# Patient Record
Sex: FEMALE | Race: BLACK OR AFRICAN AMERICAN | NOT HISPANIC OR LATINO | Employment: OTHER | URBAN - METROPOLITAN AREA
[De-identification: names, ages, dates, MRNs, and addresses within clinical notes are randomized per-mention and may not be internally consistent; named-entity substitution may affect disease eponyms.]

---

## 2017-11-29 ENCOUNTER — HOSPITAL ENCOUNTER (EMERGENCY)
Facility: HOSPITAL | Age: 72
Discharge: HOME | End: 2017-11-29
Payer: MEDICARE

## 2017-11-29 VITALS
WEIGHT: 160 LBS | RESPIRATION RATE: 18 BRPM | HEIGHT: 63 IN | TEMPERATURE: 98 F | HEART RATE: 74 BPM | DIASTOLIC BLOOD PRESSURE: 81 MMHG | OXYGEN SATURATION: 97 % | SYSTOLIC BLOOD PRESSURE: 148 MMHG | BODY MASS INDEX: 28.35 KG/M2

## 2017-11-29 DIAGNOSIS — M54.9 BACK PAIN: Primary | ICD-10-CM

## 2017-11-29 DIAGNOSIS — M25.552 ACUTE HIP PAIN, LEFT: ICD-10-CM

## 2017-11-29 DIAGNOSIS — M79.605 LEFT LEG PAIN: ICD-10-CM

## 2017-11-29 DIAGNOSIS — R74.8 ELEVATED CPK: ICD-10-CM

## 2017-11-29 LAB
ANION GAP SERPL CALC-SCNC: 8 MMOL/L
BASOPHILS # BLD AUTO: 0.02 K/UL
BASOPHILS NFR BLD: 0.2 %
BUN SERPL-MCNC: 19 MG/DL
CALCIUM SERPL-MCNC: 9.3 MG/DL
CHLORIDE SERPL-SCNC: 105 MMOL/L
CK SERPL-CCNC: 302 U/L
CO2 SERPL-SCNC: 27 MMOL/L
CREAT SERPL-MCNC: 0.9 MG/DL
D DIMER PPP IA.FEU-MCNC: 0.6 MG/L FEU
DIFFERENTIAL METHOD: ABNORMAL
EOSINOPHIL # BLD AUTO: 0.2 K/UL
EOSINOPHIL NFR BLD: 2.5 %
ERYTHROCYTE [DISTWIDTH] IN BLOOD BY AUTOMATED COUNT: 14 %
EST. GFR  (AFRICAN AMERICAN): >60 ML/MIN/1.73 M^2
EST. GFR  (NON AFRICAN AMERICAN): >60 ML/MIN/1.73 M^2
GLUCOSE SERPL-MCNC: 119 MG/DL
HCT VFR BLD AUTO: 35 %
HGB BLD-MCNC: 11.6 G/DL
LYMPHOCYTES # BLD AUTO: 3.3 K/UL
LYMPHOCYTES NFR BLD: 36.8 %
MAGNESIUM SERPL-MCNC: 2 MG/DL
MCH RBC QN AUTO: 29.1 PG
MCHC RBC AUTO-ENTMCNC: 33.1 G/DL
MCV RBC AUTO: 88 FL
MONOCYTES # BLD AUTO: 0.8 K/UL
MONOCYTES NFR BLD: 8.4 %
NEUTROPHILS # BLD AUTO: 4.7 K/UL
NEUTROPHILS NFR BLD: 52.1 %
PLATELET # BLD AUTO: 341 K/UL
PMV BLD AUTO: 9.5 FL
POTASSIUM SERPL-SCNC: 3.9 MMOL/L
RBC # BLD AUTO: 3.99 M/UL
SODIUM SERPL-SCNC: 140 MMOL/L
WBC # BLD AUTO: 9.04 K/UL

## 2017-11-29 PROCEDURE — 83735 ASSAY OF MAGNESIUM: CPT

## 2017-11-29 PROCEDURE — 82550 ASSAY OF CK (CPK): CPT

## 2017-11-29 PROCEDURE — 80048 BASIC METABOLIC PNL TOTAL CA: CPT

## 2017-11-29 PROCEDURE — 99284 EMERGENCY DEPT VISIT MOD MDM: CPT | Mod: 25

## 2017-11-29 PROCEDURE — 25000003 PHARM REV CODE 250: Performed by: PHYSICIAN ASSISTANT

## 2017-11-29 PROCEDURE — 85379 FIBRIN DEGRADATION QUANT: CPT

## 2017-11-29 PROCEDURE — 96361 HYDRATE IV INFUSION ADD-ON: CPT

## 2017-11-29 PROCEDURE — 96374 THER/PROPH/DIAG INJ IV PUSH: CPT

## 2017-11-29 PROCEDURE — 85025 COMPLETE CBC W/AUTO DIFF WBC: CPT

## 2017-11-29 PROCEDURE — 96375 TX/PRO/DX INJ NEW DRUG ADDON: CPT

## 2017-11-29 PROCEDURE — 63600175 PHARM REV CODE 636 W HCPCS: Performed by: PHYSICIAN ASSISTANT

## 2017-11-29 RX ORDER — METFORMIN HYDROCHLORIDE 1000 MG/1
1000 TABLET ORAL 2 TIMES DAILY WITH MEALS
COMMUNITY
End: 2018-05-10 | Stop reason: SDUPTHER

## 2017-11-29 RX ORDER — LORATADINE 10 MG/1
10 TABLET ORAL DAILY
COMMUNITY

## 2017-11-29 RX ORDER — VITAMIN B COMPLEX
1 CAPSULE ORAL DAILY
Status: ON HOLD | COMMUNITY
End: 2022-08-03 | Stop reason: HOSPADM

## 2017-11-29 RX ORDER — VALSARTAN AND HYDROCHLOROTHIAZIDE 160; 12.5 MG/1; MG/1
1 TABLET, FILM COATED ORAL DAILY
COMMUNITY
End: 2018-05-10 | Stop reason: SDUPTHER

## 2017-11-29 RX ORDER — ASCORBIC ACID 500 MG
500 TABLET ORAL DAILY
Status: ON HOLD | COMMUNITY
End: 2022-08-03 | Stop reason: HOSPADM

## 2017-11-29 RX ORDER — AMLODIPINE BESYLATE 2.5 MG/1
2.5 TABLET ORAL DAILY
COMMUNITY
End: 2018-05-10 | Stop reason: SDUPTHER

## 2017-11-29 RX ORDER — MORPHINE SULFATE 4 MG/ML
4 INJECTION, SOLUTION INTRAMUSCULAR; INTRAVENOUS
Status: COMPLETED | OUTPATIENT
Start: 2017-11-29 | End: 2017-11-29

## 2017-11-29 RX ORDER — ONDANSETRON 2 MG/ML
4 INJECTION INTRAMUSCULAR; INTRAVENOUS
Status: COMPLETED | OUTPATIENT
Start: 2017-11-29 | End: 2017-11-29

## 2017-11-29 RX ORDER — HYDROCODONE BITARTRATE AND ACETAMINOPHEN 5; 325 MG/1; MG/1
1 TABLET ORAL EVERY 4 HOURS PRN
Qty: 10 TABLET | Refills: 0 | Status: SHIPPED | OUTPATIENT
Start: 2017-11-29 | End: 2017-12-06 | Stop reason: SDUPTHER

## 2017-11-29 RX ORDER — CHOLECALCIFEROL (VITAMIN D3) 25 MCG
1000 TABLET ORAL DAILY
Status: ON HOLD | COMMUNITY
End: 2022-08-03 | Stop reason: HOSPADM

## 2017-11-29 RX ORDER — ATORVASTATIN CALCIUM 20 MG/1
20 TABLET, FILM COATED ORAL DAILY
COMMUNITY
End: 2017-11-29 | Stop reason: SINTOL

## 2017-11-29 RX ORDER — ASPIRIN 81 MG/1
81 TABLET ORAL DAILY
Status: ON HOLD | COMMUNITY
End: 2018-03-21

## 2017-11-29 RX ORDER — CIMETIDINE 400 MG/1
400 TABLET, FILM COATED ORAL 2 TIMES DAILY
COMMUNITY
End: 2018-04-12 | Stop reason: CLARIF

## 2017-11-29 RX ADMIN — MORPHINE SULFATE 4 MG: 4 INJECTION, SOLUTION INTRAMUSCULAR; INTRAVENOUS at 07:11

## 2017-11-29 RX ADMIN — ONDANSETRON HYDROCHLORIDE 4 MG: 2 INJECTION, SOLUTION INTRAMUSCULAR; INTRAVENOUS at 07:11

## 2017-11-29 RX ADMIN — SODIUM CHLORIDE 500 ML: 0.9 INJECTION, SOLUTION INTRAVENOUS at 08:11

## 2017-11-30 NOTE — ED PROVIDER NOTES
SCRIBE #1 NOTE: I, Kentrell Bowman, am scribing for, and in the presence of, LEA Newby. I have scribed the entire note.      History      Chief Complaint   Patient presents with    Hip Pain     left hip, leg ,ankle and back pain        Review of patient's allergies indicates:   Allergen Reactions    Iodine and iodide containing products Hives        HPI   HPI    2017, 6:18 PM   History obtained from the patient      History of Present Illness: Indira Walker is a 72 y.o. female patient who presents to the Emergency Department for left leg pain which onset gradually 4 days ago. She says pain is from low back to calf and feels like it is in bone and muscle.  Symptoms are constant and moderate in severity.  Pt states she had similar pain on the right side 8 months ago and was dx with arthritis.  No mitigating or exacerbating factors reported. No associated sx reported Patient denies any saddle anesthesia, focal weakness, bladder/bowel dysfunction, fever, chills, n/v/d, numbness, CP, SOB, HA, lightheadedness, dizziness, back pain, and all other sxs at this time. No further complaints or concerns at this time.         Arrival mode: Personal vehicle     PCP: Leo Rolle MD       Past Medical History:  Past Medical History:   Diagnosis Date    Diabetes mellitus     Hypertension        Past Surgical History:  Past Surgical History:   Procedure Laterality Date     SECTION      HERNIA REPAIR           Family History:  Unknown    Social History:  Social History     Social History Main Topics    Smoking status: Never Smoker    Smokeless tobacco: Unknown    Alcohol use No    Drug use: Unknown    Sexual activity: Unknown       ROS   Review of Systems   Constitutional: Negative for chills and fever.   HENT: Negative for sore throat.    Respiratory: Negative for shortness of breath.    Cardiovascular: Negative for chest pain.   Gastrointestinal: Negative for diarrhea, nausea and vomiting.    Genitourinary: Negative for dysuria.   Musculoskeletal: Negative for back pain and neck pain.        + left leg pain   Skin: Negative for rash.   Neurological: Negative for dizziness, weakness, light-headedness, numbness and headaches.   Hematological: Does not bruise/bleed easily.       Physical Exam      Initial Vitals [11/29/17 1759]   BP Pulse Resp Temp SpO2   (!) 173/75 73 20 98.3 °F (36.8 °C) 96 %      MAP       107.67          Physical Exam  Nursing Notes and Vital Signs Reviewed.  Constitutional: Patient is in no acute distress. Awake and alert. Well-developed and well-nourished.  Head: Atraumatic. Normocephalic.  Eyes: PERRL. EOM intact. Conjunctivae are not pale. No scleral icterus.  ENT: Mucous membranes are moist. Oropharynx is clear and symmetric.    Neck: Supple. Full ROM. No lymphadenopathy.  Cardiovascular: Regular rate. Regular rhythm. No murmurs, rubs, or gallops. Distal pulses are 2+ and symmetric.  Pulmonary/Chest: No respiratory distress. Clear to auscultation bilaterally. No wheezing, rales, or rhonchi.  Abdominal: Soft and non-distended.  There is no tenderness.  No rebound, guarding, or rigidity.    Musculoskeletal: Moves all extremities. No obvious deformities.   LLE: no evident deformity. Mild edema to left lower leg with mild ttp. ROM is normal in hip, knee, and ankle. No heat or erythema.  Cap refill distally is <2 seconds. DP and PT pulses are equal and 2+ bilaterally. No motor deficit. No distal sensory deficit  Skin: Warm and dry.  Neurological:  Alert, awake, and appropriate.  Normal speech.  No acute focal neurological deficits are appreciated.  Strong plantar and dorsiflexion bilaterally  Psychiatric: Normal affect. Good eye contact. Appropriate in content.    ED Course    Procedures  ED Vital Signs:  Vitals:    11/29/17 1759 11/29/17 1954 11/29/17 2054   BP: (!) 173/75 (!) 158/77 (!) 148/81   Pulse: 73 75 74   Resp: 20 18 18   Temp: 98.3 °F (36.8 °C)     TempSrc: Oral     SpO2:  "96% 97% 97%   Weight: 72.6 kg (160 lb)     Height: 5' 3" (1.6 m)         Abnormal Lab Results:  Labs Reviewed   CBC W/ AUTO DIFFERENTIAL - Abnormal; Notable for the following:        Result Value    RBC 3.99 (*)     Hemoglobin 11.6 (*)     Hematocrit 35.0 (*)     All other components within normal limits   BASIC METABOLIC PANEL - Abnormal; Notable for the following:     Glucose 119 (*)     All other components within normal limits   CK - Abnormal; Notable for the following:      (*)     All other components within normal limits   D DIMER, QUANTITATIVE - Abnormal; Notable for the following:     D-Dimer 0.60 (*)     All other components within normal limits   MAGNESIUM        All Lab Results:  Results for orders placed or performed during the hospital encounter of 11/29/17   CBC auto differential   Result Value Ref Range    WBC 9.04 3.90 - 12.70 K/uL    RBC 3.99 (L) 4.00 - 5.40 M/uL    Hemoglobin 11.6 (L) 12.0 - 16.0 g/dL    Hematocrit 35.0 (L) 37.0 - 48.5 %    MCV 88 82 - 98 fL    MCH 29.1 27.0 - 31.0 pg    MCHC 33.1 32.0 - 36.0 g/dL    RDW 14.0 11.5 - 14.5 %    Platelets 341 150 - 350 K/uL    MPV 9.5 9.2 - 12.9 fL    Gran # 4.7 1.8 - 7.7 K/uL    Lymph # 3.3 1.0 - 4.8 K/uL    Mono # 0.8 0.3 - 1.0 K/uL    Eos # 0.2 0.0 - 0.5 K/uL    Baso # 0.02 0.00 - 0.20 K/uL    Gran% 52.1 38.0 - 73.0 %    Lymph% 36.8 18.0 - 48.0 %    Mono% 8.4 4.0 - 15.0 %    Eosinophil% 2.5 0.0 - 8.0 %    Basophil% 0.2 0.0 - 1.9 %    Differential Method Automated    Basic metabolic panel   Result Value Ref Range    Sodium 140 136 - 145 mmol/L    Potassium 3.9 3.5 - 5.1 mmol/L    Chloride 105 95 - 110 mmol/L    CO2 27 23 - 29 mmol/L    Glucose 119 (H) 70 - 110 mg/dL    BUN, Bld 19 8 - 23 mg/dL    Creatinine 0.9 0.5 - 1.4 mg/dL    Calcium 9.3 8.7 - 10.5 mg/dL    Anion Gap 8 8 - 16 mmol/L    eGFR if African American >60 >60 mL/min/1.73 m^2    eGFR if non African American >60 >60 mL/min/1.73 m^2   Magnesium   Result Value Ref Range    Magnesium " 2.0 1.6 - 2.6 mg/dL   CPK   Result Value Ref Range     (H) 20 - 180 U/L   D dimer, quantitative   Result Value Ref Range    D-Dimer 0.60 (H) <0.50 mg/L FEU         Imaging Results:  Imaging Results          US Lower Extremity Veins Left (Final result)  Result time 11/29/17 20:31:01    Final result by Daniel Ma MD (11/29/17 20:31:01)                 Impression:         No sonographic evidence of deep venous thrombosis.      Electronically signed by: DANIEL MA MD  Date:     11/29/17  Time:    20:31              Narrative:    Exam: Venous ultrasound examination of the left lower extremity, including spectral and color flow Doppler.    Indication:     Deep venous thrombosis.    Findings:     The deep venous structures of the left lower extremity are fully patent and compressible throughout their course, with normal respiratory variation and augmentation of flow identified.                             X-Ray Lumbar Spine Ap And Lateral (Final result)  Result time 11/29/17 18:52:06    Final result by Kentrell Preston III, MD (11/29/17 18:52:06)                 Narrative:    Lumbar spine, AP and lateral views.    Clinical indication: Back pain.    Slight scoliosis, convexity to the right. Minimal anterolisthesis of L4-L5. No acute lumbar fracture. No significant subluxation.    Impression    1. no acute lumbar abnormality suggested.      Electronically signed by: KENTRELL PRESTON MD  Date:     11/29/17  Time:    18:52                              X-Ray Hip 2 View Left (Final result)  Result time 11/29/17 18:52:57    Final result by Kentrell Preston III, MD (11/29/17 18:52:57)                 Impression:     Negative AP pelvis and left hip series.      Electronically signed by: KENTRELL PRESTON MD  Date:     11/29/17  Time:    18:52              Narrative:    AP pelvis and left hip.    Hilar indication: Left hip pain.    No acute fracture. No dislocation. No lytic or blastic change.                                       The Emergency Provider reviewed the vital signs and test results, which are outlined above.    ED Discussion         8:44 PM: Reassessed pt at this time.  Pt is awake, alert, and in no distress. Pt states she is feeling better since pain medicine. Will treat for sciatica. Instructed pt to stop taking her Statin until she f/u with Dr. Serrano. Discussed with pt all pertinent ED information and results. Discussed pt dx and plan of tx. Gave pt all f/u and return to the ED instructions. All questions and concerns were addressed at this time. Pt expresses understanding of information and instructions, and is comfortable with plan to discharge. Pt is stable for discharge.    I discussed with patient and/or family/caretaker that evaluation in the ED does not suggest any emergent or life threatening medical conditions requiring immediate intervention beyond what was provided in the ED, and I believe patient is safe for discharge.  Regardless, an unremarkable evaluation in the ED does not preclude the development or presence of a serious of life threatening condition. As such, patient was instructed to return immediately for any worsening or change in current symptoms.      ED Medication(s):  Medications   morphine injection 4 mg (4 mg Intravenous Given 11/29/17 1927)   ondansetron injection 4 mg (4 mg Intravenous Given 11/29/17 1925)   sodium chloride 0.9% bolus 500 mL (0 mLs Intravenous Stopped 11/29/17 2048)       Discharge Medication List as of 11/29/2017  8:47 PM      START taking these medications    Details   hydrocodone-acetaminophen 5-325mg (NORCO) 5-325 mg per tablet Take 1 tablet by mouth every 4 (four) hours as needed for Pain., Starting Wed 11/29/2017, Print             Follow-up Information     Leo Rolle MD In 2 days.    Specialty:  Emergency Medicine  Why:  Stop taking your atorvistatin until you check with Dr Rolle.  Let him know your cpk level was a bit elevated today at  302.  Contact information:  [address]  [facility] [id number]  [phone]                     Medical Decision Making    Medical Decision Making:   Clinical Tests:   Lab Tests: Ordered and Reviewed  Radiological Study: Ordered and Reviewed           Scribe Attestation:   Scribe #1: I performed the above scribed service and the documentation accurately describes the services I performed. I attest to the accuracy of the note.    Attending:   Physician Attestation Statement for Scribe #1: I, LEA Newby, personally performed the services described in this documentation, as scribed by Kentrell Bowman, in my presence, and it is both accurate and complete.          Clinical Impression       ICD-10-CM ICD-9-CM   1. Back pain M54.9 724.5   2. Acute hip pain, left M25.552 719.45   3. Left leg pain M79.605 729.5   4. Elevated CPK R74.8 790.5       Disposition:   Disposition: Discharged  Condition: Stable           Breanna Sosa PA-C  11/29/17 2055

## 2017-12-06 ENCOUNTER — OFFICE VISIT (OUTPATIENT)
Dept: INTERNAL MEDICINE | Facility: CLINIC | Age: 72
End: 2017-12-06
Payer: MEDICARE

## 2017-12-06 VITALS
BODY MASS INDEX: 28.38 KG/M2 | DIASTOLIC BLOOD PRESSURE: 87 MMHG | SYSTOLIC BLOOD PRESSURE: 131 MMHG | OXYGEN SATURATION: 97 % | HEIGHT: 63 IN | TEMPERATURE: 99 F | HEART RATE: 106 BPM | WEIGHT: 160.19 LBS

## 2017-12-06 DIAGNOSIS — R74.8 ELEVATED CPK: ICD-10-CM

## 2017-12-06 DIAGNOSIS — R11.0 NAUSEA: ICD-10-CM

## 2017-12-06 DIAGNOSIS — M54.32 SCIATICA WITHOUT BACK PAIN, LEFT: Primary | ICD-10-CM

## 2017-12-06 DIAGNOSIS — R89.9 ABNORMAL LABORATORY TEST RESULT: ICD-10-CM

## 2017-12-06 PROCEDURE — 99204 OFFICE O/P NEW MOD 45 MIN: CPT | Mod: S$GLB,,, | Performed by: FAMILY MEDICINE

## 2017-12-06 PROCEDURE — 99999 PR PBB SHADOW E&M-EST. PATIENT-LVL IV: CPT | Mod: PBBFAC,,, | Performed by: FAMILY MEDICINE

## 2017-12-06 RX ORDER — GABAPENTIN 300 MG/1
300 CAPSULE ORAL 2 TIMES DAILY
Refills: 1 | COMMUNITY
Start: 2017-11-28 | End: 2019-01-23

## 2017-12-06 RX ORDER — HYDROCODONE BITARTRATE AND ACETAMINOPHEN 5; 325 MG/1; MG/1
1 TABLET ORAL EVERY 8 HOURS PRN
Qty: 15 TABLET | Refills: 0 | Status: ON HOLD | OUTPATIENT
Start: 2017-12-06 | End: 2018-03-21 | Stop reason: HOSPADM

## 2017-12-06 RX ORDER — CYCLOBENZAPRINE HCL 5 MG
5 TABLET ORAL 3 TIMES DAILY PRN
COMMUNITY
End: 2019-01-23

## 2017-12-06 RX ORDER — ATORVASTATIN CALCIUM 20 MG/1
20 TABLET, FILM COATED ORAL DAILY
COMMUNITY
End: 2018-05-10 | Stop reason: SDUPTHER

## 2017-12-06 RX ORDER — MELOXICAM 15 MG/1
15 TABLET ORAL
Status: ON HOLD | COMMUNITY
End: 2018-03-21 | Stop reason: HOSPADM

## 2017-12-06 RX ORDER — ONDANSETRON 4 MG/1
4 TABLET, ORALLY DISINTEGRATING ORAL EVERY 8 HOURS PRN
Qty: 10 TABLET | Refills: 0 | Status: SHIPPED | OUTPATIENT
Start: 2017-12-06 | End: 2019-05-02

## 2017-12-06 RX ORDER — PEN NEEDLE, DIABETIC 31 GX5/16"
NEEDLE, DISPOSABLE MISCELLANEOUS
Refills: 3 | COMMUNITY
Start: 2017-11-12 | End: 2018-05-24 | Stop reason: SDUPTHER
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
## 2017-12-07 ENCOUNTER — LAB VISIT (OUTPATIENT)
Dept: LAB | Facility: HOSPITAL | Age: 72
End: 2017-12-07
Attending: FAMILY MEDICINE
Payer: MEDICARE

## 2017-12-07 DIAGNOSIS — R74.8 ELEVATED CPK: ICD-10-CM

## 2017-12-07 DIAGNOSIS — R89.9 ABNORMAL LABORATORY TEST RESULT: ICD-10-CM

## 2017-12-07 LAB — CK SERPL-CCNC: 111 U/L

## 2017-12-07 PROCEDURE — 36415 COLL VENOUS BLD VENIPUNCTURE: CPT

## 2017-12-07 PROCEDURE — 82550 ASSAY OF CK (CPK): CPT

## 2017-12-11 ENCOUNTER — TELEPHONE (OUTPATIENT)
Dept: INTERNAL MEDICINE | Facility: CLINIC | Age: 72
End: 2017-12-11

## 2017-12-11 ENCOUNTER — OFFICE VISIT (OUTPATIENT)
Dept: INTERNAL MEDICINE | Facility: CLINIC | Age: 72
End: 2017-12-11
Payer: MEDICARE

## 2017-12-11 VITALS
SYSTOLIC BLOOD PRESSURE: 129 MMHG | WEIGHT: 157.94 LBS | HEART RATE: 95 BPM | OXYGEN SATURATION: 99 % | BODY MASS INDEX: 27.98 KG/M2 | DIASTOLIC BLOOD PRESSURE: 81 MMHG | HEIGHT: 63 IN | TEMPERATURE: 98 F

## 2017-12-11 DIAGNOSIS — R15.9 INCONTINENCE OF FECES, UNSPECIFIED FECAL INCONTINENCE TYPE: ICD-10-CM

## 2017-12-11 DIAGNOSIS — M54.32 SCIATICA OF LEFT SIDE: Primary | ICD-10-CM

## 2017-12-11 PROCEDURE — 99213 OFFICE O/P EST LOW 20 MIN: CPT | Mod: S$GLB,,, | Performed by: FAMILY MEDICINE

## 2017-12-11 PROCEDURE — 99999 PR PBB SHADOW E&M-EST. PATIENT-LVL III: CPT | Mod: PBBFAC,,, | Performed by: FAMILY MEDICINE

## 2017-12-11 NOTE — TELEPHONE ENCOUNTER
Ok for the note  wait until after MRI done to return to work unless she feels much better before then

## 2017-12-11 NOTE — PROGRESS NOTES
Subjective:       Patient ID: Indira Walker is a 72 y.o. female.    Chief Complaint: Numbness (has no feeling across top of foot)    She moved into her home one month ago.  She had  2 weeks duration of the left leg pain and several days of numbness of the left foot.  She has a history of hypertension or diabetes with blood sugar minimally elevated on lab 2 weeks ago.  She's had 2 episodes of bowel incontinence.  She has had no bladder incontinence.  She denies fever chills.  She denies any traumatic injuries and reports that she did no heavy lifting moving into her home.      Review of Systems   Constitutional: Negative for chills and fever.   Musculoskeletal: Positive for back pain.   Neurological: Positive for numbness. Negative for weakness and headaches.       Objective:      Physical Exam   Constitutional: She appears well-developed and well-nourished. No distress.   Musculoskeletal:   No lumbar or sciatic tenderness   Neurological:   Straight leg raise is negative.  1+ patellar reflexes bilaterally       Lab Visit on 12/07/2017   Component Date Value Ref Range Status    CPK 12/07/2017 111  20 - 180 U/L Final     Assessment:       1. Sciatica of left side        Plan:   She currently on medications including gabapentin.  MRI of the lumbar spine scheduled.  She is to start physical therapy tomorrow.  Further disposition pending MRI results   Sciatica of left side  -     MRI Lumbar Spine Without Contrast; Future; Expected date: 12/11/2017

## 2017-12-11 NOTE — TELEPHONE ENCOUNTER
The patient seen Dr Herrera on 12/06/2017 and Dr Bruno on 12/11/2017.  She states that she has been out of work since 11/28/2017 and needs a doctor's excuse to cover that and any time needed after that.  She also needs to know when the doctor wants her to go back to work.  She has PT tomorrow and her MRI is next Monday

## 2017-12-18 ENCOUNTER — HOSPITAL ENCOUNTER (OUTPATIENT)
Dept: RADIOLOGY | Facility: HOSPITAL | Age: 72
Discharge: HOME | End: 2017-12-18
Attending: FAMILY MEDICINE
Payer: MEDICARE

## 2017-12-18 DIAGNOSIS — M54.32 SCIATICA OF LEFT SIDE: ICD-10-CM

## 2017-12-18 PROCEDURE — 72148 MRI LUMBAR SPINE W/O DYE: CPT | Mod: TC

## 2017-12-20 ENCOUNTER — OFFICE VISIT (OUTPATIENT)
Dept: INTERNAL MEDICINE | Facility: CLINIC | Age: 72
End: 2017-12-20
Payer: MEDICARE

## 2017-12-20 VITALS
TEMPERATURE: 98 F | HEART RATE: 96 BPM | OXYGEN SATURATION: 98 % | DIASTOLIC BLOOD PRESSURE: 83 MMHG | BODY MASS INDEX: 27.71 KG/M2 | SYSTOLIC BLOOD PRESSURE: 127 MMHG | WEIGHT: 156.44 LBS

## 2017-12-20 DIAGNOSIS — E78.2 HYPERLIPIDEMIA, MIXED: ICD-10-CM

## 2017-12-20 DIAGNOSIS — M54.32 SCIATICA OF LEFT SIDE: Primary | ICD-10-CM

## 2017-12-20 DIAGNOSIS — R29.898 WEAKNESS OF FOOT, LEFT: ICD-10-CM

## 2017-12-20 DIAGNOSIS — R93.7 ABNORMAL MRI, LUMBAR SPINE: ICD-10-CM

## 2017-12-20 PROCEDURE — 99999 PR PBB SHADOW E&M-EST. PATIENT-LVL III: CPT | Mod: PBBFAC,,, | Performed by: FAMILY MEDICINE

## 2017-12-20 PROCEDURE — 99213 OFFICE O/P EST LOW 20 MIN: CPT | Mod: S$GLB,,, | Performed by: FAMILY MEDICINE

## 2017-12-20 RX ORDER — INSULIN DEGLUDEC 200 U/ML
INJECTION, SOLUTION SUBCUTANEOUS
Refills: 3 | COMMUNITY
Start: 2017-12-11 | End: 2017-12-20

## 2017-12-20 NOTE — PROGRESS NOTES
Subjective:       Patient ID: Indira Walker is a 72 y.o. female.    Chief Complaint: 2 week check up    She is here for recheck.  Had an MRI 2 days ago of the lumbar spine which shows left-sided nerve root impingement at the L5-S1 level.  She RTW - worked 3 days last week and 1 1/2 days this week - had MRI this week.  Her pain is not as bad as it was . She sts she missed PT - did not go. Forgot about it.   Having leg pain 4/10 - tl L lower leg to foot. It does extend from mid thigh down.  She has had no further bowel leakage - just occurred twice and not since.    She does note on questioning that she may have been stumbling more.      Review of Systems   Gastrointestinal: Positive for nausea. Negative for constipation and diarrhea.   Genitourinary: Negative for enuresis and frequency.   Neurological: Positive for numbness. Negative for weakness and headaches.       Objective:      Physical Exam   Constitutional: She is oriented to person, place, and time. She appears well-developed.   HENT:   Head: Normocephalic and atraumatic.   Cardiovascular: Normal rate, regular rhythm and normal heart sounds.    Pulmonary/Chest: Effort normal and breath sounds normal.   Musculoskeletal:   Midline TS/LS NTTP. B TS/LS paraspinals  NTTP.  B buttocks NTTP  Hip rotation painless/full B     Neurological: She is alert and oriented to person, place, and time.   MS 5/5 R knees/ankles/ toes F/E, L LE MS 5/5 except dorsiflexion ankle and toes 4+/5  MS B hip AB/AD intact and painless  Negative SLR B     Skin: Skin is warm and dry.   Psychiatric: She has a normal mood and affect. Her behavior is normal.         Assessment/Plan:     1. Sciatica of left side  Ambulatory referral to Neurosurgery   2. Weakness of foot, left  Ambulatory referral to Neurosurgery   3. Abnormal MRI, lumbar spine  Ambulatory referral to Neurosurgery   4. Uncontrolled type 2 diabetes mellitus without complication, without long-term current use of insulin   Hemoglobin A1c   5. Hyperlipidemia, mixed  Lipid panel    AST (SGOT)    ALT (SGPT)    she has some mild left ankle dorsiflexion weakness.  She is doing better in terms of pain.  She states she does not need any refills.  I'm referring her to neurosurgery for further evaluation.  She is encouraged to call back PT and reschedule.

## 2017-12-28 NOTE — PROGRESS NOTES
PHYSICAL THERAPY INITIAL OUTPATIENT EVALUATION    Referring Provider:  Dr. Gladys Herrera    Diagnosis:       ICD-10-CM ICD-9-CM    1. Lumbar radiculopathy, acute M54.16 724.4      Orders:  Evaluate and Treat    Date of Initial Evaluation: 12/29/17    Visit # 1 of 12    SUBJECTIVE: Patient reports that she was diagnosed with sciatica and has pain all the time that goes down her leg (toothache)but her foot is numb. Got a shot in her glut and helped for a little bit.  Was given pain and neuro medication and also an inflammation medicine.  Reports her L LE is also a little swollen too. She reports that is just started at the side of her leg. Reported this started 1 1/2 months ago. Reports that the only change has been in changed residency after living in a [facility] [place] for more than a year- reports doing a lot of moving. Has been been using     Onset: 1 1/2 months   Constant/ intermittent: constant  Aggravating positions:  Straightening her L LE (feels it a lot in the bed)- does worse   Relieving positions: bending L knee  Pain at worst: 10/10  Pain at best: 2/10  Pain currently: 2/10    Goal for Pt: get back to everything I was doing    Occupation:     OBJECTIVE      Gait: decreased L sided weight shift and L ankle drop while gait training, also decreased L hip flexion  Posture/Structure: decreased lordosis    L/sp AROM:   % Pain Present (Y/N) Comments( deviations,  reversal of lordosis, decreased pain with repeated mvts/ tissue on slack) aberrant movements- juddering, painful arc/return from flexion, arben's sign and reversal of lumbopelvic rhythm ,angulations    N     N     N     N     N    BB 75 N      Repeated Fl: negative  Repeated Ex: negative    Hip ROM: WNL    Hip/LE Strength R  L    Hip Fl:   4  3+  Quadriceps:  4+  4  Hamstrings:   4+  4  Ankle Df:  5  3+  Ankle Pf:  5  4  Hip Ab:  4  3+  Hip ADD:  4  4  Hip Ex:  4  4    Palpation (condition, position, mobility  (hypo/hyper): tender to L quad/L lateral quad along IT band, tender to L piriformis, lower lumbar spinous process    Core MM Strength: Trunk flexors: 4     Trunk extensors: 4     Lateral abdominals: 4     Ta: fair    MM extensibility: decreased B quad muscle length    Neuro/Sensation:   Sensation: intact     Special Test: SLR: negative    Slump: negative    Lumbar compression/distraction: negative    SI compression/ distraction: negative    Sacral thrust: negative    Function: Patient reports 50% disability based on the LEFS on initial evaluation.    ASSESSMENT:  The patient is a 72 y.o. year old female who presents to physical therapy with complaints of L LE pain/weakness/numbness.  Patient's impairments include neural compression, decreased L hip/knee/ankle strength, abnormal gait pattern, decreased muscle extensibility, increased pain/adverse symptoms, and decreased core strength.  These impairments are limiting patient's ability to perform ADLS, work, and wanted activities without increase in pain.  Patient's prognosis is good.  Patient will benefit from skilled physical therapy intervention to improve core strength, improve gait quality with decreased L foot drop,  Improve hip/knee/ankle strength, and improve muscle extensibility in order to improve quality of life.    Functional Limitations and Goal:   This patient's primary Physical Therapy goal is to improve his current level of function(Walking and moving around ) with minimal limitations. The patient's current level of impairment is 40-60% Impaired(CK) based on their score of 50% impaired on the LEFS. The Patient is expected to achieve a score of 20-39%(CJ) within 10 days of treatment.    Co-morbidities which may impact the plan of care and potentially impede the patient's progress in therapy include: Dm, HTN    Patients CLINICAL PRESENTATION is STABLE.     Short Term Goals:  1-4 weeks  1.I with HEP  2.Pt to increase lumbar AROM to 100% in all  directions    3. Pt to increase BLE strength by 1/2 grade   4, increase core mm strength to fair +  Long Term Goals: 5-12 weeks  1.Pt to score <10% on the LEFS disability questionnaire  2. Pt to report minimal to no LBP/L LE pain with all activities  3. Pt to demo Good core strength and endurance  4. Pt reports good self management of symptoms and maintenance of HEP    TREATMENT PROVIDED:  -Manual Therapy:  None provided today  -Therapeutic Exercise:  15 min  Seated marches  Supine TA activation  Seated sciatic nerve flossing  Piriformis stretch  -Modalities: none provided today  -Evaluation  -Education: 5 min: on proper posture, body mechanics and condition    PLAN:  Patient will benefit from physical therapy for 12 visits including manual therapy, therapeutic exercise, functional activities, modalities, and patient education.    Thank you for this referral.    These services are reasonable and necessary for the conditions set forth above while under my care.     Gary West, PT, DPT

## 2017-12-29 ENCOUNTER — CLINICAL SUPPORT (OUTPATIENT)
Dept: REHABILITATION | Facility: HOSPITAL | Age: 72
End: 2017-12-29
Payer: MEDICARE

## 2017-12-29 DIAGNOSIS — R20.0 LEFT LEG NUMBNESS: Primary | ICD-10-CM

## 2017-12-29 PROCEDURE — 97110 THERAPEUTIC EXERCISES: CPT

## 2017-12-29 PROCEDURE — G8978 MOBILITY CURRENT STATUS: HCPCS | Mod: CK

## 2017-12-29 PROCEDURE — G8979 MOBILITY GOAL STATUS: HCPCS | Mod: CI

## 2017-12-29 PROCEDURE — 97161 PT EVAL LOW COMPLEX 20 MIN: CPT

## 2018-01-03 ENCOUNTER — PATIENT OUTREACH (OUTPATIENT)
Dept: ADMINISTRATIVE | Facility: HOSPITAL | Age: 73
End: 2018-01-03

## 2018-01-03 NOTE — LETTER
January 3, 2018    Indira Walker  [address] [address]             [facility]  [address]  [place] [address]  Phone: [phone] Dear Mrs. Walker:    [facility] is committed to your overall health.  To help you get the most out of each of your visits, we will review your information to make sure you are up to date on all of your recommended tests and/or procedures.      Gladys Herrera MD has found that you may be due for   Health Maintenance Due   Topic    Lipid Panel     Hemoglobin A1c     Foot Exam     Eye Exam     TETANUS VACCINE     Mammogram     DEXA SCAN     Colonoscopy     Zoster Vaccine     Pneumococcal (65+) (1 of 2 - PCV13)    Influenza Vaccine         If you have had any of the above done at another facility, please bring the records or information with you so that your record at [facility] will be complete.    If you are currently taking medication, please bring it with you to your appointment for review.    We will be happy to assist you with scheduling any necessary appointments or you may contact the [facility] appointment desk at [phone] to schedule at your convenience.     Thank you for choosing [facility] for your healthcare needs,    Joan BOCANEGRA, LPN Care Coordinator  [facility]  [phone]

## 2018-01-04 ENCOUNTER — TELEPHONE (OUTPATIENT)
Dept: INTERNAL MEDICINE | Facility: CLINIC | Age: 73
End: 2018-01-04

## 2018-01-04 NOTE — TELEPHONE ENCOUNTER
----- Message from Christian Fletcher sent at 1/4/2018 12:49 PM CST -----  Pt is requesting a call from nurse. Personal.          Please call pt back at [phone]

## 2018-01-04 NOTE — TELEPHONE ENCOUNTER
Pt was calling in regards to getting an excuse for January 15-31 due to her sciatica pain. Patient states that she is retiring this month, the 31 st is her last day of actual work. But says that she does have other appointments through out this time period.    Please Advsie

## 2018-01-05 NOTE — TELEPHONE ENCOUNTER
She would need to see me to obtain an excuse such as she is requesting.  Please schedule a sooner appointment to see me

## 2018-01-08 ENCOUNTER — TELEPHONE (OUTPATIENT)
Dept: INTERNAL MEDICINE | Facility: CLINIC | Age: 73
End: 2018-01-08

## 2018-01-08 NOTE — TELEPHONE ENCOUNTER
----- Message from Gege Rodriguez sent at 1/8/2018  3:07 PM CST -----  Patient would like for you to call her at [phone].  This is all she would tell me.                                      mitchell

## 2018-01-08 NOTE — TELEPHONE ENCOUNTER
Pt was calling to verify her appt date and time. Gave pt the information. Pt verbalized understanding of the information given.

## 2018-01-11 ENCOUNTER — CLINICAL SUPPORT (OUTPATIENT)
Dept: INTERNAL MEDICINE | Facility: CLINIC | Age: 73
End: 2018-01-11
Payer: MEDICARE

## 2018-01-11 DIAGNOSIS — E78.2 HYPERLIPIDEMIA, MIXED: ICD-10-CM

## 2018-01-11 LAB
ALT SERPL W/O P-5'-P-CCNC: 18 U/L
AST SERPL-CCNC: 20 U/L
CHOLEST SERPL-MCNC: 194 MG/DL
CHOLEST/HDLC SERPL: 3.2 {RATIO}
ESTIMATED AVG GLUCOSE: 171 MG/DL
HBA1C MFR BLD HPLC: 7.6 %
HDLC SERPL-MCNC: 61 MG/DL
HDLC SERPL: 31.4 %
LDLC SERPL CALC-MCNC: 120.6 MG/DL
NONHDLC SERPL-MCNC: 133 MG/DL
TRIGL SERPL-MCNC: 62 MG/DL

## 2018-01-11 PROCEDURE — 83036 HEMOGLOBIN GLYCOSYLATED A1C: CPT

## 2018-01-11 PROCEDURE — 84460 ALANINE AMINO (ALT) (SGPT): CPT

## 2018-01-11 PROCEDURE — 80061 LIPID PANEL: CPT

## 2018-01-11 PROCEDURE — 84450 TRANSFERASE (AST) (SGOT): CPT

## 2018-01-11 PROCEDURE — 99999 PR PBB SHADOW E&M-EST. PATIENT-LVL I: CPT | Mod: PBBFAC,,,

## 2018-01-16 ENCOUNTER — TELEPHONE (OUTPATIENT)
Dept: INTERNAL MEDICINE | Facility: CLINIC | Age: 73
End: 2018-01-16

## 2018-01-16 NOTE — TELEPHONE ENCOUNTER
Pt was calling in regards to checking to see if the clinic will be opened on tomorrow. Advised the pt that we would call her before the end of the day to inform her and if need be reschedule her. Pt verbalized understanding of the information given.

## 2018-01-16 NOTE — TELEPHONE ENCOUNTER
----- Message from Daisy Morse sent at 1/16/2018 12:01 PM CST -----  Contact: Pt  Please give pt a call at ..[phone] (zbvs) regarding the weather.

## 2018-01-16 NOTE — TELEPHONE ENCOUNTER
----- Message from Daisy Morse sent at 1/16/2018 12:01 PM CST -----  Contact: Pt  Please give pt a call at ..[phone] (wroq) regarding the weather.

## 2018-01-17 ENCOUNTER — OFFICE VISIT (OUTPATIENT)
Dept: INTERNAL MEDICINE | Facility: CLINIC | Age: 73
End: 2018-01-17
Payer: MEDICARE

## 2018-01-17 VITALS
OXYGEN SATURATION: 95 % | HEART RATE: 85 BPM | SYSTOLIC BLOOD PRESSURE: 134 MMHG | TEMPERATURE: 98 F | HEIGHT: 63 IN | WEIGHT: 161.19 LBS | BODY MASS INDEX: 28.56 KG/M2 | DIASTOLIC BLOOD PRESSURE: 82 MMHG

## 2018-01-17 DIAGNOSIS — E78.2 HYPERLIPIDEMIA, MIXED: ICD-10-CM

## 2018-01-17 DIAGNOSIS — I10 ESSENTIAL HYPERTENSION: ICD-10-CM

## 2018-01-17 DIAGNOSIS — Z12.39 BREAST SCREENING: ICD-10-CM

## 2018-01-17 DIAGNOSIS — Z13.820 OSTEOPOROSIS SCREENING: ICD-10-CM

## 2018-01-17 DIAGNOSIS — M54.32 SCIATICA WITHOUT BACK PAIN, LEFT: ICD-10-CM

## 2018-01-17 PROCEDURE — 99999 PR PBB SHADOW E&M-EST. PATIENT-LVL III: CPT | Mod: PBBFAC,,, | Performed by: FAMILY MEDICINE

## 2018-01-17 PROCEDURE — 99214 OFFICE O/P EST MOD 30 MIN: CPT | Mod: S$GLB,,, | Performed by: FAMILY MEDICINE

## 2018-01-17 RX ORDER — INSULIN DEGLUDEC 200 U/ML
INJECTION, SOLUTION SUBCUTANEOUS
Refills: 3 | COMMUNITY
Start: 2018-01-10 | End: 2018-01-17 | Stop reason: SDUPTHER

## 2018-01-17 NOTE — PROGRESS NOTES
Subjective:       Patient ID: Indira Walker is a 72 y.o. female.    Chief Complaint: Follow-up (blood results)    Follow-up diabetes, hypertension, hyperlipidemia, left leg sciatica.  She reports her last A1c 3 months ago was 9+.  She is monitoring her blood sugars at home and they have improved.  She continues on a diabetic diet as well as taking metformin.  She denies any headache chest pain palpitations or shortness of breath or edema.  She denies polyuria polydipsia.  She has a left leg sciatica and currently is in physical therapy.  Neurology follow-up as planned.  She requested two-week leave of absence from work due to sciatica.  in 2 weeks she is planning on retiring from her work as an .      Review of Systems   Constitutional: Negative for activity change, appetite change, fatigue and unexpected weight change.   Respiratory: Negative for cough, chest tightness, shortness of breath and wheezing.    Cardiovascular: Negative for chest pain, palpitations and leg swelling.        No lightheadedness or syncope   Gastrointestinal: Negative for abdominal pain.   Genitourinary: Negative for difficulty urinating.   Neurological: Negative for dizziness, syncope, speech difficulty, light-headedness and headaches.        Left leg sciatica       Objective:      Physical Exam   Constitutional: She is oriented to person, place, and time. She appears well-developed and well-nourished. No distress.   Neck: Neck supple. No JVD present. No thyromegaly present.   Cardiovascular: Normal rate, regular rhythm and normal heart sounds.    No murmur heard.  Pulmonary/Chest: Effort normal and breath sounds normal. No respiratory distress. She has no wheezes.   Abdominal: Soft. Bowel sounds are normal. She exhibits no mass. There is no tenderness.   Lymphadenopathy:     She has no cervical adenopathy.   Neurological: She is alert and oriented to person, place, and time.   Skin: She is not diaphoretic.       Clinical  Support on 01/11/2018   Component Date Value Ref Range Status    Hemoglobin A1C 01/11/2018 7.6* 4.0 - 5.6 % Final    Estimated Avg Glucose 01/11/2018 171* 68 - 131 mg/dL Final    Cholesterol 01/11/2018 194  120 - 199 mg/dL Final    Triglycerides 01/11/2018 62  30 - 150 mg/dL Final    HDL 01/11/2018 61  40 - 75 mg/dL Final    LDL Cholesterol 01/11/2018 120.6  63.0 - 159.0 mg/dL Final    HDL/Chol Ratio 01/11/2018 31.4  20.0 - 50.0 % Final    Total Cholesterol/HDL Ratio 01/11/2018 3.2  2.0 - 5.0 Final    Non-HDL Cholesterol 01/11/2018 133  mg/dL Final    AST 01/11/2018 20  10 - 40 U/L Final    ALT 01/11/2018 18  10 - 44 U/L Final     Assessment:       1. Breast screening    2. Osteoporosis screening    3. Uncontrolled type 2 diabetes mellitus without complication, without long-term current use of insulin    4. Essential hypertension    5. Hyperlipidemia, mixed        Plan:   Blood pressure controlled  will continue current medication.  A1c improved and now 7.6.  Continue metformin as this.  Increase Trishiba 20 units at bedtime and start 22 units at bedtime.  Lipid profile is normal.  Health maintenance was reviewed.  She is not quite sure what she has had.  She will obtain records from previous physician for our review.  In the meantime will order mammogram and DEXA scan.  Form completed for two-week leave of absence from work.  Follow-up with Dr. Herrera  in 3 months      Breast screening  -     Mammo Digital Screening Bilat with CAD; Future; Expected date: 01/17/2018    Osteoporosis screening  -     DXA Bone Density Appendicular Skeleton; Future; Expected date: 01/17/2018    Uncontrolled type 2 diabetes mellitus without complication, without long-term current use of insulin  -     Hemoglobin A1c; Future; Expected date: 04/17/2018  -     Microalbumin/creatinine urine ratio; Future; Expected date: 04/17/2018    Essential hypertension  -     CBC auto differential; Future; Expected date: 04/17/2018  -      Comprehensive metabolic panel; Future; Expected date: 04/17/2018    Hyperlipidemia, mixed

## 2018-01-26 ENCOUNTER — INITIAL CONSULT (OUTPATIENT)
Dept: NEUROSURGERY | Facility: CLINIC | Age: 73
End: 2018-01-26
Payer: MEDICARE

## 2018-01-26 VITALS
DIASTOLIC BLOOD PRESSURE: 68 MMHG | HEIGHT: 63 IN | WEIGHT: 159.81 LBS | SYSTOLIC BLOOD PRESSURE: 132 MMHG | BODY MASS INDEX: 28.32 KG/M2 | HEART RATE: 62 BPM

## 2018-01-26 DIAGNOSIS — M54.32 SCIATICA WITHOUT BACK PAIN, LEFT: Primary | ICD-10-CM

## 2018-01-26 DIAGNOSIS — M21.372 FOOT DROP, LEFT: ICD-10-CM

## 2018-01-26 PROCEDURE — 99205 OFFICE O/P NEW HI 60 MIN: CPT | Mod: S$GLB,,, | Performed by: NEUROLOGICAL SURGERY

## 2018-01-26 NOTE — LETTER
January 26, 2018      Gladys Herrera MD  [address] [place] [address] [address] [place] [address]           [facility]  [address] [place] [address]   Second Floor  [address] [place] [address]  Phone: [phone]  Fax: [phone]          Patient: Indira Walker   MR Number: [id number]   YOB: 1945   Date of Visit: 1/26/2018       Dear Dr. Gladys Herrera:    Thank you for referring Indira Walker to me for evaluation. Attached you will find relevant portions of my assessment and plan of care.    If you have questions, please do not hesitate to call me. I look forward to following Indira Walker along with you.    Sincerely,    Shruthi Segundo MD    Enclosure  CC:  No Recipients    If you would like to receive this communication electronically, please contact [email or web address] or [phone] to request more information on [employer] Link access.    For providers and/or their staff who would like to refer a patient to [facility], please contact us through our one-stop-shop provider referral line, [facility], at [phone].    If you feel you have received this communication in error or would no longer like to receive these types of communications, please e-mail [email or web address]

## 2018-01-26 NOTE — PROGRESS NOTES
Neurosurgery History and Physical    Patient ID: Indira Walker is a 72 y.o. female.    Chief Complaint   Patient presents with    Extremity Pain     haviing pain in lower left leg and foot some pain in lt buttocks  states feels like half of her foot is dead   onset of pain in November of 2017  has never seen a neuro surgern or pain management  has never received a steroid injection in her back         Review of Systems   Constitutional: Positive for activity change.   HENT: Negative.    Eyes: Negative.    Respiratory: Negative.    Cardiovascular: Positive for chest pain.   Gastrointestinal: Negative.    Endocrine: Negative.    Genitourinary: Negative.    Musculoskeletal: Positive for back pain and gait problem.   Allergic/Immunologic: Negative.    Neurological: Positive for weakness and numbness.   Hematological: Negative.    Psychiatric/Behavioral: Negative.        Past Medical History:   Diagnosis Date    Diabetes mellitus, type 2     Hyperlipidemia     Hypertension      Social History     Social History    Marital status:      Spouse name: N/A    Number of children: 2    Years of education: N/A     Occupational History      [employer]     Social History Main Topics    Smoking status: Never Smoker    Smokeless tobacco: Never Used    Alcohol use No    Drug use: No    Sexual activity: Not Currently     Partners: Male     Other Topics Concern    Not on file     Social History Narrative    No narrative on file     No family history on file.  Review of patient's allergies indicates:   Allergen Reactions    Iodine and iodide containing products Hives       Current Outpatient Prescriptions:     amLODIPine (NORVASC) 2.5 MG tablet, Take 2.5 mg by mouth once daily., Disp: , Rfl:     ascorbic acid, vitamin C, (VITAMIN C) 1000 MG tablet, Take 1,000 mg by mouth once daily., Disp: , Rfl:     aspirin (ECOTRIN) 81 MG EC tablet, Take 81 mg by mouth once daily. Every other day, Disp:  ", Rfl:     atorvastatin (LIPITOR) 20 MG tablet, Take 20 mg by mouth once daily., Disp: , Rfl:     b complex vitamins capsule, Take 1 capsule by mouth once daily., Disp: , Rfl:     BD INSULIN PEN NEEDLE UF MINI 31 gauge x 3/16" Ndle, AS DIRECTED ONCE A DAY WITH INSULIN SUBQ 90 DAYS, Disp: , Rfl: 3    cimetidine (TAGAMET) 400 MG tablet, Take 400 mg by mouth 2 (two) times daily., Disp: , Rfl:     cyclobenzaprine (FLEXERIL) 5 MG tablet, Take 5 mg by mouth 3 (three) times daily as needed for Muscle spasms., Disp: , Rfl:     gabapentin (NEURONTIN) 300 MG capsule, Take 300 mg by mouth 2 (two) times daily., Disp: , Rfl: 1    hydrocodone-acetaminophen 5-325mg (NORCO) 5-325 mg per tablet, Take 1 tablet by mouth every 8 (eight) hours as needed for Pain., Disp: 15 tablet, Rfl: 0    INSULIN DEGLUDEC (TRESIBA FLEXTOUCH U-200 SUBQ), Inject 20 Units into the skin every evening. , Disp: , Rfl:     loratadine (CLARITIN) 10 mg tablet, Take 10 mg by mouth once daily., Disp: , Rfl:     meloxicam (MOBIC) 15 MG tablet, Take 15 mg by mouth once daily., Disp: , Rfl:     metFORMIN (GLUCOPHAGE) 1000 MG tablet, Take 1,000 mg by mouth 2 (two) times daily with meals., Disp: , Rfl:     MULTIVIT-MINERALS/FERROUS FUM (MULTI VITAMIN ORAL), Take by mouth. For women over 50 (centrum), Disp: , Rfl:     OMEGA-3S-DHA-EPA-FISH OIL ORAL, Take by mouth. Omega XL  Green lipped Mussel, Disp: , Rfl:     ondansetron (ZOFRAN-ODT) 4 MG TbDL, Take 1 tablet (4 mg total) by mouth every 8 (eight) hours as needed (nausea)., Disp: 10 tablet, Rfl: 0    valsartan-hydrochlorothiazide (DIOVAN-HCT) 160-12.5 mg per tablet, Take 1 tablet by mouth once daily., Disp: , Rfl:     vitamin D 1000 units Tab, Take 1,000 Units by mouth once daily., Disp: , Rfl:   Blood pressure 132/68, pulse 62, height 5' 3" (1.6 m), weight 72.5 kg (159 lb 13.3 oz).      Neurologic Exam     Mental Status   Oriented to person, place, and time.   Attention: normal. Concentration: normal. "   Speech: speech is normal   Level of consciousness: alert  Knowledge: good.     Cranial Nerves     CN II   Visual acuity: normal    CN III, IV, VI   Pupils are equal, round, and reactive to light.  Extraocular motions are normal.     CN V   Facial sensation intact.     CN VII   Facial expression full, symmetric.     CN VIII   Hearing: intact    CN IX, X   Palate: symmetric    CN XI   CN XI normal.     CN XII   CN XII normal.     Motor Exam   Muscle bulk: normal  Overall muscle tone: normal  Right arm pronator drift: absent  Left arm pronator drift: absent    Strength   Right deltoid: 5/5  Left deltoid: 5/5  Right biceps: 5/5  Left biceps: 5/5  Right triceps: 5/5  Left triceps: 5/5  Right wrist flexion: 5/5  Left wrist flexion: 5/5  Right wrist extension: 5/5  Left wrist extension: 5/5  Right interossei: 5/5  Left interossei: 5/5  Right iliopsoas: 5/5  Left iliopsoas: 5/5  Right quadriceps: 5/5  Left quadriceps: 5/5  Right hamstrin/5  Left hamstrin/5  Right anterior tibial: 5/5  Left anterior tibial: 3/5  Right posterior tibial: 5/5  Left posterior tibial: 3/5  Right peroneal: 5/5  Left peroneal: 3/5  Right gastroc: 5/5  Left gastroc: 5/5    Sensory Exam   Light touch normal.   Sensory deficit distribution on left: L5    Gait, Coordination, and Reflexes     Gait  Gait: normal    Coordination   Romberg: negative  Finger to nose coordination: normal    Tremor   Resting tremor: absent    Reflexes   Right brachioradialis: 2+  Left brachioradialis: 2+  Right biceps: 2+  Left biceps: 2+  Right triceps: 2+  Left triceps: 2+  Right patellar: 2+  Left patellar: 2+  Right achilles: 1+  Left achilles: 1+  Right plantar: normal  Left plantar: normal  Right Manzano: absent  Left Manzano: absent  Right ankle clonus: absent  Left ankle clonus: absent      Physical Exam   Constitutional: She is oriented to person, place, and time. She appears well-developed and well-nourished.   HENT:   Head: Normocephalic and atraumatic.  "  Eyes: EOM are normal. Pupils are equal, round, and reactive to light.   Neck: Normal range of motion. Neck supple.   Cardiovascular: Normal rate and regular rhythm.    Pulmonary/Chest: Effort normal.   Abdominal: Soft.   Musculoskeletal: Normal range of motion.   Neurological: She is alert and oriented to person, place, and time. She has a normal Finger-Nose-Finger Test and a normal Romberg Test. Gait normal.   Reflex Scores:       Tricep reflexes are 2+ on the right side and 2+ on the left side.       Bicep reflexes are 2+ on the right side and 2+ on the left side.       Brachioradialis reflexes are 2+ on the right side and 2+ on the left side.       Patellar reflexes are 2+ on the right side and 2+ on the left side.       Achilles reflexes are 1+ on the right side and 1+ on the left side.  Skin: Skin is warm and dry.   Psychiatric: She has a normal mood and affect. Her speech is normal and behavior is normal. Judgment and thought content normal.   Nursing note and vitals reviewed.      Vital Signs  Pulse: 62  BP: 132/68  Pain Score:   5  Pain Loc: Leg  Height and Weight  Height: 5' 3" (160 cm)  Weight: 72.5 kg (159 lb 13.3 oz)  BSA (Calculated - sq m): 1.8 sq meters  BMI (Calculated): 28.4  Weight in (lb) to have BMI = 25: 140.8]    Provider dictation:  I reviewed the imaging. She has a large rostrally extruded L5-S1 disc fragment compressing the left L5 nerve root. Clinicaly, she has a left foot drop and L5 distribution numbness. Her symptoms began 2 months ago and have not improved. She has had several neer fals because her foot gets caught and has been on sick leave. At this point, because of her weakness and severe radiographic appearance of the disc herniation, I recommend surgical decompression to give her the best chance at recovering some strength. I have offered her a minimally invasive left L5-S1 microdiscectomy to decompress her nerve root. I have discussed the risks, benefits and alternatives to the " proposed operation in detail. All questions were answered. Risks discussed include but are not limited to: bleeding, infection, spinal fluid leak, injury to the nerves, weakness, numbness, paralysis, loss of bowel or bladder function, injury to the blood vessels, stroke, heart attack, blood clots, destabilization, no improvement or worsening of symptoms, re-herniation, need for more surgery including fusion, death. She wishes to proceed. She does endorse sometimes having chest pain and states she has never seen a cardiologist. We will refer her to a cardiologist for evaluation and surgical clearance. She will also need clearance from her PCP.      Visit Diagnosis:  Sciatica without back pain, left    Foot drop, left

## 2018-02-01 DIAGNOSIS — M54.32 SCIATICA WITHOUT BACK PAIN, LEFT: Primary | ICD-10-CM

## 2018-02-01 DIAGNOSIS — M21.372 FOOT DROP, LEFT: ICD-10-CM

## 2018-02-01 RX ORDER — LIDOCAINE HYDROCHLORIDE 10 MG/ML
1 INJECTION, SOLUTION EPIDURAL; INFILTRATION; INTRACAUDAL; PERINEURAL ONCE
Status: CANCELLED | OUTPATIENT
Start: 2018-02-01 | End: 2018-02-01

## 2018-02-01 RX ORDER — SODIUM CHLORIDE, SODIUM LACTATE, POTASSIUM CHLORIDE, CALCIUM CHLORIDE 600; 310; 30; 20 MG/100ML; MG/100ML; MG/100ML; MG/100ML
INJECTION, SOLUTION INTRAVENOUS CONTINUOUS
Status: CANCELLED | OUTPATIENT
Start: 2018-02-01

## 2018-02-02 ENCOUNTER — TELEPHONE (OUTPATIENT)
Dept: NEUROSURGERY | Facility: CLINIC | Age: 73
End: 2018-02-02

## 2018-02-02 NOTE — TELEPHONE ENCOUNTER
Spoke with pt and confirmed her appt with Cardiologist on Monday. Also confirmed surgery pre-op and post-op appts with her. Pt verbalized understanding.

## 2018-02-02 NOTE — TELEPHONE ENCOUNTER
----- Message from Keke Hope sent at 2/2/2018  8:56 AM CST -----  Contact: pt   Pt returning a call and she needs to give some info to office,,,,Please call pt back at [phone]

## 2018-02-05 ENCOUNTER — TELEPHONE (OUTPATIENT)
Dept: INTERNAL MEDICINE | Facility: CLINIC | Age: 73
End: 2018-02-05

## 2018-02-05 ENCOUNTER — OFFICE VISIT (OUTPATIENT)
Dept: CARDIOLOGY | Facility: CLINIC | Age: 73
End: 2018-02-05
Payer: MEDICARE

## 2018-02-05 VITALS
HEIGHT: 63 IN | DIASTOLIC BLOOD PRESSURE: 75 MMHG | HEART RATE: 78 BPM | WEIGHT: 161.38 LBS | SYSTOLIC BLOOD PRESSURE: 135 MMHG | BODY MASS INDEX: 28.59 KG/M2

## 2018-02-05 DIAGNOSIS — Z91.89 AT RISK FOR CORONARY ARTERY DISEASE: ICD-10-CM

## 2018-02-05 DIAGNOSIS — M54.32 SCIATICA WITHOUT BACK PAIN, LEFT: ICD-10-CM

## 2018-02-05 DIAGNOSIS — R07.9 CHEST PAIN, UNSPECIFIED TYPE: ICD-10-CM

## 2018-02-05 DIAGNOSIS — E78.2 HYPERLIPIDEMIA, MIXED: ICD-10-CM

## 2018-02-05 DIAGNOSIS — Z01.810 PRE-OPERATIVE CARDIOVASCULAR EXAMINATION: Primary | ICD-10-CM

## 2018-02-05 DIAGNOSIS — R07.89 OTHER CHEST PAIN: ICD-10-CM

## 2018-02-05 DIAGNOSIS — I10 ESSENTIAL HYPERTENSION: ICD-10-CM

## 2018-02-05 PROCEDURE — 99205 OFFICE O/P NEW HI 60 MIN: CPT | Mod: S$GLB,,, | Performed by: INTERNAL MEDICINE

## 2018-02-05 PROCEDURE — 99999 PR PBB SHADOW E&M-EST. PATIENT-LVL III: CPT | Mod: PBBFAC,,, | Performed by: INTERNAL MEDICINE

## 2018-02-05 PROCEDURE — 3008F BODY MASS INDEX DOCD: CPT | Mod: S$GLB,,, | Performed by: INTERNAL MEDICINE

## 2018-02-05 PROCEDURE — 1159F MED LIST DOCD IN RCRD: CPT | Mod: S$GLB,,, | Performed by: INTERNAL MEDICINE

## 2018-02-05 PROCEDURE — 93000 ELECTROCARDIOGRAM COMPLETE: CPT | Mod: S$GLB,,, | Performed by: NUCLEAR MEDICINE

## 2018-02-05 NOTE — LETTER
February 5, 2018      Shruthi Segundo MD  [address] [facility] [address] [place] [address]           [facility]  [id number] [place] [address]  Phone: [phone]  Fax: [phone]          Patient: Indira Walker   MR Number: [id number]   YOB: 1945   Date of Visit: 2/5/2018       Dear Dr. Shruthi Segundo:    Thank you for referring Indira Walker to me for evaluation. Attached you will find relevant portions of my assessment and plan of care.    If you have questions, please do not hesitate to call me. I look forward to following Indira Walker along with you.    Sincerely,    Scott Rosen MD    Enclosure  CC:  No Recipients    If you would like to receive this communication electronically, please contact [email or web address] or [phone] to request more information on [employer] Link access.    For providers and/or their staff who would like to refer a patient to [facility], please contact us through our one-stop-shop provider referral line, [facility], at [phone].    If you feel you have received this communication in error or would no longer like to receive these types of communications, please e-mail [email or web address]

## 2018-02-05 NOTE — TELEPHONE ENCOUNTER
Request received for surgical clearance of the patient for a minimally invasive microdiscectomy under general anesthesia lasting approximately 2 hours.  She is to hold all anticoagulants/anti-inflammatory medications for 5-7 days prior to surgery.  Requests comes from the office of Dr. Lemos dated 2/1/18.  Surgery is scheduled for 3/21/18.    1.  Please contact the patient to schedule a preoperative evaluation.  Since the surgery is not scheduled until 3/21/18, her preoperative evaluation would not be any earlier than the last week of February, and could be performed in the first week of March.

## 2018-02-05 NOTE — PROGRESS NOTES
Subjective:   Patient ID:  Indira Walker is a 72 y.o. female who presents for cardiac consult of Pre-op Exam      HPI  The patient came in today for cardiac consult of Pre-op Exam    Referring Provider: Shruthi Segundo MD   Reason for consult: Pre-OP eval    18    This is a 72 year old female pt with PMHx HTN, HLD, DM2, Sciatica presents for pre-op CV evaluation prior to microdiscectomy.     She presents for pre-op clearance for sciata needing surgery on 3/21/18 with Dr. Segundo, Neurosurgeon, at [facility]. The surgery is a minimally invasive microdiscectomy and will be under general anesthesia per notes. MRI of the lumbar spine showed left-sided nerve root impingement at the L5-S1 level.  Pt has right foot drop with pain, shooting sharp pain. Pt has been to rehab once but will reschedule soon.     Pt does have intermittent, sharp chest pain. Pt thought it was due food or stress. Chest pain was left sided, non-radiating, pain yesterday lasted about 30 min. Occasionally gets palpitations, occurred last year. Mild dyspnea with a lot of exertion but usually okay.     Patient feels no leg swelling, no PND,  no dizziness, no syncope, no CNS symptoms.    Patient is compliant with medications.    18 ECG - NSR, cannot rule out anterior MI, poor RWP      Past Medical History:   Diagnosis Date    Diabetes mellitus, type 2     Hyperlipidemia     Hypertension        Past Surgical History:   Procedure Laterality Date     SECTION      HERNIA REPAIR         Social History   Substance Use Topics    Smoking status: Never Smoker    Smokeless tobacco: Never Used    Alcohol use No       History reviewed. No pertinent family history.    Patient's Medications   New Prescriptions    No medications on file   Previous Medications    AMLODIPINE (NORVASC) 2.5 MG TABLET    Take 2.5 mg by mouth once daily.    ASCORBIC ACID, VITAMIN C, (VITAMIN C) 1000 MG TABLET    Take 1,000 mg by mouth  "once daily.    ASPIRIN (ECOTRIN) 81 MG EC TABLET    Take 81 mg by mouth once daily. Every other day    ATORVASTATIN (LIPITOR) 20 MG TABLET    Take 20 mg by mouth once daily.    B COMPLEX VITAMINS CAPSULE    Take 1 capsule by mouth once daily.    BD INSULIN PEN NEEDLE UF MINI 31 GAUGE X 3/16" NDLE    AS DIRECTED ONCE A DAY WITH INSULIN SUBQ 90 DAYS    CIMETIDINE (TAGAMET) 400 MG TABLET    Take 400 mg by mouth 2 (two) times daily.    CYCLOBENZAPRINE (FLEXERIL) 5 MG TABLET    Take 5 mg by mouth 3 (three) times daily as needed for Muscle spasms.    GABAPENTIN (NEURONTIN) 300 MG CAPSULE    Take 300 mg by mouth 2 (two) times daily.    HYDROCODONE-ACETAMINOPHEN 5-325MG (NORCO) 5-325 MG PER TABLET    Take 1 tablet by mouth every 8 (eight) hours as needed for Pain.    INSULIN DEGLUDEC (TRESIBA FLEXTOUCH U-200 SUBQ)    Inject 20 Units into the skin every evening.     LORATADINE (CLARITIN) 10 MG TABLET    Take 10 mg by mouth once daily.    MELOXICAM (MOBIC) 15 MG TABLET    Take 15 mg by mouth as needed.     METFORMIN (GLUCOPHAGE) 1000 MG TABLET    Take 1,000 mg by mouth 2 (two) times daily with meals.    MULTIVIT-MINERALS/FERROUS FUM (MULTI VITAMIN ORAL)    Take by mouth. For women over 50 (centrum)    OMEGA-3S-DHA-EPA-FISH OIL ORAL    Take by mouth. Omega XL  Green lipped Mussel    ONDANSETRON (ZOFRAN-ODT) 4 MG TBDL    Take 1 tablet (4 mg total) by mouth every 8 (eight) hours as needed (nausea).    VALSARTAN-HYDROCHLOROTHIAZIDE (DIOVAN-HCT) 160-12.5 MG PER TABLET    Take 1 tablet by mouth once daily.    VITAMIN D 1000 UNITS TAB    Take 1,000 Units by mouth once daily.   Modified Medications    No medications on file   Discontinued Medications    No medications on file       Review of Systems   Constitutional: Negative.    HENT: Negative.    Eyes: Negative.    Respiratory: Positive for shortness of breath.    Cardiovascular: Positive for chest pain and palpitations.   Gastrointestinal: Negative.    Genitourinary: Negative.  " "  Musculoskeletal: Positive for back pain and joint pain.   Skin: Negative.    Neurological: Positive for tingling, sensory change and focal weakness. Negative for dizziness and headaches.   Endo/Heme/Allergies: Negative.    Psychiatric/Behavioral: Negative.    All 12 systems otherwise negative.      Wt Readings from Last 3 Encounters:   02/05/18 73.2 kg (161 lb 6 oz)   01/26/18 72.5 kg (159 lb 13.3 oz)   01/17/18 73.1 kg (161 lb 2.5 oz)     Temp Readings from Last 3 Encounters:   01/17/18 98.2 °F (36.8 °C) (Oral)   12/20/17 98.3 °F (36.8 °C) (Tympanic)   12/11/17 98 °F (36.7 °C) (Oral)     BP Readings from Last 3 Encounters:   02/05/18 135/75   01/26/18 132/68   01/17/18 134/82     Pulse Readings from Last 3 Encounters:   02/05/18 78   01/26/18 62   01/17/18 85       /75 (BP Method: Small (Manual))   Pulse 78   Ht 5' 3" (1.6 m)   Wt 73.2 kg (161 lb 6 oz)   BMI 28.59 kg/m²     Objective:   Physical Exam   Constitutional: She is oriented to person, place, and time. She appears well-developed and well-nourished. No distress.   HENT:   Head: Normocephalic and atraumatic.   Nose: Nose normal.   Mouth/Throat: Oropharynx is clear and moist.   Eyes: Conjunctivae and EOM are normal. No scleral icterus.   Neck: Normal range of motion. Neck supple. No JVD present. No thyromegaly present.   Cardiovascular: Normal rate, regular rhythm, S1 normal and S2 normal.  Exam reveals no gallop, no S3, no S4 and no friction rub.    No murmur heard.  Pulmonary/Chest: Effort normal and breath sounds normal. No stridor. No respiratory distress. She has no wheezes. She has no rales. She exhibits no tenderness.   Abdominal: Soft. Bowel sounds are normal. She exhibits no distension and no mass. There is no tenderness. There is no rebound.   Genitourinary:   Genitourinary Comments: Deferred   Musculoskeletal: Normal range of motion. She exhibits no edema, tenderness or deformity.   Lymphadenopathy:     She has no cervical adenopathy. "   Neurological: She is alert and oriented to person, place, and time. She exhibits normal muscle tone. Coordination normal.   Skin: Skin is warm and dry. No rash noted. She is not diaphoretic. No erythema. No pallor.   Psychiatric: She has a normal mood and affect. Her behavior is normal. Judgment and thought content normal.   Nursing note and vitals reviewed.      Lab Results   Component Value Date     11/29/2017    K 3.9 11/29/2017     11/29/2017    CO2 27 11/29/2017    BUN 19 11/29/2017    CREATININE 0.9 11/29/2017     (H) 11/29/2017    HGBA1C 7.6 (H) 01/11/2018    MG 2.0 11/29/2017    AST 20 01/11/2018    ALT 18 01/11/2018    WBC 9.04 11/29/2017    HGB 11.6 (L) 11/29/2017    HCT 35.0 (L) 11/29/2017    MCV 88 11/29/2017     11/29/2017    CHOL 194 01/11/2018    HDL 61 01/11/2018    LDLCALC 120.6 01/11/2018    TRIG 62 01/11/2018     Assessment:      1. Pre-operative cardiovascular examination    2. Hyperlipidemia, mixed    3. Essential hypertension    4. Uncontrolled type 2 diabetes mellitus without complication, without long-term current use of insulin    5. Sciatica without back pain, left    6. Chest pain, unspecified type    7. At risk for coronary artery disease        Plan:   1.Pre-op CV evaluation prior to microdiscectomy   - will order nuclear stress and 2D ECHO to evaluate for ischemia and LV/valve function  - if negative will clear for surgery    2. Chest pain, atypical but significant risk factors  - pharm nuclear stress test - pt cannot walk on treadmill due to sciatica/back pain  - 2D echo    3. HTN  - cont meds  - low salt diet    4. HLD, HbA1c 7.6  - cont statin    5. DM2  - cont meds per PCP    ADDENDUM 3/16/18  Reviewed nuclear stress results as below    Low periop risk of CV events for moderate risk procedure.  Good functional and exercise capacity.  No chest pain, active arrhythmia and CHF symptoms.  Ok to proceed the scheduled surgery without further cardiac study.  OK  to hold Aspirin 5 to 7 days before the procedure and resume ASAP postop.  Continue Metoprolol and Statin periop.  Avoid periop fluid overloaded.      Nuclear Quantitative Functional Analysis:   LVEF: >= 70 %    Impression: NORMAL MYOCARDIAL PERFUSION  1. The perfusion scan is free of evidence for myocardial ischemia or injury.   2. Resting wall motion is physiologic.   3. Resting LV function is normal.   4. The ventricular volumes are normal at rest and stress.   5. The extracardiac distribution of radioactivity is normal.   6. There was no previous study available to compare      Thank you for allowing me to participate in this patient's care. Please do not hesitate to contact me with any questions or concerns. Consult note has been forwarded to the referral physician.     Scott Rosen MD  Cardiovascular Disease  [facility], [place]  [phone] (P)

## 2018-02-07 ENCOUNTER — OFFICE VISIT (OUTPATIENT)
Dept: NEUROLOGY | Facility: CLINIC | Age: 73
End: 2018-02-07
Payer: MEDICARE

## 2018-02-07 VITALS
BODY MASS INDEX: 28.71 KG/M2 | WEIGHT: 162.06 LBS | DIASTOLIC BLOOD PRESSURE: 76 MMHG | HEART RATE: 66 BPM | HEIGHT: 63 IN | SYSTOLIC BLOOD PRESSURE: 128 MMHG

## 2018-02-07 DIAGNOSIS — M54.32 SCIATICA WITHOUT BACK PAIN, LEFT: Primary | ICD-10-CM

## 2018-02-07 PROCEDURE — 1159F MED LIST DOCD IN RCRD: CPT | Mod: S$GLB,,, | Performed by: PSYCHIATRY & NEUROLOGY

## 2018-02-07 PROCEDURE — 3008F BODY MASS INDEX DOCD: CPT | Mod: S$GLB,,, | Performed by: PSYCHIATRY & NEUROLOGY

## 2018-02-07 PROCEDURE — 99999 PR PBB SHADOW E&M-EST. PATIENT-LVL III: CPT | Mod: PBBFAC,,, | Performed by: PSYCHIATRY & NEUROLOGY

## 2018-02-07 PROCEDURE — 99203 OFFICE O/P NEW LOW 30 MIN: CPT | Mod: S$GLB,,, | Performed by: PSYCHIATRY & NEUROLOGY

## 2018-02-07 PROCEDURE — 1126F AMNT PAIN NOTED NONE PRSNT: CPT | Mod: S$GLB,,, | Performed by: PSYCHIATRY & NEUROLOGY

## 2018-02-07 NOTE — PROGRESS NOTES
This is a 72-year-old right-handed patient who indicates that she had been referred to neurology and neurosurgery for complaints of her left sciatica and left foot drop.  The patient has been previously seen by neurosurgery and an MRI of the lumbar spine had demonstrated disc bulge and rupture at L5-S1.  The patient states that she is scheduled for a surgical procedure on her disc in March.    The patient indicates that she is aware of weakness of the left lower extremity.  She will frequently trip on objects and has difficulty ambulating without a single-point cane.  She is aware of a sensation of numbness in the left foot.  She denies any severe back pain or pain in the ankle.  The patient has not had any falls although she's had several near falls.    The patient indicates that she would just like to have a second opinion regarding the findings on the MRI.  The patient states that the neurosurgeon had explained to her the surgical procedure and had shown her the images of her lumbar spine previously.      ROS:  GENERAL: No fever, chills, fatigability or weight loss.  SKIN: No rashes, itching or changes in color or texture of skin.  HEAD: No headaches or recent head trauma.  EYES: Visual acuity fine. No photophobia, ocular pain or diplopia.  EARS: Denies ear pain, discharge or vertigo.  NOSE: No loss of smell, no epistaxis or postnasal drip.  MOUTH & THROAT: No hoarseness or change in voice. No excessive gum bleeding.  NODES: Denies swollen glands.  CHEST: Denies JACKSON, cyanosis, wheezing, cough and sputum production.  CARDIOVASCULAR: Denies chest pain, PND, orthopnea or reduced exercise tolerance.  ABDOMEN: Appetite fine. No weight loss. Denies diarrhea, abdominal pain, hematemesis or blood in stool.  URINARY: No flank pain, dysuria or hematuria.  PERIPHERAL VASCULAR: No claudication or cyanosis.  MUSCULOSKELETAL: The patient has noted recently some slight swelling of the right foot and ankle.  She states that  she's had some degree of pain in the right foot and ankle.  She denies pain on the left side.  NEUROLOGIC: No history of seizures, paralysis, or unexplained loss of consciousness.    PAST HISTORY:  Surgery: , hernia repair  Medical: Diabetes mellitus, type II, hyperlipidemia, hypertension  ALLERGIES: Iodine    FAMILY HISTORY:  The patient is  and lives with her .  She has a history of longevity in the family with diabetes also.  Both of her parents are .    SOCIAL HISTORY:  The patient has recently retired as an .  She is a nonsmoker.    PE:   VITAL SIGNS: Blood pressure 128/76, pulse 66, weight 73.5 kg, height 5 foot 3 inches, BMI 28.70  APPEARANCE: Well nourished, well developed, in no acute distress.    HEAD: Normocephalic, atraumatic.  EYES: PERRL. EOMI.  Non-icteric sclerae.    EARS: TM's intact. Light reflex normal. No retraction or perforation.    NOSE: Mucosa pink. Airway clear.  MOUTH & THROAT: No tonsillar enlargement. No pharyngeal erythema or exudate. No stridor.  NECK: Supple. No bruits.  CHEST: Lungs clear to auscultation.  CARDIOVASCULAR: Regular rhythm without significant murmurs.  MUSCULOSKELETAL:  No bony deformity seen.  Muscle tone and muscle mass are normal in both upper and both lower extremities.  NEUROLOGIC:   Mental Status:  The patient is well oriented to person, time, place, and situation.  The patient is attentive to the environment and cooperative for the exam.  Cranial Nerves: II-XII grossly intact. Fundoscopic exam is normal.  No hemorrhage, exudate or papilledema is present. The extraocular muscles are intact in the cardinal directions of gaze.  No ptosis is present. Facial features are symmetrical.  Speech is normal in fluency, diction, and phrasing.  Tongue protrudes in the midline.    Gait and Station: The patient is ambulating utilizing a single-point cane having a tendency to favor the left foot and leg with an obvious foot drop.  Motor:  No  downdrift of either arm when held at shoulder level.  Manual muscle testing of proximal and distal muscles of both upper and lower extremities demonstrates weakness of dorsiflexion of the left ankle and great toe as compared to that on the right.  Sensory:  The patient reports diminished appreciation of pinprick and monofilament testing along the lateral edge of the left foot across the dorsum of the left foot in roughly the L5 and S1 nerve root distributions.  Cerebellar:  Finger to nose done well.  Alternating movements intact.  No involuntary movements or tremor seen.  Reflexes:  Stretch reflexes are 1+ both upper and lower extremities.  Plantar stimulation is flexor bilaterally and no pathological reflexes are seen       MRI lumbar spine was reviewed personally and with the patient and demonstrated to the patient again.  MRI clearly shows an eccentric disc rupture with disc fragment in the lateral recess on the left at L5-S1.    ASSESSMENT:  1.  Herniated disc with radiculopathy and foot drop due to left sided L5-S1 nerve root compression    RECOMMENDATIONS:  1.  It was recommended to the patient that she should have the microdiscectomy as planned to relieve the pressure on the nerve root and hopefully improve and prevent further weakness of the left lower extremity at the ankle.  All questions were answered to the patient's satisfaction.    This was a 45 minute visit with the patient with over 50% of time spent counseling the patient regarding the significance of the findings on the MRI and demonstrating the MRI to the patient.  All questions were answered to her satisfaction.

## 2018-02-15 ENCOUNTER — HOSPITAL ENCOUNTER (OUTPATIENT)
Dept: RADIOLOGY | Facility: HOSPITAL | Age: 73
Discharge: HOME | End: 2018-02-15
Attending: INTERNAL MEDICINE
Payer: MEDICARE

## 2018-02-15 ENCOUNTER — CLINICAL SUPPORT (OUTPATIENT)
Dept: CARDIOLOGY | Facility: CLINIC | Age: 73
End: 2018-02-15
Attending: INTERNAL MEDICINE
Payer: MEDICARE

## 2018-02-15 DIAGNOSIS — Z91.89 AT RISK FOR CORONARY ARTERY DISEASE: ICD-10-CM

## 2018-02-15 DIAGNOSIS — R07.9 CHEST PAIN, UNSPECIFIED TYPE: ICD-10-CM

## 2018-02-15 DIAGNOSIS — I10 ESSENTIAL HYPERTENSION: ICD-10-CM

## 2018-02-15 LAB
DIASTOLIC DYSFUNCTION: YES
ESTIMATED PA SYSTOLIC PRESSURE: 22.85
RETIRED EF AND QEF - SEE NOTES: 60 (ref 55–65)

## 2018-02-15 PROCEDURE — 93015 CV STRESS TEST SUPVJ I&R: CPT | Mod: S$GLB,,, | Performed by: INTERNAL MEDICINE

## 2018-02-15 PROCEDURE — 93306 TTE W/DOPPLER COMPLETE: CPT | Mod: S$GLB,,, | Performed by: INTERNAL MEDICINE

## 2018-02-15 PROCEDURE — A9502 TC99M TETROFOSMIN: HCPCS | Mod: PO

## 2018-02-15 PROCEDURE — 78452 HT MUSCLE IMAGE SPECT MULT: CPT | Mod: 26,,, | Performed by: INTERNAL MEDICINE

## 2018-02-16 LAB — DIASTOLIC DYSFUNCTION: NO

## 2018-02-21 ENCOUNTER — TELEPHONE (OUTPATIENT)
Dept: CARDIOLOGY | Facility: CLINIC | Age: 73
End: 2018-02-21

## 2018-02-21 NOTE — TELEPHONE ENCOUNTER
----- Message from Scott Rosen MD sent at 2/16/2018 10:16 AM CST -----  Please call patient regarding normal result of nuclear stress test. If he/she has any questions please let me know or have him/her schedule an appt to see me soon to discuss. Thank you

## 2018-02-21 NOTE — TELEPHONE ENCOUNTER
----- Message from Scott Rosen MD sent at 2/15/2018  2:53 PM CST -----  Please call patient regarding normal result of overall heart function with normal valves. If he/she has any questions please let me know or have him/her schedule an appt to see me soon to discuss. Thank you

## 2018-02-22 ENCOUNTER — TELEPHONE (OUTPATIENT)
Dept: INTERNAL MEDICINE | Facility: CLINIC | Age: 73
End: 2018-02-22

## 2018-02-22 NOTE — TELEPHONE ENCOUNTER
Patient requesting records from her previous Doctor.  Patient asking if records were fax'd.   Fax' number given to patient /she has appointment March 1, 2018   Patient states she will  records herself and bring them in at first appointment

## 2018-02-22 NOTE — TELEPHONE ENCOUNTER
----- Message from Oma Meredith sent at 2/22/2018  3:54 PM CST -----  Contact: pt  States she's calling to see if her documentation has been received from [facility]. Please call pt at [phone]. Thank you

## 2018-03-01 ENCOUNTER — APPOINTMENT (OUTPATIENT)
Dept: RADIOLOGY | Facility: HOSPITAL | Age: 73
End: 2018-03-01
Attending: FAMILY MEDICINE
Payer: MEDICARE

## 2018-03-01 ENCOUNTER — OFFICE VISIT (OUTPATIENT)
Dept: INTERNAL MEDICINE | Facility: CLINIC | Age: 73
End: 2018-03-01
Payer: MEDICARE

## 2018-03-01 VITALS
BODY MASS INDEX: 28.68 KG/M2 | SYSTOLIC BLOOD PRESSURE: 115 MMHG | TEMPERATURE: 98 F | OXYGEN SATURATION: 98 % | DIASTOLIC BLOOD PRESSURE: 70 MMHG | WEIGHT: 161.94 LBS | HEART RATE: 77 BPM

## 2018-03-01 DIAGNOSIS — Z01.818 PREOPERATIVE GENERAL PHYSICAL EXAMINATION: ICD-10-CM

## 2018-03-01 DIAGNOSIS — I10 ESSENTIAL HYPERTENSION: Primary | ICD-10-CM

## 2018-03-01 DIAGNOSIS — M79.605 PAIN IN BOTH LOWER EXTREMITIES: ICD-10-CM

## 2018-03-01 DIAGNOSIS — E78.2 HYPERLIPIDEMIA, MIXED: ICD-10-CM

## 2018-03-01 DIAGNOSIS — I10 ESSENTIAL HYPERTENSION: ICD-10-CM

## 2018-03-01 DIAGNOSIS — M79.604 PAIN IN BOTH LOWER EXTREMITIES: ICD-10-CM

## 2018-03-01 LAB
CREAT UR-MCNC: 85 MG/DL
MICROALBUMIN UR DL<=1MG/L-MCNC: 4 UG/ML
MICROALBUMIN/CREATININE RATIO: 4.7 UG/MG

## 2018-03-01 PROCEDURE — 3078F DIAST BP <80 MM HG: CPT | Mod: S$GLB,,, | Performed by: FAMILY MEDICINE

## 2018-03-01 PROCEDURE — 3074F SYST BP LT 130 MM HG: CPT | Mod: S$GLB,,, | Performed by: FAMILY MEDICINE

## 2018-03-01 PROCEDURE — 82043 UR ALBUMIN QUANTITATIVE: CPT

## 2018-03-01 PROCEDURE — 99999 PR PBB SHADOW E&M-EST. PATIENT-LVL III: CPT | Mod: PBBFAC,,, | Performed by: FAMILY MEDICINE

## 2018-03-01 PROCEDURE — 99214 OFFICE O/P EST MOD 30 MIN: CPT | Mod: S$GLB,,, | Performed by: FAMILY MEDICINE

## 2018-03-01 PROCEDURE — 71046 X-RAY EXAM CHEST 2 VIEWS: CPT | Mod: TC,FY,PO

## 2018-03-01 PROCEDURE — 71046 X-RAY EXAM CHEST 2 VIEWS: CPT | Mod: 26,,, | Performed by: RADIOLOGY

## 2018-03-01 RX ORDER — INSULIN DEGLUDEC 200 U/ML
INJECTION, SOLUTION SUBCUTANEOUS
Refills: 3 | COMMUNITY
Start: 2018-02-13 | End: 2018-03-08 | Stop reason: SDUPTHER

## 2018-03-01 RX ORDER — AZITHROMYCIN 250 MG/1
TABLET, FILM COATED ORAL
Refills: 0 | COMMUNITY
Start: 2017-11-28 | End: 2018-03-01

## 2018-03-01 RX ORDER — HYDROCODONE BITARTRATE AND ACETAMINOPHEN 5; 325 MG/1; MG/1
TABLET ORAL
Refills: 0 | COMMUNITY
Start: 2017-12-06 | End: 2018-03-08 | Stop reason: SDUPTHER

## 2018-03-01 NOTE — PROGRESS NOTES
Subjective:       Patient ID: Indira Walker is a 72 y.o. female.    Chief Complaint: Follow-up    She has microdiscectomy surgery planned for 3/21/18.  She is here for a preoperative evaluation for Dr. Segundo, Neurosurgeon, at [facility]. The surgery is a minimally invasive microdiscectomy and will be under general anesthesia per notes. MRI of the lumbar spine showed left-sided nerve root impingement at the L5-S1 level.  She has a receding cardiologist and undergone preoperative cardiac evaluation.  Lab Results       Component                Value               Date                       WBC                      9.04                11/29/2017                 HGB                      11.6 (L)            11/29/2017                 HCT                      35.0 (L)            11/29/2017                 PLT                      341                 11/29/2017                 CHOL                     194                 01/11/2018                 TRIG                     62                  01/11/2018                 HDL                      61                  01/11/2018                 LDLCALC                  120.6               01/11/2018                 ALT                      18                  01/11/2018                 AST                      20                  01/11/2018                 NA                       140                 11/29/2017                 K                        3.9                 11/29/2017                 CL                       105                 11/29/2017                 CALCIUM                  9.3                 11/29/2017                 CREATININE               0.9                 11/29/2017                 BUN                      19                  11/29/2017                 CO2                      27                  11/29/2017                 GLU                      119 (H)             11/29/2017                 ESTGFRAFRICA             >60                  11/29/2017                 EGFRNONAA                >60                 11/29/2017                 HGBA1C                   7.6 (H)             01/11/2018                            She has had labs as recently as November with A1c/lipids/AST/ALT in January.  She has uncontrolled diabetes with an A1c of 7.6 at her last visit.  She has orders for repeat diabetes labs in April.  No microalbumin testing in the chart.    On alendronate in past for osteoporosis but had jaw bone problem and was discontinued.  Patient is requesting temporary handicapped license certification.  She states she asked for this at her last visit with me and I did not address it at that time.      Review of Systems    Objective:      Physical Exam   Constitutional: She is oriented to person, place, and time. She appears well-developed and well-nourished.   HENT:   Head: Normocephalic and atraumatic.   Right Ear: Tympanic membrane, external ear and ear canal normal.   Left Ear: Tympanic membrane, external ear and ear canal normal.   Nose: Nose normal.   Mouth/Throat: Oropharynx is clear and moist. No oropharyngeal exudate.   Eyes: Conjunctivae and EOM are normal.   Neck: Normal range of motion. Neck supple. No thyromegaly present.   Cardiovascular: Normal rate, regular rhythm and normal heart sounds.  Exam reveals no gallop and no friction rub.    No murmur heard.  Pulmonary/Chest: Effort normal and breath sounds normal. She exhibits no tenderness.   Abdominal: Soft. She exhibits no distension. There is no tenderness.   Musculoskeletal: She exhibits no edema.   Lymphadenopathy:     She has no cervical adenopathy.   Neurological: She is alert and oriented to person, place, and time.   5/5 MSB knees/ankles/toes F/E.   Skin: Skin is warm and dry.   Psychiatric: She has a normal mood and affect. Her behavior is normal.         Assessment/Plan:     1. Essential hypertension  X-Ray Chest PA And Lateral   2. Uncontrolled type 2 diabetes  mellitus without complication, without long-term current use of insulin  Microalbumin/creatinine urine ratio   3. Preoperative general physical examination     4. Hyperlipidemia, mixed     5. Pain in both lower extremities      I asked my staff to contact the neurosurgeon's office to ask about blood work.  Patient has a pre-admit visit scheduled for 3/8/18.  They stated she can get her blood work at that time.  Awaiting those labs.  At this time there are no contraindications or precautions re proposed surgery, other than noting cardiology preoperative recs.  Most recent labs show uncontrolled but acceptable levels of blood sugar.  Hypertension is controlled.  Lipid control poor on current statin therapy.  Pt should not take metformin the night before surgery and not restart until kidney function is assured.  If no concerns with preoperative labs to be obtained 3/8/18, I see no contraindications to proceed with proposed procedure.  Forwarding this note to neurosurgery.  Temporary handicap license given pt per pt request.

## 2018-03-18 ENCOUNTER — HOSPITAL ENCOUNTER (EMERGENCY)
Facility: HOSPITAL | Age: 73
Discharge: HOME | End: 2018-03-18
Payer: MEDICARE

## 2018-03-18 VITALS
SYSTOLIC BLOOD PRESSURE: 146 MMHG | DIASTOLIC BLOOD PRESSURE: 72 MMHG | TEMPERATURE: 98 F | RESPIRATION RATE: 18 BRPM | OXYGEN SATURATION: 95 % | HEART RATE: 83 BPM | HEIGHT: 63 IN | BODY MASS INDEX: 28.56 KG/M2 | WEIGHT: 161.19 LBS

## 2018-03-18 DIAGNOSIS — M25.569 KNEE PAIN: ICD-10-CM

## 2018-03-18 DIAGNOSIS — S80.01XA CONTUSION OF RIGHT KNEE, INITIAL ENCOUNTER: Primary | ICD-10-CM

## 2018-03-18 DIAGNOSIS — S60.051A CONTUSION OF RIGHT LITTLE FINGER WITHOUT DAMAGE TO NAIL, INITIAL ENCOUNTER: ICD-10-CM

## 2018-03-18 PROCEDURE — 99283 EMERGENCY DEPT VISIT LOW MDM: CPT

## 2018-03-18 NOTE — ED PROVIDER NOTES
"Encounter Date: 3/18/2018       History     Chief Complaint   Patient presents with    Fall     " fall at [employer] on friday, hand and knee pain, i also hit my chest"     The history is provided by the patient.   Fall   The accident occurred two days ago. The fall occurred while walking. She fell from a height of 1 to 2 ft. She landed on a hard floor. The pain is at a severity of 3/10. She was ambulatory at the scene. There was no entrapment after the fall. There was no drug use involved in the accident. There was no alcohol use involved in the accident. Pertinent negatives include no neck pain, no back pain, no paresthesias, no paralysis, no visual change, no fever, no numbness, no abdominal pain, no bowel incontinence, no nausea, no vomiting, no hematuria, no headaches, no hearing loss, no loss of consciousness and no tingling. The symptoms are aggravated by activity, pressure on the injury, extension and ambulation. She has tried nothing for the symptoms.     Review of patient's allergies indicates:   Allergen Reactions    Iodine and iodide containing products Hives     Past Medical History:   Diagnosis Date    Arthritis     Diabetes mellitus, type 2     Heartburn     Hyperlipidemia     Hypertension      Past Surgical History:   Procedure Laterality Date     SECTION      HERNIA REPAIR       No family history on file.  Social History   Substance Use Topics    Smoking status: Never Smoker    Smokeless tobacco: Never Used    Alcohol use No     Review of Systems   Constitutional: Negative for chills and fever.   HENT: Negative for sore throat.    Eyes: Negative for photophobia and redness.   Respiratory: Negative for cough and shortness of breath.    Cardiovascular: Negative for chest pain.   Gastrointestinal: Negative for abdominal pain, bowel incontinence, diarrhea, nausea and vomiting.   Endocrine: Negative for polydipsia and polyphagia.   Genitourinary: Negative for dysuria and hematuria. "   Musculoskeletal: Positive for arthralgias. Negative for back pain, myalgias and neck pain.   Skin: Negative for rash.   Neurological: Negative for tingling, loss of consciousness, weakness, numbness, headaches and paresthesias.   Hematological: Does not bruise/bleed easily.   Psychiatric/Behavioral: The patient is not nervous/anxious.    All other systems reviewed and are negative.      Physical Exam     Initial Vitals [03/18/18 1222]   BP Pulse Resp Temp SpO2   (!) 146/72 83 18 98.4 °F (36.9 °C) 95 %      MAP       96.67         Physical Exam    Nursing note and vitals reviewed.  Constitutional: Vital signs are normal. She appears well-developed and well-nourished. No distress.   HENT:   Head: Normocephalic and atraumatic.   Right Ear: External ear normal.   Left Ear: External ear normal.   Nose: Nose normal.   Mouth/Throat: Oropharynx is clear and moist.   Eyes: Conjunctivae, EOM and lids are normal. Pupils are equal, round, and reactive to light.   Neck: Normal range of motion and full passive range of motion without pain. Neck supple.   Cardiovascular: Normal rate, regular rhythm, S1 normal, S2 normal, normal heart sounds, intact distal pulses and normal pulses.   Pulmonary/Chest: Breath sounds normal. No respiratory distress. She has no wheezes. She has no rales.   Abdominal: Soft. Normal appearance and bowel sounds are normal. She exhibits no distension. There is no tenderness.   Musculoskeletal:        Right knee: She exhibits decreased range of motion. She exhibits no swelling, no effusion, no ecchymosis, no deformity, no laceration, no erythema, normal alignment, no LCL laxity, normal patellar mobility, no bony tenderness, normal meniscus and no MCL laxity. Tenderness found. Patellar tendon tenderness noted. No medial joint line, no lateral joint line, no MCL and no LCL tenderness noted.        Right hand: She exhibits decreased range of motion, tenderness and bony tenderness. She exhibits normal  two-point discrimination, normal capillary refill, no deformity, no laceration and no swelling. Normal sensation noted. Normal strength noted.   Lymphadenopathy:     She has no cervical adenopathy.   Neurological: She is alert and oriented to person, place, and time. She has normal strength. No cranial nerve deficit or sensory deficit. Coordination and gait normal.   Skin: Skin is warm, dry and intact.   Psychiatric: She has a normal mood and affect. Her speech is normal and behavior is normal. Judgment and thought content normal. Cognition and memory are normal.         ED Course   Procedures  Labs Reviewed - No data to display                               Clinical Impression:   The primary encounter diagnosis was Contusion of right knee, initial encounter. Diagnoses of Knee pain and Contusion of right little finger without damage to nail, initial encounter were also pertinent to this visit.    Disposition:   Disposition: Discharged  Condition: Stable                        LEA Sam  03/18/18 0252

## 2018-03-29 ENCOUNTER — TELEPHONE (OUTPATIENT)
Dept: INTERNAL MEDICINE | Facility: CLINIC | Age: 73
End: 2018-03-29

## 2018-03-29 NOTE — TELEPHONE ENCOUNTER
----- Message from Miranda Sanchez sent at 3/29/2018 10:36 AM CDT -----  Contact: pt  The pt request a return call, no additional info given, the pt can be reached at [phone]///[id number]

## 2018-04-04 ENCOUNTER — TELEPHONE (OUTPATIENT)
Dept: INTERNAL MEDICINE | Facility: CLINIC | Age: 73
End: 2018-04-04

## 2018-04-04 NOTE — TELEPHONE ENCOUNTER
This pt had a bone density on [id number] per zbigniew with phn @8:03a. There is no record of this test in pt chart. Please call me at [phone] to let me know if doctor Bruno wants me to go forward with obtaining auth. If so, please retrieve previous results and fax to [phone].

## 2018-04-06 ENCOUNTER — CLINICAL SUPPORT (OUTPATIENT)
Dept: NEUROSURGERY | Facility: CLINIC | Age: 73
End: 2018-04-06
Payer: MEDICARE

## 2018-04-06 NOTE — PROGRESS NOTES
Patient is 10 days s/p lumbar decompression with . Her incision is well approximated with no redness, swelling, or purulent drainage noted. Steri-strips removed without complication. Patient denies tenderness at the site. Instructed to follow up in 2 weeks. Verbalized understanding.

## 2018-04-12 ENCOUNTER — TELEPHONE (OUTPATIENT)
Dept: INTERNAL MEDICINE | Facility: CLINIC | Age: 73
End: 2018-04-12

## 2018-04-12 DIAGNOSIS — T78.3XXS ANGIOEDEMA OF LIPS, SEQUELA: Primary | ICD-10-CM

## 2018-04-12 NOTE — TELEPHONE ENCOUNTER
----- Message from Praveena Paige sent at 4/12/2018  1:16 PM CDT -----  Contact: Pt   Pt called and stated she needed to speak to the nurse. She stated that her insurance no longer covers cimetidine and she needs it for swelling. She can be reached at [phone].    Thanks,  TF

## 2018-04-12 NOTE — TELEPHONE ENCOUNTER
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  It was recommended by her allergist Dr. plaza at that time.  Insurance no longer covers the Tagamet.  They do cover Zantac and Pepcid.  She is going to be placed on 150 of Zantac twice a day.  Note that she is on valsartan.  She states the swelling was going on before valsartan.  She does not think this is a problem but that it was food.

## 2018-04-12 NOTE — TELEPHONE ENCOUNTER
Patient states that her insurance company will not cover her cimetidine anymore, pt states that she would like something else called in that her insurance would cover.    Please Advise

## 2018-04-17 ENCOUNTER — CLINICAL SUPPORT (OUTPATIENT)
Dept: INTERNAL MEDICINE | Facility: CLINIC | Age: 73
End: 2018-04-17
Payer: MEDICARE

## 2018-04-17 DIAGNOSIS — I10 ESSENTIAL HYPERTENSION: ICD-10-CM

## 2018-04-17 LAB
ALBUMIN SERPL BCP-MCNC: 3.6 G/DL
ALP SERPL-CCNC: 66 U/L
ALT SERPL W/O P-5'-P-CCNC: 22 U/L
ANION GAP SERPL CALC-SCNC: 6 MMOL/L
AST SERPL-CCNC: 23 U/L
BASOPHILS # BLD AUTO: 0.05 K/UL
BASOPHILS NFR BLD: 0.8 %
BILIRUB SERPL-MCNC: 0.9 MG/DL
BUN SERPL-MCNC: 12 MG/DL
CALCIUM SERPL-MCNC: 9.3 MG/DL
CHLORIDE SERPL-SCNC: 102 MMOL/L
CO2 SERPL-SCNC: 30 MMOL/L
CREAT SERPL-MCNC: 0.9 MG/DL
DIFFERENTIAL METHOD: ABNORMAL
EOSINOPHIL # BLD AUTO: 0.2 K/UL
EOSINOPHIL NFR BLD: 3.2 %
ERYTHROCYTE [DISTWIDTH] IN BLOOD BY AUTOMATED COUNT: 13.6 %
EST. GFR  (AFRICAN AMERICAN): >60 ML/MIN/1.73 M^2
EST. GFR  (NON AFRICAN AMERICAN): >60 ML/MIN/1.73 M^2
ESTIMATED AVG GLUCOSE: 177 MG/DL
GLUCOSE SERPL-MCNC: 96 MG/DL
HBA1C MFR BLD HPLC: 7.8 %
HCT VFR BLD AUTO: 34.5 %
HGB BLD-MCNC: 11.1 G/DL
IMM GRANULOCYTES # BLD AUTO: 0.02 K/UL
IMM GRANULOCYTES NFR BLD AUTO: 0.3 %
LYMPHOCYTES # BLD AUTO: 2.6 K/UL
LYMPHOCYTES NFR BLD: 38.6 %
MCH RBC QN AUTO: 29.4 PG
MCHC RBC AUTO-ENTMCNC: 32.2 G/DL
MCV RBC AUTO: 92 FL
MONOCYTES # BLD AUTO: 0.6 K/UL
MONOCYTES NFR BLD: 8.5 %
NEUTROPHILS # BLD AUTO: 3.2 K/UL
NEUTROPHILS NFR BLD: 48.6 %
NRBC BLD-RTO: 0 /100 WBC
PLATELET # BLD AUTO: 411 K/UL
PMV BLD AUTO: 10.3 FL
POTASSIUM SERPL-SCNC: 4.1 MMOL/L
PROT SERPL-MCNC: 7.3 G/DL
RBC # BLD AUTO: 3.77 M/UL
SODIUM SERPL-SCNC: 138 MMOL/L
WBC # BLD AUTO: 6.6 K/UL

## 2018-04-17 PROCEDURE — 83036 HEMOGLOBIN GLYCOSYLATED A1C: CPT

## 2018-04-17 PROCEDURE — 85025 COMPLETE CBC W/AUTO DIFF WBC: CPT

## 2018-04-17 PROCEDURE — 99999 PR PBB SHADOW E&M-EST. PATIENT-LVL I: CPT | Mod: PBBFAC,,,

## 2018-04-17 PROCEDURE — 80053 COMPREHEN METABOLIC PANEL: CPT

## 2018-04-26 ENCOUNTER — OFFICE VISIT (OUTPATIENT)
Dept: INTERNAL MEDICINE | Facility: CLINIC | Age: 73
End: 2018-04-26
Payer: MEDICARE

## 2018-04-26 VITALS
OXYGEN SATURATION: 95 % | SYSTOLIC BLOOD PRESSURE: 122 MMHG | WEIGHT: 162.81 LBS | BODY MASS INDEX: 28.84 KG/M2 | HEART RATE: 88 BPM | DIASTOLIC BLOOD PRESSURE: 78 MMHG | TEMPERATURE: 97 F

## 2018-04-26 DIAGNOSIS — Z23 IMMUNIZATION DUE: ICD-10-CM

## 2018-04-26 PROCEDURE — 3078F DIAST BP <80 MM HG: CPT | Mod: CPTII,S$GLB,, | Performed by: FAMILY MEDICINE

## 2018-04-26 PROCEDURE — 99214 OFFICE O/P EST MOD 30 MIN: CPT | Mod: S$GLB,,, | Performed by: FAMILY MEDICINE

## 2018-04-26 PROCEDURE — 3045F PR MOST RECENT HEMOGLOBIN A1C LEVEL 7.0-9.0%: CPT | Mod: CPTII,S$GLB,, | Performed by: FAMILY MEDICINE

## 2018-04-26 PROCEDURE — 3074F SYST BP LT 130 MM HG: CPT | Mod: CPTII,S$GLB,, | Performed by: FAMILY MEDICINE

## 2018-04-26 PROCEDURE — 99999 PR PBB SHADOW E&M-EST. PATIENT-LVL IV: CPT | Mod: PBBFAC,,, | Performed by: FAMILY MEDICINE

## 2018-04-26 RX ORDER — INSULIN DEGLUDEC 200 U/ML
INJECTION, SOLUTION SUBCUTANEOUS
COMMUNITY
Start: 2018-04-11 | End: 2018-04-26 | Stop reason: SDUPTHER

## 2018-04-26 NOTE — PATIENT INSTRUCTIONS
Re-schedule your mammogram and bone density test by calling the main appt desk.    Check BS 2 hours after eating a meal or snack - <160 is considered fair control, <140 is normal

## 2018-04-26 NOTE — PROGRESS NOTES
Subjective:       Patient ID: Indira Walker is a 72 y.o. female.    Chief Complaint: f/u up on A1C    Here for recheck with recent labs - her A1C elevated at 7.8. She was instructed to increase tresiba 20 units and start 24 units a day  Lab Results       Component                Value               Date                       WBC                      6.60                04/17/2018                 HGB                      11.1 (L)            04/17/2018                 HCT                      34.5 (L)            04/17/2018                 PLT                      411 (H)             04/17/2018                 CHOL                     194                 01/11/2018                 TRIG                     62                  01/11/2018                 HDL                      61                  01/11/2018                 LDLCALC                  120.6               01/11/2018                 ALT                      22                  04/17/2018                 AST                      23                  04/17/2018                 NA                       138                 04/17/2018                 K                        4.1                 04/17/2018                 CL                       102                 04/17/2018                 CALCIUM                  9.3                 04/17/2018                 CREATININE               0.9                 04/17/2018                 BUN                      12                  04/17/2018                 CO2                      30 (H)              04/17/2018                 INR                      1.0                 03/08/2018                 GLU                      96                  04/17/2018                 ESTGFRAFRICA             >60.0               04/17/2018                 EGFRNONAA                >60.0               04/17/2018                 HGBA1C                   7.8 (H)             04/17/2018                 MICALBCREAT              4.7                  03/01/2018              She is S/P surgery for her sciatica 3/21/18. She has her f/u with surgeon tomorrow.        Diabetes   She presents for her follow-up diabetic visit. She has type 2 diabetes mellitus. There are no hypoglycemic associated symptoms. Associated symptoms include foot paresthesias (to L foot - thought due to sciatic nerve). Pertinent negatives for diabetes include no chest pain and no weakness. There are no hypoglycemic complications. Symptoms are stable. Pertinent negatives for diabetic complications include no nephropathy or retinopathy. Risk factors for coronary artery disease include dyslipidemia, diabetes mellitus, hypertension and post-menopausal. Current diabetic treatment includes oral agent (monotherapy) and insulin injections. She is compliant with treatment all of the time. Diabetic current diet: meals are not large but she eats lots of snacks. Meal planning includes avoidance of concentrated sweets. She rarely participates in exercise. There is no change in her home blood glucose trend. Her breakfast blood glucose range is generally 110-130 mg/dl. Eye exam is not current.     Review of Systems   Constitutional: Negative for fever.   Cardiovascular: Negative for chest pain.   Gastrointestinal: Negative for abdominal pain (R low pelvis) and diarrhea.   Neurological: Positive for numbness (left foot). Negative for weakness.       Objective:      Physical Exam   Constitutional: She is oriented to person, place, and time. She appears well-developed.   HENT:   Head: Normocephalic and atraumatic.   Cardiovascular: Normal rate, regular rhythm and normal heart sounds.    Pulmonary/Chest: Effort normal and breath sounds normal.   Abdominal: She exhibits no distension. There is no tenderness (Nttp to abdomen genrerally, very minimal TTP to low R ab near inguinal line).   Neurological: She is alert and oriented to person, place, and time.   Skin: Skin is warm and dry.   Surgical scar to  midline LS well healed - no S/S infection - no erythema/NTTP/no swelling   Psychiatric: She has a normal mood and affect. Her behavior is normal.         Assessment/Plan:     1. Uncontrolled type 2 diabetes mellitus without complication, with long-term current use of insulin  Ambulatory Referral to Diabetes Education    Hemoglobin A1c    Ambulatory referral to Ophthalmology    CANCELED: Ambulatory referral to Optometry   2. Immunization due  Pneumococcal Conjugate Vaccine (13 Valent) (IM)     ABBY for colonoscopy signed (JASSI). Also for [facility] exam - and she is to call for appt if due.  Too many snacks with high calories. Needs help with choices. No change to meds - work with dietician re food choices    Will contact pt with colon recs after records recd.

## 2018-04-27 ENCOUNTER — OFFICE VISIT (OUTPATIENT)
Dept: NEUROSURGERY | Facility: CLINIC | Age: 73
End: 2018-04-27
Payer: MEDICARE

## 2018-04-27 ENCOUNTER — TELEPHONE (OUTPATIENT)
Dept: INTERNAL MEDICINE | Facility: CLINIC | Age: 73
End: 2018-04-27

## 2018-04-27 VITALS — BODY MASS INDEX: 29.02 KG/M2 | WEIGHT: 163.81 LBS | HEIGHT: 63 IN

## 2018-04-27 DIAGNOSIS — M54.16 LUMBAR RADICULOPATHY: Primary | ICD-10-CM

## 2018-04-27 DIAGNOSIS — Z98.890 S/P LUMBAR MICRODISCECTOMY: ICD-10-CM

## 2018-04-27 PROCEDURE — 99024 POSTOP FOLLOW-UP VISIT: CPT | Mod: S$GLB,,, | Performed by: NEUROLOGICAL SURGERY

## 2018-04-27 NOTE — PROGRESS NOTES
Neurosurgery History and Physical    Patient ID: Indira Walker is a 72 y.o. female.    Chief Complaint   Patient presents with    Post-op Evaluation     pt states LLE is numb and tingling but not as bad as prior to surgery         Review of Systems   Constitutional: Negative for chills and fever.   HENT: Negative for congestion, ear pain and hearing loss.    Respiratory: Negative for cough and wheezing.    Cardiovascular: Negative for chest pain and palpitations.   Gastrointestinal: Negative for abdominal distention, abdominal pain, constipation, diarrhea and nausea.   Genitourinary: Negative for dysuria, frequency and urgency.   Musculoskeletal: Negative for arthralgias, back pain, myalgias and neck pain.   Neurological: Positive for numbness. Negative for weakness and headaches.   Psychiatric/Behavioral: Negative for confusion. The patient is not nervous/anxious.        Past Medical History:   Diagnosis Date    Arthritis     Diabetes mellitus, type 2     Heartburn     Hyperlipidemia     Hypertension      Social History     Social History    Marital status:      Spouse name: N/A    Number of children: 2    Years of education: N/A     Occupational History      [employer]     Social History Main Topics    Smoking status: Never Smoker    Smokeless tobacco: Never Used    Alcohol use No    Drug use: No    Sexual activity: Not Currently     Partners: Male     Other Topics Concern    Not on file     Social History Narrative    No narrative on file     History reviewed. No pertinent family history.  Review of patient's allergies indicates:   Allergen Reactions    Iodine and iodide containing products Hives       Current Outpatient Prescriptions:     amLODIPine (NORVASC) 2.5 MG tablet, Take 2.5 mg by mouth once daily., Disp: , Rfl:     ascorbic acid, vitamin C, (VITAMIN C) 1000 MG tablet, Take 1,000 mg by mouth once daily., Disp: , Rfl:     aspirin (ECOTRIN) 81 MG EC  "tablet, Take 1 tablet (81 mg total) by mouth once daily. Every other day, Disp: , Rfl: 0    atorvastatin (LIPITOR) 20 MG tablet, Take 20 mg by mouth once daily., Disp: , Rfl:     b complex vitamins capsule, Take 1 capsule by mouth once daily., Disp: , Rfl:     BD INSULIN PEN NEEDLE UF MINI 31 gauge x 3/16" Ndle, AS DIRECTED ONCE A DAY WITH INSULIN SUBQ 90 DAYS, Disp: , Rfl: 3    BIOTIN ORAL, Take 1,000 mg by mouth., Disp: , Rfl:     cyclobenzaprine (FLEXERIL) 5 MG tablet, Take 5 mg by mouth 3 (three) times daily as needed for Muscle spasms., Disp: , Rfl:     gabapentin (NEURONTIN) 300 MG capsule, Take 300 mg by mouth 2 (two) times daily., Disp: , Rfl: 1    hydrocodone-acetaminophen 7.5-325mg (NORCO) 7.5-325 mg per tablet, Take 1 tablet by mouth every 6 (six) hours as needed for Pain., Disp: 28 tablet, Rfl: 0    INSULIN DEGLUDEC (TRESIBA FLEXTOUCH U-200 SUBQ), Inject 20 Units into the skin every evening. , Disp: , Rfl:     loratadine (CLARITIN) 10 mg tablet, Take 10 mg by mouth once daily., Disp: , Rfl:     metFORMIN (GLUCOPHAGE) 1000 MG tablet, Take 1,000 mg by mouth 2 (two) times daily with meals., Disp: , Rfl:     MULTIVIT-MINERALS/FERROUS FUM (MULTI VITAMIN ORAL), Take by mouth. For women over 50 (centrum), Disp: , Rfl:     OMEGA-3S-DHA-EPA-FISH OIL ORAL, Take by mouth. Omega XL  Green lipped Mussel, Disp: , Rfl:     ondansetron (ZOFRAN-ODT) 4 MG TbDL, Take 1 tablet (4 mg total) by mouth every 8 (eight) hours as needed (nausea)., Disp: 10 tablet, Rfl: 0    ranitidine (ZANTAC) 150 MG tablet, Take 1 tablet (150 mg total) by mouth 2 (two) times daily., Disp: 60 tablet, Rfl: 11    valsartan-hydrochlorothiazide (DIOVAN-HCT) 160-12.5 mg per tablet, Take 1 tablet by mouth once daily., Disp: , Rfl:     vitamin D 1000 units Tab, Take 1,000 Units by mouth once daily., Disp: , Rfl:   Height 5' 3" (1.6 m), weight 74.3 kg (163 lb 12.8 oz).      Neurologic Exam  Mental Status   Oriented to person, place, and " time.   Attention: normal. Concentration: normal.   Speech: speech is normal   Level of consciousness: alert  Knowledge: good.      Cranial Nerves      CN II   Visual acuity: normal     CN III, IV, VI   Pupils are equal, round, and reactive to light.  Extraocular motions are normal.      CN V   Facial sensation intact.      CN VII   Facial expression full, symmetric.      CN VIII   Hearing: intact     CN IX, X   Palate: symmetric     CN XI   CN XI normal.      CN XII   CN XII normal.      Motor Exam   Muscle bulk: normal  Overall muscle tone: normal  Right arm pronator drift: absent  Left arm pronator drift: absent     Strength   Right deltoid: 5/5  Left deltoid: 5/5  Right biceps: 5/5  Left biceps: 5/5  Right triceps: 5/5  Left triceps: 5/5  Right wrist flexion: 5/5  Left wrist flexion: 5/5  Right wrist extension: 5/5  Left wrist extension: 5/5  Right interossei: 5/5  Left interossei: 5/5  Right iliopsoas: 5/5  Left iliopsoas: 5/5  Right quadriceps: 5/5  Left quadriceps: 5/5  Right hamstrin/5  Left hamstrin/5  Right anterior tibial: 5/5  Left anterior tibial: 4+/5  Right posterior tibial: 5/5  Left posterior tibial: 5/5  Right peroneal: 5/5  Left peroneal: 5/5  Right gastroc: 5/5  Left gastroc: 5/5     Sensory Exam   Light touch normal.   Sensory deficit distribution on left: L5     Gait, Coordination, and Reflexes      Gait  Gait: normal     Coordination   Romberg: negative  Finger to nose coordination: normal     Tremor   Resting tremor: absent     Reflexes   Right brachioradialis: 2+  Left brachioradialis: 2+  Right biceps: 2+  Left biceps: 2+  Right triceps: 2+  Left triceps: 2+  Right patellar: 2+  Left patellar: 2+  Right achilles: 1+  Left achilles: 1+  Right plantar: normal  Left plantar: normal  Right Manzano: absent  Left Manzano: absent  Right ankle clonus: absent  Left ankle clonus: absent        Physical Exam  Constitutional: She is oriented to person, place, and time. She appears  "well-developed and well-nourished.   HENT:   Head: Normocephalic and atraumatic.   Eyes: EOM are normal. Pupils are equal, round, and reactive to light.   Neck: Normal range of motion. Neck supple.   Cardiovascular: Normal rate and regular rhythm.    Pulmonary/Chest: Effort normal.   Abdominal: Soft.   Musculoskeletal: Normal range of motion.   Neurological: She is alert and oriented to person, place, and time. She has a normal Finger-Nose-Finger Test and a normal Romberg Test. Gait normal.   Reflex Scores:       Tricep reflexes are 2+ on the right side and 2+ on the left side.       Bicep reflexes are 2+ on the right side and 2+ on the left side.       Brachioradialis reflexes are 2+ on the right side and 2+ on the left side.       Patellar reflexes are 2+ on the right side and 2+ on the left side.       Achilles reflexes are 1+ on the right side and 1+ on the left side.  Skin: Skin is warm and dry.   Psychiatric: She has a normal mood and affect. Her speech is normal and behavior is normal. Judgment and thought content normal.   Nursing note and vitals reviewed.    Vital Signs  Pain Score:   3  Pain Loc: Back  Height and Weight  Height: 5' 3" (160 cm)  Weight: 74.3 kg (163 lb 12.8 oz)  BSA (Calculated - sq m): 1.82 sq meters  BMI (Calculated): 29.1  Weight in (lb) to have BMI = 25: 140.8]    Provider dictation:  Ms. Walker is a 72 year old female who presents for 4 week post-op follow-up s/p left L5-S1 microdiscectomy. Patient reports resolution of left lower extremity pain and improvement in weakness. She does report some intermittent numbness in the left L5 distribution, but improved. Patient denies any lower back pain.     On exam patient has significant improvement in left lower extremity weakness, she is now 5/5 throughout except 4+/5 with left dorsiflexion. Incision is well healed.     Ms. Walker is doing very well following surgery and is pleased with her outcome. She does not need any further follow-up from " my stand point. She will call with any further questions.     Visit Diagnosis:    1. Lumbar radiculopathy     2. S/P lumbar microdiscectomy

## 2018-04-27 NOTE — TELEPHONE ENCOUNTER
Called and spoke the facility it self and she sees Dr Enrique Garza, and he is an ophthalmologist

## 2018-04-27 NOTE — TELEPHONE ENCOUNTER
----- Message from Gladys Herrera MD sent at 4/27/2018 10:18 AM CDT -----  Regarding: referrals  Pls contact pt and verify provider she is seeing at [facility]. Optometrist or ophthalmologist? Name?

## 2018-04-27 NOTE — TELEPHONE ENCOUNTER
----- Message from Gladys Herrera MD sent at 4/27/2018 10:18 AM CDT -----  Regarding: referrals  Pls contact pt and verify provider she is seeing at [facility]. Optometrist or ophthalmologist? Name?

## 2018-04-30 ENCOUNTER — TELEPHONE (OUTPATIENT)
Dept: INTERNAL MEDICINE | Facility: CLINIC | Age: 73
End: 2018-04-30

## 2018-04-30 NOTE — TELEPHONE ENCOUNTER
----- Message from Candy Vega sent at 4/30/2018  2:38 PM CDT -----  Contact: Indira Jacobson at [phone] (home)   Regarding medications

## 2018-04-30 NOTE — TELEPHONE ENCOUNTER
Spoke with the patient, she stated that she is not taking Diovan.  After checking her meds, yes she is taking the valsartan- HCTZ  ( generic Diovan )

## 2018-05-07 ENCOUNTER — CLINICAL SUPPORT (OUTPATIENT)
Dept: DIABETES | Facility: CLINIC | Age: 73
End: 2018-05-07
Payer: MEDICARE

## 2018-05-07 VITALS — HEIGHT: 63 IN | BODY MASS INDEX: 29.02 KG/M2 | WEIGHT: 163.81 LBS

## 2018-05-07 PROCEDURE — 99999 PR PBB SHADOW E&M-EST. PATIENT-LVL II: CPT | Mod: PBBFAC,,, | Performed by: DIETITIAN, REGISTERED

## 2018-05-07 PROCEDURE — G0108 DIAB MANAGE TRN  PER INDIV: HCPCS | Mod: S$GLB,,, | Performed by: DIETITIAN, REGISTERED

## 2018-05-09 NOTE — PROGRESS NOTES
Diabetes Education  Author: Mayur Marie RD  Date: 5/9/2018    Diabetes Education Visit  Diabetes Education Record Assessment/Progress: Initial    Diabetes Type  Diabetes Type : Type II    Diabetes History  Diabetes Diagnosis: 5-10 years    Referring Provider: Gladys Herrera MD  DM Provider:   72 y.o. female in clinic today for diabetes education. Patient has not taken diabetes education courses in the past.  Pt stated that she reads a lot and knows what to do, but decided to come to appt to see if she could learn anything new.   DM MEDICATIONS:   Tresiba, 24 units in pm (increased from 20 units on 4/24/18)    Metformin 1000 mg bid    Hemoglobin A1C   Date Value Ref Range Status   04/17/2018 7.8 (H) 4.0 - 5.6 % Final     Comment:     According to ADA guidelines, hemoglobin A1c <7.0% represents  optimal control in non-pregnant diabetic patients. Different  metrics may apply to specific patient populations.   Standards of Medical Care in Diabetes-2016.  For the purpose of screening for the presence of diabetes:  <5.7%     Consistent with the absence of diabetes  5.7-6.4%  Consistent with increasing risk for diabetes   (prediabetes)  >or=6.5%  Consistent with diabetes  Currently, no consensus exists for use of hemoglobin A1c  for diagnosis of diabetes for children.  This Hemoglobin A1c assay has significant interference with fetal   hemoglobin   (HbF). The results are invalid for patients with abnormal amounts of   HbF,   including those with known Hereditary Persistence   of Fetal Hemoglobin. Heterozygous hemoglobin variants (HbAS, HbAC,   HbAD, HbAE, HbA2) do not significantly interfere with this assay;   however, presence of multiple variants in a sample may impact the %   interference.       Nutrition  Meal Planning: 3 meals per day  What type of beverages do you drink?: water, other (see comments), regular soda/tea (SF powdered flavor for water)  Meal Plan 24 Hour Recall - Breakfast: okra, corn, bell  pepper, celery w/shrimp and sausage, grilled fish, Sprite  Meal Plan 24 Hour Recall - Lunch: [place] (1/2 plate)- smothered pork chop w/onions, rice and gravy, carrot souflee, broccoli, roll, SF lemonade  Meal Plan 24 Hour Recall - Dinner: other half of food from lunch  Meal Plan 24 Hour Recall - Snack: Snickers dark chocolate, 1 cup of ice cream    Monitoring   Self Monitoring : per log - fastin-119, 126   Blood Glucose Logs: Yes  Do you use a personal glucose monitor?: No  In the last month, how often have you had a low blood sugar reaction?: never  Can you tell when your blood sugar is too high?: no     Exercise   Exercise Type: none (none since surgery - has exercise room w/the [place] and other small equipment)  Frequency: Never       Social History  Preferred Learning Method: Face to Face  Primary Support: Self  Educational Level: College Graduate  Occupation: retired     Barriers to Change  Barriers to Change: None  Learning Challenges : None    Readiness to Learn   Readiness to Learn : Acceptance    Cultural Influences  Cultural Influences: None    Diabetes Education Assessment/Progress  Diabetes Disease Process (diabetes disease process and treatment options): Discussion, Individual Session, Demonstrates Understanding/Competency(verbalizes/demonstrates), Written Materials Provided  Nutrition (Incorporating nutritional management into one's lifestyle): Discussion, Individual Session, Demonstrates Understanding/Competency (verbalizes/demonstrates), Written Materials Provided  Physical Activity (incorporating physical activity into one's lifestyle): Discussion, Individual Session, Demonstrates Understanding/Competency (verbalizes/demonstrates), Written Materials Provided  Medications (states correct name, dose, onset, peak, duration, side effects & timing of meds): Discussion, Individual Session, Demonstrates Understanding/Competency(verbalizes/demonstrates), Written Materials  Provided  Monitoring (monitoring blood glucose/other parameters & using results): Discussion, Individual Session, Demonstrates Understanding/Competency (verbalizes/demonstrates), Written Materials Provided  Acute Complications (preventing, detecting, and treating acute complications): Discussion, Individual Session, Demonstrates Understanding/Competency (verbalizes/demonstrates), Written Materials Provided  Chronic Complications (preventing, detecting, and treating chronic complications): Discussion, Individual Session, Demonstrates Understanding/Competency (verbalizes/demonstrates), Written Materials Provided  Clinical (diabetes, other pertinent medical history, and relevant comorbidities reviewed during visit): Discussion, Individual Session, Demonstrates Understanding/Competency (verbalizes/demonstrates), Written Materials Provided  Cognitive (knowledge of self-management skills, functional health literacy): Discussion, Individual Session, Demonstrates Understanding/Competency (verbalizes/demonstrates), Written Materials Provided  Psychosocial (emotional response to diabetes): Discussion, Individual Session, Demonstrates Understanding/Competency (verbalizes/demonstrates)  Diabetes Distress and Support Systems: Not Covered/Deferred  Behavioral (readiness for change, lifestyle practices, self-care behaviors): Discussion, Individual Session, Demonstrates Understanding/Competency (verbalizes/demonstrates)    Goals  Patient has selected/evaluated goals during today's session: Yes, selected  Healthy Eating: Set (drink lemon water rather than soft drinks )  Start Date: 05/07/18  Target Date: 08/07/18  Monitoring: Set (Check BG 2 times daily - fasting and 2hrs pp -  and record on logsheet)  Start Date: 05/07/18  Target Date: 08/07/18     Diabetes Care Plan/Intervention  Education Plan/Intervention: Individual Follow-Up DSMT   F/u in 3 months, after a1c    Diabetes Meal Plan  Calories: 1400  Carbohydrate Per Meal:  30-45g  Carbohydrate Per Snack : 15-20g    Education Units of Time   Time Spent: 60 min    Health Maintenance was reviewed today with patient. Discussed with patient importance of routine eye exams, foot exams/foot care, blood work (i.e.: A1c, microalbumin, and lipid), dental visits, yearly flu vaccine, and pneumonia vaccine as indicated by PCP. Patient verbalized understanding.     Health Maintenance Topics with due status: Not Due       Topic Last Completion Date    DEXA SCAN 04/21/2017    Lipid Panel 01/11/2018    Foot Exam 03/01/2018    Hemoglobin A1c 04/17/2018    Low Dose Statin 05/07/2018    Influenza Vaccine Not Due     Health Maintenance Due   Topic Date Due    Eye Exam  08/01/1953    TETANUS VACCINE  08/01/1961    Mammogram  08/01/1983    Colonoscopy  08/01/1993    Zoster Vaccine  08/01/2003    Pneumococcal (65+) (1 of 2 - PCV13) 08/01/2008

## 2018-05-10 ENCOUNTER — OFFICE VISIT (OUTPATIENT)
Dept: CARDIOLOGY | Facility: CLINIC | Age: 73
End: 2018-05-10
Payer: MEDICARE

## 2018-05-10 VITALS
SYSTOLIC BLOOD PRESSURE: 126 MMHG | HEART RATE: 72 BPM | DIASTOLIC BLOOD PRESSURE: 80 MMHG | BODY MASS INDEX: 28.98 KG/M2 | WEIGHT: 163.56 LBS | HEIGHT: 63 IN

## 2018-05-10 DIAGNOSIS — R07.89 OTHER CHEST PAIN: Primary | ICD-10-CM

## 2018-05-10 DIAGNOSIS — E78.2 HYPERLIPIDEMIA, MIXED: ICD-10-CM

## 2018-05-10 DIAGNOSIS — I10 ESSENTIAL HYPERTENSION: ICD-10-CM

## 2018-05-10 DIAGNOSIS — I51.89 DIASTOLIC DYSFUNCTION: ICD-10-CM

## 2018-05-10 PROCEDURE — 99214 OFFICE O/P EST MOD 30 MIN: CPT | Mod: S$GLB,,, | Performed by: INTERNAL MEDICINE

## 2018-05-10 PROCEDURE — 3079F DIAST BP 80-89 MM HG: CPT | Mod: CPTII,S$GLB,, | Performed by: INTERNAL MEDICINE

## 2018-05-10 PROCEDURE — 3074F SYST BP LT 130 MM HG: CPT | Mod: CPTII,S$GLB,, | Performed by: INTERNAL MEDICINE

## 2018-05-10 PROCEDURE — 99999 PR PBB SHADOW E&M-EST. PATIENT-LVL III: CPT | Mod: PBBFAC,,, | Performed by: INTERNAL MEDICINE

## 2018-05-10 PROCEDURE — 3045F PR MOST RECENT HEMOGLOBIN A1C LEVEL 7.0-9.0%: CPT | Mod: CPTII,S$GLB,, | Performed by: INTERNAL MEDICINE

## 2018-05-10 NOTE — TELEPHONE ENCOUNTER
Pt calling in regards to getting a refill on her medication. Patient states that it was previously prescribed by another provider but she is no longer seeing that provider and they will not refill it for her. Patient last seen on 4/26/18. Patient also needs a refill on her (Tresiba ) as well.    Please Advise

## 2018-05-10 NOTE — TELEPHONE ENCOUNTER
----- Message from Kasia Holt sent at 5/10/2018 12:25 PM CDT -----  Pt at [phone]//states she is needing a refill of her insulin//she is out of med//uses//[facility]//please call asap//dalia/michelle

## 2018-05-10 NOTE — PROGRESS NOTES
Subjective:   Patient ID:  Indira Walker is a 72 y.o. female who presents for cardiac consult of Hypertension (f/u)      HPI  The patient came in today for cardiac consult of Hypertension (f/u)    Referring Provider: Shruthi Segundo MD   Reason for consult: Pre-OP eval      This is a 72 year old female pt with PMHx HTN, HLD, DM2, Sciatica s/p surgery presents for follow up CV evaluation.     2/5/18  She presents for pre-op clearance for sciata needing surgery on 3/21/18 with Dr. Segundo, Neurosurgeon, at [facility]. The surgery is a minimally invasive microdiscectomy and will be under general anesthesia per notes. MRI of the lumbar spine showed left-sided nerve root impingement at the L5-S1 level. Pt has right foot drop with pain, shooting sharp pain. Pt has been to rehab once but will reschedule soon.   Pt does have intermittent, sharp chest pain. Pt thought it was due food or stress. Chest pain was left sided, non-radiating, pain yesterday lasted about 30 min. Occasionally gets palpitations, occurred last year. Mild dyspnea with a lot of exertion but usually okay.     5/10/18  Pt had negative nuclear stress and 2D ECHO with grade 1 DD and normal BI-V function in 2/2018. Pt's surgery went well, doing well not but still has some mild tingling. Pt still has mild mid sternal CP due to GERD, eats an apple which resolves it, previously was taking Nexium. Breathing has improved.     Patient feels no leg swelling, no PND,  no dizziness, no syncope, no CNS symptoms.    Patient is compliant with medications.    2/5/18 ECG - NSR, cannot rule out anterior MI, poor RWP    2/15/18 - Nuclear stress   Nuclear Quantitative Functional Analysis:   LVEF: >= 70 %    Impression: NORMAL MYOCARDIAL PERFUSION  1. The perfusion scan is free of evidence for myocardial ischemia or injury.   2. Resting wall motion is physiologic.   3. Resting LV function is normal.   4. The ventricular volumes are normal at  "rest and stress.   5. The extracardiac distribution of radioactivity is normal.   6. There was no previous study available to compare    2/15/18 2D ECHO  CONCLUSIONS     1 - Concentric hypertrophy.     2 - No wall motion abnormalities.     3 - Normal left ventricular systolic function (EF 60-65%).     4 - Impaired LV relaxation, normal LAP (grade 1 diastolic dysfunction).     5 - Normal right ventricular systolic function .     6 - The estimated PA systolic pressure is greater than 23 mmHg.     Past Medical History:   Diagnosis Date    Arthritis     Diabetes mellitus, type 2     Heartburn     Hyperlipidemia     Hypertension        Past Surgical History:   Procedure Laterality Date     SECTION      HERNIA REPAIR      sciatic nerve surgery in foot  2018       Social History   Substance Use Topics    Smoking status: Never Smoker    Smokeless tobacco: Never Used    Alcohol use No       History reviewed. No pertinent family history.    Patient's Medications   New Prescriptions    No medications on file   Previous Medications    AMLODIPINE (NORVASC) 2.5 MG TABLET    Take 2.5 mg by mouth once daily.    ASCORBIC ACID, VITAMIN C, (VITAMIN C) 1000 MG TABLET    Take 1,000 mg by mouth once daily.    ASPIRIN (ECOTRIN) 81 MG EC TABLET    Take 1 tablet (81 mg total) by mouth once daily. Every other day    ATORVASTATIN (LIPITOR) 20 MG TABLET    Take 20 mg by mouth once daily.    B COMPLEX VITAMINS CAPSULE    Take 1 capsule by mouth once daily.    BD INSULIN PEN NEEDLE UF MINI 31 GAUGE X 3/16" NDLE    AS DIRECTED ONCE A DAY WITH INSULIN SUBQ 90 DAYS    BIOTIN ORAL    Take 1,000 mg by mouth.    CYCLOBENZAPRINE (FLEXERIL) 5 MG TABLET    Take 5 mg by mouth 3 (three) times daily as needed for Muscle spasms.    GABAPENTIN (NEURONTIN) 300 MG CAPSULE    Take 300 mg by mouth 2 (two) times daily.    HYDROCODONE-ACETAMINOPHEN 7.5-325MG (NORCO) 7.5-325 MG PER TABLET    Take 1 tablet by mouth every 6 (six) hours as " needed for Pain.    INSULIN DEGLUDEC (TRESIBA FLEXTOUCH U-200 SUBQ)    Inject 20 Units into the skin every evening.     LORATADINE (CLARITIN) 10 MG TABLET    Take 10 mg by mouth once daily.    METFORMIN (GLUCOPHAGE) 1000 MG TABLET    Take 1,000 mg by mouth 2 (two) times daily with meals.    MULTIVIT-MINERALS/FERROUS FUM (MULTI VITAMIN ORAL)    Take by mouth. For women over 50 (centrum)    OMEGA-3S-DHA-EPA-FISH OIL ORAL    Take by mouth. Omega XL  Green lipped Mussel    ONDANSETRON (ZOFRAN-ODT) 4 MG TBDL    Take 1 tablet (4 mg total) by mouth every 8 (eight) hours as needed (nausea).    RANITIDINE (ZANTAC) 150 MG TABLET    Take 1 tablet (150 mg total) by mouth 2 (two) times daily.    VALSARTAN-HYDROCHLOROTHIAZIDE (DIOVAN-HCT) 160-12.5 MG PER TABLET    Take 1 tablet by mouth once daily.    VITAMIN D 1000 UNITS TAB    Take 1,000 Units by mouth once daily.   Modified Medications    No medications on file   Discontinued Medications    No medications on file       Review of Systems   Constitutional: Negative.    HENT: Negative.    Eyes: Negative.    Respiratory: Negative for shortness of breath.    Cardiovascular: Negative for chest pain and palpitations.   Gastrointestinal: Positive for heartburn.   Genitourinary: Negative.    Musculoskeletal: Positive for back pain and joint pain.   Skin: Negative.    Neurological: Positive for tingling and sensory change. Negative for dizziness, focal weakness and headaches.   Endo/Heme/Allergies: Negative.    Psychiatric/Behavioral: Negative.    All 12 systems otherwise negative.      Wt Readings from Last 3 Encounters:   05/10/18 74.2 kg (163 lb 9.3 oz)   05/07/18 74.3 kg (163 lb 12.8 oz)   04/27/18 74.3 kg (163 lb 12.8 oz)     Temp Readings from Last 3 Encounters:   04/26/18 97.1 °F (36.2 °C) (Oral)   03/21/18 98.6 °F (37 °C) (Oral)   03/18/18 98.4 °F (36.9 °C) (Oral)     BP Readings from Last 3 Encounters:   05/10/18 126/80   04/26/18 122/78   03/21/18 (!) 148/81     Pulse Readings  "from Last 3 Encounters:   05/10/18 72   04/26/18 88   03/21/18 84       /80 (BP Method: Large (Manual))   Pulse 72   Ht 5' 3" (1.6 m)   Wt 74.2 kg (163 lb 9.3 oz)   BMI 28.98 kg/m²     Objective:   Physical Exam   Constitutional: She is oriented to person, place, and time. She appears well-developed and well-nourished. No distress.   HENT:   Head: Normocephalic and atraumatic.   Nose: Nose normal.   Mouth/Throat: Oropharynx is clear and moist.   Eyes: Conjunctivae and EOM are normal. No scleral icterus.   Neck: Normal range of motion. Neck supple. No JVD present. No thyromegaly present.   Cardiovascular: Normal rate, regular rhythm, S1 normal and S2 normal.  Exam reveals no gallop, no S3, no S4 and no friction rub.    No murmur heard.  Pulmonary/Chest: Effort normal and breath sounds normal. No stridor. No respiratory distress. She has no wheezes. She has no rales. She exhibits no tenderness.   Abdominal: Soft. Bowel sounds are normal. She exhibits no distension and no mass. There is no tenderness. There is no rebound.   Genitourinary:   Genitourinary Comments: Deferred   Musculoskeletal: Normal range of motion. She exhibits no edema, tenderness or deformity.   Lymphadenopathy:     She has no cervical adenopathy.   Neurological: She is alert and oriented to person, place, and time. She exhibits normal muscle tone. Coordination normal.   Skin: Skin is warm and dry. No rash noted. She is not diaphoretic. No erythema. No pallor.   Psychiatric: She has a normal mood and affect. Her behavior is normal. Judgment and thought content normal.   Nursing note and vitals reviewed.      Lab Results   Component Value Date     04/17/2018    K 4.1 04/17/2018     04/17/2018    CO2 30 (H) 04/17/2018    BUN 12 04/17/2018    CREATININE 0.9 04/17/2018    GLU 96 04/17/2018    HGBA1C 7.8 (H) 04/17/2018    MG 2.0 11/29/2017    AST 23 04/17/2018    ALT 22 04/17/2018    ALBUMIN 3.6 04/17/2018    PROT 7.3 04/17/2018    " BILITOT 0.9 04/17/2018    WBC 6.60 04/17/2018    HGB 11.1 (L) 04/17/2018    HCT 34.5 (L) 04/17/2018    MCV 92 04/17/2018     (H) 04/17/2018    INR 1.0 03/08/2018    CHOL 194 01/11/2018    HDL 61 01/11/2018    LDLCALC 120.6 01/11/2018    TRIG 62 01/11/2018     Assessment:      1. Other chest pain    2. Hyperlipidemia, mixed    3. Essential hypertension    4. Diastolic dysfunction    5. Uncontrolled type 2 diabetes mellitus without complication, without long-term current use of insulin        Plan:   1. Chest pain, atypical - improved/resolved  - pharm nuclear stress test - negative  - 2D echo - diastolic dysfunction    2. HTN   - cont meds  - low salt diet    3. HLD,   - cont statin    4. DM2, HbA1c 7.6  - cont meds per PCP  - sugars improved    5. Diastolic dysfunction  - pt euvolemic  - low salt diet    Thank you for allowing me to participate in this patient's care. Please do not hesitate to contact me with any questions or concerns. Consult note has been forwarded to the referral physician.

## 2018-05-11 RX ORDER — ATORVASTATIN CALCIUM 20 MG/1
20 TABLET, FILM COATED ORAL DAILY
Qty: 90 TABLET | Refills: 3 | Status: SHIPPED | OUTPATIENT
Start: 2018-05-11 | End: 2019-03-22 | Stop reason: ALTCHOICE

## 2018-05-11 RX ORDER — METFORMIN HYDROCHLORIDE 1000 MG/1
1000 TABLET ORAL 2 TIMES DAILY WITH MEALS
Qty: 180 TABLET | Refills: 3 | Status: SHIPPED | OUTPATIENT
Start: 2018-05-11 | End: 2019-05-16 | Stop reason: SDUPTHER

## 2018-05-11 RX ORDER — INSULIN DEGLUDEC 200 U/ML
INJECTION, SOLUTION SUBCUTANEOUS
Qty: 3 SYRINGE | Refills: 1 | Status: SHIPPED | OUTPATIENT
Start: 2018-05-11 | End: 2018-10-11 | Stop reason: SDUPTHER

## 2018-05-11 RX ORDER — AMLODIPINE BESYLATE 2.5 MG/1
2.5 TABLET ORAL DAILY
Qty: 90 TABLET | Refills: 1 | Status: SHIPPED | OUTPATIENT
Start: 2018-05-11 | End: 2019-10-22 | Stop reason: DRUGHIGH

## 2018-05-11 RX ORDER — VALSARTAN AND HYDROCHLOROTHIAZIDE 160; 12.5 MG/1; MG/1
1 TABLET, FILM COATED ORAL DAILY
Qty: 90 TABLET | Refills: 1 | Status: SHIPPED | OUTPATIENT
Start: 2018-05-11 | End: 2018-07-24 | Stop reason: RX

## 2018-05-11 NOTE — TELEPHONE ENCOUNTER
----- Message from Kalpana Martell sent at 5/11/2018  1:35 PM CDT -----  Contact: Patient  Patient states she spoke to nurse yesterday and was suppose to have her insulin called in, but pharmacy does not have anything and patient is out of it. Please call patient back at [phone]. Thank you

## 2018-05-11 NOTE — TELEPHONE ENCOUNTER
----- Message from Kalpana Martell sent at 5/11/2018  1:35 PM CDT -----  Contact: Patient  Patient states she spoke to nurse yesterday and was suppose to have her insulin called in, but pharmacy does not have anything and patient is out of it. Please call patient back at [phone]. Thank you

## 2018-05-11 NOTE — TELEPHONE ENCOUNTER
Spoke with the patient. Informed her that I would inform Dr. Herrera of her needing a refill on her medications. Pt verbalized understanding of the information given.

## 2018-05-11 NOTE — TELEPHONE ENCOUNTER
In future for this many rxs from prior doctor it might be best to ask her to have her pharmacy send refill request directly to us - then we know we have correct doses/dosing etc.  Rxs have been sent. My apologies for not getting it to her yesterday.    (actually see, after sending rxs, that tresiba has been increased to 24 U daily per Dr Bruno last month - she will need rx adjustment in future.)

## 2018-05-11 NOTE — TELEPHONE ENCOUNTER
----- Message from Kalpana Martell sent at 5/11/2018  1:35 PM CDT -----  Contact: Patient  Patient states she spoke to nurse yesterday and was suppose to have her insulin called in, but pharmacy does not have anything and patient is out of it. Please call patient back at [phone]. Thank you

## 2018-05-24 ENCOUNTER — PATIENT OUTREACH (OUTPATIENT)
Dept: ADMINISTRATIVE | Facility: HOSPITAL | Age: 73
End: 2018-05-24

## 2018-05-24 RX ORDER — PEN NEEDLE, DIABETIC 31 GX5/16"
NEEDLE, DISPOSABLE MISCELLANEOUS
Qty: 100 EACH | Refills: 4 | Status: SHIPPED | OUTPATIENT
Start: 2018-05-24 | End: 2019-07-22 | Stop reason: SDUPTHER

## 2018-05-24 NOTE — TELEPHONE ENCOUNTER
----- Message from Debbie Bonner sent at 5/24/2018  8:48 AM CDT -----  Contact: patient  Calling concerning her insulin supplies. Please call patient @ [phone]. Thanks, sheba

## 2018-05-25 ENCOUNTER — TELEPHONE (OUTPATIENT)
Dept: INTERNAL MEDICINE | Facility: CLINIC | Age: 73
End: 2018-05-25

## 2018-05-25 NOTE — TELEPHONE ENCOUNTER
----- Message from Blake Arias sent at 5/25/2018 10:10 AM CDT -----  Contact: Self- [phone]   Returning call, please call back at [phone].  Thx-AH

## 2018-06-19 DIAGNOSIS — T78.3XXS ANGIOEDEMA OF LIPS, SEQUELA: Primary | ICD-10-CM

## 2018-06-19 RX ORDER — CIMETIDINE 400 MG/1
400 TABLET, FILM COATED ORAL 2 TIMES DAILY
Qty: 60 TABLET | Refills: 11 | Status: SHIPPED | OUTPATIENT
Start: 2018-06-19 | End: 2018-08-27

## 2018-06-19 RX ORDER — CIMETIDINE 400 MG/1
400 TABLET, FILM COATED ORAL 2 TIMES DAILY
Qty: 60 TABLET | Refills: 11 | Status: CANCELLED | OUTPATIENT
Start: 2018-06-19 | End: 2019-06-19

## 2018-06-19 NOTE — TELEPHONE ENCOUNTER
Called and spoke with the patient, she said that her throat has been swelling up with this dosage of ranitidine, can you either increase the ranitidine or prescribe the cimetidine.   She is allergy to several foods and the allergist prescribe for her to take both the Claritin and cimetidine 400 mg twice daily and this dose has always work.

## 2018-06-19 NOTE — TELEPHONE ENCOUNTER
----- Message from Camila Guevara sent at 6/19/2018  1:19 PM CDT -----  Contact: pt   Call pt regarding med that she was put on.   [phone] (home)

## 2018-06-19 NOTE — TELEPHONE ENCOUNTER
She was placed on the ranitidine because her insurance no longer covered cimetidine.  I have sent the prescription through for her cimetidine. She may have to pay for it herself.    I have also placed a referral to the [facility] allergist, Dr. Watson.  Please inform the patient that if her symptoms do not resolve with the switch back to cimetidine, she should call for an appointment with Dr Watson.

## 2018-06-20 NOTE — TELEPHONE ENCOUNTER
Called patient to advise.  Patient stated that she is not paying for any medication and that she will call to schedule appointment with the allergist.  Provided patient with telephone number.  Patient verbally understood.

## 2018-07-11 ENCOUNTER — PATIENT OUTREACH (OUTPATIENT)
Dept: ADMINISTRATIVE | Facility: HOSPITAL | Age: 73
End: 2018-07-11

## 2018-07-12 NOTE — PROGRESS NOTES
DM eye exam completed 1/6/18, results updated in  d/t original scanned documente date entered in error.

## 2018-07-13 ENCOUNTER — TELEPHONE (OUTPATIENT)
Dept: NEUROSURGERY | Facility: CLINIC | Age: 73
End: 2018-07-13

## 2018-07-13 DIAGNOSIS — Z12.11 SCREENING FOR COLON CANCER: Primary | ICD-10-CM

## 2018-07-13 NOTE — TELEPHONE ENCOUNTER
Left voice message to complete [employer].    ----- Message from Carol Aguilera LPN sent at 2/1/2018 12:28 PM CST -----  3 month phone f/u microdiscectomy

## 2018-07-17 ENCOUNTER — TELEPHONE (OUTPATIENT)
Dept: INTERNAL MEDICINE | Facility: CLINIC | Age: 73
End: 2018-07-17

## 2018-07-17 NOTE — TELEPHONE ENCOUNTER
Notes recorded by Gladys Herrera MD on 7/13/2018 at 11:56 PM CDT  Pls inform her that her colonoscopy report was received.  It was performed 5/8/2008 by Dr. George.  She is due for a repeat now as it has been 10 years.  Please inform her I have sent an order to our GI department and they will contact her to schedule.

## 2018-07-18 ENCOUNTER — DOCUMENTATION ONLY (OUTPATIENT)
Dept: ENDOSCOPY | Facility: HOSPITAL | Age: 73
End: 2018-07-18

## 2018-07-23 ENCOUNTER — TELEPHONE (OUTPATIENT)
Dept: NEUROSURGERY | Facility: CLINIC | Age: 73
End: 2018-07-23

## 2018-07-23 NOTE — TELEPHONE ENCOUNTER
Low Back Pain Disability Questionnaire    1. Pain Intensity:    1 - The pain is mild at the moment.    2. Personal Care (washing, dressing, etc.):    1 - I can look after myself normally, but it causes extra pain.    3. Liftin - Pain prevents me from lifting heavy weights off the floor, but I can if they are conveniently positioned, for example, on a table.    4. Walkin - Pain does not prevent me walking any distance.    5. Sittin - I can sit in my favorite chair as long as I like.    6. Standin - I can stand as long as I want but it increases my pain.     7. Sleepin - I have no trouble sleeping.    8. Sex Life:     0. My sex life is normal and causes no extra pain  1. My sex life is normal but causes some extra pain  2. My sex life is normal but is very painful  3. My sex life is severely restricted by pain  4. My sex life is nearly absent because of pain  5. Pain prevents any sex life at all    9. Social Life:    0 - My social life is normal and does not increase my pain.     10. Travellin - I can travel anywhere but it increases my pain.         Patient's Total Score:  14%       Scoring instructions   For each section the total possible score is 5: if the first statement is marked the section score = 0; if the last statement is marked, it = 5. If all 10 sections are completed the score is calculated as follows:     Example: 16 (total scored)   50 (total possible score) x 100 = 32%     If one section is missed or not applicable the score is calculated:   16 (total scored)   45 (total possible score) x 100 = 35.5%   Minimum detectable change (90% confidence): 10% points (change of less than this may be attributable to error in the measurement)       Interpretation of scores 0% to 20%: minimal disability:  The patient can cope with most living activities. Usually no treatment is indicated apart from advice on lifting sitting and exercise.    21%-40%: moderate disability:   The patient experiences more pain and difficulty with sitting, lifting and standing. Travel and social life are more difficult and they may be disabled from work. Personal care, sexual activity and sleeping are not grossly affected and the patient can usually be managed by conservative means.    41%-60%: severe disability:  Pain remains the main problem in this group but activities of daily living are affected. These patients require a detailed investigation.    61%-80%: crippled:  Back pain impinges on all aspects of the patient's life. Positive intervention is required.    81%-100%:  These patients are either bed-bound or exaggerating their symptoms.

## 2018-07-24 ENCOUNTER — TELEPHONE (OUTPATIENT)
Dept: INTERNAL MEDICINE | Facility: CLINIC | Age: 73
End: 2018-07-24

## 2018-07-24 DIAGNOSIS — I10 HYPERTENSION, ESSENTIAL: Primary | ICD-10-CM

## 2018-07-24 RX ORDER — LOSARTAN POTASSIUM AND HYDROCHLOROTHIAZIDE 12.5; 1 MG/1; MG/1
1 TABLET ORAL DAILY
Qty: 90 TABLET | Refills: 0 | Status: SHIPPED | OUTPATIENT
Start: 2018-07-24 | End: 2018-10-20 | Stop reason: SDUPTHER

## 2018-07-24 NOTE — TELEPHONE ENCOUNTER
Spoke to patient and was advised that her valsartan-hydrochlorothiazide (DIOVAN-HCT) 160-12.5 mg per tablet is on the recall list.  Please advise.    Manufacture: [employer]

## 2018-07-24 NOTE — TELEPHONE ENCOUNTER
----- Message from Rebecca Alcaraz sent at 7/24/2018  2:34 PM CDT -----  Contact: Patient  Patient called to speak with the nurse about a letter she received about her recalled medication Valsartan. She can be contacted at [phone].    Thanks,  Rebecca

## 2018-07-24 NOTE — TELEPHONE ENCOUNTER
Replacement losartan/HCTZ 100/12.5.  She will have to recheck in about 6 weeks after starting the medication and we can adjust it as needed.  Rx sent to her pharmacy.  Stop the valsartan/HCTZ.

## 2018-07-26 ENCOUNTER — HOSPITAL ENCOUNTER (OUTPATIENT)
Dept: RADIOLOGY | Facility: HOSPITAL | Age: 73
Discharge: HOME | End: 2018-07-26
Attending: FAMILY MEDICINE
Payer: MEDICARE

## 2018-07-26 DIAGNOSIS — Z12.39 BREAST SCREENING: ICD-10-CM

## 2018-07-26 PROCEDURE — 77067 SCR MAMMO BI INCL CAD: CPT | Mod: 26,,, | Performed by: RADIOLOGY

## 2018-07-26 PROCEDURE — 77067 SCR MAMMO BI INCL CAD: CPT | Mod: TC

## 2018-07-26 PROCEDURE — 77063 BREAST TOMOSYNTHESIS BI: CPT | Mod: 26,,, | Performed by: RADIOLOGY

## 2018-07-31 ENCOUNTER — TELEPHONE (OUTPATIENT)
Dept: INTERNAL MEDICINE | Facility: CLINIC | Age: 73
End: 2018-07-31

## 2018-07-31 NOTE — TELEPHONE ENCOUNTER
----- Message from Oma Meredith sent at 7/31/2018 12:09 PM CDT -----  Contact: pt  States she's returning a call. Please call pt at [phone]. Thank you

## 2018-08-07 DIAGNOSIS — R92.1 BREAST CALCIFICATION SEEN ON MAMMOGRAM: Primary | ICD-10-CM

## 2018-08-08 ENCOUNTER — TELEPHONE (OUTPATIENT)
Dept: INTERNAL MEDICINE | Facility: CLINIC | Age: 73
End: 2018-08-08

## 2018-08-08 ENCOUNTER — CLINICAL SUPPORT (OUTPATIENT)
Dept: INTERNAL MEDICINE | Facility: CLINIC | Age: 73
End: 2018-08-08
Payer: MEDICARE

## 2018-08-08 DIAGNOSIS — Z12.11 SCREENING FOR COLON CANCER: Primary | ICD-10-CM

## 2018-08-08 LAB
ESTIMATED AVG GLUCOSE: 177 MG/DL
HBA1C MFR BLD HPLC: 7.8 %

## 2018-08-08 PROCEDURE — 83036 HEMOGLOBIN GLYCOSYLATED A1C: CPT

## 2018-08-08 PROCEDURE — 99999 PR PBB SHADOW E&M-EST. PATIENT-LVL I: CPT | Mod: PBBFAC,,,

## 2018-08-08 NOTE — TELEPHONE ENCOUNTER
Spoke to patient and advised.  Patient stated that she will come and  on tomorrow.  
I have placed the order for a fit kit.  However if she wishes to do it through her insurance that is also an acceptable option.  They will send us a report of her results and we will enter the result in our system.  
Patient was here in the office today to have her hemoglobin A1C drawn. Patient advised that her insurance company sent her a FitKit to complete.  Patient would like Dr. Herrera to order the test for her and complete it through the office.  Please advise.  
soreness

## 2018-08-14 ENCOUNTER — OFFICE VISIT (OUTPATIENT)
Dept: INTERNAL MEDICINE | Facility: CLINIC | Age: 73
End: 2018-08-14
Payer: MEDICARE

## 2018-08-14 VITALS
HEART RATE: 78 BPM | WEIGHT: 163.5 LBS | DIASTOLIC BLOOD PRESSURE: 81 MMHG | BODY MASS INDEX: 28.97 KG/M2 | HEIGHT: 63 IN | SYSTOLIC BLOOD PRESSURE: 120 MMHG | TEMPERATURE: 98 F | OXYGEN SATURATION: 94 %

## 2018-08-14 DIAGNOSIS — E11.69 HYPERLIPIDEMIA ASSOCIATED WITH TYPE 2 DIABETES MELLITUS: ICD-10-CM

## 2018-08-14 DIAGNOSIS — E78.5 HYPERLIPIDEMIA ASSOCIATED WITH TYPE 2 DIABETES MELLITUS: ICD-10-CM

## 2018-08-14 DIAGNOSIS — I10 HYPERTENSION, ESSENTIAL: ICD-10-CM

## 2018-08-14 PROCEDURE — 3079F DIAST BP 80-89 MM HG: CPT | Mod: CPTII,S$GLB,, | Performed by: FAMILY MEDICINE

## 2018-08-14 PROCEDURE — 99999 PR PBB SHADOW E&M-EST. PATIENT-LVL V: CPT | Mod: PBBFAC,,, | Performed by: FAMILY MEDICINE

## 2018-08-14 PROCEDURE — 3045F PR MOST RECENT HEMOGLOBIN A1C LEVEL 7.0-9.0%: CPT | Mod: CPTII,S$GLB,, | Performed by: FAMILY MEDICINE

## 2018-08-14 PROCEDURE — 99499 UNLISTED E&M SERVICE: CPT | Mod: S$GLB,,, | Performed by: FAMILY MEDICINE

## 2018-08-14 PROCEDURE — 3074F SYST BP LT 130 MM HG: CPT | Mod: CPTII,S$GLB,, | Performed by: FAMILY MEDICINE

## 2018-08-14 PROCEDURE — 99214 OFFICE O/P EST MOD 30 MIN: CPT | Mod: S$GLB,,, | Performed by: FAMILY MEDICINE

## 2018-08-14 NOTE — PATIENT INSTRUCTIONS
Take OTC Citracal plus D (600mg calcium plus Vit D) one daily along with your diet sources of Vitamin plus 2000 U daily of the Vitamin D3 that you are already taking.  Take any supplements with food.

## 2018-08-14 NOTE — PROGRESS NOTES
Subjective:       Patient ID: Indira Walker is a 73 y.o. female.    Chief Complaint: discuss lab    Here for DM, HTN, HLD recheck - now on max metformin, 24 U tresiba. Her AM BSs are excellent - . occas low feeling when 70s. She is concerned that there was no change to A1C from last time - exact same 7.8 - despite excellent fasting blood sugars on the 80s through 120s.  Few 2 hr post prandials had been obtained  Lab Results       Component                Value               Date                       WBC                      6.60                04/17/2018                 HGB                      11.1 (L)            04/17/2018                 HCT                      34.5 (L)            04/17/2018                 PLT                      411 (H)             04/17/2018                 CHOL                     194                 01/11/2018                 TRIG                     62                  01/11/2018                 HDL                      61                  01/11/2018                 LDLCALC                  120.6               01/11/2018                 ALT                      22                  04/17/2018                 AST                      23                  04/17/2018                 NA                       138                 04/17/2018                 K                        4.1                 04/17/2018                 CL                       102                 04/17/2018                 CALCIUM                  9.3                 04/17/2018                 CREATININE               0.9                 04/17/2018                 BUN                      12                  04/17/2018                 CO2                      30 (H)              04/17/2018                 INR                      1.0                 03/08/2018                 GLU                      96                  04/17/2018                 ESTGFRAFRICA             >60.0               04/17/2018                  EGFRNONAA                >60.0               04/17/2018                 HGBA1C                   7.8 (H)             08/08/2018                 MICALBCREAT              4.7                 03/01/2018     Pt on tresiba and metformin. BP well controlled on current meds. No changes to that - no SEs.  lipids on atorvastatin 20. Note LDL level.               Diabetes   She presents for her follow-up diabetic visit. She has type 2 diabetes mellitus. Hypoglycemia symptoms include hunger and tremors. Associated symptoms include chest pain (assoc with stress  to right side). There are no hypoglycemic complications. Symptoms are stable. Diabetic complications include peripheral neuropathy. Pertinent negatives for diabetic complications include no nephropathy or retinopathy. Risk factors for coronary artery disease include dyslipidemia, diabetes mellitus, hypertension and post-menopausal. Current diabetic treatment includes insulin injections and oral agent (monotherapy). She is compliant with treatment all of the time. Exercise: activity in house only. Her breakfast blood glucose range is generally 70-90 mg/dl. An ACE inhibitor/angiotensin II receptor blocker is being taken. Eye exam is current.     Review of Systems   Respiratory: Negative for chest tightness and shortness of breath.    Cardiovascular: Positive for chest pain (assoc with stress  to right side).   Neurological: Positive for tremors.       Objective:      Physical Exam   Constitutional: She is oriented to person, place, and time. She appears well-developed.   HENT:   Head: Normocephalic and atraumatic.   Cardiovascular: Normal rate, regular rhythm and normal heart sounds.   Pulmonary/Chest: Effort normal and breath sounds normal.   Neurological: She is alert and oriented to person, place, and time.   Skin: Skin is warm and dry.   Psychiatric: She has a normal mood and affect. Her behavior is normal.         Assessment/Plan:     1. Uncontrolled type 2  diabetes mellitus without complication, with long-term current use of insulin  Hemoglobin A1c   2. Hypertension, essential  Basic metabolic panel    CBC auto differential   3. Hyperlipidemia associated with type 2 diabetes mellitus       Four month recheck with labs.  No changes to her diabetes regimen.  Her numbers are excellent.  She can try doing further 2 hour post prandials.

## 2018-08-17 ENCOUNTER — HOSPITAL ENCOUNTER (OUTPATIENT)
Dept: RADIOLOGY | Facility: HOSPITAL | Age: 73
Discharge: HOME | End: 2018-08-17
Attending: FAMILY MEDICINE
Payer: MEDICARE

## 2018-08-17 DIAGNOSIS — R92.8 ABNORMAL MAMMOGRAM: ICD-10-CM

## 2018-08-17 PROCEDURE — 77065 DX MAMMO INCL CAD UNI: CPT | Mod: TC,PO,LT

## 2018-08-17 PROCEDURE — 77065 DX MAMMO INCL CAD UNI: CPT | Mod: 26,LT,, | Performed by: RADIOLOGY

## 2018-08-20 ENCOUNTER — TELEPHONE (OUTPATIENT)
Dept: RADIOLOGY | Facility: HOSPITAL | Age: 73
End: 2018-08-20

## 2018-08-20 DIAGNOSIS — R92.8 ABNORMAL MAMMOGRAM: Primary | ICD-10-CM

## 2018-08-20 NOTE — TELEPHONE ENCOUNTER
Tried calling patient several times, no answer, and unable to leave message on voice mail due to mail box being full, will continue to try and reach patient.

## 2018-08-27 ENCOUNTER — OFFICE VISIT (OUTPATIENT)
Dept: SURGERY | Facility: CLINIC | Age: 73
End: 2018-08-27
Payer: MEDICARE

## 2018-08-27 VITALS
DIASTOLIC BLOOD PRESSURE: 70 MMHG | HEIGHT: 63 IN | SYSTOLIC BLOOD PRESSURE: 121 MMHG | BODY MASS INDEX: 28.9 KG/M2 | HEART RATE: 84 BPM | WEIGHT: 163.13 LBS | TEMPERATURE: 99 F

## 2018-08-27 DIAGNOSIS — R92.8 ABNORMAL MAMMOGRAM OF LEFT BREAST: Primary | ICD-10-CM

## 2018-08-27 PROCEDURE — 3078F DIAST BP <80 MM HG: CPT | Mod: CPTII,S$GLB,, | Performed by: SURGERY

## 2018-08-27 PROCEDURE — 99204 OFFICE O/P NEW MOD 45 MIN: CPT | Mod: S$GLB,,, | Performed by: SURGERY

## 2018-08-27 PROCEDURE — 99999 PR PBB SHADOW E&M-EST. PATIENT-LVL III: CPT | Mod: PBBFAC,,, | Performed by: SURGERY

## 2018-08-27 PROCEDURE — 3074F SYST BP LT 130 MM HG: CPT | Mod: CPTII,S$GLB,, | Performed by: SURGERY

## 2018-08-27 NOTE — LETTER
August 27, 2018      Gladys Herrera MD  [address] [place] [address]           [facility] General Surgery  [address] [place] [address]  Phone: [phone]  Fax: [phone]          Patient: Indira Walker   MR Number: [id number]   YOB: 1945   Date of Visit: 8/27/2018       Dear Dr. Gladys Herrera:    Thank you for referring Indira Walker to me for evaluation. Attached you will find relevant portions of my assessment and plan of care.    If you have questions, please do not hesitate to call me. I look forward to following Indira Walker along with you.    Sincerely,    Ko Salinas MD    Enclosure  CC:  No Recipients    If you would like to receive this communication electronically, please contact [email or web address] or [phone] to request more information on Collections Link access.    For providers and/or their staff who would like to refer a patient to [facility], please contact us through our one-stop-shop provider referral line, [facility], at [phone].    If you feel you have received this communication in error or would no longer like to receive these types of communications, please e-mail [email or web address]

## 2018-08-27 NOTE — PROGRESS NOTES
"History & Physical    SUBJECTIVE:     History of Present Illness:  Patient is a 73 y.o. female referred for abnormal mammogram of left breast.  She denies any breast masses, nipple discharge or breast pain. No family history of breast cancer or ovarian cancer menarche age 12,  1st at 21 menopause between 43 and 48 no HRT    Chief Complaint   Patient presents with    Consult       Review of patient's allergies indicates:   Allergen Reactions    Iodine and iodide containing products Hives       Current Outpatient Medications   Medication Sig Dispense Refill    amLODIPine (NORVASC) 2.5 MG tablet Take 1 tablet (2.5 mg total) by mouth once daily. 90 tablet 1    ascorbic acid, vitamin C, (VITAMIN C) 1000 MG tablet Take 1,000 mg by mouth once daily.      aspirin (ECOTRIN) 81 MG EC tablet Take 1 tablet (81 mg total) by mouth once daily. Every other day  0    atorvastatin (LIPITOR) 20 MG tablet Take 1 tablet (20 mg total) by mouth once daily. 90 tablet 3    b complex vitamins capsule Take 1 capsule by mouth once daily.      BD ULTRA-FINE MINI PEN NEEDLE 31 gauge x 3/16" Ndle AS DIRECTED ONCE daily WITH INSULIN SUBQ 100 each 4    BIOTIN ORAL Take 1,000 mg by mouth.      cholecalciferol, vitamin D3, 2,000 unit Cap Take 2,000 Units by mouth once daily.       cyclobenzaprine (FLEXERIL) 5 MG tablet Take 5 mg by mouth 3 (three) times daily as needed for Muscle spasms.      gabapentin (NEURONTIN) 300 MG capsule Take 300 mg by mouth 2 (two) times daily.  1    hydrocodone-acetaminophen 7.5-325mg (NORCO) 7.5-325 mg per tablet Take 1 tablet by mouth every 6 (six) hours as needed for Pain. 28 tablet 0    loratadine (CLARITIN) 10 mg tablet Take 10 mg by mouth once daily.      losartan-hydrochlorothiazide 100-12.5 mg (HYZAAR) 100-12.5 mg Tab Take 1 tablet by mouth once daily. 90 tablet 0    metFORMIN (GLUCOPHAGE) 1000 MG tablet Take 1 tablet (1,000 mg total) by mouth 2 (two) times daily with meals. 180 tablet 3    " MULTIVIT-MINERALS/FERROUS FUM (MULTI VITAMIN ORAL) Take by mouth. For women over 50 (centrum)      OMEGA-3S-DHA-EPA-FISH OIL ORAL Take by mouth. Omega XL  Green lipped Mussel      ondansetron (ZOFRAN-ODT) 4 MG TbDL Take 1 tablet (4 mg total) by mouth every 8 (eight) hours as needed (nausea). 10 tablet 0    TRESIBA FLEXTOUCH U-200 200 unit/mL (3 mL) InPn INJECT 20 UNITS INTO THE SKIN DAILY 3 Syringe 1     No current facility-administered medications for this visit.        Past Medical History:   Diagnosis Date    Arthritis     Diabetes mellitus, type 2     Heartburn     Hyperlipidemia     Hypertension      Past Surgical History:   Procedure Laterality Date     SECTION      COLONOSCOPY  2008    DR. JANET GARCÍA/MILD DIVERICULOSIS DESCENDING AND SIGMOID. REPEAT 5 YRS    HERNIA REPAIR      sciatic nerve surgery in foot  2018     History reviewed. No pertinent family history.  Social History     Tobacco Use    Smoking status: Never Smoker    Smokeless tobacco: Never Used   Substance Use Topics    Alcohol use: No    Drug use: No        Review of Systems:  Review of Systems   Constitutional: Negative for activity change, appetite change, chills, diaphoresis, fatigue, fever and unexpected weight change.   HENT: Negative for congestion, dental problem, rhinorrhea and sore throat.    Eyes: Negative for visual disturbance.   Respiratory: Negative for cough, chest tightness, shortness of breath and wheezing.    Cardiovascular: Negative for chest pain, palpitations and leg swelling.   Gastrointestinal: Negative for abdominal distention, abdominal pain, constipation, diarrhea, nausea and vomiting.   Endocrine: Negative for cold intolerance, heat intolerance, polydipsia, polyphagia and polyuria.   Genitourinary: Negative for difficulty urinating, dysuria, frequency, hematuria and urgency.   Musculoskeletal: Negative for arthralgias, gait problem, myalgias and neck pain.   Skin: Negative for color  "change, pallor, rash and wound.   Neurological: Negative for dizziness, syncope, weakness, light-headedness, numbness and headaches.   Hematological: Negative for adenopathy. Does not bruise/bleed easily.   Psychiatric/Behavioral: Negative for confusion, decreased concentration and sleep disturbance. The patient is not nervous/anxious.        OBJECTIVE:     Vital Signs (Most Recent)  Temp: 98.8 °F (37.1 °C) (08/27/18 0933)  Pulse: 84 (08/27/18 0933)  BP: 121/70 (08/27/18 0933)  5' 3" (1.6 m)  74 kg (163 lb 2.3 oz)     Physical Exam:  Physical Exam   Constitutional: She is oriented to person, place, and time. She appears well-developed and well-nourished. No distress.   HENT:   Head: Normocephalic and atraumatic.   Right Ear: External ear normal.   Left Ear: External ear normal.   Eyes: Conjunctivae and EOM are normal. Pupils are equal, round, and reactive to light. No scleral icterus.   Neck: Normal range of motion. Neck supple. No tracheal deviation present. No thyromegaly present.   Cardiovascular: Normal rate, regular rhythm, normal heart sounds and intact distal pulses. Exam reveals no gallop and no friction rub.   No murmur heard.  Pulmonary/Chest: Effort normal and breath sounds normal. No respiratory distress. She has no wheezes. She has no rales. She exhibits no tenderness. Right breast exhibits no inverted nipple, no mass, no nipple discharge, no skin change and no tenderness. Left breast exhibits no inverted nipple, no mass, no nipple discharge, no skin change and no tenderness.   Abdominal: Soft. Bowel sounds are normal. She exhibits no distension. There is no tenderness. No hernia.   Musculoskeletal: Normal range of motion. She exhibits no edema, tenderness or deformity.   Lymphadenopathy:     She has no cervical adenopathy.   Neurological: She is alert and oriented to person, place, and time.   Skin: Skin is warm and dry. No rash noted. She is not diaphoretic. No erythema. No pallor.   Psychiatric: She " has a normal mood and affect. Her behavior is normal. Judgment and thought content normal.   Vitals reviewed.      Diagnostic Results:  Result:   Mammo Digital Diagnostic Left with CAD     History:  Patient is 73 y.o. and is seen for a diagnostic mammogram.     Films Compared:  Compared to: 07/26/2018 Mammo Digital Screening Bilat with Tomosynthesis_CAD, 10/10/2012 Mammo Previous and 12/22/2010 Mammo Previous     Findings:   Computer-aided detection was utilized in the interpretation of this examination.  The breast has scattered areas of fibroglandular density.     There are coarse heterogeneous calcifications in a grouped distribution seen in the upper outer quadrant of the left breast.      Impression:  Left  Calcifications: Left breast calcifications at the upper outer position. Assessment: 4 - Suspicious finding. Biopsy is recommended.      BI-RADS Category:   Left: 4 - Suspicious  Overall: 4 - Suspicious     Recommendation:   Stereotactic Biopsy is recommended.     The patient's estimated lifetime risk of breast cancer (to age 85) based on Tyrer-Cuzick - 7 risk assessment model is: Tyrer-Cuzick: 2.96 %. According to the [employer],  patients with a lifetime breast cancer risk of 20% or higher might benefit from supplemental screening tests.    ASSESSMENT/PLAN:     73-year-old female with abnormal mammogram left breast    PLAN:Plan     Left breast stereotactic core biopsy  Call with results

## 2018-09-04 ENCOUNTER — PATIENT OUTREACH (OUTPATIENT)
Dept: ADMINISTRATIVE | Facility: HOSPITAL | Age: 73
End: 2018-09-04

## 2018-09-12 ENCOUNTER — HOSPITAL ENCOUNTER (OUTPATIENT)
Dept: RADIOLOGY | Facility: HOSPITAL | Age: 73
Discharge: HOME | End: 2018-09-12
Attending: SURGERY
Payer: MEDICARE

## 2018-09-12 DIAGNOSIS — R92.8 ABNORMAL MAMMOGRAM OF LEFT BREAST: ICD-10-CM

## 2018-09-12 PROCEDURE — 88305 TISSUE EXAM BY PATHOLOGIST: CPT | Performed by: PATHOLOGY

## 2018-09-12 PROCEDURE — 19081 BX BREAST 1ST LESION STRTCTC: CPT | Mod: TC,PO,LT

## 2018-09-12 PROCEDURE — 19081 BX BREAST 1ST LESION STRTCTC: CPT | Mod: LT,,, | Performed by: RADIOLOGY

## 2018-09-12 PROCEDURE — 88305 TISSUE EXAM BY PATHOLOGIST: CPT | Mod: 26,,, | Performed by: PATHOLOGY

## 2018-09-12 NOTE — NURSING
Pressure held on left breast biopsy site for 10 mins, hemostasis was achieved, steri strips were applied, and wound was covered with 4x4 guaze and a tegaderm.  Dressing clean, dry and intact with no drainage noted.  Discharge instructions given verbally and in writing, patient voiced understandings.  Patient discharged and accompanied by family member.

## 2018-09-21 ENCOUNTER — TELEPHONE (OUTPATIENT)
Dept: SURGERY | Facility: CLINIC | Age: 73
End: 2018-09-21

## 2018-09-21 NOTE — TELEPHONE ENCOUNTER
----- Message from Ko Salinas MD sent at 9/21/2018  2:33 PM CDT -----  I have tried calling her earlier this week and tried once again today.  Does she have another number where I can retract because 1 in the chart she is not answering  ----- Message -----  From: Nory Sevilla MA  Sent: 9/21/2018   1:37 PM  To: Ko Salinas MD    Please review results of core and let patient know

## 2018-09-21 NOTE — TELEPHONE ENCOUNTER
Yes sir, I will. Wen did say she is hard to get in touch with. She works at [employer].

## 2018-09-24 ENCOUNTER — TELEPHONE (OUTPATIENT)
Dept: SURGERY | Facility: CLINIC | Age: 73
End: 2018-09-24

## 2018-09-24 DIAGNOSIS — R92.8 ABNORMAL MAMMOGRAM OF LEFT BREAST: Primary | ICD-10-CM

## 2018-09-24 NOTE — PROGRESS NOTES
Breast biopsy results discussed with patient:  FINAL PATHOLOGIC DIAGNOSIS  Left breast, upper outer quadrant core biopsy:  -Benign fibroglandular breast tissue with stromal sclerosis, fibroadenomatous change, and dystrophic calcifications.  Results are concordant  Patient will need 6 month interval new baseline mammogram and follow-up in clinic for breast exam at that time

## 2018-09-24 NOTE — TELEPHONE ENCOUNTER
Call to discuss breast biopsy results.  Multiple taps have been made over the past few days.  Message left will send letter to patient.

## 2018-10-10 ENCOUNTER — TELEPHONE (OUTPATIENT)
Dept: INTERNAL MEDICINE | Facility: CLINIC | Age: 73
End: 2018-10-10

## 2018-10-10 NOTE — TELEPHONE ENCOUNTER
----- Message from Aleksandra Alanis sent at 10/10/2018  9:29 AM CDT -----  Contact: pt  Please call pt @ [phone], pt have some questions about other doctor that she was seeing.

## 2018-10-10 NOTE — TELEPHONE ENCOUNTER
Patient called in regards to checking to see if her medical records were received from her old physicians office. Patient was advised that her records were received. Pt verbally understood the information given.

## 2018-10-11 RX ORDER — INSULIN DEGLUDEC 200 U/ML
INJECTION, SOLUTION SUBCUTANEOUS
Qty: 9 SYRINGE | Refills: 3 | Status: SHIPPED | OUTPATIENT
Start: 2018-10-11 | End: 2019-11-04 | Stop reason: SDUPTHER

## 2018-10-12 NOTE — TELEPHONE ENCOUNTER
----- Message from Miri Mills sent at 10/12/2018  9:45 AM CDT -----  Contact: pt  Pt stated she will like a call back about her medication, she can be reached at [phone] Thanks

## 2018-10-12 NOTE — TELEPHONE ENCOUNTER
Patient called in regards to her getting a refill on her insulin (Tresiba Flextouch U-200). Patient stated that she was unable to get her medication refilled. I did check the patient chart and her medication was sent to the pharmacy on last night. Patient informed and verbally understood the information given.

## 2018-10-15 ENCOUNTER — LAB VISIT (OUTPATIENT)
Dept: LAB | Facility: HOSPITAL | Age: 73
End: 2018-10-15
Attending: FAMILY MEDICINE
Payer: MEDICARE

## 2018-10-15 DIAGNOSIS — Z12.11 SCREENING FOR COLON CANCER: ICD-10-CM

## 2018-10-15 PROCEDURE — 82274 ASSAY TEST FOR BLOOD FECAL: CPT

## 2018-10-17 ENCOUNTER — TELEPHONE (OUTPATIENT)
Dept: INTERNAL MEDICINE | Facility: CLINIC | Age: 73
End: 2018-10-17

## 2018-10-17 NOTE — TELEPHONE ENCOUNTER
Called patient to get the date and time that she collected her stool specimen.  Received no answer.  Left her a detailed message to call back.

## 2018-10-17 NOTE — TELEPHONE ENCOUNTER
----- Message from Roya Gloria sent at 10/17/2018  9:49 AM CDT -----  Pt mailed in FOBT stool specimen with NO collection date, in order for lab to run test within the fifteen days prior to collection of specimen, a date is needed. Because specimen is time sensitive, lab need a date of collection before running sample ASAP. Lab ext [id number]

## 2018-10-17 NOTE — TELEPHONE ENCOUNTER
Please contact the patient to obtain the date she collected her stool sample and contact the lab with that date.    See below

## 2018-10-18 ENCOUNTER — TELEPHONE (OUTPATIENT)
Dept: INTERNAL MEDICINE | Facility: CLINIC | Age: 73
End: 2018-10-18

## 2018-10-18 LAB — HEMOCCULT STL QL IA: NEGATIVE

## 2018-10-18 NOTE — TELEPHONE ENCOUNTER
----- Message from Saurabh Yoon sent at 10/17/2018  3:58 PM CDT -----  Contact: [facility] [phone]  Returning a missed call.

## 2018-10-18 NOTE — TELEPHONE ENCOUNTER
Spoke to patient and was advised that specimen was collected on 10/04/2018 at 10:30am.      Called the lab and spoke to Crispin.  Advised Crispin of the date and time.

## 2018-10-18 NOTE — TELEPHONE ENCOUNTER
----- Message from Demetra Hope sent at 10/18/2018 10:44 AM CDT -----  Contact: pt  Calling in regards to a missed call [phone] (rkhe)

## 2018-10-19 NOTE — PROGRESS NOTES
Your Pap test showed no abnormal cells and no other abnormalities.  This is a negative screening test for cervical cancer  Please repeat in 3 years.

## 2018-10-20 DIAGNOSIS — I10 HYPERTENSION, ESSENTIAL: ICD-10-CM

## 2018-10-20 RX ORDER — LOSARTAN POTASSIUM AND HYDROCHLOROTHIAZIDE 12.5; 1 MG/1; MG/1
TABLET ORAL
Qty: 90 TABLET | Refills: 3 | Status: SHIPPED | OUTPATIENT
Start: 2018-10-20 | End: 2019-10-15 | Stop reason: SDUPTHER

## 2018-11-01 ENCOUNTER — OFFICE VISIT (OUTPATIENT)
Dept: CARDIOLOGY | Facility: CLINIC | Age: 73
End: 2018-11-01
Payer: MEDICARE

## 2018-11-01 VITALS
HEIGHT: 63 IN | DIASTOLIC BLOOD PRESSURE: 70 MMHG | BODY MASS INDEX: 29.21 KG/M2 | SYSTOLIC BLOOD PRESSURE: 132 MMHG | HEART RATE: 76 BPM | WEIGHT: 164.88 LBS

## 2018-11-01 DIAGNOSIS — I50.30 HEART FAILURE WITH PRESERVED LEFT VENTRICULAR FUNCTION (HFPEF): ICD-10-CM

## 2018-11-01 DIAGNOSIS — I10 ESSENTIAL HYPERTENSION: ICD-10-CM

## 2018-11-01 DIAGNOSIS — I51.89 DIASTOLIC DYSFUNCTION: ICD-10-CM

## 2018-11-01 DIAGNOSIS — R07.89 OTHER CHEST PAIN: Primary | ICD-10-CM

## 2018-11-01 DIAGNOSIS — E78.2 HYPERLIPIDEMIA, MIXED: ICD-10-CM

## 2018-11-01 PROCEDURE — 3078F DIAST BP <80 MM HG: CPT | Mod: CPTII,S$GLB,, | Performed by: INTERNAL MEDICINE

## 2018-11-01 PROCEDURE — 99213 OFFICE O/P EST LOW 20 MIN: CPT | Mod: PBBFAC | Performed by: INTERNAL MEDICINE

## 2018-11-01 PROCEDURE — 3045F PR MOST RECENT HEMOGLOBIN A1C LEVEL 7.0-9.0%: CPT | Mod: CPTII,S$GLB,, | Performed by: INTERNAL MEDICINE

## 2018-11-01 PROCEDURE — 1101F PT FALLS ASSESS-DOCD LE1/YR: CPT | Mod: CPTII,S$GLB,, | Performed by: INTERNAL MEDICINE

## 2018-11-01 PROCEDURE — 99999 PR PBB SHADOW E&M-EST. PATIENT-LVL III: CPT | Mod: PBBFAC,,, | Performed by: INTERNAL MEDICINE

## 2018-11-01 PROCEDURE — 3075F SYST BP GE 130 - 139MM HG: CPT | Mod: CPTII,S$GLB,, | Performed by: INTERNAL MEDICINE

## 2018-11-01 PROCEDURE — 99214 OFFICE O/P EST MOD 30 MIN: CPT | Mod: S$GLB,,, | Performed by: INTERNAL MEDICINE

## 2018-11-01 PROCEDURE — 99499 UNLISTED E&M SERVICE: CPT | Mod: S$GLB,,, | Performed by: INTERNAL MEDICINE

## 2018-11-01 NOTE — PROGRESS NOTES
Subjective:   Patient ID:  Indira Walker is a 73 y.o. female who presents for cardiac consult of Hypertension; Hyperlipidemia; and Chest Pain      Hypertension   Pertinent negatives include no chest pain, headaches, palpitations or shortness of breath.   Hyperlipidemia   Pertinent negatives include no chest pain, focal weakness or shortness of breath.   Chest Pain    Associated symptoms include back pain. Pertinent negatives include no dizziness, headaches, palpitations or shortness of breath.   Her past medical history is significant for hyperlipidemia.     The patient came in today for cardiac consult of Hypertension; Hyperlipidemia; and Chest Pain    This is a 73 year old female pt with current medical conditions HFpEF, HTN, HLD, DM2, Sciatica s/p surgery presents for follow up CV evaluation.     2/5/18  She presents for pre-op clearance for sciata needing surgery on 3/21/18 with Dr. Segundo, Neurosurgeon, at [facility]. The surgery is a minimally invasive microdiscectomy and will be under general anesthesia per notes. MRI of the lumbar spine showed left-sided nerve root impingement at the L5-S1 level. Pt has right foot drop with pain, shooting sharp pain. Pt has been to rehab once but will reschedule soon.   Pt does have intermittent, sharp chest pain. Pt thought it was due food or stress. Chest pain was left sided, non-radiating, pain yesterday lasted about 30 min. Occasionally gets palpitations, occurred last year. Mild dyspnea with a lot of exertion but usually okay.     5/10/18  Pt had negative nuclear stress and 2D ECHO with grade 1 DD and normal BI-V function in 2/2018. Pt's surgery went well, doing well not but still has some mild tingling. Pt still has mild mid sternal CP due to GERD, eats an apple which resolves it, previously was taking Nexium. Breathing has improved.     11/1/18  No further episodes of CP.     Patient feels no leg swelling, no PND,  no dizziness, no syncope,  no CNS symptoms.    Patient is compliant with medications.    18 ECG - NSR, cannot rule out anterior MI, poor RWP    2/15/18 - Nuclear stress   Nuclear Quantitative Functional Analysis:   LVEF: >= 70 %    Impression: NORMAL MYOCARDIAL PERFUSION  1. The perfusion scan is free of evidence for myocardial ischemia or injury.   2. Resting wall motion is physiologic.   3. Resting LV function is normal.   4. The ventricular volumes are normal at rest and stress.   5. The extracardiac distribution of radioactivity is normal.   6. There was no previous study available to compare    2/15/18 2D ECHO  CONCLUSIONS     1 - Concentric hypertrophy.     2 - No wall motion abnormalities.     3 - Normal left ventricular systolic function (EF 60-65%).     4 - Impaired LV relaxation, normal LAP (grade 1 diastolic dysfunction).     5 - Normal right ventricular systolic function .     6 - The estimated PA systolic pressure is greater than 23 mmHg.     Past Medical History:   Diagnosis Date    Arthritis     Diabetes mellitus, type 2     Heartburn     Hyperlipidemia     Hypertension        Past Surgical History:   Procedure Laterality Date     SECTION      COLONOSCOPY  2008    DR. JANET GARCÍA/MILD DIVERICULOSIS DESCENDING AND SIGMOID. REPEAT 5 YRS    HERNIA REPAIR      MICRODISKECTOMY-MINIMALLY INVASIVE LEFT L5-S1 Left 3/21/2018    Per[facility]undo [facility]    sciatic nerve surgery in foot  2018       Social History     Tobacco Use    Smoking status: Never Smoker    Smokeless tobacco: Never Used   Substance Use Topics    Alcohol use: No    Drug use: No       History reviewed. No pertinent family history.       Medication List           Accurate as of 18 12:51 PM. If you have any questions, ask your nurse or doctor.               CONTINUE taking these medications    amLODIPine 2.5 MG tablet  Commonly known as:  NORVASC  Take 1 tablet (2.5 mg total) by mouth once daily.    "  aspirin 81 MG EC tablet  Commonly known as:  ECOTRIN  Take 1 tablet (81 mg total) by mouth once daily. Every other day     atorvastatin 20 MG tablet  Commonly known as:  LIPITOR  Take 1 tablet (20 mg total) by mouth once daily.     b complex vitamins capsule     BD ULTRA-FINE MINI PEN NEEDLE 31 gauge x 3/16" Ndle  Generic drug:  pen needle, diabetic  AS DIRECTED ONCE daily WITH INSULIN SUBQ     BIOTIN ORAL     cholecalciferol (vitamin D3) 2,000 unit Cap  Commonly known as:  VITAMIN D3     cyclobenzaprine 5 MG tablet  Commonly known as:  FLEXERIL     gabapentin 300 MG capsule  Commonly known as:  NEURONTIN     HYDROcodone-acetaminophen 7.5-325 mg per tablet  Commonly known as:  NORCO  Take 1 tablet by mouth every 6 (six) hours as needed for Pain.     loratadine 10 mg tablet  Commonly known as:  CLARITIN     losartan-hydrochlorothiazide 100-12.5 mg 100-12.5 mg Tab  Commonly known as:  HYZAAR  TAKE 1 TABLET BY MOUTH EVERY DAY     metFORMIN 1000 MG tablet  Commonly known as:  GLUCOPHAGE  Take 1 tablet (1,000 mg total) by mouth 2 (two) times daily with meals.     MULTI VITAMIN ORAL     OMEGA-3S-DHA-EPA-FISH OIL ORAL     ondansetron 4 MG Tbdl  Commonly known as:  ZOFRAN-ODT  Take 1 tablet (4 mg total) by mouth every 8 (eight) hours as needed (nausea).     TRESIBA FLEXTOUCH U-200 200 unit/mL (3 mL) Inpn  Generic drug:  insulin degludec  INJECT 20 UNITS INTO THE SKIN DAILY AS DIRECTED     VITAMIN C 1000 MG tablet  Generic drug:  ascorbic acid (vitamin C)            Review of Systems   Constitutional: Negative.    HENT: Negative.    Eyes: Negative.    Respiratory: Negative for shortness of breath.    Cardiovascular: Negative for chest pain and palpitations.   Gastrointestinal: Positive for heartburn.   Genitourinary: Negative.    Musculoskeletal: Positive for back pain and joint pain.   Skin: Negative.    Neurological: Positive for sensory change (sciatica). Negative for dizziness, tingling, focal weakness and headaches. " "  Endo/Heme/Allergies: Negative.    Psychiatric/Behavioral: Negative.    All 12 systems otherwise negative.      Wt Readings from Last 3 Encounters:   11/01/18 74.8 kg (164 lb 14.5 oz)   08/27/18 74 kg (163 lb 2.3 oz)   08/14/18 74.2 kg (163 lb 7.5 oz)     Temp Readings from Last 3 Encounters:   08/27/18 98.8 °F (37.1 °C) (Oral)   08/14/18 98 °F (36.7 °C) (Oral)   04/26/18 97.1 °F (36.2 °C) (Oral)     BP Readings from Last 3 Encounters:   11/01/18 132/70   08/27/18 121/70   08/14/18 120/81     Pulse Readings from Last 3 Encounters:   11/01/18 76   08/27/18 84   08/14/18 78       /70 (BP Location: Right arm, Patient Position: Sitting, BP Method: Medium (Manual))   Pulse 76   Ht 5' 3" (1.6 m)   Wt 74.8 kg (164 lb 14.5 oz)   BMI 29.21 kg/m²     Objective:   Physical Exam   Constitutional: She is oriented to person, place, and time. She appears well-developed and well-nourished. No distress.   HENT:   Head: Normocephalic and atraumatic.   Nose: Nose normal.   Mouth/Throat: Oropharynx is clear and moist.   Eyes: Conjunctivae and EOM are normal. No scleral icterus.   Neck: Normal range of motion. Neck supple. No JVD present. No thyromegaly present.   Cardiovascular: Normal rate, regular rhythm, S1 normal and S2 normal. Exam reveals no gallop, no S3, no S4 and no friction rub.   No murmur heard.  Pulmonary/Chest: Effort normal and breath sounds normal. No stridor. No respiratory distress. She has no wheezes. She has no rales. She exhibits no tenderness.   Abdominal: Soft. Bowel sounds are normal. She exhibits no distension and no mass. There is no tenderness. There is no rebound.   Genitourinary:   Genitourinary Comments: Deferred   Musculoskeletal: Normal range of motion. She exhibits no edema, tenderness or deformity.   Lymphadenopathy:     She has no cervical adenopathy.   Neurological: She is alert and oriented to person, place, and time. She exhibits normal muscle tone. Coordination normal.   Skin: Skin is " warm and dry. No rash noted. She is not diaphoretic. No erythema. No pallor.   Psychiatric: She has a normal mood and affect. Her behavior is normal. Judgment and thought content normal.   Nursing note and vitals reviewed.      Lab Results   Component Value Date     04/17/2018    K 4.1 04/17/2018     04/17/2018    CO2 30 (H) 04/17/2018    BUN 12 04/17/2018    CREATININE 0.9 04/17/2018    GLU 96 04/17/2018    HGBA1C 7.8 (H) 08/08/2018    MG 2.0 11/29/2017    AST 23 04/17/2018    ALT 22 04/17/2018    ALBUMIN 3.6 04/17/2018    PROT 7.3 04/17/2018    BILITOT 0.9 04/17/2018    WBC 6.60 04/17/2018    HGB 11.1 (L) 04/17/2018    HCT 34.5 (L) 04/17/2018    MCV 92 04/17/2018     (H) 04/17/2018    INR 1.0 03/08/2018    CHOL 194 01/11/2018    HDL 61 01/11/2018    LDLCALC 120.6 01/11/2018    TRIG 62 01/11/2018     Assessment:      1. Other chest pain    2. Diastolic dysfunction    3. Essential hypertension    4. Hyperlipidemia, mixed    5. Uncontrolled type 2 diabetes mellitus without complication, without long-term current use of insulin    6. Heart failure with preserved left ventricular function (HFpEF)        Plan:   1. Chest pain, atypical - improved/resolved - no further episdes  - pharm nuclear stress test - negative  - 2D echo - diastolic dysfunction    2. HTN   - cont meds  - low salt diet    3. HLD,   - cont statin    4. DM2, HbA1c 7.8  - cont meds per PCP    5. HFpEF  - pt euvolemic  - low salt diet    RTC in 1 year or PRN sooner    Thank you for allowing me to participate in this patient's care. Please do not hesitate to contact me with any questions or concerns. Consult note has been forwarded to the referral physician.

## 2018-11-01 NOTE — PATIENT INSTRUCTIONS
MRS DASH seasoning.      Low-Salt Diet  This diet removes foods that are high in salt. It also limits the amount of salt you use when cooking. It is most often used for people with high blood pressure, edema (fluid retention), and kidney, liver, or heart disease.  Table salt contains the mineral sodium. Your body needs sodium to work normally. But too much sodium can make your health problems worse. Your healthcare provider is recommending a low-salt (also called low-sodium) diet for you. Your total daily allowance of salt is 1,500 to 2,300 milligrams (mg). It is less than 1 teaspoon of table salt. This means you can have only about 500 to 700 mg of sodium at each meal. People with certain health problems should limit salt intake to the lower end of the recommended range.    When you cook, dont add much salt. If you can cook without using salt, even better. Dont add salt to your food at the table.  When shopping, read food labels. Salt is often called sodium on the label. Choose foods that are salt-free, low salt, or very low salt. Note that foods with reduced salt may not lower your salt intake enough.    Beans, potatoes, and pasta  Ok: Dry beans, split peas, lentils, potatoes, rice, macaroni, pasta, spaghetti without added salt  Avoid: Potato chips, tortilla chips, and similar products  Breads and cereals  Ok: Low-sodium breads, rolls, cereals, and cakes; low-salt crackers, matzo crackers  Avoid: Salted crackers, pretzels, popcorn, [place] toast, pancakes, muffins  Dairy  Ok: Milk, chocolate milk, hot chocolate mix, low-salt cheeses, and yogurt  Avoid: Processed cheese and cheese spreads; Roquefort, Camembert, and cottage cheese; buttermilk, instant breakfast drink  Desserts  Ok: Ice cream, frozen yogurt, juice bars, gelatin, cookies and pies, sugar, honey, jelly, hard candy  Avoid: Most pies, cakes and cookies prepared or processed with salt; instant pudding  Drinks  Ok: Tea, coffee, fizzy (carbonated) drinks,  juices  Avoid: Flavored coffees, electrolyte replacement drinks, sports drinks  Meats  Ok: All fresh meat, fish, poultry, low-salt tuna, eggs, egg substitute  Avoid: Smoked, pickled, brine-cured, or salted meats and fish. This includes fernandez, chipped beef, corned beef, hot dogs, deli meats, ham, kosher meats, salt pork, sausage, canned tuna, salted codfish, smoked salmon, herring, sardines, or anchovies.  Seasonings and spices  Ok: Most seasonings are okay. Good substitutes for salt include: fresh herb blends, hot sauce, lemon, garlic, chan, vinegar, dry mustard, parsley, cilantro, horseradish, tomato paste, regular margarine, mayonnaise, unsalted butter, cream cheese, vegetable oil, cream, low-salt salad dressing and gravy.  Avoid: Regular ketchup, relishes, pickles, soy sauce, teriyaki sauce, Worcestershire sauce, BBQ sauce, tartar sauce, meat tenderizer, chili sauce, regular gravy, regular salad dressing, salted butter  Soups  Ok: Low-salt soups and broths made with allowed foods  Avoid: Bouillon cubes, soups with smoked or salted meats, regular soup and broth  Vegetables  Ok: Most vegetables are okay; also low-salt tomato and vegetable juices  Avoid: Sauerkraut and other brine-soaked vegetables; pickles and other pickled vegetables; tomato juice, olives  Date Last Reviewed: 8/1/2016 © 2000-2017 [employer]. [address] [place] [id number]. All rights reserved. This information is not intended as a substitute for professional medical care. Always follow your healthcare professional's instructions.

## 2018-12-14 ENCOUNTER — CLINICAL SUPPORT (OUTPATIENT)
Dept: INTERNAL MEDICINE | Facility: CLINIC | Age: 73
End: 2018-12-14
Payer: MEDICARE

## 2018-12-14 DIAGNOSIS — I10 HYPERTENSION, ESSENTIAL: ICD-10-CM

## 2018-12-14 LAB
ANION GAP SERPL CALC-SCNC: 8 MMOL/L
BASOPHILS # BLD AUTO: 0.04 K/UL
BASOPHILS NFR BLD: 0.6 %
BUN SERPL-MCNC: 14 MG/DL
CALCIUM SERPL-MCNC: 9.4 MG/DL
CHLORIDE SERPL-SCNC: 104 MMOL/L
CO2 SERPL-SCNC: 29 MMOL/L
CREAT SERPL-MCNC: 0.8 MG/DL
DIFFERENTIAL METHOD: ABNORMAL
EOSINOPHIL # BLD AUTO: 0.2 K/UL
EOSINOPHIL NFR BLD: 2.6 %
ERYTHROCYTE [DISTWIDTH] IN BLOOD BY AUTOMATED COUNT: 14 %
EST. GFR  (AFRICAN AMERICAN): >60 ML/MIN/1.73 M^2
EST. GFR  (NON AFRICAN AMERICAN): >60 ML/MIN/1.73 M^2
ESTIMATED AVG GLUCOSE: 166 MG/DL
GLUCOSE SERPL-MCNC: 84 MG/DL
HBA1C MFR BLD HPLC: 7.4 %
HCT VFR BLD AUTO: 35.3 %
HGB BLD-MCNC: 11.6 G/DL
IMM GRANULOCYTES # BLD AUTO: 0.01 K/UL
IMM GRANULOCYTES NFR BLD AUTO: 0.1 %
LYMPHOCYTES # BLD AUTO: 2.2 K/UL
LYMPHOCYTES NFR BLD: 32.5 %
MCH RBC QN AUTO: 29.8 PG
MCHC RBC AUTO-ENTMCNC: 32.9 G/DL
MCV RBC AUTO: 91 FL
MONOCYTES # BLD AUTO: 0.7 K/UL
MONOCYTES NFR BLD: 9.8 %
NEUTROPHILS # BLD AUTO: 3.7 K/UL
NEUTROPHILS NFR BLD: 54.4 %
NRBC BLD-RTO: 0 /100 WBC
PLATELET # BLD AUTO: 371 K/UL
PMV BLD AUTO: 10.7 FL
POTASSIUM SERPL-SCNC: 3.6 MMOL/L
RBC # BLD AUTO: 3.89 M/UL
SODIUM SERPL-SCNC: 141 MMOL/L
WBC # BLD AUTO: 6.81 K/UL

## 2018-12-14 PROCEDURE — 80048 BASIC METABOLIC PNL TOTAL CA: CPT

## 2018-12-14 PROCEDURE — 83036 HEMOGLOBIN GLYCOSYLATED A1C: CPT

## 2018-12-14 PROCEDURE — 85025 COMPLETE CBC W/AUTO DIFF WBC: CPT

## 2018-12-14 PROCEDURE — 99999 PR PBB SHADOW E&M-EST. PATIENT-LVL I: CPT | Mod: PBBFAC,,,

## 2018-12-19 ENCOUNTER — OFFICE VISIT (OUTPATIENT)
Dept: INTERNAL MEDICINE | Facility: CLINIC | Age: 73
End: 2018-12-19
Payer: MEDICARE

## 2018-12-19 VITALS
TEMPERATURE: 98 F | DIASTOLIC BLOOD PRESSURE: 68 MMHG | WEIGHT: 161.94 LBS | OXYGEN SATURATION: 97 % | HEART RATE: 67 BPM | SYSTOLIC BLOOD PRESSURE: 114 MMHG | HEIGHT: 63 IN | BODY MASS INDEX: 28.69 KG/M2

## 2018-12-19 DIAGNOSIS — Z28.39 IMMUNIZATION DEFICIENCY: ICD-10-CM

## 2018-12-19 PROCEDURE — 99999 PR PBB SHADOW E&M-EST. PATIENT-LVL IV: CPT | Mod: PBBFAC,,, | Performed by: FAMILY MEDICINE

## 2018-12-19 PROCEDURE — 1101F PT FALLS ASSESS-DOCD LE1/YR: CPT | Mod: CPTII,S$GLB,, | Performed by: FAMILY MEDICINE

## 2018-12-19 PROCEDURE — 3078F DIAST BP <80 MM HG: CPT | Mod: CPTII,S$GLB,, | Performed by: FAMILY MEDICINE

## 2018-12-19 PROCEDURE — 90662 IIV NO PRSV INCREASED AG IM: CPT | Mod: S$GLB,,, | Performed by: FAMILY MEDICINE

## 2018-12-19 PROCEDURE — 99213 OFFICE O/P EST LOW 20 MIN: CPT | Mod: 25,S$GLB,, | Performed by: FAMILY MEDICINE

## 2018-12-19 PROCEDURE — 3074F SYST BP LT 130 MM HG: CPT | Mod: CPTII,S$GLB,, | Performed by: FAMILY MEDICINE

## 2018-12-19 PROCEDURE — G0008 ADMIN INFLUENZA VIRUS VAC: HCPCS | Mod: S$GLB,,, | Performed by: FAMILY MEDICINE

## 2018-12-19 PROCEDURE — 3045F PR MOST RECENT HEMOGLOBIN A1C LEVEL 7.0-9.0%: CPT | Mod: CPTII,S$GLB,, | Performed by: FAMILY MEDICINE

## 2018-12-19 RX ORDER — CIMETIDINE 400 MG/1
400 TABLET, FILM COATED ORAL
COMMUNITY
End: 2019-01-23

## 2018-12-19 NOTE — PATIENT INSTRUCTIONS
Diet: Diabetes  Food is an important tool that you can use to control diabetes and stay healthy. Eating well-balanced meals in the correct amounts will help you control your blood glucose levels and prevent low blood sugar reactions. It will also help you reduce the health risks of diabetes. There is no one specific diet that is right for everyone with diabetes. But there are general guidelines to follow. A registered dietitian (RD) will create a tailored diet approach thats just right for you. He or she will also help you plan healthy meals and snacks. If you have any questions, call your dietitian for advice.     Guidelines for success  Talk with your healthcare provider before starting a diabetes diet or weight loss program. If you haven't talked with a dietitian yet, ask your provider for a referral. The following guidelines can help you succeed:  · Select foods from the 6 food groups below. Your dietitian will help you find food choices within each group. He or she will also show you serving sizes and how many servings you can have at each meal.  ¨ Grains, beans, and starchy vegetables  ¨ Vegetables  ¨ Fruit  ¨ Milk or yogurt  ¨ Meat, poultry, fish, or tofu  ¨ Healthy fats  · Check your blood sugar levels as directed by your provider. Take any medicine as prescribed by your provider.  · Learn to read food labels and pick the right portion sizes.  · Eat only the amount of food in your meal plan. Eat about the same amount of food at regular times each day. Dont skip meals. Eat meals 4 to 5 hours apart, with snacks in between.  · Limit alcohol. It raises blood sugar levels. Drink water or calorie-free diet drinks that use safe sweeteners.  · Eat less fat to help lower your risk of heart disease. Use nonfat or low-fat dairy products and lean meats. Avoid fried foods. Use cooking oils that are unsaturated, such as olive, canola, or peanut oil.  · Talk with your dietitian about safe sugar substitutes.  · Avoid  added salt. It can contribute to high blood pressure, which can cause heart disease. People with diabetes already have a risk of high blood pressure and heart disease.  · Stay at a healthy weight. If you need to lose weight, cut down on your portion sizes. But dont skip meals. Exercise is an important part of any weight management program. Talk with your provider about an exercise program thats right for you.  · For more information about the best diet plan for you, talk with a registered dietitian (RD). To find an RD in your area, contact:  ¨ Academy of Nutrition and Dietetics www.eatright.org  ¨ The American Diabetes Association [phone] www.diabetes.org  Date Last Reviewed: 8/1/2016 © 2000-2017 [employer]. [address] [place] [address], [place] [address]. All rights reserved. This information is not intended as a substitute for professional medical care. Always follow your healthcare professional's instructions.        Understanding Carbohydrates, Fats, and Protein  Food is a source of fuel and nourishment for your body. Its also a source of pleasure. Having diabetes doesnt mean you have to eat special foods or give up desserts. Instead, your dietitian can show you how to plan meals to suit your body. To start, learn how different foods affect blood sugar.  Carbohydrates  Carbohydrates are the main source of fuel for the body. Carbohydrates raise blood sugar. Many people think carbohydrates are only found in pasta or bread. But carbohydrates are actually in many kinds of foods:  · Sugars occur naturally in foods such as fruit, milk, honey, and molasses. Sugars can also be added to many foods, from cereals and yogurt to candy and desserts. Sugars raise blood sugar.  · Starches are found in bread, cereals, pasta, and dried beans. Theyre also found in corn, peas, potatoes, yam, acorn squash, and butternut squash. Starches also raise blood sugar.   · Fiber is found in foods such as vegetables,  fruits, beans, and whole grains. Unlike other carbs, fiber isnt digested or absorbed. So it doesnt raise blood sugar. In fact, fiber can help keep blood sugar from rising too fast. It also helps keep blood cholesterol at a healthy level.  Did you know?  Even though carbohydrates raise blood sugar, its best to have some in every meal. They are an important part of a healthy diet.   Fat  Fat is an energy source that can be stored until needed. Fat does not raise blood sugar. However, it can raise blood cholesterol, increasing the risk of heart disease. Fat is also high in calories, which can cause weight gain. Not all types of fat are the same.  More Healthy:  · Monounsaturated fats are mostly found in vegetable oils, such as olive, canola, and peanut oils. They are also found in avocados and some nuts. Monounsaturated fats are healthy for your heart. Thats because they lower LDL (unhealthy) cholesterol.  · Polyunsaturated fats are mostly found in vegetable oils, such as corn, safflower, and soybean oils. They are also found in some seeds, nuts, and fish. Polyunsaturated fats lower LDL (unhealthy) cholesterol. So, choosing them instead of saturated fats is healthy for your heart. Certain unsaturated fats can help lower triglycerides.   Less Healthy:  · Saturated fats are found in animal products, such as meat, poultry, whole milk, lard, and butter. Saturated fats raise LDL cholesterol and are not healthy for your heart.  · Hydrogenated oils and trans fats are formed when vegetable oils are processed into solid fats. They are found in many processed foods. Hydrogenated oils and trans fats raise LDL cholesterol and lower HDL (healthy) cholesterol. They are not healthy for your heart.  Protein  Protein helps the body build and repair muscle and other tissue. Protein has little or no effect on blood sugar. However, many foods that contain protein also contain saturated fat. By choosing low-fat protein sources, you can  get the benefits of protein without the extra fat:  · Plant protein is found in dry beans and peas, nuts, and soy products, such as tofu and soymilk. These sources tend to be cholesterol-free and low in saturated fat.  · Animal protein is found in fish, poultry, meat, cheese, milk, and eggs. These contain cholesterol and can be high in saturated fat. Aim for lean, lower-fat choices.  Date Last Reviewed: 3/1/2016  © 5319-8560 [employer]. [address] [place] [address], [place] [address]. All rights reserved. This information is not intended as a substitute for professional medical care. Always follow your healthcare professional's instructions.

## 2018-12-19 NOTE — PROGRESS NOTES
Subjective:       Patient ID: Indira Walker is a 73 y.o. female.    Chief Complaint: Follow-up    Follow-up diabetes uncontrolled.  She currently is on metformin 1000 mg twice a day and tresiba 24 units a day.  Fasting home blood sugars have been in the 80 range.  She denies polyuria polydipsia or hypoglycemic symptoms.  CBC reviewed with improved hemoglobin      Review of Systems   Constitutional: Negative for appetite change, fatigue and unexpected weight change.   Respiratory: Negative for cough, shortness of breath and wheezing.    Cardiovascular: Negative for chest pain, palpitations and leg swelling.   Gastrointestinal: Negative for abdominal pain.   Endocrine: Negative for polydipsia and polyuria.   Genitourinary: Negative for difficulty urinating.   Neurological: Negative for headaches.       Objective:      Physical Exam   Constitutional: She is oriented to person, place, and time. She appears well-developed and well-nourished. No distress.   Neck: Neck supple. No thyromegaly present.   Cardiovascular: Normal rate, regular rhythm and normal heart sounds.   No murmur heard.  Pulmonary/Chest: Effort normal and breath sounds normal. No respiratory distress. She has no wheezes.   Abdominal: Soft. Bowel sounds are normal. She exhibits no mass. There is no tenderness.   Lymphadenopathy:     She has no cervical adenopathy.   Neurological: She is alert and oriented to person, place, and time.       Clinical Support on 12/14/2018   Component Date Value Ref Range Status    Hemoglobin A1C 12/14/2018 7.4* 4.0 - 5.6 % Final    Estimated Avg Glucose 12/14/2018 166* 68 - 131 mg/dL Final    Sodium 12/14/2018 141  136 - 145 mmol/L Final    Potassium 12/14/2018 3.6  3.5 - 5.1 mmol/L Final    Chloride 12/14/2018 104  95 - 110 mmol/L Final    CO2 12/14/2018 29  23 - 29 mmol/L Final    Glucose 12/14/2018 84  70 - 110 mg/dL Final    BUN, Bld 12/14/2018 14  8 - 23 mg/dL Final    Creatinine 12/14/2018 0.8  0.5 - 1.4  mg/dL Final    Calcium 12/14/2018 9.4  8.7 - 10.5 mg/dL Final    Anion Gap 12/14/2018 8  8 - 16 mmol/L Final    eGFR if African American 12/14/2018 >60.0  >60 mL/min/1.73 m^2 Final    eGFR if non African American 12/14/2018 >60.0  >60 mL/min/1.73 m^2 Final    WBC 12/14/2018 6.81  3.90 - 12.70 K/uL Final    RBC 12/14/2018 3.89* 4.00 - 5.40 M/uL Final    Hemoglobin 12/14/2018 11.6* 12.0 - 16.0 g/dL Final    Hematocrit 12/14/2018 35.3* 37.0 - 48.5 % Final    MCV 12/14/2018 91  82 - 98 fL Final    MCH 12/14/2018 29.8  27.0 - 31.0 pg Final    MCHC 12/14/2018 32.9  32.0 - 36.0 g/dL Final    RDW 12/14/2018 14.0  11.5 - 14.5 % Final    Platelets 12/14/2018 371* 150 - 350 K/uL Final    MPV 12/14/2018 10.7  9.2 - 12.9 fL Final    Immature Granulocytes 12/14/2018 0.1  0.0 - 0.5 % Final    Gran # (ANC) 12/14/2018 3.7  1.8 - 7.7 K/uL Final    Immature Grans (Abs) 12/14/2018 0.01  0.00 - 0.04 K/uL Final    Lymph # 12/14/2018 2.2  1.0 - 4.8 K/uL Final    Mono # 12/14/2018 0.7  0.3 - 1.0 K/uL Final    Eos # 12/14/2018 0.2  0.0 - 0.5 K/uL Final    Baso # 12/14/2018 0.04  0.00 - 0.20 K/uL Final    nRBC 12/14/2018 0  0 /100 WBC Final    Gran% 12/14/2018 54.4  38.0 - 73.0 % Final    Lymph% 12/14/2018 32.5  18.0 - 48.0 % Final    Mono% 12/14/2018 9.8  4.0 - 15.0 % Final    Eosinophil% 12/14/2018 2.6  0.0 - 8.0 % Final    Basophil% 12/14/2018 0.6  0.0 - 1.9 % Final    Differential Method 12/14/2018 Automated   Final     Assessment:       1. Uncontrolled type 2 diabetes mellitus without complication, with long-term current use of insulin        Plan:   A1c was 7.8 and now 7.4.  Need to adjust medications.  She is concerned about possibly getting hypoglycemic.  Will decrease metformin 1000 mg twice a day take 500 mg twice a day and increase tresiba    24 units and start 26 units a day.  Follow up with lab in 1 month.  Diet was discussed.  Flu immunization given today she wants to defer pneumococcal  immunization    Uncontrolled type 2 diabetes mellitus without complication, with long-term current use of insulin  -     Glucose, fasting; Future; Expected date: 12/19/2018  -     Hemoglobin A1c; Future; Expected date: 12/19/2018    Other orders  -     Influenza - High Dose (65+) (PF) (IM)

## 2019-01-09 ENCOUNTER — TELEPHONE (OUTPATIENT)
Dept: INTERNAL MEDICINE | Facility: CLINIC | Age: 74
End: 2019-01-09

## 2019-01-09 ENCOUNTER — PATIENT OUTREACH (OUTPATIENT)
Dept: ADMINISTRATIVE | Facility: HOSPITAL | Age: 74
End: 2019-01-09

## 2019-01-09 RX ORDER — INSULIN PUMP SYRINGE, 3 ML
EACH MISCELLANEOUS
Qty: 1 EACH | Refills: 0 | Status: SHIPPED | OUTPATIENT
Start: 2019-01-09 | End: 2022-08-02

## 2019-01-09 RX ORDER — LANCETS
EACH MISCELLANEOUS
Qty: 200 EACH | Refills: 4 | Status: SHIPPED | OUTPATIENT
Start: 2019-01-09 | End: 2022-08-02

## 2019-01-09 NOTE — LETTER
January 9, 2019        Indira Geraldine Walker  [address] [facility] [address]      Dear MsJennifer Rodriguesie,    You have an upcoming appointment with Gladys Herrera MD on 01/23/19.      Your chart is indicating you may be due for the following and I will be happy to assist you in scheduling any needed appointments:  Health Maintenance Due   Topic    TETANUS VACCINE     Zoster Vaccine     Pneumococcal Vaccine (65+ Low/Medium Risk) (1 of 2 - PCV13)    Eye Exam     Foot Exam           If you have had any of the above done at another facility, please bring the records or information with you so that your record at [facility] will be complete.    We will be happy to assist you with scheduling any necessary appointments or you may contact the [facility] appointment desk at [phone] to schedule at your convenience.     Thank you for choosing [facility] for your healthcare needs,      Joan BOCANEGRA, LPN Care Coordinator  [facility]  [phone]

## 2019-01-09 NOTE — TELEPHONE ENCOUNTER
----- Message from Lula Henning sent at 1/9/2019  9:00 AM CST -----  Contact: pt   States she's calling rg her diabetic machine has gone out and wants to know what she has to do to get another one and can be reached at [phone]//thanks/dbw

## 2019-01-09 NOTE — TELEPHONE ENCOUNTER
Spoke to patient and advised.  Patient stated that she will come by the office on tomorrow to  a meter from here.  Patient stated that she does not want a prescription sent in at this time for a meter, because she just got the one she has.  Patient has a True Metrix meter.

## 2019-01-09 NOTE — TELEPHONE ENCOUNTER
Patient called in regards to her BS machine being broken. She wants to know if we have one in office that she can have? If not she would like a script called to her pharmacy for a new meter. Patient last seen on 12/19/18.    Please Advise

## 2019-01-09 NOTE — TELEPHONE ENCOUNTER
Yes,we have meters.  Not sure what kind she has been using.  We have contour type systems mostly, I believe. Check the shelves next to wall file in nurse's station, and there are a few in my office near the outer door, top shelf.    Meanwhile, I can also send rx to her pharmacy for meter/strips/lancets - miscellaneous - and she can get the insurance preferred system - she will need to check with her insurance on their preference.

## 2019-01-10 NOTE — TELEPHONE ENCOUNTER
Spoke with the patient regarding her Rx for her blood sugar testing supply. Patient verbally understood the information given.

## 2019-01-15 ENCOUNTER — TELEPHONE (OUTPATIENT)
Dept: INTERNAL MEDICINE | Facility: CLINIC | Age: 74
End: 2019-01-15

## 2019-01-15 NOTE — TELEPHONE ENCOUNTER
----- Message from Kj Lopes sent at 1/15/2019  9:33 AM CST -----  Contact: self [phone]  Would like to have letter stating fitness to exercise faxed to [facility] at [phone].  Please call back at [phone].  Md Yvette

## 2019-01-15 NOTE — LETTER
January 16, 2019    Indira Walker  [address] [facility] [address]             [facility]  [address] [place] [id number]  Phone: [phone]  Fax: [phone] Ms. Walker is under my care.  She has multiple chronic conditions.  She was recently evaluated for cardiac health and was found to have no limiting condition present.  She is able to participate in a fitness program that starts at a low level and progresses appropriately.    Please contact me with any further questions.            Gladys Herrera MD

## 2019-01-15 NOTE — TELEPHONE ENCOUNTER
Patient called in regards to her needing to get a letter faxed over to the [facility] stating that it is okay for her to exercise. Patient last seen on 12/19/18. [facility] fax number [phone].    Please Advise

## 2019-01-16 ENCOUNTER — TELEPHONE (OUTPATIENT)
Dept: INTERNAL MEDICINE | Facility: CLINIC | Age: 74
End: 2019-01-16

## 2019-01-16 NOTE — TELEPHONE ENCOUNTER
----- Message from Beronica Frank sent at 1/16/2019  4:44 PM CST -----  Contact: pt  Pt stated she missed call back from office, advised pt of 1/23 appointment    ... [phone] (home)

## 2019-01-16 NOTE — TELEPHONE ENCOUNTER
Returned patient phone call.  Someone picked up the phone and just held it.  No one said anything.

## 2019-01-18 DIAGNOSIS — E11.9 TYPE 2 DIABETES MELLITUS WITHOUT COMPLICATION: ICD-10-CM

## 2019-01-21 ENCOUNTER — CLINICAL SUPPORT (OUTPATIENT)
Dept: INTERNAL MEDICINE | Facility: CLINIC | Age: 74
End: 2019-01-21
Payer: MEDICARE

## 2019-01-21 LAB — GLUCOSE SERPL-MCNC: 98 MG/DL

## 2019-01-21 PROCEDURE — 82947 ASSAY GLUCOSE BLOOD QUANT: CPT

## 2019-01-22 ENCOUNTER — TELEPHONE (OUTPATIENT)
Dept: INTERNAL MEDICINE | Facility: CLINIC | Age: 74
End: 2019-01-22

## 2019-01-22 NOTE — TELEPHONE ENCOUNTER
Spoke with the patient, she states that the [facility] has not received her letter stating that she could exercise. Patient asked if we could re fax the letter over to the [facility]. Patient letter re faxed. Patient verbally understood the information given.

## 2019-01-22 NOTE — TELEPHONE ENCOUNTER
----- Message from Krista Diaz sent at 1/22/2019  9:26 AM CST -----  Patient states [facility] did not get the letter that was sent to them saying patient can workout after surgery and they would like Doctor to email to [email or web address]..[phone]

## 2019-01-23 ENCOUNTER — OFFICE VISIT (OUTPATIENT)
Dept: INTERNAL MEDICINE | Facility: CLINIC | Age: 74
End: 2019-01-23
Payer: MEDICARE

## 2019-01-23 VITALS
HEART RATE: 78 BPM | BODY MASS INDEX: 28.34 KG/M2 | SYSTOLIC BLOOD PRESSURE: 146 MMHG | OXYGEN SATURATION: 100 % | WEIGHT: 159.94 LBS | HEIGHT: 63 IN | DIASTOLIC BLOOD PRESSURE: 90 MMHG | TEMPERATURE: 97 F

## 2019-01-23 DIAGNOSIS — Z23 IMMUNIZATION DUE: ICD-10-CM

## 2019-01-23 DIAGNOSIS — E11.69 HYPERLIPIDEMIA ASSOCIATED WITH TYPE 2 DIABETES MELLITUS: ICD-10-CM

## 2019-01-23 DIAGNOSIS — E78.5 HYPERLIPIDEMIA ASSOCIATED WITH TYPE 2 DIABETES MELLITUS: ICD-10-CM

## 2019-01-23 DIAGNOSIS — I10 HYPERTENSION, ESSENTIAL: ICD-10-CM

## 2019-01-23 PROCEDURE — 1101F PT FALLS ASSESS-DOCD LE1/YR: CPT | Mod: CPTII,S$GLB,, | Performed by: FAMILY MEDICINE

## 2019-01-23 PROCEDURE — 3077F PR MOST RECENT SYSTOLIC BLOOD PRESSURE >= 140 MM HG: ICD-10-PCS | Mod: CPTII,S$GLB,, | Performed by: FAMILY MEDICINE

## 2019-01-23 PROCEDURE — 3080F PR MOST RECENT DIASTOLIC BLOOD PRESSURE >= 90 MM HG: ICD-10-PCS | Mod: CPTII,S$GLB,, | Performed by: FAMILY MEDICINE

## 2019-01-23 PROCEDURE — 99999 PR PBB SHADOW E&M-EST. PATIENT-LVL III: ICD-10-PCS | Mod: PBBFAC,,, | Performed by: FAMILY MEDICINE

## 2019-01-23 PROCEDURE — 3045F PR MOST RECENT HEMOGLOBIN A1C LEVEL 7.0-9.0%: ICD-10-PCS | Mod: CPTII,S$GLB,, | Performed by: FAMILY MEDICINE

## 2019-01-23 PROCEDURE — 99213 PR OFFICE/OUTPT VISIT, EST, LEVL III, 20-29 MIN: ICD-10-PCS | Mod: S$GLB,,, | Performed by: FAMILY MEDICINE

## 2019-01-23 PROCEDURE — 3077F SYST BP >= 140 MM HG: CPT | Mod: CPTII,S$GLB,, | Performed by: FAMILY MEDICINE

## 2019-01-23 PROCEDURE — 3080F DIAST BP >= 90 MM HG: CPT | Mod: CPTII,S$GLB,, | Performed by: FAMILY MEDICINE

## 2019-01-23 PROCEDURE — 99213 OFFICE O/P EST LOW 20 MIN: CPT | Mod: S$GLB,,, | Performed by: FAMILY MEDICINE

## 2019-01-23 PROCEDURE — 99999 PR PBB SHADOW E&M-EST. PATIENT-LVL III: CPT | Mod: PBBFAC,,, | Performed by: FAMILY MEDICINE

## 2019-01-23 PROCEDURE — 1101F PR PT FALLS ASSESS DOC 0-1 FALLS W/OUT INJ PAST YR: ICD-10-PCS | Mod: CPTII,S$GLB,, | Performed by: FAMILY MEDICINE

## 2019-01-23 PROCEDURE — 3045F PR MOST RECENT HEMOGLOBIN A1C LEVEL 7.0-9.0%: CPT | Mod: CPTII,S$GLB,, | Performed by: FAMILY MEDICINE

## 2019-01-23 NOTE — PATIENT INSTRUCTIONS
chck BPs 2 x week - different times.    Continue same metformin dose and return to 24 units Tresiba.

## 2019-01-23 NOTE — PROGRESS NOTES
Subjective:       Patient ID: Indira Walker is a 73 y.o. female.    Chief Complaint: f/u DM    Here to review blood sugar readings with recent adjustment of decreasing metformin to 500 mg twice a day and increasing tresiba to 26 U from 24 U daily.  She has concerns about hypoglycemia.  Most recent A1c was 7.4 down from 7.8.  She had a low BS of 79 one day.    She states she started going to the  again - walking a mile daily. She wants to get rid of her insulin.  Lab Results       Component                Value               Date                       WBC                      6.81                12/14/2018                 HGB                      11.6 (L)            12/14/2018                 HCT                      35.3 (L)            12/14/2018                 PLT                      371 (H)             12/14/2018                 CHOL                     194                 01/11/2018                 TRIG                     62                  01/11/2018                 HDL                      61                  01/11/2018                 LDLCALC                  120.6               01/11/2018                 ALT                      22                  04/17/2018                 AST                      23                  04/17/2018                 NA                       141                 12/14/2018                 K                        3.6                 12/14/2018                 CL                       104                 12/14/2018                 CALCIUM                  9.4                 12/14/2018                 CREATININE               0.8                 12/14/2018                 BUN                      14                  12/14/2018                 CO2                      29                  12/14/2018                 INR                      1.0                 03/08/2018                 GLU                      84                  12/14/2018                 ESTGFRAFRICA              >60.0               12/14/2018                 EGFRNONAA                >60.0               12/14/2018                 HGBA1C                   7.4 (H)             12/14/2018                 MICALBCREAT              4.7                 03/01/2018                  Review of Systems   Constitutional: Negative for fever.   Genitourinary: Positive for pelvic pain.       Objective:      Physical Exam   Constitutional: She is oriented to person, place, and time. She appears well-developed.   HENT:   Head: Normocephalic and atraumatic.   Cardiovascular: Normal rate, regular rhythm and normal heart sounds.   Pulmonary/Chest: Effort normal and breath sounds normal.   Neurological: She is alert and oriented to person, place, and time.   Skin: Skin is warm and dry.   Psychiatric: She has a normal mood and affect. Her behavior is normal.         Assessment/Plan:     1. Uncontrolled type 2 diabetes mellitus without complication, with long-term current use of insulin  Hemoglobin A1c    Microalbumin/creatinine urine ratio   2. Immunization due  Pneumococcal Conjugate Vaccine (13 Valent) (IM)   3. Hypertension, essential  Comprehensive metabolic panel   4. Hyperlipidemia associated with type 2 diabetes mellitus  Lipid panel   will continue with reduced metformin for now - consider increase again in future. She has increased her exercise since last visit.  Go back to 24 U tresiba.  Check A1C in 2 more months.  Check BPs 2 x week and we will review them next visit.

## 2019-02-11 ENCOUNTER — HOSPITAL ENCOUNTER (EMERGENCY)
Facility: HOSPITAL | Age: 74
Discharge: HOME | End: 2019-02-11
Attending: EMERGENCY MEDICINE
Payer: MEDICARE

## 2019-02-11 ENCOUNTER — TELEPHONE (OUTPATIENT)
Dept: INTERNAL MEDICINE | Facility: CLINIC | Age: 74
End: 2019-02-11

## 2019-02-11 VITALS
DIASTOLIC BLOOD PRESSURE: 92 MMHG | TEMPERATURE: 98 F | HEIGHT: 64 IN | BODY MASS INDEX: 27.14 KG/M2 | OXYGEN SATURATION: 97 % | HEART RATE: 79 BPM | RESPIRATION RATE: 20 BRPM | WEIGHT: 159 LBS | SYSTOLIC BLOOD PRESSURE: 170 MMHG
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
## 2019-02-11 DIAGNOSIS — S30.0XXA COCCYX CONTUSION, INITIAL ENCOUNTER: ICD-10-CM

## 2019-02-11 DIAGNOSIS — M79.606 LEG PAIN: ICD-10-CM

## 2019-02-11 DIAGNOSIS — S39.012A LUMBAR STRAIN, INITIAL ENCOUNTER: Primary | ICD-10-CM

## 2019-02-11 DIAGNOSIS — M54.50 LOW BACK PAIN: ICD-10-CM

## 2019-02-11 PROCEDURE — 99284 EMERGENCY DEPT VISIT MOD MDM: CPT | Mod: 25

## 2019-02-11 RX ORDER — TRAMADOL HYDROCHLORIDE 50 MG/1
50 TABLET ORAL EVERY 6 HOURS PRN
Qty: 12 TABLET | Refills: 0 | Status: SHIPPED | OUTPATIENT
Start: 2019-02-11 | End: 2019-12-10 | Stop reason: SDUPTHER

## 2019-02-11 NOTE — ED NOTES
Patient examined, evaluated, and educated on discharge instructions and prescriptions by ARMANI Ferro NP  without nursing assistance. [facility]

## 2019-02-11 NOTE — TELEPHONE ENCOUNTER
Pt states she is on her way to the ER due to pain from fall. She wanted to let you know her status and will call back when she hears more information about what is going on with her.

## 2019-02-11 NOTE — ED PROVIDER NOTES
Encounter Date: 2019       History     Chief Complaint   Patient presents with    Tailbone Pain     Fell off of a step stool Friday and landed on her buttocks. complaining of lower back pain and buttock pain.      73 year old female with complaint of lower back and buttock pain after slipping off a chair 3 days ago.  Reports constant aching pain. Worse with walking and movement.  No radiation of pain. No other complaints.           Review of patient's allergies indicates:   Allergen Reactions    Iodine and iodide containing products Hives     Past Medical History:   Diagnosis Date    Arthritis     Diabetes mellitus, type 2     Heartburn     Hyperlipidemia     Hypertension      Past Surgical History:   Procedure Laterality Date     SECTION      COLONOSCOPY  2008    DR. JANET GARCÍA/MILD DIVERICULOSIS DESCENDING AND SIGMOID. REPEAT 5 YRS    HERNIA REPAIR      MICRODISKECTOMY-MINIMALLY INVASIVE LEFT L5-S1 Left 3/21/2018    Per[facility]    sciatic nerve surgery in foot  2018     History reviewed. No pertinent family history.  Social History     Tobacco Use    Smoking status: Never Smoker    Smokeless tobacco: Never Used   Substance Use Topics    Alcohol use: No    Drug use: No     Review of Systems   Constitutional: Negative for fever.   HENT: Negative for sore throat.    Respiratory: Negative for shortness of breath.    Cardiovascular: Negative for chest pain.   Gastrointestinal: Negative for nausea.   Genitourinary: Negative for dysuria.   Musculoskeletal: Positive for back pain.   Skin: Negative for rash.   Neurological: Negative for weakness.   Hematological: Does not bruise/bleed easily.       Physical Exam     Initial Vitals [19 1034]   BP Pulse Resp Temp SpO2   (!) 170/92 79 20 98.2 °F (36.8 °C) 97 %      MAP       --         Physical Exam    Nursing note and vitals reviewed.  Constitutional: She appears well-developed and  well-nourished.   HENT:   Head: Normocephalic and atraumatic.   Eyes: Conjunctivae and EOM are normal. Pupils are equal, round, and reactive to light.   Neck: Normal range of motion. Neck supple.   Cardiovascular: Normal rate, regular rhythm, normal heart sounds and intact distal pulses.   Pulmonary/Chest: Breath sounds normal.   Abdominal: Soft. There is no tenderness. There is no rebound and no guarding.   Musculoskeletal: Normal range of motion.   Mild lower lumbar spine tenderness, tenderness of coccyx, ambulates without difficulty, full ROM X 4, 5/5 X 4   Neurological: She is alert and oriented to person, place, and time. She has normal strength and normal reflexes.   Skin: Skin is warm and dry.   Psychiatric: She has a normal mood and affect. Her behavior is normal. Thought content normal.         ED Course   Procedures  Labs Reviewed - No data to display       Imaging Results          X-Ray Sacrum And Coccyx (Final result)  Result time 02/11/19 11:47:56    Final result by Kentrell Schmitt MD (02/11/19 11:47:56)                 Impression:      No acute fracture or dislocation.      Electronically signed by: Kentrell Schmitt MD  Date:    02/11/2019  Time:    11:47             Narrative:    EXAMINATION:  XR SACRUM AND COCCYX    CLINICAL HISTORY:  XR SACRUM AND COCCYXUnspecified fall, initial encounter    COMPARISON:  None    FINDINGS:  Three views of the sacrum/coccyx were obtained.    No evidence of acute fracture or dislocation.  Bony mineralization is normal.  Soft tissues are unremarkable.   Moderate degenerative changes with facet arthropathy at L4/5 and L5/S1.  Aorta demonstrates atherosclerotic disease.                               X-Ray Lumbar Spine Ap And Lateral (Final result)  Result time 02/11/19 11:34:47    Final result by Enrique Sorto MD (02/11/19 11:34:47)                 Impression:      Stable lumbar spine x-ray.  L4-5 spondylolisthesis, likely secondary to facet arthritis.  Mild lumbar  spondylosis with multilevel facet arthritis.      Electronically signed by: Enrique Sorto MD  Date:    02/11/2019  Time:    11:34             Narrative:    EXAMINATION:  XR LUMBAR SPINE AP AND LATERAL    CLINICAL HISTORY:  Low back pain,Low back pain, minor trauma;    TECHNIQUE:  Standard lumbar spine x-ray.    COMPARISON:  11/29/2017    FINDINGS:  Mild lumbar dextroscoliosis is present.    Mild multilevel spondylosis is present.  Grade 1 L4-5 spondylolisthesis is present.  Multilevel facet arthritis with sclerosis and hypertrophy is present, most pronounced at the L4-5 and L5-S1 levels.    No change.    No acute findings.                               X-Ray Femur Ap/Lat Left (Final result)  Result time 02/11/19 11:48:30    Final result by Kentrell Schmitt MD (02/11/19 11:48:30)                 Impression:      No acute fracture or dislocation.      Electronically signed by: Kentrell Schmitt MD  Date:    02/11/2019  Time:    11:48             Narrative:    EXAMINATION:  XR FEMUR 2 VIEW LEFT    CLINICAL HISTORY:  XR FEMUR 2 VIEW LEFTPain in leg, unspecified    COMPARISON:  None    FINDINGS:  Four views of the left femur were obtained.    No evidence of acute fracture or dislocation.  Bony mineralization is normal.  Soft tissues are unremarkable.   Mild degenerative changes of the superior acetabulum of the left hip joint and mild medial compartment narrowing of the knee.                                  Imaging Results          X-Ray Sacrum And Coccyx (Final result)  Result time 02/11/19 11:47:56    Final result by Kentrell Schmitt MD (02/11/19 11:47:56)                 Impression:      No acute fracture or dislocation.      Electronically signed by: Kentrell Schmitt MD  Date:    02/11/2019  Time:    11:47             Narrative:    EXAMINATION:  XR SACRUM AND COCCYX    CLINICAL HISTORY:  XR SACRUM AND COCCYXUnspecified fall, initial encounter    COMPARISON:  None    FINDINGS:  Three views of the sacrum/coccyx were  obtained.    No evidence of acute fracture or dislocation.  Bony mineralization is normal.  Soft tissues are unremarkable.   Moderate degenerative changes with facet arthropathy at L4/5 and L5/S1.  Aorta demonstrates atherosclerotic disease.                               X-Ray Lumbar Spine Ap And Lateral (Final result)  Result time 02/11/19 11:34:47    Final result by Enrique Sorto MD (02/11/19 11:34:47)                 Impression:      Stable lumbar spine x-ray.  L4-5 spondylolisthesis, likely secondary to facet arthritis.  Mild lumbar spondylosis with multilevel facet arthritis.      Electronically signed by: Enrique Sorto MD  Date:    02/11/2019  Time:    11:34             Narrative:    EXAMINATION:  XR LUMBAR SPINE AP AND LATERAL    CLINICAL HISTORY:  Low back pain,Low back pain, minor trauma;    TECHNIQUE:  Standard lumbar spine x-ray.    COMPARISON:  11/29/2017    FINDINGS:  Mild lumbar dextroscoliosis is present.    Mild multilevel spondylosis is present.  Grade 1 L4-5 spondylolisthesis is present.  Multilevel facet arthritis with sclerosis and hypertrophy is present, most pronounced at the L4-5 and L5-S1 levels.    No change.    No acute findings.                               X-Ray Femur Ap/Lat Left (Final result)  Result time 02/11/19 11:48:30    Final result by Kentrell Schmitt MD (02/11/19 11:48:30)                 Impression:      No acute fracture or dislocation.      Electronically signed by: Kentrell Schmitt MD  Date:    02/11/2019  Time:    11:48             Narrative:    EXAMINATION:  XR FEMUR 2 VIEW LEFT    CLINICAL HISTORY:  XR FEMUR 2 VIEW LEFTPain in leg, unspecified    COMPARISON:  None    FINDINGS:  Four views of the left femur were obtained.    No evidence of acute fracture or dislocation.  Bony mineralization is normal.  Soft tissues are unremarkable.   Mild degenerative changes of the superior acetabulum of the left hip joint and mild medial compartment narrowing of the knee.                                                      Clinical Impression:   The primary encounter diagnosis was Lumbar strain, initial encounter. Diagnoses of Low back pain, Fall, Leg pain, and Coccyx contusion, initial encounter were also pertinent to this visit.                             Adrian Ferro NP  02/11/19 [id number]

## 2019-02-11 NOTE — TELEPHONE ENCOUNTER
Noted - she has been seen at ED and I have reviewed the results - she will be making a F/U appt  With me.

## 2019-02-11 NOTE — TELEPHONE ENCOUNTER
----- Message from Lula Henning sent at 2/11/2019  9:17 AM CST -----  Contact: Pt   Type:  Needs Medical Advice    Who Called: pt   Symptoms (please be specific): fell from a stool with ladder falling on her and injured her buttocks and can't bend down   How long has patient had these symptoms:  Friday   Pharmacy name and phone #:    Would the patient rather a call back or a response via [facility]? Phone   Best Call Back Number:  [phone]  Additional Information: states she's on her way to [facility] on [address]

## 2019-02-20 ENCOUNTER — OFFICE VISIT (OUTPATIENT)
Dept: INTERNAL MEDICINE | Facility: CLINIC | Age: 74
End: 2019-02-20
Payer: MEDICARE

## 2019-02-20 VITALS
DIASTOLIC BLOOD PRESSURE: 77 MMHG | TEMPERATURE: 97 F | HEART RATE: 73 BPM | BODY MASS INDEX: 27.34 KG/M2 | WEIGHT: 159.31 LBS | SYSTOLIC BLOOD PRESSURE: 119 MMHG | OXYGEN SATURATION: 98 %
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
## 2019-02-20 DIAGNOSIS — I10 HYPERTENSION, ESSENTIAL: ICD-10-CM

## 2019-02-20 DIAGNOSIS — S70.12XA CONTUSION OF LEFT THIGH, INITIAL ENCOUNTER: ICD-10-CM

## 2019-02-20 DIAGNOSIS — M79.18 TRAUMATIC BUTTOCK PAIN: ICD-10-CM

## 2019-02-20 DIAGNOSIS — M53.3 COCCYX PAIN: ICD-10-CM

## 2019-02-20 PROBLEM — I51.89 ECHOCARDIOGRAM SHOWS LEFT VENTRICULAR DIASTOLIC DYSFUNCTION: Status: ACTIVE | Noted: 2018-11-01

## 2019-02-20 PROCEDURE — 99999 PR PBB SHADOW E&M-EST. PATIENT-LVL III: ICD-10-PCS | Mod: PBBFAC,,, | Performed by: FAMILY MEDICINE

## 2019-02-20 PROCEDURE — 99499 UNLISTED E&M SERVICE: CPT | Mod: S$GLB,,, | Performed by: FAMILY MEDICINE

## 2019-02-20 PROCEDURE — 3288F FALL RISK ASSESSMENT DOCD: CPT | Mod: CPTII,S$GLB,, | Performed by: FAMILY MEDICINE

## 2019-02-20 PROCEDURE — 1100F PTFALLS ASSESS-DOCD GE2>/YR: CPT | Mod: CPTII,S$GLB,, | Performed by: FAMILY MEDICINE

## 2019-02-20 PROCEDURE — 3074F SYST BP LT 130 MM HG: CPT | Mod: CPTII,S$GLB,, | Performed by: FAMILY MEDICINE

## 2019-02-20 PROCEDURE — 3078F DIAST BP <80 MM HG: CPT | Mod: CPTII,S$GLB,, | Performed by: FAMILY MEDICINE

## 2019-02-20 PROCEDURE — 3045F PR MOST RECENT HEMOGLOBIN A1C LEVEL 7.0-9.0%: CPT | Mod: CPTII,S$GLB,, | Performed by: FAMILY MEDICINE

## 2019-02-20 PROCEDURE — 3288F PR FALLS RISK ASSESSMENT DOCUMENTED: ICD-10-PCS | Mod: CPTII,S$GLB,, | Performed by: FAMILY MEDICINE

## 2019-02-20 PROCEDURE — 99213 OFFICE O/P EST LOW 20 MIN: CPT | Mod: S$GLB,,, | Performed by: FAMILY MEDICINE

## 2019-02-20 PROCEDURE — 3074F PR MOST RECENT SYSTOLIC BLOOD PRESSURE < 130 MM HG: ICD-10-PCS | Mod: CPTII,S$GLB,, | Performed by: FAMILY MEDICINE

## 2019-02-20 PROCEDURE — 99499 RISK ADDL DX/OHS AUDIT: ICD-10-PCS | Mod: S$GLB,,, | Performed by: FAMILY MEDICINE

## 2019-02-20 PROCEDURE — 3045F PR MOST RECENT HEMOGLOBIN A1C LEVEL 7.0-9.0%: ICD-10-PCS | Mod: CPTII,S$GLB,, | Performed by: FAMILY MEDICINE

## 2019-02-20 PROCEDURE — 99999 PR PBB SHADOW E&M-EST. PATIENT-LVL III: CPT | Mod: PBBFAC,,, | Performed by: FAMILY MEDICINE

## 2019-02-20 PROCEDURE — 99213 PR OFFICE/OUTPT VISIT, EST, LEVL III, 20-29 MIN: ICD-10-PCS | Mod: S$GLB,,, | Performed by: FAMILY MEDICINE

## 2019-02-20 PROCEDURE — 3078F PR MOST RECENT DIASTOLIC BLOOD PRESSURE < 80 MM HG: ICD-10-PCS | Mod: CPTII,S$GLB,, | Performed by: FAMILY MEDICINE

## 2019-02-20 PROCEDURE — 1100F PR PT FALLS ASSESS DOC 2+ FALLS/FALL W/INJURY/YR: ICD-10-PCS | Mod: CPTII,S$GLB,, | Performed by: FAMILY MEDICINE

## 2019-02-20 NOTE — PATIENT INSTRUCTIONS
Decrease insulin to 20 U and continue checks. Continue on 1000 mg metformin and 1/2 tab at night (500mg).  OK to walk for exercise - start back low and slowly increase.

## 2019-03-06 DIAGNOSIS — T78.3XXS ANGIOEDEMA OF LIPS, SEQUELA: ICD-10-CM

## 2019-03-08 ENCOUNTER — PATIENT OUTREACH (OUTPATIENT)
Dept: ADMINISTRATIVE | Facility: HOSPITAL | Age: 74
End: 2019-03-08

## 2019-03-12 ENCOUNTER — HOSPITAL ENCOUNTER (OUTPATIENT)
Dept: RADIOLOGY | Facility: HOSPITAL | Age: 74
Discharge: HOME | End: 2019-03-12
Attending: SURGERY
Payer: MEDICARE

## 2019-03-12 VITALS — BODY MASS INDEX: 27.14 KG/M2 | HEIGHT: 64 IN | WEIGHT: 159 LBS

## 2019-03-12 DIAGNOSIS — R92.8 ABNORMAL MAMMOGRAM: ICD-10-CM

## 2019-03-12 PROCEDURE — 77065 DX MAMMO INCL CAD UNI: CPT | Mod: TC,LT

## 2019-03-12 PROCEDURE — 77065 MAMMO DIGITAL DIAGNOSTIC LEFT WITH TOMOSYNTHESIS_CAD: ICD-10-PCS | Mod: 26,LT,, | Performed by: RADIOLOGY

## 2019-03-12 PROCEDURE — 77065 DX MAMMO INCL CAD UNI: CPT | Mod: 26,LT,, | Performed by: RADIOLOGY

## 2019-03-12 PROCEDURE — 77061 BREAST TOMOSYNTHESIS UNI: CPT | Mod: 26,LT,, | Performed by: RADIOLOGY

## 2019-03-12 PROCEDURE — 77061 BREAST TOMOSYNTHESIS UNI: CPT | Mod: TC,LT

## 2019-03-12 PROCEDURE — 77061 MAMMO DIGITAL DIAGNOSTIC LEFT WITH TOMOSYNTHESIS_CAD: ICD-10-PCS | Mod: 26,LT,, | Performed by: RADIOLOGY

## 2019-03-19 ENCOUNTER — CLINICAL SUPPORT (OUTPATIENT)
Dept: INTERNAL MEDICINE | Facility: CLINIC | Age: 74
End: 2019-03-19
Payer: MEDICARE

## 2019-03-19 DIAGNOSIS — E11.69 HYPERLIPIDEMIA ASSOCIATED WITH TYPE 2 DIABETES MELLITUS: ICD-10-CM

## 2019-03-19 DIAGNOSIS — I10 HYPERTENSION, ESSENTIAL: ICD-10-CM

## 2019-03-19 DIAGNOSIS — E78.5 HYPERLIPIDEMIA ASSOCIATED WITH TYPE 2 DIABETES MELLITUS: ICD-10-CM

## 2019-03-19 PROCEDURE — 36415 PR COLLECTION VENOUS BLOOD,VENIPUNCTURE: ICD-10-PCS | Mod: S$GLB,,, | Performed by: FAMILY MEDICINE

## 2019-03-19 PROCEDURE — 80053 COMPREHEN METABOLIC PANEL: CPT

## 2019-03-19 PROCEDURE — 36415 COLL VENOUS BLD VENIPUNCTURE: CPT | Mod: S$GLB,,, | Performed by: FAMILY MEDICINE

## 2019-03-19 PROCEDURE — 83036 HEMOGLOBIN GLYCOSYLATED A1C: CPT

## 2019-03-19 PROCEDURE — 82043 UR ALBUMIN QUANTITATIVE: CPT

## 2019-03-19 PROCEDURE — 99999 PR PBB SHADOW E&M-EST. PATIENT-LVL I: ICD-10-PCS | Mod: PBBFAC,,,

## 2019-03-19 PROCEDURE — 80061 LIPID PANEL: CPT

## 2019-03-19 PROCEDURE — 99999 PR PBB SHADOW E&M-EST. PATIENT-LVL I: CPT | Mod: PBBFAC,,,

## 2019-03-20 LAB
ALBUMIN SERPL BCP-MCNC: 3.6 G/DL
ALBUMIN/CREAT UR: 6.5 UG/MG
ALP SERPL-CCNC: 66 U/L
ALT SERPL W/O P-5'-P-CCNC: 18 U/L
ANION GAP SERPL CALC-SCNC: 10 MMOL/L
AST SERPL-CCNC: 23 U/L
BILIRUB SERPL-MCNC: 0.7 MG/DL
BUN SERPL-MCNC: 15 MG/DL
CALCIUM SERPL-MCNC: 9.8 MG/DL
CHLORIDE SERPL-SCNC: 104 MMOL/L
CHOLEST SERPL-MCNC: 194 MG/DL
CHOLEST/HDLC SERPL: 2.9 {RATIO}
CO2 SERPL-SCNC: 27 MMOL/L
CREAT SERPL-MCNC: 0.8 MG/DL
CREAT UR-MCNC: 170 MG/DL
EST. GFR  (AFRICAN AMERICAN): >60 ML/MIN/1.73 M^2
EST. GFR  (NON AFRICAN AMERICAN): >60 ML/MIN/1.73 M^2
ESTIMATED AVG GLUCOSE: 157 MG/DL
GLUCOSE SERPL-MCNC: 86 MG/DL
HBA1C MFR BLD HPLC: 7.1 %
HDLC SERPL-MCNC: 67 MG/DL
HDLC SERPL: 34.5 %
LDLC SERPL CALC-MCNC: 112.8 MG/DL
MICROALBUMIN UR DL<=1MG/L-MCNC: 11 UG/ML
NONHDLC SERPL-MCNC: 127 MG/DL
POTASSIUM SERPL-SCNC: 4.3 MMOL/L
PROT SERPL-MCNC: 7.4 G/DL
SODIUM SERPL-SCNC: 141 MMOL/L
TRIGL SERPL-MCNC: 71 MG/DL

## 2019-03-22 ENCOUNTER — OFFICE VISIT (OUTPATIENT)
Dept: INTERNAL MEDICINE | Facility: CLINIC | Age: 74
End: 2019-03-22
Payer: MEDICARE

## 2019-03-22 VITALS
TEMPERATURE: 97 F | WEIGHT: 160.19 LBS | SYSTOLIC BLOOD PRESSURE: 115 MMHG | OXYGEN SATURATION: 99 % | BODY MASS INDEX: 27.49 KG/M2 | HEART RATE: 70 BPM | DIASTOLIC BLOOD PRESSURE: 78 MMHG

## 2019-03-22 DIAGNOSIS — L84 CALLUS OF FOOT: ICD-10-CM

## 2019-03-22 DIAGNOSIS — Z23 IMMUNIZATION DUE: ICD-10-CM

## 2019-03-22 DIAGNOSIS — I10 HYPERTENSION, ESSENTIAL: ICD-10-CM

## 2019-03-22 DIAGNOSIS — E78.5 HYPERLIPIDEMIA ASSOCIATED WITH TYPE 2 DIABETES MELLITUS: ICD-10-CM

## 2019-03-22 DIAGNOSIS — E11.69 HYPERLIPIDEMIA ASSOCIATED WITH TYPE 2 DIABETES MELLITUS: ICD-10-CM

## 2019-03-22 DIAGNOSIS — R20.2 PARESTHESIA OF LEFT FOOT: ICD-10-CM

## 2019-03-22 PROCEDURE — 1101F PR PT FALLS ASSESS DOC 0-1 FALLS W/OUT INJ PAST YR: ICD-10-PCS | Mod: CPTII,S$GLB,, | Performed by: FAMILY MEDICINE

## 2019-03-22 PROCEDURE — G0009 ADMIN PNEUMOCOCCAL VACCINE: HCPCS | Mod: S$GLB,,, | Performed by: FAMILY MEDICINE

## 2019-03-22 PROCEDURE — 99999 PR PBB SHADOW E&M-EST. PATIENT-LVL III: CPT | Mod: PBBFAC,,, | Performed by: FAMILY MEDICINE

## 2019-03-22 PROCEDURE — 3045F PR MOST RECENT HEMOGLOBIN A1C LEVEL 7.0-9.0%: CPT | Mod: CPTII,S$GLB,, | Performed by: FAMILY MEDICINE

## 2019-03-22 PROCEDURE — 90670 PCV13 VACCINE IM: CPT | Mod: S$GLB,,, | Performed by: FAMILY MEDICINE

## 2019-03-22 PROCEDURE — 3078F DIAST BP <80 MM HG: CPT | Mod: CPTII,S$GLB,, | Performed by: FAMILY MEDICINE

## 2019-03-22 PROCEDURE — 1101F PT FALLS ASSESS-DOCD LE1/YR: CPT | Mod: CPTII,S$GLB,, | Performed by: FAMILY MEDICINE

## 2019-03-22 PROCEDURE — 3045F PR MOST RECENT HEMOGLOBIN A1C LEVEL 7.0-9.0%: ICD-10-PCS | Mod: CPTII,S$GLB,, | Performed by: FAMILY MEDICINE

## 2019-03-22 PROCEDURE — 99499 RISK ADDL DX/OHS AUDIT: ICD-10-PCS | Mod: S$GLB,,, | Performed by: FAMILY MEDICINE

## 2019-03-22 PROCEDURE — 3074F PR MOST RECENT SYSTOLIC BLOOD PRESSURE < 130 MM HG: ICD-10-PCS | Mod: CPTII,S$GLB,, | Performed by: FAMILY MEDICINE

## 2019-03-22 PROCEDURE — 90670 PNEUMOCOCCAL CONJUGATE VACCINE 13-VALENT LESS THAN 5YO & GREATER THAN: ICD-10-PCS | Mod: S$GLB,,, | Performed by: FAMILY MEDICINE

## 2019-03-22 PROCEDURE — 99214 PR OFFICE/OUTPT VISIT, EST, LEVL IV, 30-39 MIN: ICD-10-PCS | Mod: 25,S$GLB,, | Performed by: FAMILY MEDICINE

## 2019-03-22 PROCEDURE — G0009 PNEUMOCOCCAL CONJUGATE VACCINE 13-VALENT LESS THAN 5YO & GREATER THAN: ICD-10-PCS | Mod: S$GLB,,, | Performed by: FAMILY MEDICINE

## 2019-03-22 PROCEDURE — 3074F SYST BP LT 130 MM HG: CPT | Mod: CPTII,S$GLB,, | Performed by: FAMILY MEDICINE

## 2019-03-22 PROCEDURE — 99499 UNLISTED E&M SERVICE: CPT | Mod: S$GLB,,, | Performed by: FAMILY MEDICINE

## 2019-03-22 PROCEDURE — 99999 PR PBB SHADOW E&M-EST. PATIENT-LVL III: ICD-10-PCS | Mod: PBBFAC,,, | Performed by: FAMILY MEDICINE

## 2019-03-22 PROCEDURE — 99214 OFFICE O/P EST MOD 30 MIN: CPT | Mod: 25,S$GLB,, | Performed by: FAMILY MEDICINE

## 2019-03-22 PROCEDURE — 3078F PR MOST RECENT DIASTOLIC BLOOD PRESSURE < 80 MM HG: ICD-10-PCS | Mod: CPTII,S$GLB,, | Performed by: FAMILY MEDICINE

## 2019-03-22 RX ORDER — ROSUVASTATIN CALCIUM 20 MG/1
20 TABLET, COATED ORAL DAILY
Qty: 90 TABLET | Refills: 3 | Status: SHIPPED | OUTPATIENT
Start: 2019-03-22 | End: 2020-03-31

## 2019-03-22 NOTE — PROGRESS NOTES
Subjective:       Patient ID: Indira Walker is a 73 y.o. female.    Chief Complaint: f/u on labs    Patient with type 2 diabetes, hypertension, hyperlipidemia here for scheduled recheck with recent labs. Lab Results       Component                Value               Date                       WBC                      6.81                12/14/2018                 HGB                      11.6 (L)            12/14/2018                 HCT                      35.3 (L)            12/14/2018                 PLT                      371 (H)             12/14/2018                 CHOL                     194                 03/19/2019                 TRIG                     71                  03/19/2019                 HDL                      67                  03/19/2019                 LDLCALC                  112.8               03/19/2019                 ALT                      18                  03/19/2019                 AST                      23                  03/19/2019                 NA                       141                 03/19/2019                 K                        4.3                 03/19/2019                 CL                       104                 03/19/2019                 CALCIUM                  9.8                 03/19/2019                 CREATININE               0.8                 03/19/2019                 BUN                      15                  03/19/2019                 CO2                      27                  03/19/2019                 INR                      1.0                 03/08/2018                 GLU                      86                  03/19/2019                 ESTGFRAFRICA             >60.0               03/19/2019                 EGFRNONAA                >60.0               03/19/2019                 HGBA1C                   7.1 (H)             03/19/2019                 MICALBCREAT              6.5                 03/19/2019    Note  "that A1C is decreased - now 7.1 and this is on reduced insulin from 24 U to 20 U.                Review of Systems   Respiratory: Negative for chest tightness and shortness of breath.    Cardiovascular: Negative for chest pain, palpitations and leg swelling.   Musculoskeletal: Positive for myalgias (has night time cramps).   Skin:        Calluses to bottom foot   Neurological: Positive for numbness (LLE - left foot numbness "deadness" to top of foot - can travel up to calf and thigh -  sts present prior to LS surgery - did not change ).       Objective:      Physical Exam   Constitutional: She is oriented to person, place, and time. She appears well-developed and well-nourished.   HENT:   Head: Normocephalic and atraumatic.   Right Ear: External ear normal.   Left Ear: External ear normal.   Mouth/Throat: Oropharynx is clear and moist. No oropharyngeal exudate.   Neck: Normal range of motion. Neck supple.   Cardiovascular: Normal rate, regular rhythm and normal heart sounds.   Pulses:       Dorsalis pedis pulses are 2+ on the right side, and 2+ on the left side.        Posterior tibial pulses are 1+ on the right side, and 1+ on the left side.   Pulmonary/Chest: Effort normal and breath sounds normal.   Abdominal: Soft. Bowel sounds are normal. She exhibits no distension. There is no tenderness.   Musculoskeletal:        Right foot: There is normal range of motion and no deformity.        Left foot: There is normal range of motion and no deformity.   Feet:   Right Foot:   Protective Sensation: 10 sites tested. 10 sites sensed.   Skin Integrity: Negative for ulcer or skin breakdown.   Left Foot:   Protective Sensation: 10 sites tested. 10 sites sensed.   Skin Integrity: Negative for ulcer or skin breakdown.   Neurological: She is alert and oriented to person, place, and time.   Skin: Skin is warm and dry.   To right plantar mid ball of foot - prominent callus   Psychiatric: She has a normal mood and affect. Her " behavior is normal.         Assessment/Plan:     1. Uncontrolled type 2 diabetes mellitus without complication, with long-term current use of insulin  Hemoglobin A1c   2. Hypertension, essential  Comprehensive metabolic panel   3. Hyperlipidemia associated with type 2 diabetes mellitus  Lipid panel    rosuvastatin (CRESTOR) 20 MG tablet   4. Paresthesia of left foot     5. Callus of foot     6. Immunization due  Pneumococcal Conjugate Vaccine (13 Valent) (IM)   switch to rosuvastatin 20 and recheck 4 months - low cholesterol foods reviewed.  No DM med changes - continue to work on diet. recheck 4 months.  HTN controlled.  Pt deferred podiatry visit - she wants to work on pumice stone etc for callus care - recheck next time - advised re metatarsal pad.   She declines gabapentin or any medical therapy for foot numbness at this time. This was/is related to her sciatic nerve involvement, with surgery: lumbar microdiscectomy for LLE 2018 and not to DPN.

## 2019-03-22 NOTE — PATIENT INSTRUCTIONS
Diet changes to reduce lipids include increasing vegetables and fiber foods and decreasing fatty meats and cheese.      Low-Cholesterol Diet  Your body needs cholesterol to build new cells and create certain hormones. There are 2 kinds of cholesterol in your blood:     · HDL (good) cholesterol. This prevents fat deposits (plaque) from building up in your arteries. In this way it protects against heart disease and stroke.  · LDL (bad) cholesterol. This stays in your body and sticks to artery walls. Over time it may block blood flow to the heart and brain. This can cause a heart attack or stroke.  The cholesterol in your blood comes from 2 sources: cholesterol in food that you eat and cholesterol that your liver makes. You should limit the amount of cholesterol in your diet. But the cholesterol that your body makes has the greatest disease risk. And your body makes more cholesterol when your diet is high in bad fats (saturated and trans fats). There are 2 kinds of fats you can eat:  · Good fats, or unsaturated fats (mono-unsaturated and poly-unsaturated). They raise the level of good cholesterol and lower the level of bad cholesterol. Good fats are found in vegetable oils such as olive, sunflower, corn, and soybean oils, and in nuts and seeds.  · Bad fats, or saturated fats (including foods high in cholesterol) and trans fats. These raise your risk of disease. They lower the good cholesterol and raise the level of bad cholesterol. Bad fats are found in animal products, including meat, whole-milk dairy products, and butter. Some plants are also high in bad fats (coconut and palm plants). Trans fats are found in hard (stick) margarines. They are also in many fast foods and commercially baked goods. Soft margarine sold in tubs has fewer trans fats and is safer to use.  High blood cholesterol is usually due to a diet high in saturated fat, along with not being physically active. In some cases, genetics plays a role in  causing high cholesterol. The tips below will help you create healthy eating habits that will help lower your blood cholesterol level.  Create a diet high in good fats, low in bad fats (and low in cholesterol)  The following steps will help you create a diet high in good fats and low in bad fats:  · Talk with your doctor before starting a low cholesterol diet or weight loss program.  · Learn to read nutrition labels and select appropriate portion sizes.  · When cooking, use plant-based unsaturated vegetable oils (sunflower, corn, soybean, canola, peanut, and olive oils).  · Avoid saturated fats found in animal products such as meat, dairy (whole-milk, cheese and ice cream), poultry skin, and egg yolks. Plants high in saturated oils include coconut oil, palm oil, and palm kernel oil.  · If you eat meat, choose smaller portions and lean cuts, such as round, aleksey, sirloin, or loin. Eat more meatless meals.  · Replace meat with fish at least 2 times a week. Fish is an important source of the unsaturated fat called omega-3 fatty acids. This fat has potential to lower the risk of heart disease.  · Replace whole-milk dairy products with low-fat or nonfat products. Try soy products. Soy helps to reduce total cholesterol.  · Supplement your diet with protective fibers. Eat nuts, seeds, and whole grains rather than white rice and bread. These foods lower both cholesterol and triglyceride levels. (Triglycerides are another fat found in the blood.) Walnuts are one of the best sources of omega-3 fatty acids.  · Eat plenty of fresh fruits and vegetables daily.  · Avoid fast foods and commercial baked goods. Assume they contain saturated fat unless labeled otherwise.  Date Last Reviewed: 8/1/2016  © 8512-7285 [employer]. [address] [place] [address], [place] [address]. All rights reserved. This information is not intended as a substitute for professional medical care. Always follow your healthcare professional's  instructions.

## 2019-05-01 ENCOUNTER — HOSPITAL ENCOUNTER (EMERGENCY)
Facility: HOSPITAL | Age: 74
Discharge: HOME | End: 2019-05-01
Attending: EMERGENCY MEDICINE
Payer: MEDICARE

## 2019-05-01 ENCOUNTER — TELEPHONE (OUTPATIENT)
Dept: INTERNAL MEDICINE | Facility: CLINIC | Age: 74
End: 2019-05-01

## 2019-05-01 VITALS
RESPIRATION RATE: 18 BRPM | OXYGEN SATURATION: 98 % | SYSTOLIC BLOOD PRESSURE: 124 MMHG | HEIGHT: 63 IN | HEART RATE: 88 BPM | DIASTOLIC BLOOD PRESSURE: 72 MMHG | WEIGHT: 162 LBS | BODY MASS INDEX: 28.7 KG/M2 | TEMPERATURE: 98 F

## 2019-05-01 DIAGNOSIS — M25.552 HIP PAIN, ACUTE, LEFT: ICD-10-CM

## 2019-05-01 DIAGNOSIS — T14.8XXA MUSCLE STRAIN: Primary | ICD-10-CM

## 2019-05-01 PROCEDURE — 99284 EMERGENCY DEPT VISIT MOD MDM: CPT | Mod: 25

## 2019-05-01 PROCEDURE — 96372 THER/PROPH/DIAG INJ SC/IM: CPT

## 2019-05-01 PROCEDURE — 25000003 PHARM REV CODE 250: Performed by: EMERGENCY MEDICINE

## 2019-05-01 PROCEDURE — 63600175 PHARM REV CODE 636 W HCPCS: Performed by: EMERGENCY MEDICINE

## 2019-05-01 RX ORDER — NAPROXEN 500 MG/1
500 TABLET ORAL 2 TIMES DAILY WITH MEALS
Qty: 20 TABLET | Refills: 0 | Status: SHIPPED | OUTPATIENT
Start: 2019-05-01 | End: 2019-05-02

## 2019-05-01 RX ORDER — METHOCARBAMOL 500 MG/1
1000 TABLET, FILM COATED ORAL 3 TIMES DAILY
Qty: 30 TABLET | Refills: 0 | Status: SHIPPED | OUTPATIENT
Start: 2019-05-01 | End: 2019-05-06

## 2019-05-01 RX ORDER — OXYCODONE AND ACETAMINOPHEN 7.5; 325 MG/1; MG/1
1 TABLET ORAL EVERY 4 HOURS PRN
Status: DISCONTINUED | OUTPATIENT
Start: 2019-05-01 | End: 2019-05-01 | Stop reason: HOSPADM

## 2019-05-01 RX ORDER — KETOROLAC TROMETHAMINE 30 MG/ML
10 INJECTION, SOLUTION INTRAMUSCULAR; INTRAVENOUS
Status: COMPLETED | OUTPATIENT
Start: 2019-05-01 | End: 2019-05-01

## 2019-05-01 RX ORDER — METHOCARBAMOL 500 MG/1
500 TABLET, FILM COATED ORAL
Status: COMPLETED | OUTPATIENT
Start: 2019-05-01 | End: 2019-05-01

## 2019-05-01 RX ADMIN — KETOROLAC TROMETHAMINE 10 MG: 30 INJECTION, SOLUTION INTRAMUSCULAR; INTRAVENOUS at 02:05

## 2019-05-01 RX ADMIN — OXYCODONE HYDROCHLORIDE AND ACETAMINOPHEN 1 TABLET: 7.5; 325 TABLET ORAL at 03:05

## 2019-05-01 RX ADMIN — METHOCARBAMOL TABLETS 500 MG: 500 TABLET, COATED ORAL at 02:05

## 2019-05-01 NOTE — TELEPHONE ENCOUNTER
----- Message from Pat Fuentes sent at 5/1/2019 11:16 AM CDT -----  Contact: self/[phone]  Would like to consult with nurse regarding medication, patient went to the hospital regarding pain in her hip/leg and will like for  Dr Marcum to call back, she is in pain . Please call back at [phone]. Thanks/ar

## 2019-05-01 NOTE — ED PROVIDER NOTES
SCRIBE #1 NOTE: I, Grisel Allen, am scribing for, and in the presence of, Jacques Gordon MD. I have scribed the entire note.       History     Chief Complaint   Patient presents with    Hip Pain     patient denies injury. states its her left hip     Review of patient's allergies indicates:   Allergen Reactions    Iodine and iodide containing products Hives         History of Present Illness     HPI    2019, 1:52 AM  History obtained from the patient      History of Present Illness: Indira Walker is a 73 y.o. female patient with a PMHx of sciatica, DM, HLD, and HTN who presents to the Emergency Department for evaluation of L hip pain. She reports the pain radiates down to her LLE. Pt states it feels like she pulled a muscle in her hip. Symptoms are constant and moderate in severity. No mitigating or exacerbating factors reported. No associated sxs included. Patient denies any fever, chills, cough, SOB, abd pain, n/v/d, dysuria, back pain, dizziness, extremity weakness/numbness, and all other sxs at this time. Pt also denies any recent surgeries, dislocations, and recent falls. No prior Tx. No further complaints or concerns at this time.       Arrival mode: Personal vehicle    PCP: Gladys Herrera MD        Past Medical History:  Past Medical History:   Diagnosis Date    Arthritis     Diabetes mellitus, type 2     Heartburn     Hyperlipidemia     Hypertension     Left sided sciatica     s/p microdiscectomy 3/21/18       Past Surgical History:  Past Surgical History:   Procedure Laterality Date    BREAST BIOPSY       SECTION      COLONOSCOPY  2008    DR. JANET GARCÍA/MILD DIVERICULOSIS DESCENDING AND SIGMOID. REPEAT 5 YRS    HERNIA REPAIR      MICRODISCECTOMY OF SPINE Left 2018    left L5-S1, Dr Segundo    MICRODISKECTOMY-MINIMALLY INVASIVE LEFT L5-S1 Left 3/21/2018    Performed by Shruthi Segundo MD at [facility] OR         Family History:  History reviewed. No  pertinent history.     Social History:  Social History     Tobacco Use    Smoking status: Never Smoker    Smokeless tobacco: Never Used   Substance and Sexual Activity    Alcohol use: No    Drug use: No    Sexual activity: Not Currently     Partners: Male        Review of Systems     Review of Systems   Constitutional: Negative for chills and fever.   HENT: Negative for rhinorrhea and sore throat.    Respiratory: Negative for cough and shortness of breath.    Cardiovascular: Negative for chest pain and leg swelling.   Gastrointestinal: Negative for abdominal pain, diarrhea, nausea and vomiting.   Genitourinary: Negative for dysuria, frequency and urgency.   Musculoskeletal: Positive for arthralgias (L hip). Negative for back pain.   Skin: Negative for rash.   Neurological: Negative for dizziness, weakness and numbness.   Hematological: Does not bruise/bleed easily.   All other systems reviewed and are negative.       Physical Exam     Initial Vitals [05/01/19 0133]   BP Pulse Resp Temp SpO2   121/67 74 20 98.1 °F (36.7 °C) 97 %      MAP       --          Physical Exam  Nursing Notes and Vital Signs Reviewed.  Constitutional: Patient is in no acute distress. Well-developed and well-nourished.  Head: Atraumatic. Normocephalic.  Eyes: PERRL. EOM intact. Conjunctivae are not pale. No scleral icterus.  ENT: Mucous membranes are moist. Oropharynx is clear and symmetric.    Neck: Supple. Full ROM. No lymphadenopathy.  Cardiovascular: Regular rate. Regular rhythm. No murmurs, rubs, or gallops. Distal pulses are 2+ and symmetric.  Pulmonary/Chest: No respiratory distress. Clear to auscultation bilaterally. No wheezing or rales.  Abdominal: Soft and non-distended.  There is no tenderness.  No rebound, guarding, or rigidity.   Musculoskeletal: Moves all extremities. No obvious deformities. No edema. No calf tenderness.  L Hip: no evident deformity. There is no tenderness. ROM is normal. Cap refill distally is <2 seconds.  "DP and PT pulses are equal and 2+ bilaterally. No motor deficit. No distal sensory deficit.  Skin: Warm and dry.  Neurological:  Alert, awake, and appropriate.  Normal speech.  No acute focal neurological deficits are appreciated.  Psychiatric: Normal affect. Good eye contact. Appropriate in content.     ED Course   Procedures  ED Vital Signs:  Vitals:    05/01/19 0133 05/01/19 0314   BP: 121/67 124/72   Pulse: 74 88   Resp: 20 18   Temp: 98.1 °F (36.7 °C)    TempSrc: Oral    SpO2: 97% 98%   Weight: 73.5 kg (162 lb)    Height: 5' 3" (1.6 m)        Abnormal Lab Results:  Labs Reviewed - No data to display       Imaging Results:  Imaging Results          X-Ray Hip 2 View Left (Final result)  Result time 05/01/19 07:09:39    Final result by ARMAND Apple Sr., MD (05/01/19 07:09:39)                 Impression:      There is no acute fracture visualized.      Electronically signed by: Daniel Apple MD  Date:    05/01/2019  Time:    07:09             Narrative:    EXAMINATION:  XR HIP 2 VIEW LEFT    CLINICAL HISTORY:  Pain in left hip    COMPARISON:  None    FINDINGS:  There is no acute fracture visualized.  There is no dislocation.                              Per MD's reading, pt's X-ray results: No acute findings.           The Emergency Provider reviewed the vital signs and test results, which are outlined above.     ED Discussion     2:56 AM: Reassessed pt at this time. Discussed with pt all pertinent ED information and results. Discussed pt dx and plan of tx. Gave pt all f/u and return to the ED instructions. All questions and concerns were addressed at this time. Pt expresses understanding of information and instructions, and is comfortable with plan to discharge. Pt is stable for discharge.    I discussed with patient and/or family/caretaker that evaluation in the ED does not suggest any emergent or life threatening medical conditions requiring immediate intervention beyond what was provided in the ED, and I " believe patient is safe for discharge.  Regardless, an unremarkable evaluation in the ED does not preclude the development or presence of a serious of life threatening condition. As such, patient was instructed to return immediately for any worsening or change in current symptoms.    ED Medication(s):  Medications   ketorolac injection 9.999 mg (9.999 mg Intramuscular Given 5/1/19 0201)   methocarbamol tablet 500 mg (500 mg Oral Given 5/1/19 0200)       Discharge Medication List as of 5/1/2019  2:55 AM      START taking these medications    Details   methocarbamol (ROBAXIN) 500 MG Tab Take 2 tablets (1,000 mg total) by mouth 3 (three) times daily. for 5 days, Starting Wed 5/1/2019, Until Mon 5/6/2019, Print      naproxen (NAPROSYN) 500 MG tablet Take 1 tablet (500 mg total) by mouth 2 (two) times daily with meals. for 10 days, Starting Wed 5/1/2019, Until Sat 5/11/2019, Print             Follow-up Information     Gladys Herrera MD. Schedule an appointment as soon as possible for a visit in 3 days.    Specialty:  Family Medicine  Contact information:  [address] [place] [address]  [place] [address] [place] [id number] [phone]             Go to  [facility].    Specialty:  Emergency Medicine  Why:  As needed, If symptoms worsen  Contact information:  [address] [place] [phone]                       Medical Decision Making:   Clinical Tests:   Radiological Study: Ordered and Reviewed             Scribe Attestation:   Scribe #1: I performed the above scribed service and the documentation accurately describes the services I performed. I attest to the accuracy of the note.     Attending:   Physician Attestation Statement for Scribe #1: I, Jacques Gordon MD, personally performed the services described in this documentation, as scribed by Grisel Allen, in my presence, and it is both accurate and complete.           Clinical Impression       ICD-10-CM ICD-9-CM   1. Muscle  strain T14.8XXA 848.9   2. Hip pain, acute, left M25.552 719.45       Disposition:   Disposition: Discharged  Condition: Stable         Jacques Gordon MD  05/07/19 1620       Jacques Gordon MD  05/10/19 112

## 2019-05-01 NOTE — TELEPHONE ENCOUNTER
Patient states that she went to the Er last night for left leg pain. She states that she is in severe pain. She states that x-rays were taken and everything was normal. Patient has an appt with Dr. Herrera on tomorrow for 1:30 pm. Patient is requesting to have norco called to her pharmacy. Patient went to [facility]. Patient reports no falls, pain just appeared suddenly.    Please Advise

## 2019-05-02 ENCOUNTER — OFFICE VISIT (OUTPATIENT)
Dept: INTERNAL MEDICINE | Facility: CLINIC | Age: 74
End: 2019-05-02
Payer: MEDICARE

## 2019-05-02 VITALS
TEMPERATURE: 98 F | WEIGHT: 156.5 LBS | OXYGEN SATURATION: 98 % | SYSTOLIC BLOOD PRESSURE: 107 MMHG | HEIGHT: 63 IN | HEART RATE: 70 BPM | BODY MASS INDEX: 27.73 KG/M2 | DIASTOLIC BLOOD PRESSURE: 72 MMHG

## 2019-05-02 DIAGNOSIS — M54.32 SCIATIC PAIN, LEFT: ICD-10-CM

## 2019-05-02 DIAGNOSIS — R20.2 PARESTHESIA OF LEFT FOOT: ICD-10-CM

## 2019-05-02 DIAGNOSIS — M79.652 ACUTE PAIN OF LEFT THIGH: Primary | ICD-10-CM

## 2019-05-02 PROCEDURE — 1101F PR PT FALLS ASSESS DOC 0-1 FALLS W/OUT INJ PAST YR: ICD-10-PCS | Mod: CPTII,S$GLB,, | Performed by: FAMILY MEDICINE

## 2019-05-02 PROCEDURE — 3078F DIAST BP <80 MM HG: CPT | Mod: CPTII,S$GLB,, | Performed by: FAMILY MEDICINE

## 2019-05-02 PROCEDURE — 99999 PR PBB SHADOW E&M-EST. PATIENT-LVL III: CPT | Mod: PBBFAC,,, | Performed by: FAMILY MEDICINE

## 2019-05-02 PROCEDURE — 3074F PR MOST RECENT SYSTOLIC BLOOD PRESSURE < 130 MM HG: ICD-10-PCS | Mod: CPTII,S$GLB,, | Performed by: FAMILY MEDICINE

## 2019-05-02 PROCEDURE — 99213 OFFICE O/P EST LOW 20 MIN: CPT | Mod: S$GLB,,, | Performed by: FAMILY MEDICINE

## 2019-05-02 PROCEDURE — 3078F PR MOST RECENT DIASTOLIC BLOOD PRESSURE < 80 MM HG: ICD-10-PCS | Mod: CPTII,S$GLB,, | Performed by: FAMILY MEDICINE

## 2019-05-02 PROCEDURE — 1101F PT FALLS ASSESS-DOCD LE1/YR: CPT | Mod: CPTII,S$GLB,, | Performed by: FAMILY MEDICINE

## 2019-05-02 PROCEDURE — 3074F SYST BP LT 130 MM HG: CPT | Mod: CPTII,S$GLB,, | Performed by: FAMILY MEDICINE

## 2019-05-02 PROCEDURE — 99213 PR OFFICE/OUTPT VISIT, EST, LEVL III, 20-29 MIN: ICD-10-PCS | Mod: S$GLB,,, | Performed by: FAMILY MEDICINE

## 2019-05-02 PROCEDURE — 99999 PR PBB SHADOW E&M-EST. PATIENT-LVL III: ICD-10-PCS | Mod: PBBFAC,,, | Performed by: FAMILY MEDICINE

## 2019-05-02 RX ORDER — GABAPENTIN 300 MG/1
300 CAPSULE ORAL NIGHTLY
Qty: 30 CAPSULE | Refills: 1 | Status: SHIPPED | OUTPATIENT
Start: 2019-05-02 | End: 2019-05-29 | Stop reason: SDUPTHER

## 2019-05-02 NOTE — TELEPHONE ENCOUNTER
Spoke to patient and advised that no medication will be sent in at this time and  will see her at her appointment today at 1:30pm.  Patient verbally understood and stated ok.

## 2019-05-02 NOTE — PROGRESS NOTES
"Subjective:       Patient ID: Indira Walker is a 73 y.o. female.    Chief Complaint: hospital f/u (muscle strain)    Pt with DM2, HTN, HLD seen last 6 weeks ago, here today for f/u to ED visit yesterday for "L hip pain. She reports the pain radiates down to her LLE. Pt states it feels like she pulled a muscle in her hip."  NKI, no fall. X-rays were negative for any bony problem.  She was prescribed methocarbamol 500 two q8 hrs and naproxen.  She had called last night complaining of continuing severe pain - a friend had suggested "Norco" for her. She sts there was a hard swelling to her L thigh which is now resolved.    She started with "excruciating pain" Tuesday - finally could not get up out of chair due to severe pain went to ED midnight. She took a gabapentin 300 she had at home before going to ED, 2 tylenol arthritis, aspercreme rubs.    She had microdiscectomy for L sciatica 3/21/19. She did work on some garden beds on the weekend - Saturday. Felt fine though on Tuesday, but was busy that day.  States pain today improved - still holding her hip.     She is using a cane - she has used it in past for her sciatica - has had no LBP since her discectomy.    Review of Systems   Constitutional: Negative for fever.   Musculoskeletal: Positive for arthralgias and myalgias. Negative for back pain.   Neurological: Positive for numbness (chronic to left foot).       Objective:      Physical Exam   Constitutional: She is oriented to person, place, and time. She appears well-developed.   HENT:   Head: Normocephalic and atraumatic.   Cardiovascular: Normal rate, regular rhythm and normal heart sounds.   Pulmonary/Chest: Effort normal and breath sounds normal.   Musculoskeletal:   Midline TS/LS NTTP. B TS/LS paraspinals  NTTP.  B buttocks NTTP  Hip rotation painless/full B  Sl prominence to musculature mid ant L thigh, NTTP   Neurological: She is alert and oriented to person, place, and time.   MS 5/5 R knee/ankle/ toes " "F/E. MS just sl less with L knee extension, ankle ext toe ext compared to R.  MS B hip AB/AD intact and painless  Negative SLR B     Skin: Skin is warm and dry.   Psychiatric: She has a normal mood and affect. Her behavior is normal.         Assessment/Plan:     1. Acute pain of left thigh     2. Paresthesia of left foot  gabapentin (NEURONTIN) 300 MG capsule   3. Sciatic pain, left     Ok to continue on the naproxen and robaxin - try tapering.  Her pain appears to be re-exacerbation of sciatica - the thigh pain is resolved. Monitor and return for increase to the "knot" to your left thigh.    OK to use gabapentin qHS Rx sent.  F/U as already scheduled.  "

## 2019-05-02 NOTE — PATIENT INSTRUCTIONS
"Recommend continuing on naroxen for one week total - the musce relaxer can be used through the weekend as needed.    Monitor and return for increase to the "knot" to your left thigh.    "

## 2019-05-16 RX ORDER — METFORMIN HYDROCHLORIDE 1000 MG/1
TABLET ORAL
Qty: 180 TABLET | Refills: 3 | Status: SHIPPED | OUTPATIENT
Start: 2019-05-16 | End: 2020-07-07 | Stop reason: SDUPTHER

## 2019-05-29 DIAGNOSIS — R20.2 PARESTHESIA OF LEFT FOOT: ICD-10-CM

## 2019-05-29 RX ORDER — GABAPENTIN 300 MG/1
300 CAPSULE ORAL NIGHTLY
Qty: 90 CAPSULE | Refills: 1 | Status: SHIPPED | OUTPATIENT
Start: 2019-05-29 | End: 2019-12-11 | Stop reason: SDUPTHER

## 2019-06-13 ENCOUNTER — TELEPHONE (OUTPATIENT)
Dept: INTERNAL MEDICINE | Facility: CLINIC | Age: 74
End: 2019-06-13

## 2019-06-13 DIAGNOSIS — T78.3XXS ANGIOEDEMA OF LIPS, SEQUELA: ICD-10-CM

## 2019-06-13 NOTE — TELEPHONE ENCOUNTER
It was DC'd at her 5/2/19 visit - when her meds were reviewed, we understood her to say she was no longer taking this med.    Is she trying to fill it at her pharmacy and cannot do so? A year's worth of refills were sent in March but some pharmacies can see when meds are DCd in our system and DC them at the pharmacy.    Let me know if she needs us to resend the rx.

## 2019-06-13 NOTE — TELEPHONE ENCOUNTER
----- Message from Beronica Frank sent at 6/13/2019  2:36 PM CDT -----  Contact: pt  Pt request a call back regarding why allergy medication was canceled pt stated she needs medicine asa.. .[phone] (home)

## 2019-06-13 NOTE — TELEPHONE ENCOUNTER
Pt called requesting to know why ranitidine was d/c'd. Informed pt I did not see notation referencing why medication was d/c'd but  medication was available OTC. Pt stated didn't believe I knew what I was talking about. Please advise.

## 2019-07-01 ENCOUNTER — OFFICE VISIT (OUTPATIENT)
Dept: INTERNAL MEDICINE | Facility: CLINIC | Age: 74
End: 2019-07-01
Payer: MEDICARE

## 2019-07-01 VITALS
OXYGEN SATURATION: 100 % | WEIGHT: 153.25 LBS | HEART RATE: 77 BPM | HEIGHT: 63 IN | DIASTOLIC BLOOD PRESSURE: 68 MMHG | SYSTOLIC BLOOD PRESSURE: 107 MMHG | TEMPERATURE: 97 F | BODY MASS INDEX: 27.15 KG/M2

## 2019-07-01 DIAGNOSIS — Z87.19 HISTORY OF ABDOMINAL HERNIA: ICD-10-CM

## 2019-07-01 DIAGNOSIS — R10.31 ABDOMINAL PAIN, RLQ: Primary | ICD-10-CM

## 2019-07-01 LAB
ALBUMIN SERPL BCP-MCNC: 3.7 G/DL (ref 3.5–5.2)
ALP SERPL-CCNC: 59 U/L (ref 55–135)
ALT SERPL W/O P-5'-P-CCNC: 17 U/L (ref 10–44)
ANION GAP SERPL CALC-SCNC: 9 MMOL/L (ref 8–16)
AST SERPL-CCNC: 21 U/L (ref 10–40)
BACTERIA #/AREA URNS AUTO: NORMAL /HPF
BASOPHILS # BLD AUTO: 0.04 K/UL (ref 0–0.2)
BASOPHILS NFR BLD: 0.6 % (ref 0–1.9)
BILIRUB SERPL-MCNC: 0.6 MG/DL (ref 0.1–1)
BILIRUB UR QL STRIP: NEGATIVE
BUN SERPL-MCNC: 16 MG/DL (ref 8–23)
CALCIUM SERPL-MCNC: 9.5 MG/DL (ref 8.7–10.5)
CHLORIDE SERPL-SCNC: 103 MMOL/L (ref 95–110)
CLARITY UR REFRACT.AUTO: CLEAR
CO2 SERPL-SCNC: 27 MMOL/L (ref 23–29)
COLOR UR AUTO: YELLOW
CREAT SERPL-MCNC: 0.9 MG/DL (ref 0.5–1.4)
DIFFERENTIAL METHOD: ABNORMAL
EOSINOPHIL # BLD AUTO: 0.3 K/UL (ref 0–0.5)
EOSINOPHIL NFR BLD: 4.8 % (ref 0–8)
ERYTHROCYTE [DISTWIDTH] IN BLOOD BY AUTOMATED COUNT: 13.6 % (ref 11.5–14.5)
EST. GFR  (AFRICAN AMERICAN): >60 ML/MIN/1.73 M^2
EST. GFR  (NON AFRICAN AMERICAN): >60 ML/MIN/1.73 M^2
GLUCOSE SERPL-MCNC: 105 MG/DL (ref 70–110)
GLUCOSE UR QL STRIP: NEGATIVE
HCT VFR BLD AUTO: 36.8 % (ref 37–48.5)
HGB BLD-MCNC: 12.2 G/DL (ref 12–16)
HGB UR QL STRIP: NEGATIVE
IMM GRANULOCYTES # BLD AUTO: 0.02 K/UL (ref 0–0.04)
IMM GRANULOCYTES NFR BLD AUTO: 0.3 % (ref 0–0.5)
KETONES UR QL STRIP: NEGATIVE
LEUKOCYTE ESTERASE UR QL STRIP: ABNORMAL
LYMPHOCYTES # BLD AUTO: 2.5 K/UL (ref 1–4.8)
LYMPHOCYTES NFR BLD: 37 % (ref 18–48)
MCH RBC QN AUTO: 29.9 PG (ref 27–31)
MCHC RBC AUTO-ENTMCNC: 33.2 G/DL (ref 32–36)
MCV RBC AUTO: 90 FL (ref 82–98)
MICROSCOPIC COMMENT: NORMAL
MONOCYTES # BLD AUTO: 0.7 K/UL (ref 0.3–1)
MONOCYTES NFR BLD: 9.5 % (ref 4–15)
NEUTROPHILS # BLD AUTO: 3.3 K/UL (ref 1.8–7.7)
NEUTROPHILS NFR BLD: 47.8 % (ref 38–73)
NITRITE UR QL STRIP: NEGATIVE
NRBC BLD-RTO: 0 /100 WBC
PH UR STRIP: 7 [PH] (ref 5–8)
PLATELET # BLD AUTO: 367 K/UL (ref 150–350)
PMV BLD AUTO: 10.7 FL (ref 9.2–12.9)
POTASSIUM SERPL-SCNC: 4.2 MMOL/L (ref 3.5–5.1)
PROT SERPL-MCNC: 7.4 G/DL (ref 6–8.4)
PROT UR QL STRIP: NEGATIVE
RBC # BLD AUTO: 4.08 M/UL (ref 4–5.4)
RBC #/AREA URNS AUTO: 0 /HPF (ref 0–4)
SODIUM SERPL-SCNC: 139 MMOL/L (ref 136–145)
SP GR UR STRIP: 1.02 (ref 1–1.03)
SQUAMOUS #/AREA URNS AUTO: 2 /HPF
URN SPEC COLLECT METH UR: ABNORMAL
WBC # BLD AUTO: 6.82 K/UL (ref 3.9–12.7)
WBC #/AREA URNS AUTO: 0 /HPF (ref 0–5)

## 2019-07-01 PROCEDURE — 85025 COMPLETE CBC W/AUTO DIFF WBC: CPT

## 2019-07-01 PROCEDURE — 80053 COMPREHEN METABOLIC PANEL: CPT

## 2019-07-01 PROCEDURE — 1101F PR PT FALLS ASSESS DOC 0-1 FALLS W/OUT INJ PAST YR: ICD-10-PCS | Mod: CPTII,S$GLB,, | Performed by: FAMILY MEDICINE

## 2019-07-01 PROCEDURE — 3074F SYST BP LT 130 MM HG: CPT | Mod: CPTII,S$GLB,, | Performed by: FAMILY MEDICINE

## 2019-07-01 PROCEDURE — 3078F PR MOST RECENT DIASTOLIC BLOOD PRESSURE < 80 MM HG: ICD-10-PCS | Mod: CPTII,S$GLB,, | Performed by: FAMILY MEDICINE

## 2019-07-01 PROCEDURE — 81001 URINALYSIS AUTO W/SCOPE: CPT

## 2019-07-01 PROCEDURE — 99999 PR PBB SHADOW E&M-EST. PATIENT-LVL IV: ICD-10-PCS | Mod: PBBFAC,,, | Performed by: FAMILY MEDICINE

## 2019-07-01 PROCEDURE — 99999 PR PBB SHADOW E&M-EST. PATIENT-LVL IV: CPT | Mod: PBBFAC,,, | Performed by: FAMILY MEDICINE

## 2019-07-01 PROCEDURE — 3074F PR MOST RECENT SYSTOLIC BLOOD PRESSURE < 130 MM HG: ICD-10-PCS | Mod: CPTII,S$GLB,, | Performed by: FAMILY MEDICINE

## 2019-07-01 PROCEDURE — 1101F PT FALLS ASSESS-DOCD LE1/YR: CPT | Mod: CPTII,S$GLB,, | Performed by: FAMILY MEDICINE

## 2019-07-01 PROCEDURE — 99213 PR OFFICE/OUTPT VISIT, EST, LEVL III, 20-29 MIN: ICD-10-PCS | Mod: S$GLB,,, | Performed by: FAMILY MEDICINE

## 2019-07-01 PROCEDURE — 99213 OFFICE O/P EST LOW 20 MIN: CPT | Mod: S$GLB,,, | Performed by: FAMILY MEDICINE

## 2019-07-01 PROCEDURE — 3078F DIAST BP <80 MM HG: CPT | Mod: CPTII,S$GLB,, | Performed by: FAMILY MEDICINE

## 2019-07-01 NOTE — PROGRESS NOTES
Subjective:       Patient ID: Indira Walker is a 73 y.o. female.    Chief Complaint: back and pelvic pain    Last seen in May for acute left thigh pain, left sciatica with paresthesias to the left foot.  Here today with complaints of back and pelvic pain.  States she has been having pain to RLQ - very painful last 4 days. States has been painful to her right low back as well. 10/10 worst. Here at rest 0/10. sts hurts with movement. Tried gabapentin 300 one night but not help, elidia. At night she has been using a heating pad to the left side when trying to sleep.  Takes two ES tylenol only once at night couple times.    Has to hold her RLA pannus when she moves - otherwise more painful.  History hernia repair I same area of pain.    States has had small amount of pain for a long time to the RLQ in past but this is much worse.   States still with numbness/tingling to LEFT foot. Occas worse into LLE.    Abdominal Pain   This is a new problem. The current episode started in the past 7 days. The problem occurs intermittently. The problem has been unchanged. The pain is located in the RLQ. The pain is at a severity of 10/10. The pain is severe. The quality of the pain is sharp and aching. The abdominal pain radiates to the right flank. Associated symptoms include constipation (recently - last stool today). Pertinent negatives include no diarrhea. The pain is aggravated by movement. The pain is relieved by passing flatus, bowel movements, being still, urination and certain positions. She has tried acetaminophen for the symptoms. The treatment provided mild relief.     Review of Systems   Gastrointestinal: Positive for abdominal pain and constipation (recently - last stool today). Negative for diarrhea.   Musculoskeletal: Positive for back pain.       Objective:      Physical Exam   Constitutional: She is oriented to person, place, and time. She appears well-developed and well-nourished.   HENT:   Head: Normocephalic  and atraumatic.   Right Ear: External ear normal.   Left Ear: External ear normal.   Mouth/Throat: Oropharynx is clear and moist. No oropharyngeal exudate.   Neck: Normal range of motion. Neck supple.   Cardiovascular: Normal rate, regular rhythm and normal heart sounds.   Pulmonary/Chest: Effort normal and breath sounds normal. No respiratory distress.   Abdominal: Soft. Bowel sounds are normal. She exhibits no distension and no mass. There is tenderness (to deep RLQ mild TTP overlying prior hernia scar near midline). There is no rebound and no guarding.   Pain resolved momentarily with pressure to area of hernia scar. No hernia palpable however.   Neurological: She is alert and oriented to person, place, and time.   Skin: Skin is warm and dry.   Psychiatric: She has a normal mood and affect. Her behavior is normal.         Assessment/Plan:     1. Abdominal pain, RLQ  CT Abdomen Pelvis With Contrast    Urinalysis    Comprehensive metabolic panel    CBC auto differential   2. History of abdominal hernia  CT Abdomen Pelvis With Contrast     Obtain CT - eval for possible herniation. Monitor. Ok to use tramadol - has a few - with tylenol if needed.

## 2019-07-01 NOTE — PATIENT INSTRUCTIONS
OK to take two Tylenol  mg up to 3 times daily (6 total in one day),.  You may try taking one tramadol with two tylenol at bedtime.

## 2019-07-02 ENCOUNTER — TELEPHONE (OUTPATIENT)
Dept: RADIOLOGY | Facility: HOSPITAL | Age: 74
End: 2019-07-02

## 2019-07-02 ENCOUNTER — TELEPHONE (OUTPATIENT)
Dept: INTERNAL MEDICINE | Facility: CLINIC | Age: 74
End: 2019-07-02

## 2019-07-02 DIAGNOSIS — Z88.8 ALLERGY TO IODINE: Primary | ICD-10-CM

## 2019-07-02 RX ORDER — PREDNISONE 50 MG/1
TABLET ORAL
Qty: 3 TABLET | Refills: 0 | Status: SHIPPED | OUTPATIENT
Start: 2019-07-02 | End: 2019-12-05 | Stop reason: ALTCHOICE

## 2019-07-02 NOTE — TELEPHONE ENCOUNTER
Patient was informed of her script being sent to the pharmacy for her (prednisolone) that she is to take along with the OTC benadryl that she is to  for her up coming CT scan that is scheduled for tomorrow @ 1:50 pm. Patient was given instruction on how she is to take this Rx. Patient verbally understood the information given.

## 2019-07-02 NOTE — TELEPHONE ENCOUNTER
See below meassage and please inform pt - must  prednisone Rx at her pharmacy:    Take first tablet 13 hours before scan  then one tablet 7 hrs prior to scan  then last one take before scan along with 50 mg benadryl (OTC diphenhydramine 25 mg, take two)     Must also  the OTC diphenhydramine (Benadryl) if she does not have any.    I do not see a time scheduled for her CT - pls ascertain time of scan and then calculate when she needs to take her first two doses. Ask me if needed.

## 2019-07-02 NOTE — TELEPHONE ENCOUNTER
----- Message from Edda Juarez LPN sent at 7/2/2019  9:34 AM CDT -----  Regarding: FW: Premedication for iodine allergy      ----- Message -----  From: RT Jillian  Sent: 7/2/2019   9:26 AM  To: Sharon MALDONADO Staff  Subject: Premedication for iodine allergy                 Ms Walker needs to be premedicated for her allergy to iodine before her CT scan tomorrow.   Premedication protocol for radiology is :    50 mg Prednisone 13 hours prior to scan,  50 mg Prednisone 7 hours prior to scan, and  50 mg Prednisone AND 50 mg Benadryl prior to scan.    Please inform the patient of these instructions. We cannot inject contrast unless she is premedicated.    Thanks,   Claudia  [phone]

## 2019-07-03 ENCOUNTER — HOSPITAL ENCOUNTER (OUTPATIENT)
Dept: RADIOLOGY | Facility: HOSPITAL | Age: 74
Discharge: HOME | End: 2019-07-03
Attending: FAMILY MEDICINE
Payer: MEDICARE

## 2019-07-03 DIAGNOSIS — Z87.19 HISTORY OF ABDOMINAL HERNIA: ICD-10-CM

## 2019-07-03 DIAGNOSIS — R10.31 ABDOMINAL PAIN, RLQ: ICD-10-CM

## 2019-07-03 PROCEDURE — 74177 CT ABD & PELVIS W/CONTRAST: CPT | Mod: 26,,, | Performed by: RADIOLOGY

## 2019-07-03 PROCEDURE — 74177 CT ABDOMEN PELVIS WITH CONTRAST: ICD-10-PCS | Mod: 26,,, | Performed by: RADIOLOGY

## 2019-07-03 PROCEDURE — 25500020 PHARM REV CODE 255: Performed by: FAMILY MEDICINE

## 2019-07-03 PROCEDURE — 74177 CT ABD & PELVIS W/CONTRAST: CPT | Mod: TC

## 2019-07-03 RX ADMIN — IOHEXOL 75 ML: 350 INJECTION, SOLUTION INTRAVENOUS at 01:07

## 2019-07-03 RX ADMIN — IOHEXOL 30 ML: 350 INJECTION, SOLUTION INTRAVENOUS at 12:07

## 2019-07-09 ENCOUNTER — TELEPHONE (OUTPATIENT)
Dept: INTERNAL MEDICINE | Facility: CLINIC | Age: 74
End: 2019-07-09

## 2019-07-09 DIAGNOSIS — R93.5 ABNORMAL CT OF THE ABDOMEN: Primary | ICD-10-CM

## 2019-07-09 NOTE — TELEPHONE ENCOUNTER
----- Message from Lady Guevara sent at 7/9/2019  1:00 PM CDT -----  Contact: Patient   Type:  Test Results    Who Called: Indira  Name of Test (Lab/Mammo/Etc): Ct-scan  Date of Test: Wednesday 7/3/19  Ordering Provider: Dr. Herrera  Where the test was performed: [facility] the [place]  Would the patient rather a call back or a response via [employer]? Call back   Best Call Back Number: Please call her at [phone]  Additional Information:  n/a

## 2019-07-09 NOTE — TELEPHONE ENCOUNTER
Pt informed of recent CT scan results and informed that she would receive a call tmw to assist with scheduling MRI. Pt verbalized understanding of information.

## 2019-07-09 NOTE — TELEPHONE ENCOUNTER
Gallstones present but no gallbladder inflammation.  She had a lesion in the pancreas that needs further evaluation.    Diverticulosis is present but no infection or inflammation (no diverticulitis).  No appendicitis.    She has a very small fat containing umbilical herniation.  There is a very small fat containing left inguinal hernia.  She has a lipoma to the right side in the area of concern.  This is not a hernia, but may be associated with the pain she is feeling.    MRI to be scheduled to further evaluate the pancreas. I have to complete the orders and we will call back tomorrow re scheduling.

## 2019-07-10 NOTE — TELEPHONE ENCOUNTER
Returned the patient phone call.  Received no answer.  Left her a message to call the clinic back.

## 2019-07-10 NOTE — TELEPHONE ENCOUNTER
----- Message from Dora Daniels sent at 7/10/2019  2:23 PM CDT -----  Contact: Pt  PT called to speak to the nurse regarding her care and MRI and would like a call back at [phone]

## 2019-07-11 ENCOUNTER — TELEPHONE (OUTPATIENT)
Dept: INTERNAL MEDICINE | Facility: CLINIC | Age: 74
End: 2019-07-11

## 2019-07-11 RX ORDER — NAPROXEN 500 MG/1
500 TABLET ORAL 2 TIMES DAILY
Qty: 30 TABLET | Refills: 0 | Status: SHIPPED | OUTPATIENT
Start: 2019-07-11 | End: 2019-11-18 | Stop reason: SDUPTHER

## 2019-07-11 NOTE — TELEPHONE ENCOUNTER
Staff: As long as her head is not in the MRI she can do it.  She did the CT scan fine because her head was not in the tube.  Please check with Radiology to verify whether her head will be inside the MRI scanner.  If it is going to be inside, we will have  it done elsewhere.    Patient also continuing to complain of the right lower abdominal pain. No etiology suggested from the CT scan.  Reviewed results with patient.  She is out of naproxen and would like a refill.  She plans to take it with 2 Tylenol twice a day. RF sent

## 2019-07-11 NOTE — TELEPHONE ENCOUNTER
Spoke with the patient to schedule her MRI, patient informed that any date and time was okay. Patient also advised that she would need to have the seated MRI due to her being claustrophobic. I did contact [facility] [address] to schedule the patient MRI and informed that she would need to be sitting up for the test and I was informed that [facility] doesn't have the seated MRI machine, patient would have to have her MRI preformed at [facility] where they do have the seated MRI machine. Patient was informed of the information and verbally understood. Patient would need new MRI orders placed for [facility].    Please Advise

## 2019-07-11 NOTE — TELEPHONE ENCOUNTER
Spoke with Anai in radiology and was advised that based on the patient height and the type of MRI she is having, her head will be in the machine.  Anai also advised that the MRI machine is a larger machine than the usual one.

## 2019-07-11 NOTE — TELEPHONE ENCOUNTER
See order for external MRI -  Contact [facility] to schedule. Send order with CT report attached pls.

## 2019-07-19 ENCOUNTER — CLINICAL SUPPORT (OUTPATIENT)
Dept: INTERNAL MEDICINE | Facility: CLINIC | Age: 74
End: 2019-07-19
Payer: MEDICARE

## 2019-07-19 DIAGNOSIS — E78.5 HYPERLIPIDEMIA ASSOCIATED WITH TYPE 2 DIABETES MELLITUS: ICD-10-CM

## 2019-07-19 DIAGNOSIS — I10 HYPERTENSION, ESSENTIAL: ICD-10-CM

## 2019-07-19 DIAGNOSIS — E11.69 HYPERLIPIDEMIA ASSOCIATED WITH TYPE 2 DIABETES MELLITUS: ICD-10-CM

## 2019-07-19 LAB
ALBUMIN SERPL BCP-MCNC: 3.6 G/DL (ref 3.5–5.2)
ALP SERPL-CCNC: 50 U/L (ref 55–135)
ALT SERPL W/O P-5'-P-CCNC: 18 U/L (ref 10–44)
ANION GAP SERPL CALC-SCNC: 8 MMOL/L (ref 8–16)
AST SERPL-CCNC: 23 U/L (ref 10–40)
BILIRUB SERPL-MCNC: 0.7 MG/DL (ref 0.1–1)
BUN SERPL-MCNC: 16 MG/DL (ref 8–23)
CALCIUM SERPL-MCNC: 9.5 MG/DL (ref 8.7–10.5)
CHLORIDE SERPL-SCNC: 103 MMOL/L (ref 95–110)
CHOLEST SERPL-MCNC: 155 MG/DL (ref 120–199)
CHOLEST/HDLC SERPL: 2.4 {RATIO} (ref 2–5)
CO2 SERPL-SCNC: 29 MMOL/L (ref 23–29)
CREAT SERPL-MCNC: 0.8 MG/DL (ref 0.5–1.4)
EST. GFR  (AFRICAN AMERICAN): >60 ML/MIN/1.73 M^2
EST. GFR  (NON AFRICAN AMERICAN): >60 ML/MIN/1.73 M^2
GLUCOSE SERPL-MCNC: 89 MG/DL (ref 70–110)
HDLC SERPL-MCNC: 65 MG/DL (ref 40–75)
HDLC SERPL: 41.9 % (ref 20–50)
LDLC SERPL CALC-MCNC: 77.2 MG/DL (ref 63–159)
NONHDLC SERPL-MCNC: 90 MG/DL
POTASSIUM SERPL-SCNC: 3.9 MMOL/L (ref 3.5–5.1)
PROT SERPL-MCNC: 6.9 G/DL (ref 6–8.4)
SODIUM SERPL-SCNC: 140 MMOL/L (ref 136–145)
TRIGL SERPL-MCNC: 64 MG/DL (ref 30–150)

## 2019-07-19 PROCEDURE — 36415 COLL VENOUS BLD VENIPUNCTURE: CPT | Mod: S$GLB,,, | Performed by: FAMILY MEDICINE

## 2019-07-19 PROCEDURE — 80061 LIPID PANEL: CPT

## 2019-07-19 PROCEDURE — 83036 HEMOGLOBIN GLYCOSYLATED A1C: CPT

## 2019-07-19 PROCEDURE — 80053 COMPREHEN METABOLIC PANEL: CPT

## 2019-07-19 PROCEDURE — 36415 PR COLLECTION VENOUS BLOOD,VENIPUNCTURE: ICD-10-PCS | Mod: S$GLB,,, | Performed by: FAMILY MEDICINE

## 2019-07-20 LAB
ESTIMATED AVG GLUCOSE: 180 MG/DL (ref 68–131)
HBA1C MFR BLD HPLC: 7.9 % (ref 4–5.6)

## 2019-07-22 ENCOUNTER — OFFICE VISIT (OUTPATIENT)
Dept: INTERNAL MEDICINE | Facility: CLINIC | Age: 74
End: 2019-07-22
Payer: MEDICARE

## 2019-07-22 VITALS
TEMPERATURE: 98 F | WEIGHT: 152.25 LBS | HEIGHT: 63 IN | SYSTOLIC BLOOD PRESSURE: 115 MMHG | DIASTOLIC BLOOD PRESSURE: 80 MMHG | HEART RATE: 67 BPM | BODY MASS INDEX: 26.98 KG/M2 | OXYGEN SATURATION: 100 %

## 2019-07-22 DIAGNOSIS — I10 HYPERTENSION, ESSENTIAL: ICD-10-CM

## 2019-07-22 DIAGNOSIS — E78.5 HYPERLIPIDEMIA ASSOCIATED WITH TYPE 2 DIABETES MELLITUS: ICD-10-CM

## 2019-07-22 DIAGNOSIS — R10.31 ABDOMINAL PAIN, RLQ: ICD-10-CM

## 2019-07-22 DIAGNOSIS — E11.69 HYPERLIPIDEMIA ASSOCIATED WITH TYPE 2 DIABETES MELLITUS: ICD-10-CM

## 2019-07-22 PROCEDURE — 99999 PR PBB SHADOW E&M-EST. PATIENT-LVL III: CPT | Mod: PBBFAC,,, | Performed by: FAMILY MEDICINE

## 2019-07-22 PROCEDURE — 3079F PR MOST RECENT DIASTOLIC BLOOD PRESSURE 80-89 MM HG: ICD-10-PCS | Mod: CPTII,S$GLB,, | Performed by: FAMILY MEDICINE

## 2019-07-22 PROCEDURE — 1101F PR PT FALLS ASSESS DOC 0-1 FALLS W/OUT INJ PAST YR: ICD-10-PCS | Mod: CPTII,S$GLB,, | Performed by: FAMILY MEDICINE

## 2019-07-22 PROCEDURE — 3045F PR MOST RECENT HEMOGLOBIN A1C LEVEL 7.0-9.0%: ICD-10-PCS | Mod: CPTII,S$GLB,, | Performed by: FAMILY MEDICINE

## 2019-07-22 PROCEDURE — 99999 PR PBB SHADOW E&M-EST. PATIENT-LVL III: ICD-10-PCS | Mod: PBBFAC,,, | Performed by: FAMILY MEDICINE

## 2019-07-22 PROCEDURE — 3074F SYST BP LT 130 MM HG: CPT | Mod: CPTII,S$GLB,, | Performed by: FAMILY MEDICINE

## 2019-07-22 PROCEDURE — 3045F PR MOST RECENT HEMOGLOBIN A1C LEVEL 7.0-9.0%: CPT | Mod: CPTII,S$GLB,, | Performed by: FAMILY MEDICINE

## 2019-07-22 PROCEDURE — 3079F DIAST BP 80-89 MM HG: CPT | Mod: CPTII,S$GLB,, | Performed by: FAMILY MEDICINE

## 2019-07-22 PROCEDURE — 1101F PT FALLS ASSESS-DOCD LE1/YR: CPT | Mod: CPTII,S$GLB,, | Performed by: FAMILY MEDICINE

## 2019-07-22 PROCEDURE — 99213 PR OFFICE/OUTPT VISIT, EST, LEVL III, 20-29 MIN: ICD-10-PCS | Mod: S$GLB,,, | Performed by: FAMILY MEDICINE

## 2019-07-22 PROCEDURE — 3074F PR MOST RECENT SYSTOLIC BLOOD PRESSURE < 130 MM HG: ICD-10-PCS | Mod: CPTII,S$GLB,, | Performed by: FAMILY MEDICINE

## 2019-07-22 PROCEDURE — 99213 OFFICE O/P EST LOW 20 MIN: CPT | Mod: S$GLB,,, | Performed by: FAMILY MEDICINE

## 2019-07-22 RX ORDER — TRAMADOL HYDROCHLORIDE 50 MG/1
50 TABLET ORAL EVERY 6 HOURS PRN
Qty: 12 TABLET | Refills: 0 | Status: CANCELLED | OUTPATIENT
Start: 2019-07-22

## 2019-07-22 NOTE — TELEPHONE ENCOUNTER
Pt is getting this at outside imaging center - [facility] - I spoke with pt re MRI at [facility] - but this is not going to work for her needs the open MRI at  Imaging - see below.      Please cancel the MRI at the [facility].

## 2019-07-22 NOTE — PROGRESS NOTES
Subjective:       Patient ID: Indira Walker is a 74 y.o. female.    Chief Complaint: f/u on lab work    Patient with type 2 diabetes, hypertension, hyperlipidemia here for scheduled recheck with recent labs. Lab Results       Component                Value               Date                       WBC                      6.82                07/01/2019                 HGB                      12.2                07/01/2019                 HCT                      36.8 (L)            07/01/2019                 PLT                      367 (H)             07/01/2019                 CHOL                     155                 07/19/2019                 TRIG                     64                  07/19/2019                 HDL                      65                  07/19/2019                 LDLCALC                  77.2                07/19/2019                 ALT                      18                  07/19/2019                 AST                      23                  07/19/2019                 NA                       140                 07/19/2019                 K                        3.9                 07/19/2019                 CL                       103                 07/19/2019                 CALCIUM                  9.5                 07/19/2019                 CREATININE               0.8                 07/19/2019                 BUN                      16                  07/19/2019                 CO2                      29                  07/19/2019                 INR                      1.0                 03/08/2018                 GLU                      89                  07/19/2019                 ESTGFRAFRICA             >60.0               07/19/2019                 EGFRNONAA                >60.0               07/19/2019                 HGBA1C                   7.9 (H)             07/19/2019                 MICALBCREAT              6.5                 03/19/2019               Lab Results       Component                Value               Date                       HGBA1C                   7.9 (H)             07/19/2019                 HGBA1C                   7.1 (H)             03/19/2019                 HGBA1C                   7.4 (H)             12/14/2018                 HGBA1C                   7.8 (H)             08/08/2018                 HGBA1C                   7.8 (H)             04/17/2018                 HGBA1C                   7.6 (H)             01/11/2018            She changed her dosing from 1500 metformin back to 2000 daily and lowered her Tresiba insulin to 12. She brings in her readings. She has gone back up to 20 sometimes - one symptomatic low at 65 after taking 20 U night before.  She states she is symptomatic with shakiness when her blood sugar is less than 80.    She also relates that she was taken off Fosamax secondary to increased bone growth in her jaw.    Abdominal Pain   This is a chronic problem. The onset quality is gradual. The problem occurs constantly. The problem has been waxing and waning. The pain is located in the RLQ. The pain is at a severity of 3/10 (currently -at rest. worse with activity, lying down helps.). The abdominal pain radiates to the back. Associated symptoms include constipation. The pain is aggravated by movement (constipation). The pain is relieved by bowel movements, belching and passing flatus. She has tried acetaminophen (NSAID, tramadol) for the symptoms. Prior diagnostic workup includes CT scan.     Review of Systems   Gastrointestinal: Positive for abdominal pain (right sided - chronic now. relieved with laxative.) and constipation.       Objective:      Physical Exam   Constitutional: She is oriented to person, place, and time. She appears well-developed.   HENT:   Head: Normocephalic and atraumatic.   Cardiovascular: Normal rate, regular rhythm and normal heart sounds.   Pulmonary/Chest: Effort normal and breath  sounds normal.   Abdominal: Soft. She exhibits no distension.   Neurological: She is alert and oriented to person, place, and time.   Skin: Skin is warm and dry.   Psychiatric: She has a normal mood and affect. Her behavior is normal.         Assessment/Plan:     1. Uncontrolled type 2 diabetes mellitus without complication, with long-term current use of insulin  Hemoglobin A1c   2. Hyperlipidemia associated with type 2 diabetes mellitus     3. Hypertension, essential     4. Abdominal pain, RLQ      Continue current 2000 mg metformin daily as well as current dose insulin with recommendations to increase to 15-17 units as indicated by higher levels or anticipated increase in dietary intake.  Will recheck 3 months with A1C only.  Continue with diet changes to increase bowel regularity for ab pain.  More than 50% of visit was spent in counseling regarding options, recommendations, medication use, side effects, expectations, and precautions.  Total time spent face-to-face was 20 minutes.

## 2019-07-22 NOTE — TELEPHONE ENCOUNTER
I asked for help with scheduling this in clinic today. I did not remember that it is being performed at outside imaging center - see below. Let me know status - they may just need to be called to see if they have contacted the pt.

## 2019-07-22 NOTE — TELEPHONE ENCOUNTER
Patient MRI scheduled for tomorrow at [facility] for 8 am. Message left for the patient to return my call to notify of this information.

## 2019-07-22 NOTE — PATIENT INSTRUCTIONS
Continue on the 2000mg dose metformin and 12 U insulin. You may increase to 15-17 units as indicated by higher readings or dietary changes.

## 2019-07-23 RX ORDER — NAPROXEN 500 MG/1
TABLET ORAL
Qty: 30 TABLET | Refills: 0 | OUTPATIENT
Start: 2019-07-23

## 2019-07-23 RX ORDER — PEN NEEDLE, DIABETIC 31 GX5/16"
NEEDLE, DISPOSABLE MISCELLANEOUS
Qty: 100 EACH | Refills: 4 | Status: SHIPPED | OUTPATIENT
Start: 2019-07-23 | End: 2020-08-28 | Stop reason: SDUPTHER

## 2019-07-23 NOTE — TELEPHONE ENCOUNTER
Spoke with Malissa at [facility]. She states that they do accept the patient insurance [facility], she states that the order and the demographics need to be faxed over and they will call the patient to schedule her appointment and they do have the chair for the patient to sit and do her MRI. Orders and demographics will be faxed over to [phone] [phone]

## 2019-07-23 NOTE — TELEPHONE ENCOUNTER
Please contact [facility] and/or pt re scheduling of her MRI. Recall that she is getting a sitting [facility].    Orders were previously sent to [facility] - see prior notes.

## 2019-07-25 ENCOUNTER — TELEPHONE (OUTPATIENT)
Dept: INTERNAL MEDICINE | Facility: CLINIC | Age: 74
End: 2019-07-25

## 2019-07-25 DIAGNOSIS — F41.9 ANXIETY: Primary | ICD-10-CM

## 2019-07-25 NOTE — TELEPHONE ENCOUNTER
----- Message from Ami Acuna sent at 7/25/2019  4:41 PM CDT -----  Contact: Pt  Pt asked that nurse please return her call regarding her MRI, please contact pt at ([phone])

## 2019-07-25 NOTE — TELEPHONE ENCOUNTER
Pt states she was informed that they don not have an MRI machine for the abdomen that leaves her head exposed due to her claustrophobia. Pt would like to know if there is another way to examine her abdomin

## 2019-07-26 RX ORDER — LORAZEPAM 1 MG/1
TABLET ORAL
Qty: 6 TABLET | Refills: 0 | Status: SHIPPED | OUTPATIENT
Start: 2019-07-26 | End: 2022-01-27

## 2019-07-26 NOTE — TELEPHONE ENCOUNTER
Pt states she will try the medication and see if it will help. Request medication be sent to pharmacy on file.

## 2019-07-26 NOTE — TELEPHONE ENCOUNTER
Spoke with the patient.  We will try oral lorazepam 1 mg.  I am sending her 6 tablets she can try 1 one evening and if that is not sufficient she can try 2 on another evening prior to her procedure.  If two works well she can take that 30 min to an hour before the MRI.  Pt sts understanding.  Rx sent to pharmacy.

## 2019-07-28 ENCOUNTER — TELEPHONE (OUTPATIENT)
Dept: INTERNAL MEDICINE | Facility: CLINIC | Age: 74
End: 2019-07-28

## 2019-07-28 DIAGNOSIS — R93.5 ABNORMAL CT OF THE ABDOMEN: Primary | ICD-10-CM

## 2019-07-29 ENCOUNTER — TELEPHONE (OUTPATIENT)
Dept: INTERNAL MEDICINE | Facility: CLINIC | Age: 74
End: 2019-07-29

## 2019-07-29 NOTE — TELEPHONE ENCOUNTER
Spoke with the patient, she was advised of her scheduling her MRI. Patient states that she will contact the MRI department and schedule her appt after she see how the pills make her feel.

## 2019-07-29 NOTE — TELEPHONE ENCOUNTER
Patient willing to try sedative premedication for MRI abdomen. Please contact pt re re-scheduling MRI through [facility]. New internal MRI order placed.

## 2019-07-29 NOTE — TELEPHONE ENCOUNTER
----- Message from Peace Mcfarlane sent at 7/29/2019  4:47 PM CDT -----  Contact: Patient  .Type:  Patient Returning Call    Who Called:Patient  Who Left Message for Patient:???  Does the patient know what this is regarding?:???  Would the patient rather a call back or a response via [facility]? Call  Best Call Back Number:[phone]  Additional Information: Patient thinks she missed a call from Dr. Herrera's nurse

## 2019-08-07 ENCOUNTER — TELEPHONE (OUTPATIENT)
Dept: INTERNAL MEDICINE | Facility: CLINIC | Age: 74
End: 2019-08-07

## 2019-08-07 NOTE — TELEPHONE ENCOUNTER
----- Message from Jasmyn Acosta sent at 8/7/2019  2:38 PM CDT -----  Contact: Pt  Type:  Needs Medical Advice    Who Called: PT   Symptoms (please be specific): n/a   How long has patient had these symptoms:  N/a  Pharmacy name and phone #:  n/a  Would the patient rather a call back or a response via [facility]? Call back   Best Call Back Number: [phone]  Additional Information: The patient is calling in regards to checking the status of her MRI appointment being scheduled at a outside provider due to not being able to lay down because of her being afraid of being in a enclosed MRI machine,please advise as soon as possible.

## 2019-08-07 NOTE — TELEPHONE ENCOUNTER
Pt states that she has tried the medication and she thinks it  Will work to keep her calm on doing the MRI. States that she received a call that morning of her MRI apt of when it was and she canceled it because that was the first she heard about it. Informed her that she canceled the day before the apt. States that she was unaware of when it was. Informed her that she would have to get in touch with radiology department to have this scheduled. Verbalized understanding.

## 2019-09-09 ENCOUNTER — TELEPHONE (OUTPATIENT)
Dept: INTERNAL MEDICINE | Facility: CLINIC | Age: 74
End: 2019-09-09

## 2019-09-09 NOTE — TELEPHONE ENCOUNTER
----- Message from Kalpana Martell sent at 9/9/2019  2:21 PM CDT -----  Contact: Patient  Patient needs to talk to nurse about her blood pressure medication, please call her back at [phone]. Thank you

## 2019-10-15 ENCOUNTER — CLINICAL SUPPORT (OUTPATIENT)
Dept: INTERNAL MEDICINE | Facility: CLINIC | Age: 74
End: 2019-10-15
Payer: MEDICARE

## 2019-10-15 DIAGNOSIS — I10 HYPERTENSION, ESSENTIAL: ICD-10-CM

## 2019-10-15 PROCEDURE — 36415 COLL VENOUS BLD VENIPUNCTURE: CPT | Mod: S$GLB,,, | Performed by: FAMILY MEDICINE

## 2019-10-15 PROCEDURE — 83036 HEMOGLOBIN GLYCOSYLATED A1C: CPT

## 2019-10-15 PROCEDURE — 36415 PR COLLECTION VENOUS BLOOD,VENIPUNCTURE: ICD-10-PCS | Mod: S$GLB,,, | Performed by: FAMILY MEDICINE

## 2019-10-15 RX ORDER — LOSARTAN POTASSIUM AND HYDROCHLOROTHIAZIDE 12.5; 1 MG/1; MG/1
TABLET ORAL
Qty: 90 TABLET | Refills: 4 | Status: SHIPPED | OUTPATIENT
Start: 2019-10-15 | End: 2020-10-26 | Stop reason: SDUPTHER

## 2019-10-16 LAB
ESTIMATED AVG GLUCOSE: 174 MG/DL (ref 68–131)
HBA1C MFR BLD HPLC: 7.7 % (ref 4–5.6)

## 2019-10-22 ENCOUNTER — OFFICE VISIT (OUTPATIENT)
Dept: INTERNAL MEDICINE | Facility: CLINIC | Age: 74
End: 2019-10-22
Payer: MEDICARE

## 2019-10-22 VITALS
WEIGHT: 149.06 LBS | OXYGEN SATURATION: 98 % | HEIGHT: 63 IN | HEART RATE: 82 BPM | TEMPERATURE: 96 F | SYSTOLIC BLOOD PRESSURE: 105 MMHG | DIASTOLIC BLOOD PRESSURE: 73 MMHG | BODY MASS INDEX: 26.41 KG/M2

## 2019-10-22 DIAGNOSIS — E78.5 HYPERLIPIDEMIA ASSOCIATED WITH TYPE 2 DIABETES MELLITUS: ICD-10-CM

## 2019-10-22 DIAGNOSIS — R93.5 ABNORMAL CT OF THE ABDOMEN: ICD-10-CM

## 2019-10-22 DIAGNOSIS — I10 HYPERTENSION, ESSENTIAL: ICD-10-CM

## 2019-10-22 DIAGNOSIS — F41.9 ANXIETY: ICD-10-CM

## 2019-10-22 DIAGNOSIS — Z12.11 SCREENING FOR COLON CANCER: ICD-10-CM

## 2019-10-22 DIAGNOSIS — E11.69 HYPERLIPIDEMIA ASSOCIATED WITH TYPE 2 DIABETES MELLITUS: ICD-10-CM

## 2019-10-22 PROCEDURE — 99214 OFFICE O/P EST MOD 30 MIN: CPT | Mod: S$GLB,,, | Performed by: FAMILY MEDICINE

## 2019-10-22 PROCEDURE — 3074F SYST BP LT 130 MM HG: CPT | Mod: CPTII,S$GLB,, | Performed by: FAMILY MEDICINE

## 2019-10-22 PROCEDURE — 3074F PR MOST RECENT SYSTOLIC BLOOD PRESSURE < 130 MM HG: ICD-10-PCS | Mod: CPTII,S$GLB,, | Performed by: FAMILY MEDICINE

## 2019-10-22 PROCEDURE — 3078F DIAST BP <80 MM HG: CPT | Mod: CPTII,S$GLB,, | Performed by: FAMILY MEDICINE

## 2019-10-22 PROCEDURE — 99214 PR OFFICE/OUTPT VISIT, EST, LEVL IV, 30-39 MIN: ICD-10-PCS | Mod: S$GLB,,, | Performed by: FAMILY MEDICINE

## 2019-10-22 PROCEDURE — 1101F PT FALLS ASSESS-DOCD LE1/YR: CPT | Mod: CPTII,S$GLB,, | Performed by: FAMILY MEDICINE

## 2019-10-22 PROCEDURE — 99999 PR PBB SHADOW E&M-EST. PATIENT-LVL III: ICD-10-PCS | Mod: PBBFAC,,, | Performed by: FAMILY MEDICINE

## 2019-10-22 PROCEDURE — 3078F PR MOST RECENT DIASTOLIC BLOOD PRESSURE < 80 MM HG: ICD-10-PCS | Mod: CPTII,S$GLB,, | Performed by: FAMILY MEDICINE

## 2019-10-22 PROCEDURE — 1101F PR PT FALLS ASSESS DOC 0-1 FALLS W/OUT INJ PAST YR: ICD-10-PCS | Mod: CPTII,S$GLB,, | Performed by: FAMILY MEDICINE

## 2019-10-22 PROCEDURE — 99999 PR PBB SHADOW E&M-EST. PATIENT-LVL III: CPT | Mod: PBBFAC,,, | Performed by: FAMILY MEDICINE

## 2019-10-22 RX ORDER — INSULIN DEGLUDEC 200 U/ML
INJECTION, SOLUTION SUBCUTANEOUS
COMMUNITY
End: 2019-11-04 | Stop reason: SDUPTHER

## 2019-10-22 NOTE — PROGRESS NOTES
Subjective:       Patient ID: Indira Walker is a 74 y.o. female.    Chief Complaint: follow up on A1C    Patient with type 2 diabetes, hypertension, hyperlipidemia here for scheduled recheck with recent labs.   She has been having joint swelling that comes and goes to different joints. She has swelling to DIPs on some digits - pain resolves on its own - has used naproxen plus tylenol.     She is on tresiba 16 now, down from 24.    Lab Results       Component                Value               Date                       WBC                      6.82                07/01/2019                 HGB                      12.2                07/01/2019                 HCT                      36.8 (L)            07/01/2019                 PLT                      367 (H)             07/01/2019                 CHOL                     155                 07/19/2019                 TRIG                     64                  07/19/2019                 HDL                      65                  07/19/2019                 LDLCALC                  77.2                07/19/2019                 ALT                      18                  07/19/2019                 AST                      23                  07/19/2019                 NA                       140                 07/19/2019                 K                        3.9                 07/19/2019                 CL                       103                 07/19/2019                 CALCIUM                  9.5                 07/19/2019                 CREATININE               0.8                 07/19/2019                 BUN                      16                  07/19/2019                 CO2                      29                  07/19/2019                 INR                      1.0                 03/08/2018                 GLU                      89                  07/19/2019                 ESTGFRAFRICA             >60.0                07/19/2019                 EGFRNONAA                >60.0               07/19/2019                 HGBA1C                   7.7 (H)             10/15/2019                 MICALBCREAT              6.5                 03/19/2019              Lab Results       Component                Value               Date                       HGBA1C                   7.7 (H)             10/15/2019                 HGBA1C                   7.9 (H)             07/19/2019                 HGBA1C                   7.1 (H)             03/19/2019                 HGBA1C                   7.4 (H)             12/14/2018                 HGBA1C                   7.8 (H)             08/08/2018                 HGBA1C                   7.8 (H)             04/17/2018            Diabetes Medications   insulin degludec (TRESIBA FLEXTOUCH U-200) 200 unit/mL (3 mL) InPn Inject into the skin.    metFORMIN (GLUCOPHAGE) 1000 MG tablet TAKE 1 TABLET BY MOUTH TWICE A DAY WITH FOOD    TRESIBA FLEXTOUCH U-200 200 unit/mL (3 mL) InPn INJECT 20 UNITS INTO THE SKIN DAILY AS DIRECTED      BP controlled.  She brings in her own BP readings which range from a low of 99/61 to a high of 136/89.  In general they run in the 100s-120s/80s.  BP Readings from Last 3 Encounters:  10/22/19 : 105/73  07/22/19 : 115/80  07/01/19 : 107/68    Hypertension Medications   amLODIPine (NORVASC) 2.5 MG tablet Take 1 tablet (2.5 mg total) by mouth once daily.    losartan-hydrochlorothiazide 100-12.5 mg (HYZAAR) 100-12.5 mg Tab TAKE 1 TABLET BY MOUTH EVERY DAY        Lab Results       Component                Value               Date                       CHOL                     155                 07/19/2019                 CHOL                     194                 03/19/2019                 CHOL                     194                 01/11/2018            Lab Results       Component                Value               Date                       HDL                      65                   07/19/2019                 HDL                      67                  03/19/2019                 HDL                      61                  01/11/2018            Lab Results       Component                Value               Date                       LDLCALC                  77.2                07/19/2019                 LDLCALC                  112.8               03/19/2019                 LDLCALC                  120.6               01/11/2018            Lab Results       Component                Value               Date                       TRIG                     64                  07/19/2019                 TRIG                     71                  03/19/2019                 TRIG                     62                  01/11/2018                Review of Systems   Respiratory: Negative for shortness of breath.    Cardiovascular: Negative for chest pain and leg swelling.   Musculoskeletal: Positive for arthralgias and joint swelling.       Objective:      Physical Exam   Constitutional: She is oriented to person, place, and time. She appears well-developed.   HENT:   Head: Normocephalic and atraumatic.   Cardiovascular: Normal rate, regular rhythm and normal heart sounds.   Pulmonary/Chest: Effort normal and breath sounds normal.   Neurological: She is alert and oriented to person, place, and time.   Skin: Skin is warm and dry.   Psychiatric: She has a normal mood and affect. Her behavior is normal.         Assessment/Plan:     1. Uncontrolled type 2 diabetes mellitus without complication, with long-term current use of insulin  Hemoglobin A1c    Microalbumin/creatinine urine ratio   2. Abnormal CT of the abdomen     3. Hypertension, essential  Basic metabolic panel   4. Hyperlipidemia associated with type 2 diabetes mellitus     5. Anxiety     6. Screening for colon cancer  Fecal Immunochemical Test (iFOBT)     No changes.  Recheck in 4 months with labs.

## 2019-10-23 PROCEDURE — G0008 ADMIN INFLUENZA VIRUS VAC: HCPCS | Mod: S$GLB,,, | Performed by: FAMILY MEDICINE

## 2019-10-23 PROCEDURE — 90662 IIV NO PRSV INCREASED AG IM: CPT | Mod: S$GLB,,, | Performed by: FAMILY MEDICINE

## 2019-10-23 PROCEDURE — 90662 PR FLU VACCINE, INCREASED ANTIGEN, PRESV FREE: ICD-10-PCS | Mod: S$GLB,,, | Performed by: FAMILY MEDICINE

## 2019-10-23 PROCEDURE — G0008 PR ADMIN INFLUENZA VIRUS VAC: ICD-10-PCS | Mod: S$GLB,,, | Performed by: FAMILY MEDICINE

## 2019-11-04 RX ORDER — INSULIN DEGLUDEC 200 U/ML
INJECTION, SOLUTION SUBCUTANEOUS
Qty: 3 SYRINGE | Refills: 35 | Status: SHIPPED | OUTPATIENT
Start: 2019-11-04 | End: 2020-06-29 | Stop reason: SDUPTHER

## 2019-11-05 ENCOUNTER — CLINICAL SUPPORT (OUTPATIENT)
Dept: CARDIOLOGY | Facility: CLINIC | Age: 74
End: 2019-11-05
Payer: MEDICARE

## 2019-11-05 ENCOUNTER — OFFICE VISIT (OUTPATIENT)
Dept: CARDIOLOGY | Facility: CLINIC | Age: 74
End: 2019-11-05
Payer: MEDICARE

## 2019-11-05 VITALS
HEIGHT: 63 IN | HEART RATE: 63 BPM | DIASTOLIC BLOOD PRESSURE: 70 MMHG | SYSTOLIC BLOOD PRESSURE: 110 MMHG | BODY MASS INDEX: 26.6 KG/M2 | WEIGHT: 150.13 LBS

## 2019-11-05 DIAGNOSIS — I10 ESSENTIAL HYPERTENSION: Primary | ICD-10-CM

## 2019-11-05 DIAGNOSIS — R07.89 OTHER CHEST PAIN: Primary | ICD-10-CM

## 2019-11-05 DIAGNOSIS — I50.32 CHRONIC HEART FAILURE WITH PRESERVED EJECTION FRACTION: ICD-10-CM

## 2019-11-05 DIAGNOSIS — I51.89 DIASTOLIC DYSFUNCTION: ICD-10-CM

## 2019-11-05 DIAGNOSIS — I10 ESSENTIAL HYPERTENSION: ICD-10-CM

## 2019-11-05 DIAGNOSIS — E78.2 HYPERLIPIDEMIA, MIXED: ICD-10-CM

## 2019-11-05 PROCEDURE — 99499 RISK ADDL DX/OHS AUDIT: ICD-10-PCS | Mod: S$GLB,,, | Performed by: INTERNAL MEDICINE

## 2019-11-05 PROCEDURE — 1101F PT FALLS ASSESS-DOCD LE1/YR: CPT | Mod: CPTII,S$GLB,, | Performed by: INTERNAL MEDICINE

## 2019-11-05 PROCEDURE — 93010 EKG 12-LEAD: ICD-10-PCS | Mod: S$GLB,,, | Performed by: INTERNAL MEDICINE

## 2019-11-05 PROCEDURE — 3074F PR MOST RECENT SYSTOLIC BLOOD PRESSURE < 130 MM HG: ICD-10-PCS | Mod: CPTII,S$GLB,, | Performed by: INTERNAL MEDICINE

## 2019-11-05 PROCEDURE — 99999 PR PBB SHADOW E&M-EST. PATIENT-LVL III: CPT | Mod: PBBFAC,,, | Performed by: INTERNAL MEDICINE

## 2019-11-05 PROCEDURE — 99214 PR OFFICE/OUTPT VISIT, EST, LEVL IV, 30-39 MIN: ICD-10-PCS | Mod: S$GLB,,, | Performed by: INTERNAL MEDICINE

## 2019-11-05 PROCEDURE — 3078F PR MOST RECENT DIASTOLIC BLOOD PRESSURE < 80 MM HG: ICD-10-PCS | Mod: CPTII,S$GLB,, | Performed by: INTERNAL MEDICINE

## 2019-11-05 PROCEDURE — 3078F DIAST BP <80 MM HG: CPT | Mod: CPTII,S$GLB,, | Performed by: INTERNAL MEDICINE

## 2019-11-05 PROCEDURE — 99499 UNLISTED E&M SERVICE: CPT | Mod: S$GLB,,, | Performed by: INTERNAL MEDICINE

## 2019-11-05 PROCEDURE — 99999 PR PBB SHADOW E&M-EST. PATIENT-LVL III: ICD-10-PCS | Mod: PBBFAC,,, | Performed by: INTERNAL MEDICINE

## 2019-11-05 PROCEDURE — 1101F PR PT FALLS ASSESS DOC 0-1 FALLS W/OUT INJ PAST YR: ICD-10-PCS | Mod: CPTII,S$GLB,, | Performed by: INTERNAL MEDICINE

## 2019-11-05 PROCEDURE — 93005 ELECTROCARDIOGRAM TRACING: CPT | Mod: S$GLB,,, | Performed by: INTERNAL MEDICINE

## 2019-11-05 PROCEDURE — 99214 OFFICE O/P EST MOD 30 MIN: CPT | Mod: S$GLB,,, | Performed by: INTERNAL MEDICINE

## 2019-11-05 PROCEDURE — 93010 ELECTROCARDIOGRAM REPORT: CPT | Mod: S$GLB,,, | Performed by: INTERNAL MEDICINE

## 2019-11-05 PROCEDURE — 3074F SYST BP LT 130 MM HG: CPT | Mod: CPTII,S$GLB,, | Performed by: INTERNAL MEDICINE

## 2019-11-05 PROCEDURE — 93005 EKG 12-LEAD: ICD-10-PCS | Mod: S$GLB,,, | Performed by: INTERNAL MEDICINE

## 2019-11-05 NOTE — PROGRESS NOTES
Subjective:   Patient ID:  Indira Walker is a 74 y.o. female who presents for cardiac consult of Hypertension (annual f/u) and Hyperlipidemia      Hypertension   Pertinent negatives include no chest pain, headaches, palpitations or shortness of breath.   Hyperlipidemia   Pertinent negatives include no chest pain, focal weakness or shortness of breath.   Chest Pain    Associated symptoms include back pain. Pertinent negatives include no dizziness, headaches, palpitations or shortness of breath.   Her past medical history is significant for hyperlipidemia.     The patient came in today for cardiac consult of Hypertension (annual f/u) and Hyperlipidemia    Idnira Walker is a 74 y.o. female pt  with current medical conditions HFpEF, HTN, HLD, DM2, Sciatica s/p surgery presents for follow up CV evaluation.     2/5/18  She presents for pre-op clearance for sciata needing surgery on 3/21/18 with Dr. Segundo, Neurosurgeon, at [facility]. The surgery is a minimally invasive microdiscectomy and will be under general anesthesia per notes. MRI of the lumbar spine showed left-sided nerve root impingement at the L5-S1 level. Pt has right foot drop with pain, shooting sharp pain. Pt has been to rehab once but will reschedule soon.   Pt does have intermittent, sharp chest pain. Pt thought it was due food or stress. Chest pain was left sided, non-radiating, pain yesterday lasted about 30 min. Occasionally gets palpitations, occurred last year. Mild dyspnea with a lot of exertion but usually okay.     5/10/18  Pt had negative nuclear stress and 2D ECHO with grade 1 DD and normal BI-V function in 2/2018. Pt's surgery went well, doing well not but still has some mild tingling. Pt still has mild mid sternal CP due to GERD, eats an apple which resolves it, previously was taking Nexium. Breathing has improved.     11/1/18  No further episodes of CP.     11/5/19  She has been having joint pain sec to  arthritis. Breathing is good, occ chest burning and eats an apple every night. No palpitations. Occ NSAIDs as needed but rarely.   ECG - NSR, poor RWP - stable    Patient feels no leg swelling, no PND,  no dizziness, no syncope, no CNS symptoms.    Patient is compliant with medications.    18 ECG - NSR, cannot rule out anterior MI, poor RWP    2/15/18 - Nuclear stress   Nuclear Quantitative Functional Analysis:   LVEF: >= 70 %    Impression: NORMAL MYOCARDIAL PERFUSION  1. The perfusion scan is free of evidence for myocardial ischemia or injury.   2. Resting wall motion is physiologic.   3. Resting LV function is normal.   4. The ventricular volumes are normal at rest and stress.   5. The extracardiac distribution of radioactivity is normal.   6. There was no previous study available to compare    2/15/18 2D ECHO  CONCLUSIONS     1 - Concentric hypertrophy.     2 - No wall motion abnormalities.     3 - Normal left ventricular systolic function (EF 60-65%).     4 - Impaired LV relaxation, normal LAP (grade 1 diastolic dysfunction).     5 - Normal right ventricular systolic function .     6 - The estimated PA systolic pressure is greater than 23 mmHg.     Past Medical History:   Diagnosis Date    Arthritis     Diabetes mellitus, type 2     Heartburn     Hyperlipidemia     Hypertension     Left sided sciatica     s/p microdiscectomy 3/21/18       Past Surgical History:   Procedure Laterality Date    BREAST BIOPSY       SECTION      COLONOSCOPY  2008    DR. JANET GARCÍA/MILD DIVERICULOSIS DESCENDING AND SIGMOID. REPEAT 5 YRS    HERNIA REPAIR      MICRODISCECTOMY OF SPINE Left 2018    left L5-S1, Dr Segundo       Social History     Tobacco Use    Smoking status: Never Smoker    Smokeless tobacco: Never Used   Substance Use Topics    Alcohol use: No    Drug use: No       History reviewed. No pertinent family history.    Patient's Medications   New Prescriptions    No medications  "on file   Previous Medications    ASCORBIC ACID, VITAMIN C, (VITAMIN C) 1000 MG TABLET    Take 1,000 mg by mouth once daily.    ASPIRIN (ECOTRIN) 81 MG EC TABLET    Take 1 tablet (81 mg total) by mouth once daily. Every other day    B COMPLEX VITAMINS CAPSULE    Take 1 capsule by mouth once daily.    BD ULTRA-FINE MINI PEN NEEDLE 31 GAUGE X 3/16" NDLE    AS DIRECTED ONCE DAILY WITH INSULIN SUBCUTANEOUSLY    BETA-CAROTENE,A,-VITS C,E/MINS (VISION ORAL)    Take by mouth.    BIOTIN ORAL    Take 1,000 mg by mouth.    BIOTIN ORAL    Take by mouth.    BLOOD SUGAR DIAGNOSTIC STRP    To check BG 2 times daily, to use with insurance preferred meter    BLOOD-GLUCOSE METER KIT    To check BG 2 times daily, to use with insurance preferred meter    CHOLECALCIFEROL, VITAMIN D3, 2,000 UNIT CAP    Take 2,000 Units by mouth once daily.     GABAPENTIN (NEURONTIN) 300 MG CAPSULE    TAKE 1 CAPSULE (300 MG TOTAL) BY MOUTH EVERY EVENING.    LANCETS MISC    To check BG 2 times daily, to use with insurance preferred meter    LORATADINE (CLARITIN) 10 MG TABLET    Take 10 mg by mouth once daily.    LORAZEPAM (ATIVAN) 1 MG TABLET    1-2 po 1 hour before procedure/test.    LOSARTAN-HYDROCHLOROTHIAZIDE 100-12.5 MG (HYZAAR) 100-12.5 MG TAB    TAKE 1 TABLET BY MOUTH EVERY DAY    METFORMIN (GLUCOPHAGE) 1000 MG TABLET    TAKE 1 TABLET BY MOUTH TWICE A DAY WITH FOOD    METHYL SALICYLATE/MENTHOL (ARTHRITIS HOT PAIN RELIEF TOP)    Apply topically.    MULTIVIT-MINERALS/FERROUS FUM (MULTI VITAMIN ORAL)    Take by mouth. For women over 50 (centrum)    NAPROXEN (NAPROSYN) 500 MG TABLET    Take 1 tablet (500 mg total) by mouth 2 (two) times daily. Take with food.    OMEGA-3S-DHA-EPA-FISH OIL ORAL    Take by mouth. Omega XL  Green lipped Mussel    PREDNISONE (DELTASONE) 50 MG TAB    One po 13 hours prior to scan, then one po 7 hrs prior to scan, then one po prior to scan    RANITIDINE (ZANTAC) 150 MG TABLET    Take 1 tablet (150 mg total) by mouth 2 (two) " "times daily.    ROSUVASTATIN (CRESTOR) 20 MG TABLET    Take 1 tablet (20 mg total) by mouth once daily.    TRAMADOL (ULTRAM) 50 MG TABLET    Take 1 tablet (50 mg total) by mouth every 6 (six) hours as needed.    TRESIBA FLEXTOUCH U-200 200 UNIT/ML (3 ML) INPN    INJECT 20 UNITS INTO THE SKIN DAILY AS DIRECTED   Modified Medications    No medications on file   Discontinued Medications    No medications on file       Review of Systems   Constitutional: Negative.    HENT: Negative.    Eyes: Negative.    Respiratory: Negative for shortness of breath.    Cardiovascular: Negative for chest pain and palpitations.   Gastrointestinal: Positive for heartburn.   Genitourinary: Negative.    Musculoskeletal: Positive for back pain and joint pain.   Skin: Negative.    Neurological: Positive for sensory change (sciatica). Negative for dizziness, tingling, focal weakness and headaches.   Endo/Heme/Allergies: Negative.    Psychiatric/Behavioral: Negative.    All 12 systems otherwise negative.      Wt Readings from Last 3 Encounters:   11/05/19 68.1 kg (150 lb 2.1 oz)   10/22/19 67.6 kg (149 lb 0.5 oz)   07/22/19 69 kg (152 lb 3.6 oz)     Temp Readings from Last 3 Encounters:   10/22/19 96 °F (35.6 °C) (Tympanic)   07/22/19 97.7 °F (36.5 °C) (Oral)   07/01/19 97.2 °F (36.2 °C) (Oral)     BP Readings from Last 3 Encounters:   11/05/19 110/70   10/22/19 105/73   07/22/19 115/80     Pulse Readings from Last 3 Encounters:   11/05/19 63   10/22/19 82   07/22/19 67       /70 (BP Method: Small (Manual))   Pulse 63   Ht 5' 3" (1.6 m)   Wt 68.1 kg (150 lb 2.1 oz)   BMI 26.59 kg/m²     Objective:   Physical Exam   Constitutional: She is oriented to person, place, and time. She appears well-developed and well-nourished. No distress.   HENT:   Head: Normocephalic and atraumatic.   Nose: Nose normal.   Mouth/Throat: Oropharynx is clear and moist.   Eyes: Conjunctivae and EOM are normal. No scleral icterus.   Neck: Normal range of " motion. Neck supple. No JVD present. No thyromegaly present.   Cardiovascular: Normal rate, regular rhythm, S1 normal and S2 normal. Exam reveals no gallop, no S3, no S4 and no friction rub.   No murmur heard.  Pulmonary/Chest: Effort normal and breath sounds normal. No stridor. No respiratory distress. She has no wheezes. She has no rales. She exhibits no tenderness.   Abdominal: Soft. Bowel sounds are normal. She exhibits no distension and no mass. There is no tenderness. There is no rebound.   Genitourinary:   Genitourinary Comments: Deferred   Musculoskeletal: Normal range of motion. She exhibits no edema, tenderness or deformity.   Lymphadenopathy:     She has no cervical adenopathy.   Neurological: She is alert and oriented to person, place, and time. She exhibits normal muscle tone. Coordination normal.   Skin: Skin is warm and dry. No rash noted. She is not diaphoretic. No erythema. No pallor.   Psychiatric: She has a normal mood and affect. Her behavior is normal. Judgment and thought content normal.   Nursing note and vitals reviewed.      Lab Results   Component Value Date     07/19/2019    K 3.9 07/19/2019     07/19/2019    CO2 29 07/19/2019    BUN 16 07/19/2019    CREATININE 0.8 07/19/2019    GLU 89 07/19/2019    HGBA1C 7.7 (H) 10/15/2019    MG 2.0 11/29/2017    AST 23 07/19/2019    ALT 18 07/19/2019    ALBUMIN 3.6 07/19/2019    PROT 6.9 07/19/2019    BILITOT 0.7 07/19/2019    WBC 6.82 07/01/2019    HGB 12.2 07/01/2019    HCT 36.8 (L) 07/01/2019    MCV 90 07/01/2019     (H) 07/01/2019    INR 1.0 03/08/2018    CHOL 155 07/19/2019    HDL 65 07/19/2019    LDLCALC 77.2 07/19/2019    TRIG 64 07/19/2019     Assessment:      1. Other chest pain    2. Essential hypertension    3. Diastolic dysfunction    4. Hyperlipidemia, mixed    5. Chronic heart failure with preserved ejection fraction        Plan:   1. Chest pain, atypical - improved/resolved - no further episodes  - pharm nuclear stress  test - negative  - 2D echo - diastolic dysfunction    2. HTN   - cont meds  - low salt diet    3. HLD,   - cont statin    4. DM2, HbA1c 7.8 --> 7.7  - cont meds per PCP    5. HFpEF  - pt euvolemic  - cont diuretic  - low salt diet    RTC in 1 year or PRN sooner    Thank you for allowing me to participate in this patient's care. Please do not hesitate to contact me with any questions or concerns. Consult note has been forwarded to the referral physician.

## 2019-11-18 RX ORDER — NAPROXEN 500 MG/1
TABLET ORAL
Qty: 30 TABLET | Refills: 0 | Status: SHIPPED | OUTPATIENT
Start: 2019-11-18 | End: 2020-02-06

## 2019-11-25 ENCOUNTER — TELEPHONE (OUTPATIENT)
Dept: INTERNAL MEDICINE | Facility: CLINIC | Age: 74
End: 2019-11-25

## 2019-11-25 NOTE — TELEPHONE ENCOUNTER
----- Message from Daisy Morse sent at 11/25/2019  9:24 AM CST -----  Contact: Pt  Please give pt a call at .[phone] (home) she is having severe swelling and they are painful.

## 2019-11-25 NOTE — TELEPHONE ENCOUNTER
Spoke with the patient requesting Dr. Herrera to give her a call. Patient stated her pain and swelling hand get worsen and Dr. Herrera told her to have a call but she don't know how to connect directly to Dr. Herrera. Patient advised to come and see a doctor but refused to see other doctor and Dr. Herrera next available on 01/02/20 she refused. Please advise.

## 2019-11-26 ENCOUNTER — TELEPHONE (OUTPATIENT)
Dept: INTERNAL MEDICINE | Facility: CLINIC | Age: 74
End: 2019-11-26

## 2019-11-26 NOTE — TELEPHONE ENCOUNTER
OK to use naproxen rx sent 11/18/19 plus acetaminophen 500 mg (Tylenol ES) two tablets up to TID. Warm soaks.  I can also send her to rheumatology and/or consider course of steroids if tylenol plus ibuprofen for one week not helping.

## 2019-11-26 NOTE — TELEPHONE ENCOUNTER
----- Message from Moni Hope sent at 11/26/2019  2:10 PM CST -----  Contact: [phone]  Would like to consult with the nurse, Patient is returning the nurse call, Please call back [phone], Thanks sj  .Type:  Patient Returning Call    Who Called: Ms Walker  Who Left Message for Patient:Raymundo  Does the patient know what this is regarding?:No  Would the patient rather a call back or a response via [facility]? CallBack  Best Call Back Number:[phone]  Additional Information:

## 2019-11-26 NOTE — TELEPHONE ENCOUNTER
Patient informed rx was sent to her pharmacy. Patient advised taking rx for 1 week and if don't help Dr. Herrera consider her to give an steriod or sent to rheumatology. Patient verbally understand.

## 2019-11-28 ENCOUNTER — HOSPITAL ENCOUNTER (EMERGENCY)
Facility: HOSPITAL | Age: 74
Discharge: HOME | End: 2019-11-28
Attending: EMERGENCY MEDICINE
Payer: MEDICARE

## 2019-11-28 VITALS
TEMPERATURE: 99 F | BODY MASS INDEX: 26.34 KG/M2 | RESPIRATION RATE: 16 BRPM | HEART RATE: 95 BPM | DIASTOLIC BLOOD PRESSURE: 80 MMHG | OXYGEN SATURATION: 99 % | WEIGHT: 148.69 LBS | SYSTOLIC BLOOD PRESSURE: 157 MMHG

## 2019-11-28 DIAGNOSIS — H92.01 RIGHT EAR PAIN: ICD-10-CM

## 2019-11-28 DIAGNOSIS — B02.8 HERPES ZOSTER WITH COMPLICATION: Primary | ICD-10-CM

## 2019-11-28 DIAGNOSIS — R09.81 NASAL CONGESTION: ICD-10-CM

## 2019-11-28 PROCEDURE — 99284 EMERGENCY DEPT VISIT MOD MDM: CPT

## 2019-11-28 RX ORDER — MONTELUKAST SODIUM 10 MG/1
10 TABLET ORAL NIGHTLY
Qty: 10 TABLET | Refills: 0 | Status: SHIPPED | OUTPATIENT
Start: 2019-11-28 | End: 2019-12-05 | Stop reason: SDUPTHER

## 2019-11-28 RX ORDER — VALACYCLOVIR HYDROCHLORIDE 1 G/1
1000 TABLET, FILM COATED ORAL 3 TIMES DAILY
Qty: 21 TABLET | Refills: 0 | Status: SHIPPED | OUTPATIENT
Start: 2019-11-28 | End: 2020-12-14 | Stop reason: ALTCHOICE

## 2019-11-28 RX ORDER — HYDROCORTISONE 1 %
CREAM (GRAM) TOPICAL
Qty: 30 G | Refills: 0 | Status: SHIPPED | OUTPATIENT
Start: 2019-11-28 | End: 2020-12-14

## 2019-11-28 NOTE — ED PROVIDER NOTES
HISTORY     Chief Complaint   Patient presents with    Rash     woke up this morning with rash to back     Review of patient's allergies indicates:   Allergen Reactions    Iodine and iodide containing products Hives        HPI   The history is provided by the patient.   Rash    This is a new problem. The current episode started today. The problem has been unchanged. The problem is associated with nothing. Affected Location: upper back and right chest wall. The pain is at a severity of 3/10. The pain has been constant since onset. Associated symptoms include blisters. She has tried nothing for the symptoms. The treatment provided no relief.    pt also presents with nasal congestion and right ear pain x 3 days. No previous tx    PCP: Gladys Herrera MD     Past Medical History:  Past Medical History:   Diagnosis Date    Arthritis     Diabetes mellitus, type 2     Heartburn     Hyperlipidemia     Hypertension     Left sided sciatica     s/p microdiscectomy 3/21/18        Past Surgical History:  Past Surgical History:   Procedure Laterality Date    BREAST BIOPSY       SECTION      COLONOSCOPY  2008    DR. JANET GARCÍA/MILD DIVERICULOSIS DESCENDING AND SIGMOID. REPEAT 5 YRS    HERNIA REPAIR      MICRODISCECTOMY OF SPINE Left 2018    left L5-S1, Dr Segundo        Family History:  History reviewed. No pertinent family history.     Social History:  Social History     Tobacco Use    Smoking status: Never Smoker    Smokeless tobacco: Never Used   Substance and Sexual Activity    Alcohol use: No    Drug use: No    Sexual activity: Not Currently     Partners: Male         ROS   Review of Systems   Constitutional: Negative for fever.   HENT: Positive for congestion and ear pain (right). Negative for sore throat.    Respiratory: Negative for shortness of breath.    Cardiovascular: Negative for chest pain.   Gastrointestinal: Negative for nausea.   Genitourinary: Negative for  dysuria.   Musculoskeletal: Negative for back pain.   Skin: Positive for rash.   Neurological: Negative for dizziness and weakness.   Hematological: Does not bruise/bleed easily.       PHYSICAL EXAM     Initial Vitals [11/28/19 1130]   BP Pulse Resp Temp SpO2   (!) 157/80 95 16 98.6 °F (37 °C) 99 %      MAP       --           Physical Exam    Nursing note and vitals reviewed.  Constitutional: She appears well-developed and well-nourished. She is not diaphoretic. No distress.   HENT:   Head: Normocephalic and atraumatic.   Left Ear: External ear normal.   Mouth/Throat: Oropharynx is clear and moist. No oropharyngeal exudate.   Right ear tm bulging without erythema or discharge. Tm intact   Eyes: Conjunctivae are normal. Right eye exhibits no discharge. Left eye exhibits no discharge.   Neck: Normal range of motion. Neck supple.   Cardiovascular: Normal rate, regular rhythm and normal heart sounds.   Pulmonary/Chest: Breath sounds normal. No respiratory distress. She has no wheezes. She has no rhonchi. She has no rales.   Abdominal: Soft. Bowel sounds are normal. She exhibits no distension. There is no tenderness. There is no rebound and no guarding.   Musculoskeletal: Normal range of motion.   Neurological: She is alert and oriented to person, place, and time. She has normal strength.   Skin: Skin is warm and dry.        Psychiatric: She has a normal mood and affect. Her behavior is normal. Thought content normal.          ED COURSE   Procedures  ED ONGOING VITALS:  Vitals:    11/28/19 1130   BP: (!) 157/80   Pulse: 95   Resp: 16   Temp: 98.6 °F (37 °C)   TempSrc: Oral   SpO2: 99%   Weight: 67.4 kg (148 lb 11.2 oz)         ABNORMAL LAB VALUES:  Labs Reviewed - No data to display      ALL LAB VALUES:  none      RADIOLOGY STUDIES:  Imaging Results    None                   The above vital signs and test results have been reviewed by the emergency provider.     ED Medications:  Current Discharge Medication List       START taking these medications    Details   hydrocortisone 1 % cream Apply to affected area 2 times daily for 5 days  Qty: 30 g, Refills: 0      montelukast (SINGULAIR) 10 mg tablet Take 1 tablet (10 mg total) by mouth every evening. for 10 days  Qty: 10 tablet, Refills: 0      valACYclovir (VALTREX) 1000 MG tablet Take 1 tablet (1,000 mg total) by mouth 3 (three) times daily. for 7 days  Qty: 21 tablet, Refills: 0           Discharge Medications:  New Prescriptions    HYDROCORTISONE 1 % CREAM    Apply to affected area 2 times daily for 5 days    MONTELUKAST (SINGULAIR) 10 MG TABLET    Take 1 tablet (10 mg total) by mouth every evening. for 10 days    VALACYCLOVIR (VALTREX) 1000 MG TABLET    Take 1 tablet (1,000 mg total) by mouth 3 (three) times daily. for 7 days      Follow-up Information     Gladys Herrera MD.    Specialty:  Family Medicine  Contact information:  [address] [place] [address] [address] [place] [address] [place] [id number] [phone]                  11:50 AM    I discussed with patient and/or family/caretaker that evaluation in the ED does not suggest any emergent or life threatening medical conditions requiring immediate intervention beyond what was provided in the ED, and I believe patient is safe for discharge. Regardless, an unremarkable evaluation in the ED does not preclude the development or presence of a serious or life threatening condition. As such, patient was instructed to return immediately for any worsening or change in current symptoms.    Pre-hypertension/Hypertension: The pt has been informed that they may have pre-hypertension or hypertension based on a blood pressure reading in the ED. I recommend that the pt call the PCP listed on their discharge instructions or a physician of their choice this week to arrange f/u for further evaluation of possible pre-hypertension or hypertension.       MEDICAL DECISION MAKING                 CLINICAL IMPRESSION       ICD-10-CM ICD-9-CM   1. Herpes zoster with complication  B02.8 053.8   2. Nasal congestion R09.81 478.19   3. Right ear pain H92.01 388.70               Brent Tim NP  11/28/19 1153

## 2019-11-29 ENCOUNTER — TELEPHONE (OUTPATIENT)
Dept: INTERNAL MEDICINE | Facility: CLINIC | Age: 74
End: 2019-11-29

## 2019-11-29 NOTE — TELEPHONE ENCOUNTER
----- Message from Kj Lopes sent at 11/29/2019  9:19 AM CST -----  Contact: self [phone]  Pt would like to return call from nurse regarding advice for Shingles; pt states she went to ER on yesterday. Please call back at [phone].   Yvette Lynn

## 2019-11-29 NOTE — TELEPHONE ENCOUNTER
Creams may not be very effective - take the Valacyclovir as directed.  She was on gabapentin in the past - actually that can be helpful. She can start back on 300 mg gabapentin at bedtime. It may make her groggy the next day for a day or two.   Does she still have that medication or does she want me to send a refill? Which pharmacy if RF needed?

## 2019-11-29 NOTE — TELEPHONE ENCOUNTER
Spoke with pt, states that she will try to take the Gabapentin and see how it works, states that if she needs refills she will call back.

## 2019-11-29 NOTE — TELEPHONE ENCOUNTER
Pt states that she has shingles and was given a hydrocortisone 1%  cream and Valtrex. States that she is still burning, wanted to know if that is the best cream for it. Please advise.

## 2019-12-05 ENCOUNTER — OFFICE VISIT (OUTPATIENT)
Dept: INTERNAL MEDICINE | Facility: CLINIC | Age: 74
End: 2019-12-05
Payer: MEDICARE

## 2019-12-05 VITALS
OXYGEN SATURATION: 97 % | DIASTOLIC BLOOD PRESSURE: 74 MMHG | HEART RATE: 89 BPM | WEIGHT: 144.06 LBS | TEMPERATURE: 99 F | BODY MASS INDEX: 25.52 KG/M2 | SYSTOLIC BLOOD PRESSURE: 134 MMHG

## 2019-12-05 DIAGNOSIS — B02.9 HERPES ZOSTER WITHOUT COMPLICATION: Primary | ICD-10-CM

## 2019-12-05 DIAGNOSIS — R20.2 PARESTHESIA OF LEFT FOOT: ICD-10-CM

## 2019-12-05 DIAGNOSIS — J30.89 ENVIRONMENTAL AND SEASONAL ALLERGIES: ICD-10-CM

## 2019-12-05 DIAGNOSIS — R05.9 COUGH: ICD-10-CM

## 2019-12-05 DIAGNOSIS — Z12.11 SCREENING FOR COLON CANCER: ICD-10-CM

## 2019-12-05 PROCEDURE — 3078F DIAST BP <80 MM HG: CPT | Mod: CPTII,S$GLB,, | Performed by: FAMILY MEDICINE

## 2019-12-05 PROCEDURE — 99999 PR PBB SHADOW E&M-EST. PATIENT-LVL III: CPT | Mod: PBBFAC,,, | Performed by: FAMILY MEDICINE

## 2019-12-05 PROCEDURE — 1125F PR PAIN SEVERITY QUANTIFIED, PAIN PRESENT: ICD-10-PCS | Mod: S$GLB,,, | Performed by: FAMILY MEDICINE

## 2019-12-05 PROCEDURE — 1101F PT FALLS ASSESS-DOCD LE1/YR: CPT | Mod: CPTII,S$GLB,, | Performed by: FAMILY MEDICINE

## 2019-12-05 PROCEDURE — 3075F SYST BP GE 130 - 139MM HG: CPT | Mod: CPTII,S$GLB,, | Performed by: FAMILY MEDICINE

## 2019-12-05 PROCEDURE — 3078F PR MOST RECENT DIASTOLIC BLOOD PRESSURE < 80 MM HG: ICD-10-PCS | Mod: CPTII,S$GLB,, | Performed by: FAMILY MEDICINE

## 2019-12-05 PROCEDURE — 99213 OFFICE O/P EST LOW 20 MIN: CPT | Mod: S$GLB,,, | Performed by: FAMILY MEDICINE

## 2019-12-05 PROCEDURE — 99213 PR OFFICE/OUTPT VISIT, EST, LEVL III, 20-29 MIN: ICD-10-PCS | Mod: S$GLB,,, | Performed by: FAMILY MEDICINE

## 2019-12-05 PROCEDURE — 1159F PR MEDICATION LIST DOCUMENTED IN MEDICAL RECORD: ICD-10-PCS | Mod: S$GLB,,, | Performed by: FAMILY MEDICINE

## 2019-12-05 PROCEDURE — 99999 PR PBB SHADOW E&M-EST. PATIENT-LVL III: ICD-10-PCS | Mod: PBBFAC,,, | Performed by: FAMILY MEDICINE

## 2019-12-05 PROCEDURE — 1125F AMNT PAIN NOTED PAIN PRSNT: CPT | Mod: S$GLB,,, | Performed by: FAMILY MEDICINE

## 2019-12-05 PROCEDURE — 1101F PR PT FALLS ASSESS DOC 0-1 FALLS W/OUT INJ PAST YR: ICD-10-PCS | Mod: CPTII,S$GLB,, | Performed by: FAMILY MEDICINE

## 2019-12-05 PROCEDURE — 1159F MED LIST DOCD IN RCRD: CPT | Mod: S$GLB,,, | Performed by: FAMILY MEDICINE

## 2019-12-05 PROCEDURE — 3075F PR MOST RECENT SYSTOLIC BLOOD PRESS GE 130-139MM HG: ICD-10-PCS | Mod: CPTII,S$GLB,, | Performed by: FAMILY MEDICINE

## 2019-12-05 RX ORDER — MUPIROCIN 20 MG/G
OINTMENT TOPICAL 2 TIMES DAILY
Qty: 1 TUBE | Refills: 0 | Status: SHIPPED | OUTPATIENT
Start: 2019-12-05 | End: 2020-12-14 | Stop reason: ALTCHOICE

## 2019-12-05 RX ORDER — MONTELUKAST SODIUM 10 MG/1
10 TABLET ORAL NIGHTLY
Qty: 30 TABLET | Refills: 5 | OUTPATIENT
Start: 2019-12-05 | End: 2021-01-08 | Stop reason: SDUPTHER

## 2019-12-05 RX ORDER — ALBUTEROL SULFATE 90 UG/1
2 AEROSOL, METERED RESPIRATORY (INHALATION) EVERY 6 HOURS PRN
Qty: 18 G | Refills: 0 | Status: SHIPPED | OUTPATIENT
Start: 2019-12-05 | End: 2020-01-02

## 2019-12-05 RX ORDER — GABAPENTIN 100 MG/1
CAPSULE ORAL
Qty: 120 CAPSULE | Refills: 2 | Status: SHIPPED | OUTPATIENT
Start: 2019-12-05 | End: 2020-02-06

## 2019-12-05 NOTE — PROGRESS NOTES
Subjective:       Patient ID: Indira Walker is a 74 y.o. female.    Chief Complaint: Herpes Zoster (localized to right breast and upper back. 8th day, ER visit on 12/28 )    Here for ED f/u from a week ago with bad case of shingles to right chest wall. She sts he came up suddenly with a very dense vesicles.  She shows me a picture on her iPhone.  Has finished a 1 week course of Valtrex.  We had recommended over the phone that she take gabapentin which nightly, she already has prescribed.    She was caring for a 8 yo who was dxdwith flu.  This was couple weeks ago.  She has been having upper respiratory symptoms since then but no discrete illness.    HM reviewed.    Review of Systems   HENT: Positive for rhinorrhea.    Respiratory: Positive for cough.    Skin: Positive for rash.   Neurological: Negative for numbness.       Objective:      Physical Exam   Constitutional: She is oriented to person, place, and time. She appears well-developed.   HENT:   Head: Normocephalic and atraumatic.   Cardiovascular: Normal rate, regular rhythm and normal heart sounds.   Pulmonary/Chest: Effort normal and breath sounds normal. No respiratory distress. She has no wheezes.   Neurological: She is alert and oriented to person, place, and time.   Skin: Skin is warm and dry.   Healing vesiculo- papular lesions to right thorax extending from spine to anterior midline running along lower breast.  Some areas on the underside of breast have denuded skin.   Psychiatric: She has a normal mood and affect. Her behavior is normal.         Assessment/Plan:     1. Herpes zoster without complication  mupirocin (BACTROBAN) 2 % ointment   2. Screening for colon cancer  Fecal Immunochemical Test (iFOBT)   3. Environmental and seasonal allergies  montelukast (SINGULAIR) 10 mg tablet   4. Paresthesia of left foot  gabapentin (NEURONTIN) 100 MG capsule   5. Cough  albuterol (PROVENTIL/VENTOLIN HFA) 90 mcg/actuation inhaler     ABBY - signed and held -  she will make appt with her ophthal -hold Nilesh to late December.  rec increase use of gabapentin by continuing 300 at night and tapering up with 100mg dose to 1-2 AM and noon.

## 2019-12-05 NOTE — PROGRESS NOTES
[employer] was given to patient on 12/5/2019 3:22 PM, instructed on how to do the test and when and where to return it, Verbalized understanding.

## 2019-12-06 ENCOUNTER — TELEPHONE (OUTPATIENT)
Dept: INTERNAL MEDICINE | Facility: CLINIC | Age: 74
End: 2019-12-06

## 2019-12-06 DIAGNOSIS — J30.89 ENVIRONMENTAL AND SEASONAL ALLERGIES: ICD-10-CM

## 2019-12-06 NOTE — TELEPHONE ENCOUNTER
----- Message from Debbie Bonner sent at 12/6/2019 10:30 AM CST -----  Contact: PATIENT  CALLING CONCERNING AN OVER-SITE RX RENEWAL OF MONTELUKAST 10 MG @ 1 PILL DAILY. PLEASE CALL PATIENT @ [phone]. THANKS, [facility]LL[facility]/pharmacy #[id number] - [place] - [address] AT [facility]  [address] [place] [address]  Phone: [phone] Fax: [phone]

## 2019-12-10 NOTE — TELEPHONE ENCOUNTER
----- Message from Daniel Song sent at 12/10/2019 10:31 AM CST -----  Type:  RX Refill Request    Who Called:  Indira Walker  Refill or New Rx: refill  RX Name and Strength: traMADol (ULTRAM) 50 mg tablet  How is the patient currently taking it?1X6 hours as needed  Is this a 30 day or 90 day RX:   Preferred Pharmacy with phone number:   [facility]/pharmacy #[id number] - [place] - [address]  [address] [address]  Phone: [phone] Fax: [phone]  Local or Mail Order: local  Ordering Provider: Sharon  Would the patient rather a call back or a response via [facility]?  Call back  Best Call Back Number: [phone]  Additional Information:

## 2019-12-10 NOTE — TELEPHONE ENCOUNTER
Spoke with the patient. Patient requesting a prescription refill that was prescribed by a nurse practicioner from [facility] emergency department. Patient stated it work good in her nerve pain. Patient last OV 12/05/19. Please advise.

## 2019-12-11 ENCOUNTER — TELEPHONE (OUTPATIENT)
Dept: INTERNAL MEDICINE | Facility: CLINIC | Age: 74
End: 2019-12-11

## 2019-12-11 DIAGNOSIS — R20.2 PARESTHESIA OF LEFT FOOT: ICD-10-CM

## 2019-12-11 RX ORDER — GABAPENTIN 300 MG/1
300 CAPSULE ORAL NIGHTLY
Qty: 90 CAPSULE | Refills: 3 | Status: SHIPPED | OUTPATIENT
Start: 2019-12-11 | End: 2020-02-06

## 2019-12-11 RX ORDER — TRAMADOL HYDROCHLORIDE 50 MG/1
50 TABLET ORAL EVERY 8 HOURS PRN
Qty: 20 TABLET | Refills: 0 | Status: SHIPPED | OUTPATIENT
Start: 2019-12-11 | End: 2020-12-14

## 2019-12-11 NOTE — TELEPHONE ENCOUNTER
----- Message from Kalpana Martell sent at 12/11/2019  7:53 AM CST -----  Contact: Patient  Patient is returning a call, please call her back at [phone]. Thank you

## 2019-12-11 NOTE — TELEPHONE ENCOUNTER
Attempted to call pt. Left voice message.//TT  
ADHD    Depression    Gall stone    Hypercholesteremia

## 2019-12-11 NOTE — TELEPHONE ENCOUNTER
----- Message from Leyla Lang sent at 12/11/2019  8:55 AM CST -----  Contact: pt   She's calling in regards to missed call ,pls call pt back at [phone] (home)

## 2019-12-11 NOTE — TELEPHONE ENCOUNTER
Pt with herpes zoster. rx sent. Will not be refilling this rx without seeing her again - has appt 12/24/19.

## 2019-12-24 ENCOUNTER — OFFICE VISIT (OUTPATIENT)
Dept: INTERNAL MEDICINE | Facility: CLINIC | Age: 74
End: 2019-12-24
Payer: MEDICARE

## 2019-12-24 VITALS
BODY MASS INDEX: 25.76 KG/M2 | WEIGHT: 145.38 LBS | SYSTOLIC BLOOD PRESSURE: 111 MMHG | DIASTOLIC BLOOD PRESSURE: 79 MMHG | OXYGEN SATURATION: 100 % | TEMPERATURE: 99 F | HEIGHT: 63 IN | HEART RATE: 82 BPM

## 2019-12-24 DIAGNOSIS — B02.9 HERPES ZOSTER WITHOUT COMPLICATION: Primary | ICD-10-CM

## 2019-12-24 PROCEDURE — 3074F PR MOST RECENT SYSTOLIC BLOOD PRESSURE < 130 MM HG: ICD-10-PCS | Mod: CPTII,S$GLB,, | Performed by: FAMILY MEDICINE

## 2019-12-24 PROCEDURE — 99213 PR OFFICE/OUTPT VISIT, EST, LEVL III, 20-29 MIN: ICD-10-PCS | Mod: S$GLB,,, | Performed by: FAMILY MEDICINE

## 2019-12-24 PROCEDURE — 1159F MED LIST DOCD IN RCRD: CPT | Mod: S$GLB,,, | Performed by: FAMILY MEDICINE

## 2019-12-24 PROCEDURE — 99999 PR PBB SHADOW E&M-EST. PATIENT-LVL III: CPT | Mod: PBBFAC,,, | Performed by: FAMILY MEDICINE

## 2019-12-24 PROCEDURE — 99999 PR PBB SHADOW E&M-EST. PATIENT-LVL III: ICD-10-PCS | Mod: PBBFAC,,, | Performed by: FAMILY MEDICINE

## 2019-12-24 PROCEDURE — 1125F AMNT PAIN NOTED PAIN PRSNT: CPT | Mod: S$GLB,,, | Performed by: FAMILY MEDICINE

## 2019-12-24 PROCEDURE — 3078F DIAST BP <80 MM HG: CPT | Mod: CPTII,S$GLB,, | Performed by: FAMILY MEDICINE

## 2019-12-24 PROCEDURE — 3078F PR MOST RECENT DIASTOLIC BLOOD PRESSURE < 80 MM HG: ICD-10-PCS | Mod: CPTII,S$GLB,, | Performed by: FAMILY MEDICINE

## 2019-12-24 PROCEDURE — 3074F SYST BP LT 130 MM HG: CPT | Mod: CPTII,S$GLB,, | Performed by: FAMILY MEDICINE

## 2019-12-24 PROCEDURE — 1101F PR PT FALLS ASSESS DOC 0-1 FALLS W/OUT INJ PAST YR: ICD-10-PCS | Mod: CPTII,S$GLB,, | Performed by: FAMILY MEDICINE

## 2019-12-24 PROCEDURE — 99213 OFFICE O/P EST LOW 20 MIN: CPT | Mod: S$GLB,,, | Performed by: FAMILY MEDICINE

## 2019-12-24 PROCEDURE — 1159F PR MEDICATION LIST DOCUMENTED IN MEDICAL RECORD: ICD-10-PCS | Mod: S$GLB,,, | Performed by: FAMILY MEDICINE

## 2019-12-24 PROCEDURE — 1101F PT FALLS ASSESS-DOCD LE1/YR: CPT | Mod: CPTII,S$GLB,, | Performed by: FAMILY MEDICINE

## 2019-12-24 PROCEDURE — 1125F PR PAIN SEVERITY QUANTIFIED, PAIN PRESENT: ICD-10-PCS | Mod: S$GLB,,, | Performed by: FAMILY MEDICINE

## 2019-12-24 NOTE — PROGRESS NOTES
"Subjective:       Patient ID: Indira Walker is a 74 y.o. female.    Chief Complaint: shingles follow up and Medication Refill    Here for monitoring her shingles episode.  We had increased her gabapentin at her last visit approximately 2 weeks ago. She states she did take it some but now is taking just benadryl OTC and 2 ES tylenol and is able to sleep. States the rash is drying up - had used mupirocin to the rash and is out.  Last tramadol - weeks ago - made her nauseated. Does not want to add to her allergies - not sure that was the only contributor. She last took gabapentin 8-10 days ago - she tried using the 100 mg AM and noon and 300 at night only few times - may have worked her pain is just a lot less.    Review of Systems   Neurological: Negative for numbness.   Psychiatric/Behavioral: Negative for sleep disturbance.       Objective:      Physical Exam   Constitutional: She is oriented to person, place, and time. She appears well-developed.   HENT:   Head: Normocephalic and atraumatic.   Cardiovascular: Normal rate, regular rhythm and normal heart sounds.   Pulmonary/Chest: Effort normal and breath sounds normal.   Neurological: She is alert and oriented to person, place, and time.   Skin: Skin is warm and dry.   Psychiatric: She has a normal mood and affect. Her behavior is normal.         Assessment/Plan:     1. Herpes zoster without complication     continue with current OTC med regimen at night. If pain gets worse in interim - she can use 100 mg gabapentin TID.  Has appt scheduled for Feb for DM recheck.  Questions answered about shingrix. Do not recommend it for at least a year - usually wait 3 yrs. Her "brother had shingles 4 x in a year" and was given the shot series.  Also note ab MRI never obtained ordered July - she has claustrophobia fears. She has xanax. Advised to schedule MRI and take the lorazepam beforehand.  More than 50% of visit was spent in counseling regarding options, " recommendations, medication use, side effects, expectations, and precautions.  Total time spent face-to-face was 15 minutes.

## 2020-01-01 DIAGNOSIS — R05.9 COUGH: ICD-10-CM

## 2020-01-02 RX ORDER — ALBUTEROL SULFATE 90 UG/1
2 AEROSOL, METERED RESPIRATORY (INHALATION) EVERY 6 HOURS PRN
Qty: 18 INHALER | Refills: 0 | Status: SHIPPED | OUTPATIENT
Start: 2020-01-02 | End: 2021-09-27 | Stop reason: SDUPTHER

## 2020-01-03 ENCOUNTER — TELEPHONE (OUTPATIENT)
Dept: INTERNAL MEDICINE | Facility: CLINIC | Age: 75
End: 2020-01-03

## 2020-01-03 NOTE — TELEPHONE ENCOUNTER
----- Message from Lady Guevara sent at 1/3/2020  2:44 PM CST -----  Contact: Patient   Patient miss her call and would like another call back at [phone].    Thanks  Td

## 2020-01-03 NOTE — TELEPHONE ENCOUNTER
Attempt to call the patient to clarify the rx refill request for nausea. No answer. Left message to call the clinic back.

## 2020-01-03 NOTE — TELEPHONE ENCOUNTER
----- Message from Daisy Morse sent at 1/3/2020  8:28 AM CST -----  Contact: Pt   Caller is calling to get a refill on her nausea medication. Please give pt a call at .[phone] (home)               ...  [id number] [place] - [address] AT [facility]  [address] [place] [address]  Phone: [phone] Fax: [phone]

## 2020-01-08 NOTE — TELEPHONE ENCOUNTER
Patient asking an order for a blood work before seen by Dr. Herrera. Patient has informed nurse visit for blood work already scheduled. Patient verbally understood.

## 2020-01-09 ENCOUNTER — LAB VISIT (OUTPATIENT)
Dept: LAB | Facility: HOSPITAL | Age: 75
End: 2020-01-09
Payer: MEDICARE

## 2020-01-09 DIAGNOSIS — Z12.11 SCREENING FOR COLON CANCER: ICD-10-CM

## 2020-01-09 PROCEDURE — 82274 ASSAY TEST FOR BLOOD FECAL: CPT

## 2020-01-10 RX ORDER — ONDANSETRON 4 MG/1
4 TABLET, ORALLY DISINTEGRATING ORAL EVERY 8 HOURS PRN
Qty: 10 TABLET | Refills: 0 | Status: SHIPPED | OUTPATIENT
Start: 2020-01-10 | End: 2020-10-16 | Stop reason: SDUPTHER

## 2020-01-10 NOTE — TELEPHONE ENCOUNTER
Patient has inform rx was refilled today and sent to her local pharmacy. Patient verbally understood.

## 2020-01-16 LAB — HEMOCCULT STL QL IA: NEGATIVE

## 2020-01-23 ENCOUNTER — TELEPHONE (OUTPATIENT)
Dept: INTERNAL MEDICINE | Facility: CLINIC | Age: 75
End: 2020-01-23

## 2020-01-23 DIAGNOSIS — K21.9 GASTROESOPHAGEAL REFLUX DISEASE, ESOPHAGITIS PRESENCE NOT SPECIFIED: Primary | ICD-10-CM

## 2020-01-23 RX ORDER — FAMOTIDINE 40 MG/1
40 TABLET, FILM COATED ORAL DAILY
Qty: 30 TABLET | Refills: 11 | Status: SHIPPED | OUTPATIENT
Start: 2020-01-23 | End: 2021-02-17

## 2020-01-23 NOTE — TELEPHONE ENCOUNTER
Called the patient to advise that prescription was sent to the pharmacy. Received no answer.  Left a message.

## 2020-01-23 NOTE — TELEPHONE ENCOUNTER
Patient requesting an alternative for ZANTAC. Patient says ZANTAC is on recall. Please advise.    SEND TO:  [id number] - [place] - [address] AT AT [facility]

## 2020-01-23 NOTE — TELEPHONE ENCOUNTER
----- Message from Jackie Adorno sent at 1/23/2020 10:45 AM CST -----  Contact: patient  Would like to consult with nurse regarding Ranitidine 150mg medication. Patient stated she would like to change her medication being that the one she is on is now recalled. Please call back at [phone]

## 2020-02-04 ENCOUNTER — PATIENT OUTREACH (OUTPATIENT)
Dept: ADMINISTRATIVE | Facility: HOSPITAL | Age: 75
End: 2020-02-04

## 2020-02-06 ENCOUNTER — OFFICE VISIT (OUTPATIENT)
Dept: INTERNAL MEDICINE | Facility: CLINIC | Age: 75
End: 2020-02-06
Payer: MEDICARE

## 2020-02-06 VITALS
HEART RATE: 64 BPM | TEMPERATURE: 98 F | BODY MASS INDEX: 25.86 KG/M2 | DIASTOLIC BLOOD PRESSURE: 72 MMHG | WEIGHT: 145.94 LBS | HEIGHT: 63 IN | SYSTOLIC BLOOD PRESSURE: 136 MMHG | OXYGEN SATURATION: 99 %

## 2020-02-06 DIAGNOSIS — M19.042 PRIMARY OSTEOARTHRITIS OF BOTH HANDS: Primary | ICD-10-CM

## 2020-02-06 DIAGNOSIS — I10 HYPERTENSION, ESSENTIAL: ICD-10-CM

## 2020-02-06 DIAGNOSIS — I50.32 CHRONIC HEART FAILURE WITH PRESERVED EJECTION FRACTION: ICD-10-CM

## 2020-02-06 DIAGNOSIS — M19.041 PRIMARY OSTEOARTHRITIS OF BOTH HANDS: Primary | ICD-10-CM

## 2020-02-06 PROCEDURE — 1101F PT FALLS ASSESS-DOCD LE1/YR: CPT | Mod: CPTII,S$GLB,, | Performed by: FAMILY MEDICINE

## 2020-02-06 PROCEDURE — 1101F PR PT FALLS ASSESS DOC 0-1 FALLS W/OUT INJ PAST YR: ICD-10-PCS | Mod: CPTII,S$GLB,, | Performed by: FAMILY MEDICINE

## 2020-02-06 PROCEDURE — 99499 RISK ADDL DX/OHS AUDIT: ICD-10-PCS | Mod: S$GLB,,, | Performed by: FAMILY MEDICINE

## 2020-02-06 PROCEDURE — 1125F PR PAIN SEVERITY QUANTIFIED, PAIN PRESENT: ICD-10-PCS | Mod: S$GLB,,, | Performed by: FAMILY MEDICINE

## 2020-02-06 PROCEDURE — 3078F DIAST BP <80 MM HG: CPT | Mod: CPTII,S$GLB,, | Performed by: FAMILY MEDICINE

## 2020-02-06 PROCEDURE — 99499 UNLISTED E&M SERVICE: CPT | Mod: S$GLB,,, | Performed by: FAMILY MEDICINE

## 2020-02-06 PROCEDURE — 3051F PR MOST RECENT HEMOGLOBIN A1C LEVEL 7.0 - < 8.0%: ICD-10-PCS | Mod: CPTII,S$GLB,, | Performed by: FAMILY MEDICINE

## 2020-02-06 PROCEDURE — 3051F HG A1C>EQUAL 7.0%<8.0%: CPT | Mod: CPTII,S$GLB,, | Performed by: FAMILY MEDICINE

## 2020-02-06 PROCEDURE — 99999 PR PBB SHADOW E&M-EST. PATIENT-LVL III: ICD-10-PCS | Mod: PBBFAC,,, | Performed by: FAMILY MEDICINE

## 2020-02-06 PROCEDURE — 3075F PR MOST RECENT SYSTOLIC BLOOD PRESS GE 130-139MM HG: ICD-10-PCS | Mod: CPTII,S$GLB,, | Performed by: FAMILY MEDICINE

## 2020-02-06 PROCEDURE — 99999 PR PBB SHADOW E&M-EST. PATIENT-LVL III: CPT | Mod: PBBFAC,,, | Performed by: FAMILY MEDICINE

## 2020-02-06 PROCEDURE — 99214 OFFICE O/P EST MOD 30 MIN: CPT | Mod: S$GLB,,, | Performed by: FAMILY MEDICINE

## 2020-02-06 PROCEDURE — 1159F PR MEDICATION LIST DOCUMENTED IN MEDICAL RECORD: ICD-10-PCS | Mod: S$GLB,,, | Performed by: FAMILY MEDICINE

## 2020-02-06 PROCEDURE — 3078F PR MOST RECENT DIASTOLIC BLOOD PRESSURE < 80 MM HG: ICD-10-PCS | Mod: CPTII,S$GLB,, | Performed by: FAMILY MEDICINE

## 2020-02-06 PROCEDURE — 3075F SYST BP GE 130 - 139MM HG: CPT | Mod: CPTII,S$GLB,, | Performed by: FAMILY MEDICINE

## 2020-02-06 PROCEDURE — 1125F AMNT PAIN NOTED PAIN PRSNT: CPT | Mod: S$GLB,,, | Performed by: FAMILY MEDICINE

## 2020-02-06 PROCEDURE — 99214 PR OFFICE/OUTPT VISIT, EST, LEVL IV, 30-39 MIN: ICD-10-PCS | Mod: S$GLB,,, | Performed by: FAMILY MEDICINE

## 2020-02-06 PROCEDURE — 1159F MED LIST DOCD IN RCRD: CPT | Mod: S$GLB,,, | Performed by: FAMILY MEDICINE

## 2020-02-06 NOTE — PROGRESS NOTES
Subjective:       Patient ID: Indira Walker is a 74 y.o. female.    Chief Complaint: Hand Pain    Medical history includes hypertension uncontrolled diabetes chronic heart failure.  She reports her home blood sugars fasting have been 107-140.  She is on metformin and tresiba.  She denies headache chest pain palpitations shortness of breath or edema. She is concerned about increased pain in both hands.  She reports she has osteoarthritis.  She has had discomfort intermittently in her shoulders hands wrist and knees.  Her last A1c was elevated at 7.7.  She reports that naproxen has not helped her.  She currently is having increased pain and swelling of the right hand 2nd 3rd MCP joints.  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   Respiratory: Negative for cough, chest tightness, shortness of breath and wheezing.    Cardiovascular: Negative for chest pain, palpitations and leg swelling.        Denies lightheadedness   Endocrine: Negative for polydipsia and polyuria.   Musculoskeletal: Positive for arthralgias.   Neurological: Negative for dizziness, weakness, light-headedness, numbness and headaches.       Objective:      Physical Exam   Constitutional: She is oriented to person, place, and time. She appears well-developed and well-nourished. No distress.   Cardiovascular: Normal rate and regular rhythm.   Pulmonary/Chest: Effort normal and breath sounds normal.   Musculoskeletal: She exhibits tenderness and deformity.   She has moderate swelling tenderness 2nd and 3rd MCP joints right hand   Neurological: She is alert and oriented to person, place, and time.   Skin: She is not diaphoretic.       Lab Visit on 01/09/2020   Component Date Value Ref Range Status    Fecal Immunochemical Test (iFOBT) 01/16/2020 Negative  Negative Final     Assessment:       1.  Primary osteoarthritis of both hands        Plan:     Mild to moderate degen joint disease current with acute flare up in the right hand.  Naproxen did not help.  Concerned about using systemic steroid with uncontrolled diabetes.  She has requested x-ray of her hands which is being done.  Rheumatology referral.  She is scheduled for routine follow-up with Dr. Herrera in several weeks.  Primary osteoarthritis of both hands  -     X-Ray Hand Complete Left; Future; Expected date: 02/06/2020  -     X-Ray Hand Complete Right; Future; Expected date: 02/06/2020  -     Ambulatory referral/consult to Rheumatology; Future; Expected date: 02/13/2020

## 2020-02-07 ENCOUNTER — HOSPITAL ENCOUNTER (OUTPATIENT)
Dept: RADIOLOGY | Facility: HOSPITAL | Age: 75
Discharge: HOME | End: 2020-02-07
Attending: FAMILY MEDICINE
Payer: MEDICARE

## 2020-02-07 DIAGNOSIS — M19.042 PRIMARY OSTEOARTHRITIS OF BOTH HANDS: ICD-10-CM

## 2020-02-07 DIAGNOSIS — M19.041 PRIMARY OSTEOARTHRITIS OF BOTH HANDS: ICD-10-CM

## 2020-02-07 PROCEDURE — 73130 XR HAND COMPLETE 3 VIEWS BILATERAL: ICD-10-PCS | Mod: 26,50,, | Performed by: RADIOLOGY

## 2020-02-07 PROCEDURE — 73130 X-RAY EXAM OF HAND: CPT | Mod: TC,50

## 2020-02-07 PROCEDURE — 73130 X-RAY EXAM OF HAND: CPT | Mod: 26,50,, | Performed by: RADIOLOGY

## 2020-02-11 ENCOUNTER — OFFICE VISIT (OUTPATIENT)
Dept: RHEUMATOLOGY | Facility: CLINIC | Age: 75
End: 2020-02-11
Payer: MEDICARE

## 2020-02-11 ENCOUNTER — LAB VISIT (OUTPATIENT)
Dept: LAB | Facility: HOSPITAL | Age: 75
End: 2020-02-11
Attending: INTERNAL MEDICINE
Payer: MEDICARE

## 2020-02-11 VITALS
BODY MASS INDEX: 25.7 KG/M2 | DIASTOLIC BLOOD PRESSURE: 72 MMHG | HEIGHT: 63 IN | SYSTOLIC BLOOD PRESSURE: 134 MMHG | HEART RATE: 86 BPM | WEIGHT: 145.06 LBS

## 2020-02-11 DIAGNOSIS — R53.82 CHRONIC FATIGUE: ICD-10-CM

## 2020-02-11 DIAGNOSIS — M19.90 INFLAMMATORY ARTHRITIS: ICD-10-CM

## 2020-02-11 DIAGNOSIS — M19.042 PRIMARY OSTEOARTHRITIS OF BOTH HANDS: ICD-10-CM

## 2020-02-11 DIAGNOSIS — Z71.89 COUNSELING ON HEALTH PROMOTION AND DISEASE PREVENTION: ICD-10-CM

## 2020-02-11 DIAGNOSIS — M19.90 INFLAMMATORY ARTHRITIS: Primary | ICD-10-CM

## 2020-02-11 DIAGNOSIS — M19.041 PRIMARY OSTEOARTHRITIS OF BOTH HANDS: ICD-10-CM

## 2020-02-11 LAB
ALBUMIN SERPL BCP-MCNC: 3.7 G/DL (ref 3.5–5.2)
ALP SERPL-CCNC: 54 U/L (ref 55–135)
ALT SERPL W/O P-5'-P-CCNC: 17 U/L (ref 10–44)
ANION GAP SERPL CALC-SCNC: 8 MMOL/L (ref 8–16)
AST SERPL-CCNC: 19 U/L (ref 10–40)
BASOPHILS # BLD AUTO: 0.03 K/UL (ref 0–0.2)
BASOPHILS NFR BLD: 0.4 % (ref 0–1.9)
BILIRUB SERPL-MCNC: 0.7 MG/DL (ref 0.1–1)
BUN SERPL-MCNC: 13 MG/DL (ref 8–23)
CALCIUM SERPL-MCNC: 9.6 MG/DL (ref 8.7–10.5)
CCP AB SER IA-ACNC: 725.5 U/ML
CHLORIDE SERPL-SCNC: 102 MMOL/L (ref 95–110)
CO2 SERPL-SCNC: 29 MMOL/L (ref 23–29)
CREAT SERPL-MCNC: 0.9 MG/DL (ref 0.5–1.4)
CRP SERPL-MCNC: 3.9 MG/L (ref 0–8.2)
DIFFERENTIAL METHOD: ABNORMAL
EOSINOPHIL # BLD AUTO: 0.1 K/UL (ref 0–0.5)
EOSINOPHIL NFR BLD: 2.1 % (ref 0–8)
ERYTHROCYTE [DISTWIDTH] IN BLOOD BY AUTOMATED COUNT: 14.4 % (ref 11.5–14.5)
ERYTHROCYTE [SEDIMENTATION RATE] IN BLOOD BY WESTERGREN METHOD: 19 MM/HR (ref 0–36)
EST. GFR  (AFRICAN AMERICAN): >60 ML/MIN/1.73 M^2
EST. GFR  (NON AFRICAN AMERICAN): >60 ML/MIN/1.73 M^2
GLUCOSE SERPL-MCNC: 167 MG/DL (ref 70–110)
HCT VFR BLD AUTO: 35.5 % (ref 37–48.5)
HGB BLD-MCNC: 11.8 G/DL (ref 12–16)
IMM GRANULOCYTES # BLD AUTO: 0.02 K/UL (ref 0–0.04)
IMM GRANULOCYTES NFR BLD AUTO: 0.3 % (ref 0–0.5)
LYMPHOCYTES # BLD AUTO: 2.4 K/UL (ref 1–4.8)
LYMPHOCYTES NFR BLD: 34.9 % (ref 18–48)
MCH RBC QN AUTO: 30.6 PG (ref 27–31)
MCHC RBC AUTO-ENTMCNC: 33.2 G/DL (ref 32–36)
MCV RBC AUTO: 92 FL (ref 82–98)
MONOCYTES # BLD AUTO: 0.5 K/UL (ref 0.3–1)
MONOCYTES NFR BLD: 7.9 % (ref 4–15)
NEUTROPHILS # BLD AUTO: 3.7 K/UL (ref 1.8–7.7)
NEUTROPHILS NFR BLD: 54.7 % (ref 38–73)
NRBC BLD-RTO: 0 /100 WBC
PLATELET # BLD AUTO: 459 K/UL (ref 150–350)
PMV BLD AUTO: 9.2 FL (ref 9.2–12.9)
POTASSIUM SERPL-SCNC: 3.6 MMOL/L (ref 3.5–5.1)
PROT SERPL-MCNC: 7.7 G/DL (ref 6–8.4)
RBC # BLD AUTO: 3.85 M/UL (ref 4–5.4)
RHEUMATOID FACT SERPL-ACNC: 159 IU/ML (ref 0–15)
SODIUM SERPL-SCNC: 139 MMOL/L (ref 136–145)
WBC # BLD AUTO: 6.74 K/UL (ref 3.9–12.7)

## 2020-02-11 PROCEDURE — 80053 COMPREHEN METABOLIC PANEL: CPT

## 2020-02-11 PROCEDURE — 36415 COLL VENOUS BLD VENIPUNCTURE: CPT

## 2020-02-11 PROCEDURE — 86140 C-REACTIVE PROTEIN: CPT

## 2020-02-11 PROCEDURE — 3075F PR MOST RECENT SYSTOLIC BLOOD PRESS GE 130-139MM HG: ICD-10-PCS | Mod: CPTII,S$GLB,, | Performed by: INTERNAL MEDICINE

## 2020-02-11 PROCEDURE — 3075F SYST BP GE 130 - 139MM HG: CPT | Mod: CPTII,S$GLB,, | Performed by: INTERNAL MEDICINE

## 2020-02-11 PROCEDURE — 1159F PR MEDICATION LIST DOCUMENTED IN MEDICAL RECORD: ICD-10-PCS | Mod: S$GLB,,, | Performed by: INTERNAL MEDICINE

## 2020-02-11 PROCEDURE — 86431 RHEUMATOID FACTOR QUANT: CPT

## 2020-02-11 PROCEDURE — 85652 RBC SED RATE AUTOMATED: CPT

## 2020-02-11 PROCEDURE — 86704 HEP B CORE ANTIBODY TOTAL: CPT

## 2020-02-11 PROCEDURE — 99999 PR PBB SHADOW E&M-EST. PATIENT-LVL III: CPT | Mod: PBBFAC,,, | Performed by: INTERNAL MEDICINE

## 2020-02-11 PROCEDURE — 99205 OFFICE O/P NEW HI 60 MIN: CPT | Mod: S$GLB,,, | Performed by: INTERNAL MEDICINE

## 2020-02-11 PROCEDURE — 1125F AMNT PAIN NOTED PAIN PRSNT: CPT | Mod: S$GLB,,, | Performed by: INTERNAL MEDICINE

## 2020-02-11 PROCEDURE — 1125F PR PAIN SEVERITY QUANTIFIED, PAIN PRESENT: ICD-10-PCS | Mod: S$GLB,,, | Performed by: INTERNAL MEDICINE

## 2020-02-11 PROCEDURE — 3078F PR MOST RECENT DIASTOLIC BLOOD PRESSURE < 80 MM HG: ICD-10-PCS | Mod: CPTII,S$GLB,, | Performed by: INTERNAL MEDICINE

## 2020-02-11 PROCEDURE — 86480 TB TEST CELL IMMUN MEASURE: CPT

## 2020-02-11 PROCEDURE — 99205 PR OFFICE/OUTPT VISIT, NEW, LEVL V, 60-74 MIN: ICD-10-PCS | Mod: S$GLB,,, | Performed by: INTERNAL MEDICINE

## 2020-02-11 PROCEDURE — 86803 HEPATITIS C AB TEST: CPT

## 2020-02-11 PROCEDURE — 87340 HEPATITIS B SURFACE AG IA: CPT

## 2020-02-11 PROCEDURE — 85025 COMPLETE CBC W/AUTO DIFF WBC: CPT

## 2020-02-11 PROCEDURE — 86200 CCP ANTIBODY: CPT

## 2020-02-11 PROCEDURE — 1101F PR PT FALLS ASSESS DOC 0-1 FALLS W/OUT INJ PAST YR: ICD-10-PCS | Mod: CPTII,S$GLB,, | Performed by: INTERNAL MEDICINE

## 2020-02-11 PROCEDURE — 1159F MED LIST DOCD IN RCRD: CPT | Mod: S$GLB,,, | Performed by: INTERNAL MEDICINE

## 2020-02-11 PROCEDURE — 3078F DIAST BP <80 MM HG: CPT | Mod: CPTII,S$GLB,, | Performed by: INTERNAL MEDICINE

## 2020-02-11 PROCEDURE — 99999 PR PBB SHADOW E&M-EST. PATIENT-LVL III: ICD-10-PCS | Mod: PBBFAC,,, | Performed by: INTERNAL MEDICINE

## 2020-02-11 PROCEDURE — 1101F PT FALLS ASSESS-DOCD LE1/YR: CPT | Mod: CPTII,S$GLB,, | Performed by: INTERNAL MEDICINE

## 2020-02-11 RX ORDER — DICLOFENAC SODIUM 10 MG/G
2 GEL TOPICAL 4 TIMES DAILY
Qty: 1 TUBE | Refills: 2 | Status: SHIPPED | OUTPATIENT
Start: 2020-02-11 | End: 2022-08-02

## 2020-02-11 RX ORDER — METHOTREXATE 2.5 MG/1
10 TABLET ORAL
Qty: 16 TABLET | Refills: 2 | Status: SHIPPED | OUTPATIENT
Start: 2020-02-11 | End: 2020-02-14 | Stop reason: SDUPTHER

## 2020-02-11 RX ORDER — FOLIC ACID 1 MG/1
1 TABLET ORAL DAILY
Qty: 30 TABLET | Refills: 4 | Status: SHIPPED | OUTPATIENT
Start: 2020-02-11 | End: 2020-03-12 | Stop reason: SDUPTHER

## 2020-02-11 NOTE — PATIENT INSTRUCTIONS
Rheumatology medications:    · Methotrexate  Every Tuesday; take 2 tablets for 2 weeks, then  Every Tuesday; take 2 tablets in the AM and 2 in the PM until next visit    · Folic acid  Take 1 tablet every day    Prednisone tablets  What is this medicine?  PREDNISONE (PRED ni sone) is a corticosteroid. It is commonly used to treat inflammation of the skin, joints, lungs, and other organs. Common conditions treated include asthma, allergies, and arthritis. It is also used for other conditions, such as blood disorders and diseases of the adrenal glands.  How should I use this medicine?  Take this medicine by mouth with a glass of water. Follow the directions on the prescription label. Take this medicine with food. If you are taking this medicine once a day, take it in the morning. Do not take more medicine than you are told to take. Do not suddenly stop taking your medicine because you may develop a severe reaction. Your doctor will tell you how much medicine to take. If your doctor wants you to stop the medicine, the dose may be slowly lowered over time to avoid any side effects.  Talk to your pediatrician regarding the use of this medicine in children. Special care may be needed.  What side effects may I notice from receiving this medicine?  Side effects that you should report to your doctor or health care professional as soon as possible:  · allergic reactions like skin rash, itching or hives, swelling of the face, lips, or tongue  · changes in emotions or moods  · changes in vision  · depressed mood  · eye pain  · fever or chills, cough, sore throat, pain or difficulty passing urine  · increased thirst  · swelling of ankles, feet  Side effects that usually do not require medical attention (report to your doctor or health care professional if they continue or are bothersome):  · confusion, excitement, restlessness  · headache  · nausea, vomiting  · skin problems, acne, thin and shiny skin  · trouble  sleeping  · weight gain  What may interact with this medicine?  Do not take this medicine with any of the following medications:  · metyrapone  · mifepristone  This medicine may also interact with the following medications:  · aminoglutethimide  · amphotericin B  · aspirin and aspirin-like medicines  · barbiturates  · certain medicines for diabetes, like glipizide or glyburide  · cholestyramine  · cholinesterase inhibitors  · cyclosporine  · digoxin  · diuretics  · ephedrine  · female hormones, like estrogens and birth control pills  · isoniazid  · ketoconazole  · NSAIDS, medicines for pain and inflammation, like ibuprofen or naproxen  · phenytoin  · rifampin  · toxoids  · vaccines  · warfarin  What if I miss a dose?  If you miss a dose, take it as soon as you can. If it is almost time for your next dose, talk to your doctor or health care professional. You may need to miss a dose or take an extra dose. Do not take double or extra doses without advice.  Where should I keep my medicine?  Keep out of the reach of children.  Store at room temperature between 15 and 30 degrees C (59 and 86 degrees F). Protect from light. Keep container tightly closed. Throw away any unused medicine after the expiration date.  What should I tell my health care provider before I take this medicine?  They need to know if you have any of these conditions:  · Cushing's syndrome  · diabetes  · glaucoma  · heart disease  · high blood pressure  · infection (especially a virus infection such as chickenpox, cold sores, or herpes)  · kidney disease  · liver disease  · mental illness  · myasthenia gravis  · osteoporosis  · seizures  · stomach or intestine problems  · thyroid disease  · an unusual or allergic reaction to lactose, prednisone, other medicines, foods, dyes, or preservatives  · pregnant or trying to get pregnant  · breast-feeding  What should I watch for while using this medicine?  Visit your doctor or health care professional for  regular checks on your progress. If you are taking this medicine over a prolonged period, carry an identification card with your name and address, the type and dose of your medicine, and your doctor's name and address.  This medicine may increase your risk of getting an infection. Tell your doctor or health care professional if you are around anyone with measles or chickenpox, or if you develop sores or blisters that do not heal properly.  If you are going to have surgery, tell your doctor or health care professional that you have taken this medicine within the last twelve months.  Ask your doctor or health care professional about your diet. You may need to lower the amount of salt you eat.  This medicine may affect blood sugar levels. If you have diabetes, check with your doctor or health care professional before you change your diet or the dose of your diabetic medicine.  NOTE:This sheet is a summary. It may not cover all possible information. If you have questions about this medicine, talk to your doctor, pharmacist, or health care provider. Copyright© 2017 [employer]        Methotrexate tablets  What is this medicine?  METHOTREXATE (METH oh TREX ate) is a chemotherapy drug used to treat cancer including breast cancer, leukemia, and lymphoma. This medicine can also be used to treat psoriasis and certain kinds of arthritis.  How should I use this medicine?  Take this medicine by mouth with a glass of water. Follow the directions on the prescription label. Take your medicine at regular intervals. Do not take it more often than directed. Do not stop taking except on your doctor's advice.  Make sure you know why you are taking this medicine and how often you should take it. If this medicine is used for a condition that is not cancer, like arthritis or psoriasis, it should be taken weekly, NOT daily. Taking this medicine more often than directed can cause serious side effects, even death.  Talk to your healthcare  provider about safe handling and disposal of this medicine. You may need to take special precautions.  Talk to your pediatrician regarding the use of this medicine in children. While this drug may be prescribed for selected conditions, precautions do apply.  What side effects may I notice from receiving this medicine?  Side effects that you should report to your doctor or health care professional as soon as possible:  · allergic reactions like skin rash, itching or hives, swelling of the face, lips, or tongue  · breathing problems or shortness of breath  · diarrhea  · dry, nonproductive cough  · low blood counts - this medicine may decrease the number of white blood cells, red blood cells and platelets. You may be at increased risk for infections and bleeding.  · mouth sores  · redness, blistering, peeling or loosening of the skin, including inside the mouth  · signs of infection - fever or chills, cough, sore throat, pain or trouble passing urine  · signs and symptoms of bleeding such as bloody or black, tarry stools; red or dark-brown urine; spitting up blood or brown material that looks like coffee grounds; red spots on the skin; unusual bruising or bleeding from the eye, gums, or nose  · signs and symptoms of kidney injury like trouble passing urine or change in the amount of urine  · signs and symptoms of liver injury like dark yellow or brown urine; general ill feeling or flu-like symptoms; light-colored stools; loss of appetite; nausea; right upper belly pain; unusually weak or tired; yellowing of the eyes or skin  Side effects that usually do not require medical attention (report to your doctor or health care professional if they continue or are bothersome):  · dizziness  · hair loss  · tiredness  · upset stomach  · vomiting  What may interact with this medicine?  This medicine may interact with the following medication:  · acitretin  · aspirin and aspirin-like medicines including  salicylates  · azathioprine  · certain antibiotics like penicillins, tetracycline, and chloramphenicol  · cyclosporine  · gold  · hydroxychloroquine  · live virus vaccines  · NSAIDs, medicines for pain and inflammation, like ibuprofen or naproxen  · other cytotoxic agents  · penicillamine  · phenylbutazone  · phenytoin  · probenecid  · retinoids such as isotretinoin and tretinoin  · steroid medicines like prednisone or cortisone  · sulfonamides like sulfasalazine and trimethoprim/sulfamethoxazole  · theophylline  What if I miss a dose?  If you miss a dose, talk with your doctor or health care professional. Do not take double or extra doses.  Where should I keep my medicine?  Keep out of the reach of children.  Store at room temperature between 20 and 25 degrees C (68 and 77 degrees F). Protect from light. Throw away any unused medicine after the expiration date.  What should I tell my health care provider before I take this medicine?  They need to know if you have any of these conditions:  · fluid in the stomach area or lungs  · if you often drink alcohol  · infection or immune system problems  · kidney disease or on hemodialysis  · liver disease  · low blood counts, like low white cell, platelet, or red cell counts  · lung disease  · radiation therapy  · stomach ulcers  · ulcerative colitis  · an unusual or allergic reaction to methotrexate, other medicines, foods, dyes, or preservatives  · pregnant or trying to get pregnant  · breast-feeding  What should I watch for while using this medicine?  Avoid alcoholic drinks.  This medicine can make you more sensitive to the sun. Keep out of the sun. If you cannot avoid being in the sun, wear protective clothing and use sunscreen. Do not use sun lamps or tanning beds/booths.  You may need blood work done while you are taking this medicine.  Call your doctor or health care professional for advice if you get a fever, chills or sore throat, or other symptoms of a cold or flu.  Do not treat yourself. This drug decreases your body's ability to fight infections. Try to avoid being around people who are sick.  This medicine may increase your risk to bruise or bleed. Call your doctor or health care professional if you notice any unusual bleeding.  Check with your doctor or health care professional if you get an attack of severe diarrhea, nausea and vomiting, or if you sweat a lot. The loss of too much body fluid can make it dangerous for you to take this medicine.  Talk to your doctor about your risk of cancer. You may be more at risk for certain types of cancers if you take this medicine.  Both men and women must use effective birth control with this medicine. Do not become pregnant while taking this medicine or until at least 1 normal menstrual cycle has occurred after stopping it. Women should inform their doctor if they wish to become pregnant or think they might be pregnant. Men should not father a child while taking this medicine and for 3 months after stopping it. There is a potential for serious side effects to an unborn child. Talk to your health care professional or pharmacist for more information. Do not breast-feed an infant while taking this medicine.  NOTE:This sheet is a summary. It may not cover all possible information. If you have questions about this medicine, talk to your doctor, pharmacist, or health care provider. Copyright© 2017 [employer]

## 2020-02-11 NOTE — LETTER
February 11, 2020      Tucker Bruno MD  [address] [place] [address] [address] [place] [address]           [facility]  [address] [facility]  [place] [address]  Phone: [phone]  Fax: [phone]          Patient: Indira Walker   MR Number: [id number]   YOB: 1945   Date of Visit: 2/11/2020       Dear Dr. Tucker Bruno:    Thank you for referring Indira Walker to me for evaluation. Attached you will find relevant portions of my assessment and plan of care.    If you have questions, please do not hesitate to call me. I look forward to following Indira Walker along with you.    Sincerely,    Giovanni Cage MD    Enclosure  CC:  No Recipients    If you would like to receive this communication electronically, please contact [email or web address] or [phone] to request more information on [email or web address] Link access.    For providers and/or their staff who would like to refer a patient to [facility], please contact us through our one-stop-shop provider referral line, [facility], at [phone].    If you feel you have received this communication in error or would no longer like to receive these types of communications, please e-mail [email or web address]

## 2020-02-11 NOTE — PROGRESS NOTES
RHEUMATOLOGY OUTPATIENT CLINIC NOTE    2020    Attending Rheumatologist: Giovanni Cage  Primary Care Provider: Gladys Herrera MD   Physician Requesting Consultation: Tucker Bruno MD  [address] [place] [address] [place] [address]  Chief Complaint/Reason For Consultation:  Hand Pain    Subjective:       HPI  Indira Walker is a 74 y.o. Black or  female with hand pain referred for rheumatic evaluation.  Main complaint is bilateral MCP and PIP joint pain.  Onset approximately a months ago, with exacerbations the are started to occur more frequently.  No particular precipitating event.  Worst with prolonged inactivity, improved somewhat by light motion, alleviated with NSAIDs.  Associated with prolonged morning stiffness and joint swelling.  Denies fever, rash,  or GI complaints.    Review of Systems   Constitutional: Negative for chills, fever and malaise/fatigue.   Eyes: Negative for pain and redness.   Respiratory: Negative for cough, hemoptysis and shortness of breath.    Cardiovascular: Negative for chest pain and leg swelling.   Gastrointestinal: Negative for abdominal pain, blood in stool and melena.   Genitourinary: Negative for dysuria and hematuria.   Musculoskeletal: Positive for joint pain (Episodic, worst on hands.  Inflammatory pattern.). Negative for falls.   Skin: Negative for rash.   Neurological: Negative for tingling and focal weakness.   Psychiatric/Behavioral: Negative for memory loss. The patient does not have insomnia.      Chronic comorbid conditions affecting medical decision making today:  Past Medical History:   Diagnosis Date    Arthritis     Diabetes mellitus, type 2     Heartburn     Hyperlipidemia     Hypertension     Left sided sciatica     s/p microdiscectomy 3/21/18     Past Surgical History:   Procedure Laterality Date    BREAST BIOPSY       SECTION      COLONOSCOPY  2008    DR. JANET GARCÍA/MILD DIVERICULOSIS DESCENDING AND SIGMOID.  "REPEAT 5 YRS    HERNIA REPAIR      MICRODISCECTOMY OF SPINE Left 03/21/2018    left L5-S1, Dr Segundo     History reviewed. No pertinent family history.  Social History     Substance and Sexual Activity   Alcohol Use No     Social History     Tobacco Use   Smoking Status Never Smoker   Smokeless Tobacco Never Used     Social History     Substance and Sexual Activity   Drug Use No       Current Outpatient Medications:     albuterol (PROVENTIL/VENTOLIN HFA) 90 mcg/actuation inhaler, INHALE 2 PUFFS INTO THE LUNGS EVERY 6 (SIX) HOURS AS NEEDED FOR SHORTNESS OF BREATH (OR COUGHING)., Disp: 18 Inhaler, Rfl: 0    ascorbic acid, vitamin C, (VITAMIN C) 1000 MG tablet, Take 1,000 mg by mouth once daily., Disp: , Rfl:     aspirin (ECOTRIN) 81 MG EC tablet, Take 1 tablet (81 mg total) by mouth once daily. Every other day, Disp: , Rfl: 0    b complex vitamins capsule, Take 1 capsule by mouth once daily., Disp: , Rfl:     BD ULTRA-FINE MINI PEN NEEDLE 31 gauge x 3/16" Ndle, AS DIRECTED ONCE DAILY WITH INSULIN SUBCUTANEOUSLY, Disp: 100 each, Rfl: 4    beta-carotene,A,-vits C,E/mins (VISION ORAL), Take by mouth., Disp: , Rfl:     BIOTIN ORAL, Take 1,000 mg by mouth., Disp: , Rfl:     blood sugar diagnostic Strp, To check BG 2 times daily, to use with insurance preferred meter, Disp: 200 each, Rfl: 4    blood-glucose meter kit, To check BG 2 times daily, to use with insurance preferred meter, Disp: 1 each, Rfl: 0    cholecalciferol, vitamin D3, 2,000 unit Cap, Take 2,000 Units by mouth once daily. , Disp: , Rfl:     famotidine (PEPCID) 40 MG tablet, Take 1 tablet (40 mg total) by mouth once daily., Disp: 30 tablet, Rfl: 11    hydrocortisone 1 % cream, Apply to affected area 2 times daily for 5 days, Disp: 30 g, Rfl: 0    lancets Misc, To check BG 2 times daily, to use with insurance preferred meter, Disp: 200 each, Rfl: 4    loratadine (CLARITIN) 10 mg tablet, Take 10 mg by mouth once daily., Disp: , Rfl:     " LORazepam (ATIVAN) 1 MG tablet, 1-2 po 1 hour before procedure/test., Disp: 6 tablet, Rfl: 0    losartan-hydrochlorothiazide 100-12.5 mg (HYZAAR) 100-12.5 mg Tab, TAKE 1 TABLET BY MOUTH EVERY DAY, Disp: 90 tablet, Rfl: 4    metFORMIN (GLUCOPHAGE) 1000 MG tablet, TAKE 1 TABLET BY MOUTH TWICE A DAY WITH FOOD, Disp: 180 tablet, Rfl: 3    methyl salicylate/menthol (ARTHRITIS HOT PAIN RELIEF TOP), Apply topically., Disp: , Rfl:     montelukast (SINGULAIR) 10 mg tablet, Take 1 tablet (10 mg total) by mouth every evening., Disp: 30 tablet, Rfl: 5    MULTIVIT-MINERALS/FERROUS FUM (MULTI VITAMIN ORAL), Take by mouth. For women over 50 (centrum), Disp: , Rfl:     mupirocin (BACTROBAN) 2 % ointment, Apply topically 2 (two) times daily. Apply to open wounds., Disp: 1 Tube, Rfl: 0    OMEGA-3S-DHA-EPA-FISH OIL ORAL, Take by mouth. Omega XL  Green lipped Mussel, Disp: , Rfl:     ondansetron (ZOFRAN-ODT) 4 MG TbDL, Take 1 tablet (4 mg total) by mouth every 8 (eight) hours as needed (nausea)., Disp: 10 tablet, Rfl: 0    rosuvastatin (CRESTOR) 20 MG tablet, Take 1 tablet (20 mg total) by mouth once daily., Disp: 90 tablet, Rfl: 3    traMADol (ULTRAM) 50 mg tablet, Take 1 tablet (50 mg total) by mouth every 8 (eight) hours as needed for Pain., Disp: 20 tablet, Rfl: 0    TRESIBA FLEXTOUCH U-200 200 unit/mL (3 mL) InPn, INJECT 20 UNITS INTO THE SKIN DAILY AS DIRECTED (Patient taking differently: Inject 16 Units into the skin. ), Disp: 3 Syringe, Rfl: 35    BIOTIN ORAL, Take by mouth., Disp: , Rfl:     diclofenac sodium (VOLTAREN) 1 % Gel, Apply 2 g topically 4 (four) times daily., Disp: 1 Tube, Rfl: 2    folic acid (FOLVITE) 1 MG tablet, Take 1 tablet (1 mg total) by mouth once daily., Disp: 30 tablet, Rfl: 4    methotrexate 2.5 MG Tab, Take 4 tablets (10 mg total) by mouth every 7 days., Disp: 16 tablet, Rfl: 2    valACYclovir (VALTREX) 1000 MG tablet, Take 1 tablet (1,000 mg total) by mouth 3 (three) times daily. for 7  "days, Disp: 21 tablet, Rfl: 0     Objective:         Vitals:    02/11/20 1031   BP: 134/72   Pulse: 86     Physical Exam   Constitutional: No distress.   Estimated body mass index is 25.7 kg/m² as calculated from the following:    Height as of this encounter: 5' 3" (1.6 m).    Weight as of this encounter: 65.8 kg (145 lb 1 oz).    Wt Readings from Last 1 Encounters:  02/11/20 1031 : 65.8 kg (145 lb 1 oz)     HENT:   Head: Normocephalic and atraumatic.   Eyes: Conjunctivae are normal. Pupils are equal, round, and reactive to light.   Neck: Normal range of motion.   Cardiovascular: Normal rate and intact distal pulses.    Pulmonary/Chest: Effort normal. No respiratory distress.   Abdominal: Soft. She exhibits no distension.   Neurological: She is alert. Gait normal.   Skin: No rash noted. No erythema.     Musculoskeletal: Normal range of motion. She exhibits edema, tenderness and deformity (Slight ulnar deviation.).   :  Week right hand due to pain  Synovitis ++, warmth +.  No erythema:   2nd and 3rd MCP R>L, some PIPs.    AROM:  Limited right  due to pain, otherwise intact  PROM:  As above    Devices used by patient: none       Reviewed old and all outside pertinent medical records available.    All lab results personally reviewed and interpreted by me.  Lab Results   Component Value Date    WBC 6.82 07/01/2019    HGB 12.2 07/01/2019    HCT 36.8 (L) 07/01/2019    MCV 90 07/01/2019    MCH 29.9 07/01/2019    MCHC 33.2 07/01/2019    RDW 13.6 07/01/2019     (H) 07/01/2019    MPV 10.7 07/01/2019       Lab Results   Component Value Date     07/19/2019    K 3.9 07/19/2019     07/19/2019    CO2 29 07/19/2019    GLU 89 07/19/2019    BUN 16 07/19/2019    CALCIUM 9.5 07/19/2019    PROT 6.9 07/19/2019    ALBUMIN 3.6 07/19/2019    BILITOT 0.7 07/19/2019    AST 23 07/19/2019    ALKPHOS 50 (L) 07/19/2019    ALT 18 07/19/2019       Lab Results   Component Value Date    COLORU Yellow 07/01/2019    " APPEARANCEUA Clear 07/01/2019    SPECGRAV 1.020 07/01/2019    PHUR 7.0 07/01/2019    PROTEINUA Negative 07/01/2019    KETONESU Negative 07/01/2019    LEUKOCYTESUR Trace (A) 07/01/2019    NITRITE Negative 07/01/2019    UROBILINOGEN 0.2 03/08/2018       No results found for: CRP    No results found for: SEDRATE, ERYTHROCYTES    No results found for: CORAL, RF, SEDRATE    No components found for: 25OHVITDTOT, 87JKBMHK7, 72SJZINC2, METHODNOTE    No results found for: URICACID    No components found for: TSPOTTB    · Hemoglobin A1c 7.7 October 2019    Rheum Labs:   n/a     Infectious Labs:   n/a     Imaging:  All imaging reviewed and independently  interpreted by me.    X-ray hands February 2020  Mild degenerative changes noted bilaterally with most prominent findings at the 1st CMC joints and to lesser extent throughout the IP joints.  Right 2nd, 4th and 5th DIP joints and left 2nd and 5th DIP joints demonstrate mildly prominent changes.  Calcification again noted and slightly more prominent along the margins of the right 1st and 4th MCP joints.  No acute or healing fracture bilaterally.     ASSESSMENT / PLAN:     Indira Walker is a 74 y.o. Black or  female with:    1. Inflammatory arthritis  - inflammatory joint pain, synovitis present, onset over 6 weeks,  several small joints  - high likelihood of rheumatoid arthritis, currently on active flare.  - No erosions noted on available imaging.  Recommended workup as below.  - will initiate DMARD therapy with methotrexate 10 mg once per week and daily folic acid  - clinical significanct side effects of therapy discussed in detail.  - expect onset of action around 3 months.  - Offered systemic steroids for immediate relief, refused by patient at this time for history of diabetes.  - C-reactive protein; Standing  - Sedimentation rate; Standing  - Rheumatoid factor; Future  - Cyclic citrul peptide antibody, IgG; Future  - Quantiferon Gold TB; Future  -  Comprehensive metabolic panel; Standing  - methotrexate 2.5 MG Tab; Take 4 tablets (10 mg total) by mouth every 7 days.  Dispense: 16 tablet; Refill: 2  - folic acid (FOLVITE) 1 MG tablet; Take 1 tablet (1 mg total) by mouth once daily.  Dispense: 30 tablet; Refill: 4  - diclofenac sodium (VOLTAREN) 1 % Gel; Apply 2 g topically 4 (four) times daily.  Dispense: 1 Tube; Refill: 2  - Hepatitis B core antibody, total; Future  - Hepatitis B surface antigen; Future  - Hepatitis C antibody; Future  - CBC auto differential; Standing    2. Other specified counseling  - over 10 minutes spent regarding below topics:  - Immunization counseling done.  - Nutrition and exercise counseling.  - Limitation of alcohol consumption.  - Medication counseling provided.    Follow up in about 1 month (around 3/11/2020).    Method of contact with patient concerns: [facility]    Disclaimer:  This note is prepared using voice recognition software and as such is likely to have errors and has not been proof read. Please contact me for questions.     Time spent: 60 minutes in face to face discussion concerning diagnosis, prognosis, review of lab and test results, benefits of treatment as well as management of disease, counseling of patient and coordination of care between various health care providers.  Greater than half the time spent was used for coordination of care and counseling of patient.    Giovanni Cage M.D.  Rheumatology Department   [facility]

## 2020-02-13 LAB
GAMMA INTERFERON BACKGROUND BLD IA-ACNC: 0.06 IU/ML
HBV CORE AB SERPL QL IA: NEGATIVE
HBV SURFACE AG SERPL QL IA: NEGATIVE
HCV AB SERPL QL IA: NEGATIVE
M TB IFN-G CD4+ BCKGRND COR BLD-ACNC: 0 IU/ML
MITOGEN IGNF BCKGRD COR BLD-ACNC: >10 IU/ML
TB GOLD PLUS: NEGATIVE
TB2 - NIL: -0.01 IU/ML

## 2020-02-14 ENCOUNTER — PATIENT OUTREACH (OUTPATIENT)
Dept: ADMINISTRATIVE | Facility: HOSPITAL | Age: 75
End: 2020-02-14

## 2020-02-14 ENCOUNTER — OFFICE VISIT (OUTPATIENT)
Dept: RHEUMATOLOGY | Facility: CLINIC | Age: 75
End: 2020-02-14
Payer: MEDICARE

## 2020-02-14 ENCOUNTER — TELEPHONE (OUTPATIENT)
Dept: RHEUMATOLOGY | Facility: CLINIC | Age: 75
End: 2020-02-14

## 2020-02-14 VITALS
DIASTOLIC BLOOD PRESSURE: 79 MMHG | WEIGHT: 148.13 LBS | BODY MASS INDEX: 26.25 KG/M2 | HEART RATE: 81 BPM | SYSTOLIC BLOOD PRESSURE: 130 MMHG | HEIGHT: 63 IN

## 2020-02-14 DIAGNOSIS — M05.9 SEROPOSITIVE RHEUMATOID ARTHRITIS: Primary | ICD-10-CM

## 2020-02-14 DIAGNOSIS — Z71.89 COUNSELING ON HEALTH PROMOTION AND DISEASE PREVENTION: ICD-10-CM

## 2020-02-14 DIAGNOSIS — Z79.60 LONG-TERM USE OF IMMUNOSUPPRESSANT MEDICATION: ICD-10-CM

## 2020-02-14 PROCEDURE — 99999 PR PBB SHADOW E&M-EST. PATIENT-LVL III: ICD-10-PCS | Mod: PBBFAC,,, | Performed by: INTERNAL MEDICINE

## 2020-02-14 PROCEDURE — 99215 OFFICE O/P EST HI 40 MIN: CPT | Mod: S$GLB,,, | Performed by: INTERNAL MEDICINE

## 2020-02-14 PROCEDURE — 3075F SYST BP GE 130 - 139MM HG: CPT | Mod: CPTII,S$GLB,, | Performed by: INTERNAL MEDICINE

## 2020-02-14 PROCEDURE — 99215 PR OFFICE/OUTPT VISIT, EST, LEVL V, 40-54 MIN: ICD-10-PCS | Mod: S$GLB,,, | Performed by: INTERNAL MEDICINE

## 2020-02-14 PROCEDURE — 1159F MED LIST DOCD IN RCRD: CPT | Mod: S$GLB,,, | Performed by: INTERNAL MEDICINE

## 2020-02-14 PROCEDURE — 1125F AMNT PAIN NOTED PAIN PRSNT: CPT | Mod: S$GLB,,, | Performed by: INTERNAL MEDICINE

## 2020-02-14 PROCEDURE — 3075F PR MOST RECENT SYSTOLIC BLOOD PRESS GE 130-139MM HG: ICD-10-PCS | Mod: CPTII,S$GLB,, | Performed by: INTERNAL MEDICINE

## 2020-02-14 PROCEDURE — 1101F PT FALLS ASSESS-DOCD LE1/YR: CPT | Mod: CPTII,S$GLB,, | Performed by: INTERNAL MEDICINE

## 2020-02-14 PROCEDURE — 3078F PR MOST RECENT DIASTOLIC BLOOD PRESSURE < 80 MM HG: ICD-10-PCS | Mod: CPTII,S$GLB,, | Performed by: INTERNAL MEDICINE

## 2020-02-14 PROCEDURE — 99999 PR PBB SHADOW E&M-EST. PATIENT-LVL III: CPT | Mod: PBBFAC,,, | Performed by: INTERNAL MEDICINE

## 2020-02-14 PROCEDURE — 1159F PR MEDICATION LIST DOCUMENTED IN MEDICAL RECORD: ICD-10-PCS | Mod: S$GLB,,, | Performed by: INTERNAL MEDICINE

## 2020-02-14 PROCEDURE — 1125F PR PAIN SEVERITY QUANTIFIED, PAIN PRESENT: ICD-10-PCS | Mod: S$GLB,,, | Performed by: INTERNAL MEDICINE

## 2020-02-14 PROCEDURE — 1101F PR PT FALLS ASSESS DOC 0-1 FALLS W/OUT INJ PAST YR: ICD-10-PCS | Mod: CPTII,S$GLB,, | Performed by: INTERNAL MEDICINE

## 2020-02-14 PROCEDURE — 3078F DIAST BP <80 MM HG: CPT | Mod: CPTII,S$GLB,, | Performed by: INTERNAL MEDICINE

## 2020-02-14 RX ORDER — METHOTREXATE 2.5 MG/1
10 TABLET ORAL
Qty: 16 TABLET | Refills: 2 | Status: SHIPPED | OUTPATIENT
Start: 2020-02-14 | End: 2020-02-14

## 2020-02-14 RX ORDER — METHOTREXATE 2.5 MG/1
10 TABLET ORAL
Qty: 16 TABLET | Refills: 2 | Status: SHIPPED | OUTPATIENT
Start: 2020-02-14 | End: 2020-03-12

## 2020-02-14 RX ORDER — PREDNISONE 10 MG/1
10 TABLET ORAL DAILY
Qty: 30 TABLET | Refills: 2 | Status: SHIPPED | OUTPATIENT
Start: 2020-02-14 | End: 2020-03-12 | Stop reason: SDUPTHER

## 2020-02-14 NOTE — PROGRESS NOTES
RHEUMATOLOGY OUTPATIENT CLINIC NOTE    2020    Attending Rheumatologist: Giovanni Cage  Primary Care Provider: Gladys Herrera MD   Physician Requesting Consultation: No referring provider defined for this encounter.  Chief Complaint/Reason For Consultation:  Seropositive rheumatoid arthritis    Subjective:       HPI  Indira Walker is a 74 y.o. Black or  female with hand pain comes for follow-up.    Today  Last seen approximately 10 days ago.  Presentation highly suggestive of rheumatoid arthritis, recommended for imaging and blood workup.  Topical therapy recommended, no significant response referred by patient.  Was previously hesitant to start steroid therapy due to history of diabetes.  No change to main complaint of bilateral hand pain and swelling.  Denies fever, rash,  or GI complaints.    Review of Systems   Constitutional: Negative for chills, fever and malaise/fatigue.   Eyes: Negative for pain and redness.   Respiratory: Negative for cough, hemoptysis and shortness of breath.    Cardiovascular: Negative for chest pain and leg swelling.   Gastrointestinal: Negative for abdominal pain, blood in stool and melena.   Genitourinary: Negative for dysuria and hematuria.   Musculoskeletal: Positive for joint pain (Episodic, worst on hands.  Inflammatory pattern.). Negative for falls.   Skin: Negative for rash.   Neurological: Negative for tingling and focal weakness.   Psychiatric/Behavioral: Negative for memory loss. The patient does not have insomnia.      Chronic comorbid conditions affecting medical decision making today:  Past Medical History:   Diagnosis Date    Arthritis     Diabetes mellitus, type 2     Heartburn     Hyperlipidemia     Hypertension     Left sided sciatica     s/p microdiscectomy 3/21/18     Past Surgical History:   Procedure Laterality Date    BREAST BIOPSY       SECTION      COLONOSCOPY  2008    DR. JANET GARCÍA/MILD DIVERICULOSIS  "DESCENDING AND SIGMOID. REPEAT 5 YRS    HERNIA REPAIR      MICRODISCECTOMY OF SPINE Left 03/21/2018    left L5-S1, Dr Segundo     History reviewed. No pertinent family history.  Social History     Substance and Sexual Activity   Alcohol Use No     Social History     Tobacco Use   Smoking Status Never Smoker   Smokeless Tobacco Never Used     Social History     Substance and Sexual Activity   Drug Use No       Current Outpatient Medications:     albuterol (PROVENTIL/VENTOLIN HFA) 90 mcg/actuation inhaler, INHALE 2 PUFFS INTO THE LUNGS EVERY 6 (SIX) HOURS AS NEEDED FOR SHORTNESS OF BREATH (OR COUGHING)., Disp: 18 Inhaler, Rfl: 0    ascorbic acid, vitamin C, (VITAMIN C) 1000 MG tablet, Take 1,000 mg by mouth once daily., Disp: , Rfl:     aspirin (ECOTRIN) 81 MG EC tablet, Take 1 tablet (81 mg total) by mouth once daily. Every other day, Disp: , Rfl: 0    b complex vitamins capsule, Take 1 capsule by mouth once daily., Disp: , Rfl:     BD ULTRA-FINE MINI PEN NEEDLE 31 gauge x 3/16" Ndle, AS DIRECTED ONCE DAILY WITH INSULIN SUBCUTANEOUSLY, Disp: 100 each, Rfl: 4    beta-carotene,A,-vits C,E/mins (VISION ORAL), Take by mouth., Disp: , Rfl:     BIOTIN ORAL, Take 1,000 mg by mouth., Disp: , Rfl:     BIOTIN ORAL, Take by mouth., Disp: , Rfl:     blood sugar diagnostic Strp, To check BG 2 times daily, to use with insurance preferred meter, Disp: 200 each, Rfl: 4    blood-glucose meter kit, To check BG 2 times daily, to use with insurance preferred meter, Disp: 1 each, Rfl: 0    cholecalciferol, vitamin D3, 2,000 unit Cap, Take 2,000 Units by mouth once daily. , Disp: , Rfl:     diclofenac sodium (VOLTAREN) 1 % Gel, Apply 2 g topically 4 (four) times daily., Disp: 1 Tube, Rfl: 2    famotidine (PEPCID) 40 MG tablet, Take 1 tablet (40 mg total) by mouth once daily., Disp: 30 tablet, Rfl: 11    folic acid (FOLVITE) 1 MG tablet, Take 1 tablet (1 mg total) by mouth once daily., Disp: 30 tablet, Rfl: 4    " hydrocortisone 1 % cream, Apply to affected area 2 times daily for 5 days, Disp: 30 g, Rfl: 0    lancets Misc, To check BG 2 times daily, to use with insurance preferred meter, Disp: 200 each, Rfl: 4    loratadine (CLARITIN) 10 mg tablet, Take 10 mg by mouth once daily., Disp: , Rfl:     LORazepam (ATIVAN) 1 MG tablet, 1-2 po 1 hour before procedure/test., Disp: 6 tablet, Rfl: 0    losartan-hydrochlorothiazide 100-12.5 mg (HYZAAR) 100-12.5 mg Tab, TAKE 1 TABLET BY MOUTH EVERY DAY, Disp: 90 tablet, Rfl: 4    metFORMIN (GLUCOPHAGE) 1000 MG tablet, TAKE 1 TABLET BY MOUTH TWICE A DAY WITH FOOD, Disp: 180 tablet, Rfl: 3    methyl salicylate/menthol (ARTHRITIS HOT PAIN RELIEF TOP), Apply topically., Disp: , Rfl:     montelukast (SINGULAIR) 10 mg tablet, Take 1 tablet (10 mg total) by mouth every evening., Disp: 30 tablet, Rfl: 5    MULTIVIT-MINERALS/FERROUS FUM (MULTI VITAMIN ORAL), Take by mouth. For women over 50 (centrum), Disp: , Rfl:     mupirocin (BACTROBAN) 2 % ointment, Apply topically 2 (two) times daily. Apply to open wounds., Disp: 1 Tube, Rfl: 0    OMEGA-3S-DHA-EPA-FISH OIL ORAL, Take by mouth. Omega XL  Green lipped Mussel, Disp: , Rfl:     ondansetron (ZOFRAN-ODT) 4 MG TbDL, Take 1 tablet (4 mg total) by mouth every 8 (eight) hours as needed (nausea)., Disp: 10 tablet, Rfl: 0    rosuvastatin (CRESTOR) 20 MG tablet, Take 1 tablet (20 mg total) by mouth once daily., Disp: 90 tablet, Rfl: 3    traMADol (ULTRAM) 50 mg tablet, Take 1 tablet (50 mg total) by mouth every 8 (eight) hours as needed for Pain., Disp: 20 tablet, Rfl: 0    TRESIBA FLEXTOUCH U-200 200 unit/mL (3 mL) InPn, INJECT 20 UNITS INTO THE SKIN DAILY AS DIRECTED (Patient taking differently: Inject 16 Units into the skin. ), Disp: 3 Syringe, Rfl: 35    methotrexate 2.5 MG Tab, Take 4 tablets (10 mg total) by mouth every 7 days., Disp: 16 tablet, Rfl: 2    predniSONE (DELTASONE) 10 MG tablet, Take 1 tablet (10 mg total) by mouth once  "daily., Disp: 30 tablet, Rfl: 2    valACYclovir (VALTREX) 1000 MG tablet, Take 1 tablet (1,000 mg total) by mouth 3 (three) times daily. for 7 days, Disp: 21 tablet, Rfl: 0     Objective:         Vitals:    02/14/20 1325   BP: 130/79   Pulse: 81     Physical Exam   Constitutional: No distress.   Estimated body mass index is 25.7 kg/m² as calculated from the following:    Height as of this encounter: 5' 3" (1.6 m).    Weight as of this encounter: 65.8 kg (145 lb 1 oz).    Wt Readings from Last 1 Encounters:  02/11/20 1031 : 65.8 kg (145 lb 1 oz)     HENT:   Head: Normocephalic and atraumatic.   Eyes: Conjunctivae are normal. Pupils are equal, round, and reactive to light.   Neck: Normal range of motion.   Cardiovascular: Normal rate and intact distal pulses.    Pulmonary/Chest: Effort normal. No respiratory distress.   Abdominal: Soft. She exhibits no distension.   Neurological: She is alert. Gait normal.   Skin: No rash noted. No erythema.     Musculoskeletal: Normal range of motion. She exhibits edema, tenderness and deformity (Slight ulnar deviation.).   :  Week right hand due to pain  Synovitis ++, warmth +.  No erythema:   2nd and 3rd MCP R>L, some PIPs.    AROM:  Limited right  due to pain, otherwise intact  PROM:  As above    Devices used by patient: none       Reviewed old and all outside pertinent medical records available.    All lab results personally reviewed and interpreted by me.  Lab Results   Component Value Date    WBC 6.74 02/11/2020    HGB 11.8 (L) 02/11/2020    HCT 35.5 (L) 02/11/2020    MCV 92 02/11/2020    MCH 30.6 02/11/2020    MCHC 33.2 02/11/2020    RDW 14.4 02/11/2020     (H) 02/11/2020    MPV 9.2 02/11/2020       Lab Results   Component Value Date     02/11/2020    K 3.6 02/11/2020     02/11/2020    CO2 29 02/11/2020     (H) 02/11/2020    BUN 13 02/11/2020    CALCIUM 9.6 02/11/2020    PROT 7.7 02/11/2020    ALBUMIN 3.7 02/11/2020    BILITOT 0.7 02/11/2020 "    AST 19 02/11/2020    ALKPHOS 54 (L) 02/11/2020    ALT 17 02/11/2020       Lab Results   Component Value Date    COLORU Yellow 07/01/2019    APPEARANCEUA Clear 07/01/2019    SPECGRAV 1.020 07/01/2019    PHUR 7.0 07/01/2019    PROTEINUA Negative 07/01/2019    KETONESU Negative 07/01/2019    LEUKOCYTESUR Trace (A) 07/01/2019    NITRITE Negative 07/01/2019    UROBILINOGEN 0.2 03/08/2018       Lab Results   Component Value Date    CRP 3.9 02/11/2020       Lab Results   Component Value Date    SEDRATE 19 02/11/2020       Lab Results   Component Value Date    .0 (H) 02/11/2020    SEDRATE 19 02/11/2020       No components found for: 25OHVITDTOT, 40XLMNRJ2, 87GHVUAK6, METHODNOTE    No results found for: URICACID    No components found for: TSPOTTB    · Hemoglobin A1c 7.7 October 2019    Rheum Labs:   Rheumatoid factor 159.  .5    Infectious Labs:   Hepatitis profile nonreactive February 2020   QuantiFERON TB negative February 2020    Imaging:  All imaging reviewed and independently  interpreted by me.    X-ray hands February 2020  Mild degenerative changes noted bilaterally with most prominent findings at the 1st CMC joints and to lesser extent throughout the IP joints.  Right 2nd, 4th and 5th DIP joints and left 2nd and 5th DIP joints demonstrate mildly prominent changes.  Calcification again noted and slightly more prominent along the margins of the right 1st and 4th MCP joints.  No acute or healing fracture bilaterally.     ASSESSMENT / PLAN:     Indira Walker is a 74 y.o. Black or  female with:    1. Seropositive rheumatoid arthritis  - currently on active flare.  High disease activity.  - patient amenable for steroid therapy.  Recommend 10 mg daily until next visit.  Close glucose monitoring per PMD.  - initiate DMARD therapy with methotrexate 10 mg once per week with daily folic acid supplementation.  - clinical significanct side effects of therapy discussed in detail.  - ESR, CRP  prior next visit.    2. Immunosuppressed status   - Compromised immune system secondary to autoimmune disease and use of immunosuppressive drugs.   - Monitor carefully for infections and toxicities.   - Advised to get immediate medical care if any infection.   - Also advised strict adherence to age appropriate vaccinations and cancer screenings with PCP.  - CBC, CMP prior next visit    3. Other specified counseling  - over 10 minutes spent regarding below topics:  - Immunization counseling done.  - Nutrition and exercise counseling.  - Limitation of alcohol consumption.  - Medication counseling provided.    Follow up in about 1 month (around 3/14/2020).    Method of contact with patient concerns: [facility]    Disclaimer:  This note is prepared using voice recognition software and as such is likely to have errors and has not been proof read. Please contact me for questions.     Time spent: 40 minutes in face to face discussion concerning diagnosis, prognosis, review of lab and test results, benefits of treatment as well as management of disease, counseling of patient and coordination of care between various health care providers.  Greater than half the time spent was used for coordination of care and counseling of patient.    Giovanni Cage M.D.  Rheumatology Department   [facility]

## 2020-02-14 NOTE — TELEPHONE ENCOUNTER
----- Message from [place] sent at 2/14/2020  8:13 AM CST -----  Contact: Pt   Type:  Same Day Appointment Request    Caller is requesting a same day appointment.  Caller declined first available appointment listed below.    Name of Caller:Indira Walker  When is the first available appointment?  Symptoms:Hand pains (pt wants a shot )  Best Call Back Number:[phone] (home)   Additional Information:

## 2020-02-18 ENCOUNTER — CLINICAL SUPPORT (OUTPATIENT)
Dept: INTERNAL MEDICINE | Facility: CLINIC | Age: 75
End: 2020-02-18
Payer: MEDICARE

## 2020-02-18 DIAGNOSIS — I10 HYPERTENSION, ESSENTIAL: ICD-10-CM

## 2020-02-18 LAB
ALBUMIN/CREAT UR: 6.4 UG/MG (ref 0–30)
ANION GAP SERPL CALC-SCNC: 10 MMOL/L (ref 8–16)
BUN SERPL-MCNC: 16 MG/DL (ref 8–23)
CALCIUM SERPL-MCNC: 9 MG/DL (ref 8.7–10.5)
CHLORIDE SERPL-SCNC: 102 MMOL/L (ref 95–110)
CO2 SERPL-SCNC: 28 MMOL/L (ref 23–29)
CREAT SERPL-MCNC: 0.8 MG/DL (ref 0.5–1.4)
CREAT UR-MCNC: 298 MG/DL (ref 15–325)
EST. GFR  (AFRICAN AMERICAN): >60 ML/MIN/1.73 M^2
EST. GFR  (NON AFRICAN AMERICAN): >60 ML/MIN/1.73 M^2
ESTIMATED AVG GLUCOSE: 166 MG/DL (ref 68–131)
GLUCOSE SERPL-MCNC: 86 MG/DL (ref 70–110)
HBA1C MFR BLD HPLC: 7.4 % (ref 4–5.6)
MICROALBUMIN UR DL<=1MG/L-MCNC: 19 UG/ML
POTASSIUM SERPL-SCNC: 3.9 MMOL/L (ref 3.5–5.1)
SODIUM SERPL-SCNC: 140 MMOL/L (ref 136–145)

## 2020-02-18 PROCEDURE — 36416 PR COLLECTION CAPILLARY BLOOD SPECIMEN: ICD-10-PCS | Mod: S$GLB,,, | Performed by: FAMILY MEDICINE

## 2020-02-18 PROCEDURE — 82043 UR ALBUMIN QUANTITATIVE: CPT

## 2020-02-18 PROCEDURE — 36416 COLLJ CAPILLARY BLOOD SPEC: CPT | Mod: S$GLB,,, | Performed by: FAMILY MEDICINE

## 2020-02-18 PROCEDURE — 83036 HEMOGLOBIN GLYCOSYLATED A1C: CPT

## 2020-02-18 PROCEDURE — 80048 BASIC METABOLIC PNL TOTAL CA: CPT

## 2020-02-18 NOTE — PROGRESS NOTES
Patient here in the office to have her labs and urine collected.  Labs and urine collected successfully.

## 2020-02-25 ENCOUNTER — OFFICE VISIT (OUTPATIENT)
Dept: INTERNAL MEDICINE | Facility: CLINIC | Age: 75
End: 2020-02-25
Payer: MEDICARE

## 2020-02-25 VITALS
DIASTOLIC BLOOD PRESSURE: 81 MMHG | SYSTOLIC BLOOD PRESSURE: 133 MMHG | HEIGHT: 63 IN | BODY MASS INDEX: 26.11 KG/M2 | TEMPERATURE: 97 F | OXYGEN SATURATION: 96 % | HEART RATE: 78 BPM | WEIGHT: 147.38 LBS

## 2020-02-25 DIAGNOSIS — I10 HYPERTENSION, ESSENTIAL: ICD-10-CM

## 2020-02-25 DIAGNOSIS — E78.5 HYPERLIPIDEMIA ASSOCIATED WITH TYPE 2 DIABETES MELLITUS: ICD-10-CM

## 2020-02-25 DIAGNOSIS — E11.69 HYPERLIPIDEMIA ASSOCIATED WITH TYPE 2 DIABETES MELLITUS: ICD-10-CM

## 2020-02-25 DIAGNOSIS — Z12.31 ENCOUNTER FOR SCREENING MAMMOGRAM FOR BREAST CANCER: ICD-10-CM

## 2020-02-25 PROCEDURE — 1101F PT FALLS ASSESS-DOCD LE1/YR: CPT | Mod: CPTII,S$GLB,, | Performed by: FAMILY MEDICINE

## 2020-02-25 PROCEDURE — 3051F PR MOST RECENT HEMOGLOBIN A1C LEVEL 7.0 - < 8.0%: ICD-10-PCS | Mod: CPTII,S$GLB,, | Performed by: FAMILY MEDICINE

## 2020-02-25 PROCEDURE — 99499 UNLISTED E&M SERVICE: CPT | Mod: S$GLB,,, | Performed by: FAMILY MEDICINE

## 2020-02-25 PROCEDURE — 1101F PR PT FALLS ASSESS DOC 0-1 FALLS W/OUT INJ PAST YR: ICD-10-PCS | Mod: CPTII,S$GLB,, | Performed by: FAMILY MEDICINE

## 2020-02-25 PROCEDURE — 1159F PR MEDICATION LIST DOCUMENTED IN MEDICAL RECORD: ICD-10-PCS | Mod: S$GLB,,, | Performed by: FAMILY MEDICINE

## 2020-02-25 PROCEDURE — 99999 PR PBB SHADOW E&M-EST. PATIENT-LVL IV: CPT | Mod: PBBFAC,,, | Performed by: FAMILY MEDICINE

## 2020-02-25 PROCEDURE — 3079F DIAST BP 80-89 MM HG: CPT | Mod: CPTII,S$GLB,, | Performed by: FAMILY MEDICINE

## 2020-02-25 PROCEDURE — 3075F SYST BP GE 130 - 139MM HG: CPT | Mod: CPTII,S$GLB,, | Performed by: FAMILY MEDICINE

## 2020-02-25 PROCEDURE — 1126F PR PAIN SEVERITY QUANTIFIED, NO PAIN PRESENT: ICD-10-PCS | Mod: S$GLB,,, | Performed by: FAMILY MEDICINE

## 2020-02-25 PROCEDURE — 99499 RISK ADDL DX/OHS AUDIT: ICD-10-PCS | Mod: S$GLB,,, | Performed by: FAMILY MEDICINE

## 2020-02-25 PROCEDURE — 3051F HG A1C>EQUAL 7.0%<8.0%: CPT | Mod: CPTII,S$GLB,, | Performed by: FAMILY MEDICINE

## 2020-02-25 PROCEDURE — 3079F PR MOST RECENT DIASTOLIC BLOOD PRESSURE 80-89 MM HG: ICD-10-PCS | Mod: CPTII,S$GLB,, | Performed by: FAMILY MEDICINE

## 2020-02-25 PROCEDURE — 3075F PR MOST RECENT SYSTOLIC BLOOD PRESS GE 130-139MM HG: ICD-10-PCS | Mod: CPTII,S$GLB,, | Performed by: FAMILY MEDICINE

## 2020-02-25 PROCEDURE — 99214 OFFICE O/P EST MOD 30 MIN: CPT | Mod: S$GLB,,, | Performed by: FAMILY MEDICINE

## 2020-02-25 PROCEDURE — 1126F AMNT PAIN NOTED NONE PRSNT: CPT | Mod: S$GLB,,, | Performed by: FAMILY MEDICINE

## 2020-02-25 PROCEDURE — 99999 PR PBB SHADOW E&M-EST. PATIENT-LVL IV: ICD-10-PCS | Mod: PBBFAC,,, | Performed by: FAMILY MEDICINE

## 2020-02-25 PROCEDURE — 99214 PR OFFICE/OUTPT VISIT, EST, LEVL IV, 30-39 MIN: ICD-10-PCS | Mod: S$GLB,,, | Performed by: FAMILY MEDICINE

## 2020-02-25 PROCEDURE — 1159F MED LIST DOCD IN RCRD: CPT | Mod: S$GLB,,, | Performed by: FAMILY MEDICINE

## 2020-02-25 RX ORDER — ACETAMINOPHEN 500 MG
1000 TABLET ORAL EVERY 8 HOURS PRN
COMMUNITY
End: 2022-08-02

## 2020-02-25 RX ORDER — GABAPENTIN 300 MG/1
300 CAPSULE ORAL 2 TIMES DAILY
COMMUNITY
End: 2022-08-02

## 2020-02-25 NOTE — PROGRESS NOTES
Subjective:       Patient ID: Indira Walker is a 74 y.o. female.    Chief Complaint: Follow-up (lab)    Patient with type 2 diabetes, hypertension, hyperlipidemia here for scheduled recheck with recent labs.   She had recent diagnosis of seropositive rheumatoid arthritis and has started methotrexate and prednisone 10 mg as of 02/14/2020 - 10 days ago.  Lab Results       Component                Value               Date                       WBC                      6.74                02/11/2020                 HGB                      11.8 (L)            02/11/2020                 HCT                      35.5 (L)            02/11/2020                 PLT                      459 (H)             02/11/2020                 CHOL                     155                 07/19/2019                 TRIG                     64                  07/19/2019                 HDL                      65                  07/19/2019                 LDLCALC                  77.2                07/19/2019                 ALT                      17                  02/11/2020                 AST                      19                  02/11/2020                 NA                       140                 02/18/2020                 K                        3.9                 02/18/2020                 CL                       102                 02/18/2020                 CALCIUM                  9.0                 02/18/2020                 CREATININE               0.8                 02/18/2020                 BUN                      16                  02/18/2020                 CO2                      28                  02/18/2020                 INR                      1.0                 03/08/2018                 GLU                      86                  02/18/2020                 ESTGFRAFRICA             >60.0               02/18/2020                 EGFRNONAA                >60.0               02/18/2020                  HGBA1C                   7.4 (H)             02/18/2020                 MICALBCREAT              6.4                 02/18/2020              Some slight improvement to A1c is seen from October.  No microalbuminuria present.  Lab Results       Component                Value               Date                       HGBA1C                   7.4 (H)             02/18/2020                 HGBA1C                   7.7 (H)             10/15/2019                 HGBA1C                   7.9 (H)             07/19/2019                 HGBA1C                   7.1 (H)             03/19/2019                 HGBA1C                   7.4 (H)             12/14/2018                 HGBA1C                   7.8 (H)             08/08/2018            Diabetes Medications        metFORMIN (GLUCOPHAGE) 1000 MG tablet TAKE 1 TABLET BY MOUTH TWICE A DAY WITH FOOD    TRESIBA FLEXTOUCH U-200 200 unit/mL (3 mL) InPn INJECT 20 UNITS INTO THE SKIN DAILY AS DIRECTED        BP Readings from Last 3 Encounters:  02/25/20 : 133/81  02/14/20 : 130/79  02/11/20 : 134/72  Hypertension controlled.  Hypertension Medications        losartan-hydrochlorothiazide 100-12.5 mg (HYZAAR) 100-12.5 mg Tab TAKE 1 TABLET BY MOUTH EVERY DAY        Lab Results on rosuvastatin 20:       Component                Value               Date                       CHOL                     155                 07/19/2019                 CHOL                     194                 03/19/2019                 CHOL                     194                 01/11/2018            Lab Results       Component                Value               Date                       HDL                      65                  07/19/2019                 HDL                      67                  03/19/2019                 HDL                      61                  01/11/2018            Lab Results       Component                Value               Date                       LDLCALC                   77.2                07/19/2019                 LDLCALC                  112.8               03/19/2019                 LDLCALC                  120.6               01/11/2018            Lab Results       Component                Value               Date                       TRIG                     64                  07/19/2019                 TRIG                     71                  03/19/2019                 TRIG                     62                  01/11/2018     She started taking the 100 mg gabapentin few days ago due to exacerbation of sciatica. Not taking the 300 at night.    She wakes up with leg cramps at night - reviewed recs - she has gabapentin 300   Diabetes   She presents for her follow-up diabetic visit. She has type 2 diabetes mellitus. Her disease course has been improving. (At 75 BS she feels a little out of it) Associated symptoms include chest pain and foot paresthesias (L foot 2/2 sciatica). Pertinent negatives for diabetes include no foot ulcerations. There are no hypoglycemic complications. Diabetic complications include peripheral neuropathy. Pertinent negatives for diabetic complications include no nephropathy or retinopathy. Current diabetic treatment includes insulin injections and oral agent (monotherapy). She is compliant with treatment all of the time. Her weight is stable. She is following a generally healthy diet. Meal planning includes avoidance of concentrated sweets. Exercise: [employer] - not walking currently. Her breakfast blood glucose range is generally  mg/dl. An ACE inhibitor/angiotensin II receptor blocker is being taken. She does not see a podiatrist.Eye exam is current.     Review of Systems   Cardiovascular: Positive for chest pain. Negative for leg swelling.   Musculoskeletal: Positive for arthralgias and joint swelling.   Psychiatric/Behavioral: Negative for sleep disturbance.       Objective:      Physical Exam   Constitutional: She is  oriented to person, place, and time. She appears well-developed.   HENT:   Head: Normocephalic and atraumatic.   Cardiovascular: Normal rate, regular rhythm and normal heart sounds.   No murmur heard.  Pulmonary/Chest: Effort normal and breath sounds normal. No respiratory distress. She has no wheezes.   Musculoskeletal: She exhibits no edema.   Neurological: She is alert and oriented to person, place, and time.   Skin: Skin is warm and dry.   Psychiatric: She has a normal mood and affect. Her behavior is normal.         Assessment/Plan:     1. Uncontrolled type 2 diabetes mellitus without complication, with long-term current use of insulin  Hemoglobin A1c   2. Hypertension, essential     3. Hyperlipidemia associated with type 2 diabetes mellitus     4. Encounter for screening mammogram for breast cancer  Mammo Digital Screening Bilat   Pt instructions: Take the gabapentin 300mg every night (cramps) Take the 100 mg gabapentin during the day if sciatica is acting up.  Also try taking one OTC B-complex 3 x day dialy with meals.  OK to decrease insulin to 14 U. Go back up if needed. Monitor and let me know if increasing readings occur  Schedule the MRI by calling our general appt desk  ABBY signed for [facility] - she states she saw them end of 2019.

## 2020-02-25 NOTE — PATIENT INSTRUCTIONS
"Talk to your pharmacy and/or your insurance about the "Shingrix" shingles vaccine (two-shot series).    Go to [email or web address] to sign into [email or web address].    Take the gabapentin 300mg every night (cramps)  Take the 100 mg gabapentin during the day if sciatica is acting up.  Also try taking one OTC B-complex 3 x day dialy with meals.    OK to decrease insulin to 14 U. Go back up if needed. Monitor and let me know if increasing readings occur    Schedule the MRI by calling our general appt desk  "

## 2020-03-11 ENCOUNTER — PATIENT OUTREACH (OUTPATIENT)
Dept: ADMINISTRATIVE | Facility: OTHER | Age: 75
End: 2020-03-11

## 2020-03-12 ENCOUNTER — LAB VISIT (OUTPATIENT)
Dept: LAB | Facility: HOSPITAL | Age: 75
End: 2020-03-12
Attending: INTERNAL MEDICINE
Payer: MEDICARE

## 2020-03-12 ENCOUNTER — OFFICE VISIT (OUTPATIENT)
Dept: RHEUMATOLOGY | Facility: CLINIC | Age: 75
End: 2020-03-12
Payer: MEDICARE

## 2020-03-12 VITALS
HEART RATE: 75 BPM | BODY MASS INDEX: 26.17 KG/M2 | SYSTOLIC BLOOD PRESSURE: 128 MMHG | WEIGHT: 147.69 LBS | HEIGHT: 63 IN | DIASTOLIC BLOOD PRESSURE: 71 MMHG

## 2020-03-12 DIAGNOSIS — M19.90 INFLAMMATORY ARTHRITIS: ICD-10-CM

## 2020-03-12 DIAGNOSIS — Z71.89 COUNSELING ON HEALTH PROMOTION AND DISEASE PREVENTION: ICD-10-CM

## 2020-03-12 DIAGNOSIS — M05.9 SEROPOSITIVE RHEUMATOID ARTHRITIS: ICD-10-CM

## 2020-03-12 DIAGNOSIS — Z79.60 LONG-TERM USE OF IMMUNOSUPPRESSANT MEDICATION: ICD-10-CM

## 2020-03-12 DIAGNOSIS — M05.9 SEROPOSITIVE RHEUMATOID ARTHRITIS: Primary | ICD-10-CM

## 2020-03-12 LAB
ALBUMIN SERPL BCP-MCNC: 3.4 G/DL (ref 3.5–5.2)
ALP SERPL-CCNC: 54 U/L (ref 55–135)
ALT SERPL W/O P-5'-P-CCNC: 22 U/L (ref 10–44)
ANION GAP SERPL CALC-SCNC: 11 MMOL/L (ref 8–16)
AST SERPL-CCNC: 14 U/L (ref 10–40)
BASOPHILS # BLD AUTO: 0.04 K/UL (ref 0–0.2)
BASOPHILS NFR BLD: 0.5 % (ref 0–1.9)
BILIRUB SERPL-MCNC: 0.7 MG/DL (ref 0.1–1)
BUN SERPL-MCNC: 15 MG/DL (ref 8–23)
CALCIUM SERPL-MCNC: 9 MG/DL (ref 8.7–10.5)
CHLORIDE SERPL-SCNC: 99 MMOL/L (ref 95–110)
CO2 SERPL-SCNC: 27 MMOL/L (ref 23–29)
CREAT SERPL-MCNC: 1 MG/DL (ref 0.5–1.4)
CRP SERPL-MCNC: 1.1 MG/L (ref 0–8.2)
DIFFERENTIAL METHOD: ABNORMAL
EOSINOPHIL # BLD AUTO: 0.2 K/UL (ref 0–0.5)
EOSINOPHIL NFR BLD: 2.5 % (ref 0–8)
ERYTHROCYTE [DISTWIDTH] IN BLOOD BY AUTOMATED COUNT: 14.4 % (ref 11.5–14.5)
ERYTHROCYTE [SEDIMENTATION RATE] IN BLOOD BY WESTERGREN METHOD: 5 MM/HR (ref 0–20)
EST. GFR  (AFRICAN AMERICAN): >60 ML/MIN/1.73 M^2
EST. GFR  (NON AFRICAN AMERICAN): 55 ML/MIN/1.73 M^2
GLUCOSE SERPL-MCNC: 283 MG/DL (ref 70–110)
HCT VFR BLD AUTO: 34.4 % (ref 37–48.5)
HGB BLD-MCNC: 11.1 G/DL (ref 12–16)
IMM GRANULOCYTES # BLD AUTO: 0.05 K/UL (ref 0–0.04)
IMM GRANULOCYTES NFR BLD AUTO: 0.6 % (ref 0–0.5)
LYMPHOCYTES # BLD AUTO: 3.3 K/UL (ref 1–4.8)
LYMPHOCYTES NFR BLD: 37.3 % (ref 18–48)
MCH RBC QN AUTO: 30.1 PG (ref 27–31)
MCHC RBC AUTO-ENTMCNC: 32.3 G/DL (ref 32–36)
MCV RBC AUTO: 93 FL (ref 82–98)
MONOCYTES # BLD AUTO: 0.8 K/UL (ref 0.3–1)
MONOCYTES NFR BLD: 8.8 % (ref 4–15)
NEUTROPHILS # BLD AUTO: 4.5 K/UL (ref 1.8–7.7)
NEUTROPHILS NFR BLD: 50.3 % (ref 38–73)
NRBC BLD-RTO: 0 /100 WBC
PLATELET # BLD AUTO: 359 K/UL (ref 150–350)
PMV BLD AUTO: 10.1 FL (ref 9.2–12.9)
POTASSIUM SERPL-SCNC: 3.5 MMOL/L (ref 3.5–5.1)
PROT SERPL-MCNC: 6.7 G/DL (ref 6–8.4)
RBC # BLD AUTO: 3.69 M/UL (ref 4–5.4)
SODIUM SERPL-SCNC: 137 MMOL/L (ref 136–145)
WBC # BLD AUTO: 8.84 K/UL (ref 3.9–12.7)

## 2020-03-12 PROCEDURE — 3078F DIAST BP <80 MM HG: CPT | Mod: CPTII,S$GLB,, | Performed by: INTERNAL MEDICINE

## 2020-03-12 PROCEDURE — 1101F PR PT FALLS ASSESS DOC 0-1 FALLS W/OUT INJ PAST YR: ICD-10-PCS | Mod: CPTII,S$GLB,, | Performed by: INTERNAL MEDICINE

## 2020-03-12 PROCEDURE — 85651 RBC SED RATE NONAUTOMATED: CPT

## 2020-03-12 PROCEDURE — 1126F AMNT PAIN NOTED NONE PRSNT: CPT | Mod: S$GLB,,, | Performed by: INTERNAL MEDICINE

## 2020-03-12 PROCEDURE — 36415 COLL VENOUS BLD VENIPUNCTURE: CPT

## 2020-03-12 PROCEDURE — 99999 PR PBB SHADOW E&M-EST. PATIENT-LVL III: ICD-10-PCS | Mod: PBBFAC,,, | Performed by: INTERNAL MEDICINE

## 2020-03-12 PROCEDURE — 3074F SYST BP LT 130 MM HG: CPT | Mod: CPTII,S$GLB,, | Performed by: INTERNAL MEDICINE

## 2020-03-12 PROCEDURE — 99214 OFFICE O/P EST MOD 30 MIN: CPT | Mod: S$GLB,,, | Performed by: INTERNAL MEDICINE

## 2020-03-12 PROCEDURE — 85025 COMPLETE CBC W/AUTO DIFF WBC: CPT

## 2020-03-12 PROCEDURE — 99214 PR OFFICE/OUTPT VISIT, EST, LEVL IV, 30-39 MIN: ICD-10-PCS | Mod: S$GLB,,, | Performed by: INTERNAL MEDICINE

## 2020-03-12 PROCEDURE — 80053 COMPREHEN METABOLIC PANEL: CPT

## 2020-03-12 PROCEDURE — 3078F PR MOST RECENT DIASTOLIC BLOOD PRESSURE < 80 MM HG: ICD-10-PCS | Mod: CPTII,S$GLB,, | Performed by: INTERNAL MEDICINE

## 2020-03-12 PROCEDURE — 1101F PT FALLS ASSESS-DOCD LE1/YR: CPT | Mod: CPTII,S$GLB,, | Performed by: INTERNAL MEDICINE

## 2020-03-12 PROCEDURE — 99999 PR PBB SHADOW E&M-EST. PATIENT-LVL III: CPT | Mod: PBBFAC,,, | Performed by: INTERNAL MEDICINE

## 2020-03-12 PROCEDURE — 1126F PR PAIN SEVERITY QUANTIFIED, NO PAIN PRESENT: ICD-10-PCS | Mod: S$GLB,,, | Performed by: INTERNAL MEDICINE

## 2020-03-12 PROCEDURE — 1159F PR MEDICATION LIST DOCUMENTED IN MEDICAL RECORD: ICD-10-PCS | Mod: S$GLB,,, | Performed by: INTERNAL MEDICINE

## 2020-03-12 PROCEDURE — 3074F PR MOST RECENT SYSTOLIC BLOOD PRESSURE < 130 MM HG: ICD-10-PCS | Mod: CPTII,S$GLB,, | Performed by: INTERNAL MEDICINE

## 2020-03-12 PROCEDURE — 86140 C-REACTIVE PROTEIN: CPT

## 2020-03-12 PROCEDURE — 1159F MED LIST DOCD IN RCRD: CPT | Mod: S$GLB,,, | Performed by: INTERNAL MEDICINE

## 2020-03-12 RX ORDER — METHOTREXATE 2.5 MG/1
20 TABLET ORAL
Qty: 40 TABLET | Refills: 2 | Status: SHIPPED | OUTPATIENT
Start: 2020-03-12 | End: 2020-08-12 | Stop reason: SDUPTHER

## 2020-03-12 RX ORDER — PREDNISONE 10 MG/1
10 TABLET ORAL DAILY
Qty: 30 TABLET | Refills: 1 | Status: SHIPPED | OUTPATIENT
Start: 2020-03-12 | End: 2020-04-11

## 2020-03-12 RX ORDER — FOLIC ACID 1 MG/1
1 TABLET ORAL DAILY
Qty: 30 TABLET | Refills: 4 | Status: SHIPPED | OUTPATIENT
Start: 2020-03-12 | End: 2020-06-23 | Stop reason: SDUPTHER

## 2020-03-12 NOTE — PROGRESS NOTES
RHEUMATOLOGY OUTPATIENT CLINIC NOTE    3/12/2020    Attending Rheumatologist: Giovanni Cage  Primary Care Provider: Gladys Herrera MD   Physician Requesting Consultation: Aaareferral Self  No address on file  Chief Complaint/Reason For Consultation:  Seropositive rheumatoid arthritis    Subjective:       HPI  Indira Walker is a 74 y.o. Black or  female with hand pain comes for follow-up.    Today  Last seen early February.  Recommended to start methotrexate 10 mg weekly and continue with 10 mg of prednisone.  Excellent response to therapy.  Tolerating DMARD without side effects.  Denies having new joint pain or swelling.  Does not have interference with activities of daily living due to pain.  Denies fever, rash,  or GI complaints.    Addendum 3/17:  Communicated patient.  Reported having malaise over the weekend, upon evaluation by EMT was noted to have blood glucose over 500 mg per dL.  Serum glucose level normalized after management on emergency department.  Currently voices no acute complaints, taking 5 mg of prednisone daily.  Denies new joint pain or swelling.  Recommend taper PDN to discontinue over 1 week.  She will inform clinic if for RA flares.    Review of Systems   Constitutional: Negative for chills, fever and malaise/fatigue.   Eyes: Negative for pain and redness.   Respiratory: Negative for cough, hemoptysis and shortness of breath.    Cardiovascular: Negative for chest pain and leg swelling.   Gastrointestinal: Negative for abdominal pain, blood in stool and melena.   Genitourinary: Negative for dysuria and hematuria.   Musculoskeletal: Negative for falls and joint pain.   Skin: Negative for rash.   Neurological: Negative for tingling and focal weakness.   Psychiatric/Behavioral: Negative for memory loss. The patient does not have insomnia.      Chronic comorbid conditions affecting medical decision making today:  Past Medical History:   Diagnosis Date    Arthritis   "   Diabetes mellitus, type 2     Heartburn     Hyperlipidemia     Hypertension     Left sided sciatica     s/p microdiscectomy 3/21/18     Past Surgical History:   Procedure Laterality Date    BREAST BIOPSY       SECTION      COLONOSCOPY  2008    DR. JANET GARCÍA/MILD DIVERICULOSIS DESCENDING AND SIGMOID. REPEAT 5 YRS    HERNIA REPAIR      MICRODISCECTOMY OF SPINE Left 2018    left L5-S1, Dr Segundo     History reviewed. No pertinent family history.  Social History     Substance and Sexual Activity   Alcohol Use No     Social History     Tobacco Use   Smoking Status Never Smoker   Smokeless Tobacco Never Used     Social History     Substance and Sexual Activity   Drug Use No       Current Outpatient Medications:     acetaminophen (TYLENOL) 650 MG TbSR, Take 1,300 mg by mouth every 8 (eight) hours., Disp: , Rfl:     albuterol (PROVENTIL/VENTOLIN HFA) 90 mcg/actuation inhaler, INHALE 2 PUFFS INTO THE LUNGS EVERY 6 (SIX) HOURS AS NEEDED FOR SHORTNESS OF BREATH (OR COUGHING)., Disp: 18 Inhaler, Rfl: 0    ascorbic acid, vitamin C, (VITAMIN C) 1000 MG tablet, Take 1,000 mg by mouth once daily., Disp: , Rfl:     aspirin (ECOTRIN) 81 MG EC tablet, Take 1 tablet (81 mg total) by mouth once daily. Every other day, Disp: , Rfl: 0    b complex vitamins capsule, Take 1 capsule by mouth once daily., Disp: , Rfl:     BD ULTRA-FINE MINI PEN NEEDLE 31 gauge x 3/16" Ndle, AS DIRECTED ONCE DAILY WITH INSULIN SUBCUTANEOUSLY, Disp: 100 each, Rfl: 4    beta-carotene,A,-vits C,E/mins (VISION ORAL), Take by mouth., Disp: , Rfl:     BIOTIN ORAL, Take 1,000 mg by mouth., Disp: , Rfl:     BIOTIN ORAL, Take by mouth., Disp: , Rfl:     blood sugar diagnostic Strp, To check BG 2 times daily, to use with insurance preferred meter, Disp: 200 each, Rfl: 4    blood-glucose meter kit, To check BG 2 times daily, to use with insurance preferred meter, Disp: 1 each, Rfl: 0    cholecalciferol, vitamin D3, " 2,000 unit Cap, Take 2,000 Units by mouth once daily. , Disp: , Rfl:     diclofenac sodium (VOLTAREN) 1 % Gel, Apply 2 g topically 4 (four) times daily., Disp: 1 Tube, Rfl: 2    famotidine (PEPCID) 40 MG tablet, Take 1 tablet (40 mg total) by mouth once daily., Disp: 30 tablet, Rfl: 11    folic acid (FOLVITE) 1 MG tablet, Take 1 tablet (1 mg total) by mouth once daily., Disp: 30 tablet, Rfl: 4    gabapentin (NEURONTIN) 300 MG capsule, Take 300 mg by mouth 2 (two) times daily., Disp: , Rfl:     hydrocortisone 1 % cream, Apply to affected area 2 times daily for 5 days, Disp: 30 g, Rfl: 0    lancets Misc, To check BG 2 times daily, to use with insurance preferred meter, Disp: 200 each, Rfl: 4    loratadine (CLARITIN) 10 mg tablet, Take 10 mg by mouth once daily., Disp: , Rfl:     LORazepam (ATIVAN) 1 MG tablet, 1-2 po 1 hour before procedure/test., Disp: 6 tablet, Rfl: 0    losartan-hydrochlorothiazide 100-12.5 mg (HYZAAR) 100-12.5 mg Tab, TAKE 1 TABLET BY MOUTH EVERY DAY, Disp: 90 tablet, Rfl: 4    metFORMIN (GLUCOPHAGE) 1000 MG tablet, TAKE 1 TABLET BY MOUTH TWICE A DAY WITH FOOD, Disp: 180 tablet, Rfl: 3    methotrexate 2.5 MG Tab, Take 8 tablets (20 mg total) by mouth every 7 days., Disp: 40 tablet, Rfl: 2    methyl salicylate/menthol (ARTHRITIS HOT PAIN RELIEF TOP), Apply topically., Disp: , Rfl:     montelukast (SINGULAIR) 10 mg tablet, Take 1 tablet (10 mg total) by mouth every evening., Disp: 30 tablet, Rfl: 5    MULTIVIT-MINERALS/FERROUS FUM (MULTI VITAMIN ORAL), Take by mouth. For women over 50 (centrum), Disp: , Rfl:     mupirocin (BACTROBAN) 2 % ointment, Apply topically 2 (two) times daily. Apply to open wounds., Disp: 1 Tube, Rfl: 0    OMEGA-3S-DHA-EPA-FISH OIL ORAL, Take by mouth. Omega XL  Green lipped Mussel, Disp: , Rfl:     ondansetron (ZOFRAN-ODT) 4 MG TbDL, Take 1 tablet (4 mg total) by mouth every 8 (eight) hours as needed (nausea)., Disp: 10 tablet, Rfl: 0    predniSONE  "(DELTASONE) 10 MG tablet, Take 1 tablet (10 mg total) by mouth once daily., Disp: 30 tablet, Rfl: 1    rosuvastatin (CRESTOR) 20 MG tablet, Take 1 tablet (20 mg total) by mouth once daily., Disp: 90 tablet, Rfl: 3    traMADol (ULTRAM) 50 mg tablet, Take 1 tablet (50 mg total) by mouth every 8 (eight) hours as needed for Pain., Disp: 20 tablet, Rfl: 0    TRESIBA FLEXTOUCH U-200 200 unit/mL (3 mL) InPn, INJECT 20 UNITS INTO THE SKIN DAILY AS DIRECTED (Patient taking differently: Inject 16 Units into the skin. ), Disp: 3 Syringe, Rfl: 35    valACYclovir (VALTREX) 1000 MG tablet, Take 1 tablet (1,000 mg total) by mouth 3 (three) times daily. for 7 days, Disp: 21 tablet, Rfl: 0     Objective:         Vitals:    03/12/20 1135   BP: 128/71   Pulse: 75     Physical Exam   Constitutional: No distress.   Estimated body mass index is 26.17 kg/m² as calculated from the following:    Height as of this encounter: 5' 3" (1.6 m).    Weight as of this encounter: 67 kg (147 lb 11.3 oz).    Wt Readings from Last 1 Encounters:  03/12/20 1135 : 67 kg (147 lb 11.3 oz)     HENT:   Head: Normocephalic and atraumatic.   Eyes: Conjunctivae are normal. Pupils are equal, round, and reactive to light.   Neck: Normal range of motion.   Cardiovascular: Normal rate and intact distal pulses.    Pulmonary/Chest: Effort normal. No respiratory distress.   Abdominal: Soft. She exhibits no distension.   Neurological: She is alert. Gait normal.   Skin: No rash noted. No erythema.     Musculoskeletal: Normal range of motion. She exhibits no tenderness.   : strong    AROM: intact  PROM: intact    Devices used by patient: none       Reviewed old and all outside pertinent medical records available.    All lab results personally reviewed and interpreted by me.  Lab Results   Component Value Date    WBC 8.84 03/12/2020    HGB 11.1 (L) 03/12/2020    HCT 34.4 (L) 03/12/2020    MCV 93 03/12/2020    MCH 30.1 03/12/2020    MCHC 32.3 03/12/2020    RDW 14.4 " 03/12/2020     (H) 03/12/2020    MPV 10.1 03/12/2020       Lab Results   Component Value Date     02/18/2020    K 3.9 02/18/2020     02/18/2020    CO2 28 02/18/2020    GLU 86 02/18/2020    BUN 16 02/18/2020    CALCIUM 9.0 02/18/2020    PROT 7.7 02/11/2020    ALBUMIN 3.7 02/11/2020    BILITOT 0.7 02/11/2020    AST 19 02/11/2020    ALKPHOS 54 (L) 02/11/2020    ALT 17 02/11/2020       Lab Results   Component Value Date    COLORU Yellow 07/01/2019    APPEARANCEUA Clear 07/01/2019    SPECGRAV 1.020 07/01/2019    PHUR 7.0 07/01/2019    PROTEINUA Negative 07/01/2019    KETONESU Negative 07/01/2019    LEUKOCYTESUR Trace (A) 07/01/2019    NITRITE Negative 07/01/2019    UROBILINOGEN 0.2 03/08/2018       Lab Results   Component Value Date    CRP 3.9 02/11/2020       Lab Results   Component Value Date    SEDRATE 19 02/11/2020       Lab Results   Component Value Date    .0 (H) 02/11/2020    SEDRATE 19 02/11/2020       No components found for: 25OHVITDTOT, 34UPBZIV8, 47EZRFRT9, METHODNOTE    No results found for: URICACID    No components found for: TSPOTTB    · Hemoglobin A1c 7.7 October 2019    Rheum Labs:   Rheumatoid factor 159.  .5    Infectious Labs:   Hepatitis profile nonreactive February 2020   QuantiFERON TB negative February 2020    Imaging:  All imaging reviewed and independently  interpreted by me.    X-ray hands February 2020  Mild degenerative changes noted bilaterally with most prominent findings at the 1st CMC joints and to lesser extent throughout the IP joints.  Right 2nd, 4th and 5th DIP joints and left 2nd and 5th DIP joints demonstrate mildly prominent changes.  Calcification again noted and slightly more prominent along the margins of the right 1st and 4th MCP joints.  No acute or healing fracture bilaterally.     ASSESSMENT / PLAN:     Indira Walker is a 74 y.o. Black or  female with:    1. Seropositive rheumatoid arthritis  - CDAI:  Low disease  activity.  - currently on 10 mg prednisone daily.  Recommend taper to discontinue.    - May resume PDN up to 15 mg per day p.r.n. for flares for 10 days  - tolerating methotrexate 10 mg once per week.  Will ramp to goal of 20 mg split dose once per week and continue with daily folic acid.  - ESR, CRP prior next visit.    2. Immunosuppressed status   - Compromised immune system secondary to autoimmune disease and use of immunosuppressive drugs.   - Monitor carefully for infections and toxicities.   - Advised to get immediate medical care if any infection.   - Also advised strict adherence to age appropriate vaccinations and cancer screenings with PCP.  - CBC, CMP prior next visit    3. Other specified counseling  - over 10 minutes spent regarding below topics:  - Immunization counseling done.  - Nutrition and exercise counseling.  - Limitation of alcohol consumption.  - Medication counseling provided.    Follow up in about 3 months (around 6/12/2020).    Method of contact with patient concerns: [facility]    Disclaimer:  This note is prepared using voice recognition software and as such is likely to have errors and has not been proof read. Please contact me for questions.     Time spent: 25 minutes in face to face discussion concerning diagnosis, prognosis, review of lab and test results, benefits of treatment as well as management of disease, counseling of patient and coordination of care between various health care providers.  Greater than half the time spent was used for coordination of care and counseling of patient.    Giovanni Cage M.D.  Rheumatology Department   [facility]

## 2020-03-12 NOTE — PATIENT INSTRUCTIONS
Rheumatology medications 3/12/20:    · Prednisone  - Taper:  - Take 10 mg daily for 7 days then  - Take 7.5 mg daily for 7 days then  - Take 5 mg daily for 7 days then  - Take 5 mg every other day then stop  - May resume 10-15mg for 10 days for flares    · Methotrexate  Every Friday; take 3 tablets in the AM and 3 in the PM for 1 weeks then  Every Friday; take 4 tablets in the AM and 4 in the PM until next visit    · Folic acid  Take 1 tablet every day

## 2020-03-15 ENCOUNTER — NURSE TRIAGE (OUTPATIENT)
Dept: ADMINISTRATIVE | Facility: CLINIC | Age: 75
End: 2020-03-15

## 2020-03-15 NOTE — TELEPHONE ENCOUNTER
Reason for Disposition   Blood glucose  mg/dl (3.9 -13 mmol/l)    Additional Information   Negative: Unconscious or difficult to awaken   Negative: Acting confused (e.g., disoriented, slurred speech)   Negative: Very weak (e.g., can't stand)   Negative: Sounds like a life-threatening emergency to the triager   Negative: [1] Vomiting AND [2] signs of dehydration (e.g., very dry mouth, lightheaded, etc.)   Negative: [1] Blood glucose > 240 mg/dl (13 mmol/l) AND [2] rapid breathing   Negative: Blood glucose > 500 mg/dl (27.5 mmol/l)   Negative: [1] Blood glucose > 240 mg/dl (13 mmol/l) AND [2] urine ketones moderate-large (or more than 1+)   Negative: [1] Blood glucose > 240 mg/dl (13 mmol/l) AND [2] blood ketones > 1.5 mmol/l   Negative: [1] Blood glucose > 240 mg/dl (13 mmol/l) AND [2] vomiting AND [3] unable to check for ketones (in blood or urine)   Negative: [1] New onset Diabetes suspected (e.g., frequent urination, weak, weight loss) AND [2] vomiting or rapid breathing   Negative: Vomiting lasts > 4 hours   Negative: Patient sounds very sick or weak to the triager   Negative: Fever > 100.5 F (38.1 C)   Negative: Blood glucose > 400 mg/dl (22 mmol/l)   Negative: [1] Blood glucose > 300 mg/dl (16.7 mmol/l) AND [2] two or more times in a row   Negative: Urine ketones moderate - large (or blood ketones > 1.5 mmol/l)   Negative: Caller has URGENT medication or insulin pump question and triager unable to answer question   Negative: [1] Symptoms of high blood sugar (e.g., frequent urination, weak, weight loss) AND [2] not able to test blood glucose   Negative: New onset diabetes suspected (e.g., frequent urination, weakness, weight loss)   Negative: Caller has NON-URGENT medication question about med that PCP prescribed and triager unable to answer question   Negative: [1] Blood glucose > 240 mg/dl (13 mmol/l) AND [2] uses insulin (e.g., insulin-dependent, all people with type 1  diabetes)   Negative: [1] Blood glucose > 240  mg/dl (13 mmol/l) AND [2] does not  use insulin (e.g., not insulin-dependent; most people with type 2 diabetes)    Protocols used: DIABETES - HIGH BLOOD SUGAR-A-    Right now . Earlier tonight around 930pm it was 586 and she administered 26 units of tresiba insulin instead of her usual dose. She is on prednisone for rheumatoid arthritis. Patient advised not to administer more units of insulin without consulting her md first. She is drinking wter and feeling better. Patient advised to call back if she has more questions.

## 2020-03-16 ENCOUNTER — TELEPHONE (OUTPATIENT)
Dept: RHEUMATOLOGY | Facility: CLINIC | Age: 75
End: 2020-03-16

## 2020-03-16 NOTE — TELEPHONE ENCOUNTER
Pt called in stating she was just put Prednisone & has caused her blood sugars to elevate. Would like advise as to what to do?  Please advise

## 2020-03-30 DIAGNOSIS — E11.69 HYPERLIPIDEMIA ASSOCIATED WITH TYPE 2 DIABETES MELLITUS: ICD-10-CM

## 2020-03-30 DIAGNOSIS — E78.5 HYPERLIPIDEMIA ASSOCIATED WITH TYPE 2 DIABETES MELLITUS: ICD-10-CM

## 2020-03-31 RX ORDER — ROSUVASTATIN CALCIUM 20 MG/1
TABLET, COATED ORAL
Qty: 90 TABLET | Refills: 3 | Status: SHIPPED | OUTPATIENT
Start: 2020-03-31 | End: 2021-02-24

## 2020-05-19 ENCOUNTER — TELEPHONE (OUTPATIENT)
Dept: INTERNAL MEDICINE | Facility: CLINIC | Age: 75
End: 2020-05-19

## 2020-05-19 NOTE — TELEPHONE ENCOUNTER
Peripheral neuropathy. OK for handicap certification - one year temporary signed - call pt to  and file copy to Epic pls. At your work station.

## 2020-05-19 NOTE — TELEPHONE ENCOUNTER
----- Message from Iveth Ernandez sent at 5/19/2020  4:05 PM CDT -----  Contact: self  Pt is requesting a replacement handicap parking sticker. She states she has tried reaching out several times she also states that she needs ASAP. Please call back [phone]  Thanks

## 2020-05-20 NOTE — TELEPHONE ENCOUNTER
Patient notified and patient will  today. Advised to call the office as needed, patient verbalized understanding.

## 2020-06-10 ENCOUNTER — TELEPHONE (OUTPATIENT)
Dept: RHEUMATOLOGY | Facility: CLINIC | Age: 75
End: 2020-06-10

## 2020-06-23 DIAGNOSIS — M19.90 INFLAMMATORY ARTHRITIS: ICD-10-CM

## 2020-06-23 RX ORDER — PREDNISONE 10 MG/1
10 TABLET ORAL DAILY
COMMUNITY
End: 2020-06-23 | Stop reason: SDUPTHER

## 2020-06-23 NOTE — TELEPHONE ENCOUNTER
----- Message from Joan Baum sent at 6/23/2020  2:09 PM CDT -----  Contact: Patient [phone]  Requesting an RX refill or new RX.  Is this a refill or new RX:  refill  RX name and strength: folic acid (FOLVITE) 1 MG tablet  Directions (copy/paste from chart):    Is this a 30 day or 90 day RX:    Local pharmacy or mail order pharmacy:  local  Pharmacy name and phone # (copy/paste from chart):   [facility]/pharmacy #[id number] - [place] - [address] AT AT [facility] [phone] (Phone)  [phone] (Fax)

## 2020-06-24 RX ORDER — PREDNISONE 10 MG/1
10 TABLET ORAL DAILY
Qty: 30 TABLET | Refills: 1 | Status: SHIPPED | OUTPATIENT
Start: 2020-06-24 | End: 2020-12-14 | Stop reason: ALTCHOICE

## 2020-06-24 RX ORDER — FOLIC ACID 1 MG/1
1 TABLET ORAL DAILY
Qty: 30 TABLET | Refills: 4 | Status: SHIPPED | OUTPATIENT
Start: 2020-06-24 | End: 2020-08-12 | Stop reason: SDUPTHER

## 2020-06-29 RX ORDER — INSULIN DEGLUDEC 200 U/ML
INJECTION, SOLUTION SUBCUTANEOUS
Qty: 3 SYRINGE | Refills: 35 | Status: SHIPPED | OUTPATIENT
Start: 2020-06-29 | End: 2020-12-14

## 2020-06-29 NOTE — TELEPHONE ENCOUNTER
Please advise- patient's next dose is tonight. Blood sugar this morning was 124.----- Message from Sabrina Lezama sent at 6/29/2020 10:49 AM CDT -----  Regarding: RX refill  Contact: Pt  Pt is calling the staff regarding a refill RX TRESIBA FLEXTOUCH U-200 200 unit/mL (3 mL) InPn  Once a day/ 90 day     Pt call back to be advised  today [phone] Pt stated that the pt is completely out  of medication      [id number] [place] - [address] AT [facility]  [address] [facility]  [place] [address]  Phone: [phone] Fax: [phone]    Thanks

## 2020-07-07 ENCOUNTER — TELEPHONE (OUTPATIENT)
Dept: INTERNAL MEDICINE | Facility: CLINIC | Age: 75
End: 2020-07-07

## 2020-07-07 RX ORDER — METFORMIN HYDROCHLORIDE 1000 MG/1
1000 TABLET ORAL 2 TIMES DAILY WITH MEALS
Qty: 180 TABLET | Refills: 3 | Status: SHIPPED | OUTPATIENT
Start: 2020-07-07 | End: 2021-07-07 | Stop reason: SDUPTHER

## 2020-07-07 NOTE — TELEPHONE ENCOUNTER
Please advise----- Message from Rosemary Jerson sent at 7/7/2020 10:57 AM CDT -----  Regarding: refill  Contact: pt  Type:  RX Refill Request    Who Called: pt  Refill or New Rx:efill  RX Name and Strength:metFORMIN (GLUCOPHAGE) 1000 MG tablet  How is the patient currently taking it? (ex. 1XDay):twice a day  Is this a 30 day or 90 day RX:90  Preferred Pharmacy with phone number:  [facility] #[id number] - [place] - [address] [facility] AT [facility]  [address] [facility] [place] [address]  Phone: [phone] Fax: [phone]  Local or Mail Order:local  Ordering Provider:jacqueline  Would the patient rather a call back or a response via [employer]? Call back  Best Call Back Number:[phone]  Additional Information: none

## 2020-07-08 NOTE — TELEPHONE ENCOUNTER
Spoke with patient and informed her that her refill was sent to the pharmacy. She thanked me for calling

## 2020-07-15 ENCOUNTER — TELEPHONE (OUTPATIENT)
Dept: RHEUMATOLOGY | Facility: CLINIC | Age: 75
End: 2020-07-15

## 2020-07-15 NOTE — TELEPHONE ENCOUNTER
----- Message from Alise Henning sent at 7/15/2020  4:09 PM CDT -----  Contact: self/ [phone]  Pt would consult with nurse about her hands please call back [phone]

## 2020-07-15 NOTE — TELEPHONE ENCOUNTER
Left voice message stating I was returning her call.    Problem: Occupational Therapy Goal  Goal: Occupational Therapy Goal  Goals to be met by: 3 weeks     Patient will increase functional independence with ADLs by performing:    Feeding with Set-up Assistance.  MET 05-14  UE Dressing with Set-up Assistance. MET 05-14  LE Dressing with Supervision.  Grooming while standing with Supervision.  Toileting from bedside commode with Supervision for hygiene and clothing management.   Bathing from  shower chair/bench with Stand-by Assistance. MET sponge bath 05-14  Supine to sit with Modified [place]. MET 05/16  Stand pivot transfers with Supervision.  Toilet transfer to bedside commode with Supervision  Pt. Will be able to follow 2 step directions involving ADL tasks with  75% accuracy MET 05-14  Pt.. Will be independent with HEP for BUE as well as for improving FMC skills in left hand   .        Pt. Tolerated session well.

## 2020-07-22 ENCOUNTER — TELEPHONE (OUTPATIENT)
Dept: RHEUMATOLOGY | Facility: CLINIC | Age: 75
End: 2020-07-22

## 2020-07-22 NOTE — TELEPHONE ENCOUNTER
Dr. Cage  I spoke with Mrs. Walker she reports that she is experiencing increase pain and swelling to bilateral hands, she states that she is presently taking Prednisone 10mg daily she would like to know what else she can do to help.

## 2020-07-22 NOTE — TELEPHONE ENCOUNTER
----- Message from Aleksandra Alanis sent at 7/22/2020  1:48 PM CDT -----  Type:  Patient Returning Call    Who Called:Patient  Who Left Message for Patient:nurse  Does the patient know what this is regarding?:hand   Would the patient rather a call back or a response via [facility]? Call back  Best Call Back Number:[phone]  Additional Information: na

## 2020-07-27 ENCOUNTER — TELEPHONE (OUTPATIENT)
Dept: INTERNAL MEDICINE | Facility: CLINIC | Age: 75
End: 2020-07-27

## 2020-07-27 NOTE — TELEPHONE ENCOUNTER
----- Message from Miri [place] sent at 7/27/2020 10:39 AM CDT -----  Regarding: Refill  Contact: pt  Pt stated she out of her insulin and will like a refill, she can be reached at [phone] Thanks       [facility]pharmacy #[id number] - [place] - [address] AT AT [facility]  [address] [place] [address]  Phone: [phone] Fax: [phone]

## 2020-07-31 ENCOUNTER — TELEPHONE (OUTPATIENT)
Dept: INTERNAL MEDICINE | Facility: CLINIC | Age: 75
End: 2020-07-31

## 2020-07-31 NOTE — TELEPHONE ENCOUNTER
Patient states she start having the pain on her left side tight yesterday and today is getting worse , she describe the pain excruciating that she can't walk. Patient informed to proceed to the ER and follow up with Dr. Herrera. Verbally agreed.

## 2020-07-31 NOTE — TELEPHONE ENCOUNTER
----- Message from Elisabeth Day sent at 7/31/2020 12:09 PM CDT -----  Regarding: Patient  The patient would like to consult with nurse. She stated that she has pain on her LT side between her stomach and thigh. She is unable to walk or get out of bed. She's not sure if she should go to the ER. Please call back at [phone] (home)

## 2020-08-03 NOTE — TELEPHONE ENCOUNTER
Message  Received: Today  Message Contents   MD Jhonathan Gaffney, GERRYN; P Niles Davila Staff   Caller: Unspecified (1 week ago)             She missed her June follow-up appointment.  Recommend to come to clinic at her earliest convenience for further evaluation management for probable active RA.  Thanks!      Spoke with patient. Scheduled 8-12 at [facility].

## 2020-08-04 NOTE — TELEPHONE ENCOUNTER
Spoke with Jennifer Denise informed her that per Dr. Hernández she will need follow-up appointment for further evaluation. Patient has appointment scheduled for 8/12/2020

## 2020-08-11 ENCOUNTER — PATIENT OUTREACH (OUTPATIENT)
Dept: ADMINISTRATIVE | Facility: OTHER | Age: 75
End: 2020-08-11

## 2020-08-11 ENCOUNTER — TELEPHONE (OUTPATIENT)
Dept: RHEUMATOLOGY | Facility: CLINIC | Age: 75
End: 2020-08-11

## 2020-08-11 NOTE — PROGRESS NOTES
Requested updates within Care Everywhere.  Patient's chart was reviewed for overdue GEORGE topics.  Immunizations not reconciled due to LINKS not responding.

## 2020-08-12 ENCOUNTER — LAB VISIT (OUTPATIENT)
Dept: LAB | Facility: HOSPITAL | Age: 75
End: 2020-08-12
Attending: FAMILY MEDICINE
Payer: MEDICARE

## 2020-08-12 ENCOUNTER — OFFICE VISIT (OUTPATIENT)
Dept: RHEUMATOLOGY | Facility: CLINIC | Age: 75
End: 2020-08-12
Payer: MEDICARE

## 2020-08-12 VITALS
DIASTOLIC BLOOD PRESSURE: 76 MMHG | BODY MASS INDEX: 26.68 KG/M2 | HEART RATE: 85 BPM | SYSTOLIC BLOOD PRESSURE: 134 MMHG | HEIGHT: 63 IN | WEIGHT: 150.56 LBS

## 2020-08-12 DIAGNOSIS — Z79.52 LONG TERM CURRENT USE OF SYSTEMIC STEROIDS: ICD-10-CM

## 2020-08-12 DIAGNOSIS — M19.90 INFLAMMATORY ARTHRITIS: ICD-10-CM

## 2020-08-12 DIAGNOSIS — M05.9 SEROPOSITIVE RHEUMATOID ARTHRITIS: Primary | ICD-10-CM

## 2020-08-12 DIAGNOSIS — Z71.89 COUNSELING ON HEALTH PROMOTION AND DISEASE PREVENTION: ICD-10-CM

## 2020-08-12 DIAGNOSIS — Z79.60 LONG-TERM USE OF IMMUNOSUPPRESSANT MEDICATION: ICD-10-CM

## 2020-08-12 LAB
ALBUMIN SERPL BCP-MCNC: 3.4 G/DL (ref 3.5–5.2)
ALP SERPL-CCNC: 58 U/L (ref 55–135)
ALT SERPL W/O P-5'-P-CCNC: 19 U/L (ref 10–44)
ANION GAP SERPL CALC-SCNC: 9 MMOL/L (ref 8–16)
AST SERPL-CCNC: 15 U/L (ref 10–40)
BASOPHILS # BLD AUTO: 0.04 K/UL (ref 0–0.2)
BASOPHILS NFR BLD: 0.4 % (ref 0–1.9)
BILIRUB SERPL-MCNC: 0.5 MG/DL (ref 0.1–1)
BUN SERPL-MCNC: 13 MG/DL (ref 8–23)
CALCIUM SERPL-MCNC: 9.3 MG/DL (ref 8.7–10.5)
CHLORIDE SERPL-SCNC: 99 MMOL/L (ref 95–110)
CO2 SERPL-SCNC: 31 MMOL/L (ref 23–29)
CREAT SERPL-MCNC: 1.3 MG/DL (ref 0.5–1.4)
CRP SERPL-MCNC: 1.9 MG/L (ref 0–8.2)
DIFFERENTIAL METHOD: ABNORMAL
EOSINOPHIL # BLD AUTO: 0.1 K/UL (ref 0–0.5)
EOSINOPHIL NFR BLD: 1 % (ref 0–8)
ERYTHROCYTE [DISTWIDTH] IN BLOOD BY AUTOMATED COUNT: 13.5 % (ref 11.5–14.5)
ERYTHROCYTE [SEDIMENTATION RATE] IN BLOOD BY WESTERGREN METHOD: 8 MM/HR (ref 0–36)
EST. GFR  (AFRICAN AMERICAN): 46 ML/MIN/1.73 M^2
EST. GFR  (NON AFRICAN AMERICAN): 40 ML/MIN/1.73 M^2
GLUCOSE SERPL-MCNC: 366 MG/DL (ref 70–110)
HCT VFR BLD AUTO: 35 % (ref 37–48.5)
HGB BLD-MCNC: 11.4 G/DL (ref 12–16)
IMM GRANULOCYTES # BLD AUTO: 0.05 K/UL (ref 0–0.04)
IMM GRANULOCYTES NFR BLD AUTO: 0.5 % (ref 0–0.5)
LYMPHOCYTES # BLD AUTO: 3.3 K/UL (ref 1–4.8)
LYMPHOCYTES NFR BLD: 34.2 % (ref 18–48)
MCH RBC QN AUTO: 29.5 PG (ref 27–31)
MCHC RBC AUTO-ENTMCNC: 32.6 G/DL (ref 32–36)
MCV RBC AUTO: 91 FL (ref 82–98)
MONOCYTES # BLD AUTO: 0.8 K/UL (ref 0.3–1)
MONOCYTES NFR BLD: 8.1 % (ref 4–15)
NEUTROPHILS # BLD AUTO: 5.3 K/UL (ref 1.8–7.7)
NEUTROPHILS NFR BLD: 55.8 % (ref 38–73)
NRBC BLD-RTO: 0 /100 WBC
PLATELET # BLD AUTO: 384 K/UL (ref 150–350)
PMV BLD AUTO: 9.1 FL (ref 9.2–12.9)
POTASSIUM SERPL-SCNC: 3.6 MMOL/L (ref 3.5–5.1)
PROT SERPL-MCNC: 6.9 G/DL (ref 6–8.4)
RBC # BLD AUTO: 3.86 M/UL (ref 4–5.4)
SODIUM SERPL-SCNC: 139 MMOL/L (ref 136–145)
WBC # BLD AUTO: 9.56 K/UL (ref 3.9–12.7)

## 2020-08-12 PROCEDURE — 99499 RISK ADDL DX/OHS AUDIT: ICD-10-PCS | Mod: S$GLB,,, | Performed by: INTERNAL MEDICINE

## 2020-08-12 PROCEDURE — 1125F AMNT PAIN NOTED PAIN PRSNT: CPT | Mod: S$GLB,,, | Performed by: INTERNAL MEDICINE

## 2020-08-12 PROCEDURE — 1125F PR PAIN SEVERITY QUANTIFIED, PAIN PRESENT: ICD-10-PCS | Mod: S$GLB,,, | Performed by: INTERNAL MEDICINE

## 2020-08-12 PROCEDURE — 99214 OFFICE O/P EST MOD 30 MIN: CPT | Mod: S$GLB,,, | Performed by: INTERNAL MEDICINE

## 2020-08-12 PROCEDURE — 3078F PR MOST RECENT DIASTOLIC BLOOD PRESSURE < 80 MM HG: ICD-10-PCS | Mod: CPTII,S$GLB,, | Performed by: INTERNAL MEDICINE

## 2020-08-12 PROCEDURE — 1101F PT FALLS ASSESS-DOCD LE1/YR: CPT | Mod: CPTII,S$GLB,, | Performed by: INTERNAL MEDICINE

## 2020-08-12 PROCEDURE — 3075F SYST BP GE 130 - 139MM HG: CPT | Mod: CPTII,S$GLB,, | Performed by: INTERNAL MEDICINE

## 2020-08-12 PROCEDURE — 86140 C-REACTIVE PROTEIN: CPT

## 2020-08-12 PROCEDURE — 99499 UNLISTED E&M SERVICE: CPT | Mod: S$GLB,,, | Performed by: INTERNAL MEDICINE

## 2020-08-12 PROCEDURE — 36415 COLL VENOUS BLD VENIPUNCTURE: CPT

## 2020-08-12 PROCEDURE — 80053 COMPREHEN METABOLIC PANEL: CPT

## 2020-08-12 PROCEDURE — 1159F MED LIST DOCD IN RCRD: CPT | Mod: S$GLB,,, | Performed by: INTERNAL MEDICINE

## 2020-08-12 PROCEDURE — 3075F PR MOST RECENT SYSTOLIC BLOOD PRESS GE 130-139MM HG: ICD-10-PCS | Mod: CPTII,S$GLB,, | Performed by: INTERNAL MEDICINE

## 2020-08-12 PROCEDURE — 99214 PR OFFICE/OUTPT VISIT, EST, LEVL IV, 30-39 MIN: ICD-10-PCS | Mod: S$GLB,,, | Performed by: INTERNAL MEDICINE

## 2020-08-12 PROCEDURE — 85025 COMPLETE CBC W/AUTO DIFF WBC: CPT

## 2020-08-12 PROCEDURE — 85651 RBC SED RATE NONAUTOMATED: CPT

## 2020-08-12 PROCEDURE — 1101F PR PT FALLS ASSESS DOC 0-1 FALLS W/OUT INJ PAST YR: ICD-10-PCS | Mod: CPTII,S$GLB,, | Performed by: INTERNAL MEDICINE

## 2020-08-12 PROCEDURE — 99999 PR PBB SHADOW E&M-EST. PATIENT-LVL V: CPT | Mod: PBBFAC,,, | Performed by: INTERNAL MEDICINE

## 2020-08-12 PROCEDURE — 99999 PR PBB SHADOW E&M-EST. PATIENT-LVL V: ICD-10-PCS | Mod: PBBFAC,,, | Performed by: INTERNAL MEDICINE

## 2020-08-12 PROCEDURE — 3078F DIAST BP <80 MM HG: CPT | Mod: CPTII,S$GLB,, | Performed by: INTERNAL MEDICINE

## 2020-08-12 PROCEDURE — 1159F PR MEDICATION LIST DOCUMENTED IN MEDICAL RECORD: ICD-10-PCS | Mod: S$GLB,,, | Performed by: INTERNAL MEDICINE

## 2020-08-12 RX ORDER — FOLIC ACID 1 MG/1
1 TABLET ORAL DAILY
Qty: 90 TABLET | Refills: 0 | Status: SHIPPED | OUTPATIENT
Start: 2020-08-12 | End: 2020-11-11 | Stop reason: SDUPTHER

## 2020-08-12 RX ORDER — METHOTREXATE 2.5 MG/1
20 TABLET ORAL
Qty: 96 TABLET | Refills: 0 | Status: SHIPPED | OUTPATIENT
Start: 2020-08-12 | End: 2020-10-28

## 2020-08-12 RX ORDER — HYDROXYCHLOROQUINE SULFATE 200 MG/1
200 TABLET, FILM COATED ORAL 2 TIMES DAILY
Qty: 180 TABLET | Refills: 0 | Status: SHIPPED | OUTPATIENT
Start: 2020-08-12 | End: 2020-11-03

## 2020-08-12 NOTE — PATIENT INSTRUCTIONS
Rheumatology medications 8/12/2020     · Prednisone (stop rheumatoid flares)  - May take resume 10-15mg for 10 days for flares.  Do not repeat more than once per month     · Methotrexate (medication for rheumatoid arthritis)  Every Friday; take 4 tablets in the AM and 4 in the PM    · Plaquenil  (medication for rheumatoid arthritis)  Take 1 tablet every 12 hours     · Folic acid  Take 1 tablet every day

## 2020-08-12 NOTE — PROGRESS NOTES
RHEUMATOLOGY OUTPATIENT CLINIC NOTE    2020    Attending Rheumatologist: Giovanni Cage  Primary Care Provider: Gladys Herrera MD   Physician Requesting Consultation: No referring provider defined for this encounter.  Chief Complaint/Reason For Consultation:  Seropositive rheumatoid arthritis    Subjective:       HPI  Indira Walker is a 75 y.o. Black or  female with hand pain comes for follow-up.    Today  Last seen on March.  Recommendations given for RA, patient lost follow-up at 3 months.  She has stopped taking methotrexate because ran out of script.  Continues to take prednisone on a daily basis.  Denies significant hand pain while on prednisone.  Follow with primary care physician for history of uncontrolled diabetes.  Denies fever or rash.  Does not have new joint pain or swelling.  Was tolerating DMARD therapy without significant side effects previously.    Review of Systems   Constitutional: Negative for chills, fever and malaise/fatigue.   Eyes: Negative for pain and redness.   Respiratory: Negative for cough, hemoptysis and shortness of breath.    Cardiovascular: Negative for chest pain and leg swelling.   Gastrointestinal: Negative for abdominal pain, blood in stool and melena.   Genitourinary: Negative for dysuria and hematuria.   Musculoskeletal: Negative for falls and joint pain.   Skin: Negative for rash.   Neurological: Negative for tingling and focal weakness.   Psychiatric/Behavioral: Negative for memory loss. The patient does not have insomnia.      Chronic comorbid conditions affecting medical decision making today:  Past Medical History:   Diagnosis Date    Arthritis     Diabetes mellitus, type 2     Heartburn     Hyperlipidemia     Hypertension     Left sided sciatica     s/p microdiscectomy 3/21/18     Past Surgical History:   Procedure Laterality Date    BREAST BIOPSY       SECTION      COLONOSCOPY  2008    DR. JANET GARCÍA/ARELI  "DIVERICULOSIS DESCENDING AND SIGMOID. REPEAT 5 YRS    HERNIA REPAIR      MICRODISCECTOMY OF SPINE Left 03/21/2018    left L5-S1, Dr Segundo     History reviewed. No pertinent family history.  Social History     Substance and Sexual Activity   Alcohol Use No     Social History     Tobacco Use   Smoking Status Never Smoker   Smokeless Tobacco Never Used     Social History     Substance and Sexual Activity   Drug Use No       Current Outpatient Medications:     acetaminophen (TYLENOL) 650 MG TbSR, Take 1,300 mg by mouth every 8 (eight) hours., Disp: , Rfl:     albuterol (PROVENTIL/VENTOLIN HFA) 90 mcg/actuation inhaler, INHALE 2 PUFFS INTO THE LUNGS EVERY 6 (SIX) HOURS AS NEEDED FOR SHORTNESS OF BREATH (OR COUGHING)., Disp: 18 Inhaler, Rfl: 0    ascorbic acid, vitamin C, (VITAMIN C) 1000 MG tablet, Take 1,000 mg by mouth once daily., Disp: , Rfl:     aspirin (ECOTRIN) 81 MG EC tablet, Take 1 tablet (81 mg total) by mouth once daily. Every other day, Disp: , Rfl: 0    b complex vitamins capsule, Take 1 capsule by mouth once daily., Disp: , Rfl:     BD ULTRA-FINE MINI PEN NEEDLE 31 gauge x 3/16" Ndle, AS DIRECTED ONCE DAILY WITH INSULIN SUBCUTANEOUSLY, Disp: 100 each, Rfl: 4    beta-carotene,A,-vits C,E/mins (VISION ORAL), Take by mouth., Disp: , Rfl:     BIOTIN ORAL, Take 1,000 mg by mouth., Disp: , Rfl:     blood sugar diagnostic Strp, To check BG 2 times daily, to use with insurance preferred meter, Disp: 200 each, Rfl: 4    blood-glucose meter kit, To check BG 2 times daily, to use with insurance preferred meter, Disp: 1 each, Rfl: 0    cholecalciferol, vitamin D3, 2,000 unit Cap, Take 2,000 Units by mouth once daily. , Disp: , Rfl:     famotidine (PEPCID) 40 MG tablet, Take 1 tablet (40 mg total) by mouth once daily., Disp: 30 tablet, Rfl: 11    folic acid (FOLVITE) 1 MG tablet, Take 1 tablet (1 mg total) by mouth once daily., Disp: 90 tablet, Rfl: 0    gabapentin (NEURONTIN) 300 MG capsule, " Take 300 mg by mouth 2 (two) times daily., Disp: , Rfl:     hydrocortisone 1 % cream, Apply to affected area 2 times daily for 5 days, Disp: 30 g, Rfl: 0    insulin degludec (TRESIBA FLEXTOUCH U-200) 200 unit/mL (3 mL) InPn, INJECT 20 UNITS INTO THE SKIN DAILY AS DIRECTED, Disp: 3 Syringe, Rfl: 35    lancets Misc, To check BG 2 times daily, to use with insurance preferred meter, Disp: 200 each, Rfl: 4    loratadine (CLARITIN) 10 mg tablet, Take 10 mg by mouth once daily., Disp: , Rfl:     LORazepam (ATIVAN) 1 MG tablet, 1-2 po 1 hour before procedure/test., Disp: 6 tablet, Rfl: 0    losartan-hydrochlorothiazide 100-12.5 mg (HYZAAR) 100-12.5 mg Tab, TAKE 1 TABLET BY MOUTH EVERY DAY, Disp: 90 tablet, Rfl: 4    metFORMIN (GLUCOPHAGE) 1000 MG tablet, Take 1 tablet (1,000 mg total) by mouth 2 (two) times daily with meals., Disp: 180 tablet, Rfl: 3    montelukast (SINGULAIR) 10 mg tablet, Take 1 tablet (10 mg total) by mouth every evening., Disp: 30 tablet, Rfl: 5    MULTIVIT-MINERALS/FERROUS FUM (MULTI VITAMIN ORAL), Take by mouth. For women over 50 (centrum), Disp: , Rfl:     OMEGA-3S-DHA-EPA-FISH OIL ORAL, Take by mouth. Omega XL  Green lipped Mussel, Disp: , Rfl:     ondansetron (ZOFRAN-ODT) 4 MG TbDL, Take 1 tablet (4 mg total) by mouth every 8 (eight) hours as needed (nausea)., Disp: 10 tablet, Rfl: 0    predniSONE (DELTASONE) 10 MG tablet, Take 1 tablet (10 mg total) by mouth once daily., Disp: 30 tablet, Rfl: 1    ranitidine (ZANTAC) 150 MG tablet, , Disp: , Rfl:     rosuvastatin (CRESTOR) 20 MG tablet, TAKE 1 TABLET BY MOUTH EVERY DAY, Disp: 90 tablet, Rfl: 3    traMADol (ULTRAM) 50 mg tablet, Take 1 tablet (50 mg total) by mouth every 8 (eight) hours as needed for Pain., Disp: 20 tablet, Rfl: 0    BIOTIN ORAL, Take by mouth., Disp: , Rfl:     diclofenac sodium (VOLTAREN) 1 % Gel, Apply 2 g topically 4 (four) times daily., Disp: 1 Tube, Rfl: 2    hydrOXYchloroQUINE (PLAQUENIL) 200 mg tablet,  Take 1 tablet (200 mg total) by mouth 2 (two) times daily., Disp: 180 tablet, Rfl: 0    methotrexate 2.5 MG Tab, Take 8 tablets (20 mg total) by mouth every 7 days., Disp: 96 tablet, Rfl: 0    methyl salicylate/menthol (ARTHRITIS HOT PAIN RELIEF TOP), Apply topically., Disp: , Rfl:     mupirocin (BACTROBAN) 2 % ointment, Apply topically 2 (two) times daily. Apply to open wounds., Disp: 1 Tube, Rfl: 0    valACYclovir (VALTREX) 1000 MG tablet, Take 1 tablet (1,000 mg total) by mouth 3 (three) times daily. for 7 days, Disp: 21 tablet, Rfl: 0     Objective:         Vitals:    08/12/20 1531   BP: 134/76   Pulse: 85     Physical Exam   Constitutional: No distress.   Estimated body mass index is 26.6  Wt Readings from Last 1 Encounters:  03/12/20 1135 : 67 kg (147 lb 11.3 oz)   HENT:   Head: Normocephalic and atraumatic.   Eyes: Conjunctivae are normal. Pupils are equal, round, and reactive to light.   Neck: Normal range of motion.   Cardiovascular: Normal rate and intact distal pulses.    Pulmonary/Chest: Effort normal. No respiratory distress.   Abdominal: Soft. She exhibits no distension.   Neurological: She is alert. Gait normal.   Skin: No rash noted. No erythema.   Musculoskeletal: Normal range of motion. She exhibits no tenderness.   : strong  AROM: intact  PROM: intact    Devices used by patient: none     Reviewed old and all outside pertinent medical records available.    All lab results personally reviewed and interpreted by me.  Lab Results   Component Value Date    WBC 9.56 08/12/2020    HGB 11.4 (L) 08/12/2020    HCT 35.0 (L) 08/12/2020    MCV 91 08/12/2020    MCH 29.5 08/12/2020    MCHC 32.6 08/12/2020    RDW 13.5 08/12/2020     (H) 08/12/2020    MPV 9.1 (L) 08/12/2020       Lab Results   Component Value Date     03/12/2020    K 3.5 03/12/2020    CL 99 03/12/2020    CO2 27 03/12/2020     (H) 03/12/2020    BUN 15 03/12/2020    CALCIUM 9.0 03/12/2020    PROT 6.7 03/12/2020    ALBUMIN  3.4 (L) 03/12/2020    BILITOT 0.7 03/12/2020    AST 14 03/12/2020    ALKPHOS 54 (L) 03/12/2020    ALT 22 03/12/2020       Lab Results   Component Value Date    COLORU Yellow 07/01/2019    APPEARANCEUA Clear 07/01/2019    SPECGRAV 1.020 07/01/2019    PHUR 7.0 07/01/2019    PROTEINUA Negative 07/01/2019    KETONESU Negative 07/01/2019    LEUKOCYTESUR Trace (A) 07/01/2019    NITRITE Negative 07/01/2019    UROBILINOGEN 0.2 03/08/2018       Lab Results   Component Value Date    CRP 1.1 03/12/2020       Lab Results   Component Value Date    SEDRATE 5 03/12/2020       Lab Results   Component Value Date    .0 (H) 02/11/2020    SEDRATE 5 03/12/2020       No components found for: 25OHVITDTOT, 79RHRNNK8, 49GUCFOG4, METHODNOTE    No results found for: URICACID    No components found for: TSPOTTB    · Hemoglobin A1c 7.7 October 2019    Rheum Labs:   Rheumatoid factor 159.  .5    Infectious Labs:   Hepatitis profile nonreactive February 2020   QuantiFERON TB negative February 2020    Imaging:  All imaging reviewed and independently  interpreted by me.    X-ray hands February 2020  Mild degenerative changes noted bilaterally with most prominent findings at the 1st CMC joints and to lesser extent throughout the IP joints.  Right 2nd, 4th and 5th DIP joints and left 2nd and 5th DIP joints demonstrate mildly prominent changes.  Calcification again noted and slightly more prominent along the margins of the right 1st and 4th MCP joints.  No acute or healing fracture bilaterally.     ASSESSMENT / PLAN:     Indira Walker is a 75 y.o. Black or  female with:    1. Seropositive rheumatoid arthritis  - CDAI:  Low disease activity (on chronic prednisone).  - adherence to therapeutic recommendations and followups recommended.  Clinical side effects of chronic steroid therapy discussed.  - may use prednisone 5-10 mg daily for 10 days for RA flares.  - recommend to resume methotrexate 20 mg split dose once per  week with daily folic acid.  - with out Plaquenil 200 mg b.i.d.  Follow with Ophthalmology to monitor for retinal toxicity.    2. Immunosuppressed status   - Compromised immune system secondary to autoimmune disease and use of immunosuppressive drugs.   - Monitor carefully for infections and toxicities.   - Advised to get immediate medical care if any infection.   - Also advised strict adherence to age appropriate vaccinations and cancer screenings with PCP.  - CBC, CMP today and prior next visit    3. Other specified counseling  - Immunization counseling done.  - Nutrition and exercise counseling.  - Limitation of alcohol consumption.  - Medication counseling provided.    No follow-ups on file.   RTC 3 months    Method of contact with patient concerns: [facility]    Disclaimer:  This note is prepared using voice recognition software and as such is likely to have errors and has not been proof read. Please contact me for questions.     Time spent: 25 minutes in face to face discussion concerning diagnosis, prognosis, review of lab and test results, benefits of treatment as well as management of disease, counseling of patient and coordination of care between various health care providers.  Greater than half the time spent was used for coordination of care and counseling of patient.    Giovanni Cage M.D.  Rheumatology Department   [facility]

## 2020-08-25 ENCOUNTER — TELEPHONE (OUTPATIENT)
Dept: RHEUMATOLOGY | Facility: CLINIC | Age: 75
End: 2020-08-25

## 2020-08-25 NOTE — TELEPHONE ENCOUNTER
Spoke with Mrs. Walker regarding her inquiring if the Plaquenil that Dr. Cage prescribed for her for Seropositive rheumatoid arthritis can be used to treat COVID-19 because her son is presently in the hospital with COVID. I explained to Mrs. Walker that Plaquenil was prescribed to her for Rheumatologic reasons, I gave clear instructions that any and all medications that is prescribed for her should never be shared. Mrs. Walker voiced understanding stating that she will not share her medications and that she just was curious.

## 2020-08-25 NOTE — TELEPHONE ENCOUNTER
----- Message from Shashank Fletcher sent at 8/25/2020  1:44 PM CDT -----  Regarding: pt  Would like a call from nurse in regards to a medication that was prescribed for her . Please call back at .[phone] (home)             Thank You,   Shashank Fletcher

## 2020-08-28 RX ORDER — PEN NEEDLE, DIABETIC 31 GX5/16"
NEEDLE, DISPOSABLE MISCELLANEOUS
Qty: 100 EACH | Refills: 4 | Status: SHIPPED | OUTPATIENT
Start: 2020-08-28 | End: 2022-02-02

## 2020-09-18 ENCOUNTER — TELEPHONE (OUTPATIENT)
Dept: INTERNAL MEDICINE | Facility: CLINIC | Age: 75
End: 2020-09-18

## 2020-09-18 NOTE — TELEPHONE ENCOUNTER
----- Message from Peace Mcfarlane sent at 9/18/2020  2:13 PM CDT -----  Regarding: Missed call  Contact: Patient  .Type:  Patient Returning Call    Who Called:Patient  Who Left Message for Patient:  PEDRO   Does the patient know what this is regarding?: missed call  Would the patient rather a call back or a response via [facility]?  call  Best Call Back Number:.[phone] (home)     Additional Information:

## 2020-09-18 NOTE — TELEPHONE ENCOUNTER
----- Message from Diamond Marroquin sent at 9/18/2020 11:35 AM CDT -----  .Type:  Sooner Apoointment Request    Caller is requesting a sooner appointment.  Caller declined first available appointment listed below.  Caller will not accept being placed on the waitlist and is requesting a message be sent to doctor.  Name of Caller:self  When is the first available appointment? 11/2  Symptoms: 9/24 lab f/u  Would the patient rather a call back or a response via [facility]? call  Best Call Back Number:.[phone] (home)   Additional Information:

## 2020-09-24 ENCOUNTER — LAB VISIT (OUTPATIENT)
Dept: LAB | Facility: HOSPITAL | Age: 75
End: 2020-09-24
Attending: FAMILY MEDICINE
Payer: MEDICARE

## 2020-09-24 LAB
ESTIMATED AVG GLUCOSE: 220 MG/DL (ref 68–131)
HBA1C MFR BLD HPLC: 9.3 % (ref 4–5.6)

## 2020-09-24 PROCEDURE — 83036 HEMOGLOBIN GLYCOSYLATED A1C: CPT

## 2020-09-24 PROCEDURE — 36415 COLL VENOUS BLD VENIPUNCTURE: CPT

## 2020-09-30 ENCOUNTER — TELEPHONE (OUTPATIENT)
Dept: INTERNAL MEDICINE | Facility: CLINIC | Age: 75
End: 2020-09-30

## 2020-09-30 NOTE — TELEPHONE ENCOUNTER
I called pharmacy and can not get them on the phone. Pt states that she has called them and they told her they can't break the box.   Not for sure if a new Rx with just one pen needs to be written or not. Please advise.

## 2020-09-30 NOTE — TELEPHONE ENCOUNTER
----- Message from Stephanie Méndez sent at 9/30/2020  3:59 PM CDT -----  Regarding: medication  Good evening,      Pt stated that he refrigerator at home is broken and she is unable to keep all insulin but would like for you to call her pharmacy back to do only a 30 day supply till its fix. Pt can be reached at [phone]      Thanks,  Stephanie Méndez

## 2020-10-01 ENCOUNTER — TELEPHONE (OUTPATIENT)
Dept: INTERNAL MEDICINE | Facility: CLINIC | Age: 75
End: 2020-10-01

## 2020-10-01 NOTE — TELEPHONE ENCOUNTER
----- Message from Negra Rodrigez sent at 10/1/2020  2:59 PM CDT -----  Pt called, she was returning a call from the nurse this morning.    Pt can be reached at  [phone]    Thank you

## 2020-10-08 ENCOUNTER — PES CALL (OUTPATIENT)
Dept: ADMINISTRATIVE | Facility: CLINIC | Age: 75
End: 2020-10-08

## 2020-10-14 ENCOUNTER — TELEPHONE (OUTPATIENT)
Dept: INTERNAL MEDICINE | Facility: CLINIC | Age: 75
End: 2020-10-14

## 2020-10-14 NOTE — TELEPHONE ENCOUNTER
----- Message from Miranda Sanchez sent at 10/13/2020  4:18 PM CDT -----  Contact: pt  Type:  Needs Medical Advice    Who Called: pt  Symptoms (please be specific): The pt states there is a recall on her  Metformin, the pt wants to be advised.  How long has patient had these symptoms:  n/a  Pharmacy name and phone #:  n/a  Would the patient rather a call back or a response via [facility]? Call back  Best Call Back Number: [phone]  Additional Information: n/a

## 2020-10-15 NOTE — TELEPHONE ENCOUNTER
Contact your pharmacy to see if your metformin was recalled - if it was ask if there is a replacement available for it.

## 2020-10-16 ENCOUNTER — TELEPHONE (OUTPATIENT)
Dept: INTERNAL MEDICINE | Facility: CLINIC | Age: 75
End: 2020-10-16

## 2020-10-16 DIAGNOSIS — R11.0 NAUSEA: Primary | ICD-10-CM

## 2020-10-16 RX ORDER — ONDANSETRON 4 MG/1
4 TABLET, ORALLY DISINTEGRATING ORAL EVERY 8 HOURS PRN
Qty: 10 TABLET | Refills: 0 | Status: SHIPPED | OUTPATIENT
Start: 2020-10-16 | End: 2022-01-11

## 2020-10-16 NOTE — TELEPHONE ENCOUNTER
Called and spoke with patient. She asked if you can send something to the pharmacy for her for nausea that she can place under her tongue.

## 2020-10-16 NOTE — TELEPHONE ENCOUNTER
----- Message from Jr Mendes sent at 10/16/2020  9:43 AM CDT -----  Regarding: Nausea question  Please call Indira to discuss a medication question she has [phone] (home).

## 2020-10-26 DIAGNOSIS — I10 HYPERTENSION, ESSENTIAL: ICD-10-CM

## 2020-10-26 RX ORDER — LOSARTAN POTASSIUM AND HYDROCHLOROTHIAZIDE 12.5; 1 MG/1; MG/1
1 TABLET ORAL DAILY
Qty: 90 TABLET | Refills: 4 | Status: SHIPPED | OUTPATIENT
Start: 2020-10-26 | End: 2021-11-05 | Stop reason: SDUPTHER

## 2020-10-26 NOTE — TELEPHONE ENCOUNTER
----- Message from Lynne Diaz sent at 10/26/2020  1:42 PM CDT -----  Type:  RX Refill Request    Who Called: pt   Refill or New Rx:refill  RX Name and Strength:losartan-hydrochlorothiazide 100-12.5 mg (HYZAAR) 100-12.5 mg Tab  How is the patient currently taking it? (ex. 1XDay):1XDaily   Is this a 30 day or 90 day RX:90  Preferred Pharmacy with phone number:  Local or Mail Order:local   Ordering Provider:jacqueline   Would the patient rather a call back or a response via [facility]? Callback   Best Call Back Number:[phone]   Additional Information:       [id number] [place] [facility] [address] AT AT [facility]  [address] [place] [address]  Phone: [phone] Fax: [phone]

## 2020-10-28 DIAGNOSIS — M05.9 SEROPOSITIVE RHEUMATOID ARTHRITIS: ICD-10-CM

## 2020-10-28 RX ORDER — METHOTREXATE 2.5 MG/1
20 TABLET ORAL
Qty: 96 TABLET | Refills: 0 | Status: SHIPPED | OUTPATIENT
Start: 2020-10-28 | End: 2020-11-11

## 2020-10-29 RX ORDER — PREDNISONE 10 MG/1
10 TABLET ORAL DAILY
Qty: 30 TABLET | Refills: 1 | OUTPATIENT
Start: 2020-10-29

## 2020-10-30 ENCOUNTER — TELEPHONE (OUTPATIENT)
Dept: INTERNAL MEDICINE | Facility: CLINIC | Age: 75
End: 2020-10-30

## 2020-10-30 NOTE — TELEPHONE ENCOUNTER
Called and spoke with patient regarding her medication. I told her to call her pharmacy to check to see if her prescription was recalled or not. Patient verbalized understanding.

## 2020-10-30 NOTE — TELEPHONE ENCOUNTER
----- Message from Odalis Bailey sent at 10/30/2020  2:52 PM CDT -----  Would lie to consult with nurse to verify if she is to still take metformin being that it was recalled. States she be nausea everyday. Please give a call back at [phone].

## 2020-11-03 DIAGNOSIS — M05.9 SEROPOSITIVE RHEUMATOID ARTHRITIS: ICD-10-CM

## 2020-11-03 DIAGNOSIS — I10 ESSENTIAL HYPERTENSION: Primary | ICD-10-CM

## 2020-11-03 DIAGNOSIS — I50.32 CHRONIC HEART FAILURE WITH PRESERVED EJECTION FRACTION: ICD-10-CM

## 2020-11-03 RX ORDER — HYDROXYCHLOROQUINE SULFATE 200 MG/1
TABLET, FILM COATED ORAL
Qty: 180 TABLET | Refills: 0 | Status: SHIPPED | OUTPATIENT
Start: 2020-11-03 | End: 2020-11-11 | Stop reason: SDUPTHER

## 2020-11-04 RX ORDER — PREDNISONE 10 MG/1
10 TABLET ORAL DAILY
Qty: 30 TABLET | Refills: 1 | OUTPATIENT
Start: 2020-11-04

## 2020-11-09 NOTE — TELEPHONE ENCOUNTER
----- Message from Miranda Sanchez sent at 11/9/2020 12:07 PM CST -----  Contact: pt  Type:  Needs Medical Advice    Who Called: pt  Symptoms (please be specific): The pt states her Hydroxychloroquine medication is making her sick and wants to know if she can take one a day instead of two.  How long has patient had these symptoms:  n/a  Pharmacy name and phone #:  n/a  Would the patient rather a call back or a response via [facility]? Call back  Best Call Back Number: [phone]  Additional Information: n/a

## 2020-11-09 NOTE — TELEPHONE ENCOUNTER
Patient states that the Plaquenil is making her nauseated after taking.  States that it is the newest medication that she was started on.    Can she decrease to taking it one time daily to see if that helps?

## 2020-11-10 ENCOUNTER — TELEPHONE (OUTPATIENT)
Dept: RHEUMATOLOGY | Facility: CLINIC | Age: 75
End: 2020-11-10

## 2020-11-10 ENCOUNTER — PATIENT OUTREACH (OUTPATIENT)
Dept: ADMINISTRATIVE | Facility: OTHER | Age: 75
End: 2020-11-10

## 2020-11-11 ENCOUNTER — LAB VISIT (OUTPATIENT)
Dept: LAB | Facility: HOSPITAL | Age: 75
End: 2020-11-11
Attending: FAMILY MEDICINE
Payer: MEDICARE

## 2020-11-11 ENCOUNTER — OFFICE VISIT (OUTPATIENT)
Dept: RHEUMATOLOGY | Facility: CLINIC | Age: 75
End: 2020-11-11
Payer: MEDICARE

## 2020-11-11 DIAGNOSIS — M05.9 SEROPOSITIVE RHEUMATOID ARTHRITIS: ICD-10-CM

## 2020-11-11 DIAGNOSIS — Z79.60 LONG-TERM USE OF IMMUNOSUPPRESSANT MEDICATION: ICD-10-CM

## 2020-11-11 DIAGNOSIS — M19.90 INFLAMMATORY ARTHRITIS: ICD-10-CM

## 2020-11-11 DIAGNOSIS — Z79.899 HIGH RISK MEDICATION USE: Primary | ICD-10-CM

## 2020-11-11 LAB
ALBUMIN SERPL BCP-MCNC: 4 G/DL (ref 3.5–5.2)
ALP SERPL-CCNC: 44 U/L (ref 55–135)
ALT SERPL W/O P-5'-P-CCNC: 17 U/L (ref 10–44)
ANION GAP SERPL CALC-SCNC: 7 MMOL/L (ref 8–16)
AST SERPL-CCNC: 20 U/L (ref 10–40)
BASOPHILS # BLD AUTO: 0.04 K/UL (ref 0–0.2)
BASOPHILS NFR BLD: 0.5 % (ref 0–1.9)
BILIRUB SERPL-MCNC: 0.4 MG/DL (ref 0.1–1)
CALCIUM SERPL-MCNC: 10 MG/DL (ref 8.7–10.5)
CHLORIDE SERPL-SCNC: 99 MMOL/L (ref 95–110)
CO2 SERPL-SCNC: 29 MMOL/L (ref 23–29)
CREAT SERPL-MCNC: 1 MG/DL (ref 0.5–1.4)
CRP SERPL-MCNC: 1.7 MG/L (ref 0–8.2)
DIFFERENTIAL METHOD: ABNORMAL
EOSINOPHIL # BLD AUTO: 0.2 K/UL (ref 0–0.5)
EOSINOPHIL NFR BLD: 3.3 % (ref 0–8)
ERYTHROCYTE [DISTWIDTH] IN BLOOD BY AUTOMATED COUNT: 14.4 % (ref 11.5–14.5)
ERYTHROCYTE [SEDIMENTATION RATE] IN BLOOD BY WESTERGREN METHOD: <2 MM/HR (ref 0–36)
EST. GFR  (AFRICAN AMERICAN): >60 ML/MIN/1.73 M^2
EST. GFR  (NON AFRICAN AMERICAN): 54 ML/MIN/1.73 M^2
GLUCOSE SERPL-MCNC: 91 MG/DL (ref 70–110)
HCT VFR BLD AUTO: 34.7 % (ref 37–48.5)
HGB BLD-MCNC: 11.4 G/DL (ref 12–16)
IMM GRANULOCYTES # BLD AUTO: 0.01 K/UL (ref 0–0.04)
IMM GRANULOCYTES NFR BLD AUTO: 0.1 % (ref 0–0.5)
LYMPHOCYTES # BLD AUTO: 2.5 K/UL (ref 1–4.8)
LYMPHOCYTES NFR BLD: 34.4 % (ref 18–48)
MCH RBC QN AUTO: 30.3 PG (ref 27–31)
MCHC RBC AUTO-ENTMCNC: 32.9 G/DL (ref 32–36)
MCV RBC AUTO: 92 FL (ref 82–98)
MONOCYTES # BLD AUTO: 0.7 K/UL (ref 0.3–1)
MONOCYTES NFR BLD: 10.1 % (ref 4–15)
NEUTROPHILS # BLD AUTO: 3.8 K/UL (ref 1.8–7.7)
NEUTROPHILS NFR BLD: 51.6 % (ref 38–73)
NRBC BLD-RTO: 0 /100 WBC
PLATELET # BLD AUTO: 392 K/UL (ref 150–350)
PMV BLD AUTO: 8.8 FL (ref 9.2–12.9)
POTASSIUM SERPL-SCNC: 4 MMOL/L (ref 3.5–5.1)
PROT SERPL-MCNC: 7.3 G/DL (ref 6–8.4)
RBC # BLD AUTO: 3.76 M/UL (ref 4–5.4)
SODIUM SERPL-SCNC: 135 MMOL/L (ref 136–145)
WBC # BLD AUTO: 7.35 K/UL (ref 3.9–12.7)

## 2020-11-11 PROCEDURE — 1159F MED LIST DOCD IN RCRD: CPT | Mod: S$GLB,,, | Performed by: INTERNAL MEDICINE

## 2020-11-11 PROCEDURE — 80053 COMPREHEN METABOLIC PANEL: CPT

## 2020-11-11 PROCEDURE — 85652 RBC SED RATE AUTOMATED: CPT

## 2020-11-11 PROCEDURE — 1101F PR PT FALLS ASSESS DOC 0-1 FALLS W/OUT INJ PAST YR: ICD-10-PCS | Mod: CPTII,S$GLB,, | Performed by: INTERNAL MEDICINE

## 2020-11-11 PROCEDURE — 1159F PR MEDICATION LIST DOCUMENTED IN MEDICAL RECORD: ICD-10-PCS | Mod: S$GLB,,, | Performed by: INTERNAL MEDICINE

## 2020-11-11 PROCEDURE — 99999 PR PBB SHADOW E&M-EST. PATIENT-LVL I: CPT | Mod: PBBFAC,,, | Performed by: INTERNAL MEDICINE

## 2020-11-11 PROCEDURE — 99214 OFFICE O/P EST MOD 30 MIN: CPT | Mod: S$GLB,,, | Performed by: INTERNAL MEDICINE

## 2020-11-11 PROCEDURE — 85025 COMPLETE CBC W/AUTO DIFF WBC: CPT

## 2020-11-11 PROCEDURE — 99214 PR OFFICE/OUTPT VISIT, EST, LEVL IV, 30-39 MIN: ICD-10-PCS | Mod: S$GLB,,, | Performed by: INTERNAL MEDICINE

## 2020-11-11 PROCEDURE — 36415 COLL VENOUS BLD VENIPUNCTURE: CPT

## 2020-11-11 PROCEDURE — 99999 PR PBB SHADOW E&M-EST. PATIENT-LVL I: ICD-10-PCS | Mod: PBBFAC,,, | Performed by: INTERNAL MEDICINE

## 2020-11-11 PROCEDURE — 86140 C-REACTIVE PROTEIN: CPT

## 2020-11-11 PROCEDURE — 1101F PT FALLS ASSESS-DOCD LE1/YR: CPT | Mod: CPTII,S$GLB,, | Performed by: INTERNAL MEDICINE

## 2020-11-11 RX ORDER — HYDROXYCHLOROQUINE SULFATE 200 MG/1
200 TABLET, FILM COATED ORAL 2 TIMES DAILY
Qty: 60 TABLET | Refills: 3 | Status: SHIPPED | OUTPATIENT
Start: 2020-11-11 | End: 2020-12-11

## 2020-11-11 RX ORDER — METHOTREXATE 2.5 MG/1
25 TABLET ORAL
Qty: 40 TABLET | Refills: 3 | Status: SHIPPED | OUTPATIENT
Start: 2020-11-11 | End: 2020-12-07 | Stop reason: SDUPTHER

## 2020-11-11 RX ORDER — FOLIC ACID 1 MG/1
1 TABLET ORAL DAILY
Qty: 90 TABLET | Refills: 3 | Status: SHIPPED | OUTPATIENT
Start: 2020-11-11 | End: 2021-03-18 | Stop reason: SDUPTHER

## 2020-11-11 NOTE — PROGRESS NOTES
Health Maintenance Due   Topic Date Due    TETANUS VACCINE  08/01/1963    Shingles Vaccine (1 of 2) 08/01/1993    Mammogram  03/12/2020    Pneumococcal Vaccine (65+ Low/Medium Risk) (2 of 2 - PPSV23) 03/22/2020    Foot Exam  03/22/2020    Lipid Panel  07/19/2020    Influenza Vaccine (1) 08/01/2020     Updates were requested from care everywhere.  Chart was reviewed for overdue [facility]active [facility] ([facility]) topics (CRS, Breast Cancer Screening, Eye exam)  Health Maintenance has been updated.  LINKS immunization registry triggered.  Immunizations were reconciled.

## 2020-11-11 NOTE — PROGRESS NOTES
RHEUMATOLOGY OUTPATIENT CLINIC NOTE    2020    Attending Rheumatologist: Giovanni Cage  Primary Care Provider: Gladys Herrera MD   Physician Requesting Consultation: No referring provider defined for this encounter.  Chief Complaint/Reason For Consultation:  Seropositive rheumatoid arthritis    Subjective:       HPI  Indira Walker is a 75 y.o. Black or  female with hand pain comes for follow-up.    Today  Last seen on August.  Recommended taper prednisone and titrate methotrexate.  Tolerating 20 mg of methotrexate and daily Plaquenil without significant side effects.  Refers episodic abdominal discomfort when takes Plaquenil.  Denies fever or rash.  Does not have  or GI complaints.  Refers very normal improvement to exam pains since on DMARDs.  Denies interference with activities of daily living from pain or stiffness.  Does not have new joint swelling.    Review of Systems   Constitutional: Negative for chills, fever and malaise/fatigue.   Eyes: Negative for pain and redness.   Respiratory: Negative for cough, hemoptysis and shortness of breath.    Cardiovascular: Negative for chest pain and leg swelling.   Gastrointestinal: Negative for abdominal pain, blood in stool and melena.   Genitourinary: Negative for dysuria and hematuria.   Musculoskeletal: Negative for falls and joint pain.   Skin: Negative for rash.   Neurological: Negative for tingling, focal weakness and weakness.   Psychiatric/Behavioral: Negative for memory loss. The patient does not have insomnia.      Chronic comorbid conditions affecting medical decision making today:  Past Medical History:   Diagnosis Date    Arthritis     Diabetes mellitus, type 2     Heartburn     Hyperlipidemia     Hypertension     Left sided sciatica     s/p microdiscectomy 3/21/18     Past Surgical History:   Procedure Laterality Date    BREAST BIOPSY       SECTION      COLONOSCOPY  2008    DR. JANET GARCÍA/ARELI  DIVERICULOSIS DESCENDING AND SIGMOID. REPEAT 5 YRS    HERNIA REPAIR      MICRODISCECTOMY OF SPINE Left 03/21/2018    left L5-S1, Dr Segundo     History reviewed. No pertinent family history.  Social History     Substance and Sexual Activity   Alcohol Use No     Social History     Tobacco Use   Smoking Status Never Smoker   Smokeless Tobacco Never Used     Social History     Substance and Sexual Activity   Drug Use No       Current Outpatient Medications:     acetaminophen (TYLENOL) 650 MG TbSR, Take 1,300 mg by mouth every 8 (eight) hours., Disp: , Rfl:     ascorbic acid, vitamin C, (VITAMIN C) 1000 MG tablet, Take 1,000 mg by mouth once daily., Disp: , Rfl:     aspirin (ECOTRIN) 81 MG EC tablet, Take 1 tablet (81 mg total) by mouth once daily. Every other day, Disp: , Rfl: 0    b complex vitamins capsule, Take 1 capsule by mouth once daily., Disp: , Rfl:     beta-carotene,A,-vits C,E/mins (VISION ORAL), Take by mouth., Disp: , Rfl:     BIOTIN ORAL, Take 1,000 mg by mouth., Disp: , Rfl:     cholecalciferol, vitamin D3, 2,000 unit Cap, Take 2,000 Units by mouth once daily. , Disp: , Rfl:     famotidine (PEPCID) 40 MG tablet, Take 1 tablet (40 mg total) by mouth once daily., Disp: 30 tablet, Rfl: 11    folic acid (FOLVITE) 1 MG tablet, Take 1 tablet (1 mg total) by mouth once daily., Disp: 90 tablet, Rfl: 3    gabapentin (NEURONTIN) 300 MG capsule, Take 300 mg by mouth 2 (two) times daily., Disp: , Rfl:     hydrOXYchloroQUINE (PLAQUENIL) 200 mg tablet, Take 1 tablet (200 mg total) by mouth 2 (two) times daily., Disp: 60 tablet, Rfl: 3    loratadine (CLARITIN) 10 mg tablet, Take 10 mg by mouth once daily., Disp: , Rfl:     losartan-hydrochlorothiazide 100-12.5 mg (HYZAAR) 100-12.5 mg Tab, Take 1 tablet by mouth once daily., Disp: 90 tablet, Rfl: 4    metFORMIN (GLUCOPHAGE) 1000 MG tablet, Take 1 tablet (1,000 mg total) by mouth 2 (two) times daily with meals., Disp: 180 tablet, Rfl: 3     "methotrexate 2.5 MG Tab, Take 10 tablets (25 mg total) by mouth every 7 days. 5 tabs in the a.m. and 5 tabs in the p.m. once per week, Disp: 40 tablet, Rfl: 3    methyl salicylate/menthol (ARTHRITIS HOT PAIN RELIEF TOP), Apply topically., Disp: , Rfl:     MULTIVIT-MINERALS/FERROUS FUM (MULTI VITAMIN ORAL), Take by mouth. For women over 50 (centrum), Disp: , Rfl:     OMEGA-3S-DHA-EPA-FISH OIL ORAL, Take by mouth. Omega XL  Green lipped Mussel, Disp: , Rfl:     ranitidine (ZANTAC) 150 MG tablet, , Disp: , Rfl:     rosuvastatin (CRESTOR) 20 MG tablet, TAKE 1 TABLET BY MOUTH EVERY DAY, Disp: 90 tablet, Rfl: 3    albuterol (PROVENTIL/VENTOLIN HFA) 90 mcg/actuation inhaler, INHALE 2 PUFFS INTO THE LUNGS EVERY 6 (SIX) HOURS AS NEEDED FOR SHORTNESS OF BREATH (OR COUGHING)., Disp: 18 Inhaler, Rfl: 0    BD ULTRA-FINE MINI PEN NEEDLE 31 gauge x 3/16" Ndle, AS DIRECTED ONCE DAILY WITH INSULIN SUBCUTANEOUSLY, Disp: 100 each, Rfl: 4    blood sugar diagnostic Strp, To check BG 2 times daily, to use with insurance preferred meter, Disp: 200 each, Rfl: 4    blood-glucose meter kit, To check BG 2 times daily, to use with insurance preferred meter, Disp: 1 each, Rfl: 0    diclofenac sodium (VOLTAREN) 1 % Gel, Apply 2 g topically 4 (four) times daily., Disp: 1 Tube, Rfl: 2    hydrocortisone 1 % cream, Apply to affected area 2 times daily for 5 days, Disp: 30 g, Rfl: 0    insulin degludec (TRESIBA FLEXTOUCH U-200) 200 unit/mL (3 mL) InPn, INJECT 20 UNITS INTO THE SKIN DAILY AS DIRECTED, Disp: 3 Syringe, Rfl: 35    lancets Misc, To check BG 2 times daily, to use with insurance preferred meter, Disp: 200 each, Rfl: 4    LORazepam (ATIVAN) 1 MG tablet, 1-2 po 1 hour before procedure/test., Disp: 6 tablet, Rfl: 0    montelukast (SINGULAIR) 10 mg tablet, Take 1 tablet (10 mg total) by mouth every evening. (Patient not taking: Reported on 11/11/2020), Disp: 30 tablet, Rfl: 5    mupirocin (BACTROBAN) 2 % ointment, Apply " "topically 2 (two) times daily. Apply to open wounds. (Patient not taking: Reported on 11/11/2020), Disp: 1 Tube, Rfl: 0    ondansetron (ZOFRAN-ODT) 4 MG TbDL, Take 1 tablet (4 mg total) by mouth every 8 (eight) hours as needed (nausea). (Patient not taking: Reported on 11/11/2020), Disp: 10 tablet, Rfl: 0    predniSONE (DELTASONE) 10 MG tablet, Take 1 tablet (10 mg total) by mouth once daily. (Patient not taking: Reported on 11/11/2020), Disp: 30 tablet, Rfl: 1    traMADol (ULTRAM) 50 mg tablet, Take 1 tablet (50 mg total) by mouth every 8 (eight) hours as needed for Pain. (Patient not taking: Reported on 11/11/2020), Disp: 20 tablet, Rfl: 0    valACYclovir (VALTREX) 1000 MG tablet, Take 1 tablet (1,000 mg total) by mouth 3 (three) times daily. for 7 days (Patient not taking: Reported on 11/11/2020), Disp: 21 tablet, Rfl: 0     Objective:         There were no vitals filed for this visit.    Physical Exam   Constitutional: No distress.   Estimated body mass index is 26.67 kg/m² as calculated from the following:    Height as of 8/12/20: 5' 3" (1.6 m).    Weight as of 8/12/20: 68.3 kg (150 lb 9.2 oz).    Wt Readings from Last 1 Encounters:  08/12/20 1531 : 68.3 kg (150 lb 9.2 oz)     HENT:   Head: Normocephalic and atraumatic.   Eyes: Conjunctivae are normal. Pupils are equal, round, and reactive to light.   Neck: Normal range of motion.   Cardiovascular: Normal rate and intact distal pulses.    Pulmonary/Chest: Effort normal. No respiratory distress.   Abdominal: Soft. She exhibits no distension.   Neurological: She is alert. Gait normal.   Skin: No rash noted. No erythema.     Musculoskeletal: Normal range of motion. No tenderness.      Comments: :  Able to make full fist without difficulty  No acute synovitis appreciated    AROM: intact  PROM: intact    Devices used by patient: none       Reviewed old and all outside pertinent medical records available.    All lab results personally reviewed and interpreted " by me.  Lab Results   Component Value Date    WBC 7.35 11/11/2020    HGB 11.4 (L) 11/11/2020    HCT 34.7 (L) 11/11/2020    MCV 92 11/11/2020    MCH 30.3 11/11/2020    MCHC 32.9 11/11/2020    RDW 14.4 11/11/2020     (H) 11/11/2020    MPV 8.8 (L) 11/11/2020       Lab Results   Component Value Date     08/12/2020    K 3.6 08/12/2020    CL 99 08/12/2020    CO2 31 (H) 08/12/2020     (H) 08/12/2020    BUN 13 08/12/2020    CALCIUM 9.3 08/12/2020    PROT 6.9 08/12/2020    ALBUMIN 3.4 (L) 08/12/2020    BILITOT 0.5 08/12/2020    AST 15 08/12/2020    ALKPHOS 58 08/12/2020    ALT 19 08/12/2020       Lab Results   Component Value Date    COLORU Yellow 07/01/2019    APPEARANCEUA Clear 07/01/2019    SPECGRAV 1.020 07/01/2019    PHUR 7.0 07/01/2019    PROTEINUA Negative 07/01/2019    KETONESU Negative 07/01/2019    LEUKOCYTESUR Trace (A) 07/01/2019    NITRITE Negative 07/01/2019    UROBILINOGEN 0.2 03/08/2018       Lab Results   Component Value Date    CRP 1.9 08/12/2020       Lab Results   Component Value Date    SEDRATE 8 08/12/2020       Lab Results   Component Value Date    .0 (H) 02/11/2020    SEDRATE 8 08/12/2020       No components found for: 25OHVITDTOT, 22GFMVRG2, 95QTXLAQ5, METHODNOTE    No results found for: URICACID    No components found for: TSPOTTB    · Hemoglobin A1c 7.7 October 2019    Rheum Labs:   Rheumatoid factor 159.  .5    Infectious Labs:   Hepatitis profile nonreactive February 2020   QuantiFERON TB negative February 2020    Imaging:  All imaging reviewed and independently  interpreted by me.    X-ray hands February 2020  Mild degenerative changes noted bilaterally with most prominent findings at the 1st CMC joints and to lesser extent throughout the IP joints.  Right 2nd, 4th and 5th DIP joints and left 2nd and 5th DIP joints demonstrate mildly prominent changes.  Calcification again noted and slightly more prominent along the margins of the right 1st and 4th MCP joints.   No acute or healing fracture bilaterally.     ASSESSMENT / PLAN:     Indira Walker is a 75 y.o. Black or  female with:    1. Seropositive rheumatoid arthritis  - CDAI: Remission off systemic steroids since last visit  - excellent response to methotrexate 20 mg and Plaquenil  - refers episodic abdominal discomfort when takes Plaquenil.  Recommend holding dose or taking 1 tablet daily instead  - increase methotrexate to 25 mg once per week split dose and continue with daily folic acid  - may use prednisone 5-10 mg daily for 10 days for RA flares.  Follow with PMD for history of diabetes    2.  High risk medication use   - Compromised immune system secondary to autoimmune disease and use of immunosuppressive drugs.   - Monitor carefully for infections and toxicities.   - Advised to get immediate medical care if any infection.   - Also advised strict adherence to age appropriate vaccinations and cancer screenings with PCP.  - CBC, CMP today and prior next visit    3. Other specified counseling  - Immunization counseling done.  - Nutrition and exercise counseling.  - Limitation of alcohol consumption.  - Medication counseling provided.    No follow-ups on file.   RTC 4 months    Method of contact with patient concerns: [facility]    Disclaimer:  This note is prepared using voice recognition software and as such is likely to have errors and has not been proof read. Please contact me for questions.     Giovanni Cage M.D.  Rheumatology Department   [facility] [place]

## 2020-11-30 ENCOUNTER — TELEPHONE (OUTPATIENT)
Dept: INTERNAL MEDICINE | Facility: CLINIC | Age: 75
End: 2020-11-30

## 2020-11-30 DIAGNOSIS — E11.9 TYPE 2 DIABETES MELLITUS WITHOUT COMPLICATION, WITHOUT LONG-TERM CURRENT USE OF INSULIN: ICD-10-CM

## 2020-11-30 DIAGNOSIS — E78.5 HYPERLIPIDEMIA ASSOCIATED WITH TYPE 2 DIABETES MELLITUS: Primary | ICD-10-CM

## 2020-11-30 DIAGNOSIS — E11.69 HYPERLIPIDEMIA ASSOCIATED WITH TYPE 2 DIABETES MELLITUS: Primary | ICD-10-CM

## 2020-11-30 NOTE — TELEPHONE ENCOUNTER
----- Message from Vesna Mandujano sent at 11/30/2020 10:57 AM CST -----  Contact: [phone]/SELF  Type:  Needs Medical Advice    Who Called: patient  Symptoms (please be specific):    How long has patient had these symptoms:    Pharmacy name and phone #:    Would the patient rather a call back or a response via [facility]? .Call Back  Best Call Back Number: [phone]  Additional Information: patient has stopped taking her insulin. Patient would like to speak with nurse regarding blood work before she makes an appointment        Thanks/ROBERT

## 2020-12-07 DIAGNOSIS — M05.9 SEROPOSITIVE RHEUMATOID ARTHRITIS: ICD-10-CM

## 2020-12-09 RX ORDER — METHOTREXATE 2.5 MG/1
25 TABLET ORAL
Qty: 120 TABLET | Refills: 0 | Status: SHIPPED | OUTPATIENT
Start: 2020-12-09 | End: 2021-03-18 | Stop reason: SDUPTHER

## 2020-12-11 ENCOUNTER — LAB VISIT (OUTPATIENT)
Dept: LAB | Facility: HOSPITAL | Age: 75
End: 2020-12-11
Attending: FAMILY MEDICINE
Payer: MEDICARE

## 2020-12-11 DIAGNOSIS — E11.9 TYPE 2 DIABETES MELLITUS WITHOUT COMPLICATION, WITHOUT LONG-TERM CURRENT USE OF INSULIN: ICD-10-CM

## 2020-12-11 DIAGNOSIS — E11.69 HYPERLIPIDEMIA ASSOCIATED WITH TYPE 2 DIABETES MELLITUS: ICD-10-CM

## 2020-12-11 DIAGNOSIS — E78.5 HYPERLIPIDEMIA ASSOCIATED WITH TYPE 2 DIABETES MELLITUS: ICD-10-CM

## 2020-12-11 LAB
CHOLEST SERPL-MCNC: 151 MG/DL (ref 120–199)
CHOLEST/HDLC SERPL: 2 {RATIO} (ref 2–5)
ESTIMATED AVG GLUCOSE: 166 MG/DL (ref 68–131)
HBA1C MFR BLD HPLC: 7.4 % (ref 4–5.6)
HDLC SERPL-MCNC: 74 MG/DL (ref 40–75)
HDLC SERPL: 49 % (ref 20–50)
LDLC SERPL CALC-MCNC: 64.6 MG/DL (ref 63–159)
NONHDLC SERPL-MCNC: 77 MG/DL
TRIGL SERPL-MCNC: 62 MG/DL (ref 30–150)

## 2020-12-11 PROCEDURE — 36415 COLL VENOUS BLD VENIPUNCTURE: CPT

## 2020-12-11 PROCEDURE — 80061 LIPID PANEL: CPT

## 2020-12-11 PROCEDURE — 83036 HEMOGLOBIN GLYCOSYLATED A1C: CPT

## 2020-12-14 ENCOUNTER — OFFICE VISIT (OUTPATIENT)
Dept: INTERNAL MEDICINE | Facility: CLINIC | Age: 75
End: 2020-12-14
Payer: MEDICARE

## 2020-12-14 VITALS
OXYGEN SATURATION: 97 % | TEMPERATURE: 97 F | BODY MASS INDEX: 26.06 KG/M2 | DIASTOLIC BLOOD PRESSURE: 70 MMHG | WEIGHT: 147.06 LBS | SYSTOLIC BLOOD PRESSURE: 128 MMHG | HEART RATE: 97 BPM | HEIGHT: 63 IN

## 2020-12-14 DIAGNOSIS — E78.5 HYPERLIPIDEMIA ASSOCIATED WITH TYPE 2 DIABETES MELLITUS: ICD-10-CM

## 2020-12-14 DIAGNOSIS — E11.69 HYPERLIPIDEMIA ASSOCIATED WITH TYPE 2 DIABETES MELLITUS: ICD-10-CM

## 2020-12-14 DIAGNOSIS — E11.9 TYPE 2 DIABETES MELLITUS WITHOUT COMPLICATION, WITHOUT LONG-TERM CURRENT USE OF INSULIN: Primary | ICD-10-CM

## 2020-12-14 DIAGNOSIS — I10 HYPERTENSION, ESSENTIAL: ICD-10-CM

## 2020-12-14 PROCEDURE — 3074F SYST BP LT 130 MM HG: CPT | Mod: CPTII,S$GLB,, | Performed by: FAMILY MEDICINE

## 2020-12-14 PROCEDURE — 1159F MED LIST DOCD IN RCRD: CPT | Mod: S$GLB,,, | Performed by: FAMILY MEDICINE

## 2020-12-14 PROCEDURE — 1101F PR PT FALLS ASSESS DOC 0-1 FALLS W/OUT INJ PAST YR: ICD-10-PCS | Mod: CPTII,S$GLB,, | Performed by: FAMILY MEDICINE

## 2020-12-14 PROCEDURE — 3051F HG A1C>EQUAL 7.0%<8.0%: CPT | Mod: CPTII,S$GLB,, | Performed by: FAMILY MEDICINE

## 2020-12-14 PROCEDURE — 3288F PR FALLS RISK ASSESSMENT DOCUMENTED: ICD-10-PCS | Mod: CPTII,S$GLB,, | Performed by: FAMILY MEDICINE

## 2020-12-14 PROCEDURE — 3078F DIAST BP <80 MM HG: CPT | Mod: CPTII,S$GLB,, | Performed by: FAMILY MEDICINE

## 2020-12-14 PROCEDURE — 99499 UNLISTED E&M SERVICE: CPT | Mod: S$GLB,,, | Performed by: FAMILY MEDICINE

## 2020-12-14 PROCEDURE — G0008 ADMIN INFLUENZA VIRUS VAC: HCPCS | Mod: S$GLB,,, | Performed by: FAMILY MEDICINE

## 2020-12-14 PROCEDURE — 3051F PR MOST RECENT HEMOGLOBIN A1C LEVEL 7.0 - < 8.0%: ICD-10-PCS | Mod: CPTII,S$GLB,, | Performed by: FAMILY MEDICINE

## 2020-12-14 PROCEDURE — 99214 PR OFFICE/OUTPT VISIT, EST, LEVL IV, 30-39 MIN: ICD-10-PCS | Mod: 25,S$GLB,, | Performed by: FAMILY MEDICINE

## 2020-12-14 PROCEDURE — 1101F PT FALLS ASSESS-DOCD LE1/YR: CPT | Mod: CPTII,S$GLB,, | Performed by: FAMILY MEDICINE

## 2020-12-14 PROCEDURE — 90694 FLU VACCINE - QUADRIVALENT - ADJUVANTED: ICD-10-PCS | Mod: S$GLB,,, | Performed by: FAMILY MEDICINE

## 2020-12-14 PROCEDURE — 3074F PR MOST RECENT SYSTOLIC BLOOD PRESSURE < 130 MM HG: ICD-10-PCS | Mod: CPTII,S$GLB,, | Performed by: FAMILY MEDICINE

## 2020-12-14 PROCEDURE — 1126F AMNT PAIN NOTED NONE PRSNT: CPT | Mod: S$GLB,,, | Performed by: FAMILY MEDICINE

## 2020-12-14 PROCEDURE — 99499 RISK ADDL DX/OHS AUDIT: ICD-10-PCS | Mod: S$GLB,,, | Performed by: FAMILY MEDICINE

## 2020-12-14 PROCEDURE — 99999 PR PBB SHADOW E&M-EST. PATIENT-LVL III: CPT | Mod: PBBFAC,,, | Performed by: FAMILY MEDICINE

## 2020-12-14 PROCEDURE — 3288F FALL RISK ASSESSMENT DOCD: CPT | Mod: CPTII,S$GLB,, | Performed by: FAMILY MEDICINE

## 2020-12-14 PROCEDURE — 3078F PR MOST RECENT DIASTOLIC BLOOD PRESSURE < 80 MM HG: ICD-10-PCS | Mod: CPTII,S$GLB,, | Performed by: FAMILY MEDICINE

## 2020-12-14 PROCEDURE — 1126F PR PAIN SEVERITY QUANTIFIED, NO PAIN PRESENT: ICD-10-PCS | Mod: S$GLB,,, | Performed by: FAMILY MEDICINE

## 2020-12-14 PROCEDURE — G0008 FLU VACCINE - QUADRIVALENT - ADJUVANTED: ICD-10-PCS | Mod: S$GLB,,, | Performed by: FAMILY MEDICINE

## 2020-12-14 PROCEDURE — 99999 PR PBB SHADOW E&M-EST. PATIENT-LVL III: ICD-10-PCS | Mod: PBBFAC,,, | Performed by: FAMILY MEDICINE

## 2020-12-14 PROCEDURE — 99214 OFFICE O/P EST MOD 30 MIN: CPT | Mod: 25,S$GLB,, | Performed by: FAMILY MEDICINE

## 2020-12-14 PROCEDURE — 1159F PR MEDICATION LIST DOCUMENTED IN MEDICAL RECORD: ICD-10-PCS | Mod: S$GLB,,, | Performed by: FAMILY MEDICINE

## 2020-12-14 PROCEDURE — 90694 VACC AIIV4 NO PRSRV 0.5ML IM: CPT | Mod: S$GLB,,, | Performed by: FAMILY MEDICINE

## 2020-12-14 RX ORDER — GABAPENTIN 100 MG/1
100 CAPSULE ORAL 2 TIMES DAILY
COMMUNITY
End: 2022-08-02

## 2020-12-14 NOTE — PROGRESS NOTES
Subjective:       Patient ID: Indira Walker is a 75 y.o. female.    Chief Complaint: Follow-up (LAB)    Patient with type 2 diabetes, hypertension, hyperlipidemia here for scheduled recheck with recent labs.   The patient's last visit with me was on 2020.  Big improvement with A1c now 7.4 down from 9.3 in September.  SHE STATES SHE STOPPED HER INSULIN - LAST DAY 2020. She had dropped her insulin to 16, then 12 U for 3-4 weeks and then stopped.  She is drinking 48 oz water daily. She uses a gazelle - more than in the past.  BS is running 100-130s.     Her son  of COVID - early September.    Lab Results       Component                Value               Date                       WBC                      7.35                2020                 HGB                      11.4 (L)            2020                 HCT                      34.7 (L)            2020                 PLT                      392 (H)             2020                 CHOL                     151                 2020                 TRIG                     62                  2020                 HDL                      74                  2020                 LDLCALC                  64.6                2020                 ALT                      17                  2020                 AST                      20                  2020                 NA                       135 (L)             2020                 K                        4.0                 2020                 CL                       99                  2020                 CALCIUM                  10.0                2020                 CREATININE               1.0                 2020                 BUN                      13                  2020                 CO2                      29                  2020                 INR                       1.0                 03/08/2018                 GLU                      91                  11/11/2020                 ESTGFRAFRICA             >60                 11/11/2020                 EGFRNONAA                54 (A)              11/11/2020                 HGBA1C                   7.4 (H)             12/11/2020                 MICALBCREAT              6.4                 02/18/2020            Lab Results       Component                Value               Date                       HGBA1C                   7.4 (H)             12/11/2020                 HGBA1C                   9.3 (H)             09/24/2020                 HGBA1C                   7.4 (H)             02/18/2020                 HGBA1C                   7.7 (H)             10/15/2019                 HGBA1C                   7.9 (H)             07/19/2019                 HGBA1C                   7.1 (H)             03/19/2019            Diabetes Medications        metFORMIN (GLUCOPHAGE) 1000 MG tablet Take 1 tablet (1,000 mg total) by mouth 2 (two) times daily with meals.     BP Readings from Last 3 Encounters:  12/14/20 : 128/70  08/12/20 : 134/76  03/12/20 : 128/71  Controlled.  Hypertension Medications        losartan-hydrochlorothiazide 100-12.5 mg (HYZAAR) 100-12.5 mg Tab Take 1 tablet by mouth once daily.        Lab Results       Component                Value               Date                       CHOL                     151                 12/11/2020                 CHOL                     155                 07/19/2019                 CHOL                     194                 03/19/2019            Lab Results       Component                Value               Date                       HDL                      74                  12/11/2020                 HDL                      65                  07/19/2019                 HDL                      67                  03/19/2019            Lab Results       Component                 Value               Date                       LDLCALC                  64.6                12/11/2020                 LDLCALC                  77.2                07/19/2019                 LDLCALC                  112.8               03/19/2019            Lab Results       Component                Value               Date                       TRIG                     62                  12/11/2020                 TRIG                     64                  07/19/2019                 TRIG                     71                  03/19/2019                Diabetes  She has type 2 diabetes mellitus. Her disease course has been improving. There are no hypoglycemic associated symptoms. Associated symptoms include foot paresthesias. Pertinent negatives for diabetes include no foot ulcerations. There are no hypoglycemic complications. Diabetic complications include nephropathy and peripheral neuropathy. Pertinent negatives for diabetic complications include no retinopathy. Current diabetic treatment includes diet. Her weight is fluctuating minimally. She is following a generally healthy diet. Her breakfast blood glucose range is generally 110-130 mg/dl. An ACE inhibitor/angiotensin II receptor blocker is being taken. Eye exam is current.     Review of Systems   Constitutional: Negative for fever.   Cardiovascular: Negative for leg swelling.       Objective:      Physical Exam  Constitutional:       Appearance: Normal appearance. She is well-developed.   HENT:      Head: Normocephalic and atraumatic.   Cardiovascular:      Rate and Rhythm: Normal rate and regular rhythm.      Pulses:           Dorsalis pedis pulses are 2+ on the right side and 2+ on the left side.        Posterior tibial pulses are 1+ on the right side and 1+ on the left side.      Heart sounds: Normal heart sounds.   Pulmonary:      Effort: Pulmonary effort is normal.      Breath sounds: Normal breath sounds.   Abdominal:      General: There is  no distension.      Palpations: Abdomen is soft. There is no mass.      Tenderness: There is no abdominal tenderness.   Musculoskeletal:      Right lower leg: No edema.      Left lower leg: No edema.      Right foot: Normal range of motion. No deformity.      Left foot: Normal range of motion. No deformity.   Feet:      Right foot:      Protective Sensation: 10 sites tested. 10 sites sensed.      Skin integrity: No ulcer or skin breakdown.      Left foot:      Protective Sensation: 10 sites tested. 10 sites sensed.      Skin integrity: No ulcer or skin breakdown.   Skin:     General: Skin is warm and dry.      Comments: Toes 1 and 2 R with white flaking under nail   Neurological:      Mental Status: She is alert and oriented to person, place, and time.   Psychiatric:         Behavior: Behavior normal.           Assessment/Plan:     1. Type 2 diabetes mellitus without complication, without long-term current use of insulin  Hemoglobin A1C    Microalbumin/Creatinine Ratio, Urine   2. Hypertension, essential     3. Hyperlipidemia associated with type 2 diabetes mellitus     she is going to schedule visit with Dr Garza for diabetic eye exam. ABBY signed/hold  Schedule for 3 month recheck with Dr Kearns, labs prior. Continue on metformin + diet/exercise.

## 2021-01-08 DIAGNOSIS — J30.89 ENVIRONMENTAL AND SEASONAL ALLERGIES: ICD-10-CM

## 2021-01-08 RX ORDER — MONTELUKAST SODIUM 10 MG/1
10 TABLET ORAL NIGHTLY
Qty: 30 TABLET | Refills: 11 | Status: SHIPPED | OUTPATIENT
Start: 2021-01-08 | End: 2022-08-02

## 2021-01-19 ENCOUNTER — TELEPHONE (OUTPATIENT)
Dept: INTERNAL MEDICINE | Facility: CLINIC | Age: 76
End: 2021-01-19

## 2021-02-08 ENCOUNTER — TELEPHONE (OUTPATIENT)
Dept: INTERNAL MEDICINE | Facility: CLINIC | Age: 76
End: 2021-02-08

## 2021-02-11 ENCOUNTER — TELEPHONE (OUTPATIENT)
Dept: INTERNAL MEDICINE | Facility: CLINIC | Age: 76
End: 2021-02-11

## 2021-02-15 DIAGNOSIS — K21.9 GASTROESOPHAGEAL REFLUX DISEASE: ICD-10-CM

## 2021-02-17 RX ORDER — FAMOTIDINE 40 MG/1
TABLET, FILM COATED ORAL
Qty: 90 TABLET | Refills: 2 | Status: SHIPPED | OUTPATIENT
Start: 2021-02-17 | End: 2021-02-22

## 2021-02-18 DIAGNOSIS — K21.9 GASTROESOPHAGEAL REFLUX DISEASE: ICD-10-CM

## 2021-02-22 DIAGNOSIS — E78.5 HYPERLIPIDEMIA ASSOCIATED WITH TYPE 2 DIABETES MELLITUS: ICD-10-CM

## 2021-02-22 DIAGNOSIS — E11.69 HYPERLIPIDEMIA ASSOCIATED WITH TYPE 2 DIABETES MELLITUS: ICD-10-CM

## 2021-02-22 RX ORDER — FAMOTIDINE 40 MG/1
40 TABLET, FILM COATED ORAL DAILY
Qty: 90 TABLET | Refills: 2 | OUTPATIENT
Start: 2021-02-22

## 2021-02-22 RX ORDER — FAMOTIDINE 40 MG/1
TABLET, FILM COATED ORAL
Qty: 90 TABLET | Refills: 2 | Status: SHIPPED | OUTPATIENT
Start: 2021-02-22 | End: 2021-09-27 | Stop reason: SDUPTHER

## 2021-02-24 RX ORDER — ROSUVASTATIN CALCIUM 20 MG/1
TABLET, COATED ORAL
Qty: 90 TABLET | Refills: 0 | Status: SHIPPED | OUTPATIENT
Start: 2021-02-24 | End: 2021-07-01

## 2021-03-04 ENCOUNTER — TELEPHONE (OUTPATIENT)
Dept: RHEUMATOLOGY | Facility: CLINIC | Age: 76
End: 2021-03-04

## 2021-03-08 ENCOUNTER — LAB VISIT (OUTPATIENT)
Dept: LAB | Facility: HOSPITAL | Age: 76
End: 2021-03-08
Attending: FAMILY MEDICINE
Payer: MEDICARE

## 2021-03-08 DIAGNOSIS — E11.9 TYPE 2 DIABETES MELLITUS WITHOUT COMPLICATION, WITHOUT LONG-TERM CURRENT USE OF INSULIN: ICD-10-CM

## 2021-03-08 DIAGNOSIS — Z79.899 HIGH RISK MEDICATION USE: ICD-10-CM

## 2021-03-08 LAB
ALBUMIN SERPL BCP-MCNC: 3.9 G/DL (ref 3.5–5.2)
ALP SERPL-CCNC: 52 U/L (ref 55–135)
ALT SERPL W/O P-5'-P-CCNC: 15 U/L (ref 10–44)
ANION GAP SERPL CALC-SCNC: 9 MMOL/L (ref 8–16)
AST SERPL-CCNC: 18 U/L (ref 10–40)
BASOPHILS # BLD AUTO: 0.02 K/UL (ref 0–0.2)
BASOPHILS NFR BLD: 0.5 % (ref 0–1.9)
BILIRUB SERPL-MCNC: 0.7 MG/DL (ref 0.1–1)
BUN SERPL-MCNC: 16 MG/DL (ref 8–23)
CALCIUM SERPL-MCNC: 9.2 MG/DL (ref 8.7–10.5)
CHLORIDE SERPL-SCNC: 102 MMOL/L (ref 95–110)
CO2 SERPL-SCNC: 28 MMOL/L (ref 23–29)
CREAT SERPL-MCNC: 0.8 MG/DL (ref 0.5–1.4)
DIFFERENTIAL METHOD: ABNORMAL
EOSINOPHIL # BLD AUTO: 0.2 K/UL (ref 0–0.5)
EOSINOPHIL NFR BLD: 3.5 % (ref 0–8)
ERYTHROCYTE [DISTWIDTH] IN BLOOD BY AUTOMATED COUNT: 14.6 % (ref 11.5–14.5)
EST. GFR  (AFRICAN AMERICAN): >60 ML/MIN/1.73 M^2
EST. GFR  (NON AFRICAN AMERICAN): >60 ML/MIN/1.73 M^2
GLUCOSE SERPL-MCNC: 137 MG/DL (ref 70–110)
HCT VFR BLD AUTO: 35.6 % (ref 37–48.5)
HGB BLD-MCNC: 11.7 G/DL (ref 12–16)
IMM GRANULOCYTES # BLD AUTO: 0.02 K/UL (ref 0–0.04)
IMM GRANULOCYTES NFR BLD AUTO: 0.5 % (ref 0–0.5)
LYMPHOCYTES # BLD AUTO: 1.9 K/UL (ref 1–4.8)
LYMPHOCYTES NFR BLD: 45 % (ref 18–48)
MCH RBC QN AUTO: 31 PG (ref 27–31)
MCHC RBC AUTO-ENTMCNC: 32.9 G/DL (ref 32–36)
MCV RBC AUTO: 94 FL (ref 82–98)
MONOCYTES # BLD AUTO: 0.3 K/UL (ref 0.3–1)
MONOCYTES NFR BLD: 7.3 % (ref 4–15)
NEUTROPHILS # BLD AUTO: 1.9 K/UL (ref 1.8–7.7)
NEUTROPHILS NFR BLD: 43.2 % (ref 38–73)
NRBC BLD-RTO: 0 /100 WBC
PLATELET # BLD AUTO: 366 K/UL (ref 150–350)
PMV BLD AUTO: 9.3 FL (ref 9.2–12.9)
POTASSIUM SERPL-SCNC: 3.5 MMOL/L (ref 3.5–5.1)
PROT SERPL-MCNC: 7.1 G/DL (ref 6–8.4)
RBC # BLD AUTO: 3.77 M/UL (ref 4–5.4)
SODIUM SERPL-SCNC: 139 MMOL/L (ref 136–145)
WBC # BLD AUTO: 4.27 K/UL (ref 3.9–12.7)

## 2021-03-08 PROCEDURE — 80053 COMPREHEN METABOLIC PANEL: CPT | Performed by: INTERNAL MEDICINE

## 2021-03-08 PROCEDURE — 85025 COMPLETE CBC W/AUTO DIFF WBC: CPT | Performed by: INTERNAL MEDICINE

## 2021-03-08 PROCEDURE — 36415 COLL VENOUS BLD VENIPUNCTURE: CPT | Performed by: FAMILY MEDICINE

## 2021-03-08 PROCEDURE — 83036 HEMOGLOBIN GLYCOSYLATED A1C: CPT | Performed by: FAMILY MEDICINE

## 2021-03-09 LAB
ESTIMATED AVG GLUCOSE: 174 MG/DL (ref 68–131)
HBA1C MFR BLD: 7.7 % (ref 4–5.6)

## 2021-03-15 ENCOUNTER — OFFICE VISIT (OUTPATIENT)
Dept: INTERNAL MEDICINE | Facility: CLINIC | Age: 76
End: 2021-03-15
Payer: MEDICARE

## 2021-03-15 ENCOUNTER — PATIENT OUTREACH (OUTPATIENT)
Dept: ADMINISTRATIVE | Facility: HOSPITAL | Age: 76
End: 2021-03-15

## 2021-03-15 VITALS
TEMPERATURE: 99 F | HEART RATE: 100 BPM | BODY MASS INDEX: 24.65 KG/M2 | OXYGEN SATURATION: 97 % | HEIGHT: 63 IN | DIASTOLIC BLOOD PRESSURE: 80 MMHG | WEIGHT: 139.13 LBS | SYSTOLIC BLOOD PRESSURE: 110 MMHG

## 2021-03-15 DIAGNOSIS — L08.9 FINGER INFECTION: ICD-10-CM

## 2021-03-15 DIAGNOSIS — E78.5 HYPERLIPIDEMIA ASSOCIATED WITH TYPE 2 DIABETES MELLITUS: ICD-10-CM

## 2021-03-15 DIAGNOSIS — E11.65 UNCONTROLLED TYPE 2 DIABETES MELLITUS WITH HYPERGLYCEMIA: Primary | ICD-10-CM

## 2021-03-15 DIAGNOSIS — E11.69 HYPERLIPIDEMIA ASSOCIATED WITH TYPE 2 DIABETES MELLITUS: ICD-10-CM

## 2021-03-15 DIAGNOSIS — I15.2 HYPERTENSION ASSOCIATED WITH DIABETES: ICD-10-CM

## 2021-03-15 DIAGNOSIS — E11.59 HYPERTENSION ASSOCIATED WITH DIABETES: ICD-10-CM

## 2021-03-15 DIAGNOSIS — M54.32 SCIATICA WITHOUT BACK PAIN, LEFT: ICD-10-CM

## 2021-03-15 DIAGNOSIS — M85.80 OSTEOPENIA, UNSPECIFIED LOCATION: ICD-10-CM

## 2021-03-15 PROCEDURE — 1125F AMNT PAIN NOTED PAIN PRSNT: CPT | Mod: S$GLB,,, | Performed by: FAMILY MEDICINE

## 2021-03-15 PROCEDURE — 1159F MED LIST DOCD IN RCRD: CPT | Mod: S$GLB,,, | Performed by: FAMILY MEDICINE

## 2021-03-15 PROCEDURE — 1125F PR PAIN SEVERITY QUANTIFIED, PAIN PRESENT: ICD-10-PCS | Mod: S$GLB,,, | Performed by: FAMILY MEDICINE

## 2021-03-15 PROCEDURE — 99999 PR PBB SHADOW E&M-EST. PATIENT-LVL III: CPT | Mod: PBBFAC,,, | Performed by: FAMILY MEDICINE

## 2021-03-15 PROCEDURE — 1101F PT FALLS ASSESS-DOCD LE1/YR: CPT | Mod: CPTII,S$GLB,, | Performed by: FAMILY MEDICINE

## 2021-03-15 PROCEDURE — 99499 UNLISTED E&M SERVICE: CPT | Mod: S$GLB,,, | Performed by: FAMILY MEDICINE

## 2021-03-15 PROCEDURE — 99214 OFFICE O/P EST MOD 30 MIN: CPT | Mod: S$GLB,,, | Performed by: FAMILY MEDICINE

## 2021-03-15 PROCEDURE — 99499 RISK ADDL DX/OHS AUDIT: ICD-10-PCS | Mod: S$GLB,,, | Performed by: FAMILY MEDICINE

## 2021-03-15 PROCEDURE — 1159F PR MEDICATION LIST DOCUMENTED IN MEDICAL RECORD: ICD-10-PCS | Mod: S$GLB,,, | Performed by: FAMILY MEDICINE

## 2021-03-15 PROCEDURE — 3079F DIAST BP 80-89 MM HG: CPT | Mod: CPTII,S$GLB,, | Performed by: FAMILY MEDICINE

## 2021-03-15 PROCEDURE — 3288F FALL RISK ASSESSMENT DOCD: CPT | Mod: CPTII,S$GLB,, | Performed by: FAMILY MEDICINE

## 2021-03-15 PROCEDURE — 3079F PR MOST RECENT DIASTOLIC BLOOD PRESSURE 80-89 MM HG: ICD-10-PCS | Mod: CPTII,S$GLB,, | Performed by: FAMILY MEDICINE

## 2021-03-15 PROCEDURE — 99214 PR OFFICE/OUTPT VISIT, EST, LEVL IV, 30-39 MIN: ICD-10-PCS | Mod: S$GLB,,, | Performed by: FAMILY MEDICINE

## 2021-03-15 PROCEDURE — 1101F PR PT FALLS ASSESS DOC 0-1 FALLS W/OUT INJ PAST YR: ICD-10-PCS | Mod: CPTII,S$GLB,, | Performed by: FAMILY MEDICINE

## 2021-03-15 PROCEDURE — 3051F HG A1C>EQUAL 7.0%<8.0%: CPT | Mod: CPTII,S$GLB,, | Performed by: FAMILY MEDICINE

## 2021-03-15 PROCEDURE — 3288F PR FALLS RISK ASSESSMENT DOCUMENTED: ICD-10-PCS | Mod: CPTII,S$GLB,, | Performed by: FAMILY MEDICINE

## 2021-03-15 PROCEDURE — 99999 PR PBB SHADOW E&M-EST. PATIENT-LVL III: ICD-10-PCS | Mod: PBBFAC,,, | Performed by: FAMILY MEDICINE

## 2021-03-15 PROCEDURE — 3074F PR MOST RECENT SYSTOLIC BLOOD PRESSURE < 130 MM HG: ICD-10-PCS | Mod: CPTII,S$GLB,, | Performed by: FAMILY MEDICINE

## 2021-03-15 PROCEDURE — 3051F PR MOST RECENT HEMOGLOBIN A1C LEVEL 7.0 - < 8.0%: ICD-10-PCS | Mod: CPTII,S$GLB,, | Performed by: FAMILY MEDICINE

## 2021-03-15 PROCEDURE — 3074F SYST BP LT 130 MM HG: CPT | Mod: CPTII,S$GLB,, | Performed by: FAMILY MEDICINE

## 2021-03-15 RX ORDER — MUPIROCIN 20 MG/G
OINTMENT TOPICAL 3 TIMES DAILY
Qty: 30 G | Refills: 0 | Status: SHIPPED | OUTPATIENT
Start: 2021-03-15 | End: 2022-01-11

## 2021-03-15 RX ORDER — HYDROXYCHLOROQUINE SULFATE 200 MG/1
200 TABLET, FILM COATED ORAL 2 TIMES DAILY
COMMUNITY
Start: 2021-03-03 | End: 2021-03-18

## 2021-03-17 ENCOUNTER — TELEPHONE (OUTPATIENT)
Dept: RHEUMATOLOGY | Facility: CLINIC | Age: 76
End: 2021-03-17

## 2021-03-17 ENCOUNTER — PATIENT OUTREACH (OUTPATIENT)
Dept: ADMINISTRATIVE | Facility: OTHER | Age: 76
End: 2021-03-17

## 2021-03-18 ENCOUNTER — OFFICE VISIT (OUTPATIENT)
Dept: RHEUMATOLOGY | Facility: CLINIC | Age: 76
End: 2021-03-18
Payer: MEDICARE

## 2021-03-18 VITALS
HEIGHT: 63 IN | WEIGHT: 139.13 LBS | SYSTOLIC BLOOD PRESSURE: 135 MMHG | HEART RATE: 83 BPM | BODY MASS INDEX: 24.65 KG/M2 | DIASTOLIC BLOOD PRESSURE: 81 MMHG

## 2021-03-18 DIAGNOSIS — M05.9 SEROPOSITIVE RHEUMATOID ARTHRITIS: Primary | ICD-10-CM

## 2021-03-18 DIAGNOSIS — M19.90 INFLAMMATORY ARTHRITIS: ICD-10-CM

## 2021-03-18 DIAGNOSIS — M15.9 PRIMARY OSTEOARTHRITIS INVOLVING MULTIPLE JOINTS: ICD-10-CM

## 2021-03-18 DIAGNOSIS — Z71.89 COUNSELING ON HEALTH PROMOTION AND DISEASE PREVENTION: ICD-10-CM

## 2021-03-18 PROCEDURE — 99999 PR PBB SHADOW E&M-EST. PATIENT-LVL III: ICD-10-PCS | Mod: PBBFAC,,, | Performed by: INTERNAL MEDICINE

## 2021-03-18 PROCEDURE — 1126F AMNT PAIN NOTED NONE PRSNT: CPT | Mod: S$GLB,,, | Performed by: INTERNAL MEDICINE

## 2021-03-18 PROCEDURE — 1101F PT FALLS ASSESS-DOCD LE1/YR: CPT | Mod: CPTII,S$GLB,, | Performed by: INTERNAL MEDICINE

## 2021-03-18 PROCEDURE — 3288F FALL RISK ASSESSMENT DOCD: CPT | Mod: CPTII,S$GLB,, | Performed by: INTERNAL MEDICINE

## 2021-03-18 PROCEDURE — 1159F MED LIST DOCD IN RCRD: CPT | Mod: S$GLB,,, | Performed by: INTERNAL MEDICINE

## 2021-03-18 PROCEDURE — 99214 PR OFFICE/OUTPT VISIT, EST, LEVL IV, 30-39 MIN: ICD-10-PCS | Mod: S$GLB,,, | Performed by: INTERNAL MEDICINE

## 2021-03-18 PROCEDURE — 3075F SYST BP GE 130 - 139MM HG: CPT | Mod: CPTII,S$GLB,, | Performed by: INTERNAL MEDICINE

## 2021-03-18 PROCEDURE — 3075F PR MOST RECENT SYSTOLIC BLOOD PRESS GE 130-139MM HG: ICD-10-PCS | Mod: CPTII,S$GLB,, | Performed by: INTERNAL MEDICINE

## 2021-03-18 PROCEDURE — 99499 RISK ADDL DX/OHS AUDIT: ICD-10-PCS | Mod: S$GLB,,, | Performed by: INTERNAL MEDICINE

## 2021-03-18 PROCEDURE — 1159F PR MEDICATION LIST DOCUMENTED IN MEDICAL RECORD: ICD-10-PCS | Mod: S$GLB,,, | Performed by: INTERNAL MEDICINE

## 2021-03-18 PROCEDURE — 1101F PR PT FALLS ASSESS DOC 0-1 FALLS W/OUT INJ PAST YR: ICD-10-PCS | Mod: CPTII,S$GLB,, | Performed by: INTERNAL MEDICINE

## 2021-03-18 PROCEDURE — 99214 OFFICE O/P EST MOD 30 MIN: CPT | Mod: S$GLB,,, | Performed by: INTERNAL MEDICINE

## 2021-03-18 PROCEDURE — 99499 UNLISTED E&M SERVICE: CPT | Mod: S$GLB,,, | Performed by: INTERNAL MEDICINE

## 2021-03-18 PROCEDURE — 1126F PR PAIN SEVERITY QUANTIFIED, NO PAIN PRESENT: ICD-10-PCS | Mod: S$GLB,,, | Performed by: INTERNAL MEDICINE

## 2021-03-18 PROCEDURE — 3079F DIAST BP 80-89 MM HG: CPT | Mod: CPTII,S$GLB,, | Performed by: INTERNAL MEDICINE

## 2021-03-18 PROCEDURE — 3288F PR FALLS RISK ASSESSMENT DOCUMENTED: ICD-10-PCS | Mod: CPTII,S$GLB,, | Performed by: INTERNAL MEDICINE

## 2021-03-18 PROCEDURE — 3079F PR MOST RECENT DIASTOLIC BLOOD PRESSURE 80-89 MM HG: ICD-10-PCS | Mod: CPTII,S$GLB,, | Performed by: INTERNAL MEDICINE

## 2021-03-18 PROCEDURE — 99999 PR PBB SHADOW E&M-EST. PATIENT-LVL III: CPT | Mod: PBBFAC,,, | Performed by: INTERNAL MEDICINE

## 2021-03-18 RX ORDER — METHOTREXATE 2.5 MG/1
25 TABLET ORAL
Qty: 120 TABLET | Refills: 1 | Status: SHIPPED | OUTPATIENT
Start: 2021-03-18 | End: 2021-03-18

## 2021-03-18 RX ORDER — FOLIC ACID 1 MG/1
1 TABLET ORAL DAILY
Qty: 90 TABLET | Refills: 3 | Status: SHIPPED | OUTPATIENT
Start: 2021-03-18 | End: 2021-08-04 | Stop reason: SDUPTHER

## 2021-03-30 LAB
LEFT EYE DM RETINOPATHY: POSITIVE
RIGHT EYE DM RETINOPATHY: POSITIVE

## 2021-04-01 ENCOUNTER — PATIENT OUTREACH (OUTPATIENT)
Dept: ADMINISTRATIVE | Facility: HOSPITAL | Age: 76
End: 2021-04-01

## 2021-05-28 ENCOUNTER — TELEPHONE (OUTPATIENT)
Dept: RHEUMATOLOGY | Facility: CLINIC | Age: 76
End: 2021-05-28

## 2021-06-03 ENCOUNTER — TELEPHONE (OUTPATIENT)
Dept: RHEUMATOLOGY | Facility: CLINIC | Age: 76
End: 2021-06-03

## 2021-06-28 ENCOUNTER — OFFICE VISIT (OUTPATIENT)
Dept: INTERNAL MEDICINE | Facility: CLINIC | Age: 76
End: 2021-06-28
Payer: MEDICARE

## 2021-06-28 VITALS
OXYGEN SATURATION: 98 % | HEART RATE: 87 BPM | WEIGHT: 139 LBS | BODY MASS INDEX: 24.63 KG/M2 | SYSTOLIC BLOOD PRESSURE: 120 MMHG | TEMPERATURE: 98 F | DIASTOLIC BLOOD PRESSURE: 70 MMHG | HEIGHT: 63 IN

## 2021-06-28 DIAGNOSIS — M25.551 RIGHT HIP PAIN: ICD-10-CM

## 2021-06-28 DIAGNOSIS — E11.69 HYPERLIPIDEMIA ASSOCIATED WITH TYPE 2 DIABETES MELLITUS: ICD-10-CM

## 2021-06-28 DIAGNOSIS — I15.2 HYPERTENSION ASSOCIATED WITH DIABETES: ICD-10-CM

## 2021-06-28 DIAGNOSIS — E78.5 HYPERLIPIDEMIA ASSOCIATED WITH TYPE 2 DIABETES MELLITUS: ICD-10-CM

## 2021-06-28 DIAGNOSIS — E11.59 HYPERTENSION ASSOCIATED WITH DIABETES: ICD-10-CM

## 2021-06-28 DIAGNOSIS — H61.22 IMPACTED CERUMEN OF LEFT EAR: ICD-10-CM

## 2021-06-28 DIAGNOSIS — Z78.0 POSTMENOPAUSAL: ICD-10-CM

## 2021-06-28 DIAGNOSIS — E11.65 UNCONTROLLED TYPE 2 DIABETES MELLITUS WITH HYPERGLYCEMIA: ICD-10-CM

## 2021-06-28 DIAGNOSIS — Z00.00 ROUTINE GENERAL MEDICAL EXAMINATION AT A HEALTH CARE FACILITY: Primary | ICD-10-CM

## 2021-06-28 PROCEDURE — 3051F PR MOST RECENT HEMOGLOBIN A1C LEVEL 7.0 - < 8.0%: ICD-10-PCS | Mod: CPTII,S$GLB,, | Performed by: FAMILY MEDICINE

## 2021-06-28 PROCEDURE — 1101F PT FALLS ASSESS-DOCD LE1/YR: CPT | Mod: CPTII,S$GLB,, | Performed by: FAMILY MEDICINE

## 2021-06-28 PROCEDURE — 99499 RISK ADDL DX/OHS AUDIT: ICD-10-PCS | Mod: S$GLB,,, | Performed by: FAMILY MEDICINE

## 2021-06-28 PROCEDURE — 1126F PR PAIN SEVERITY QUANTIFIED, NO PAIN PRESENT: ICD-10-PCS | Mod: S$GLB,,, | Performed by: FAMILY MEDICINE

## 2021-06-28 PROCEDURE — 3288F PR FALLS RISK ASSESSMENT DOCUMENTED: ICD-10-PCS | Mod: CPTII,S$GLB,, | Performed by: FAMILY MEDICINE

## 2021-06-28 PROCEDURE — 3288F FALL RISK ASSESSMENT DOCD: CPT | Mod: CPTII,S$GLB,, | Performed by: FAMILY MEDICINE

## 2021-06-28 PROCEDURE — 99999 PR PBB SHADOW E&M-EST. PATIENT-LVL V: ICD-10-PCS | Mod: PBBFAC,,, | Performed by: FAMILY MEDICINE

## 2021-06-28 PROCEDURE — 99499 UNLISTED E&M SERVICE: CPT | Mod: S$GLB,,, | Performed by: FAMILY MEDICINE

## 2021-06-28 PROCEDURE — 3051F HG A1C>EQUAL 7.0%<8.0%: CPT | Mod: CPTII,S$GLB,, | Performed by: FAMILY MEDICINE

## 2021-06-28 PROCEDURE — 1126F AMNT PAIN NOTED NONE PRSNT: CPT | Mod: S$GLB,,, | Performed by: FAMILY MEDICINE

## 2021-06-28 PROCEDURE — 99999 PR PBB SHADOW E&M-EST. PATIENT-LVL V: CPT | Mod: PBBFAC,,, | Performed by: FAMILY MEDICINE

## 2021-06-28 PROCEDURE — 99214 PR OFFICE/OUTPT VISIT, EST, LEVL IV, 30-39 MIN: ICD-10-PCS | Mod: S$GLB,,, | Performed by: FAMILY MEDICINE

## 2021-06-28 PROCEDURE — 1101F PR PT FALLS ASSESS DOC 0-1 FALLS W/OUT INJ PAST YR: ICD-10-PCS | Mod: CPTII,S$GLB,, | Performed by: FAMILY MEDICINE

## 2021-06-28 PROCEDURE — 99214 OFFICE O/P EST MOD 30 MIN: CPT | Mod: S$GLB,,, | Performed by: FAMILY MEDICINE

## 2021-06-28 RX ORDER — GINGER ROOT
POWDER (GRAM) MISCELLANEOUS
COMMUNITY
End: 2022-08-02

## 2021-06-28 RX ORDER — CINNAMON BARK
POWDER (GRAM) MISCELLANEOUS
COMMUNITY
End: 2022-08-02

## 2021-06-30 ENCOUNTER — TELEPHONE (OUTPATIENT)
Dept: INTERNAL MEDICINE | Facility: CLINIC | Age: 76
End: 2021-06-30

## 2021-07-01 DIAGNOSIS — E11.69 HYPERLIPIDEMIA ASSOCIATED WITH TYPE 2 DIABETES MELLITUS: ICD-10-CM

## 2021-07-01 DIAGNOSIS — E78.5 HYPERLIPIDEMIA ASSOCIATED WITH TYPE 2 DIABETES MELLITUS: ICD-10-CM

## 2021-07-01 RX ORDER — ROSUVASTATIN CALCIUM 20 MG/1
TABLET, COATED ORAL
Qty: 90 TABLET | Refills: 3 | Status: SHIPPED | OUTPATIENT
Start: 2021-07-01 | End: 2022-05-24

## 2021-07-07 DIAGNOSIS — G91.2 (IDIOPATHIC) NORMAL PRESSURE HYDROCEPHALUS: ICD-10-CM

## 2021-07-07 RX ORDER — METFORMIN HYDROCHLORIDE 1000 MG/1
1000 TABLET ORAL 2 TIMES DAILY WITH MEALS
Qty: 180 TABLET | Refills: 3 | Status: SHIPPED | OUTPATIENT
Start: 2021-07-07 | End: 2022-05-24

## 2021-07-08 ENCOUNTER — LAB VISIT (OUTPATIENT)
Dept: LAB | Facility: HOSPITAL | Age: 76
End: 2021-07-08
Payer: MEDICARE

## 2021-07-08 DIAGNOSIS — I15.2 HYPERTENSION ASSOCIATED WITH DIABETES: ICD-10-CM

## 2021-07-08 DIAGNOSIS — E11.65 UNCONTROLLED TYPE 2 DIABETES MELLITUS WITH HYPERGLYCEMIA: ICD-10-CM

## 2021-07-08 DIAGNOSIS — E78.5 HYPERLIPIDEMIA ASSOCIATED WITH TYPE 2 DIABETES MELLITUS: ICD-10-CM

## 2021-07-08 DIAGNOSIS — E11.59 HYPERTENSION ASSOCIATED WITH DIABETES: ICD-10-CM

## 2021-07-08 DIAGNOSIS — E11.69 HYPERLIPIDEMIA ASSOCIATED WITH TYPE 2 DIABETES MELLITUS: ICD-10-CM

## 2021-07-08 DIAGNOSIS — M85.80 OSTEOPENIA, UNSPECIFIED LOCATION: ICD-10-CM

## 2021-07-08 LAB
BASOPHILS # BLD AUTO: 0.04 K/UL (ref 0–0.2)
BASOPHILS NFR BLD: 0.7 % (ref 0–1.9)
DIFFERENTIAL METHOD: ABNORMAL
EOSINOPHIL # BLD AUTO: 0.2 K/UL (ref 0–0.5)
EOSINOPHIL NFR BLD: 2.4 % (ref 0–8)
ERYTHROCYTE [DISTWIDTH] IN BLOOD BY AUTOMATED COUNT: 14.9 % (ref 11.5–14.5)
HCT VFR BLD AUTO: 35.2 % (ref 37–48.5)
HGB BLD-MCNC: 11 G/DL (ref 12–16)
IMM GRANULOCYTES # BLD AUTO: 0.02 K/UL (ref 0–0.04)
IMM GRANULOCYTES NFR BLD AUTO: 0.3 % (ref 0–0.5)
LYMPHOCYTES # BLD AUTO: 2.2 K/UL (ref 1–4.8)
LYMPHOCYTES NFR BLD: 35.7 % (ref 18–48)
MCH RBC QN AUTO: 30.1 PG (ref 27–31)
MCHC RBC AUTO-ENTMCNC: 31.3 G/DL (ref 32–36)
MCV RBC AUTO: 96 FL (ref 82–98)
MONOCYTES # BLD AUTO: 0.7 K/UL (ref 0.3–1)
MONOCYTES NFR BLD: 10.6 % (ref 4–15)
NEUTROPHILS # BLD AUTO: 3.1 K/UL (ref 1.8–7.7)
NEUTROPHILS NFR BLD: 50.3 % (ref 38–73)
NRBC BLD-RTO: 0 /100 WBC
PLATELET # BLD AUTO: 370 K/UL (ref 150–450)
PMV BLD AUTO: 10.1 FL (ref 9.2–12.9)
RBC # BLD AUTO: 3.65 M/UL (ref 4–5.4)
WBC # BLD AUTO: 6.13 K/UL (ref 3.9–12.7)

## 2021-07-08 PROCEDURE — 80061 LIPID PANEL: CPT | Performed by: FAMILY MEDICINE

## 2021-07-08 PROCEDURE — 85025 COMPLETE CBC W/AUTO DIFF WBC: CPT | Performed by: FAMILY MEDICINE

## 2021-07-08 PROCEDURE — 83036 HEMOGLOBIN GLYCOSYLATED A1C: CPT | Performed by: FAMILY MEDICINE

## 2021-07-08 PROCEDURE — 82306 VITAMIN D 25 HYDROXY: CPT | Performed by: FAMILY MEDICINE

## 2021-07-08 PROCEDURE — 36415 COLL VENOUS BLD VENIPUNCTURE: CPT | Performed by: FAMILY MEDICINE

## 2021-07-08 PROCEDURE — 80053 COMPREHEN METABOLIC PANEL: CPT | Performed by: FAMILY MEDICINE

## 2021-07-08 PROCEDURE — 84443 ASSAY THYROID STIM HORMONE: CPT | Performed by: FAMILY MEDICINE

## 2021-07-09 LAB
25(OH)D3+25(OH)D2 SERPL-MCNC: 58 NG/ML (ref 30–96)
ALBUMIN SERPL BCP-MCNC: 3.9 G/DL (ref 3.5–5.2)
ALP SERPL-CCNC: 47 U/L (ref 55–135)
ALT SERPL W/O P-5'-P-CCNC: 14 U/L (ref 10–44)
ANION GAP SERPL CALC-SCNC: 10 MMOL/L (ref 8–16)
AST SERPL-CCNC: 19 U/L (ref 10–40)
BILIRUB SERPL-MCNC: 0.8 MG/DL (ref 0.1–1)
BUN SERPL-MCNC: 10 MG/DL (ref 8–23)
CALCIUM SERPL-MCNC: 9.5 MG/DL (ref 8.7–10.5)
CHLORIDE SERPL-SCNC: 99 MMOL/L (ref 95–110)
CHOLEST SERPL-MCNC: 153 MG/DL (ref 120–199)
CHOLEST/HDLC SERPL: 2 {RATIO} (ref 2–5)
CO2 SERPL-SCNC: 27 MMOL/L (ref 23–29)
CREAT SERPL-MCNC: 0.9 MG/DL (ref 0.5–1.4)
EST. GFR  (AFRICAN AMERICAN): >60 ML/MIN/1.73 M^2
EST. GFR  (NON AFRICAN AMERICAN): >60 ML/MIN/1.73 M^2
ESTIMATED AVG GLUCOSE: 186 MG/DL (ref 68–131)
GLUCOSE SERPL-MCNC: 135 MG/DL (ref 70–110)
HBA1C MFR BLD: 8.1 % (ref 4–5.6)
HDLC SERPL-MCNC: 76 MG/DL (ref 40–75)
HDLC SERPL: 49.7 % (ref 20–50)
LDLC SERPL CALC-MCNC: 65.8 MG/DL (ref 63–159)
NONHDLC SERPL-MCNC: 77 MG/DL
POTASSIUM SERPL-SCNC: 3.6 MMOL/L (ref 3.5–5.1)
PROT SERPL-MCNC: 7.3 G/DL (ref 6–8.4)
SODIUM SERPL-SCNC: 136 MMOL/L (ref 136–145)
TRIGL SERPL-MCNC: 56 MG/DL (ref 30–150)
TSH SERPL DL<=0.005 MIU/L-ACNC: 2.26 UIU/ML (ref 0.4–4)

## 2021-07-13 ENCOUNTER — OFFICE VISIT (OUTPATIENT)
Dept: OTOLARYNGOLOGY | Facility: CLINIC | Age: 76
End: 2021-07-13
Payer: MEDICARE

## 2021-07-13 VITALS — HEIGHT: 63 IN | TEMPERATURE: 98 F | BODY MASS INDEX: 24.34 KG/M2 | WEIGHT: 137.38 LBS

## 2021-07-13 DIAGNOSIS — H61.22 IMPACTED CERUMEN OF LEFT EAR: Primary | ICD-10-CM

## 2021-07-13 DIAGNOSIS — M27.0 TORUS PALATINUS: ICD-10-CM

## 2021-07-13 PROCEDURE — 1159F PR MEDICATION LIST DOCUMENTED IN MEDICAL RECORD: ICD-10-PCS | Mod: S$GLB,,, | Performed by: OTOLARYNGOLOGY

## 2021-07-13 PROCEDURE — 99999 PR PBB SHADOW E&M-EST. PATIENT-LVL V: ICD-10-PCS | Mod: PBBFAC,,, | Performed by: OTOLARYNGOLOGY

## 2021-07-13 PROCEDURE — 1101F PT FALLS ASSESS-DOCD LE1/YR: CPT | Mod: CPTII,S$GLB,, | Performed by: OTOLARYNGOLOGY

## 2021-07-13 PROCEDURE — 99202 OFFICE O/P NEW SF 15 MIN: CPT | Mod: 25,S$GLB,, | Performed by: OTOLARYNGOLOGY

## 2021-07-13 PROCEDURE — 1101F PR PT FALLS ASSESS DOC 0-1 FALLS W/OUT INJ PAST YR: ICD-10-PCS | Mod: CPTII,S$GLB,, | Performed by: OTOLARYNGOLOGY

## 2021-07-13 PROCEDURE — 69210 REMOVE IMPACTED EAR WAX UNI: CPT | Mod: S$GLB,,, | Performed by: OTOLARYNGOLOGY

## 2021-07-13 PROCEDURE — 3288F PR FALLS RISK ASSESSMENT DOCUMENTED: ICD-10-PCS | Mod: CPTII,S$GLB,, | Performed by: OTOLARYNGOLOGY

## 2021-07-13 PROCEDURE — 1159F MED LIST DOCD IN RCRD: CPT | Mod: S$GLB,,, | Performed by: OTOLARYNGOLOGY

## 2021-07-13 PROCEDURE — 99999 PR PBB SHADOW E&M-EST. PATIENT-LVL V: CPT | Mod: PBBFAC,,, | Performed by: OTOLARYNGOLOGY

## 2021-07-13 PROCEDURE — 3288F FALL RISK ASSESSMENT DOCD: CPT | Mod: CPTII,S$GLB,, | Performed by: OTOLARYNGOLOGY

## 2021-07-13 PROCEDURE — 69210 PR REMOVAL IMPACTED CERUMEN REQUIRING INSTRUMENTATION, UNILATERAL: ICD-10-PCS | Mod: S$GLB,,, | Performed by: OTOLARYNGOLOGY

## 2021-07-13 PROCEDURE — 1126F AMNT PAIN NOTED NONE PRSNT: CPT | Mod: S$GLB,,, | Performed by: OTOLARYNGOLOGY

## 2021-07-13 PROCEDURE — 1126F PR PAIN SEVERITY QUANTIFIED, NO PAIN PRESENT: ICD-10-PCS | Mod: S$GLB,,, | Performed by: OTOLARYNGOLOGY

## 2021-07-13 PROCEDURE — 99202 PR OFFICE/OUTPT VISIT, NEW, LEVL II, 15-29 MIN: ICD-10-PCS | Mod: 25,S$GLB,, | Performed by: OTOLARYNGOLOGY

## 2021-07-26 ENCOUNTER — APPOINTMENT (OUTPATIENT)
Dept: RADIOLOGY | Facility: HOSPITAL | Age: 76
End: 2021-07-26
Attending: FAMILY MEDICINE
Payer: MEDICARE

## 2021-07-26 DIAGNOSIS — Z78.0 POSTMENOPAUSAL: ICD-10-CM

## 2021-07-26 PROCEDURE — 77080 DEXA BONE DENSITY SPINE HIP: ICD-10-PCS | Mod: 26,,, | Performed by: RADIOLOGY

## 2021-07-26 PROCEDURE — 77080 DXA BONE DENSITY AXIAL: CPT | Mod: 26,,, | Performed by: RADIOLOGY

## 2021-07-26 PROCEDURE — 77080 DXA BONE DENSITY AXIAL: CPT | Mod: TC

## 2021-08-04 ENCOUNTER — OFFICE VISIT (OUTPATIENT)
Dept: RHEUMATOLOGY | Facility: CLINIC | Age: 76
End: 2021-08-04
Payer: MEDICARE

## 2021-08-04 ENCOUNTER — LAB VISIT (OUTPATIENT)
Dept: LAB | Facility: HOSPITAL | Age: 76
End: 2021-08-04
Attending: INTERNAL MEDICINE
Payer: MEDICARE

## 2021-08-04 VITALS
HEIGHT: 63 IN | DIASTOLIC BLOOD PRESSURE: 68 MMHG | HEART RATE: 81 BPM | WEIGHT: 136 LBS | SYSTOLIC BLOOD PRESSURE: 106 MMHG | BODY MASS INDEX: 24.1 KG/M2

## 2021-08-04 DIAGNOSIS — Z71.89 COUNSELING ON HEALTH PROMOTION AND DISEASE PREVENTION: ICD-10-CM

## 2021-08-04 DIAGNOSIS — M19.90 INFLAMMATORY ARTHRITIS: ICD-10-CM

## 2021-08-04 DIAGNOSIS — M15.9 PRIMARY OSTEOARTHRITIS INVOLVING MULTIPLE JOINTS: ICD-10-CM

## 2021-08-04 DIAGNOSIS — M05.9 SEROPOSITIVE RHEUMATOID ARTHRITIS: Primary | ICD-10-CM

## 2021-08-04 DIAGNOSIS — Z79.899 HIGH RISK MEDICATION USE: ICD-10-CM

## 2021-08-04 DIAGNOSIS — M05.9 SEROPOSITIVE RHEUMATOID ARTHRITIS: ICD-10-CM

## 2021-08-04 LAB
ALBUMIN SERPL BCP-MCNC: 3.8 G/DL (ref 3.5–5.2)
ALP SERPL-CCNC: 54 U/L (ref 55–135)
ALT SERPL W/O P-5'-P-CCNC: 15 U/L (ref 10–44)
ANION GAP SERPL CALC-SCNC: 10 MMOL/L (ref 8–16)
AST SERPL-CCNC: 15 U/L (ref 10–40)
BASOPHILS # BLD AUTO: 0.02 K/UL (ref 0–0.2)
BASOPHILS NFR BLD: 0.4 % (ref 0–1.9)
BILIRUB SERPL-MCNC: 0.6 MG/DL (ref 0.1–1)
BUN SERPL-MCNC: 14 MG/DL (ref 8–23)
CALCIUM SERPL-MCNC: 9.6 MG/DL (ref 8.7–10.5)
CHLORIDE SERPL-SCNC: 103 MMOL/L (ref 95–110)
CO2 SERPL-SCNC: 27 MMOL/L (ref 23–29)
CREAT SERPL-MCNC: 1 MG/DL (ref 0.5–1.4)
DIFFERENTIAL METHOD: ABNORMAL
EOSINOPHIL # BLD AUTO: 0.3 K/UL (ref 0–0.5)
EOSINOPHIL NFR BLD: 4.7 % (ref 0–8)
ERYTHROCYTE [DISTWIDTH] IN BLOOD BY AUTOMATED COUNT: 14.6 % (ref 11.5–14.5)
EST. GFR  (AFRICAN AMERICAN): >60 ML/MIN/1.73 M^2
EST. GFR  (NON AFRICAN AMERICAN): 54 ML/MIN/1.73 M^2
GLUCOSE SERPL-MCNC: 265 MG/DL (ref 70–110)
HCT VFR BLD AUTO: 34.5 % (ref 37–48.5)
HGB BLD-MCNC: 11.2 G/DL (ref 12–16)
IMM GRANULOCYTES # BLD AUTO: 0.02 K/UL (ref 0–0.04)
IMM GRANULOCYTES NFR BLD AUTO: 0.4 % (ref 0–0.5)
LYMPHOCYTES # BLD AUTO: 1.5 K/UL (ref 1–4.8)
LYMPHOCYTES NFR BLD: 27.6 % (ref 18–48)
MCH RBC QN AUTO: 30.8 PG (ref 27–31)
MCHC RBC AUTO-ENTMCNC: 32.5 G/DL (ref 32–36)
MCV RBC AUTO: 95 FL (ref 82–98)
MONOCYTES # BLD AUTO: 0.5 K/UL (ref 0.3–1)
MONOCYTES NFR BLD: 8.5 % (ref 4–15)
NEUTROPHILS # BLD AUTO: 3.2 K/UL (ref 1.8–7.7)
NEUTROPHILS NFR BLD: 58.4 % (ref 38–73)
NRBC BLD-RTO: 0 /100 WBC
PLATELET # BLD AUTO: 388 K/UL (ref 150–450)
PMV BLD AUTO: 8.8 FL (ref 9.2–12.9)
POTASSIUM SERPL-SCNC: 3.9 MMOL/L (ref 3.5–5.1)
PROT SERPL-MCNC: 7 G/DL (ref 6–8.4)
RBC # BLD AUTO: 3.64 M/UL (ref 4–5.4)
SODIUM SERPL-SCNC: 140 MMOL/L (ref 136–145)
WBC # BLD AUTO: 5.51 K/UL (ref 3.9–12.7)

## 2021-08-04 PROCEDURE — 36415 COLL VENOUS BLD VENIPUNCTURE: CPT | Performed by: INTERNAL MEDICINE

## 2021-08-04 PROCEDURE — 80053 COMPREHEN METABOLIC PANEL: CPT | Performed by: INTERNAL MEDICINE

## 2021-08-04 PROCEDURE — 1101F PT FALLS ASSESS-DOCD LE1/YR: CPT | Mod: CPTII,S$GLB,, | Performed by: INTERNAL MEDICINE

## 2021-08-04 PROCEDURE — 3074F PR MOST RECENT SYSTOLIC BLOOD PRESSURE < 130 MM HG: ICD-10-PCS | Mod: CPTII,S$GLB,, | Performed by: INTERNAL MEDICINE

## 2021-08-04 PROCEDURE — 99999 PR PBB SHADOW E&M-EST. PATIENT-LVL V: ICD-10-PCS | Mod: PBBFAC,,, | Performed by: INTERNAL MEDICINE

## 2021-08-04 PROCEDURE — 3078F PR MOST RECENT DIASTOLIC BLOOD PRESSURE < 80 MM HG: ICD-10-PCS | Mod: CPTII,S$GLB,, | Performed by: INTERNAL MEDICINE

## 2021-08-04 PROCEDURE — 3074F SYST BP LT 130 MM HG: CPT | Mod: CPTII,S$GLB,, | Performed by: INTERNAL MEDICINE

## 2021-08-04 PROCEDURE — 1159F MED LIST DOCD IN RCRD: CPT | Mod: CPTII,S$GLB,, | Performed by: INTERNAL MEDICINE

## 2021-08-04 PROCEDURE — 3078F DIAST BP <80 MM HG: CPT | Mod: CPTII,S$GLB,, | Performed by: INTERNAL MEDICINE

## 2021-08-04 PROCEDURE — 1126F AMNT PAIN NOTED NONE PRSNT: CPT | Mod: CPTII,S$GLB,, | Performed by: INTERNAL MEDICINE

## 2021-08-04 PROCEDURE — 99214 OFFICE O/P EST MOD 30 MIN: CPT | Mod: S$GLB,,, | Performed by: INTERNAL MEDICINE

## 2021-08-04 PROCEDURE — 3288F PR FALLS RISK ASSESSMENT DOCUMENTED: ICD-10-PCS | Mod: CPTII,S$GLB,, | Performed by: INTERNAL MEDICINE

## 2021-08-04 PROCEDURE — 99214 PR OFFICE/OUTPT VISIT, EST, LEVL IV, 30-39 MIN: ICD-10-PCS | Mod: S$GLB,,, | Performed by: INTERNAL MEDICINE

## 2021-08-04 PROCEDURE — 1126F PR PAIN SEVERITY QUANTIFIED, NO PAIN PRESENT: ICD-10-PCS | Mod: CPTII,S$GLB,, | Performed by: INTERNAL MEDICINE

## 2021-08-04 PROCEDURE — 1101F PR PT FALLS ASSESS DOC 0-1 FALLS W/OUT INJ PAST YR: ICD-10-PCS | Mod: CPTII,S$GLB,, | Performed by: INTERNAL MEDICINE

## 2021-08-04 PROCEDURE — 1159F PR MEDICATION LIST DOCUMENTED IN MEDICAL RECORD: ICD-10-PCS | Mod: CPTII,S$GLB,, | Performed by: INTERNAL MEDICINE

## 2021-08-04 PROCEDURE — 99999 PR PBB SHADOW E&M-EST. PATIENT-LVL V: CPT | Mod: PBBFAC,,, | Performed by: INTERNAL MEDICINE

## 2021-08-04 PROCEDURE — 85025 COMPLETE CBC W/AUTO DIFF WBC: CPT | Performed by: INTERNAL MEDICINE

## 2021-08-04 PROCEDURE — 3288F FALL RISK ASSESSMENT DOCD: CPT | Mod: CPTII,S$GLB,, | Performed by: INTERNAL MEDICINE

## 2021-08-04 RX ORDER — METHOTREXATE 2.5 MG/1
25 TABLET ORAL
Qty: 120 TABLET | Refills: 2 | Status: SHIPPED | OUTPATIENT
Start: 2021-08-04 | End: 2021-10-26

## 2021-08-04 RX ORDER — FOLIC ACID 1 MG/1
1 TABLET ORAL DAILY
Qty: 90 TABLET | Refills: 3 | Status: SHIPPED | OUTPATIENT
Start: 2021-08-04 | End: 2021-12-30 | Stop reason: SDUPTHER

## 2021-09-14 DIAGNOSIS — E11.9 TYPE 2 DIABETES MELLITUS WITHOUT COMPLICATION, WITH LONG-TERM CURRENT USE OF INSULIN: ICD-10-CM

## 2021-09-14 DIAGNOSIS — Z79.4 TYPE 2 DIABETES MELLITUS WITHOUT COMPLICATION, WITH LONG-TERM CURRENT USE OF INSULIN: ICD-10-CM

## 2021-09-27 DIAGNOSIS — R05.9 COUGH: ICD-10-CM

## 2021-09-27 DIAGNOSIS — K21.9 GASTROESOPHAGEAL REFLUX DISEASE: ICD-10-CM

## 2021-09-27 RX ORDER — ALBUTEROL SULFATE 90 UG/1
2 AEROSOL, METERED RESPIRATORY (INHALATION) EVERY 6 HOURS PRN
Qty: 18 G | Refills: 3 | Status: SHIPPED | OUTPATIENT
Start: 2021-09-27 | End: 2022-08-02

## 2021-09-27 RX ORDER — FAMOTIDINE 40 MG/1
40 TABLET, FILM COATED ORAL DAILY
Qty: 90 TABLET | Refills: 3 | Status: SHIPPED | OUTPATIENT
Start: 2021-09-27 | End: 2022-02-02

## 2021-09-30 LAB
LEFT EYE DM RETINOPATHY: NEGATIVE
RIGHT EYE DM RETINOPATHY: NEGATIVE

## 2021-10-15 ENCOUNTER — PATIENT OUTREACH (OUTPATIENT)
Dept: ADMINISTRATIVE | Facility: HOSPITAL | Age: 76
End: 2021-10-15

## 2021-10-27 ENCOUNTER — PATIENT OUTREACH (OUTPATIENT)
Dept: ADMINISTRATIVE | Facility: HOSPITAL | Age: 76
End: 2021-10-27
Payer: MEDICARE

## 2021-11-01 ENCOUNTER — TELEPHONE (OUTPATIENT)
Dept: ADMINISTRATIVE | Facility: HOSPITAL | Age: 76
End: 2021-11-01
Payer: MEDICARE

## 2021-11-05 DIAGNOSIS — I10 HYPERTENSION, ESSENTIAL: ICD-10-CM

## 2021-11-05 RX ORDER — LOSARTAN POTASSIUM AND HYDROCHLOROTHIAZIDE 12.5; 1 MG/1; MG/1
1 TABLET ORAL DAILY
Qty: 90 TABLET | Refills: 2 | Status: SHIPPED | OUTPATIENT
Start: 2021-11-05 | End: 2022-05-24

## 2021-12-08 ENCOUNTER — TELEPHONE (OUTPATIENT)
Dept: RHEUMATOLOGY | Facility: CLINIC | Age: 76
End: 2021-12-08
Payer: MEDICARE

## 2021-12-13 ENCOUNTER — PES CALL (OUTPATIENT)
Dept: ADMINISTRATIVE | Facility: CLINIC | Age: 76
End: 2021-12-13
Payer: MEDICARE

## 2021-12-27 ENCOUNTER — PATIENT OUTREACH (OUTPATIENT)
Dept: ADMINISTRATIVE | Facility: OTHER | Age: 76
End: 2021-12-27
Payer: MEDICARE

## 2021-12-30 ENCOUNTER — LAB VISIT (OUTPATIENT)
Dept: LAB | Facility: HOSPITAL | Age: 76
End: 2021-12-30
Attending: INTERNAL MEDICINE
Payer: MEDICARE

## 2021-12-30 ENCOUNTER — OFFICE VISIT (OUTPATIENT)
Dept: RHEUMATOLOGY | Facility: CLINIC | Age: 76
End: 2021-12-30
Payer: MEDICARE

## 2021-12-30 VITALS
DIASTOLIC BLOOD PRESSURE: 68 MMHG | BODY MASS INDEX: 21.61 KG/M2 | HEART RATE: 70 BPM | WEIGHT: 121.94 LBS | HEIGHT: 63 IN | SYSTOLIC BLOOD PRESSURE: 119 MMHG

## 2021-12-30 DIAGNOSIS — M05.9 SEROPOSITIVE RHEUMATOID ARTHRITIS: ICD-10-CM

## 2021-12-30 DIAGNOSIS — R10.13 EPIGASTRIC PAIN: ICD-10-CM

## 2021-12-30 DIAGNOSIS — Z71.89 COUNSELING ON HEALTH PROMOTION AND DISEASE PREVENTION: ICD-10-CM

## 2021-12-30 DIAGNOSIS — M15.9 PRIMARY OSTEOARTHRITIS INVOLVING MULTIPLE JOINTS: ICD-10-CM

## 2021-12-30 DIAGNOSIS — M19.90 INFLAMMATORY ARTHRITIS: ICD-10-CM

## 2021-12-30 DIAGNOSIS — M05.9 SEROPOSITIVE RHEUMATOID ARTHRITIS: Primary | ICD-10-CM

## 2021-12-30 DIAGNOSIS — Z79.899 HIGH RISK MEDICATION USE: ICD-10-CM

## 2021-12-30 LAB
ALBUMIN SERPL BCP-MCNC: 3.7 G/DL (ref 3.5–5.2)
ALP SERPL-CCNC: 57 U/L (ref 55–135)
ALT SERPL W/O P-5'-P-CCNC: 17 U/L (ref 10–44)
ANION GAP SERPL CALC-SCNC: 12 MMOL/L (ref 8–16)
AST SERPL-CCNC: 18 U/L (ref 10–40)
BASOPHILS # BLD AUTO: 0.05 K/UL (ref 0–0.2)
BASOPHILS NFR BLD: 0.9 % (ref 0–1.9)
BILIRUB SERPL-MCNC: 0.6 MG/DL (ref 0.1–1)
BUN SERPL-MCNC: 15 MG/DL (ref 8–23)
CALCIUM SERPL-MCNC: 9.8 MG/DL (ref 8.7–10.5)
CHLORIDE SERPL-SCNC: 100 MMOL/L (ref 95–110)
CO2 SERPL-SCNC: 29 MMOL/L (ref 23–29)
CREAT SERPL-MCNC: 0.9 MG/DL (ref 0.5–1.4)
CRP SERPL-MCNC: 2.6 MG/L (ref 0–8.2)
DIFFERENTIAL METHOD: ABNORMAL
EOSINOPHIL # BLD AUTO: 0.2 K/UL (ref 0–0.5)
EOSINOPHIL NFR BLD: 3.4 % (ref 0–8)
ERYTHROCYTE [DISTWIDTH] IN BLOOD BY AUTOMATED COUNT: 14.8 % (ref 11.5–14.5)
ERYTHROCYTE [SEDIMENTATION RATE] IN BLOOD BY WESTERGREN METHOD: 15 MM/HR (ref 0–20)
EST. GFR  (AFRICAN AMERICAN): >60 ML/MIN/1.73 M^2
EST. GFR  (NON AFRICAN AMERICAN): >60 ML/MIN/1.73 M^2
GLUCOSE SERPL-MCNC: 170 MG/DL (ref 70–110)
HCT VFR BLD AUTO: 34.6 % (ref 37–48.5)
HGB BLD-MCNC: 11.1 G/DL (ref 12–16)
IMM GRANULOCYTES # BLD AUTO: 0.03 K/UL (ref 0–0.04)
IMM GRANULOCYTES NFR BLD AUTO: 0.5 % (ref 0–0.5)
LYMPHOCYTES # BLD AUTO: 1.3 K/UL (ref 1–4.8)
LYMPHOCYTES NFR BLD: 22.2 % (ref 18–48)
MCH RBC QN AUTO: 30.2 PG (ref 27–31)
MCHC RBC AUTO-ENTMCNC: 32.1 G/DL (ref 32–36)
MCV RBC AUTO: 94 FL (ref 82–98)
MONOCYTES # BLD AUTO: 0.6 K/UL (ref 0.3–1)
MONOCYTES NFR BLD: 10.1 % (ref 4–15)
NEUTROPHILS # BLD AUTO: 3.7 K/UL (ref 1.8–7.7)
NEUTROPHILS NFR BLD: 62.9 % (ref 38–73)
NRBC BLD-RTO: 0 /100 WBC
PLATELET # BLD AUTO: 393 K/UL (ref 150–450)
PMV BLD AUTO: 9.9 FL (ref 9.2–12.9)
POTASSIUM SERPL-SCNC: 3.5 MMOL/L (ref 3.5–5.1)
PROT SERPL-MCNC: 7.1 G/DL (ref 6–8.4)
RBC # BLD AUTO: 3.67 M/UL (ref 4–5.4)
SODIUM SERPL-SCNC: 141 MMOL/L (ref 136–145)
WBC # BLD AUTO: 5.86 K/UL (ref 3.9–12.7)

## 2021-12-30 PROCEDURE — 99999 PR PBB SHADOW E&M-EST. PATIENT-LVL IV: CPT | Mod: PBBFAC,,, | Performed by: INTERNAL MEDICINE

## 2021-12-30 PROCEDURE — 1125F AMNT PAIN NOTED PAIN PRSNT: CPT | Mod: CPTII,S$GLB,, | Performed by: INTERNAL MEDICINE

## 2021-12-30 PROCEDURE — 99215 PR OFFICE/OUTPT VISIT, EST, LEVL V, 40-54 MIN: ICD-10-PCS | Mod: S$GLB,,, | Performed by: INTERNAL MEDICINE

## 2021-12-30 PROCEDURE — 99499 UNLISTED E&M SERVICE: CPT | Mod: S$GLB,,, | Performed by: INTERNAL MEDICINE

## 2021-12-30 PROCEDURE — 3078F PR MOST RECENT DIASTOLIC BLOOD PRESSURE < 80 MM HG: ICD-10-PCS | Mod: CPTII,S$GLB,, | Performed by: INTERNAL MEDICINE

## 2021-12-30 PROCEDURE — 3288F PR FALLS RISK ASSESSMENT DOCUMENTED: ICD-10-PCS | Mod: CPTII,S$GLB,, | Performed by: INTERNAL MEDICINE

## 2021-12-30 PROCEDURE — 80053 COMPREHEN METABOLIC PANEL: CPT | Performed by: INTERNAL MEDICINE

## 2021-12-30 PROCEDURE — 1159F PR MEDICATION LIST DOCUMENTED IN MEDICAL RECORD: ICD-10-PCS | Mod: CPTII,S$GLB,, | Performed by: INTERNAL MEDICINE

## 2021-12-30 PROCEDURE — 36415 COLL VENOUS BLD VENIPUNCTURE: CPT | Performed by: INTERNAL MEDICINE

## 2021-12-30 PROCEDURE — 99215 OFFICE O/P EST HI 40 MIN: CPT | Mod: S$GLB,,, | Performed by: INTERNAL MEDICINE

## 2021-12-30 PROCEDURE — 99499 RISK ADDL DX/OHS AUDIT: ICD-10-PCS | Mod: S$GLB,,, | Performed by: INTERNAL MEDICINE

## 2021-12-30 PROCEDURE — 3078F DIAST BP <80 MM HG: CPT | Mod: CPTII,S$GLB,, | Performed by: INTERNAL MEDICINE

## 2021-12-30 PROCEDURE — 1125F PR PAIN SEVERITY QUANTIFIED, PAIN PRESENT: ICD-10-PCS | Mod: CPTII,S$GLB,, | Performed by: INTERNAL MEDICINE

## 2021-12-30 PROCEDURE — 3074F SYST BP LT 130 MM HG: CPT | Mod: CPTII,S$GLB,, | Performed by: INTERNAL MEDICINE

## 2021-12-30 PROCEDURE — 1101F PR PT FALLS ASSESS DOC 0-1 FALLS W/OUT INJ PAST YR: ICD-10-PCS | Mod: CPTII,S$GLB,, | Performed by: INTERNAL MEDICINE

## 2021-12-30 PROCEDURE — 3288F FALL RISK ASSESSMENT DOCD: CPT | Mod: CPTII,S$GLB,, | Performed by: INTERNAL MEDICINE

## 2021-12-30 PROCEDURE — 86140 C-REACTIVE PROTEIN: CPT | Performed by: INTERNAL MEDICINE

## 2021-12-30 PROCEDURE — 85651 RBC SED RATE NONAUTOMATED: CPT | Performed by: INTERNAL MEDICINE

## 2021-12-30 PROCEDURE — 1101F PT FALLS ASSESS-DOCD LE1/YR: CPT | Mod: CPTII,S$GLB,, | Performed by: INTERNAL MEDICINE

## 2021-12-30 PROCEDURE — 1159F MED LIST DOCD IN RCRD: CPT | Mod: CPTII,S$GLB,, | Performed by: INTERNAL MEDICINE

## 2021-12-30 PROCEDURE — 3074F PR MOST RECENT SYSTOLIC BLOOD PRESSURE < 130 MM HG: ICD-10-PCS | Mod: CPTII,S$GLB,, | Performed by: INTERNAL MEDICINE

## 2021-12-30 PROCEDURE — 99999 PR PBB SHADOW E&M-EST. PATIENT-LVL IV: ICD-10-PCS | Mod: PBBFAC,,, | Performed by: INTERNAL MEDICINE

## 2021-12-30 PROCEDURE — 85025 COMPLETE CBC W/AUTO DIFF WBC: CPT | Performed by: INTERNAL MEDICINE

## 2021-12-30 RX ORDER — FOLIC ACID 1 MG/1
1 TABLET ORAL DAILY
Qty: 90 TABLET | Refills: 3 | Status: SHIPPED | OUTPATIENT
Start: 2021-12-30 | End: 2022-11-28

## 2021-12-30 RX ORDER — METHOTREXATE 2.5 MG/1
TABLET ORAL
Qty: 120 TABLET | Refills: 1 | Status: SHIPPED | OUTPATIENT
Start: 2021-12-30 | End: 2022-07-26

## 2022-01-11 ENCOUNTER — OFFICE VISIT (OUTPATIENT)
Dept: HOME HEALTH SERVICES | Facility: CLINIC | Age: 77
End: 2022-01-11
Payer: MEDICARE

## 2022-01-11 VITALS
HEART RATE: 71 BPM | SYSTOLIC BLOOD PRESSURE: 134 MMHG | WEIGHT: 122 LBS | TEMPERATURE: 98 F | DIASTOLIC BLOOD PRESSURE: 77 MMHG | OXYGEN SATURATION: 99 % | BODY MASS INDEX: 21.62 KG/M2 | HEIGHT: 63 IN

## 2022-01-11 DIAGNOSIS — M54.32 SCIATICA WITHOUT BACK PAIN, LEFT: ICD-10-CM

## 2022-01-11 DIAGNOSIS — I51.89 DIASTOLIC DYSFUNCTION: ICD-10-CM

## 2022-01-11 DIAGNOSIS — M19.042 PRIMARY OSTEOARTHRITIS OF BOTH HANDS: ICD-10-CM

## 2022-01-11 DIAGNOSIS — E11.65 UNCONTROLLED TYPE 2 DIABETES MELLITUS WITH HYPERGLYCEMIA: ICD-10-CM

## 2022-01-11 DIAGNOSIS — Z63.4 GRIEF AT LOSS OF CHILD: ICD-10-CM

## 2022-01-11 DIAGNOSIS — Z00.00 ENCOUNTER FOR PREVENTIVE HEALTH EXAMINATION: Primary | ICD-10-CM

## 2022-01-11 DIAGNOSIS — H91.93 DECREASED HEARING OF BOTH EARS: ICD-10-CM

## 2022-01-11 DIAGNOSIS — E11.69 HYPERLIPIDEMIA ASSOCIATED WITH TYPE 2 DIABETES MELLITUS: ICD-10-CM

## 2022-01-11 DIAGNOSIS — E78.5 HYPERLIPIDEMIA ASSOCIATED WITH TYPE 2 DIABETES MELLITUS: ICD-10-CM

## 2022-01-11 DIAGNOSIS — M05.9 SEROPOSITIVE RHEUMATOID ARTHRITIS: ICD-10-CM

## 2022-01-11 DIAGNOSIS — I50.32 CHRONIC HEART FAILURE WITH PRESERVED EJECTION FRACTION: ICD-10-CM

## 2022-01-11 DIAGNOSIS — F43.21 GRIEF AT LOSS OF CHILD: ICD-10-CM

## 2022-01-11 DIAGNOSIS — E11.59 HYPERTENSION ASSOCIATED WITH DIABETES: ICD-10-CM

## 2022-01-11 DIAGNOSIS — M85.80 OSTEOPENIA, UNSPECIFIED LOCATION: ICD-10-CM

## 2022-01-11 DIAGNOSIS — M19.041 PRIMARY OSTEOARTHRITIS OF BOTH HANDS: ICD-10-CM

## 2022-01-11 DIAGNOSIS — E78.2 HYPERLIPIDEMIA, MIXED: ICD-10-CM

## 2022-01-11 DIAGNOSIS — I15.2 HYPERTENSION ASSOCIATED WITH DIABETES: ICD-10-CM

## 2022-01-11 PROBLEM — R07.89 OTHER CHEST PAIN: Status: RESOLVED | Noted: 2018-05-10 | Resolved: 2022-01-11

## 2022-01-11 PROBLEM — M21.372 FOOT DROP, LEFT: Status: RESOLVED | Noted: 2018-01-26 | Resolved: 2022-01-11

## 2022-01-11 PROBLEM — R15.9 INCONTINENCE OF FECES: Status: RESOLVED | Noted: 2017-12-11 | Resolved: 2022-01-11

## 2022-01-11 PROCEDURE — G0439 PPPS, SUBSEQ VISIT: HCPCS | Mod: S$GLB,,, | Performed by: NURSE PRACTITIONER

## 2022-01-11 PROCEDURE — 3288F PR FALLS RISK ASSESSMENT DOCUMENTED: ICD-10-PCS | Mod: CPTII,S$GLB,, | Performed by: NURSE PRACTITIONER

## 2022-01-11 PROCEDURE — 3288F FALL RISK ASSESSMENT DOCD: CPT | Mod: CPTII,S$GLB,, | Performed by: NURSE PRACTITIONER

## 2022-01-11 PROCEDURE — 3078F PR MOST RECENT DIASTOLIC BLOOD PRESSURE < 80 MM HG: ICD-10-PCS | Mod: CPTII,S$GLB,, | Performed by: NURSE PRACTITIONER

## 2022-01-11 PROCEDURE — 3078F DIAST BP <80 MM HG: CPT | Mod: CPTII,S$GLB,, | Performed by: NURSE PRACTITIONER

## 2022-01-11 PROCEDURE — 3075F SYST BP GE 130 - 139MM HG: CPT | Mod: CPTII,S$GLB,, | Performed by: NURSE PRACTITIONER

## 2022-01-11 PROCEDURE — 1125F AMNT PAIN NOTED PAIN PRSNT: CPT | Mod: CPTII,S$GLB,, | Performed by: NURSE PRACTITIONER

## 2022-01-11 PROCEDURE — 1159F PR MEDICATION LIST DOCUMENTED IN MEDICAL RECORD: ICD-10-PCS | Mod: CPTII,S$GLB,, | Performed by: NURSE PRACTITIONER

## 2022-01-11 PROCEDURE — 1160F PR REVIEW ALL MEDS BY PRESCRIBER/CLIN PHARMACIST DOCUMENTED: ICD-10-PCS | Mod: CPTII,S$GLB,, | Performed by: NURSE PRACTITIONER

## 2022-01-11 PROCEDURE — 1101F PR PT FALLS ASSESS DOC 0-1 FALLS W/OUT INJ PAST YR: ICD-10-PCS | Mod: CPTII,S$GLB,, | Performed by: NURSE PRACTITIONER

## 2022-01-11 PROCEDURE — 1170F PR FUNCTIONAL STATUS ASSESSED: ICD-10-PCS | Mod: CPTII,S$GLB,, | Performed by: NURSE PRACTITIONER

## 2022-01-11 PROCEDURE — 1125F PR PAIN SEVERITY QUANTIFIED, PAIN PRESENT: ICD-10-PCS | Mod: CPTII,S$GLB,, | Performed by: NURSE PRACTITIONER

## 2022-01-11 PROCEDURE — 3052F HG A1C>EQUAL 8.0%<EQUAL 9.0%: CPT | Mod: CPTII,S$GLB,, | Performed by: NURSE PRACTITIONER

## 2022-01-11 PROCEDURE — 3075F PR MOST RECENT SYSTOLIC BLOOD PRESS GE 130-139MM HG: ICD-10-PCS | Mod: CPTII,S$GLB,, | Performed by: NURSE PRACTITIONER

## 2022-01-11 PROCEDURE — 1159F MED LIST DOCD IN RCRD: CPT | Mod: CPTII,S$GLB,, | Performed by: NURSE PRACTITIONER

## 2022-01-11 PROCEDURE — 1101F PT FALLS ASSESS-DOCD LE1/YR: CPT | Mod: CPTII,S$GLB,, | Performed by: NURSE PRACTITIONER

## 2022-01-11 PROCEDURE — 1160F RVW MEDS BY RX/DR IN RCRD: CPT | Mod: CPTII,S$GLB,, | Performed by: NURSE PRACTITIONER

## 2022-01-11 PROCEDURE — 3052F PR MOST RECENT HEMOGLOBIN A1C LEVEL 8.0 - < 9.0%: ICD-10-PCS | Mod: CPTII,S$GLB,, | Performed by: NURSE PRACTITIONER

## 2022-01-11 PROCEDURE — 1170F FXNL STATUS ASSESSED: CPT | Mod: CPTII,S$GLB,, | Performed by: NURSE PRACTITIONER

## 2022-01-11 PROCEDURE — G0439 PR MEDICARE ANNUAL WELLNESS SUBSEQUENT VISIT: ICD-10-PCS | Mod: S$GLB,,, | Performed by: NURSE PRACTITIONER

## 2022-01-11 SDOH — SOCIAL DETERMINANTS OF HEALTH (SDOH): DISSAPEARANCE AND DEATH OF FAMILY MEMBER: Z63.4

## 2022-01-11 NOTE — Clinical Note
Medicare awv complete. Health maintenance:  Tetanus, shingles, pneumococcal, and flu vaccines due-discussed with patient.

## 2022-01-11 NOTE — PATIENT INSTRUCTIONS
Counseling and Referral of Other Preventative  (Italic type indicates deductible and co-insurance are waived)    Patient Name: Indira Walker  Today's Date: 1/11/2022    Health Maintenance       Date Due Completion Date    TETANUS VACCINE Never done ---    Shingles Vaccine (1 of 2) Never done ---    Influenza Vaccine (1) 09/01/2021 12/14/2020    COVID-19 Vaccine (3 - Booster for Moderna series) 10/02/2021 4/2/2021    Hemoglobin A1c 10/08/2021 7/8/2021    Foot Exam 12/14/2021 12/14/2020    Diabetes Urine Screening 03/08/2022 3/8/2021    Pneumococcal Vaccines (Age 65+) (2 of 4 - PPSV23) 06/28/2022 (Originally 5/17/2019) 3/22/2019    Lipid Panel 07/08/2022 7/8/2021    Eye Exam 09/30/2022 9/30/2021    DEXA SCAN 07/26/2024 7/26/2021        No orders of the defined types were placed in this encounter.    The following information is provided to all patients.  This information is to help you find resources for any of the problems found today that may be affecting your health:                Living healthy guide: www.[facility].[place].gov      Understanding Diabetes: www.diabetes.org      Eating healthy: www.cdc.gov/healthyweight      CDC home safety checklist: www.cdc.gov/steadi/patient.html      Agency on Aging: www.goea.[place].[facility]      Alcoholics anonymous (AA): www.aa.org      Physical Activity: www.[place].nih.gov/xo0wmve      Tobacco use: www.quitwithusla.org

## 2022-01-11 NOTE — PROGRESS NOTES
"Indira Walker presented for Medicare AWV today. The following components were reviewed and updated:    · Medical history  · Family History  · Social history  · Allergies and Current Medications  · Health Risk Assessment  · Health Maintenance  · Care Team    **See Completed Assessments for Annual Wellness visit with in the encounter summary    The following assessments were completed:  · Depression Screening  · Cognitive function Screening        · Timed Get Up Test  · Whisper Test    Vitals:    01/11/22 0913   BP: 134/77   Pulse: 71   Temp: 98.4 °F (36.9 °C)   TempSrc: Temporal   SpO2: 99%   Weight: 55.3 kg (122 lb)   Height: 5' 3" (1.6 m)     Body mass index is 21.61 kg/m².   ]    Physical Exam  Vitals reviewed.   Constitutional:       Appearance: Normal appearance.   HENT:      Head: Normocephalic and atraumatic.      Nose: Nose normal.      Mouth/Throat:      Mouth: Mucous membranes are moist.      Pharynx: Oropharynx is clear.   Cardiovascular:      Rate and Rhythm: Normal rate and regular rhythm.      Pulses: Normal pulses.           Dorsalis pedis pulses are 2+ on the right side and 2+ on the left side.        Posterior tibial pulses are 2+ on the right side and 2+ on the left side.      Heart sounds: Normal heart sounds.   Pulmonary:      Effort: Pulmonary effort is normal.      Breath sounds: Normal breath sounds.   Musculoskeletal:         General: Normal range of motion.      Cervical back: Normal range of motion and neck supple.      Right foot: Normal range of motion. No deformity, bunion, Charcot foot, foot drop or prominent metatarsal heads.      Left foot: Normal range of motion. No deformity, bunion, Charcot foot, foot drop or prominent metatarsal heads.   Feet:      Right foot:      Protective Sensation: 10 sites tested. 10 sites sensed.      Skin integrity: Skin integrity normal. No ulcer, blister, skin breakdown, erythema, warmth, callus, dry skin or fissure.      Toenail Condition: Right toenails " "are normal.      Left foot:      Protective Sensation: 10 sites tested. 10 sites sensed.      Skin integrity: Skin integrity normal. No ulcer, blister, skin breakdown, erythema, warmth, callus, dry skin or fissure.      Toenail Condition: Left toenails are normal.   Skin:     General: Skin is warm and dry.   Neurological:      Mental Status: She is alert and oriented to person, place, and time.   Psychiatric:         Mood and Affect: Mood normal.         Behavior: Behavior normal.          Outpatient Medications Marked as Taking for the 1/11/22 encounter (Office Visit) with RYDER Martinez   Medication Sig Dispense Refill    acetaminophen (TYLENOL) 500 MG tablet Take 1,000 mg by mouth every 8 (eight) hours as needed.      ascorbic acid, vitamin C, (VITAMIN C) 1000 MG tablet Take 1,000 mg by mouth once daily.      aspirin (ECOTRIN) 81 MG EC tablet Take 1 tablet (81 mg total) by mouth once daily. Every other day  0    b complex vitamins capsule Take 1 capsule by mouth once daily.      BD ULTRA-FINE MINI PEN NEEDLE 31 gauge x 3/16" Ndle AS DIRECTED ONCE DAILY WITH INSULIN SUBCUTANEOUSLY 100 each 4    beta-carotene,A,-vits C,E/mins (VISION ORAL) Take by mouth.      BIOTIN ORAL Take 1,000 mg by mouth.      blood sugar diagnostic Strp To check BG 2 times daily, to use with insurance preferred meter 200 each 3    blood-glucose meter kit To check BG 2 times daily, to use with insurance preferred meter 1 each 0    chlorpheniramine-acetaminophen 2-325 mg Tab Take 2 tablets by mouth every 4 to 6 hours as needed. Coricidin HBP      cholecalciferol, vitamin D3, 2,000 unit Cap Take 2,000 Units by mouth once daily.       folic acid (FOLVITE) 1 MG tablet Take 1 tablet (1 mg total) by mouth once daily. 90 tablet 3    ginger root, bulk, Powd by Misc.(Non-Drug; Combo Route) route.      lancets Mis To check BG 2 times daily, to use with insurance preferred meter 200 each 4    loratadine (CLARITIN) 10 mg tablet " Take 10 mg by mouth once daily.      losartan-hydrochlorothiazide 100-12.5 mg (HYZAAR) 100-12.5 mg Tab Take 1 tablet by mouth once daily. 90 tablet 2    metFORMIN (GLUCOPHAGE) 1000 MG tablet Take 1 tablet (1,000 mg total) by mouth 2 (two) times daily with meals. 180 tablet 3    methotrexate 2.5 MG Tab TAKE 10 TABLETS (5 TABLET IN THE MORNING, 5 TABLETS IN THE EVENING) BY MOUTH EVERY 7 DAYS. 120 tablet 1    montelukast (SINGULAIR) 10 mg tablet Take 1 tablet (10 mg total) by mouth every evening. 30 tablet 11    MULTIVIT-MINERALS/FERROUS FUM (MULTI VITAMIN ORAL) Take by mouth. For women over 50 (centrum)      rosuvastatin (CRESTOR) 20 MG tablet TAKE 1 TABLET BY MOUTH EVERY DAY 90 tablet 3        Diagnoses and health risks identified today and associated recommendations/orders:  1. Encounter for preventive health examination  Medicare awv complete. Health maintenance:  Tetanus, shingles, pneumococcal, and flu vaccines due-discussed with patient.     2. Uncontrolled type 2 diabetes mellitus with hyperglycemia  Results for JINA TAM (MRN [id number]) as of 1/11/2022 21:48   Ref. Range 8/8/2018 10:04 12/14/2018 09:09 1/21/2019 09:23 3/19/2019 09:28 7/19/2019 09:29 10/15/2019 09:50 2/18/2020 10:13 9/24/2020 09:42 12/11/2020 09:10 3/8/2021 11:28 7/8/2021 11:27   Hemoglobin A1C External Latest Ref Range: 4.0 - 5.6 % 7.8 (H) 7.4 (H)  7.1 (H) 7.9 (H) 7.7 (H) 7.4 (H) 9.3 (H) 7.4 (H) 7.7 (H) 8.1 (H)   Estimated Avg Glucose Latest Ref Range: 68 - 131 mg/dL 177 (H) 166 (H)  157 (H) 180 (H) 174 (H) 166 (H) 220 (H) 166 (H) 174 (H) 186 (H)   Chronic and uncontrolled. On metformin. See med list above. Patient states BS ranges 100-140s.. Reviewed diabetic diet and preferred BS levels. Hgba1c scheduled for 1/27/21. Follow up with your PCP as instructed. Instructed patient on diabetic foot care. Follow up with podiatry and ophthalmology yearly.      3. Hypertension associated with diabetes  Chronic and stable on current  management. See med list above. On hyzaar. Recommend low sodium diet. Follow up with PCP.       4. Hyperlipidemia associated with type 2 diabetes mellitus  Lab Results   Component Value Date    CHOL 153 07/08/2021    CHOL 151 12/11/2020    CHOL 155 07/19/2019     Lab Results   Component Value Date    HDL 76 (H) 07/08/2021    HDL 74 12/11/2020    HDL 65 07/19/2019     Lab Results   Component Value Date    LDLCALC 65.8 07/08/2021    LDLCALC 64.6 12/11/2020    LDLCALC 77.2 07/19/2019     Lab Results   Component Value Date    TRIG 56 07/08/2021    TRIG 62 12/11/2020    TRIG 64 07/19/2019     Lab Results   Component Value Date    CHOLHDL 49.7 07/08/2021    CHOLHDL 49.0 12/11/2020    CHOLHDL 41.9 07/19/2019    Chronic and stable on current management. On crestor. See med list above. Follow up with PCP.      5. Seropositive rheumatoid arthritis  Chronic and stable on current management. On methotrexate and folic acid. See med list above. Follow up with rheumatology.     6. Chronic heart failure with preserved ejection fraction  Chronic and stable. No acute issues. Continue current management. On hyzaar. See med list above. Follow up with cardiology.      7. Hyperlipidemia, mixed  Lab Results   Component Value Date    CHOL 153 07/08/2021    CHOL 151 12/11/2020    CHOL 155 07/19/2019     Lab Results   Component Value Date    HDL 76 (H) 07/08/2021    HDL 74 12/11/2020    HDL 65 07/19/2019     Lab Results   Component Value Date    LDLCALC 65.8 07/08/2021    LDLCALC 64.6 12/11/2020    LDLCALC 77.2 07/19/2019     Lab Results   Component Value Date    TRIG 56 07/08/2021    TRIG 62 12/11/2020    TRIG 64 07/19/2019     Lab Results   Component Value Date    CHOLHDL 49.7 07/08/2021    CHOLHDL 49.0 12/11/2020    CHOLHDL 41.9 07/19/2019    Chronic and stable on current management. On crestor. See med list above. Follow up with PCP.      8. Diastolic dysfunction  Chronic and stable. No acute issues. Continue current management. On  hyzaar. See med list above. Follow up with cardiology.      9. Sciatica without back pain, left  Chronic and stable on current management. See med list above. Follow up with PCP.      10. Primary osteoarthritis of both hands  Chronic and stable on current management. See med list above. Follow up with PCP.      11. Osteopenia, unspecified location  Chronic and stable on current management. Continue vitamin d supplement, calcium in the diet, and weight bearing exercise. See med list above. Follow up with PCP.      12. Grief at loss of child  Acute. States she does feel depressed nearly every day. Mild depression on PHQ-9. Denies thoughts of suicide or harming self.  States she is not interested in medication or counseling. F/u with pcp.     13. Decreased hearing of both ears  Chronic and stable on current management. Informed patient to use Debrox OTC. Follow up with PCP.          Provided Indira with a 5-10 year written screening schedule and personal prevention plan. Recommendations were developed using the USPSTF age appropriate recommendations. Education, counseling, and referrals were provided as needed.  After Visit Summary printed and given to patient which includes a list of additional screenings\tests needed.    Follow up in about 1 year (around 1/11/2023) for annual wellness visit.      Doreen Rose, RYDER     I offered to discuss advanced care planning, including how to pick a person who would make decisions for you if you were unable to make them for yourself, called a health care power of , and what kind of decisions you might make such as use of life sustaining treatments such as ventilators and tube feeding when faced with a life limiting illness recorded on a living will that they will need to know. (How you want to be cared for as you near the end of your natural life)     X Patient is interested in learning more about how to make advanced directives.  I provided them paperwork and offered  to discuss this with them.

## 2022-01-25 ENCOUNTER — TELEPHONE (OUTPATIENT)
Dept: INTERNAL MEDICINE | Facility: CLINIC | Age: 77
End: 2022-01-25
Payer: MEDICARE

## 2022-01-25 ENCOUNTER — HOSPITAL ENCOUNTER (EMERGENCY)
Facility: HOSPITAL | Age: 77
Discharge: HOME | End: 2022-01-25
Attending: EMERGENCY MEDICINE
Payer: MEDICARE

## 2022-01-25 VITALS
BODY MASS INDEX: 23.03 KG/M2 | TEMPERATURE: 99 F | OXYGEN SATURATION: 100 % | HEART RATE: 80 BPM | RESPIRATION RATE: 18 BRPM | SYSTOLIC BLOOD PRESSURE: 144 MMHG | WEIGHT: 130 LBS | DIASTOLIC BLOOD PRESSURE: 72 MMHG

## 2022-01-25 DIAGNOSIS — N81.10 BLADDER PROLAPSE, FEMALE, ACQUIRED: Primary | ICD-10-CM

## 2022-01-25 DIAGNOSIS — N81.10 FEMALE BLADDER PROLAPSE: Primary | ICD-10-CM

## 2022-01-25 PROCEDURE — 99284 EMERGENCY DEPT VISIT MOD MDM: CPT

## 2022-01-25 NOTE — TELEPHONE ENCOUNTER
She probably needs to see GYN rather than Urology. Referral placed, please assist with appointment. Thank you.

## 2022-01-25 NOTE — ED PROVIDER NOTES
"SCRIBE #1 NOTE: I, Keisha Mandujano, am scribing for, and in the presence of, Ananth Carranza MD. I have scribed the entire note.      History      Chief Complaint   Patient presents with    Abdominal Pain     Has "something" hanging out of her vaginal opening        Review of patient's allergies indicates:   Allergen Reactions    Iodine and iodide containing products Hives        HPI   HPI    2022, 11:39 AM   History obtained from the patient      History of Present Illness: Indira Walker is a 76 y.o. female  patient with a PMHx of DM2, HLD, HTN, and left sided sciatica who presents to the Emergency Department for a vaginal mass which onset suddenly about 1 month PTA. The pt reports that she incidentally noticed the mass 1 month ago while wiping. Her symptoms are constant and mild in severity. No mitigating or exacerbating factors reported. No associated sxs reported. Patient denies any dysuria, hematuria, SOB, CP, n/v/d, abdominal pain, and current frequency, although; she had frequency 2-3 weeks ago which resolved on its own with out treatment, and all other sxs at this time. No prior Tx reported. She also reports minimal trouble having a bowel movement this morning, but she attributes this to not drinking enough water. The pt has a PCP appointment on 2022, but she is anxious to have the mass evaluated before her appointment. No further complaints or concerns at this time.          Arrival mode: Personal vehicle    PCP: Bertha Kearns MD       Past Medical History:  Past Medical History:   Diagnosis Date    Arthritis     Diabetes mellitus, type 2     Foot drop, left 2018    Heartburn     Hyperlipidemia     Hypertension     Left sided sciatica     s/p microdiscectomy 3/21/18       Past Surgical History:  Past Surgical History:   Procedure Laterality Date    BREAST BIOPSY       SECTION      COLONOSCOPY  2008    DR. JANET GARCÍA/MILD DIVERICULOSIS DESCENDING AND " SIGMOID. REPEAT 5 YRS    HERNIA REPAIR      MICRODISCECTOMY OF SPINE Left 03/21/2018    left L5-S1, Dr Segundo         Family History:  Family History   Problem Relation Age of Onset    Diabetes Mother     Diabetes Father        Social History:  Social History     Tobacco Use    Smoking status: Never Smoker    Smokeless tobacco: Never Used   Substance and Sexual Activity    Alcohol use: No    Drug use: No    Sexual activity: Not Currently     Partners: Male       ROS   Review of Systems   Constitutional: Negative for chills and fever.   HENT: Negative for sore throat.    Respiratory: Negative for shortness of breath.    Cardiovascular: Negative for chest pain.   Gastrointestinal: Negative for abdominal pain, diarrhea, nausea and vomiting.   Genitourinary: Negative for dysuria, frequency and hematuria.        (+) vaginal mass   Musculoskeletal: Negative for back pain.   Skin: Negative for rash.   Neurological: Negative for weakness.   Hematological: Does not bruise/bleed easily.   All other systems reviewed and are negative.    Physical Exam      Initial Vitals   BP Pulse Resp Temp SpO2   01/25/22 0951 01/25/22 0951 01/25/22 0951 01/25/22 0950 01/25/22 0951   (!) 148/71 81 18 98.9 °F (37.2 °C) 96 %      MAP       --                 Physical Exam  Nursing Notes and Vital Signs Reviewed.  Constitutional: Patient is in no acute distress. Pt is elderly.  Head: Atraumatic. Normocephalic.  Eyes: PERRL. EOM intact. Conjunctivae are not pale. No scleral icterus.  ENT: Mucous membranes are moist. Oropharynx is clear and symmetric.    Neck: Supple. Full ROM. No lymphadenopathy.  Cardiovascular: Regular rate. Regular rhythm. No murmurs, rubs, or gallops. Distal pulses are 2+ and symmetric.  Pulmonary/Chest: No respiratory distress. Clear to auscultation bilaterally. No wheezing or rales.  Abdominal: Soft and non-distended.  There is no tenderness.  No rebound, guarding, or rigidity.   Musculoskeletal: Moves all  extremities. No obvious deformities. No edema.  Skin: Warm and dry.  Neurological:  Alert, awake, and appropriate.  Normal speech.  No acute focal neurological deficits are appreciated.  Psychiatric: Normal affect. Good eye contact. Appropriate in content.  Pelvic: A female chaperone was present for this examination. Nl external inspection. No lesions or abnormalities were visible on the labia majora or minora. Cervical os is closed. There is no CMT. There is no blood in the vaginal vault. No discharge. No adnexal tenderness. No adnexal masses.    ED Course    Procedures  ED Vital Signs:  Vitals:    01/25/22 0950 01/25/22 0951 01/25/22 1145   BP:  (!) 148/71    Pulse:  81 82   Resp:  18 18   Temp: 98.9 °F (37.2 °C)  98.6 °F (37 °C)   TempSrc: Oral     SpO2:  96% 100%   Weight: 59 kg (130 lb)         Abnormal Lab Results:  Labs Reviewed - No data to display     All Lab Results:    Imaging Results:  Imaging Results    None                 The Emergency Provider reviewed the vital signs and test results, which are outlined above.    ED Discussion     11:56 AM: Reassessed pt at this time. Discussed with pt all pertinent ED information and results. Discussed pt dx and plan of tx. Gave pt all f/u and return to the ED instructions. All questions and concerns were addressed at this time. Pt expresses understanding of information and instructions, and is comfortable with plan to discharge. Pt is stable for discharge.      Patient on history, it is likely she has a prolapse.  Recommend follow up with urology for definitive treatment.        I discussed with patient and/or family/caretaker that evaluation in the ED does not suggest any emergent or life threatening medical conditions requiring immediate intervention beyond what was provided in the ED, and I believe patient is safe for discharge.  Regardless, an unremarkable evaluation in the ED does not preclude the development or presence of a serious of life threatening  condition. As such, patient was instructed to return immediately for any worsening or change in current symptoms.           ED Medication(s):  Medications - No data to display     Follow-up Information     Urology.    Contact information:  [phone]                       New Prescriptions    No medications on file         Medical Decision Making              Scribe Attestation:   Scribe #1: I performed the above scribed service and the documentation accurately describes the services I performed. I attest to the accuracy of the note.    Attending:   Physician Attestation Statement for Scribe #1: I, Ananth Carranza MD, personally performed the services described in this documentation, as scribed by Keisha Mandujano, in my presence, and it is both accurate and complete.          Clinical Impression       ICD-10-CM ICD-9-CM   1. Bladder prolapse, female, acquired  N81.10 618.01       Disposition:   Disposition: Discharged  Condition: Stable         Ananth Carranza MD  01/25/22 [id number]

## 2022-01-25 NOTE — FIRST PROVIDER EVALUATION
Medical screening exam completed.  I have conducted a focused provider triage encounter, findings are as follows:    Brief history of present illness:  Feels something like bladder/uterus protruding from vagina.      Vitals:    01/25/22 0950 01/25/22 0951   BP:  (!) 148/71   BP Location:  Right arm   Patient Position:  Sitting   Pulse:  81   Resp:  18   Temp: 98.9 °F (37.2 °C)    TempSrc: Oral    SpO2:  96%   Weight: 59 kg (130 lb)        Pertinent physical exam:  nad        Preliminary workup initiated; this workup will be continued and followed by the physician or advanced practice provider that is assigned to the patient when roomed.

## 2022-01-25 NOTE — TELEPHONE ENCOUNTER
----- Message from Susie Vega sent at 1/25/2022 10:39 AM CST -----  Contact: [phone]  Patient called, stated that she is in the emergency for prolapse uterus and would like to get an advise on what to do after seen emergency . Please call and advise. Thank you

## 2022-01-26 ENCOUNTER — OFFICE VISIT (OUTPATIENT)
Dept: OBSTETRICS AND GYNECOLOGY | Facility: CLINIC | Age: 77
End: 2022-01-26
Payer: MEDICARE

## 2022-01-26 VITALS
DIASTOLIC BLOOD PRESSURE: 78 MMHG | BODY MASS INDEX: 22.19 KG/M2 | HEIGHT: 63 IN | WEIGHT: 125.25 LBS | SYSTOLIC BLOOD PRESSURE: 130 MMHG

## 2022-01-26 DIAGNOSIS — N81.10 FEMALE BLADDER PROLAPSE: ICD-10-CM

## 2022-01-26 PROCEDURE — 3078F DIAST BP <80 MM HG: CPT | Mod: CPTII,S$GLB,, | Performed by: NURSE PRACTITIONER

## 2022-01-26 PROCEDURE — 3288F PR FALLS RISK ASSESSMENT DOCUMENTED: ICD-10-PCS | Mod: CPTII,S$GLB,, | Performed by: NURSE PRACTITIONER

## 2022-01-26 PROCEDURE — 1125F AMNT PAIN NOTED PAIN PRSNT: CPT | Mod: CPTII,S$GLB,, | Performed by: NURSE PRACTITIONER

## 2022-01-26 PROCEDURE — 99202 OFFICE O/P NEW SF 15 MIN: CPT | Mod: S$GLB,,, | Performed by: NURSE PRACTITIONER

## 2022-01-26 PROCEDURE — 1159F MED LIST DOCD IN RCRD: CPT | Mod: CPTII,S$GLB,, | Performed by: NURSE PRACTITIONER

## 2022-01-26 PROCEDURE — 1125F PR PAIN SEVERITY QUANTIFIED, PAIN PRESENT: ICD-10-PCS | Mod: CPTII,S$GLB,, | Performed by: NURSE PRACTITIONER

## 2022-01-26 PROCEDURE — 1159F PR MEDICATION LIST DOCUMENTED IN MEDICAL RECORD: ICD-10-PCS | Mod: CPTII,S$GLB,, | Performed by: NURSE PRACTITIONER

## 2022-01-26 PROCEDURE — 3288F FALL RISK ASSESSMENT DOCD: CPT | Mod: CPTII,S$GLB,, | Performed by: NURSE PRACTITIONER

## 2022-01-26 PROCEDURE — 3075F PR MOST RECENT SYSTOLIC BLOOD PRESS GE 130-139MM HG: ICD-10-PCS | Mod: CPTII,S$GLB,, | Performed by: NURSE PRACTITIONER

## 2022-01-26 PROCEDURE — 99999 PR PBB SHADOW E&M-EST. PATIENT-LVL III: ICD-10-PCS | Mod: PBBFAC,,, | Performed by: NURSE PRACTITIONER

## 2022-01-26 PROCEDURE — 99202 PR OFFICE/OUTPT VISIT, NEW, LEVL II, 15-29 MIN: ICD-10-PCS | Mod: S$GLB,,, | Performed by: NURSE PRACTITIONER

## 2022-01-26 PROCEDURE — 1160F PR REVIEW ALL MEDS BY PRESCRIBER/CLIN PHARMACIST DOCUMENTED: ICD-10-PCS | Mod: CPTII,S$GLB,, | Performed by: NURSE PRACTITIONER

## 2022-01-26 PROCEDURE — 3078F PR MOST RECENT DIASTOLIC BLOOD PRESSURE < 80 MM HG: ICD-10-PCS | Mod: CPTII,S$GLB,, | Performed by: NURSE PRACTITIONER

## 2022-01-26 PROCEDURE — 1160F RVW MEDS BY RX/DR IN RCRD: CPT | Mod: CPTII,S$GLB,, | Performed by: NURSE PRACTITIONER

## 2022-01-26 PROCEDURE — 3075F SYST BP GE 130 - 139MM HG: CPT | Mod: CPTII,S$GLB,, | Performed by: NURSE PRACTITIONER

## 2022-01-26 PROCEDURE — 1101F PT FALLS ASSESS-DOCD LE1/YR: CPT | Mod: CPTII,S$GLB,, | Performed by: NURSE PRACTITIONER

## 2022-01-26 PROCEDURE — 99999 PR PBB SHADOW E&M-EST. PATIENT-LVL III: CPT | Mod: PBBFAC,,, | Performed by: NURSE PRACTITIONER

## 2022-01-26 PROCEDURE — 1101F PR PT FALLS ASSESS DOC 0-1 FALLS W/OUT INJ PAST YR: ICD-10-PCS | Mod: CPTII,S$GLB,, | Performed by: NURSE PRACTITIONER

## 2022-01-26 NOTE — PROGRESS NOTES
Chief Complaint   Patient presents with    Vaginal Prolapse        Indira Walker is a 76 y.o. female  Went to ER 2022 and was diagnosed with bladder prolapse and was told to see urology, however her PCP told her she needs to see GYN.   About a week ago she was urinating every 15 minutes and it felt like a knot would come down in her vagina.   Past Medical History:   Diagnosis Date    Arthritis     Diabetes mellitus, type 2     Foot drop, left 2018    Heartburn     Hyperlipidemia     Hypertension     Left sided sciatica     s/p microdiscectomy 3/21/18     Past Surgical History:   Procedure Laterality Date    BREAST BIOPSY       SECTION      COLONOSCOPY  2008    DR. JANET GARCÍA/MILD DIVERICULOSIS DESCENDING AND SIGMOID. REPEAT 5 YRS    HERNIA REPAIR      MICRODISCECTOMY OF SPINE Left 2018    left L5-S1, Dr Segundo     Social History     Tobacco Use    Smoking status: Never Smoker    Smokeless tobacco: Never Used   Substance Use Topics    Alcohol use: No    Drug use: No     Family History   Problem Relation Age of Onset    Diabetes Mother     Diabetes Father      OB History    Para Term  AB Living   3 3 3         SAB IAB Ectopic Multiple Live Births                  # Outcome Date GA Lbr Leonid/2nd Weight Sex Delivery Anes PTL Lv   3 Term            2 Term            1 Term                Medication List with Changes/Refills   Current Medications    ACETAMINOPHEN (TYLENOL) 500 MG TABLET    Take 1,000 mg by mouth every 8 (eight) hours as needed.    ALBUTEROL (PROVENTIL/VENTOLIN HFA) 90 MCG/ACTUATION INHALER    Inhale 2 puffs into the lungs every 6 (six) hours as needed for Shortness of Breath (or coughing).    ASCORBIC ACID, VITAMIN C, (VITAMIN C) 1000 MG TABLET    Take 1,000 mg by mouth once daily.    ASPIRIN (ECOTRIN) 81 MG EC TABLET    Take 1 tablet (81 mg total) by mouth once daily. Every other day    B COMPLEX VITAMINS CAPSULE    Take 1  "capsule by mouth once daily.    BD ULTRA-FINE MINI PEN NEEDLE 31 GAUGE X 3/16" NDLE    AS DIRECTED ONCE DAILY WITH INSULIN SUBCUTANEOUSLY    BETA-CAROTENE,A,-VITS C,E/MINS (VISION ORAL)    Take by mouth.    BIOTIN ORAL    Take 1,000 mg by mouth.    BLOOD SUGAR DIAGNOSTIC STRP    To check BG 2 times daily, to use with insurance preferred meter    BLOOD-GLUCOSE METER KIT    To check BG 2 times daily, to use with insurance preferred meter    CHLORPHENIRAMINE-ACETAMINOPHEN 2-325 MG TAB    Take 2 tablets by mouth every 4 to 6 hours as needed. Coricidin HBP    CHOLECALCIFEROL, VITAMIN D3, 2,000 UNIT CAP    Take 2,000 Units by mouth once daily.     CINNAMON BARK, BULK, POWD    by Misc.(Non-Drug; Combo Route) route.    DICLOFENAC SODIUM (VOLTAREN) 1 % GEL    Apply 2 g topically 4 (four) times daily.    FAMOTIDINE (PEPCID) 40 MG TABLET    Take 1 tablet (40 mg total) by mouth once daily.    FOLIC ACID (FOLVITE) 1 MG TABLET    Take 1 tablet (1 mg total) by mouth once daily.    GABAPENTIN (NEURONTIN) 100 MG CAPSULE    Take 100 mg by mouth 2 (two) times daily. Taking up to BID prn    GABAPENTIN (NEURONTIN) 300 MG CAPSULE    Take 300 mg by mouth 2 (two) times daily.    GARLIC TAB    Take by mouth.    GINGER ROOT, BULK, POWD    by Misc.(Non-Drug; Combo Route) route.    LANCETS MISC    To check BG 2 times daily, to use with insurance preferred meter    LORATADINE (CLARITIN) 10 MG TABLET    Take 10 mg by mouth once daily.    LORAZEPAM (ATIVAN) 1 MG TABLET    1-2 po 1 hour before procedure/test.    LOSARTAN-HYDROCHLOROTHIAZIDE 100-12.5 MG (HYZAAR) 100-12.5 MG TAB    Take 1 tablet by mouth once daily.    METFORMIN (GLUCOPHAGE) 1000 MG TABLET    Take 1 tablet (1,000 mg total) by mouth 2 (two) times daily with meals.    METHOTREXATE 2.5 MG TAB    TAKE 10 TABLETS (5 TABLET IN THE MORNING, 5 TABLETS IN THE EVENING) BY MOUTH EVERY 7 DAYS.    MONTELUKAST (SINGULAIR) 10 MG TABLET    Take 1 tablet (10 mg total) by mouth every evening.    " "MULTIVIT-MINERALS/FERROUS FUM (MULTI VITAMIN ORAL)    Take by mouth. For women over 50 (centrum)    OMEGA-3S-DHA-EPA-FISH OIL ORAL    Take by mouth. Omega XL  Green lipped Mussel    ROSUVASTATIN (CRESTOR) 20 MG TABLET    TAKE 1 TABLET BY MOUTH EVERY DAY      /78   Ht 5' 3" (1.6 m)   Wt 56.8 kg (125 lb 3.5 oz)   BMI 22.18 kg/m²     ROS:  Review of Systems    See HPI   PHYSICAL EXAM:  Physical Exam  Genitourinary:      Urethral meatus normal.      Vulva exam comments: Normal .      Vaginal exam comments: Normal .      Bladder exam comments: Bladder prolapse 0-1 .               ASSESSMENT and PLAN:    ICD-10-CM ICD-9-CM    1. Female bladder prolapse  N81.10 618.01 Ambulatory referral/consult to Obstetrics / Gynecology      Ambulatory referral/consult to Urology     Refer to GYN MD for consultation for bladder repair  Cecily Sosa NP     "

## 2022-01-27 ENCOUNTER — LAB VISIT (OUTPATIENT)
Dept: LAB | Facility: HOSPITAL | Age: 77
End: 2022-01-27
Attending: FAMILY MEDICINE
Payer: MEDICARE

## 2022-01-27 DIAGNOSIS — E11.65 UNCONTROLLED TYPE 2 DIABETES MELLITUS WITH HYPERGLYCEMIA: ICD-10-CM

## 2022-01-27 DIAGNOSIS — E78.5 HYPERLIPIDEMIA ASSOCIATED WITH TYPE 2 DIABETES MELLITUS: ICD-10-CM

## 2022-01-27 DIAGNOSIS — E11.69 HYPERLIPIDEMIA ASSOCIATED WITH TYPE 2 DIABETES MELLITUS: ICD-10-CM

## 2022-01-27 LAB
ALBUMIN SERPL BCP-MCNC: 3.6 G/DL (ref 3.5–5.2)
ALP SERPL-CCNC: 48 U/L (ref 55–135)
ALT SERPL W/O P-5'-P-CCNC: 27 U/L (ref 10–44)
ANION GAP SERPL CALC-SCNC: 7 MMOL/L (ref 8–16)
AST SERPL-CCNC: 28 U/L (ref 10–40)
BILIRUB SERPL-MCNC: 0.8 MG/DL (ref 0.1–1)
BUN SERPL-MCNC: 16 MG/DL (ref 8–23)
CALCIUM SERPL-MCNC: 10.1 MG/DL (ref 8.7–10.5)
CHLORIDE SERPL-SCNC: 100 MMOL/L (ref 95–110)
CHOLEST SERPL-MCNC: 158 MG/DL (ref 120–199)
CHOLEST/HDLC SERPL: 2.1 {RATIO} (ref 2–5)
CO2 SERPL-SCNC: 28 MMOL/L (ref 23–29)
CREAT SERPL-MCNC: 0.9 MG/DL (ref 0.5–1.4)
EST. GFR  (AFRICAN AMERICAN): >60 ML/MIN/1.73 M^2
EST. GFR  (NON AFRICAN AMERICAN): >60 ML/MIN/1.73 M^2
ESTIMATED AVG GLUCOSE: 174 MG/DL (ref 68–131)
GLUCOSE SERPL-MCNC: 107 MG/DL (ref 70–110)
HBA1C MFR BLD: 7.7 % (ref 4–5.6)
HDLC SERPL-MCNC: 74 MG/DL (ref 40–75)
HDLC SERPL: 46.8 % (ref 20–50)
LDLC SERPL CALC-MCNC: 72.6 MG/DL (ref 63–159)
NONHDLC SERPL-MCNC: 84 MG/DL
POTASSIUM SERPL-SCNC: 4.2 MMOL/L (ref 3.5–5.1)
PROT SERPL-MCNC: 7.2 G/DL (ref 6–8.4)
SODIUM SERPL-SCNC: 135 MMOL/L (ref 136–145)
TRIGL SERPL-MCNC: 57 MG/DL (ref 30–150)

## 2022-01-27 PROCEDURE — 80061 LIPID PANEL: CPT | Performed by: FAMILY MEDICINE

## 2022-01-27 PROCEDURE — 36415 COLL VENOUS BLD VENIPUNCTURE: CPT | Performed by: FAMILY MEDICINE

## 2022-01-27 PROCEDURE — 80053 COMPREHEN METABOLIC PANEL: CPT | Performed by: FAMILY MEDICINE

## 2022-01-27 PROCEDURE — 83036 HEMOGLOBIN GLYCOSYLATED A1C: CPT | Performed by: FAMILY MEDICINE

## 2022-02-02 ENCOUNTER — OFFICE VISIT (OUTPATIENT)
Dept: INTERNAL MEDICINE | Facility: CLINIC | Age: 77
End: 2022-02-02
Payer: MEDICARE

## 2022-02-02 VITALS
WEIGHT: 123 LBS | TEMPERATURE: 98 F | BODY MASS INDEX: 21.79 KG/M2 | SYSTOLIC BLOOD PRESSURE: 110 MMHG | OXYGEN SATURATION: 96 % | HEIGHT: 63 IN | HEART RATE: 89 BPM | DIASTOLIC BLOOD PRESSURE: 60 MMHG

## 2022-02-02 DIAGNOSIS — I50.32 CHRONIC HEART FAILURE WITH PRESERVED EJECTION FRACTION: ICD-10-CM

## 2022-02-02 DIAGNOSIS — E78.5 HYPERLIPIDEMIA ASSOCIATED WITH TYPE 2 DIABETES MELLITUS: Primary | ICD-10-CM

## 2022-02-02 DIAGNOSIS — E11.59 HYPERTENSION ASSOCIATED WITH DIABETES: ICD-10-CM

## 2022-02-02 DIAGNOSIS — E11.69 HYPERLIPIDEMIA ASSOCIATED WITH TYPE 2 DIABETES MELLITUS: Primary | ICD-10-CM

## 2022-02-02 DIAGNOSIS — M05.9 SEROPOSITIVE RHEUMATOID ARTHRITIS: ICD-10-CM

## 2022-02-02 DIAGNOSIS — I15.2 HYPERTENSION ASSOCIATED WITH DIABETES: ICD-10-CM

## 2022-02-02 DIAGNOSIS — E11.65 UNCONTROLLED TYPE 2 DIABETES MELLITUS WITH HYPERGLYCEMIA: ICD-10-CM

## 2022-02-02 PROCEDURE — 99499 RISK ADDL DX/OHS AUDIT: ICD-10-PCS | Mod: S$GLB,,, | Performed by: FAMILY MEDICINE

## 2022-02-02 PROCEDURE — 90694 VACC AIIV4 NO PRSRV 0.5ML IM: CPT | Mod: S$GLB,,, | Performed by: FAMILY MEDICINE

## 2022-02-02 PROCEDURE — 3288F PR FALLS RISK ASSESSMENT DOCUMENTED: ICD-10-PCS | Mod: CPTII,S$GLB,, | Performed by: FAMILY MEDICINE

## 2022-02-02 PROCEDURE — G0008 ADMIN INFLUENZA VIRUS VAC: HCPCS | Mod: S$GLB,,, | Performed by: FAMILY MEDICINE

## 2022-02-02 PROCEDURE — 1126F AMNT PAIN NOTED NONE PRSNT: CPT | Mod: CPTII,S$GLB,, | Performed by: FAMILY MEDICINE

## 2022-02-02 PROCEDURE — 99999 PR PBB SHADOW E&M-EST. PATIENT-LVL V: ICD-10-PCS | Mod: PBBFAC,,, | Performed by: FAMILY MEDICINE

## 2022-02-02 PROCEDURE — 99499 UNLISTED E&M SERVICE: CPT | Mod: S$GLB,,, | Performed by: FAMILY MEDICINE

## 2022-02-02 PROCEDURE — G0008 FLU VACCINE - QUADRIVALENT - ADJUVANTED: ICD-10-PCS | Mod: S$GLB,,, | Performed by: FAMILY MEDICINE

## 2022-02-02 PROCEDURE — 3074F SYST BP LT 130 MM HG: CPT | Mod: CPTII,S$GLB,, | Performed by: FAMILY MEDICINE

## 2022-02-02 PROCEDURE — 3051F HG A1C>EQUAL 7.0%<8.0%: CPT | Mod: CPTII,S$GLB,, | Performed by: FAMILY MEDICINE

## 2022-02-02 PROCEDURE — 1101F PT FALLS ASSESS-DOCD LE1/YR: CPT | Mod: CPTII,S$GLB,, | Performed by: FAMILY MEDICINE

## 2022-02-02 PROCEDURE — 1101F PR PT FALLS ASSESS DOC 0-1 FALLS W/OUT INJ PAST YR: ICD-10-PCS | Mod: CPTII,S$GLB,, | Performed by: FAMILY MEDICINE

## 2022-02-02 PROCEDURE — 1126F PR PAIN SEVERITY QUANTIFIED, NO PAIN PRESENT: ICD-10-PCS | Mod: CPTII,S$GLB,, | Performed by: FAMILY MEDICINE

## 2022-02-02 PROCEDURE — 90694 FLU VACCINE - QUADRIVALENT - ADJUVANTED: ICD-10-PCS | Mod: S$GLB,,, | Performed by: FAMILY MEDICINE

## 2022-02-02 PROCEDURE — 3078F DIAST BP <80 MM HG: CPT | Mod: CPTII,S$GLB,, | Performed by: FAMILY MEDICINE

## 2022-02-02 PROCEDURE — 3078F PR MOST RECENT DIASTOLIC BLOOD PRESSURE < 80 MM HG: ICD-10-PCS | Mod: CPTII,S$GLB,, | Performed by: FAMILY MEDICINE

## 2022-02-02 PROCEDURE — 3288F FALL RISK ASSESSMENT DOCD: CPT | Mod: CPTII,S$GLB,, | Performed by: FAMILY MEDICINE

## 2022-02-02 PROCEDURE — 3074F PR MOST RECENT SYSTOLIC BLOOD PRESSURE < 130 MM HG: ICD-10-PCS | Mod: CPTII,S$GLB,, | Performed by: FAMILY MEDICINE

## 2022-02-02 PROCEDURE — 99214 OFFICE O/P EST MOD 30 MIN: CPT | Mod: S$GLB,,, | Performed by: FAMILY MEDICINE

## 2022-02-02 PROCEDURE — 3051F PR MOST RECENT HEMOGLOBIN A1C LEVEL 7.0 - < 8.0%: ICD-10-PCS | Mod: CPTII,S$GLB,, | Performed by: FAMILY MEDICINE

## 2022-02-02 PROCEDURE — 99214 PR OFFICE/OUTPT VISIT, EST, LEVL IV, 30-39 MIN: ICD-10-PCS | Mod: S$GLB,,, | Performed by: FAMILY MEDICINE

## 2022-02-02 PROCEDURE — 99999 PR PBB SHADOW E&M-EST. PATIENT-LVL V: CPT | Mod: PBBFAC,,, | Performed by: FAMILY MEDICINE

## 2022-02-09 ENCOUNTER — OFFICE VISIT (OUTPATIENT)
Dept: OBSTETRICS AND GYNECOLOGY | Facility: CLINIC | Age: 77
End: 2022-02-09
Payer: MEDICARE

## 2022-02-09 VITALS
DIASTOLIC BLOOD PRESSURE: 70 MMHG | HEIGHT: 63 IN | SYSTOLIC BLOOD PRESSURE: 138 MMHG | WEIGHT: 129 LBS | BODY MASS INDEX: 22.86 KG/M2

## 2022-02-09 DIAGNOSIS — N81.4 CYSTOCELE WITH UTERINE PROLAPSE: Primary | ICD-10-CM

## 2022-02-09 PROCEDURE — 1159F PR MEDICATION LIST DOCUMENTED IN MEDICAL RECORD: ICD-10-PCS | Mod: CPTII,S$GLB,, | Performed by: OBSTETRICS & GYNECOLOGY

## 2022-02-09 PROCEDURE — 1159F MED LIST DOCD IN RCRD: CPT | Mod: CPTII,S$GLB,, | Performed by: OBSTETRICS & GYNECOLOGY

## 2022-02-09 PROCEDURE — 1126F PR PAIN SEVERITY QUANTIFIED, NO PAIN PRESENT: ICD-10-PCS | Mod: CPTII,S$GLB,, | Performed by: OBSTETRICS & GYNECOLOGY

## 2022-02-09 PROCEDURE — 3078F DIAST BP <80 MM HG: CPT | Mod: CPTII,S$GLB,, | Performed by: OBSTETRICS & GYNECOLOGY

## 2022-02-09 PROCEDURE — 1101F PR PT FALLS ASSESS DOC 0-1 FALLS W/OUT INJ PAST YR: ICD-10-PCS | Mod: CPTII,S$GLB,, | Performed by: OBSTETRICS & GYNECOLOGY

## 2022-02-09 PROCEDURE — 99999 PR PBB SHADOW E&M-EST. PATIENT-LVL IV: ICD-10-PCS | Mod: PBBFAC,,, | Performed by: OBSTETRICS & GYNECOLOGY

## 2022-02-09 PROCEDURE — 99213 PR OFFICE/OUTPT VISIT, EST, LEVL III, 20-29 MIN: ICD-10-PCS | Mod: S$GLB,,, | Performed by: OBSTETRICS & GYNECOLOGY

## 2022-02-09 PROCEDURE — 3075F PR MOST RECENT SYSTOLIC BLOOD PRESS GE 130-139MM HG: ICD-10-PCS | Mod: CPTII,S$GLB,, | Performed by: OBSTETRICS & GYNECOLOGY

## 2022-02-09 PROCEDURE — 99213 OFFICE O/P EST LOW 20 MIN: CPT | Mod: S$GLB,,, | Performed by: OBSTETRICS & GYNECOLOGY

## 2022-02-09 PROCEDURE — 3288F PR FALLS RISK ASSESSMENT DOCUMENTED: ICD-10-PCS | Mod: CPTII,S$GLB,, | Performed by: OBSTETRICS & GYNECOLOGY

## 2022-02-09 PROCEDURE — 1101F PT FALLS ASSESS-DOCD LE1/YR: CPT | Mod: CPTII,S$GLB,, | Performed by: OBSTETRICS & GYNECOLOGY

## 2022-02-09 PROCEDURE — 3288F FALL RISK ASSESSMENT DOCD: CPT | Mod: CPTII,S$GLB,, | Performed by: OBSTETRICS & GYNECOLOGY

## 2022-02-09 PROCEDURE — 1126F AMNT PAIN NOTED NONE PRSNT: CPT | Mod: CPTII,S$GLB,, | Performed by: OBSTETRICS & GYNECOLOGY

## 2022-02-09 PROCEDURE — 3078F PR MOST RECENT DIASTOLIC BLOOD PRESSURE < 80 MM HG: ICD-10-PCS | Mod: CPTII,S$GLB,, | Performed by: OBSTETRICS & GYNECOLOGY

## 2022-02-09 PROCEDURE — 99999 PR PBB SHADOW E&M-EST. PATIENT-LVL IV: CPT | Mod: PBBFAC,,, | Performed by: OBSTETRICS & GYNECOLOGY

## 2022-02-09 PROCEDURE — 3075F SYST BP GE 130 - 139MM HG: CPT | Mod: CPTII,S$GLB,, | Performed by: OBSTETRICS & GYNECOLOGY

## 2022-02-09 NOTE — PROGRESS NOTES
"  Subjective:       Patient ID: Indira Walker is a 76 y.o. female.    Chief Complaint:  Bladder Prolapse      History of Present Illness  HPI  Referral to discuss findings of Pelvic Organ Prolapse over the past 30 days   Symptom is limited to observation of vaginal wall prolapse out the vaginal opening   No problem with defecation or micturation   Pelvic organs are intact.     Health Maintenance   Topic Date Due    TETANUS VACCINE  Never done    Hemoglobin A1c  2022    Eye Exam  2022    Foot Exam  2023    Lipid Panel  2023    DEXA SCAN  2024    Hepatitis C Screening  Completed     GYN & OB History  No LMP recorded. Patient is postmenopausal.   Date of Last Pap: No result found    OB History    Para Term  AB Living   3 3 3         SAB IAB Ectopic Multiple Live Births                  # Outcome Date GA Lbr Leonid/2nd Weight Sex Delivery Anes PTL Lv   3 Term            2 Term            1 Term                Review of Systems  Review of Systems        Objective:   /70   Ht 5' 3" (1.6 m)   Wt 58.5 kg (128 lb 15.5 oz)   BMI 22.85 kg/m²    Physical Exam:             Abdominal: Soft. Bowel sounds are normal. She exhibits no distension, no mass and no abdominal incision. There is no abdominal tenderness. There is no guarding. No hernia. Hernia confirmed negative in the right inguinal area and confirmed negative in the left inguinal area.     Genitourinary:    Vagina, uterus and rectum normal.   Labial bartholins normal.There is no rash, tenderness or lesion on the right labia. There is no rash, tenderness or lesion on the left labia. Cervix is normal. Right adnexum displays no mass, no tenderness and no fullness. Left adnexum displays no mass, no tenderness and no fullness. There is cystocele (grade 2 ) in the vagina. No  no vaginal discharge, tenderness, bleeding, rectocele or unspecified prolapse of vaginal walls in the vagina.    No foreign body in the vagina. "      No signs of injury in the vagina.   Vaginal atrophy noted. Cervix exhibits no motion tenderness, no discharge and no friability. Uterus is uterine prolapse (grade 2 ). Uterus is not deviated, not enlarged, not tender, not hosting fibroids and not experiencing uterine prolapse.              Lymphadenopathy:        Right: No inguinal adenopathy present.        Left: No inguinal adenopathy present.           Assessment:        1. Cystocele with uterine prolapse                Plan:            Indira was seen today for bladder prolapse.    Diagnoses and all orders for this visit:    Cystocele with uterine prolapse  Comments:  Asymptomatic, will follow for exacerbation

## 2022-02-09 NOTE — PATIENT INSTRUCTIONS
"Patient Education       Pelvic Organ Prolapse   The Basics   Written by the doctors and editors at [facility]   What is pelvic organ prolapse? -- Pelvic organ prolapse is a condition that affects the "pelvic floor." The pelvic floor is the name for the muscles that support the organs in the pelvis, including the bladder, rectum, and uterus (figure 1). Pelvic organ prolapse is when these muscles relax too much. This causes the organs to drop down and press against or bulge into the vagina.  Prolapse can affect different organs (figure 2). Doctors use different terms for the types of prolapse:  · Bladder - If the bladder bulges into the vagina, it is called "cystocele."  · Rectum - If the rectum bulges into the vagina, it is called "rectocele."  · Uterus - If the uterus bulges into the vagina, it is called "uterine prolapse."  Some things can increase your risk of having pelvic organ prolapse. They include pregnancy, obesity, and older age.  What are the symptoms of pelvic organ prolapse? -- Many times, prolapse does not cause any symptoms. But when symptoms do happen, they can include:  · Fullness or pressure in the pelvis or vagina   · An aching feeling in the pelvis  · A bulge in the vagina or coming out of the vagina  · Leaking urine when you laugh, cough, or sneeze  · Needing to urinate all of a sudden  · Trouble having a bowel movement  When using the toilet, you might need to press on the bulge in your vagina with a finger to get out all your urine or to finish a bowel movement.  Is there a test for pelvic organ prolapse? -- Your doctor or nurse will be able to tell if you have it by doing a pelvic exam.  Is there anything I can do on my own to feel better? -- Yes. Some people feel better if they do pelvic muscle exercises. These exercises strengthen the muscles that control the flow of urine and bowel movements. They are also known as "Kegel" exercises. Your nurse or doctor can teach you how to do them or refer " "you to a physical therapist who specializes in pelvic floor problems.  How is pelvic organ prolapse treated? -- People who have no symptoms or who are not bothered by their symptoms do not need treatment. If you do have symptoms that bother you, your treatment options might include:  · Pelvic floor muscle exercises - This involves working with a physical therapist for 8 to 12 weeks to strengthen your pelvic muscles.  · A vaginal pessary - This device fits inside your vagina to support the bladder and push it back into place. Pessaries come in different shapes and sizes. Your doctor or nurse will talk to you about your options and make sure your pessary fits your body.   · Surgery - A surgeon can move organs back where they belong and strengthen the tissues that keep them in place. You should only consider this type of surgery if you do not plan to have children or are done having children.  Can pelvic organ prolapse be prevented? -- You can reduce your chances of pelvic organ prolapse if you:  · Lose weight if you are overweight  · Get treated for constipation if you are constipated  · Avoid activities that require you to lift heavy things  All topics are updated as new evidence becomes available and our peer review process is complete.  This topic retrieved from [employer] on: Sep 21, 2021.  Topic 42631 Version 6.0  Release: 29.4.2 - C29.263  © 2021 UpToDate, Inc. and/or its affiliates. All rights reserved.  figure 1: Pelvic floor muscles     The "pelvic floor" is a group of muscles that support the organs in the pelvis. In women, these organs include the uterus, bladder, and rectum.  Graphic 573565 Version 1.0    figure 2: Pelvic organ prolapse     When the "pelvic floor" muscles relax, organs can drop down and bulge into the vagina. In each picture, the black arrow shows how the organs move. In picture A, the organs are normal. Picture B shows prolapse of the bladder (called a "cystocele" or "anterior vaginal wall " "prolapse"). Picture C shows prolapse of the rectum (called a "rectocele" or "posterior vaginal wall prolapse"). Picture D shows prolapse of the uterus (called uterine prolapse).  Graphic 83279 Version 3.0    Consumer Information Use and Disclaimer   This information is not specific medical advice and does not replace information you receive from your health care provider. This is only a brief summary of general information. It does NOT include all information about conditions, illnesses, injuries, tests, procedures, treatments, therapies, discharge instructions or life-style choices that may apply to you. You must talk with your health care provider for complete information about your health and treatment options. This information should not be used to decide whether or not to accept your health care provider's advice, instructions or recommendations. Only your health care provider has the knowledge and training to provide advice that is right for you. The use of this information is governed by the [employer] End User License Agreement, available at https://www.Prow[employer]Plexis/en/solutions/[employer]/about/kishore.The use of [employer] content is governed by the [employer] Terms of Use. ©2021 UpToDate, Inc. All rights reserved.  Copyright   © 2021 UpToDate, Inc. and/or its affiliates. All rights reserved.    "

## 2022-02-12 NOTE — PROGRESS NOTES
Subjective:       Patient ID: Indira Walker is a 76 y.o. female.    Chief Complaint: Follow-up (6 month)    Patient presents to clinic today for followup of chronic conditions.    Review of Systems   Constitutional: Negative for chills, fatigue, fever and unexpected weight change.   Eyes: Negative for visual disturbance.   Respiratory: Negative for shortness of breath.    Cardiovascular: Negative for chest pain.   Musculoskeletal: Negative for myalgias.   Neurological: Negative for headaches.         Objective:      Physical Exam  Vitals reviewed.   Constitutional:       General: She is not in acute distress.     Appearance: She is well-developed.   HENT:      Head: Normocephalic and atraumatic.   Eyes:      General: Lids are normal. No scleral icterus.     Extraocular Movements: Extraocular movements intact.      Conjunctiva/sclera: Conjunctivae normal.      Pupils: Pupils are equal, round, and reactive to light.   Pulmonary:      Effort: Pulmonary effort is normal.   Neurological:      Mental Status: She is alert and oriented to person, place, and time.      Cranial Nerves: No cranial nerve deficit.      Gait: Gait normal.   Psychiatric:         Mood and Affect: Mood and affect normal.         Assessment:       1. Hyperlipidemia associated with type 2 diabetes mellitus    2. Uncontrolled type 2 diabetes mellitus with hyperglycemia    3. Hypertension associated with diabetes    4. Chronic heart failure with preserved ejection fraction    5. Seropositive rheumatoid arthritis        Plan:     Problem List Items Addressed This Visit     Chronic heart failure with preserved ejection fraction    Overview     Followed by Cardiology         Hyperlipidemia associated with type 2 diabetes mellitus - Primary    Current Assessment & Plan     LDL 72; continue crestor         Relevant Orders    Comprehensive Metabolic Panel    Lipid Panel    Hypertension associated with diabetes    Current Assessment & Plan     Controlled,  continue losartan-HCTZ         Relevant Orders    TSH    CBC Auto Differential    Seropositive rheumatoid arthritis    Overview     Followed by Rheumatology         Uncontrolled type 2 diabetes mellitus with hyperglycemia    Current Assessment & Plan     a1c 7.7, improved from 8.1; continue metformin         Relevant Orders    Hemoglobin A1C    Microalbumin/Creatinine Ratio, Urine          Health Maintenance reviewed/updated.  HRA reviewed.

## 2022-02-21 DIAGNOSIS — K21.9 GASTROESOPHAGEAL REFLUX DISEASE: ICD-10-CM

## 2022-02-21 NOTE — TELEPHONE ENCOUNTER
----- Message from Lula Henning sent at 2/21/2022 10:01 AM CST -----  Contact: Pt  Pt is taking medicine and states the medicine was stopped per pt's pharm / famotidine is the med and / pt wants to know why it was stopped and can be reached at [phone]//thanks/dbw

## 2022-02-22 RX ORDER — FAMOTIDINE 40 MG/1
40 TABLET, FILM COATED ORAL DAILY
Qty: 90 TABLET | Refills: 3 | Status: SHIPPED | OUTPATIENT
Start: 2022-02-22 | End: 2022-12-27

## 2022-02-23 ENCOUNTER — TELEPHONE (OUTPATIENT)
Dept: RHEUMATOLOGY | Facility: CLINIC | Age: 77
End: 2022-02-23
Payer: MEDICARE

## 2022-02-23 ENCOUNTER — TELEPHONE (OUTPATIENT)
Dept: RADIOLOGY | Facility: HOSPITAL | Age: 77
End: 2022-02-23
Payer: MEDICARE

## 2022-02-23 NOTE — TELEPHONE ENCOUNTER
Jocelin CALDWELL, Rheumatology Manager received below message regarding CT scan ordered on 2.28.22 from Cathi with Radiology           She has an allergy to iodine and will need allergy prep ordered. Allergy prep should be prednisone 50 mg 13hr before CT, another 50 mg 7 hrs before CT, then another 50 mg 1hr before CT. Benadryl 50 mg 1hr before CT, as well.         Pt will need to contacted an notified of above.

## 2022-02-24 DIAGNOSIS — R93.5 ABNORMAL CT OF THE ABDOMEN: Primary | ICD-10-CM

## 2022-02-24 DIAGNOSIS — R63.4 WEIGHT LOSS: ICD-10-CM

## 2022-02-25 ENCOUNTER — TELEPHONE (OUTPATIENT)
Dept: RHEUMATOLOGY | Facility: CLINIC | Age: 77
End: 2022-02-25
Payer: MEDICARE

## 2022-02-25 NOTE — TELEPHONE ENCOUNTER
Spoke to patient and informed her that  Dr. Cage canceled CT scan and recommend GI evaluation for further evaluation and management of incidental pancreatic body/tail junction small hypodense lesion.     Explained that someone from the GI department will be calling her to schedule an appt.

## 2022-02-25 NOTE — TELEPHONE ENCOUNTER
----- Message from Kalpana Martell sent at 2/25/2022 12:17 PM CST -----  Regarding: CT  Contact: Patient  Patient wants to know why her CT scan was cancelled, please call her back at [phone]

## 2022-03-03 ENCOUNTER — TELEPHONE (OUTPATIENT)
Dept: RHEUMATOLOGY | Facility: CLINIC | Age: 77
End: 2022-03-03
Payer: MEDICARE

## 2022-03-03 NOTE — TELEPHONE ENCOUNTER
----- Message from Dinora Bernstein sent at 3/3/2022 10:37 AM CST -----  Contact: JINA TAM [[id number]]  .Type:  Needs Medical Advice    Who Called: JINA TAM [[id number]]  Would the patient rather a call back or a response via [facility]? Call   Best Call Back Number: .[phone] (home)    Additional Information: Pt is req a call back in regards to what's going on with her and why she needs to see Gastro

## 2022-03-04 ENCOUNTER — TELEPHONE (OUTPATIENT)
Dept: OBSTETRICS AND GYNECOLOGY | Facility: CLINIC | Age: 77
End: 2022-03-04
Payer: MEDICARE

## 2022-03-04 NOTE — TELEPHONE ENCOUNTER
Spoke to pt. Pt wants to know what incidental pancreatic body/tail junction small hypodense lesion is (see encounters from 2/25). Pt also would like to know who Dr. Cage recommends to see. Please advise

## 2022-03-04 NOTE — TELEPHONE ENCOUNTER
----- Message from Geo Hermosillo sent at 3/4/2022  2:57 PM CST -----  Contact: Pt [phone]  In addition to the message sent yesterday, she wants to why her CT ABD W CONTRAST was cancelled.    Thank you

## 2022-03-08 NOTE — TELEPHONE ENCOUNTER
Spoke with pt and told pt the following recommendations below per [facility]. Pt verbalized understanding        Giovanni Cage MD  You; [facility] Staff 8 minutes ago (4:36 PM)         A pancreatic body/tail junction small hypodense lesion reported on CT abdomen and pelvis from July 2019. In view of progressive weight loss and intermittent abdominal pain, I recommended gastroenterology evaluation for these symptoms and prior abnormal CT imaging for further evaluation management.         Message text

## 2022-03-09 ENCOUNTER — TELEPHONE (OUTPATIENT)
Dept: INTERNAL MEDICINE | Facility: CLINIC | Age: 77
End: 2022-03-09
Payer: MEDICARE

## 2022-03-09 NOTE — TELEPHONE ENCOUNTER
----- Message from Anastasiia Diallo sent at 3/9/2022  4:16 PM CST -----  Contact: Pt  Type:  Needs Medical Advice    Who Called:Pt  Symptoms (please be specific):   How long has patient had these symptoms:    Pharmacy name and phone #:   Would the patient rather a call back or a response via [facility]? Call back  Best Call Back Number: [phone]  Additional Information: Pt called to see if the doctor could recommend a Gastro so she can see

## 2022-04-05 LAB
LEFT EYE DM RETINOPATHY: NEGATIVE
RIGHT EYE DM RETINOPATHY: NEGATIVE

## 2022-05-24 DIAGNOSIS — G91.2 (IDIOPATHIC) NORMAL PRESSURE HYDROCEPHALUS: ICD-10-CM

## 2022-05-24 DIAGNOSIS — E11.69 HYPERLIPIDEMIA ASSOCIATED WITH TYPE 2 DIABETES MELLITUS: ICD-10-CM

## 2022-05-24 DIAGNOSIS — I10 HYPERTENSION, ESSENTIAL: ICD-10-CM

## 2022-05-24 DIAGNOSIS — E78.5 HYPERLIPIDEMIA ASSOCIATED WITH TYPE 2 DIABETES MELLITUS: ICD-10-CM

## 2022-05-24 RX ORDER — ROSUVASTATIN CALCIUM 20 MG/1
TABLET, COATED ORAL
Qty: 90 TABLET | Refills: 1 | Status: SHIPPED | OUTPATIENT
Start: 2022-05-24 | End: 2022-11-09

## 2022-05-24 RX ORDER — LOSARTAN POTASSIUM AND HYDROCHLOROTHIAZIDE 12.5; 1 MG/1; MG/1
TABLET ORAL
Qty: 90 TABLET | Refills: 1 | Status: SHIPPED | OUTPATIENT
Start: 2022-05-24 | End: 2022-11-09

## 2022-05-24 RX ORDER — METFORMIN HYDROCHLORIDE 1000 MG/1
TABLET ORAL
Qty: 180 TABLET | Refills: 1 | Status: SHIPPED | OUTPATIENT
Start: 2022-05-24 | End: 2022-11-09

## 2022-05-26 ENCOUNTER — OFFICE VISIT (OUTPATIENT)
Dept: GASTROENTEROLOGY | Facility: CLINIC | Age: 77
End: 2022-05-26
Payer: MEDICARE

## 2022-05-26 VITALS
WEIGHT: 127.44 LBS | BODY MASS INDEX: 22.58 KG/M2 | SYSTOLIC BLOOD PRESSURE: 128 MMHG | DIASTOLIC BLOOD PRESSURE: 62 MMHG | HEIGHT: 63 IN

## 2022-05-26 DIAGNOSIS — R63.4 WEIGHT LOSS: ICD-10-CM

## 2022-05-26 DIAGNOSIS — K86.9 LESION OF PANCREAS: ICD-10-CM

## 2022-05-26 DIAGNOSIS — R63.4 ABNORMAL WEIGHT LOSS: Primary | ICD-10-CM

## 2022-05-26 DIAGNOSIS — R93.5 ABNORMAL CT OF THE ABDOMEN: ICD-10-CM

## 2022-05-26 PROCEDURE — 99214 OFFICE O/P EST MOD 30 MIN: CPT | Mod: S$GLB,,, | Performed by: INTERNAL MEDICINE

## 2022-05-26 PROCEDURE — 1159F MED LIST DOCD IN RCRD: CPT | Mod: CPTII,S$GLB,, | Performed by: INTERNAL MEDICINE

## 2022-05-26 PROCEDURE — 3288F FALL RISK ASSESSMENT DOCD: CPT | Mod: CPTII,S$GLB,, | Performed by: INTERNAL MEDICINE

## 2022-05-26 PROCEDURE — 1101F PR PT FALLS ASSESS DOC 0-1 FALLS W/OUT INJ PAST YR: ICD-10-PCS | Mod: CPTII,S$GLB,, | Performed by: INTERNAL MEDICINE

## 2022-05-26 PROCEDURE — 3078F DIAST BP <80 MM HG: CPT | Mod: CPTII,S$GLB,, | Performed by: INTERNAL MEDICINE

## 2022-05-26 PROCEDURE — 1159F PR MEDICATION LIST DOCUMENTED IN MEDICAL RECORD: ICD-10-PCS | Mod: CPTII,S$GLB,, | Performed by: INTERNAL MEDICINE

## 2022-05-26 PROCEDURE — 3074F PR MOST RECENT SYSTOLIC BLOOD PRESSURE < 130 MM HG: ICD-10-PCS | Mod: CPTII,S$GLB,, | Performed by: INTERNAL MEDICINE

## 2022-05-26 PROCEDURE — 1101F PT FALLS ASSESS-DOCD LE1/YR: CPT | Mod: CPTII,S$GLB,, | Performed by: INTERNAL MEDICINE

## 2022-05-26 PROCEDURE — 1126F PR PAIN SEVERITY QUANTIFIED, NO PAIN PRESENT: ICD-10-PCS | Mod: CPTII,S$GLB,, | Performed by: INTERNAL MEDICINE

## 2022-05-26 PROCEDURE — 3078F PR MOST RECENT DIASTOLIC BLOOD PRESSURE < 80 MM HG: ICD-10-PCS | Mod: CPTII,S$GLB,, | Performed by: INTERNAL MEDICINE

## 2022-05-26 PROCEDURE — 1126F AMNT PAIN NOTED NONE PRSNT: CPT | Mod: CPTII,S$GLB,, | Performed by: INTERNAL MEDICINE

## 2022-05-26 PROCEDURE — 3288F PR FALLS RISK ASSESSMENT DOCUMENTED: ICD-10-PCS | Mod: CPTII,S$GLB,, | Performed by: INTERNAL MEDICINE

## 2022-05-26 PROCEDURE — 99214 PR OFFICE/OUTPT VISIT, EST, LEVL IV, 30-39 MIN: ICD-10-PCS | Mod: S$GLB,,, | Performed by: INTERNAL MEDICINE

## 2022-05-26 PROCEDURE — 3074F SYST BP LT 130 MM HG: CPT | Mod: CPTII,S$GLB,, | Performed by: INTERNAL MEDICINE

## 2022-05-26 PROCEDURE — 99999 PR PBB SHADOW E&M-EST. PATIENT-LVL V: CPT | Mod: PBBFAC,,, | Performed by: INTERNAL MEDICINE

## 2022-05-26 PROCEDURE — 99999 PR PBB SHADOW E&M-EST. PATIENT-LVL V: ICD-10-PCS | Mod: PBBFAC,,, | Performed by: INTERNAL MEDICINE

## 2022-05-26 NOTE — PROGRESS NOTES
[facility]  Gastroenterology    Patient evaluated at the request of Giovanni Cage MD  [id number] [place] [address] [place] [address]    PCP: Bertha Kearns MD    22    HPI     Abnormal Ct Scan      Additional comments: Review abnormal CT abdomen from 2021.              Weight Loss      Additional comments: Stable since last 22          Last edited by Gladys Kelley MA on 2022 10:44 AM. (History)        Reason for Visit: Abnormal CT Scan    Subjective:   Indira Walker is a 76 y.o. female with PMH of diverticulosis here for evaluation of abnormal CT scan of the GI tract. Patient had a Ct scan done in 2019 which showed a pancreatic body/tail junction lesion 9 mm with further imaging recommended. It seems like MRI was ordered but not completed because patient states she cannot go into MRI machine due to claustrophobia even pre-medicated. She was recently seen by rheumatology for unintentional 40 lb weight loss and it was recommended she follow-up with GI clinic for further evaluation of this lesion. Her weight has now remained stable. Of note, she did lose two of her children over the past year but she feels like her weight loss started before this happened. She denies any abdominal pain.       Past Medical History:   Diagnosis Date    Arthritis     Diabetes mellitus, type 2     Foot drop, left 2018    Heartburn     Hyperlipidemia     Hypertension     Left sided sciatica     s/p microdiscectomy 3/21/18       Past Surgical History:   Procedure Laterality Date    BREAST BIOPSY       SECTION      COLONOSCOPY  2008    DR. JANET GARCÍA/MILD DIVERICULOSIS DESCENDING AND SIGMOID. REPEAT 5 YRS    HERNIA REPAIR      MICRODISCECTOMY OF SPINE Left 2018    left L5-S1, Dr Segundo       Current Outpatient Medications on File Prior to Visit   Medication Sig Dispense Refill    acetaminophen (TYLENOL) 500 MG tablet Take 1,000 mg by mouth every 8  (eight) hours as needed.      albuterol (PROVENTIL/VENTOLIN HFA) 90 mcg/actuation inhaler Inhale 2 puffs into the lungs every 6 (six) hours as needed for Shortness of Breath (or coughing). 18 g 3    ascorbic acid, vitamin C, (VITAMIN C) 1000 MG tablet Take 1,000 mg by mouth once daily.      aspirin (ECOTRIN) 81 MG EC tablet Take 1 tablet (81 mg total) by mouth once daily. Every other day  0    b complex vitamins capsule Take 1 capsule by mouth once daily.      beta-carotene,A,-vits C,E/mins (VISION ORAL) Take by mouth.      BIOTIN ORAL Take 1,000 mg by mouth.      blood sugar diagnostic Strp To check BG 2 times daily, to use with insurance preferred meter 200 each 3    blood-glucose meter kit To check BG 2 times daily, to use with insurance preferred meter 1 each 0    chlorpheniramine-acetaminophen 2-325 mg Tab Take 2 tablets by mouth every 4 to 6 hours as needed. Coricidin HBP      cholecalciferol, vitamin D3, 2,000 unit Cap Take 2,000 Units by mouth once daily.       cinnamon bark, bulk, Powd by Misc.(Non-Drug; Combo Route) route.      diclofenac sodium (VOLTAREN) 1 % Gel Apply 2 g topically 4 (four) times daily. 1 Tube 2    famotidine (PEPCID) 40 MG tablet Take 1 tablet (40 mg total) by mouth once daily. 90 tablet 3    folic acid (FOLVITE) 1 MG tablet Take 1 tablet (1 mg total) by mouth once daily. 90 tablet 3    gabapentin (NEURONTIN) 100 MG capsule Take 100 mg by mouth 2 (two) times daily. Taking up to BID prn      gabapentin (NEURONTIN) 300 MG capsule Take 300 mg by mouth 2 (two) times daily.      garlic Tab Take by mouth.      ginger root, bulk, Powd by Misc.(Non-Drug; Combo Route) route.      lancets [facility] To check BG 2 times daily, to use with insurance preferred meter 200 each 4    loratadine (CLARITIN) 10 mg tablet Take 10 mg by mouth once daily.      losartan-hydrochlorothiazide 100-12.5 mg (HYZAAR) 100-12.5 mg Tab TAKE 1 TABLET BY MOUTH EVERY DAY 90 tablet 1    metFORMIN  (GLUCOPHAGE) 1000 MG tablet TAKE 1 TABLET BY MOUTH TWICE A DAY WITH MEALS 180 tablet 1    methotrexate 2.5 MG Tab TAKE 10 TABLETS (5 TABLET IN THE MORNING, 5 TABLETS IN THE EVENING) BY MOUTH EVERY 7 DAYS. 120 tablet 1    montelukast (SINGULAIR) 10 mg tablet Take 1 tablet (10 mg total) by mouth every evening. 30 tablet 11    MULTIVIT-MINERALS/FERROUS FUM (MULTI VITAMIN ORAL) Take by mouth. For women over 50 (centrum)      rosuvastatin (CRESTOR) 20 MG tablet TAKE 1 TABLET BY MOUTH EVERY DAY 90 tablet 1     No current facility-administered medications on file prior to visit.       Review of patient's allergies indicates:   Allergen Reactions    Iodine and iodide containing products Hives       Social History     Socioeconomic History    Marital status:     Number of children: 2   Occupational History    Occupation:       Employer: [employer]    Tobacco Use    Smoking status: Never Smoker    Smokeless tobacco: Never Used   Substance and Sexual Activity    Alcohol use: No    Drug use: No    Sexual activity: Not Currently     Partners: Male       Family History   Problem Relation Age of Onset    Diabetes Mother     Diabetes Father        Review of Systems   Constitutional: Negative for appetite change, fever and unexpected weight change.   HENT: Negative for postnasal drip, rhinorrhea, sneezing, sore throat and trouble swallowing.    Eyes: Negative for visual disturbance.   Respiratory: Negative for cough, shortness of breath and wheezing.    Cardiovascular: Negative for chest pain, palpitations and leg swelling.   Gastrointestinal: Negative for abdominal pain, blood in stool, constipation, diarrhea, nausea and vomiting.   Genitourinary: Negative for dysuria.   Musculoskeletal: Negative for arthralgias, joint swelling and myalgias.   Skin: Negative for color change, pallor and rash.   Neurological: Negative for weakness, light-headedness, numbness and headaches.   Hematological:  Negative for adenopathy. Does not bruise/bleed easily.   Psychiatric/Behavioral: Negative for agitation.       Objective:   Vitals:   Vitals:    05/26/22 1042   BP: 128/62       Physical Exam  Vitals reviewed.   Constitutional:       General: She is not in acute distress.     Appearance: She is not diaphoretic.   HENT:      Head: Normocephalic and atraumatic.      Mouth/Throat:      Pharynx: No oropharyngeal exudate.   Eyes:      General: No scleral icterus.        Right eye: No discharge.         Left eye: No discharge.      Conjunctiva/sclera: Conjunctivae normal.      Pupils: Pupils are equal, round, and reactive to light.   Cardiovascular:      Rate and Rhythm: Normal rate and regular rhythm.      Heart sounds: Normal heart sounds. No murmur heard.    No friction rub. No gallop.   Pulmonary:      Effort: Pulmonary effort is normal. No respiratory distress.      Breath sounds: Normal breath sounds. No stridor. No wheezing or rales.   Abdominal:      General: Bowel sounds are normal. There is no distension.      Palpations: Abdomen is soft. There is no mass.      Tenderness: There is no abdominal tenderness. There is no guarding.   Musculoskeletal:         General: Normal range of motion.      Cervical back: Normal range of motion.   Skin:     General: Skin is warm and dry.      Coloration: Skin is not pale.      Findings: No erythema or rash.   Neurological:      Mental Status: She is alert and oriented to person, place, and time.       IMPRESSION     Problem List Items Addressed This Visit    None     Visit Diagnoses     Abnormal weight loss    -  Primary    Abnormal CT of the abdomen        Weight loss        Lesion of pancreas              PLANS:    - Patient reports that her weight is now maintained, but she did lose 40 lbs over the past year. Also noted that she had two children pass away during this time as well  - CT scan in 2019 showed a 9 mm pancreatic emily/body lesion and it was not followed up  -  Patient does not think she can tolerate an MRI   - Will discuss with Dr. Saldivar regarding an EUS for further evaluation of this lesion  - In addition, it seems that patient is not up to date on her colonoscopy. If the pancreatic work-up is unrevealing, will need to pursue EGD/colonoscopy for evaluation of the weight loss. Will discuss with patient (she had already left at the time it was noted that she was not up to date on scopes)    Abnormal weight loss    Abnormal CT of the abdomen  -     Ambulatory referral/consult to Gastroenterology    Weight loss  -     Ambulatory referral/consult to Gastroenterology    Lesion of pancreas      Tamiko Smith MD  Gastroenterology and Hepatology

## 2022-05-28 DIAGNOSIS — K86.9 LESION OF PANCREAS: Primary | ICD-10-CM

## 2022-06-15 ENCOUNTER — TELEPHONE (OUTPATIENT)
Dept: GASTROENTEROLOGY | Facility: CLINIC | Age: 77
End: 2022-06-15
Payer: MEDICARE

## 2022-06-15 ENCOUNTER — PATIENT MESSAGE (OUTPATIENT)
Dept: GASTROENTEROLOGY | Facility: CLINIC | Age: 77
End: 2022-06-15
Payer: MEDICARE

## 2022-06-15 NOTE — TELEPHONE ENCOUNTER
----- Message from Gladys Kelley MA sent at 6/15/2022  9:51 AM CDT -----  Good morning! Can you please schedule this patient for an EUS? Thanks!  ----- Message -----  From: Jose M Martinez RN  Sent: 6/15/2022   9:36 AM CDT  To: Wesley Preston LPN, Gladys Kelley MA    Please make sure once you have read the message and it's completed that you remove from the inbasket or it will show as an undone message  ----- Message -----  From: Robbie Jensen  Sent: 6/14/2022   3:55 PM CDT  To: Luis Bah         Type: Patient Returning Call    Who Called: Pt  Who Left Message for Patient: NA  Does the patient know what this is regarding?: MRI order  Would the patient rather a call back or a response via [facility]? Call  Best Call Back Number: [phone]  Additional Information: Please assist, thank you!

## 2022-06-15 NOTE — TELEPHONE ENCOUNTER
Phoned patient and scheduled her EUS with Dr. Saldivar on July 11th.  Went over instructions and will also send vial MyChart and will mail in case patient can't get into her MyChart.  Patient verbalized understanding.

## 2022-07-10 ENCOUNTER — NURSE TRIAGE (OUTPATIENT)
Dept: ADMINISTRATIVE | Facility: CLINIC | Age: 77
End: 2022-07-10
Payer: MEDICARE

## 2022-07-11 ENCOUNTER — HOSPITAL ENCOUNTER (OUTPATIENT)
Facility: HOSPITAL | Age: 77
Discharge: HOME | End: 2022-07-11
Attending: INTERNAL MEDICINE | Admitting: INTERNAL MEDICINE
Payer: MEDICARE

## 2022-07-11 ENCOUNTER — ANESTHESIA EVENT (OUTPATIENT)
Dept: ENDOSCOPY | Facility: HOSPITAL | Age: 77
End: 2022-07-11
Payer: MEDICARE

## 2022-07-11 ENCOUNTER — ANESTHESIA (OUTPATIENT)
Dept: ENDOSCOPY | Facility: HOSPITAL | Age: 77
End: 2022-07-11
Payer: MEDICARE

## 2022-07-11 VITALS
BODY MASS INDEX: 22.5 KG/M2 | OXYGEN SATURATION: 96 % | RESPIRATION RATE: 17 BRPM | SYSTOLIC BLOOD PRESSURE: 122 MMHG | DIASTOLIC BLOOD PRESSURE: 89 MMHG | HEART RATE: 76 BPM | TEMPERATURE: 98 F | WEIGHT: 127 LBS | HEIGHT: 63 IN

## 2022-07-11 DIAGNOSIS — K86.2 PANCREAS CYST: Primary | ICD-10-CM

## 2022-07-11 LAB — GLUCOSE SERPL-MCNC: 165 MG/DL (ref 70–110)

## 2022-07-11 PROCEDURE — 37000009 HC ANESTHESIA EA ADD 15 MINS: Performed by: INTERNAL MEDICINE

## 2022-07-11 PROCEDURE — 43242 PR UPGI ENDOSCOPY,FN NEEDLE BX,GUIDED: ICD-10-PCS | Mod: ,,, | Performed by: INTERNAL MEDICINE

## 2022-07-11 PROCEDURE — 82378 CARCINOEMBRYONIC ANTIGEN: CPT | Performed by: INTERNAL MEDICINE

## 2022-07-11 PROCEDURE — 37000008 HC ANESTHESIA 1ST 15 MINUTES: Performed by: INTERNAL MEDICINE

## 2022-07-11 PROCEDURE — 27202059 HC NEEDLE, FNA (ANY): Performed by: INTERNAL MEDICINE

## 2022-07-11 PROCEDURE — 43242 EGD US FINE NEEDLE BX/ASPIR: CPT | Performed by: INTERNAL MEDICINE

## 2022-07-11 PROCEDURE — 43242 EGD US FINE NEEDLE BX/ASPIR: CPT | Mod: ,,, | Performed by: INTERNAL MEDICINE

## 2022-07-11 PROCEDURE — 63600175 PHARM REV CODE 636 W HCPCS: Performed by: NURSE ANESTHETIST, CERTIFIED REGISTERED

## 2022-07-11 RX ORDER — PROPOFOL 10 MG/ML
VIAL (ML) INTRAVENOUS
Status: DISCONTINUED | OUTPATIENT
Start: 2022-07-11 | End: 2022-07-11

## 2022-07-11 RX ORDER — PROPOFOL 10 MG/ML
VIAL (ML) INTRAVENOUS CONTINUOUS PRN
Status: DISCONTINUED | OUTPATIENT
Start: 2022-07-11 | End: 2022-07-11

## 2022-07-11 RX ADMIN — PROPOFOL 30 MG: 10 INJECTION, EMULSION INTRAVENOUS at 12:07

## 2022-07-11 RX ADMIN — SODIUM CHLORIDE, POTASSIUM CHLORIDE, SODIUM LACTATE AND CALCIUM CHLORIDE: 600; 310; 30; 20 INJECTION, SOLUTION INTRAVENOUS at 11:07

## 2022-07-11 RX ADMIN — PROPOFOL 100 MCG/KG/MIN: 10 INJECTION, EMULSION INTRAVENOUS at 11:07

## 2022-07-11 RX ADMIN — PROPOFOL 30 MG: 10 INJECTION, EMULSION INTRAVENOUS at 11:07

## 2022-07-11 NOTE — TRANSFER OF CARE
"Anesthesia Transfer of Care Note    Patient: Indira Walker    Procedure(s) Performed: Procedure(s) (LRB):  ULTRASOUND, UPPER GI TRACT, ENDOSCOPIC (N/A)    Patient location: GI    Anesthesia Type: MAC    Transport from OR: Transported from OR on room air with adequate spontaneous ventilation    Post pain: adequate analgesia    Post assessment: no apparent anesthetic complications and tolerated procedure well    Post vital signs: stable    Level of consciousness: awake and alert    Nausea/Vomiting: no nausea/vomiting    Complications: none    Transfer of care protocol was followed      Last vitals:   Visit Vitals  /73 (BP Location: Left arm, Patient Position: Lying)   Pulse 80   Temp 36.8 °C (98.2 °F)   Resp 15   Ht 5' 3" (1.6 m)   Wt 57.6 kg (127 lb)   SpO2 98%   Breastfeeding No   BMI 22.50 kg/m²     "

## 2022-07-11 NOTE — ANESTHESIA POSTPROCEDURE EVALUATION
Anesthesia Post Evaluation    Patient: Indira Walker    Procedure(s) Performed: Procedure(s) (LRB):  ULTRASOUND, UPPER GI TRACT, ENDOSCOPIC (N/A)    Final Anesthesia Type: MAC      Patient location during evaluation: GI PACU  Patient participation: Yes- Able to Participate  Level of consciousness: awake and alert and awake  Post-procedure vital signs: reviewed and stable  Pain management: adequate  Airway patency: patent  RHIANNON mitigation strategies: Multimodal analgesia  PONV status at discharge: No PONV  Anesthetic complications: no      Cardiovascular status: blood pressure returned to baseline and hemodynamically stable  Respiratory status: unassisted and spontaneous ventilation  Hydration status: euvolemic  Follow-up not needed.          Vitals Value Taken Time   /89 07/11/22 1245   Temp 36.5 °C (97.7 °F) 07/11/22 1230   Pulse 76 07/11/22 1245   Resp 17 07/11/22 1245   SpO2 96 % 07/11/22 1245         Event Time   Out of Recovery 12:57:07         Pain/Coleman Score: Coleman Score: 10 (7/11/2022 12:45 PM)

## 2022-07-11 NOTE — H&P
PRE PROCEDURE H&P    Patient Name: Indira Walker  MRN: [id number]  : 1945  Date of Procedure:  2022  Referring Physician: Ange Saldivar *  Primary Physician: Bertha Kearns MD  Procedure Physician: Ange Saldivar MD       Planned Procedure: EUS  Diagnosis: pancreatic lesion seen on MRCP  Chief Complaint: Same as above    HPI: Patient is an 76 y.o. female is here for the above.     .Endoscopic ultrasound with possible fine needle aspiration/biopsy was recommended. The procedure was described along with the risks and benefits. Risks include perforation (0.02%), pancreatitis (0-2%), bleeding (4%), sedation related adverse events. Other risks include, risks of adverse reaction to sedation requiring the use of reversal agents, bleeding requiring blood transfusion, perforation requiring surgical intervention, technical failure, aspiration leading to respiratory distress and respiratory failure resulting in endotracheal intubation and mechanical ventilation including death. Anesthesia is utilized for this procedure, it is up to the anesthesiologist to determine airway safety including elective endotracheal intubation. Questions were answered, the patient agreed to proceed. There were no language barriers.      Past Medical History:   Past Medical History:   Diagnosis Date    Arthritis     Diabetes mellitus, type 2     Foot drop, left 2018    GERD (gastroesophageal reflux disease)     Heartburn     Hyperlipidemia     Hypertension     Left sided sciatica     s/p microdiscectomy 3/21/18        Past Surgical History:  Past Surgical History:   Procedure Laterality Date    BREAST BIOPSY       SECTION      COLONOSCOPY  2008    DR. JANET GARCÍA/MILD DIVERICULOSIS DESCENDING AND SIGMOID. REPEAT 5 YRS    HERNIA REPAIR      MICRODISCECTOMY OF SPINE Left 2018    left L5-S1, Dr Segundo        Home Medications:  Prior to Admission medications    Medication Sig  Start Date End Date Taking? Authorizing Provider   ascorbic acid, vitamin C, (VITAMIN C) 1000 MG tablet Take 1,000 mg by mouth once daily.   Yes Historical Provider   aspirin (ECOTRIN) 81 MG EC tablet Take 1 tablet (81 mg total) by mouth once daily. Every other day 3/21/18  Yes Suzi Hall PA-C   b complex vitamins capsule Take 1 capsule by mouth once daily.   Yes Historical Provider   beta-carotene,A,-vits C,E/mins (VISION ORAL) Take by mouth.   Yes Historical Provider   BIOTIN ORAL Take 1,000 mg by mouth.   Yes Historical Provider   cholecalciferol, vitamin D3, 2,000 unit Cap Take 2,000 Units by mouth once daily.    Yes Historical Provider   cinnamon bark, bulk, Powd by Misc.(Non-Drug; Combo Route) route.   Yes Historical Provider   famotidine (PEPCID) 40 MG tablet Take 1 tablet (40 mg total) by mouth once daily. 2/22/22  Yes Hilda Hankins PA-C   folic acid (FOLVITE) 1 MG tablet Take 1 tablet (1 mg total) by mouth once daily. 12/30/21 12/30/22 Yes Giovanni Cage MD   garlic Tab Take by mouth.   Yes Historical Provider   loratadine (CLARITIN) 10 mg tablet Take 10 mg by mouth once daily.   Yes Historical Provider   losartan-hydrochlorothiazide 100-12.5 mg (HYZAAR) 100-12.5 mg Tab TAKE 1 TABLET BY MOUTH EVERY DAY 5/24/22  Yes Bertha Kearns MD   metFORMIN (GLUCOPHAGE) 1000 MG tablet TAKE 1 TABLET BY MOUTH TWICE A DAY WITH MEALS 5/24/22  Yes Bertha Kearns MD   methotrexate 2.5 MG Tab TAKE 10 TABLETS (5 TABLET IN THE MORNING, 5 TABLETS IN THE EVENING) BY MOUTH EVERY 7 DAYS. 12/30/21  Yes Giovanni Cage MD   MULTIVIT-MINERALS/FERROUS FUM (MULTI VITAMIN ORAL) Take by mouth. For women over 50 (centrum)   Yes Historical Provider   rosuvastatin (CRESTOR) 20 MG tablet TAKE 1 TABLET BY MOUTH EVERY DAY 5/24/22  Yes Bertha Kearns MD   acetaminophen (TYLENOL) 500 MG tablet Take 1,000 mg by mouth every 8 (eight) hours as needed.    Historical Provider   albuterol (PROVENTIL/VENTOLIN HFA) 90  mcg/actuation inhaler Inhale 2 puffs into the lungs every 6 (six) hours as needed for Shortness of Breath (or coughing). 9/27/21   Bertha Kearns MD   blood sugar diagnostic Strp To check BG 2 times daily, to use with insurance preferred meter 9/15/21   Bertha Kearns MD   blood-glucose meter kit To check BG 2 times daily, to use with insurance preferred meter 1/9/19   Gladys Herrera MD   chlorpheniramine-acetaminophen 2-325 mg Tab Take 2 tablets by mouth every 4 to 6 hours as needed. Coricidin HBP    Historical Provider   diclofenac sodium (VOLTAREN) 1 % Gel Apply 2 g topically 4 (four) times daily. 2/11/20 3/15/21  Giovanni Cage MD   gabapentin (NEURONTIN) 100 MG capsule Take 100 mg by mouth 2 (two) times daily. Taking up to BID prn    Historical Provider   gabapentin (NEURONTIN) 300 MG capsule Take 300 mg by mouth 2 (two) times daily.    Historical Provider   ginger root, bulk, Powd by Misc.(Non-Drug; Combo Route) route.    Historical Provider   lancets Misc To check BG 2 times daily, to use with insurance preferred meter 1/9/19   Gladys Herrera MD   montelukast (SINGULAIR) 10 mg tablet Take 1 tablet (10 mg total) by mouth every evening. 1/8/21   Gladys Herrera MD        Allergies:  Review of patient's allergies indicates:   Allergen Reactions    Midol (acetaminoph-pyrilamine) Swelling    Iodine and iodide containing products Hives        Social History:   Social History     Socioeconomic History    Marital status:     Number of children: 2   Occupational History    Occupation:       Employer: [employer]   Tobacco Use    Smoking status: Never Smoker    Smokeless tobacco: Never Used   Substance and Sexual Activity    Alcohol use: No    Drug use: No    Sexual activity: Not Currently     Partners: Male       Family History:  Family History   Problem Relation Age of Onset    Diabetes Mother     Diabetes Father        ROS: No acute cardiac events, no acute  "respiratory complaints.     Physical Exam (all patients):    /73 (BP Location: Left arm, Patient Position: Lying)   Pulse 80   Temp 98.2 °F (36.8 °C)   Resp 15   Ht 5' 3" (1.6 m)   Wt 57.6 kg (127 lb)   SpO2 98%   Breastfeeding No   BMI 22.50 kg/m²   Lungs: Clear to auscultation bilaterally, respirations unlabored  Heart: Regular rate and rhythm, S1 and S2 normal, no obvious murmurs  Abdomen:         Soft, non-tender, bowel sounds normal, no masses, no organomegaly    Lab Results   Component Value Date    WBC 5.86 12/30/2021    WBC 5.86 12/30/2021    WBC 5.86 12/30/2021    MCV 94 12/30/2021    MCV 94 12/30/2021    MCV 94 12/30/2021    RDW 14.8 (H) 12/30/2021    RDW 14.8 (H) 12/30/2021    RDW 14.8 (H) 12/30/2021     12/30/2021     12/30/2021     12/30/2021    INR 1.0 03/08/2018     01/27/2022    HGBA1C 7.7 (H) 01/27/2022    BUN 16 01/27/2022     (L) 01/27/2022    K 4.2 01/27/2022     01/27/2022        SEDATION PLAN: per anesthesia      History reviewed, vital signs satisfactory, cardiopulmonary status satisfactory, sedation options, risks and plans have been discussed with the patient  All their questions were answered and the patient agrees to the sedation procedures as planned and the patient is deemed an appropriate candidate for the sedation as planned.    Procedure explained to patient, informed consent obtained and placed in chart.    Ange Saldivar  7/11/2022  11:46 AM     "

## 2022-07-11 NOTE — TELEPHONE ENCOUNTER
Patient's states she has a procedure scheduled in the morning and needs clarification regarding the pre-op instruction.     Triage RN reviewed and read patient's pre-op instructions that included the following:       Please check in for your appointment at 10:30.     Your procedure will be performed at [facility] ([facility] The hospital is located at [address], off [address] ([place] Once on [address], [facility] is the second building on the left. Check in for your procedure on the 1st floor. ([phone])         If you take BLOOD PRESSURE, HEART, SEIZURE, LUNG or PSYCHIATRIC MEDICATIONS in the morning, please take them the morning of your procedure. This includes Inhalers.   Please take these medications one hour prior to your arrival time with a small sip of water, or 2 hours before you startdrinking the second part of the prep.     IF YOU ARE DIABETIC:   Check blood sugar levels while following clear liquid diet on the day before the exam. Check again on the morning of the examand as needed if you feel hypoglycemic (low blood sugar).  Do not take any diabetic medications (including insulin) the morning of the exam.  If your blood sugar goes below 70, you may drink 4 ounces of clear juice, soda or eat a piece of hard candy. Wait 15 minutes,then recheck your blood sugar. If it isn't going up, you may drink another 4 ounces of clear juice and contact the [facility] at [phone].               The day before your procedure: Date: July 10th:                 Eat a light evening meal and no solid food after 7:00 pm. You may drink clear liquids including     · Water, Coffee, or decaffeinated coffee (no milk or cream)          · Tea, herbal Tea  · Carbonated beverages (soft drinks), regular sugar-free  · Gelatin desert (jell-O), plain or fruit flavored (no red Gelatin)  · Apple juice, grape juice , cranberry juice, juicy juice  · Powerade drinks,  such as Gatorade, Power Aid, Crystal Light, and Josue-Aid  · Lemonade and limeade  · Bouillon, Clear consommé  · Snowball, flavored water-ice, popsicles  · Hard candy, sugar, salt        · Do not drink any liquids containing red dye     · Do not drink any liquids not listed     · Do not drink Alcohol     · Nothing by mouth after midnight                         The day of your procedure: Date: July 11th:     When you wake nothing by mouth except water to take any of the approved medications listed above.      Patient stated understanding of Care Advice and cites no additional concerns at this time.       Reason for Disposition   Question about upcoming scheduled test, no triage required and triager able to answer question    Additional Information   Negative: [1] Caller is not with the adult (patient) AND [2] reporting urgent symptoms   Negative: Lab result questions   Negative: Medication questions   Negative: Caller can't be reached by phone   Negative: Caller has already spoken to PCP or another triager   Negative: RN needs further essential information from caller in order to complete triage   Negative: Requesting regular office appointment   Negative: [1] Caller requesting NON-URGENT health information AND [2] PCP's office is the best resource   Negative: Health Information question, no triage required and triager able to answer question   Negative: General information question, no triage required and triager able to answer question    Protocols used: INFORMATION ONLY CALL - NO TRIAGE-A-

## 2022-07-11 NOTE — PLAN OF CARE
Dr Peña came to bedside and discussed findings. NO N/V,  no abdominal pain, no GI bleeding, and vitals stable.  Pt discharged from unit.

## 2022-07-11 NOTE — [employer]ATION PATIENT INSTRUCTIONS
Discharge Summary/Instructions after an Endoscopic Procedure  Patient Name: Indira Walker  Patient MRN: [id number]  Patient YOB: 1945 Monday, July 11, 2022 Ange Saldivar MD  Dear patient,  As a result of recent federal legislation (The Federal Cures Act), you may   receive lab or pathology results from your procedure in your [facility]   account before your physician is able to contact you. Your physician or   their representative will relay the results to you with their   recommendations at their soonest availability.  Thank you,  RESTRICTIONS:  During your procedure today, you received medications for sedation.  These   medications may affect your judgment, balance and coordination.  Therefore,   for 24 hours, you have the following restrictions:   - DO NOT drive a car, operate machinery, make legal/financial decisions,   sign important papers or drink alcohol.    ACTIVITY:  Today: no heavy lifting, straining or running due to procedural   sedation/anesthesia.  The following day: return to full activity including work.  DIET:  Eat and drink normally unless instructed otherwise.     TREATMENT FOR COMMON SIDE EFFECTS:  - Mild abdominal pain, nausea, belching, bloating or excessive gas:  rest,   eat lightly and use a heating pad.  - Sore Throat: treat with throat lozenges and/or gargle with warm salt   water.  - Because air was used during the procedure, expelling large amounts of air   from your rectum or belching is normal.  - If a bowel prep was taken, you may not have a bowel movement for 1-3 days.    This is normal.  SYMPTOMS TO WATCH FOR AND REPORT TO YOUR PHYSICIAN:  1. Abdominal pain or bloating, other than gas cramps.  2. Chest pain.  3. Back pain.  4. Signs of infection such as: chills or fever occurring within 24 hours   after the procedure.  5. Rectal bleeding, which would show as bright red, maroon, or black stools.   (A tablespoon of blood from the rectum is not serious, especially  if   hemorrhoids are present.)  6. Vomiting.  7. Weakness or dizziness.  GO DIRECTLY TO THE NEAREST EMERGENCY ROOM IF YOU HAVE ANY OF THE FOLLOWING:      Difficulty breathing              Chills and/or fever over 101 F   Persistent vomiting and/or vomiting blood   Severe abdominal pain   Severe chest pain   Black, tarry stools   Bleeding- more than one tablespoon   Any other symptom or condition that you feel may need urgent attention  Your doctor recommends these additional instructions:  If any biopsies were taken, your doctors clinic will contact you in 1 to 2   weeks with any results.  - Discharge patient to home.   - Resume previous diet.   - Continue present medications.   - Await pathology results.   - Return to my office in 3 months.  For questions, problems or results please call your physician Ange Saldivar MD at Work:  [phone]  If you have any questions about the above instructions, call the GI   department at [phone] or call the endoscopy unit at [phone]   from 7am until 3 pm.  [facility], EMERGENCY ROOM PHONE NUMBER:   [phone]  IF A COMPLICATION OR EMERGENCY SITUATION ARISES AND YOU ARE UNABLE TO REACH   YOUR PHYSICIAN - GO DIRECTLY TO THE EMERGENCY ROOM.  I have read or have had read to me these discharge instructions for my   procedure and have received a written copy.  I understand these   instructions and will follow-up with my physician if I have any questions.     __________________________________       _____________________________________  Nurse Signature                                          Patient/Designated   Responsible Party Signature  MD Ange Reis MD  7/11/2022 12:42:28 PM  This report has been verified and signed electronically.  Dear patient,  As a result of recent federal legislation (The Federal Cures Act), you may   receive lab or pathology results from your procedure in  your [facility]   account before your physician is able to contact you. Your physician or   their representative will relay the results to you with their   recommendations at their soonest availability.  Thank you,  PROVATION

## 2022-07-11 NOTE — ANESTHESIA PREPROCEDURE EVALUATION
07/11/2022  Indira Walker is a 76 y.o., female.      - Patient reports that her weight is now maintained, but she did lose 40 lbs over the past year. Also noted that she had two children pass away during this time as well  - CT scan in 2019 showed a 9 mm pancreatic emily/body lesion and it was not followed up  - Patient does not think she can tolerate an MRI     Pre-op Assessment    I have reviewed the Patient Summary Reports.    I have reviewed the Nursing Notes.    I have reviewed the Medications.     Review of Systems  Anesthesia Hx:  No problems with previous Anesthesia    Social:  Non-Smoker, No Alcohol Use    Cardiovascular:   Hypertension    Hepatic/GI:   Incontinence of feces   Musculoskeletal:   Arthritis  Sciatica without back pain, left  Foot drop, left   Endocrine:   Diabetes, type 2        Physical Exam  General:  Well nourished and Obesity      Airway/Jaw/Neck:  Airway Findings: Mouth Opening: Normal   Tongue: Normal   General Airway Assessment: Adult Mallampati: III  Improves to II with phonation.  TM Distance: Normal, at least 6 cm   Jaw/Neck Findings:  Neck ROM: Normal ROM      Eyes/Ears/Nose:  Eyes/Ears/Nose Findings:    Dental:  Dental Findings: In tact          Mental Status:  Mental Status Findings:  Cooperative, Alert and Oriented         Anesthesia Plan  Type of Anesthesia, risks & benefits discussed:  Anesthesia Type:  general, MAC    Patient's Preference:   Plan Factors:          Intra-op Monitoring Plan: standard ASA monitors  Intra-op Monitoring Plan Comments:   Post Op Pain Control Plan:   Post Op Pain Control Plan Comments:     Induction:   IV  Beta Blocker:  Patient is not currently on a Beta-Blocker (No further documentation required).       Informed Consent: Informed consent signed with the Patient and all parties understand the risks and agree with anesthesia plan.  All  questions answered.  Anesthesia consent signed with patient.  ASA Score: 2     Day of Surgery Review of History & Physical:        Anesthesia Plan Notes: The patient has been educated and understands the risk for dental injury  Discussed risk of obstructive sleep apnea with patient.  Advised to follow up with primary care physician.              Ready For Surgery From Anesthesia Perspective.           Physical Exam  General: Well nourished and Obesity    Airway:  Mallampati: III / II  Mouth Opening: Normal  TM Distance: Normal, at least 6 cm  Tongue: Normal  Neck ROM: Normal ROM    Dental:  In tact          Anesthesia Plan  Type of Anesthesia, risks & benefits discussed:    Anesthesia Type: general, MAC  Intra-op Monitoring Plan: standard ASA monitors  Induction:  IV  Informed Consent: Informed consent signed with the Patient and all parties understand the risks and agree with anesthesia plan.  All questions answered.   ASA Score: 2  Anesthesia Plan Notes: The patient has been educated and understands the risk for dental injury  Discussed risk of obstructive sleep apnea with patient.  Advised to follow up with primary care physician.          Ready For Surgery From Anesthesia Perspective.       .

## 2022-07-12 LAB — POCT GLUCOSE: 165 MG/DL (ref 70–110)

## 2022-07-13 ENCOUNTER — TELEPHONE (OUTPATIENT)
Dept: GASTROENTEROLOGY | Facility: CLINIC | Age: 77
End: 2022-07-13
Payer: MEDICARE

## 2022-07-13 LAB
BDY SITE: NORMAL
CEA FLD-MCNC: 32 NG/ML

## 2022-07-13 NOTE — TELEPHONE ENCOUNTER
----- Message from Nila Vallejo PA-C sent at 7/11/2022 12:56 PM CDT -----    ----- Message -----  From: Ange Saldivar MD  Sent: 7/11/2022  12:53 PM CDT  To: Nila Vallejo PA-C    Can you schedule her for follow up with you in 3 months for pancreatic cyst on a Thursday with me.    -S

## 2022-07-13 NOTE — TELEPHONE ENCOUNTER
Patient scheduled for a 3 month follow up on 10/13/22 at the [place] location. Patient aware of appointment date and time.

## 2022-07-19 ENCOUNTER — PATIENT MESSAGE (OUTPATIENT)
Dept: GASTROENTEROLOGY | Facility: CLINIC | Age: 77
End: 2022-07-19
Payer: MEDICARE

## 2022-07-20 ENCOUNTER — LAB VISIT (OUTPATIENT)
Dept: LAB | Facility: HOSPITAL | Age: 77
End: 2022-07-20
Attending: INTERNAL MEDICINE
Payer: MEDICARE

## 2022-07-20 ENCOUNTER — OFFICE VISIT (OUTPATIENT)
Dept: RHEUMATOLOGY | Facility: CLINIC | Age: 77
End: 2022-07-20
Payer: MEDICARE

## 2022-07-20 VITALS
SYSTOLIC BLOOD PRESSURE: 117 MMHG | HEART RATE: 74 BPM | DIASTOLIC BLOOD PRESSURE: 62 MMHG | HEIGHT: 63 IN | WEIGHT: 122.56 LBS | BODY MASS INDEX: 21.71 KG/M2

## 2022-07-20 DIAGNOSIS — Z79.899 HIGH RISK MEDICATION USE: ICD-10-CM

## 2022-07-20 DIAGNOSIS — Z71.89 COUNSELING ON HEALTH PROMOTION AND DISEASE PREVENTION: ICD-10-CM

## 2022-07-20 DIAGNOSIS — L98.499 SUPERFICIAL ULCERATIVE LESION: ICD-10-CM

## 2022-07-20 DIAGNOSIS — M05.9 SEROPOSITIVE RHEUMATOID ARTHRITIS: ICD-10-CM

## 2022-07-20 DIAGNOSIS — M05.9 SEROPOSITIVE RHEUMATOID ARTHRITIS: Primary | ICD-10-CM

## 2022-07-20 LAB
ALBUMIN SERPL BCP-MCNC: 3.8 G/DL (ref 3.5–5.2)
ALP SERPL-CCNC: 66 U/L (ref 55–135)
ALT SERPL W/O P-5'-P-CCNC: 21 U/L (ref 10–44)
ANION GAP SERPL CALC-SCNC: 10 MMOL/L (ref 8–16)
AST SERPL-CCNC: 19 U/L (ref 10–40)
BASOPHILS # BLD AUTO: 0.06 K/UL (ref 0–0.2)
BASOPHILS NFR BLD: 1.1 % (ref 0–1.9)
BILIRUB SERPL-MCNC: 0.8 MG/DL (ref 0.1–1)
BUN SERPL-MCNC: 13 MG/DL (ref 8–23)
C3 SERPL-MCNC: 144 MG/DL (ref 50–180)
C4 SERPL-MCNC: 36 MG/DL (ref 11–44)
CALCIUM SERPL-MCNC: 10.7 MG/DL (ref 8.7–10.5)
CHLORIDE SERPL-SCNC: 100 MMOL/L (ref 95–110)
CO2 SERPL-SCNC: 28 MMOL/L (ref 23–29)
CREAT SERPL-MCNC: 1.1 MG/DL (ref 0.5–1.4)
CREAT UR-MCNC: 92 MG/DL (ref 15–325)
CRP SERPL-MCNC: 6.4 MG/L (ref 0–8.2)
DIFFERENTIAL METHOD: ABNORMAL
EOSINOPHIL # BLD AUTO: 0.2 K/UL (ref 0–0.5)
EOSINOPHIL NFR BLD: 4 % (ref 0–8)
ERYTHROCYTE [DISTWIDTH] IN BLOOD BY AUTOMATED COUNT: 15.4 % (ref 11.5–14.5)
ERYTHROCYTE [SEDIMENTATION RATE] IN BLOOD BY WESTERGREN METHOD: 28 MM/HR (ref 0–20)
EST. GFR  (AFRICAN AMERICAN): 56 ML/MIN/1.73 M^2
EST. GFR  (NON AFRICAN AMERICAN): 48 ML/MIN/1.73 M^2
GLUCOSE SERPL-MCNC: 154 MG/DL (ref 70–110)
HCT VFR BLD AUTO: 31.2 % (ref 37–48.5)
HGB BLD-MCNC: 10.8 G/DL (ref 12–16)
IMM GRANULOCYTES # BLD AUTO: 0.04 K/UL (ref 0–0.04)
IMM GRANULOCYTES NFR BLD AUTO: 0.7 % (ref 0–0.5)
LYMPHOCYTES # BLD AUTO: 0.8 K/UL (ref 1–4.8)
LYMPHOCYTES NFR BLD: 13.7 % (ref 18–48)
MCH RBC QN AUTO: 32 PG (ref 27–31)
MCHC RBC AUTO-ENTMCNC: 34.6 G/DL (ref 32–36)
MCV RBC AUTO: 93 FL (ref 82–98)
MONOCYTES # BLD AUTO: 0.5 K/UL (ref 0.3–1)
MONOCYTES NFR BLD: 9 % (ref 4–15)
NEUTROPHILS # BLD AUTO: 4.1 K/UL (ref 1.8–7.7)
NEUTROPHILS NFR BLD: 71.5 % (ref 38–73)
NRBC BLD-RTO: 0 /100 WBC
PLATELET # BLD AUTO: 413 K/UL (ref 150–450)
PMV BLD AUTO: 9.2 FL (ref 9.2–12.9)
POTASSIUM SERPL-SCNC: 4.1 MMOL/L (ref 3.5–5.1)
PROT SERPL-MCNC: 7.2 G/DL (ref 6–8.4)
PROT UR-MCNC: 98 MG/DL (ref 0–15)
PROT/CREAT UR: 1.07 MG/G{CREAT} (ref 0–0.2)
RBC # BLD AUTO: 3.37 M/UL (ref 4–5.4)
SODIUM SERPL-SCNC: 138 MMOL/L (ref 136–145)
WBC # BLD AUTO: 5.69 K/UL (ref 3.9–12.7)

## 2022-07-20 PROCEDURE — 1159F PR MEDICATION LIST DOCUMENTED IN MEDICAL RECORD: ICD-10-PCS | Mod: CPTII,S$GLB,, | Performed by: INTERNAL MEDICINE

## 2022-07-20 PROCEDURE — 1125F PR PAIN SEVERITY QUANTIFIED, PAIN PRESENT: ICD-10-PCS | Mod: CPTII,S$GLB,, | Performed by: INTERNAL MEDICINE

## 2022-07-20 PROCEDURE — 80053 COMPREHEN METABOLIC PANEL: CPT | Performed by: INTERNAL MEDICINE

## 2022-07-20 PROCEDURE — 1159F MED LIST DOCD IN RCRD: CPT | Mod: CPTII,S$GLB,, | Performed by: INTERNAL MEDICINE

## 2022-07-20 PROCEDURE — 83516 IMMUNOASSAY NONANTIBODY: CPT | Mod: 59 | Performed by: INTERNAL MEDICINE

## 2022-07-20 PROCEDURE — 3074F PR MOST RECENT SYSTOLIC BLOOD PRESSURE < 130 MM HG: ICD-10-PCS | Mod: CPTII,S$GLB,, | Performed by: INTERNAL MEDICINE

## 2022-07-20 PROCEDURE — 87340 HEPATITIS B SURFACE AG IA: CPT | Performed by: INTERNAL MEDICINE

## 2022-07-20 PROCEDURE — 3074F SYST BP LT 130 MM HG: CPT | Mod: CPTII,S$GLB,, | Performed by: INTERNAL MEDICINE

## 2022-07-20 PROCEDURE — 36415 COLL VENOUS BLD VENIPUNCTURE: CPT | Performed by: INTERNAL MEDICINE

## 2022-07-20 PROCEDURE — 1125F AMNT PAIN NOTED PAIN PRSNT: CPT | Mod: CPTII,S$GLB,, | Performed by: INTERNAL MEDICINE

## 2022-07-20 PROCEDURE — 86704 HEP B CORE ANTIBODY TOTAL: CPT | Performed by: INTERNAL MEDICINE

## 2022-07-20 PROCEDURE — 3078F DIAST BP <80 MM HG: CPT | Mod: CPTII,S$GLB,, | Performed by: INTERNAL MEDICINE

## 2022-07-20 PROCEDURE — 86160 COMPLEMENT ANTIGEN: CPT | Mod: 59 | Performed by: INTERNAL MEDICINE

## 2022-07-20 PROCEDURE — 86038 ANTINUCLEAR ANTIBODIES: CPT | Performed by: INTERNAL MEDICINE

## 2022-07-20 PROCEDURE — 99499 RISK ADDL DX/OHS AUDIT: ICD-10-PCS | Mod: S$GLB,,, | Performed by: INTERNAL MEDICINE

## 2022-07-20 PROCEDURE — 85651 RBC SED RATE NONAUTOMATED: CPT | Performed by: INTERNAL MEDICINE

## 2022-07-20 PROCEDURE — 99499 UNLISTED E&M SERVICE: CPT | Mod: S$GLB,,, | Performed by: INTERNAL MEDICINE

## 2022-07-20 PROCEDURE — 85025 COMPLETE CBC W/AUTO DIFF WBC: CPT | Performed by: INTERNAL MEDICINE

## 2022-07-20 PROCEDURE — 99215 PR OFFICE/OUTPT VISIT, EST, LEVL V, 40-54 MIN: ICD-10-PCS | Mod: S$GLB,,, | Performed by: INTERNAL MEDICINE

## 2022-07-20 PROCEDURE — 99999 PR PBB SHADOW E&M-EST. PATIENT-LVL IV: ICD-10-PCS | Mod: PBBFAC,,, | Performed by: INTERNAL MEDICINE

## 2022-07-20 PROCEDURE — 82570 ASSAY OF URINE CREATININE: CPT | Performed by: INTERNAL MEDICINE

## 2022-07-20 PROCEDURE — 86140 C-REACTIVE PROTEIN: CPT | Performed by: INTERNAL MEDICINE

## 2022-07-20 PROCEDURE — 99999 PR PBB SHADOW E&M-EST. PATIENT-LVL IV: CPT | Mod: PBBFAC,,, | Performed by: INTERNAL MEDICINE

## 2022-07-20 PROCEDURE — 3288F PR FALLS RISK ASSESSMENT DOCUMENTED: ICD-10-PCS | Mod: CPTII,S$GLB,, | Performed by: INTERNAL MEDICINE

## 2022-07-20 PROCEDURE — 3078F PR MOST RECENT DIASTOLIC BLOOD PRESSURE < 80 MM HG: ICD-10-PCS | Mod: CPTII,S$GLB,, | Performed by: INTERNAL MEDICINE

## 2022-07-20 PROCEDURE — 86255 FLUORESCENT ANTIBODY SCREEN: CPT | Performed by: INTERNAL MEDICINE

## 2022-07-20 PROCEDURE — 1101F PT FALLS ASSESS-DOCD LE1/YR: CPT | Mod: CPTII,S$GLB,, | Performed by: INTERNAL MEDICINE

## 2022-07-20 PROCEDURE — 1101F PR PT FALLS ASSESS DOC 0-1 FALLS W/OUT INJ PAST YR: ICD-10-PCS | Mod: CPTII,S$GLB,, | Performed by: INTERNAL MEDICINE

## 2022-07-20 PROCEDURE — 3288F FALL RISK ASSESSMENT DOCD: CPT | Mod: CPTII,S$GLB,, | Performed by: INTERNAL MEDICINE

## 2022-07-20 PROCEDURE — 99215 OFFICE O/P EST HI 40 MIN: CPT | Mod: S$GLB,,, | Performed by: INTERNAL MEDICINE

## 2022-07-20 PROCEDURE — 86160 COMPLEMENT ANTIGEN: CPT | Performed by: INTERNAL MEDICINE

## 2022-07-20 PROCEDURE — 83516 IMMUNOASSAY NONANTIBODY: CPT | Performed by: INTERNAL MEDICINE

## 2022-07-20 PROCEDURE — 82595 ASSAY OF CRYOGLOBULIN: CPT | Performed by: INTERNAL MEDICINE

## 2022-07-20 NOTE — PROGRESS NOTES
RHEUMATOLOGY OUTPATIENT CLINIC NOTE    2022    Attending Rheumatologist: Giovanni Cage  Primary Care Provider/Physician Requesting Consultation: Bertha Kearns MD   Chief Complaint/Reason For Consultation:  Rheumatoid Arthritis and Osteoarthritis      Subjective:     Indira Walker is a 76 y.o. Black or  female with seropositive rheumatoid arthritis for follow-up visit.    Main concern of acute onset ulcerative lesion on left palmar aspect of middle finger.  Denies association with recent trauma or prior episodes.  Currently without significant arthralgias.    Addendum 1/3: message received regarding MTX interaction with PPIu, along with refractory painful mouth ulcers w/o PPI.  Will prescribe Enbrel for seropositive RA.    Review of Systems   Constitutional: Negative for fever and malaise/fatigue.   HENT:        Denies significant xerostomia or xerophthalmia. Denies Raynaud phenomena.   Eyes: Negative for photophobia and pain.   Respiratory: Negative for cough, hemoptysis and shortness of breath.         Denies history of smoking   Cardiovascular: Negative for palpitations.   Gastrointestinal: Negative for blood in stool and melena.   Genitourinary: Negative for hematuria.   Musculoskeletal: Negative for joint pain.   Skin: Negative for rash.   Neurological: Negative for tingling and weakness.     Chronic comorbid conditions affecting medical decision making today:  Past Medical History:   Diagnosis Date    Arthritis     Diabetes mellitus, type 2     Foot drop, left 2018    GERD (gastroesophageal reflux disease)     Heartburn     Hyperlipidemia     Hypertension     Left sided sciatica     s/p microdiscectomy 3/21/18     Past Surgical History:   Procedure Laterality Date    BREAST BIOPSY       SECTION      COLONOSCOPY  2008    DR. JANET GARCÍA/MILD DIVERICULOSIS DESCENDING AND SIGMOID. REPEAT 5 YRS    ENDOSCOPIC ULTRASOUND OF UPPER GASTROINTESTINAL TRACT N/A  7/11/2022    Procedure: ULTRASOUND, UPPER GI TRACT, ENDOSCOPIC;  Surgeon: Ange Saldivar MD;  Location: [facility];  Service: Endoscopy;  Laterality: N/A;  Upper and Linear, 22 sharkcore, slides and formalin    HERNIA REPAIR      MICRODISCECTOMY OF SPINE Left 03/21/2018    left L5-S1, Dr Segundo     Family History   Problem Relation Age of Onset    Diabetes Mother     Diabetes Father      Social History     Substance and Sexual Activity   Alcohol Use No     Social History     Tobacco Use   Smoking Status Never Smoker   Smokeless Tobacco Never Used     Social History     Substance and Sexual Activity   Drug Use No       Current Outpatient Medications:     acetaminophen (TYLENOL) 500 MG tablet, Take 1,000 mg by mouth every 8 (eight) hours as needed., Disp: , Rfl:     albuterol (PROVENTIL/VENTOLIN HFA) 90 mcg/actuation inhaler, Inhale 2 puffs into the lungs every 6 (six) hours as needed for Shortness of Breath (or coughing)., Disp: 18 g, Rfl: 3    ascorbic acid, vitamin C, (VITAMIN C) 1000 MG tablet, Take 1,000 mg by mouth once daily., Disp: , Rfl:     aspirin (ECOTRIN) 81 MG EC tablet, Take 1 tablet (81 mg total) by mouth once daily. Every other day, Disp: , Rfl: 0    b complex vitamins capsule, Take 1 capsule by mouth once daily., Disp: , Rfl:     beta-carotene,A,-vits C,E/mins (VISION ORAL), Take by mouth., Disp: , Rfl:     blood sugar diagnostic Strp, To check BG 2 times daily, to use with insurance preferred meter, Disp: 200 each, Rfl: 3    blood-glucose meter kit, To check BG 2 times daily, to use with insurance preferred meter, Disp: 1 each, Rfl: 0    chlorpheniramine-acetaminophen 2-325 mg Tab, Take 2 tablets by mouth every 4 to 6 hours as needed. Coricidin HBP, Disp: , Rfl:     cholecalciferol, vitamin D3, 2,000 unit Cap, Take 2,000 Units by mouth once daily. , Disp: , Rfl:     cinnamon bark, bulk, Powd, by Misc.(Non-Drug; Combo Route) route., Disp: , Rfl:     famotidine (PEPCID) 40 MG tablet,  Take 1 tablet (40 mg total) by mouth once daily., Disp: 90 tablet, Rfl: 3    folic acid (FOLVITE) 1 MG tablet, Take 1 tablet (1 mg total) by mouth once daily., Disp: 90 tablet, Rfl: 3    garlic Tab, Take by mouth., Disp: , Rfl:     ruby root, bulk, Powd, by Misc.(Non-Drug; Combo Route) route., Disp: , Rfl:     lancets Mis, To check BG 2 times daily, to use with insurance preferred meter, Disp: 200 each, Rfl: 4    loratadine (CLARITIN) 10 mg tablet, Take 10 mg by mouth once daily., Disp: , Rfl:     losartan-hydrochlorothiazide 100-12.5 mg (HYZAAR) 100-12.5 mg Tab, TAKE 1 TABLET BY MOUTH EVERY DAY, Disp: 90 tablet, Rfl: 1    metFORMIN (GLUCOPHAGE) 1000 MG tablet, TAKE 1 TABLET BY MOUTH TWICE A DAY WITH MEALS, Disp: 180 tablet, Rfl: 1    methotrexate 2.5 MG Tab, TAKE 10 TABLETS (5 TABLET IN THE MORNING, 5 TABLETS IN THE EVENING) BY MOUTH EVERY 7 DAYS., Disp: 120 tablet, Rfl: 1    montelukast (SINGULAIR) 10 mg tablet, Take 1 tablet (10 mg total) by mouth every evening., Disp: 30 tablet, Rfl: 11    MULTIVIT-MINERALS/FERROUS FUM (MULTI VITAMIN ORAL), Take by mouth. For women over 50 (centrum), Disp: , Rfl:     rosuvastatin (CRESTOR) 20 MG tablet, TAKE 1 TABLET BY MOUTH EVERY DAY, Disp: 90 tablet, Rfl: 1    diclofenac sodium (VOLTAREN) 1 % Gel, Apply 2 g topically 4 (four) times daily., Disp: 1 Tube, Rfl: 2    gabapentin (NEURONTIN) 100 MG capsule, Take 100 mg by mouth 2 (two) times daily. Taking up to BID prn, Disp: , Rfl:     gabapentin (NEURONTIN) 300 MG capsule, Take 300 mg by mouth 2 (two) times daily., Disp: , Rfl:      Objective:     Vitals:    07/20/22 1207   BP: 117/62   Pulse: 74     Physical Exam   Eyes: Conjunctivae are normal.   Pulmonary/Chest: Effort normal. No respiratory distress.   Musculoskeletal:         General: No swelling or tenderness. Normal range of motion.      Comments: Interosseous atrophy giving appearance of prominent MCP joints   Skin: No rash noted.   Ulcerative lesion 3rd finger left  hand.         Reviewed available old and all outside pertinent medical records available.    All lab results personally reviewed and interpreted by me.  Lab Results   Component Value Date    WBC 5.86 12/30/2021    WBC 5.86 12/30/2021    WBC 5.86 12/30/2021    HGB 11.1 (L) 12/30/2021    HGB 11.1 (L) 12/30/2021    HGB 11.1 (L) 12/30/2021    HCT 34.6 (L) 12/30/2021    HCT 34.6 (L) 12/30/2021    HCT 34.6 (L) 12/30/2021    MCV 94 12/30/2021    MCV 94 12/30/2021    MCV 94 12/30/2021    RDW 14.8 (H) 12/30/2021    RDW 14.8 (H) 12/30/2021    RDW 14.8 (H) 12/30/2021     12/30/2021     12/30/2021     12/30/2021       Lab Results   Component Value Date     (L) 01/27/2022    K 4.2 01/27/2022     01/27/2022    CO2 28 01/27/2022     01/27/2022    BUN 16 01/27/2022    CALCIUM 10.1 01/27/2022    PROT 7.2 01/27/2022    ALBUMIN 3.6 01/27/2022    BILITOT 0.8 01/27/2022    AST 28 01/27/2022    ALKPHOS 48 (L) 01/27/2022    ALT 27 01/27/2022       Lab Results   Component Value Date    COLORU Yellow 07/01/2019    APPEARANCEUA Clear 07/01/2019    SPECGRAV 1.020 07/01/2019    PHUR 7.0 07/01/2019    PROTEINUA Negative 07/01/2019    KETONESU Negative 07/01/2019    LEUKOCYTESUR Trace (A) 07/01/2019    NITRITE Negative 07/01/2019    UROBILINOGEN 0.2 03/08/2018       No results found for: PTH    No results found for: URICACID    Lab Results   Component Value Date    CRP 2.6 12/30/2021       No results found for: ANATITER    No components found for: TSPOTTB,  QUANTIFERON     ASSESSMENT:     Seropositive rheumatoid arthritis for follow-up visit.  Currently without significant arthralgias, good adherence with methotrexate 25 mg split dose once per week with daily folic acid.  Main concern of ulcerative skin lesion acute onset on left hand.  Denies past episodes or association with injury.  Does not have Raynaud phenomena.  Refers intermittent oral ulcers in the past.  Will send blood work to determine features  suggestive of vasculitis.  Recommend holding methotrexate for 2 weeks and apply Neosporin topical for the time being.  If no improvement recommend follow-up with primary care provider to consider oral antibiotic therapy.  Recommend dermatology evaluation to determine etiology of lesions (bullosis diabeticorum vs vasculitis vs PG vs other) considering skin biopsy for differential diagnosis.      PLAN:     1. Seropositive rheumatoid arthritis    2. Ulcerative skin lesion    3. High risk medication use    4. Other specified counseling      Disclaimer: This note is prepared using voice recognition software and as such is likely to have errors and has not been proof read. Please contact me for questions.       Giovanni Cage M.D.

## 2022-07-21 LAB — ANA SER QL IF: NORMAL

## 2022-07-22 LAB
HBV CORE AB SERPL QL IA: NEGATIVE
HBV SURFACE AG SERPL QL IA: NEGATIVE
PROTEINASE3 IGG SER-ACNC: <0.2 U

## 2022-07-25 LAB
ANCA AB TITR SER IF: NORMAL TITER
MYELOPEROXIDASE AB SER-ACNC: 5 UNITS
P-ANCA TITR SER IF: NORMAL TITER

## 2022-07-28 ENCOUNTER — OFFICE VISIT (OUTPATIENT)
Dept: DERMATOLOGY | Facility: CLINIC | Age: 77
End: 2022-07-28
Payer: MEDICARE

## 2022-07-28 DIAGNOSIS — L98.499 ULCER OF FINGER, UNSPECIFIED ULCER STAGE: Primary | ICD-10-CM

## 2022-07-28 DIAGNOSIS — L98.499 SUPERFICIAL ULCERATIVE LESION: ICD-10-CM

## 2022-07-28 DIAGNOSIS — M05.9 SEROPOSITIVE RHEUMATOID ARTHRITIS: ICD-10-CM

## 2022-07-28 PROCEDURE — 1126F PR PAIN SEVERITY QUANTIFIED, NO PAIN PRESENT: ICD-10-PCS | Mod: CPTII,S$GLB,, | Performed by: DERMATOLOGY

## 2022-07-28 PROCEDURE — 3288F FALL RISK ASSESSMENT DOCD: CPT | Mod: CPTII,S$GLB,, | Performed by: DERMATOLOGY

## 2022-07-28 PROCEDURE — 99202 OFFICE O/P NEW SF 15 MIN: CPT | Mod: S$GLB,,, | Performed by: DERMATOLOGY

## 2022-07-28 PROCEDURE — 1101F PR PT FALLS ASSESS DOC 0-1 FALLS W/OUT INJ PAST YR: ICD-10-PCS | Mod: CPTII,S$GLB,, | Performed by: DERMATOLOGY

## 2022-07-28 PROCEDURE — 1160F PR REVIEW ALL MEDS BY PRESCRIBER/CLIN PHARMACIST DOCUMENTED: ICD-10-PCS | Mod: CPTII,S$GLB,, | Performed by: DERMATOLOGY

## 2022-07-28 PROCEDURE — 1159F MED LIST DOCD IN RCRD: CPT | Mod: CPTII,S$GLB,, | Performed by: DERMATOLOGY

## 2022-07-28 PROCEDURE — 1159F PR MEDICATION LIST DOCUMENTED IN MEDICAL RECORD: ICD-10-PCS | Mod: CPTII,S$GLB,, | Performed by: DERMATOLOGY

## 2022-07-28 PROCEDURE — 1126F AMNT PAIN NOTED NONE PRSNT: CPT | Mod: CPTII,S$GLB,, | Performed by: DERMATOLOGY

## 2022-07-28 PROCEDURE — 1101F PT FALLS ASSESS-DOCD LE1/YR: CPT | Mod: CPTII,S$GLB,, | Performed by: DERMATOLOGY

## 2022-07-28 PROCEDURE — 99999 PR PBB SHADOW E&M-EST. PATIENT-LVL IV: CPT | Mod: PBBFAC,,, | Performed by: DERMATOLOGY

## 2022-07-28 PROCEDURE — 99202 PR OFFICE/OUTPT VISIT, NEW, LEVL II, 15-29 MIN: ICD-10-PCS | Mod: S$GLB,,, | Performed by: DERMATOLOGY

## 2022-07-28 PROCEDURE — 1160F RVW MEDS BY RX/DR IN RCRD: CPT | Mod: CPTII,S$GLB,, | Performed by: DERMATOLOGY

## 2022-07-28 PROCEDURE — 99999 PR PBB SHADOW E&M-EST. PATIENT-LVL IV: ICD-10-PCS | Mod: PBBFAC,,, | Performed by: DERMATOLOGY

## 2022-07-28 PROCEDURE — 3288F PR FALLS RISK ASSESSMENT DOCUMENTED: ICD-10-PCS | Mod: CPTII,S$GLB,, | Performed by: DERMATOLOGY

## 2022-07-28 NOTE — PROGRESS NOTES
Subjective:       Patient ID:  Indira Walker is a 77 y.o. female who presents for   History of Present Illness: The patient presents with chief complaint of ulcer  Location: L 3rd ulcer  Duration: 2 weeks  Signs/Symptoms: pain at first, now healing; denies any preceding trauma, blister, no drainage    Prior treatments: neosporin and a bandage daily      Referred from rheum for acute onset ulcer of the L 3rd finger.  Reports it is healing. Not painful.  No ulcers elsewhere.    Has seropositive RA and is treated with MTX, currently holding this medication.  Also has h/o DM, HTN, HLD.            Review of Systems   Constitutional: Negative for fever.   Musculoskeletal: Positive for arthralgias.   Skin: Negative for itching and rash.        Objective:    Physical Exam   Constitutional: She appears well-developed and well-nourished. No distress.   Neurological: She is alert and oriented to person, place, and time. She is not disoriented.   Psychiatric: She has a normal mood and affect.   Skin:   Areas Examined (abnormalities noted in diagram):   Head / Face Inspection Performed  Neck Inspection Performed  Chest / Axilla Inspection Performed  Abdomen Inspection Performed  Genitals / Buttocks / Groin Inspection Performed  Back Inspection Performed  RUE Inspected  LUE Inspection Performed  RLE Inspected  LLE Inspection Performed  Nails and Digits Inspection Performed             Diagram Legend     Erythematous scaling macule/papule c/w actinic keratosis       Vascular papule c/w angioma      Pigmented verrucoid papule/plaque c/w seborrheic keratosis      Yellow umbilicated papule c/w sebaceous hyperplasia      Irregularly shaped tan macule c/w lentigo     1-2 mm smooth white papules consistent with Milia      Movable subcutaneous cyst with punctum c/w epidermal inclusion cyst      Subcutaneous movable cyst c/w pilar cyst      Firm pink to brown papule c/w dermatofibroma      Pedunculated fleshy papule(s) c/w skin  tag(s)      Evenly pigmented macule c/w junctional nevus     Mildly variegated pigmented, slightly irregular-bordered macule c/w mildly atypical nevus      Flesh colored to evenly pigmented papule c/w intradermal nevus       Pink pearly papule/plaque c/w basal cell carcinoma      Erythematous hyperkeratotic cursted plaque c/w SCC      Surgical scar with no sign of skin cancer recurrence      Open and closed comedones      Inflammatory papules and pustules      Verrucoid papule consistent consistent with wart     Erythematous eczematous patches and plaques     Dystrophic onycholytic nail with subungual debris c/w onychomycosis     Umbilicated papule    Erythematous-base heme-crusted tan verrucoid plaque consistent with inflamed seborrheic keratosis     Erythematous Silvery Scaling Plaque c/w Psoriasis     See annotation          Assessment / Plan:        Ulcer of finger  - based on previous photo and appearance of ulcer today, appears to be healing well, and does not appear infectious, therefore will continue local wound care (apply vaseline ointment BID and keep clean and covered, washing once daily with gentle cleanser) and avoid biopsy at this time.  If ulcer worsens or does not heal, or if she develops additional lesions, will plan to biopsy. Measurements and photo taken.  - ddx: healing bullosis diabeticorum (it does appear non inflammatory) vs vasculitis/vasculopathy (but lab w/u negative) vs PG (but appears to be healing, no rolled borders) vs infectious (but no signs/symptoms of infection) vs traumatic (no trauma identified) vs other           RTC 2 weeks

## 2022-08-01 ENCOUNTER — LAB VISIT (OUTPATIENT)
Dept: LAB | Facility: HOSPITAL | Age: 77
End: 2022-08-01
Attending: FAMILY MEDICINE
Payer: MEDICARE

## 2022-08-01 ENCOUNTER — TELEPHONE (OUTPATIENT)
Dept: PEDIATRICS | Facility: CLINIC | Age: 77
End: 2022-08-01
Payer: MEDICARE

## 2022-08-01 DIAGNOSIS — E11.65 UNCONTROLLED TYPE 2 DIABETES MELLITUS WITH HYPERGLYCEMIA: ICD-10-CM

## 2022-08-01 DIAGNOSIS — E11.69 HYPERLIPIDEMIA ASSOCIATED WITH TYPE 2 DIABETES MELLITUS: ICD-10-CM

## 2022-08-01 DIAGNOSIS — E78.5 HYPERLIPIDEMIA ASSOCIATED WITH TYPE 2 DIABETES MELLITUS: ICD-10-CM

## 2022-08-01 DIAGNOSIS — I15.2 HYPERTENSION ASSOCIATED WITH DIABETES: ICD-10-CM

## 2022-08-01 DIAGNOSIS — E11.59 HYPERTENSION ASSOCIATED WITH DIABETES: ICD-10-CM

## 2022-08-01 LAB
ALBUMIN SERPL BCP-MCNC: 3.1 G/DL (ref 3.5–5.2)
ALP SERPL-CCNC: 62 U/L (ref 55–135)
ALT SERPL W/O P-5'-P-CCNC: 25 U/L (ref 10–44)
ANION GAP SERPL CALC-SCNC: 11 MMOL/L (ref 8–16)
AST SERPL-CCNC: 27 U/L (ref 10–40)
BILIRUB SERPL-MCNC: 0.5 MG/DL (ref 0.1–1)
BUN SERPL-MCNC: 21 MG/DL (ref 8–23)
CALCIUM SERPL-MCNC: 12.4 MG/DL (ref 8.7–10.5)
CHLORIDE SERPL-SCNC: 99 MMOL/L (ref 95–110)
CHOLEST SERPL-MCNC: 114 MG/DL (ref 120–199)
CHOLEST/HDLC SERPL: 2.2 {RATIO} (ref 2–5)
CO2 SERPL-SCNC: 27 MMOL/L (ref 23–29)
CREAT SERPL-MCNC: 1.6 MG/DL (ref 0.5–1.4)
CRYOGLOB SER QL: NORMAL
EST. GFR  (NO RACE VARIABLE): 32.6 ML/MIN/1.73 M^2
ESTIMATED AVG GLUCOSE: 194 MG/DL (ref 68–131)
GLUCOSE SERPL-MCNC: 121 MG/DL (ref 70–110)
HBA1C MFR BLD: 8.4 % (ref 4–5.6)
HDLC SERPL-MCNC: 52 MG/DL (ref 40–75)
HDLC SERPL: 45.6 % (ref 20–50)
LDLC SERPL CALC-MCNC: 46.2 MG/DL (ref 63–159)
NONHDLC SERPL-MCNC: 62 MG/DL
POTASSIUM SERPL-SCNC: 3.9 MMOL/L (ref 3.5–5.1)
PROT SERPL-MCNC: 7.3 G/DL (ref 6–8.4)
SODIUM SERPL-SCNC: 137 MMOL/L (ref 136–145)
TRIGL SERPL-MCNC: 79 MG/DL (ref 30–150)
TSH SERPL DL<=0.005 MIU/L-ACNC: 1.5 UIU/ML (ref 0.4–4)

## 2022-08-01 PROCEDURE — 83036 HEMOGLOBIN GLYCOSYLATED A1C: CPT | Performed by: FAMILY MEDICINE

## 2022-08-01 PROCEDURE — 85025 COMPLETE CBC W/AUTO DIFF WBC: CPT | Performed by: FAMILY MEDICINE

## 2022-08-01 PROCEDURE — 36415 COLL VENOUS BLD VENIPUNCTURE: CPT | Performed by: FAMILY MEDICINE

## 2022-08-01 PROCEDURE — 84443 ASSAY THYROID STIM HORMONE: CPT | Performed by: FAMILY MEDICINE

## 2022-08-01 PROCEDURE — 80053 COMPREHEN METABOLIC PANEL: CPT | Performed by: FAMILY MEDICINE

## 2022-08-01 PROCEDURE — 80061 LIPID PANEL: CPT | Performed by: FAMILY MEDICINE

## 2022-08-02 ENCOUNTER — HOSPITAL ENCOUNTER (OUTPATIENT)
Facility: HOSPITAL | Age: 77
Discharge: HOME | End: 2022-08-03
Attending: EMERGENCY MEDICINE | Admitting: EMERGENCY MEDICINE
Payer: MEDICARE

## 2022-08-02 ENCOUNTER — TELEPHONE (OUTPATIENT)
Dept: INTERNAL MEDICINE | Facility: CLINIC | Age: 77
End: 2022-08-02
Payer: MEDICARE

## 2022-08-02 DIAGNOSIS — N17.9 AKI (ACUTE KIDNEY INJURY): ICD-10-CM

## 2022-08-02 DIAGNOSIS — E87.5 HYPERKALEMIA: ICD-10-CM

## 2022-08-02 DIAGNOSIS — E83.52 HYPERCALCEMIA: Primary | ICD-10-CM

## 2022-08-02 DIAGNOSIS — R07.9 CHEST PAIN: ICD-10-CM

## 2022-08-02 PROBLEM — M06.9 RA (RHEUMATOID ARTHRITIS): Status: ACTIVE | Noted: 2022-01-11

## 2022-08-02 LAB
ALBUMIN SERPL BCP-MCNC: 3.4 G/DL (ref 3.5–5.2)
ALP SERPL-CCNC: 62 U/L (ref 55–135)
ALT SERPL W/O P-5'-P-CCNC: 25 U/L (ref 10–44)
ANION GAP SERPL CALC-SCNC: 7 MMOL/L (ref 8–16)
ANISOCYTOSIS BLD QL SMEAR: SLIGHT
AST SERPL-CCNC: 25 U/L (ref 10–40)
BACTERIA #/AREA URNS HPF: NORMAL /HPF
BASOPHILS # BLD AUTO: 0.11 K/UL (ref 0–0.2)
BASOPHILS # BLD AUTO: 0.13 K/UL (ref 0–0.2)
BASOPHILS NFR BLD: 0.8 % (ref 0–1.9)
BASOPHILS NFR BLD: 1 % (ref 0–1.9)
BILIRUB SERPL-MCNC: 0.6 MG/DL (ref 0.1–1)
BILIRUB UR QL STRIP: NEGATIVE
BUN SERPL-MCNC: 24 MG/DL (ref 8–23)
BURR CELLS BLD QL SMEAR: ABNORMAL
BURR CELLS BLD QL SMEAR: ABNORMAL
CALCIUM SERPL-MCNC: 12.3 MG/DL (ref 8.7–10.5)
CHLORIDE SERPL-SCNC: 98 MMOL/L (ref 95–110)
CLARITY UR: ABNORMAL
CO2 SERPL-SCNC: 31 MMOL/L (ref 23–29)
COLOR UR: YELLOW
CREAT SERPL-MCNC: 1.7 MG/DL (ref 0.5–1.4)
CTP QC/QA: YES
DIFFERENTIAL METHOD: ABNORMAL
DIFFERENTIAL METHOD: ABNORMAL
EOSINOPHIL # BLD AUTO: 0.3 K/UL (ref 0–0.5)
EOSINOPHIL # BLD AUTO: 0.3 K/UL (ref 0–0.5)
EOSINOPHIL NFR BLD: 2.5 % (ref 0–8)
EOSINOPHIL NFR BLD: 2.6 % (ref 0–8)
ERYTHROCYTE [DISTWIDTH] IN BLOOD BY AUTOMATED COUNT: 15.7 % (ref 11.5–14.5)
ERYTHROCYTE [DISTWIDTH] IN BLOOD BY AUTOMATED COUNT: 15.9 % (ref 11.5–14.5)
EST. GFR  (NO RACE VARIABLE): 30 ML/MIN/1.73 M^2
GLUCOSE SERPL-MCNC: 131 MG/DL (ref 70–110)
GLUCOSE UR QL STRIP: NEGATIVE
HCT VFR BLD AUTO: 30.6 % (ref 37–48.5)
HCT VFR BLD AUTO: 32.8 % (ref 37–48.5)
HGB BLD-MCNC: 10.1 G/DL (ref 12–16)
HGB BLD-MCNC: 10.7 G/DL (ref 12–16)
HGB UR QL STRIP: ABNORMAL
HYALINE CASTS #/AREA URNS LPF: 0 /LPF
IMM GRANULOCYTES # BLD AUTO: 0.12 K/UL (ref 0–0.04)
IMM GRANULOCYTES # BLD AUTO: 0.15 K/UL (ref 0–0.04)
IMM GRANULOCYTES NFR BLD AUTO: 0.9 % (ref 0–0.5)
IMM GRANULOCYTES NFR BLD AUTO: 1.1 % (ref 0–0.5)
KETONES UR QL STRIP: NEGATIVE
LEUKOCYTE ESTERASE UR QL STRIP: ABNORMAL
LYMPHOCYTES # BLD AUTO: 7.4 K/UL (ref 1–4.8)
LYMPHOCYTES # BLD AUTO: 7.7 K/UL (ref 1–4.8)
LYMPHOCYTES NFR BLD: 56.8 % (ref 18–48)
LYMPHOCYTES NFR BLD: 59.1 % (ref 18–48)
MCH RBC QN AUTO: 30.7 PG (ref 27–31)
MCH RBC QN AUTO: 32.5 PG (ref 27–31)
MCHC RBC AUTO-ENTMCNC: 32.6 G/DL (ref 32–36)
MCHC RBC AUTO-ENTMCNC: 33 G/DL (ref 32–36)
MCV RBC AUTO: 94 FL (ref 82–98)
MCV RBC AUTO: 98 FL (ref 82–98)
MICROSCOPIC COMMENT: NORMAL
MONOCYTES # BLD AUTO: 1.5 K/UL (ref 0.3–1)
MONOCYTES # BLD AUTO: 1.6 K/UL (ref 0.3–1)
MONOCYTES NFR BLD: 11.7 % (ref 4–15)
MONOCYTES NFR BLD: 12.1 % (ref 4–15)
NEUTROPHILS # BLD AUTO: 3.2 K/UL (ref 1.8–7.7)
NEUTROPHILS # BLD AUTO: 3.5 K/UL (ref 1.8–7.7)
NEUTROPHILS NFR BLD: 24.4 % (ref 38–73)
NEUTROPHILS NFR BLD: 27 % (ref 38–73)
NITRITE UR QL STRIP: NEGATIVE
NRBC BLD-RTO: 0 /100 WBC
NRBC BLD-RTO: 0 /100 WBC
OVALOCYTES BLD QL SMEAR: ABNORMAL
OVALOCYTES BLD QL SMEAR: ABNORMAL
PH UR STRIP: 7 [PH] (ref 5–8)
PLATELET # BLD AUTO: 524 K/UL (ref 150–450)
PLATELET # BLD AUTO: 537 K/UL (ref 150–450)
PLATELET BLD QL SMEAR: ABNORMAL
PLATELET BLD QL SMEAR: ABNORMAL
PMV BLD AUTO: 11.1 FL (ref 9.2–12.9)
PMV BLD AUTO: 9.6 FL (ref 9.2–12.9)
POCT GLUCOSE: 142 MG/DL (ref 70–110)
POIKILOCYTOSIS BLD QL SMEAR: SLIGHT
POTASSIUM SERPL-SCNC: 3.8 MMOL/L (ref 3.5–5.1)
PROT SERPL-MCNC: 7.8 G/DL (ref 6–8.4)
PROT UR QL STRIP: ABNORMAL
PTH-INTACT SERPL-MCNC: 12.8 PG/ML (ref 9–77)
RBC # BLD AUTO: 3.11 M/UL (ref 4–5.4)
RBC # BLD AUTO: 3.48 M/UL (ref 4–5.4)
RBC #/AREA URNS HPF: 1 /HPF (ref 0–4)
SARS-COV-2 RDRP RESP QL NAA+PROBE: NEGATIVE
SODIUM SERPL-SCNC: 136 MMOL/L (ref 136–145)
SP GR UR STRIP: 1.01 (ref 1–1.03)
SQUAMOUS #/AREA URNS HPF: 5 /HPF
URN SPEC COLLECT METH UR: ABNORMAL
UROBILINOGEN UR STRIP-ACNC: NEGATIVE EU/DL
WBC # BLD AUTO: 13.05 K/UL (ref 3.9–12.7)
WBC # BLD AUTO: 13.07 K/UL (ref 3.9–12.7)
WBC #/AREA URNS HPF: 3 /HPF (ref 0–5)

## 2022-08-02 PROCEDURE — 93010 ELECTROCARDIOGRAM REPORT: CPT | Mod: ,,, | Performed by: INTERNAL MEDICINE

## 2022-08-02 PROCEDURE — 25000003 PHARM REV CODE 250: Performed by: EMERGENCY MEDICINE

## 2022-08-02 PROCEDURE — G0378 HOSPITAL OBSERVATION PER HR: HCPCS

## 2022-08-02 PROCEDURE — 85025 COMPLETE CBC W/AUTO DIFF WBC: CPT | Performed by: NURSE PRACTITIONER

## 2022-08-02 PROCEDURE — 80053 COMPREHEN METABOLIC PANEL: CPT | Performed by: NURSE PRACTITIONER

## 2022-08-02 PROCEDURE — U0002 COVID-19 LAB TEST NON-CDC: HCPCS | Performed by: EMERGENCY MEDICINE

## 2022-08-02 PROCEDURE — 96374 THER/PROPH/DIAG INJ IV PUSH: CPT

## 2022-08-02 PROCEDURE — A4216 STERILE WATER/SALINE, 10 ML: HCPCS | Performed by: EMERGENCY MEDICINE

## 2022-08-02 PROCEDURE — 99291 CRITICAL CARE FIRST HOUR: CPT | Mod: 25

## 2022-08-02 PROCEDURE — 96372 THER/PROPH/DIAG INJ SC/IM: CPT | Performed by: EMERGENCY MEDICINE

## 2022-08-02 PROCEDURE — 63600175 PHARM REV CODE 636 W HCPCS: Performed by: EMERGENCY MEDICINE

## 2022-08-02 PROCEDURE — 81000 URINALYSIS NONAUTO W/SCOPE: CPT | Performed by: NURSE PRACTITIONER

## 2022-08-02 PROCEDURE — 93005 ELECTROCARDIOGRAM TRACING: CPT

## 2022-08-02 PROCEDURE — 96361 HYDRATE IV INFUSION ADD-ON: CPT

## 2022-08-02 PROCEDURE — 83970 ASSAY OF PARATHORMONE: CPT | Performed by: EMERGENCY MEDICINE

## 2022-08-02 PROCEDURE — 93010 EKG 12-LEAD: ICD-10-PCS | Mod: ,,, | Performed by: INTERNAL MEDICINE

## 2022-08-02 RX ORDER — POLYETHYLENE GLYCOL 3350 17 G/17G
17 POWDER, FOR SOLUTION ORAL DAILY PRN
Status: DISCONTINUED | OUTPATIENT
Start: 2022-08-02 | End: 2022-08-03 | Stop reason: HOSPADM

## 2022-08-02 RX ORDER — ACETAMINOPHEN 325 MG/1
650 TABLET ORAL EVERY 4 HOURS PRN
Status: DISCONTINUED | OUTPATIENT
Start: 2022-08-02 | End: 2022-08-03 | Stop reason: HOSPADM

## 2022-08-02 RX ORDER — IBUPROFEN 200 MG
24 TABLET ORAL
Status: DISCONTINUED | OUTPATIENT
Start: 2022-08-02 | End: 2022-08-03 | Stop reason: HOSPADM

## 2022-08-02 RX ORDER — SODIUM CHLORIDE 0.9 % (FLUSH) 0.9 %
10 SYRINGE (ML) INJECTION EVERY 8 HOURS
Status: DISCONTINUED | OUTPATIENT
Start: 2022-08-02 | End: 2022-08-03 | Stop reason: HOSPADM

## 2022-08-02 RX ORDER — NALOXONE HCL 0.4 MG/ML
0.02 VIAL (ML) INJECTION
Status: DISCONTINUED | OUTPATIENT
Start: 2022-08-02 | End: 2022-08-03 | Stop reason: HOSPADM

## 2022-08-02 RX ORDER — IBUPROFEN 200 MG
16 TABLET ORAL
Status: DISCONTINUED | OUTPATIENT
Start: 2022-08-02 | End: 2022-08-03 | Stop reason: HOSPADM

## 2022-08-02 RX ORDER — GLUCAGON 1 MG
1 KIT INJECTION
Status: DISCONTINUED | OUTPATIENT
Start: 2022-08-02 | End: 2022-08-03 | Stop reason: HOSPADM

## 2022-08-02 RX ORDER — AMOXICILLIN 250 MG
1 CAPSULE ORAL 2 TIMES DAILY PRN
Status: DISCONTINUED | OUTPATIENT
Start: 2022-08-02 | End: 2022-08-03 | Stop reason: HOSPADM

## 2022-08-02 RX ORDER — BIOTIN 1 MG
1000 TABLET ORAL DAILY
Status: ON HOLD | COMMUNITY
End: 2022-08-03 | Stop reason: HOSPADM

## 2022-08-02 RX ORDER — ENOXAPARIN SODIUM 100 MG/ML
30 INJECTION SUBCUTANEOUS EVERY 24 HOURS
Status: DISCONTINUED | OUTPATIENT
Start: 2022-08-02 | End: 2022-08-03 | Stop reason: HOSPADM

## 2022-08-02 RX ORDER — TALC
6 POWDER (GRAM) TOPICAL NIGHTLY PRN
Status: DISCONTINUED | OUTPATIENT
Start: 2022-08-02 | End: 2022-08-03 | Stop reason: HOSPADM

## 2022-08-02 RX ORDER — ONDANSETRON 4 MG/1
4 TABLET, ORALLY DISINTEGRATING ORAL EVERY 8 HOURS PRN
Status: DISCONTINUED | OUTPATIENT
Start: 2022-08-02 | End: 2022-08-03 | Stop reason: HOSPADM

## 2022-08-02 RX ORDER — INSULIN ASPART 100 [IU]/ML
0-5 INJECTION, SOLUTION INTRAVENOUS; SUBCUTANEOUS
Status: DISCONTINUED | OUTPATIENT
Start: 2022-08-02 | End: 2022-08-03 | Stop reason: HOSPADM

## 2022-08-02 RX ORDER — ONDANSETRON 2 MG/ML
4 INJECTION INTRAMUSCULAR; INTRAVENOUS
Status: COMPLETED | OUTPATIENT
Start: 2022-08-02 | End: 2022-08-02

## 2022-08-02 RX ADMIN — SODIUM CHLORIDE 1000 ML: 0.9 INJECTION, SOLUTION INTRAVENOUS at 02:08

## 2022-08-02 RX ADMIN — ENOXAPARIN SODIUM 30 MG: 30 INJECTION SUBCUTANEOUS at 09:08

## 2022-08-02 RX ADMIN — ONDANSETRON 4 MG: 2 INJECTION INTRAMUSCULAR; INTRAVENOUS at 02:08

## 2022-08-02 RX ADMIN — Medication 10 ML: at 09:08

## 2022-08-02 NOTE — ASSESSMENT & PLAN NOTE
Etiology unclear-  Could be due to Vitamins and various Supplements but she has a 40 lbs weight loss hx- so concerned about any occult malignancy  Will continue IVF and repeat BMP in am  Await PTH, Vit D levels  May need Bone scan, skeletal survery

## 2022-08-02 NOTE — TELEPHONE ENCOUNTER
Called pt to urge her to proceed to nearest ED.  No answer, left voicemail urging her to call clinic back

## 2022-08-02 NOTE — ED NOTES
Attempted to bring pt to the restroom. Pt is currently consulting with a physician. Pt stated she will call back after.

## 2022-08-02 NOTE — FIRST PROVIDER EVALUATION
Medical screening exam completed.  I have conducted a focused provider triage encounter, findings are as follows:    Brief history of present illness:  Patient sent into the ED by primary care for hypercalcemia    There were no vitals filed for this visit.    Pertinent physical exam:  nad    Brief workup plan:  labs    Preliminary workup initiated; this workup will be continued and followed by the physician or advanced practice provider that is assigned to the patient when roomed.

## 2022-08-02 NOTE — ASSESSMENT & PLAN NOTE
Patient's FSGs are uncontrolled due to hyperglycemia on current medication regimen.  Last A1c reviewed-   Lab Results   Component Value Date    HGBA1C 8.4 (H) 08/01/2022     Most recent fingerstick glucose reviewed- No results for input(s): POCTGLUCOSE in the last 24 hours.  Current correctional scale  Medium  Maintain anti-hyperglycemic dose as follows-   Antihyperglycemics (From admission, onward)            None        Hold Oral hypoglycemics while patient is in the hospital.

## 2022-08-02 NOTE — TELEPHONE ENCOUNTER
Please contact patient. Her calcium level is critical. Given her symptoms, I agree she should present to ER for evaluation/treatment as advised last night by Dr. Delgado.

## 2022-08-02 NOTE — ED PROVIDER NOTES
SCRIBE #1 NOTE: I, Keisha Mandujano, am scribing for, and in the presence of, Cheryl Evans Do, MD. I have scribed the entire note.      History      Chief Complaint   Patient presents with    Abnormal Lab     Pt was seen by provider and reports an issue with her calcium level and was told to come to ED. Pt reports dizziness, fever, nausea       Review of patient's allergies indicates:   Allergen Reactions    Midol (acetaminoph-pyrilamine) Swelling    Iodine and iodide containing products Hives        HPI   HPI    2022, 1:56 PM   History obtained from the patient      History of Present Illness: Indira Walker is a 77 y.o. female patient with a PMHx of DM2, HLD, and HTN who presents to the Emergency Department after she was referred to the ED by Dr. Kearns ([facility]) because her Calcium was 12.4. Pt reports that she has been taking half of a teaspoon of Marisa Red which has calcium in it and drinking plenty of fluids recently. The pt denies a history of thyroid disease or cancer. Associated sxs include confusion, generalized weakness, dizziness, N/V, and dark urine. Symptoms are constant and moderate in severity. No mitigating or exacerbating factors reported. Patient denies any fever, chills, myalgias, abdominal pain, appetite change, SOB, CP, and all other sxs at this time. No prior Tx reported. No further complaints or concerns at this time.         Arrival mode: Personal vehicle    PCP: Bertha Kearns MD       Past Medical History:  Past Medical History:   Diagnosis Date    Arthritis     Diabetes mellitus, type 2     Foot drop, left 2018    GERD (gastroesophageal reflux disease)     Heartburn     Hyperlipidemia     Hypertension     Left sided sciatica     s/p microdiscectomy 3/21/18       Past Surgical History:  Past Surgical History:   Procedure Laterality Date    BREAST BIOPSY       SECTION      COLONOSCOPY  2008    DR. JANET GARCÍA/MILD DIVERICULOSIS DESCENDING  AND SIGMOID. REPEAT 5 YRS    ENDOSCOPIC ULTRASOUND OF UPPER GASTROINTESTINAL TRACT N/A 7/11/2022    Procedure: ULTRASOUND, UPPER GI TRACT, ENDOSCOPIC;  Surgeon: Ange Saldivar MD;  Location: [facility];  Service: Endoscopy;  Laterality: N/A;  Upper and Linear, 22 sharkcore, slides and formalin    HERNIA REPAIR      MICRODISCECTOMY OF SPINE Left 03/21/2018    left L5-S1, Dr Segundo         Family History:  Family History   Problem Relation Age of Onset    Diabetes Mother     Diabetes Father        Social History:  Social History     Tobacco Use    Smoking status: Never Smoker    Smokeless tobacco: Never Used   Substance and Sexual Activity    Alcohol use: No    Drug use: No    Sexual activity: Not Currently     Partners: Male       ROS   Review of Systems   Constitutional: Negative for appetite change, chills and fever.   HENT: Negative for sore throat.    Respiratory: Negative for shortness of breath.    Cardiovascular: Negative for chest pain.   Gastrointestinal: Positive for nausea and vomiting. Negative for abdominal pain and diarrhea.   Genitourinary: Negative for dysuria.        (+) dark urine   Musculoskeletal: Negative for back pain and myalgias.   Skin: Negative for rash.   Neurological: Positive for dizziness and weakness (generalized).   Hematological: Does not bruise/bleed easily.   Psychiatric/Behavioral: Positive for confusion.   All other systems reviewed and are negative.    Physical Exam      Initial Vitals [08/02/22 1150]   BP Pulse Resp Temp SpO2   (!) 155/84 92 18 98.2 °F (36.8 °C) 97 %      MAP       --          Physical Exam  Nursing Notes and Vital Signs Reviewed.  Constitutional: Patient is in mild distress. Well-developed and well-nourished.  Head: Atraumatic. Normocephalic.  Eyes: PERRL. EOM intact. Conjunctivae are not pale. No scleral icterus.  ENT: Pt's mouth is dry. Oropharynx is clear and symmetric.    Neck: Supple. Full ROM. No lymphadenopathy.  Cardiovascular:  Regular rate. Regular rhythm. No murmurs, rubs, or gallops. Distal pulses are 2+ and symmetric.  Pulmonary/Chest: No respiratory distress. Clear to auscultation bilaterally. No wheezing or rales.  Abdominal: Soft and non-distended.  There is no tenderness.  No rebound, guarding, or rigidity.   Musculoskeletal: Moves all extremities. No obvious deformities. No edema.  Skin: Warm and dry.  Neurological:  Alert, awake, and appropriate.  Normal speech.  No acute focal neurological deficits are appreciated.  Psychiatric: Normal affect. Good eye contact. Appropriate in content.    ED Course    Critical Care    Date/Time: 8/2/2022 3:03 PM  Performed by: Cheryl Evans Do, MD  Authorized by: Cheryl Evans Do, MD   Direct patient critical care time: 15 minutes  Additional history critical care time: 5 minutes  Ordering / reviewing critical care time: 5 minutes  Documentation critical care time: 5 minutes  Consulting other physicians critical care time: 10 minutes  Total critical care time (exclusive of procedural time) : 40 minutes  Critical care time was exclusive of separately billable procedures and treating other patients and teaching time.  Critical care was necessary to treat or prevent imminent or life-threatening deterioration of the following conditions: JANICE and hypercalcemia.  Critical care was time spent personally by me on the following activities: blood draw for specimens, development of treatment plan with patient or surrogate, discussions with consultants, interpretation of cardiac output measurements, evaluation of patient's response to treatment, examination of patient, ordering and performing treatments and interventions, obtaining history from patient or surrogate, ordering and review of laboratory studies, ordering and review of radiographic studies, pulse oximetry, re-evaluation of patient's condition and review of old charts.        ED Vital Signs:  Vitals:    08/02/22 1150 08/02/22 1432 08/02/22 1502    BP: (!) 155/84  (!) 153/83   Pulse: 92  80   Resp: 18 (!) 29 20   Temp: 98.2 °F (36.8 °C)     TempSrc: Oral     SpO2: 97%  97%   Weight: 55.3 kg (122 lb 0.4 oz)         Abnormal Lab Results:  Labs Reviewed   CBC W/ AUTO DIFFERENTIAL - Abnormal; Notable for the following components:       Result Value    WBC 13.05 (*)     RBC 3.48 (*)     Hemoglobin 10.7 (*)     Hematocrit 32.8 (*)     RDW 15.7 (*)     Platelets 537 (*)     Immature Granulocytes 0.9 (*)     Immature Grans (Abs) 0.12 (*)     Lymph # 7.7 (*)     Mono # 1.6 (*)     Gran % 24.4 (*)     Lymph % 59.1 (*)     Platelet Estimate Increased (*)     All other components within normal limits   COMPREHENSIVE METABOLIC PANEL - Abnormal; Notable for the following components:    CO2 31 (*)     Glucose 131 (*)     BUN 24 (*)     Creatinine 1.7 (*)     Calcium 12.3 (*)     Albumin 3.4 (*)     Anion Gap 7 (*)     eGFR 30 (*)     All other components within normal limits    Narrative:     CA critical result(s) called and verbal readback obtained from MARY GORDILLO  by CP5 08/02/2022 13:05   URINALYSIS, REFLEX TO URINE CULTURE - Abnormal; Notable for the following components:    Appearance, UA Hazy (*)     Protein, UA 1+ (*)     Occult Blood UA Trace (*)     Leukocytes, UA 2+ (*)     All other components within normal limits    Narrative:     Specimen Source->Urine   URINALYSIS MICROSCOPIC    Narrative:     Specimen Source->Urine   PTH, INTACT   SARS-COV-2 RDRP GENE    Narrative:     This test utilizes isothermal nucleic acid amplification   technology to detect the SARS-CoV-2 RdRp nucleic acid segment.   The analytical sensitivity (limit of detection) is 125 genome   equivalents/mL.   A POSITIVE result implies infection with the SARS-CoV-2 virus;   the patient is presumed to be contagious.     A NEGATIVE result means that SARS-CoV-2 nucleic acids are not   present above the limit of detection. A NEGATIVE result should be   treated as presumptive. It does not rule  out the possibility of   COVID-19 and should not be the sole basis for treatment decisions.   If COVID-19 is strongly suspected based on clinical and exposure   history, re-testing using an alternate molecular assay should be   considered.   This test is only for use under the Food and Drug   Administration s Emergency Use Authorization (EUA).   Commercial kits are provided by [employer].   Performance characteristics of the EUA have been independently   verified by [facility] Department of   Pathology and Laboratory Medicine.   _________________________________________________________________   The authorized Fact Sheet for Healthcare Providers and the authorized Fact   Sheet for Patients of the ID NOW COVID-19 are available on the FDA   website:     https://www.fda.gov/media/871431/download  https://www.fda.gov/media/170454/download                All Lab Results:  Results for orders placed or performed during the hospital encounter of 08/02/22   CBC auto differential   Result Value Ref Range    WBC 13.05 (H) 3.90 - 12.70 K/uL    RBC 3.48 (L) 4.00 - 5.40 M/uL    Hemoglobin 10.7 (L) 12.0 - 16.0 g/dL    Hematocrit 32.8 (L) 37.0 - 48.5 %    MCV 94 82 - 98 fL    MCH 30.7 27.0 - 31.0 pg    MCHC 32.6 32.0 - 36.0 g/dL    RDW 15.7 (H) 11.5 - 14.5 %    Platelets 537 (H) 150 - 450 K/uL    MPV 9.6 9.2 - 12.9 fL    Immature Granulocytes 0.9 (H) 0.0 - 0.5 %    Gran # (ANC) 3.2 1.8 - 7.7 K/uL    Immature Grans (Abs) 0.12 (H) 0.00 - 0.04 K/uL    Lymph # 7.7 (H) 1.0 - 4.8 K/uL    Mono # 1.6 (H) 0.3 - 1.0 K/uL    Eos # 0.3 0.0 - 0.5 K/uL    Baso # 0.13 0.00 - 0.20 K/uL    nRBC 0 0 /100 WBC    Gran % 24.4 (L) 38.0 - 73.0 %    Lymph % 59.1 (H) 18.0 - 48.0 %    Mono % 12.1 4.0 - 15.0 %    Eosinophil % 2.5 0.0 - 8.0 %    Basophil % 1.0 0.0 - 1.9 %    Platelet Estimate Increased (A)     Ovalocytes Occasional     Jordan Cells Occasional     Differential Method Automated    Comprehensive metabolic panel   Result Value  Ref Range    Sodium 136 136 - 145 mmol/L    Potassium 3.8 3.5 - 5.1 mmol/L    Chloride 98 95 - 110 mmol/L    CO2 31 (H) 23 - 29 mmol/L    Glucose 131 (H) 70 - 110 mg/dL    BUN 24 (H) 8 - 23 mg/dL    Creatinine 1.7 (H) 0.5 - 1.4 mg/dL    Calcium 12.3 (HH) 8.7 - 10.5 mg/dL    Total Protein 7.8 6.0 - 8.4 g/dL    Albumin 3.4 (L) 3.5 - 5.2 g/dL    Total Bilirubin 0.6 0.1 - 1.0 mg/dL    Alkaline Phosphatase 62 55 - 135 U/L    AST 25 10 - 40 U/L    ALT 25 10 - 44 U/L    Anion Gap 7 (L) 8 - 16 mmol/L    eGFR 30 (A) >60 mL/min/1.73 m^2   Urinalysis, Reflex to Urine Culture Urine, Clean Catch    Specimen: Urine   Result Value Ref Range    Specimen UA Urine, Clean Catch     Color, UA Yellow Yellow, Straw, Harriet    Appearance, UA Hazy (A) Clear    pH, UA 7.0 5.0 - 8.0    Specific Gravity, UA 1.010 1.005 - 1.030    Protein, UA 1+ (A) Negative    Glucose, UA Negative Negative    Ketones, UA Negative Negative    Bilirubin (UA) Negative Negative    Occult Blood UA Trace (A) Negative    Nitrite, UA Negative Negative    Urobilinogen, UA Negative <2.0 EU/dL    Leukocytes, UA 2+ (A) Negative   Urinalysis Microscopic   Result Value Ref Range    RBC, UA 1 0 - 4 /hpf    WBC, UA 3 0 - 5 /hpf    Bacteria Rare None-Occ /hpf    Squam Epithel, UA 5 /hpf    Hyaline Casts, UA 0 0-1/lpf /lpf    Microscopic Comment SEE COMMENT    PTH, intact   Result Value Ref Range    PTH, Intact 12.8 9.0 - 77.0 pg/mL   POCT COVID-19 Rapid Screening   Result Value Ref Range    POC Rapid COVID Negative Negative     Acceptable Yes          Imaging Results:  Imaging Results    None        The EKG was ordered, reviewed, and independently interpreted by the ED provider.  Interpretation time: 12:51  Rate: 89 BPM  Rhythm: normal sinus rhythm  Interpretation: Cannot rule out Anterior infarct, age undetermined. No STEMI.         The Emergency Provider reviewed the vital signs and test results, which are outlined above.    ED Discussion     2:37 PM: Based on  the calcium correction based on hypoalbuminemia, the pt's calcium is 12.78.    2:58 PM: Discussed case with Dr. West ([facility]) who recommends orthostatics. Dr. West agrees with current care and management of pt and accepts admission.   Admitting Service: Hospital Medicine   Admitting Physician: Dr. West  Admit to: Obs    2:59 PM: Re-evaluated pt. I have discussed test results, shared treatment plan, and the need for admission with patient and family at bedside. Pt and family express understanding at this time and agree with all information. All questions answered. Pt and family have no further questions or concerns at this time. Pt is ready for admit.         ED Medication(s):  Medications   sodium chloride 0.9% bolus 1,000 mL (1,000 mLs Intravenous New Bag 8/2/22 1437)   ondansetron injection 4 mg (4 mg Intravenous Given 8/2/22 1436)           New Prescriptions    No medications on file         Medical Decision Making    Medical Decision Making:   Clinical Tests:   Lab Tests: Ordered and Reviewed  Radiological Study: Ordered and Reviewed  Medical Tests: Ordered and Reviewed           Scribe Attestation:   Scribe #1: I performed the above scribed service and the documentation accurately describes the services I performed. I attest to the accuracy of the note.    Attending:   Physician Attestation Statement for Scribe #1: I, Cheryl Evans Do, MD, personally performed the services described in this documentation, as scribed by Keisha Mandujano, in my presence, and it is both accurate and complete.          Clinical Impression       ICD-10-CM ICD-9-CM   1. JANICE (acute kidney injury)  N17.9 584.9   2. Hyperkalemia  E87.5 276.7   3. Hypercalcemia  E83.52 275.42       Disposition:   Disposition: Placed in Observation  Condition: Fair         Cheryl Evans Do, MD  08/02/22 4474

## 2022-08-02 NOTE — TELEPHONE ENCOUNTER
"----- Message from Megha Delgado DO sent at 8/2/2022  7:42 AM CDT -----  I received a call about this patient last night (11:35 pm) regarding her calcium of 12.4. I talked with her and she denies taking any calcium but has been taking "herbs". She was having symptoms (dizziness, weakness). I advised evaluation in the ER but she states she will monitor her symptoms and wait for a call from you (your staff) this morning.     Megha"

## 2022-08-02 NOTE — PHARMACY MED REC
"Admission Medication History     The home medication history was taken by Kamari Rincon.    You may go to "Admission" then "Reconcile Home Medications" tabs to review and/or act upon these items.      The home medication list has been updated by the Pharmacy department.    Please read ALL comments highlighted in yellow.    Please address this information as you see fit.     Feel free to contact us if you have any questions or require assistance.      The medications listed below were removed from the home medication list. Please reorder if appropriate:  Patient reports no longer taking the following medication(s):   ACETAMINOPHEN 500 MG TABLET   ALBUTEROL HFA 90 MCG/ACTUATION INHALER   CHLORPHENIRAMINE-ACETAMINOPHEN 2-325 MG TABLET   CINNAMON BARK   DICLOFENAC SODIUM 1 % TOPICAL GEL   GABAPENTIN 100 MG CAPSULE   GABAPENTIN 300 MG CAPSULE   GARLIC   CAROL ROOT   MONTELUKAST 10 MG TABLET    Medications listed below were obtained from: Patient/family, Analytic software- [employer] and Patient's pharmacy  (Not in a hospital admission)      Potential issues to be addressed PRIOR TO DISCHARGE: NONE    Kamari Rincon CPhT  Pharmacy Technician Specialist-Medication History  [facility] [phone]  Secure chat preferred       Current Outpatient Medications on File Prior to Encounter   Medication Sig Dispense Refill Last Dose    ascorbic acid, vitamin C, (VITAMIN C) 500 MG tablet Take 500 mg by mouth once daily.   8/2/2022 at Unknown time    aspirin (ECOTRIN) 81 MG EC tablet Take 1 tablet (81 mg total) by mouth once daily. Every other day  0 8/2/2022 at Unknown time    b complex vitamins capsule Take 1 capsule by mouth once daily.   8/2/2022 at Unknown time    biotin 1 mg tablet Take 1,000 mcg by mouth once daily.   8/2/2022 at Unknown time    famotidine (PEPCID) 40 MG tablet Take 1 tablet (40 mg total) by mouth once daily. 90 tablet 3 8/2/2022 at Unknown time    folic acid (FOLVITE) 1 MG tablet Take 1 " tablet (1 mg total) by mouth once daily. 90 tablet 3 8/2/2022 at Unknown time    loratadine (CLARITIN) 10 mg tablet Take 10 mg by mouth once daily.   8/2/2022 at Unknown time    losartan-hydrochlorothiazide 100-12.5 mg (HYZAAR) 100-12.5 mg Tab TAKE 1 TABLET BY MOUTH EVERY DAY 90 tablet 1 8/2/2022 at Unknown time    metFORMIN (GLUCOPHAGE) 1000 MG tablet TAKE 1 TABLET BY MOUTH TWICE A DAY WITH MEALS 180 tablet 1 8/2/2022 at Unknown time    methotrexate 2.5 MG Tab TAKE 10 TABLETS (5 TABLET IN THE MORNING, 5 TABLETS IN THE EVENING) BY MOUTH EVERY 7 DAYS. 120 tablet 1 Past Week at Friday, 07/29/2022    MULTIVIT-MINERALS/FERROUS FUM (MULTI VITAMIN ORAL) Take 1 tablet by mouth once daily. For women over 50 (centrum)   8/2/2022 at Unknown time    rosuvastatin (CRESTOR) 20 MG tablet TAKE 1 TABLET BY MOUTH EVERY DAY 90 tablet 1 8/2/2022 at Unknown time    TURMERIC ROOT EXTRACT ORAL Take 1 tablet by mouth once daily.   8/2/2022 at Unknown time    vit A/vit C/vit E/zinc/copper (OCUVITE PRESERVISION ORAL) Take 1 tablet by mouth once daily.   8/2/2022 at Unknown time    vitamin D (VITAMIN D3) 1000 units Tab Take 1,000 Units by mouth once daily.   8/2/2022 at Unknown time    [DISCONTINUED] acetaminophen (TYLENOL) 500 MG tablet Take 1,000 mg by mouth every 8 (eight) hours as needed.       [DISCONTINUED] albuterol (PROVENTIL/VENTOLIN HFA) 90 mcg/actuation inhaler Inhale 2 puffs into the lungs every 6 (six) hours as needed for Shortness of Breath (or coughing). 18 g 3     [DISCONTINUED] beta-carotene,A,-vits C,E/mins (VISION ORAL) Take by mouth.       [DISCONTINUED] blood sugar diagnostic Strp To check BG 2 times daily, to use with insurance preferred meter 200 each 3     [DISCONTINUED] blood-glucose meter kit To check BG 2 times daily, to use with insurance preferred meter 1 each 0     [DISCONTINUED] chlorpheniramine-acetaminophen 2-325 mg Tab Take 2 tablets by mouth every 4 to 6 hours as needed. Coricidin HBP        [DISCONTINUED] cinnamon bark, bulk, Powd by Misc.(Non-Drug; Combo Route) route.       [DISCONTINUED] diclofenac sodium (VOLTAREN) 1 % Gel Apply 2 g topically 4 (four) times daily. 1 Tube 2     [DISCONTINUED] gabapentin (NEURONTIN) 100 MG capsule Take 100 mg by mouth 2 (two) times daily. Taking up to BID prn       [DISCONTINUED] gabapentin (NEURONTIN) 300 MG capsule Take 300 mg by mouth 2 (two) times daily.       [DISCONTINUED] garlic Tab Take by mouth.       [DISCONTINUED] ruby root, bulk, Powd by Misc.(Non-Drug; Combo Route) route.       [DISCONTINUED] lancets [facility] To check BG 2 times daily, to use with insurance preferred meter 200 each 4     [DISCONTINUED] montelukast (SINGULAIR) 10 mg tablet Take 1 tablet (10 mg total) by mouth every evening. 30 tablet 11                              .

## 2022-08-02 NOTE — ASSESSMENT & PLAN NOTE
Chronic and stable, hx of Sciatica with L Foot Drop  May have associated Osteopenia/bone loss contributing to hypercalcemia

## 2022-08-02 NOTE — HPI
Indira Walker is a 77 y.o. female patient with a PMHx of Seropositive RA, DM2, HLD, HTN, GERD, L Foot drop sec to Sciatica s/p Microdiscectomy who was sent to ER by Dr. Kearns () because her Calcium was 12.4. Pt reports that she has been taking half a teaspoon of Marisa Red which has calcium in it and drinking plenty of fluids recently. The pt denies a history of thyroid disease or cancer. Associated sxs include confusion, generalized weakness, dizziness, N/V, and dark urine. She also reports a 40 lbs weight loss over last few months. Patient denies any fever, chills, myalgias, abdominal pain, appetite change, SOB, CP, and all other sxs at this time. No prior Tx reported.   Repeat labs today showed WBC 13, H/H 10.7/33, Bun/Cr 24/1.7, Ca 12.3- (corrected Ca 12.8). EKG NSR, 89, no ST-T changes. Pt received a bolus of NS 1 L in the ER plus I dose of Zofran for her nausea. Hospital Medicine was contacted and pt placed in Observation for Hypercalcemia. Pt admits to eating multiple types of vitamins and supplements including Vit D and Ca. PTH and Vit D levels sent.

## 2022-08-02 NOTE — H&P
[facility]  [facility]  History & Physical    Patient Name: Indira Walker  MRN: [id number]  Patient Class: OP- Observation  Admission Date: 8/2/2022  Attending Physician: Marcell West MD   Primary Care Provider: Bertha Kearns MD         Patient information was obtained from patient, relative(s), past medical records and ER records.     Subjective:     Principal Problem:Hypercalcemia    Chief Complaint:   Chief Complaint   Patient presents with    Abnormal Lab     Pt was seen by provider and reports an issue with her calcium level and was told to come to ED. Pt reports dizziness, fever, nausea        HPI: Indira Walker is a 77 y.o. female patient with a PMHx of Seropositive RA, DM2, HLD, HTN, GERD, L Foot drop sec to Sciatica s/p Microdiscectomy who was sent to ER by Dr. Kearns () because her Calcium was 12.4. Pt reports that she has been taking half a teaspoon of Marisa Red which has calcium in it and drinking plenty of fluids recently. The pt denies a history of thyroid disease or cancer. Associated sxs include confusion, generalized weakness, dizziness, N/V, and dark urine. She also reports a 40 lbs weight loss over last few months. Patient denies any fever, chills, myalgias, abdominal pain, appetite change, SOB, CP, and all other sxs at this time. No prior Tx reported.   Repeat labs today showed WBC 13, H/H 10.7/33, Bun/Cr 24/1.7, Ca 12.3- (corrected Ca 12.8). EKG NSR, 89, no ST-T changes. Pt received a bolus of NS 1 L in the ER plus I dose of Zofran for her nausea. Hospital Medicine was contacted and pt placed in Observation for Hypercalcemia. Pt admits to eating multiple types of vitamins and supplements including Vit D and Ca. PTH and Vit D levels sent.             Past Medical History:   Diagnosis Date    Arthritis     Diabetes mellitus, type 2     Foot drop, left 01/26/2018    GERD (gastroesophageal reflux disease)     Heartburn     Hyperlipidemia     Hypertension      Left sided sciatica     s/p microdiscectomy 3/21/18       Past Surgical History:   Procedure Laterality Date    BREAST BIOPSY       SECTION      COLONOSCOPY  2008    DR. JANET GARCÍA/MILD DIVERICULOSIS DESCENDING AND SIGMOID. REPEAT 5 YRS    ENDOSCOPIC ULTRASOUND OF UPPER GASTROINTESTINAL TRACT N/A 2022    Procedure: ULTRASOUND, UPPER GI TRACT, ENDOSCOPIC;  Surgeon: Ange Saldivar MD;  Location: [facility];  Service: Endoscopy;  Laterality: N/A;  Upper and Linear, 22 sharkcore, slides and formalin    HERNIA REPAIR      MICRODISCECTOMY OF SPINE Left 2018    left L5-S1, Dr Segundo       Review of patient's allergies indicates:   Allergen Reactions    Pyrilamine Swelling     (an ingredient in Midol)    Iodine and iodide containing products Hives       No current facility-administered medications on file prior to encounter.     Current Outpatient Medications on File Prior to Encounter   Medication Sig    ascorbic acid, vitamin C, (VITAMIN C) 500 MG tablet Take 500 mg by mouth once daily.    aspirin (ECOTRIN) 81 MG EC tablet Take 1 tablet (81 mg total) by mouth once daily. Every other day    b complex vitamins capsule Take 1 capsule by mouth once daily.    biotin 1 mg tablet Take 1,000 mcg by mouth once daily.    famotidine (PEPCID) 40 MG tablet Take 1 tablet (40 mg total) by mouth once daily.    folic acid (FOLVITE) 1 MG tablet Take 1 tablet (1 mg total) by mouth once daily.    loratadine (CLARITIN) 10 mg tablet Take 10 mg by mouth once daily.    losartan-hydrochlorothiazide 100-12.5 mg (HYZAAR) 100-12.5 mg Tab TAKE 1 TABLET BY MOUTH EVERY DAY    metFORMIN (GLUCOPHAGE) 1000 MG tablet TAKE 1 TABLET BY MOUTH TWICE A DAY WITH MEALS    methotrexate 2.5 MG Tab TAKE 10 TABLETS (5 TABLET IN THE MORNING, 5 TABLETS IN THE EVENING) BY MOUTH EVERY 7 DAYS.    MULTIVIT-MINERALS/FERROUS FUM (MULTI VITAMIN ORAL) Take 1 tablet by mouth once daily. For women over 50 (centrum)     rosuvastatin (CRESTOR) 20 MG tablet TAKE 1 TABLET BY MOUTH EVERY DAY    TURMERIC ROOT EXTRACT ORAL Take 1 tablet by mouth once daily.    vit A/vit C/vit E/zinc/copper (OCUVITE PRESERVISION ORAL) Take 1 tablet by mouth once daily.    vitamin D (VITAMIN D3) 1000 units Tab Take 1,000 Units by mouth once daily.    [DISCONTINUED] acetaminophen (TYLENOL) 500 MG tablet Take 1,000 mg by mouth every 8 (eight) hours as needed.    [DISCONTINUED] albuterol (PROVENTIL/VENTOLIN HFA) 90 mcg/actuation inhaler Inhale 2 puffs into the lungs every 6 (six) hours as needed for Shortness of Breath (or coughing).    [DISCONTINUED] beta-carotene,A,-vits C,E/mins (VISION ORAL) Take by mouth.    [DISCONTINUED] blood sugar diagnostic Strp To check BG 2 times daily, to use with insurance preferred meter    [DISCONTINUED] blood-glucose meter kit To check BG 2 times daily, to use with insurance preferred meter    [DISCONTINUED] chlorpheniramine-acetaminophen 2-325 mg Tab Take 2 tablets by mouth every 4 to 6 hours as needed. Coricidin HBP    [DISCONTINUED] cinnamon bark, bulk, Powd by Misc.(Non-Drug; Combo Route) route.    [DISCONTINUED] diclofenac sodium (VOLTAREN) 1 % Gel Apply 2 g topically 4 (four) times daily.    [DISCONTINUED] gabapentin (NEURONTIN) 100 MG capsule Take 100 mg by mouth 2 (two) times daily. Taking up to BID prn    [DISCONTINUED] gabapentin (NEURONTIN) 300 MG capsule Take 300 mg by mouth 2 (two) times daily.    [DISCONTINUED] garlic Tab Take by mouth.    [DISCONTINUED] ginger root, bulk, Powd by Misc.(Non-Drug; Combo Route) route.    [DISCONTINUED] lancets Mis To check BG 2 times daily, to use with insurance preferred meter    [DISCONTINUED] montelukast (SINGULAIR) 10 mg tablet Take 1 tablet (10 mg total) by mouth every evening.     Family History       Problem Relation (Age of Onset)    Diabetes Mother, Father          Tobacco Use    Smoking status: Never Smoker    Smokeless tobacco: Never Used    Substance and Sexual Activity    Alcohol use: No    Drug use: No    Sexual activity: Not Currently     Partners: Male     Review of Systems   Constitutional:  Positive for activity change and fatigue. Negative for appetite change, chills, diaphoresis and fever.   HENT: Negative.  Negative for congestion, trouble swallowing and voice change.    Eyes: Negative.    Respiratory:  Negative for apnea, cough and shortness of breath.    Cardiovascular: Negative.  Negative for chest pain, palpitations and leg swelling.   Gastrointestinal:  Positive for nausea and vomiting. Negative for abdominal distention, abdominal pain, constipation and diarrhea.   Endocrine: Negative.    Genitourinary: Negative.    Musculoskeletal:  Positive for arthralgias and gait problem.   Skin: Negative.    Allergic/Immunologic: Negative.    Neurological:  Positive for weakness.   Hematological: Negative.    Psychiatric/Behavioral: Negative.     Objective:     Vital Signs (Most Recent):  Temp: 98.2 °F (36.8 °C) (08/02/22 1150)  Pulse: 82 (08/02/22 1732)  Resp: (!) 21 (08/02/22 1731)  BP: (!) 170/85 (08/02/22 1732)  SpO2: (!) 94 % (08/02/22 1732)   Vital Signs (24h Range):  Temp:  [98.2 °F (36.8 °C)] 98.2 °F (36.8 °C)  Pulse:  [76-92] 82  Resp:  [18-29] 21  SpO2:  [94 %-98 %] 94 %  BP: (152-170)/(79-97) 170/85     Weight: 55.3 kg (122 lb 0.4 oz)  Body mass index is 21.62 kg/m².    Physical Exam  Vitals and nursing note reviewed.   Constitutional:       General: She is awake. She is not in acute distress.     Appearance: She is ill-appearing.   HENT:      Head: Normocephalic and atraumatic.      Right Ear: External ear normal.      Left Ear: External ear normal.      Nose: Nose normal. No congestion or rhinorrhea.      Mouth/Throat:      Mouth: Mucous membranes are moist.      Pharynx: Oropharynx is clear. No oropharyngeal exudate or posterior oropharyngeal erythema.   Eyes:      General: No scleral icterus.     Extraocular Movements: Extraocular  movements intact.      Conjunctiva/sclera: Conjunctivae normal.      Pupils: Pupils are equal, round, and reactive to light.   Cardiovascular:      Rate and Rhythm: Normal rate and regular rhythm.      Pulses: Normal pulses.      Heart sounds: Normal heart sounds. No murmur heard.    No gallop.   Pulmonary:      Effort: Pulmonary effort is normal.      Breath sounds: Normal breath sounds. No wheezing or rales.   Abdominal:      General: Abdomen is flat. Bowel sounds are normal. There is no distension.      Palpations: Abdomen is soft.      Tenderness: There is no abdominal tenderness. There is no guarding.   Genitourinary:     Comments: deferred  Musculoskeletal:         General: No swelling or deformity. Normal range of motion.      Cervical back: Normal range of motion and neck supple. No rigidity or tenderness.   Lymphadenopathy:      Cervical: No cervical adenopathy.   Skin:     General: Skin is warm and dry.      Capillary Refill: Capillary refill takes 2 to 3 seconds.   Neurological:      General: No focal deficit present.      Mental Status: She is alert and oriented to person, place, and time.      Cranial Nerves: No cranial nerve deficit.      Motor: No weakness.      Gait: Gait normal.   Psychiatric:         Mood and Affect: Mood normal.         Behavior: Behavior normal. Behavior is cooperative.         Thought Content: Thought content normal.         Judgment: Judgment normal.         CRANIAL NERVES     CN III, IV, VI   Pupils are equal, round, and reactive to light.    Results for orders placed or performed during the hospital encounter of 08/02/22   CBC auto differential   Result Value Ref Range    WBC 13.05 (H) 3.90 - 12.70 K/uL    RBC 3.48 (L) 4.00 - 5.40 M/uL    Hemoglobin 10.7 (L) 12.0 - 16.0 g/dL    Hematocrit 32.8 (L) 37.0 - 48.5 %    MCV 94 82 - 98 fL    MCH 30.7 27.0 - 31.0 pg    MCHC 32.6 32.0 - 36.0 g/dL    RDW 15.7 (H) 11.5 - 14.5 %    Platelets 537 (H) 150 - 450 K/uL    MPV 9.6 9.2 - 12.9  fL    Immature Granulocytes 0.9 (H) 0.0 - 0.5 %    Gran # (ANC) 3.2 1.8 - 7.7 K/uL    Immature Grans (Abs) 0.12 (H) 0.00 - 0.04 K/uL    Lymph # 7.7 (H) 1.0 - 4.8 K/uL    Mono # 1.6 (H) 0.3 - 1.0 K/uL    Eos # 0.3 0.0 - 0.5 K/uL    Baso # 0.13 0.00 - 0.20 K/uL    nRBC 0 0 /100 WBC    Gran % 24.4 (L) 38.0 - 73.0 %    Lymph % 59.1 (H) 18.0 - 48.0 %    Mono % 12.1 4.0 - 15.0 %    Eosinophil % 2.5 0.0 - 8.0 %    Basophil % 1.0 0.0 - 1.9 %    Platelet Estimate Increased (A)     Ovalocytes Occasional     Sharon Cells Occasional     Differential Method Automated    Comprehensive metabolic panel   Result Value Ref Range    Sodium 136 136 - 145 mmol/L    Potassium 3.8 3.5 - 5.1 mmol/L    Chloride 98 95 - 110 mmol/L    CO2 31 (H) 23 - 29 mmol/L    Glucose 131 (H) 70 - 110 mg/dL    BUN 24 (H) 8 - 23 mg/dL    Creatinine 1.7 (H) 0.5 - 1.4 mg/dL    Calcium 12.3 (HH) 8.7 - 10.5 mg/dL    Total Protein 7.8 6.0 - 8.4 g/dL    Albumin 3.4 (L) 3.5 - 5.2 g/dL    Total Bilirubin 0.6 0.1 - 1.0 mg/dL    Alkaline Phosphatase 62 55 - 135 U/L    AST 25 10 - 40 U/L    ALT 25 10 - 44 U/L    Anion Gap 7 (L) 8 - 16 mmol/L    eGFR 30 (A) >60 mL/min/1.73 m^2   Urinalysis, Reflex to Urine Culture Urine, Clean Catch    Specimen: Urine   Result Value Ref Range    Specimen UA Urine, Clean Catch     Color, UA Yellow Yellow, Straw, Harriet    Appearance, UA Hazy (A) Clear    pH, UA 7.0 5.0 - 8.0    Specific Gravity, UA 1.010 1.005 - 1.030    Protein, UA 1+ (A) Negative    Glucose, UA Negative Negative    Ketones, UA Negative Negative    Bilirubin (UA) Negative Negative    Occult Blood UA Trace (A) Negative    Nitrite, UA Negative Negative    Urobilinogen, UA Negative <2.0 EU/dL    Leukocytes, UA 2+ (A) Negative   Urinalysis Microscopic   Result Value Ref Range    RBC, UA 1 0 - 4 /hpf    WBC, UA 3 0 - 5 /hpf    Bacteria Rare None-Occ /hpf    Squam Epithel, UA 5 /hpf    Hyaline Casts, UA 0 0-1/lpf /lpf    Microscopic Comment SEE COMMENT    PTH, intact   Result  Value Ref Range    PTH, Intact 12.8 9.0 - 77.0 pg/mL   POCT COVID-19 Rapid Screening   Result Value Ref Range    POC Rapid COVID Negative Negative     Acceptable Yes       Significant Labs: All pertinent labs within the past 24 hours have been reviewed.    Significant Imaging: I have reviewed all pertinent imaging results/findings within the past 24 hours.    Assessment/Plan:     * Hypercalcemia  Etiology unclear-  Could be due to Vitamins and various Supplements but she has a 40 lbs weight loss hx- so concerned about any occult malignancy  Will continue IVF and repeat BMP in am  Await PTH, Vit D levels  May need Bone scan, skeletal survery        JANICE (acute kidney injury)  Patient with acute kidney injury likely due to IVVD/dehydration JANICE is currently worsening. Labs reviewed- Renal function/electrolytes with Estimated Creatinine Clearance: 22.2 mL/min (A) (based on SCr of 1.7 mg/dL (H)). according to latest data. Monitor urine output and serial BMP and adjust therapy as needed. Avoid nephrotoxins and renally dose meds for GFR listed above.     Rehydrate Aggressively to treat IVVD and HHypercalcemia    RA (rheumatoid arthritis)  Chronic and stable, hx of Sciatica with L Foot Drop  May have associated Osteopenia/bone loss contributing to hypercalcemia      Uncontrolled type 2 diabetes mellitus with hyperglycemia  Patient's FSGs are uncontrolled due to hyperglycemia on current medication regimen.  Last A1c reviewed-   Lab Results   Component Value Date    HGBA1C 8.4 (H) 08/01/2022     Most recent fingerstick glucose reviewed- No results for input(s): POCTGLUCOSE in the last 24 hours.  Current correctional scale  Medium  Maintain anti-hyperglycemic dose as follows-   Antihyperglycemics (From admission, onward)            None        Hold Oral hypoglycemics while patient is in the hospital.    Chronic heart failure with preserved ejection fraction          VTE Risk Mitigation (From admission, onward)     None             Marcell West MD  Department of Hospital Medicine   [facility] - Emergency Dept.

## 2022-08-02 NOTE — SUBJECTIVE & OBJECTIVE
Past Medical History:   Diagnosis Date    Arthritis     Diabetes mellitus, type 2     Foot drop, left 2018    GERD (gastroesophageal reflux disease)     Heartburn     Hyperlipidemia     Hypertension     Left sided sciatica     s/p microdiscectomy 3/21/18       Past Surgical History:   Procedure Laterality Date    BREAST BIOPSY       SECTION      COLONOSCOPY  2008    DR. JANET GARCÍA/MILD DIVERICULOSIS DESCENDING AND SIGMOID. REPEAT 5 YRS    ENDOSCOPIC ULTRASOUND OF UPPER GASTROINTESTINAL TRACT N/A 2022    Procedure: ULTRASOUND, UPPER GI TRACT, ENDOSCOPIC;  Surgeon: Ange Saldivar MD;  Location: [facility];  Service: Endoscopy;  Laterality: N/A;  Upper and Linear, 22 sharkcore, slides and formalin    HERNIA REPAIR      MICRODISCECTOMY OF SPINE Left 2018    left L5-S1, Dr Segundo       Review of patient's allergies indicates:   Allergen Reactions    Pyrilamine Swelling     (an ingredient in Midol)    Iodine and iodide containing products Hives       No current facility-administered medications on file prior to encounter.     Current Outpatient Medications on File Prior to Encounter   Medication Sig    ascorbic acid, vitamin C, (VITAMIN C) 500 MG tablet Take 500 mg by mouth once daily.    aspirin (ECOTRIN) 81 MG EC tablet Take 1 tablet (81 mg total) by mouth once daily. Every other day    b complex vitamins capsule Take 1 capsule by mouth once daily.    biotin 1 mg tablet Take 1,000 mcg by mouth once daily.    famotidine (PEPCID) 40 MG tablet Take 1 tablet (40 mg total) by mouth once daily.    folic acid (FOLVITE) 1 MG tablet Take 1 tablet (1 mg total) by mouth once daily.    loratadine (CLARITIN) 10 mg tablet Take 10 mg by mouth once daily.    losartan-hydrochlorothiazide 100-12.5 mg (HYZAAR) 100-12.5 mg Tab TAKE 1 TABLET BY MOUTH EVERY DAY    metFORMIN (GLUCOPHAGE) 1000 MG tablet TAKE 1 TABLET BY MOUTH TWICE A DAY WITH MEALS    methotrexate 2.5 MG Tab TAKE 10 TABLETS (5  TABLET IN THE MORNING, 5 TABLETS IN THE EVENING) BY MOUTH EVERY 7 DAYS.    MULTIVIT-MINERALS/FERROUS FUM (MULTI VITAMIN ORAL) Take 1 tablet by mouth once daily. For women over 50 (centrum)    rosuvastatin (CRESTOR) 20 MG tablet TAKE 1 TABLET BY MOUTH EVERY DAY    TURMERIC ROOT EXTRACT ORAL Take 1 tablet by mouth once daily.    vit A/vit C/vit E/zinc/copper (OCUVITE PRESERVISION ORAL) Take 1 tablet by mouth once daily.    vitamin D (VITAMIN D3) 1000 units Tab Take 1,000 Units by mouth once daily.    [DISCONTINUED] acetaminophen (TYLENOL) 500 MG tablet Take 1,000 mg by mouth every 8 (eight) hours as needed.    [DISCONTINUED] albuterol (PROVENTIL/VENTOLIN HFA) 90 mcg/actuation inhaler Inhale 2 puffs into the lungs every 6 (six) hours as needed for Shortness of Breath (or coughing).    [DISCONTINUED] beta-carotene,A,-vits C,E/mins (VISION ORAL) Take by mouth.    [DISCONTINUED] blood sugar diagnostic Strp To check BG 2 times daily, to use with insurance preferred meter    [DISCONTINUED] blood-glucose meter kit To check BG 2 times daily, to use with insurance preferred meter    [DISCONTINUED] chlorpheniramine-acetaminophen 2-325 mg Tab Take 2 tablets by mouth every 4 to 6 hours as needed. Coricidin HBP    [DISCONTINUED] cinnamon bark, bulk, Powd by Misc.(Non-Drug; Combo Route) route.    [DISCONTINUED] diclofenac sodium (VOLTAREN) 1 % Gel Apply 2 g topically 4 (four) times daily.    [DISCONTINUED] gabapentin (NEURONTIN) 100 MG capsule Take 100 mg by mouth 2 (two) times daily. Taking up to BID prn    [DISCONTINUED] gabapentin (NEURONTIN) 300 MG capsule Take 300 mg by mouth 2 (two) times daily.    [DISCONTINUED] garlic Tab Take by mouth.    [DISCONTINUED] ginger root, bulk, Powd by Misc.(Non-Drug; Combo Route) route.    [DISCONTINUED] lancets Misc To check BG 2 times daily, to use with insurance preferred meter    [DISCONTINUED] montelukast (SINGULAIR) 10 mg tablet Take 1 tablet (10 mg total) by mouth every evening.      Family History       Problem Relation (Age of Onset)    Diabetes Mother, Father          Tobacco Use    Smoking status: Never Smoker    Smokeless tobacco: Never Used   Substance and Sexual Activity    Alcohol use: No    Drug use: No    Sexual activity: Not Currently     Partners: Male     Review of Systems   Constitutional:  Positive for activity change and fatigue. Negative for appetite change, chills, diaphoresis and fever.   HENT: Negative.  Negative for congestion, trouble swallowing and voice change.    Eyes: Negative.    Respiratory:  Negative for apnea, cough and shortness of breath.    Cardiovascular: Negative.  Negative for chest pain, palpitations and leg swelling.   Gastrointestinal:  Positive for nausea and vomiting. Negative for abdominal distention, abdominal pain, constipation and diarrhea.   Endocrine: Negative.    Genitourinary: Negative.    Musculoskeletal:  Positive for arthralgias and gait problem.   Skin: Negative.    Allergic/Immunologic: Negative.    Neurological:  Positive for weakness.   Hematological: Negative.    Psychiatric/Behavioral: Negative.     Objective:     Vital Signs (Most Recent):  Temp: 98.2 °F (36.8 °C) (08/02/22 1150)  Pulse: 82 (08/02/22 1732)  Resp: (!) 21 (08/02/22 1731)  BP: (!) 170/85 (08/02/22 1732)  SpO2: (!) 94 % (08/02/22 1732)   Vital Signs (24h Range):  Temp:  [98.2 °F (36.8 °C)] 98.2 °F (36.8 °C)  Pulse:  [76-92] 82  Resp:  [18-29] 21  SpO2:  [94 %-98 %] 94 %  BP: (152-170)/(79-97) 170/85     Weight: 55.3 kg (122 lb 0.4 oz)  Body mass index is 21.62 kg/m².    Physical Exam  Vitals and nursing note reviewed.   Constitutional:       General: She is awake. She is not in acute distress.     Appearance: She is ill-appearing.   HENT:      Head: Normocephalic and atraumatic.      Right Ear: External ear normal.      Left Ear: External ear normal.      Nose: Nose normal. No congestion or rhinorrhea.      Mouth/Throat:      Mouth: Mucous membranes are moist.       Pharynx: Oropharynx is clear. No oropharyngeal exudate or posterior oropharyngeal erythema.   Eyes:      General: No scleral icterus.     Extraocular Movements: Extraocular movements intact.      Conjunctiva/sclera: Conjunctivae normal.      Pupils: Pupils are equal, round, and reactive to light.   Cardiovascular:      Rate and Rhythm: Normal rate and regular rhythm.      Pulses: Normal pulses.      Heart sounds: Normal heart sounds. No murmur heard.    No gallop.   Pulmonary:      Effort: Pulmonary effort is normal.      Breath sounds: Normal breath sounds. No wheezing or rales.   Abdominal:      General: Abdomen is flat. Bowel sounds are normal. There is no distension.      Palpations: Abdomen is soft.      Tenderness: There is no abdominal tenderness. There is no guarding.   Genitourinary:     Comments: deferred  Musculoskeletal:         General: No swelling or deformity. Normal range of motion.      Cervical back: Normal range of motion and neck supple. No rigidity or tenderness.   Lymphadenopathy:      Cervical: No cervical adenopathy.   Skin:     General: Skin is warm and dry.      Capillary Refill: Capillary refill takes 2 to 3 seconds.   Neurological:      General: No focal deficit present.      Mental Status: She is alert and oriented to person, place, and time.      Cranial Nerves: No cranial nerve deficit.      Motor: No weakness.      Gait: Gait normal.   Psychiatric:         Mood and Affect: Mood normal.         Behavior: Behavior normal. Behavior is cooperative.         Thought Content: Thought content normal.         Judgment: Judgment normal.         CRANIAL NERVES     CN III, IV, VI   Pupils are equal, round, and reactive to light.    Results for orders placed or performed during the hospital encounter of 08/02/22   CBC auto differential   Result Value Ref Range    WBC 13.05 (H) 3.90 - 12.70 K/uL    RBC 3.48 (L) 4.00 - 5.40 M/uL    Hemoglobin 10.7 (L) 12.0 - 16.0 g/dL    Hematocrit 32.8 (L) 37.0 -  48.5 %    MCV 94 82 - 98 fL    MCH 30.7 27.0 - 31.0 pg    MCHC 32.6 32.0 - 36.0 g/dL    RDW 15.7 (H) 11.5 - 14.5 %    Platelets 537 (H) 150 - 450 K/uL    MPV 9.6 9.2 - 12.9 fL    Immature Granulocytes 0.9 (H) 0.0 - 0.5 %    Gran # (ANC) 3.2 1.8 - 7.7 K/uL    Immature Grans (Abs) 0.12 (H) 0.00 - 0.04 K/uL    Lymph # 7.7 (H) 1.0 - 4.8 K/uL    Mono # 1.6 (H) 0.3 - 1.0 K/uL    Eos # 0.3 0.0 - 0.5 K/uL    Baso # 0.13 0.00 - 0.20 K/uL    nRBC 0 0 /100 WBC    Gran % 24.4 (L) 38.0 - 73.0 %    Lymph % 59.1 (H) 18.0 - 48.0 %    Mono % 12.1 4.0 - 15.0 %    Eosinophil % 2.5 0.0 - 8.0 %    Basophil % 1.0 0.0 - 1.9 %    Platelet Estimate Increased (A)     Ovalocytes Occasional     Jordan Cells Occasional     Differential Method Automated    Comprehensive metabolic panel   Result Value Ref Range    Sodium 136 136 - 145 mmol/L    Potassium 3.8 3.5 - 5.1 mmol/L    Chloride 98 95 - 110 mmol/L    CO2 31 (H) 23 - 29 mmol/L    Glucose 131 (H) 70 - 110 mg/dL    BUN 24 (H) 8 - 23 mg/dL    Creatinine 1.7 (H) 0.5 - 1.4 mg/dL    Calcium 12.3 (HH) 8.7 - 10.5 mg/dL    Total Protein 7.8 6.0 - 8.4 g/dL    Albumin 3.4 (L) 3.5 - 5.2 g/dL    Total Bilirubin 0.6 0.1 - 1.0 mg/dL    Alkaline Phosphatase 62 55 - 135 U/L    AST 25 10 - 40 U/L    ALT 25 10 - 44 U/L    Anion Gap 7 (L) 8 - 16 mmol/L    eGFR 30 (A) >60 mL/min/1.73 m^2   Urinalysis, Reflex to Urine Culture Urine, Clean Catch    Specimen: Urine   Result Value Ref Range    Specimen UA Urine, Clean Catch     Color, UA Yellow Yellow, Straw, Harriet    Appearance, UA Hazy (A) Clear    pH, UA 7.0 5.0 - 8.0    Specific Gravity, UA 1.010 1.005 - 1.030    Protein, UA 1+ (A) Negative    Glucose, UA Negative Negative    Ketones, UA Negative Negative    Bilirubin (UA) Negative Negative    Occult Blood UA Trace (A) Negative    Nitrite, UA Negative Negative    Urobilinogen, UA Negative <2.0 EU/dL    Leukocytes, UA 2+ (A) Negative   Urinalysis Microscopic   Result Value Ref Range    RBC, UA 1 0 - 4 /hpf     WBC, UA 3 0 - 5 /hpf    Bacteria Rare None-Occ /hpf    Squam Epithel, UA 5 /hpf    Hyaline Casts, UA 0 0-1/lpf /lpf    Microscopic Comment SEE COMMENT    PTH, intact   Result Value Ref Range    PTH, Intact 12.8 9.0 - 77.0 pg/mL   POCT COVID-19 Rapid Screening   Result Value Ref Range    POC Rapid COVID Negative Negative     Acceptable Yes       Significant Labs: All pertinent labs within the past 24 hours have been reviewed.    Significant Imaging: I have reviewed all pertinent imaging results/findings within the past 24 hours.

## 2022-08-02 NOTE — ED NOTES
Bed: 15  Expected date:   Expected time:   Means of arrival: Personal Transportation  Comments:  When clean

## 2022-08-02 NOTE — ASSESSMENT & PLAN NOTE
Patient with acute kidney injury likely due to IVVD/dehydration JANICE is currently worsening. Labs reviewed- Renal function/electrolytes with Estimated Creatinine Clearance: 22.2 mL/min (A) (based on SCr of 1.7 mg/dL (H)). according to latest data. Monitor urine output and serial BMP and adjust therapy as needed. Avoid nephrotoxins and renally dose meds for GFR listed above.     Rehydrate Aggressively to treat IVVD and HHypercalcemia

## 2022-08-03 VITALS
DIASTOLIC BLOOD PRESSURE: 70 MMHG | HEART RATE: 87 BPM | BODY MASS INDEX: 22.19 KG/M2 | TEMPERATURE: 99 F | HEIGHT: 63 IN | OXYGEN SATURATION: 97 % | SYSTOLIC BLOOD PRESSURE: 145 MMHG | WEIGHT: 125.25 LBS | RESPIRATION RATE: 18 BRPM

## 2022-08-03 PROBLEM — E83.52 HYPERCALCEMIA: Status: RESOLVED | Noted: 2022-08-02 | Resolved: 2022-08-03

## 2022-08-03 PROBLEM — N17.9 AKI (ACUTE KIDNEY INJURY): Status: RESOLVED | Noted: 2022-08-02 | Resolved: 2022-08-03

## 2022-08-03 LAB
ANION GAP SERPL CALC-SCNC: 5 MMOL/L (ref 8–16)
BASOPHILS # BLD AUTO: 0.13 K/UL (ref 0–0.2)
BASOPHILS NFR BLD: 1.1 % (ref 0–1.9)
BUN SERPL-MCNC: 22 MG/DL (ref 8–23)
CALCIUM SERPL-MCNC: 10.8 MG/DL (ref 8.7–10.5)
CHLORIDE SERPL-SCNC: 104 MMOL/L (ref 95–110)
CO2 SERPL-SCNC: 30 MMOL/L (ref 23–29)
CREAT SERPL-MCNC: 1.5 MG/DL (ref 0.5–1.4)
DIFFERENTIAL METHOD: ABNORMAL
EOSINOPHIL # BLD AUTO: 0.4 K/UL (ref 0–0.5)
EOSINOPHIL NFR BLD: 3.7 % (ref 0–8)
ERYTHROCYTE [DISTWIDTH] IN BLOOD BY AUTOMATED COUNT: 15.6 % (ref 11.5–14.5)
EST. GFR  (NO RACE VARIABLE): 35 ML/MIN/1.73 M^2
GLUCOSE SERPL-MCNC: 143 MG/DL (ref 70–110)
HCT VFR BLD AUTO: 29.1 % (ref 37–48.5)
HGB BLD-MCNC: 9.6 G/DL (ref 12–16)
IMM GRANULOCYTES # BLD AUTO: 0.09 K/UL (ref 0–0.04)
IMM GRANULOCYTES NFR BLD AUTO: 0.7 % (ref 0–0.5)
LYMPHOCYTES # BLD AUTO: 6.2 K/UL (ref 1–4.8)
LYMPHOCYTES NFR BLD: 51.4 % (ref 18–48)
MAGNESIUM SERPL-MCNC: 2 MG/DL (ref 1.6–2.6)
MCH RBC QN AUTO: 31.7 PG (ref 27–31)
MCHC RBC AUTO-ENTMCNC: 33 G/DL (ref 32–36)
MCV RBC AUTO: 96 FL (ref 82–98)
MONOCYTES # BLD AUTO: 1.5 K/UL (ref 0.3–1)
MONOCYTES NFR BLD: 12.8 % (ref 4–15)
NEUTROPHILS # BLD AUTO: 3.6 K/UL (ref 1.8–7.7)
NEUTROPHILS NFR BLD: 30.3 % (ref 38–73)
NRBC BLD-RTO: 0 /100 WBC
PHOSPHATE SERPL-MCNC: 2.8 MG/DL (ref 2.7–4.5)
PLATELET # BLD AUTO: 490 K/UL (ref 150–450)
PMV BLD AUTO: 10.3 FL (ref 9.2–12.9)
POCT GLUCOSE: 130 MG/DL (ref 70–110)
POCT GLUCOSE: 368 MG/DL (ref 70–110)
POTASSIUM SERPL-SCNC: 4.1 MMOL/L (ref 3.5–5.1)
RBC # BLD AUTO: 3.03 M/UL (ref 4–5.4)
SODIUM SERPL-SCNC: 139 MMOL/L (ref 136–145)
T4 FREE SERPL-MCNC: 1.04 NG/DL (ref 0.71–1.51)
TSH SERPL DL<=0.005 MIU/L-ACNC: 1.06 UIU/ML (ref 0.4–4)
WBC # BLD AUTO: 12.01 K/UL (ref 3.9–12.7)

## 2022-08-03 PROCEDURE — 36415 COLL VENOUS BLD VENIPUNCTURE: CPT | Performed by: INTERNAL MEDICINE

## 2022-08-03 PROCEDURE — 84165 PROTEIN E-PHORESIS SERUM: CPT | Mod: 26,,, | Performed by: PATHOLOGY

## 2022-08-03 PROCEDURE — 84443 ASSAY THYROID STIM HORMONE: CPT | Performed by: INTERNAL MEDICINE

## 2022-08-03 PROCEDURE — 85025 COMPLETE CBC W/AUTO DIFF WBC: CPT | Performed by: EMERGENCY MEDICINE

## 2022-08-03 PROCEDURE — A4216 STERILE WATER/SALINE, 10 ML: HCPCS | Performed by: EMERGENCY MEDICINE

## 2022-08-03 PROCEDURE — 25000003 PHARM REV CODE 250: Performed by: EMERGENCY MEDICINE

## 2022-08-03 PROCEDURE — 82397 CHEMILUMINESCENT ASSAY: CPT | Performed by: INTERNAL MEDICINE

## 2022-08-03 PROCEDURE — 86334 IMMUNOFIX E-PHORESIS SERUM: CPT | Performed by: INTERNAL MEDICINE

## 2022-08-03 PROCEDURE — 84165 PATHOLOGIST INTERPRETATION SPE: ICD-10-PCS | Mod: 26,,, | Performed by: PATHOLOGY

## 2022-08-03 PROCEDURE — 84439 ASSAY OF FREE THYROXINE: CPT | Performed by: INTERNAL MEDICINE

## 2022-08-03 PROCEDURE — 82164 ANGIOTENSIN I ENZYME TEST: CPT | Performed by: INTERNAL MEDICINE

## 2022-08-03 PROCEDURE — 36415 COLL VENOUS BLD VENIPUNCTURE: CPT | Performed by: EMERGENCY MEDICINE

## 2022-08-03 PROCEDURE — 99205 OFFICE O/P NEW HI 60 MIN: CPT | Mod: ,,, | Performed by: INTERNAL MEDICINE

## 2022-08-03 PROCEDURE — 86334 IMMUNOFIX E-PHORESIS SERUM: CPT | Mod: 26,,, | Performed by: PATHOLOGY

## 2022-08-03 PROCEDURE — 82306 VITAMIN D 25 HYDROXY: CPT | Performed by: INTERNAL MEDICINE

## 2022-08-03 PROCEDURE — 83735 ASSAY OF MAGNESIUM: CPT | Performed by: EMERGENCY MEDICINE

## 2022-08-03 PROCEDURE — 80048 BASIC METABOLIC PNL TOTAL CA: CPT | Performed by: EMERGENCY MEDICINE

## 2022-08-03 PROCEDURE — 84100 ASSAY OF PHOSPHORUS: CPT | Performed by: EMERGENCY MEDICINE

## 2022-08-03 PROCEDURE — 99205 PR OFFICE/OUTPT VISIT, NEW, LEVL V, 60-74 MIN: ICD-10-PCS | Mod: ,,, | Performed by: INTERNAL MEDICINE

## 2022-08-03 PROCEDURE — 84165 PROTEIN E-PHORESIS SERUM: CPT | Performed by: INTERNAL MEDICINE

## 2022-08-03 PROCEDURE — 86334 PATHOLOGIST INTERPRETATION IFE: ICD-10-PCS | Mod: 26,,, | Performed by: PATHOLOGY

## 2022-08-03 PROCEDURE — G0378 HOSPITAL OBSERVATION PER HR: HCPCS

## 2022-08-03 RX ADMIN — Medication 10 ML: at 06:08

## 2022-08-03 NOTE — ASSESSMENT & PLAN NOTE
Hypercalcemia and metabolic alkalosis  Pt has milk alkali syndrome  Due to XS intake of calcium in food supplements (takes 200% of daily requirements)  Doubt other etiology, but w/u was initiated    JANICE: due to hypercalcemia  Prognosis is favorable once ca is corrected    Pt was advised about food supplements and risk of taking excess amounts of daily requirements of vitamins and minerals, as well as taking impurities contained in food supplements (not FDA approved)  Hypercalcemia will require no specific treatment other than discontinuing the food supplements and some NS IVF's.

## 2022-08-03 NOTE — PLAN OF CARE
[facility] Tele[facility] (Hospital)  Discharge Final Note    Primary Care Provider: Bertha Kearns MD    Expected Discharge Date: 8/3/2022    Final Discharge Note (most recent)     Final Note - 08/03/22 1407        Final Note    Assessment Type Final Discharge Note     Anticipated Discharge Disposition Home or Self Care     Hospital Resources/Appts/Education Provided Appointments scheduled by Navigator/Coordinator                 Important Message from Medicare             Contact Info     Bertha Kearns MD   Specialty: Family Medicine   Relationship: PCP - General    [address] [place] [address] [address]SE [address] [place] [id number]   Phone: [phone]       Next Steps: Schedule an appointment as soon as possible for a visit    Instructions: Hospital follow up for elevated Calcium level    Hilda Hankins PA-C   Specialty: Internal Medicine    [address] [place] [address] [address] Jose L [place] [address]   Phone: [phone]       Next Steps: Go on 8/4/2022    Instructions: Scheduled follow up        PCP f/u Friday, CM called and discussed with patient's daughter.

## 2022-08-03 NOTE — HPI
Pt was seen and examined. Labs and meds reviewed. Discussed with other providers. Pt is a 76 y/o female with h/o of HTn who presented with weakness. Pt was noted to have worsened s Cr from baseline 1.1 to 1.7 and Ca 12.4. Renal was consulted. Pt has no acute or new c/o's. Pt feels weak chronically, no new events. 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 Pt reports occasional nausea and hard stools.

## 2022-08-03 NOTE — PLAN OF CARE
[facility] (Hospital)  Discharge Final Note    Primary Care Provider: Bertha Kearns MD    Expected Discharge Date:     Final Discharge Note (most recent)     Final Note - 08/03/22 1210        Final Note    Assessment Type Discharge Planning Assessment                 Important Message from Medicare             Anticipated DC Dispo: home with spouse   Prior Level of Function: independent, lives with spouse. Denies use of HME.  PCP: Dr. Kearns  Comments:  CM met with patient and family at bedside to introduce role and discuss d/c planning. Patient is independent, denies needs at this time. Patient is hopeful to d/c today, CM will continue to follow.

## 2022-08-03 NOTE — DISCHARGE SUMMARY
[facility] ([facility])  [facility] Medicine  Discharge Summary      Patient Name: Indira Walker  MRN: [id number]  Patient Class: OP- Observation  Admission Date: 8/2/2022  Hospital Length of Stay: 0 days  Discharge Date and Time: 8/3/2022  2:32 PM  Attending Physician: Marcell West MD   Discharging Provider: Armando Hilario NP  Primary Care Provider: Bertha Kearns MD      HPI:   Indira Walker is a 77 y.o. female patient with a PMHx of Seropositive RA, DM2, HLD, HTN, GERD, L Foot drop sec to Sciatica s/p Microdiscectomy who was sent to ER by Dr. Kearns () because her Calcium was 12.4. Pt reports that she has been taking half a teaspoon of Marisa Red which has calcium in it and drinking plenty of fluids recently. The pt denies a history of thyroid disease or cancer. Associated sxs include confusion, generalized weakness, dizziness, N/V, and dark urine. She also reports a 40 lbs weight loss over last few months. Patient denies any fever, chills, myalgias, abdominal pain, appetite change, SOB, CP, and all other sxs at this time. No prior Tx reported.   Repeat labs today showed WBC 13, H/H 10.7/33, Bun/Cr 24/1.7, Ca 12.3- (corrected Ca 12.8). EKG NSR, 89, no ST-T changes. Pt received a bolus of NS 1 L in the ER plus I dose of Zofran for her nausea. Hospital Medicine was contacted and pt placed in Observation for Hypercalcemia. Pt admits to eating multiple types of vitamins and supplements including Vit D and Ca. PTH and Vit D levels sent.             * No surgery found *      Hospital Course:   Ms Walker is a 77 year old female who presented to [facility] Emergency Room after being sent by Dr Kearns for elevated calcium level of 12.4. She had been taking Marisa Red which has calcium in it and other supplements. Creatinine noted to be mildly elevated on admission at 1.7, Nephrology consulted. As of 8/3/2022, patient feeling well, vital signs and labs stable. Calcium level trending downward to 10.8,  creatinine down to 1.5. Case reviewed with Nephrology, Dr Reynoso, on to discharge for his standpoint. She was offer Home Health, however refused. Patient instruct to avoid supplements. She was seen, examined and deemed suitable for discharge.        Goals of Care Treatment Preferences:  Code Status: Full Code      Consults:     No new Assessment & Plan notes have been filed under this hospital service since the last note was generated.  Service: Hospital Medicine    Final Active Diagnoses:    Diagnosis Date Noted POA    PRINCIPAL PROBLEM:  Hypercalcemia [E83.52] 08/02/2022 Unknown    RA (rheumatoid arthritis) [M06.9] 01/11/2022 Yes    Uncontrolled type 2 diabetes mellitus with hyperglycemia [E11.65] 03/15/2021 Yes      Problems Resolved During this Admission:    Diagnosis Date Noted Date Resolved POA    JANICE (acute kidney injury) [N17.9] 08/02/2022 08/03/2022 Yes       Discharged Condition: stable    Disposition: Home or Self Care    Follow Up:   Follow-up Information     Bertha Kearns MD. Schedule an appointment as soon as possible for a visit.    Specialty: Family Medicine  Why: Hospital follow up for elevated Calcium level  Contact information:  [address] [place] [address] DR Jose L [place] [address]  [phone]             Hilda Hankins PA-C. Go on 8/4/2022.    Specialty: Internal Medicine  Why: Scheduled follow up  Contact information:  [address] [place] [address] Dr Jose L [place] [address]  [phone]                       Patient Instructions:      Ambulatory referral/consult to [facility]   Standing Status: Future   Referral Priority: Routine Referral Type: Consultation   Referral Reason: Specialty Services Required   Number of Visits Requested: 1     Diet diabetic     Notify your health care provider if you experience any of the following:  increased confusion or weakness     Notify your health care provider if you experience any of the following:  persistent dizziness, light-headedness, or  visual disturbances     Activity as tolerated       Significant Diagnostic Studies: Labs:   CMP   Recent Labs   Lab 08/02/22  1224 08/03/22  0308    139   K 3.8 4.1   CL 98 104   CO2 31* 30*   * 143*   BUN 24* 22   CREATININE 1.7* 1.5*   CALCIUM 12.3* 10.8*   PROT 7.8  --    ALBUMIN 3.4*  --    BILITOT 0.6  --    ALKPHOS 62  --    AST 25  --    ALT 25  --    ANIONGAP 7* 5*    and CBC   Recent Labs   Lab 08/02/22  1224 08/03/22  0308   WBC 13.05* 12.01   HGB 10.7* 9.6*   HCT 32.8* 29.1*   * 490*       Pending Diagnostic Studies:     Procedure Component Value Units Date/Time    Angiotensin converting enzyme [[id number]] Collected: 08/03/22 1213    Order Status: Sent Lab Status: In process Updated: 08/03/22 1214    Specimen: Blood     Immunofixation electrophoresis [[id number]] Collected: 08/03/22 1214    Order Status: Sent Lab Status: In process Updated: 08/03/22 1214    Specimen: Blood     PTH-related peptide [[id number]] Collected: 08/03/22 1213    Order Status: Sent Lab Status: In process Updated: 08/03/22 1214    Specimen: Blood     Protein electrophoresis, serum [[id number]] Collected: 08/03/22 1214    Order Status: Sent Lab Status: In process Updated: 08/03/22 1214    Specimen: Blood     Vitamin D [[id number]] Collected: 08/03/22 1214    Order Status: Sent Lab Status: In process Updated: 08/03/22 1214    Specimen: Blood          Medications:  Reconciled Home Medications:      Medication List      CONTINUE taking these medications    aspirin 81 MG EC tablet  Commonly known as: ECOTRIN  Take 1 tablet (81 mg total) by mouth once daily. Every other day     famotidine 40 MG tablet  Commonly known as: PEPCID  Take 1 tablet (40 mg total) by mouth once daily.     folic acid 1 MG tablet  Commonly known as: FOLVITE  Take 1 tablet (1 mg total) by mouth once daily.     loratadine 10 mg tablet  Commonly known as: CLARITIN  Take 10 mg by mouth once daily.     losartan-hydrochlorothiazide 100-12.5 mg 100-12.5 mg  Tab  Commonly known as: HYZAAR  TAKE 1 TABLET BY MOUTH EVERY DAY     metFORMIN 1000 MG tablet  Commonly known as: GLUCOPHAGE  TAKE 1 TABLET BY MOUTH TWICE A DAY WITH MEALS     methotrexate 2.5 MG Tab  TAKE 10 TABLETS (5 TABLET IN THE MORNING, 5 TABLETS IN THE EVENING) BY MOUTH EVERY 7 DAYS.     rosuvastatin 20 MG tablet  Commonly known as: CRESTOR  TAKE 1 TABLET BY MOUTH EVERY DAY        STOP taking these medications    ascorbic acid (vitamin C) 500 MG tablet  Commonly known as: VITAMIN C     b complex vitamins capsule     biotin 1 mg tablet     MULTI VITAMIN ORAL     OCUVITE PRESERVISION ORAL     TURMERIC ROOT EXTRACT ORAL     vitamin D 1000 units Tab  Commonly known as: VITAMIN D3            Indwelling Lines/Drains at time of discharge:   Lines/Drains/Airways     None                 Time spent on the discharge of patient: 45 minutes         Armando Hilario NP  Department of Hospital Medicine  [facility] Tele[facility] ([facility])

## 2022-08-03 NOTE — PLAN OF CARE
Pt AAOx4. No signs of distress. Able to verbalize needs and follow commands. Calm and Cooperative. Denies any pain at this time. Vital signs stable. Sinus Rhythm on tele monitor. On room air.  PIV is patent. Pt continent of b/b. independent. Pt remains free of falls. Meds given as prescribed.  POC reviewed with pt and pt verbalized understanding. Pt educated on safety and fall risk. Verbalized understanding. Interventions implemented as appropriate. Pt able to turn self. Encouraged pt  to turn frequently. Resting quietly in bed.Bed low. Side rails up x2, wheels locked, call light within reach.

## 2022-08-03 NOTE — SUBJECTIVE & OBJECTIVE
Past Medical History:   Diagnosis Date    Arthritis     Diabetes mellitus, type 2     Foot drop, left 2018    GERD (gastroesophageal reflux disease)     Heartburn     Hyperlipidemia     Hypertension     Left sided sciatica     s/p microdiscectomy 3/21/18       Past Surgical History:   Procedure Laterality Date    BREAST BIOPSY       SECTION      COLONOSCOPY  2008    DR. JANET GARCÍA/MILD DIVERICULOSIS DESCENDING AND SIGMOID. REPEAT 5 YRS    ENDOSCOPIC ULTRASOUND OF UPPER GASTROINTESTINAL TRACT N/A 2022    Procedure: ULTRASOUND, UPPER GI TRACT, ENDOSCOPIC;  Surgeon: Ange Saldivar MD;  Location: [facility];  Service: Endoscopy;  Laterality: N/A;  Upper and Linear, 22 sharkcore, slides and formalin    HERNIA REPAIR      MICRODISCECTOMY OF SPINE Left 2018    left L5-S1, Dr Segundo       Review of patient's allergies indicates:   Allergen Reactions    Pyrilamine Swelling     (an ingredient in Midol)    Iodine and iodide containing products Hives     Current Facility-Administered Medications   Medication Frequency    acetaminophen tablet 650 mg Q4H PRN    cefTRIAXone (ROCEPHIN) 1 g/50 mL D5W IVPB Q24H    dextrose 10% bolus 125 mL PRN    dextrose 10% bolus 250 mL PRN    enoxaparin injection 30 mg Daily    glucagon (human recombinant) injection 1 mg PRN    glucose chewable tablet 16 g PRN    glucose chewable tablet 24 g PRN    insulin aspart U-100 pen 0-5 Units QID (AC + HS) PRN    melatonin tablet 6 mg Nightly PRN    naloxone 0.4 mg/mL injection 0.02 mg PRN    ondansetron disintegrating tablet 4 mg Q8H PRN    polyethylene glycol packet 17 g Daily PRN    senna-docusate 8.6-50 mg per tablet 1 tablet BID PRN    sodium chloride 0.9% flush 10 mL Q8H     Family History       Problem Relation (Age of Onset)    Diabetes Mother, Father          Tobacco Use    Smoking status: Never Smoker    Smokeless tobacco: Never Used   Substance and Sexual Activity    Alcohol use: No    Drug use: No     Sexual activity: Not Currently     Partners: Male     Review of Systems   Constitutional: Negative.    HENT: Negative.     Respiratory: Negative.     Cardiovascular: Negative.    Gastrointestinal:  Positive for nausea. Negative for constipation.   Genitourinary: Negative.    Musculoskeletal: Negative.    Neurological: Negative.    Psychiatric/Behavioral: Negative.     Objective:     Vital Signs (Most Recent):  Temp: 98.6 °F (37 °C) (08/03/22 1143)  Pulse: 87 (08/03/22 1143)  Resp: 18 (08/03/22 1143)  BP: (!) 145/70 (08/03/22 1143)  SpO2: 97 % (08/03/22 1143)  O2 Device (Oxygen Therapy): room air (08/03/22 0300)   Vital Signs (24h Range):  Temp:  [96.8 °F (36 °C)-98.6 °F (37 °C)] 98.6 °F (37 °C)  Pulse:  [65-88] 87  Resp:  [12-29] 18  SpO2:  [93 %-98 %] 97 %  BP: (140-170)/(70-97) 145/70     Weight: 56.8 kg (125 lb 3.5 oz) (08/03/22 0009)  Body mass index is 22.18 kg/m².  Body surface area is 1.59 meters squared.    I/O last 3 completed shifts:  In: 1000 [IV Piggyback:1000]  Out: -     Physical Exam  Vitals and nursing note reviewed.   Constitutional:       Appearance: Normal appearance.   HENT:      Head: Normocephalic and atraumatic.   Cardiovascular:      Rate and Rhythm: Normal rate and regular rhythm.      Pulses: Normal pulses.      Heart sounds: Normal heart sounds.   Pulmonary:      Effort: Pulmonary effort is normal.      Breath sounds: Normal breath sounds.   Abdominal:      General: Abdomen is flat.      Palpations: Abdomen is soft.      Tenderness: There is no abdominal tenderness.   Musculoskeletal:      Right lower leg: No edema.      Left lower leg: No edema.   Skin:     General: Skin is warm and dry.   Neurological:      Mental Status: She is alert and oriented to person, place, and time.   Psychiatric:         Mood and Affect: Mood normal.         Behavior: Behavior normal.       Significant Labs: reviewed  BMP  Lab Results   Component Value Date     08/03/2022    K 4.1 08/03/2022      08/03/2022    CO2 30 (H) 08/03/2022    BUN 22 08/03/2022    CREATININE 1.5 (H) 08/03/2022    CALCIUM 10.8 (H) 08/03/2022    ANIONGAP 5 (L) 08/03/2022    ESTGFRAFRICA 56 (A) 07/20/2022    EGFRNONAA 48 (A) 07/20/2022     Lab Results   Component Value Date    WBC 12.01 08/03/2022    HGB 9.6 (L) 08/03/2022    HCT 29.1 (L) 08/03/2022    MCV 96 08/03/2022     (H) 08/03/2022       Lab Results   Component Value Date    PTH 12.8 08/02/2022    CALCIUM 10.8 (H) 08/03/2022    PHOS 2.8 08/03/2022     Ca highest 12.4      Significant Imaging: reviewed

## 2022-08-03 NOTE — CONSULTS
[facility] ([facility])  Nephrology  Consult Note      Patient Name: Indira Walker  MRN: [id number]  Admission Date: 2022  Hospital Length of Stay: 0 days  Attending Provider: Marcell West MD   Primary Care Physician: Bertha Kearns MD  Principal Problem:Hypercalcemia    Reason for consult: JANICE and hypercalcemia    Consults  Subjective:     HPI: Pt was seen and examined. Labs and meds reviewed. Discussed with other providers. Pt is a 78 y/o female with h/o of HTn who presented with weakness. Pt was noted to have worsened s Cr from baseline 1.1 to 1.7 and Ca 12.4. Renal was consulted. Pt has no acute or new c/o's. Pt feels weak chronically, no new events. 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 Pt reports occasional nausea and hard stools.      Past Medical History:   Diagnosis Date    Arthritis     Diabetes mellitus, type 2     Foot drop, left 2018    GERD (gastroesophageal reflux disease)     Heartburn     Hyperlipidemia     Hypertension     Left sided sciatica     s/p microdiscectomy 3/21/18       Past Surgical History:   Procedure Laterality Date    BREAST BIOPSY       SECTION      COLONOSCOPY  2008    DR. JANET GARCÍA/MILD DIVERICULOSIS DESCENDING AND SIGMOID. REPEAT 5 YRS    ENDOSCOPIC ULTRASOUND OF UPPER GASTROINTESTINAL TRACT N/A 2022    Procedure: ULTRASOUND, UPPER GI TRACT, ENDOSCOPIC;  Surgeon: Ange Saldivar MD;  Location: [facility];  Service: Endoscopy;  Laterality: N/A;  Upper and Linear, 22 sharkcore, slides and formalin    HERNIA REPAIR      MICRODISCECTOMY OF SPINE Left 2018    left L5-S1, Dr Segundo       Review of patient's allergies indicates:   Allergen Reactions    Pyrilamine Swelling     (an ingredient in Midol)    Iodine and iodide containing products Hives      Current Facility-Administered Medications   Medication Frequency    acetaminophen tablet 650 mg Q4H PRN    cefTRIAXone (ROCEPHIN) 1 g/50 mL D5W IVPB Q24H    dextrose 10% bolus 125 mL PRN    dextrose 10% bolus 250 mL PRN    enoxaparin injection 30 mg Daily    glucagon (human recombinant) injection 1 mg PRN    glucose chewable tablet 16 g PRN    glucose chewable tablet 24 g PRN    insulin aspart U-100 pen 0-5 Units QID (AC + HS) PRN    melatonin tablet 6 mg Nightly PRN    naloxone 0.4 mg/mL injection 0.02 mg PRN    ondansetron disintegrating tablet 4 mg Q8H PRN    polyethylene glycol packet 17 g Daily PRN    senna-docusate 8.6-50 mg per tablet 1 tablet BID PRN    sodium chloride 0.9% flush 10 mL Q8H     Family History       Problem Relation (Age of Onset)    Diabetes Mother, Father          Tobacco Use    Smoking status: Never Smoker    Smokeless tobacco: Never Used   Substance and Sexual Activity    Alcohol use: No    Drug use: No    Sexual activity: Not Currently     Partners: Male     Review of Systems   Constitutional: Negative.    HENT: Negative.     Respiratory: Negative.     Cardiovascular: Negative.    Gastrointestinal:  Positive for nausea. Negative for constipation.   Genitourinary: Negative.    Musculoskeletal: Negative.    Neurological: Negative.    Psychiatric/Behavioral: Negative.     Objective:     Vital Signs (Most Recent):  Temp: 98.6 °F (37 °C) (08/03/22 1143)  Pulse: 87 (08/03/22 1143)  Resp: 18 (08/03/22 1143)  BP: (!) 145/70 (08/03/22 1143)  SpO2: 97 % (08/03/22 1143)  O2 Device (Oxygen Therapy): room air (08/03/22 0300)   Vital Signs (24h Range):  Temp:  [96.8 °F (36 °C)-98.6 °F (37 °C)] 98.6 °F (37 °C)  Pulse:  [65-88] 87  Resp:  [12-29] 18  SpO2:  [93 %-98 %] 97 %  BP: (140-170)/(70-97) 145/70     Weight: 56.8 kg (125 lb 3.5 oz) (08/03/22 0009)  Body mass index is 22.18 kg/m².  Body surface area is 1.59 meters squared.    I/O last 3 completed shifts:  In: 1000 [IV  Piggyback:1000]  Out: -     Physical Exam  Vitals and nursing note reviewed.   Constitutional:       Appearance: Normal appearance.   HENT:      Head: Normocephalic and atraumatic.   Cardiovascular:      Rate and Rhythm: Normal rate and regular rhythm.      Pulses: Normal pulses.      Heart sounds: Normal heart sounds.   Pulmonary:      Effort: Pulmonary effort is normal.      Breath sounds: Normal breath sounds.   Abdominal:      General: Abdomen is flat.      Palpations: Abdomen is soft.      Tenderness: There is no abdominal tenderness.   Musculoskeletal:      Right lower leg: No edema.      Left lower leg: No edema.   Skin:     General: Skin is warm and dry.   Neurological:      Mental Status: She is alert and oriented to person, place, and time.   Psychiatric:         Mood and Affect: Mood normal.         Behavior: Behavior normal.       Significant Labs: reviewed  BMP  Lab Results   Component Value Date     08/03/2022    K 4.1 08/03/2022     08/03/2022    CO2 30 (H) 08/03/2022    BUN 22 08/03/2022    CREATININE 1.5 (H) 08/03/2022    CALCIUM 10.8 (H) 08/03/2022    ANIONGAP 5 (L) 08/03/2022    ESTGFRAFRICA 56 (A) 07/20/2022    EGFRNONAA 48 (A) 07/20/2022     Lab Results   Component Value Date    WBC 12.01 08/03/2022    HGB 9.6 (L) 08/03/2022    HCT 29.1 (L) 08/03/2022    MCV 96 08/03/2022     (H) 08/03/2022       Lab Results   Component Value Date    PTH 12.8 08/02/2022    CALCIUM 10.8 (H) 08/03/2022    PHOS 2.8 08/03/2022     Ca highest 12.4      Significant Imaging: reviewed    Assessment/Plan:     76 y/o female with JANICE and elevated serum calcium:    * Hypercalcemia  Hypercalcemia and metabolic alkalosis  Pt has milk alkali syndrome  Due to XS intake of calcium in food supplements (takes 200% of daily requirements)  Doubt other etiology, but w/u was initiated    JANICE: due to hypercalcemia  Prognosis is favorable once ca is corrected    Pt was advised about food supplements and risk of taking  excess amounts of daily requirements of vitamins and minerals, as well as taking impurities contained in food supplements (not FDA approved)  Hypercalcemia will require no specific treatment other than discontinuing the food supplements and some NS IVF's.      Uncontrolled type 2 diabetes mellitus with hyperglycemia  Reviewed the chart      Plans and recommendations:  As discussed above  Total time spent 70 minutes including time needed to review the records, the   patient evaluation, documentation, face-to-face discussion with the patient,   more than 50% of the time was spent on coordination of care and counseling.    Level V visit.      Scott Reynoso MD   Nephrology  [facility] ([facility])

## 2022-08-03 NOTE — HOSPITAL COURSE
Ms Walker is a 77 year old female who presented to [facility] Emergency Room after being sent by Dr Kearns for elevated calcium level of 12.4. She had been taking Marisa Red which has calcium in it and other supplements. Creatinine noted to be mildly elevated on admission at 1.7, Nephrology consulted. As of 8/3/2022, patient feeling well, vital signs and labs stable. Calcium level trending downward to 10.8, creatinine down to 1.5. Case reviewed with Nephrology, Dr Reynoso, on to discharge for his standpoint. She was offer Home Health, however refused. Patient instruct to avoid supplements. She was seen, examined and deemed suitable for discharge.

## 2022-08-04 ENCOUNTER — PATIENT OUTREACH (OUTPATIENT)
Dept: ADMINISTRATIVE | Facility: HOSPITAL | Age: 77
End: 2022-08-04
Payer: MEDICARE

## 2022-08-04 LAB
25(OH)D3+25(OH)D2 SERPL-MCNC: 66 NG/ML (ref 30–96)
ALBUMIN SERPL ELPH-MCNC: 3.27 G/DL (ref 3.35–5.55)
ALPHA1 GLOB SERPL ELPH-MCNC: 0.33 G/DL (ref 0.17–0.41)
ALPHA2 GLOB SERPL ELPH-MCNC: 0.64 G/DL (ref 0.43–0.99)
B-GLOBULIN SERPL ELPH-MCNC: 0.79 G/DL (ref 0.5–1.1)
GAMMA GLOB SERPL ELPH-MCNC: 2.07 G/DL (ref 0.67–1.58)
INTERPRETATION SERPL IFE-IMP: NORMAL
PATHOLOGIST INTERPRETATION IFE: NORMAL
PATHOLOGIST INTERPRETATION SPE: NORMAL
PROT SERPL-MCNC: 7.1 G/DL (ref 6–8.4)

## 2022-08-04 NOTE — PROGRESS NOTES
Called patient to confirm hospital follow up scheduled on 8/05/2022.   Patient is confirmed. Encouraged patient to bring all medications and BP cuff to follow up visit.

## 2022-08-05 ENCOUNTER — OFFICE VISIT (OUTPATIENT)
Dept: INTERNAL MEDICINE | Facility: CLINIC | Age: 77
End: 2022-08-05
Payer: MEDICARE

## 2022-08-05 ENCOUNTER — LAB VISIT (OUTPATIENT)
Dept: LAB | Facility: HOSPITAL | Age: 77
End: 2022-08-05
Attending: PHYSICIAN ASSISTANT
Payer: MEDICARE

## 2022-08-05 VITALS
SYSTOLIC BLOOD PRESSURE: 136 MMHG | DIASTOLIC BLOOD PRESSURE: 74 MMHG | TEMPERATURE: 98 F | HEIGHT: 63 IN | BODY MASS INDEX: 21.56 KG/M2 | OXYGEN SATURATION: 96 % | WEIGHT: 121.69 LBS | HEART RATE: 99 BPM

## 2022-08-05 DIAGNOSIS — E11.59 HYPERTENSION ASSOCIATED WITH DIABETES: ICD-10-CM

## 2022-08-05 DIAGNOSIS — M79.673 PAIN OF FOOT, UNSPECIFIED LATERALITY: ICD-10-CM

## 2022-08-05 DIAGNOSIS — D64.9 ANEMIA, UNSPECIFIED TYPE: ICD-10-CM

## 2022-08-05 DIAGNOSIS — R31.9 HEMATURIA, UNSPECIFIED TYPE: ICD-10-CM

## 2022-08-05 DIAGNOSIS — E78.5 HYPERLIPIDEMIA ASSOCIATED WITH TYPE 2 DIABETES MELLITUS: ICD-10-CM

## 2022-08-05 DIAGNOSIS — E83.52 HYPERCALCEMIA DUE TO A DRUG: ICD-10-CM

## 2022-08-05 DIAGNOSIS — E11.69 HYPERLIPIDEMIA ASSOCIATED WITH TYPE 2 DIABETES MELLITUS: ICD-10-CM

## 2022-08-05 DIAGNOSIS — I15.2 HYPERTENSION ASSOCIATED WITH DIABETES: ICD-10-CM

## 2022-08-05 DIAGNOSIS — T50.905A HYPERCALCEMIA DUE TO A DRUG: ICD-10-CM

## 2022-08-05 DIAGNOSIS — E11.65 UNCONTROLLED TYPE 2 DIABETES MELLITUS WITH HYPERGLYCEMIA: Primary | ICD-10-CM

## 2022-08-05 LAB
ACE SERPL-CCNC: 47 U/L (ref 16–85)
BACTERIA #/AREA URNS AUTO: NORMAL /HPF
BILIRUB UR QL STRIP: NEGATIVE
CLARITY UR REFRACT.AUTO: CLEAR
COLOR UR AUTO: YELLOW
GLUCOSE UR QL STRIP: NEGATIVE
HGB UR QL STRIP: NEGATIVE
HYALINE CASTS UR QL AUTO: 0 /LPF
KETONES UR QL STRIP: ABNORMAL
LEUKOCYTE ESTERASE UR QL STRIP: NEGATIVE
MICROSCOPIC COMMENT: NORMAL
NITRITE UR QL STRIP: NEGATIVE
PH UR STRIP: 6 [PH] (ref 5–8)
PROT UR QL STRIP: ABNORMAL
RBC #/AREA URNS AUTO: 1 /HPF (ref 0–4)
SP GR UR STRIP: 1.01 (ref 1–1.03)
SQUAMOUS #/AREA URNS AUTO: 2 /HPF
URN SPEC COLLECT METH UR: ABNORMAL
WBC #/AREA URNS AUTO: 2 /HPF (ref 0–5)

## 2022-08-05 PROCEDURE — 3075F SYST BP GE 130 - 139MM HG: CPT | Mod: CPTII,S$GLB,, | Performed by: FAMILY MEDICINE

## 2022-08-05 PROCEDURE — 1126F PR PAIN SEVERITY QUANTIFIED, NO PAIN PRESENT: ICD-10-PCS | Mod: CPTII,S$GLB,, | Performed by: FAMILY MEDICINE

## 2022-08-05 PROCEDURE — 3078F DIAST BP <80 MM HG: CPT | Mod: CPTII,S$GLB,, | Performed by: FAMILY MEDICINE

## 2022-08-05 PROCEDURE — 1101F PR PT FALLS ASSESS DOC 0-1 FALLS W/OUT INJ PAST YR: ICD-10-PCS | Mod: CPTII,S$GLB,, | Performed by: FAMILY MEDICINE

## 2022-08-05 PROCEDURE — 99999 PR PBB SHADOW E&M-EST. PATIENT-LVL V: CPT | Mod: PBBFAC,,, | Performed by: FAMILY MEDICINE

## 2022-08-05 PROCEDURE — 3078F PR MOST RECENT DIASTOLIC BLOOD PRESSURE < 80 MM HG: ICD-10-PCS | Mod: CPTII,S$GLB,, | Performed by: FAMILY MEDICINE

## 2022-08-05 PROCEDURE — 87086 URINE CULTURE/COLONY COUNT: CPT | Performed by: FAMILY MEDICINE

## 2022-08-05 PROCEDURE — 3288F FALL RISK ASSESSMENT DOCD: CPT | Mod: CPTII,S$GLB,, | Performed by: FAMILY MEDICINE

## 2022-08-05 PROCEDURE — 1126F AMNT PAIN NOTED NONE PRSNT: CPT | Mod: CPTII,S$GLB,, | Performed by: FAMILY MEDICINE

## 2022-08-05 PROCEDURE — 99495 TRANSJ CARE MGMT MOD F2F 14D: CPT | Mod: S$GLB,,, | Performed by: FAMILY MEDICINE

## 2022-08-05 PROCEDURE — 99495 TCM SERVICES (MODERATE COMPLEXITY): ICD-10-PCS | Mod: S$GLB,,, | Performed by: FAMILY MEDICINE

## 2022-08-05 PROCEDURE — 99999 PR PBB SHADOW E&M-EST. PATIENT-LVL V: ICD-10-PCS | Mod: PBBFAC,,, | Performed by: FAMILY MEDICINE

## 2022-08-05 PROCEDURE — 3288F PR FALLS RISK ASSESSMENT DOCUMENTED: ICD-10-PCS | Mod: CPTII,S$GLB,, | Performed by: FAMILY MEDICINE

## 2022-08-05 PROCEDURE — 1101F PT FALLS ASSESS-DOCD LE1/YR: CPT | Mod: CPTII,S$GLB,, | Performed by: FAMILY MEDICINE

## 2022-08-05 PROCEDURE — 81001 URINALYSIS AUTO W/SCOPE: CPT | Performed by: FAMILY MEDICINE

## 2022-08-05 PROCEDURE — 3075F PR MOST RECENT SYSTOLIC BLOOD PRESS GE 130-139MM HG: ICD-10-PCS | Mod: CPTII,S$GLB,, | Performed by: FAMILY MEDICINE

## 2022-08-05 RX ORDER — MULTIVIT WITH MINERALS/HERBS
1 TABLET ORAL DAILY
COMMUNITY
End: 2022-08-05

## 2022-08-05 RX ORDER — BIOTIN 1 MG
1000 TABLET ORAL 3 TIMES DAILY
COMMUNITY
End: 2022-08-05

## 2022-08-05 RX ORDER — ASCORBIC ACID 500 MG
500 TABLET ORAL DAILY
COMMUNITY
End: 2022-08-05

## 2022-08-05 RX ORDER — VIT C/E/ZN/COPPR/LUTEIN/ZEAXAN 250MG-90MG
1000 CAPSULE ORAL DAILY
COMMUNITY
End: 2022-08-05

## 2022-08-05 NOTE — ASSESSMENT & PLAN NOTE
Uncontrolled, continue metformin, referral to diabetes education to assist with diet changes as she does not desire to add other medications; follow up in 3 months    Lab Results   Component Value Date    HGBA1C 8.4 (H) 08/01/2022    HGBA1C 7.7 (H) 01/27/2022    LDLCALC 46.2 (L) 08/01/2022    LABMICR 125.0 08/01/2022    CREATRANDUR 81.0 08/01/2022    MICALBCREAT 154.3 (H) 08/01/2022

## 2022-08-05 NOTE — Clinical Note
What do you recommend regarding abnormal protein electrophoresis result? Hematology/Oncology consult? Thanks

## 2022-08-05 NOTE — PROGRESS NOTES
Subjective:       Patient ID: Indira Walker is a 77 y.o. female.    Chief Complaint: Hospital Follow Up    Transitional Care Note    Family and/or Caretaker present at visit?  Yes.  and daughter present for visit.  Diagnostic tests reviewed/disposition: I have reviewed all completed as well as pending diagnostic tests at the time of discharge.  Disease/illness education: see A/P  Home health/community services discussion/referrals: Patient does not have home health established from hospital visit.  They do not need home health.  If needed, we will set up home health for the patient.   Establishment or re-establishment of referral orders for community resources: No other necessary community resources.   Discussion with other health care providers: I will discuss abnormal protein electrophoresis result with Dr. Reynoso, Nephrology.     Patient presents to clinic today for hospital TCC visit. Also lab review for chronic conditions. Patient was recently hospitalized for hypercalcemia. She is feeling better and has stopped taking supplements. She requests podiatry referral for foot pain. She also reports having prolapsed uterus and inquires what can be done regarding that. Patient is otherwise without concerns today.    Wt Readings from Last 10 Encounters:  08/05/22 : 55.2 kg (121 lb 11.1 oz)  08/03/22 : 56.8 kg (125 lb 3.5 oz)  07/20/22 : 55.6 kg (122 lb 9.2 oz)  07/11/22 : 57.6 kg (127 lb)  05/26/22 : 57.8 kg (127 lb 6.8 oz)  02/09/22 : 58.5 kg (128 lb 15.5 oz)  02/02/22 : 55.8 kg (123 lb 0.3 oz)  01/26/22 : 56.8 kg (125 lb 3.5 oz)  01/25/22 : 59 kg (130 lb)  01/11/22 : 55.3 kg (122 lb)      Review of Systems   Constitutional: Negative for chills, fatigue, fever and unexpected weight change.   Eyes: Negative for visual disturbance.   Respiratory: Negative for shortness of breath.    Cardiovascular: Negative for chest pain.   Musculoskeletal: Negative for myalgias.   Neurological: Negative for headaches.        Objective:      Physical Exam  Vitals reviewed.   Constitutional:       General: She is not in acute distress.     Appearance: She is well-developed.   HENT:      Head: Normocephalic and atraumatic.   Eyes:      General: Lids are normal. No scleral icterus.     Extraocular Movements: Extraocular movements intact.      Conjunctiva/sclera: Conjunctivae normal.      Pupils: Pupils are equal, round, and reactive to light.   Cardiovascular:      Rate and Rhythm: Normal rate and regular rhythm.      Heart sounds: No murmur heard.    No friction rub. No gallop.   Pulmonary:      Effort: Pulmonary effort is normal.      Breath sounds: Normal breath sounds. No decreased breath sounds, wheezing, rhonchi or rales.   Neurological:      Mental Status: She is alert and oriented to person, place, and time.      Cranial Nerves: No cranial nerve deficit.      Gait: Gait normal.   Psychiatric:         Mood and Affect: Mood and affect normal.         Assessment:       1. Uncontrolled type 2 diabetes mellitus with hyperglycemia    2. Hypercalcemia due to a drug    3. Anemia, unspecified type    4. Hematuria, unspecified type    5. Pain of foot, unspecified laterality    6. Hypertension associated with diabetes    7. Hyperlipidemia associated with type 2 diabetes mellitus        Plan:   1. Uncontrolled type 2 diabetes mellitus with hyperglycemia  Assessment & Plan:  Uncontrolled, continue metformin, referral to diabetes education to assist with diet changes as she does not desire to add other medications; follow up in 3 months    Lab Results   Component Value Date    HGBA1C 8.4 (H) 08/01/2022    HGBA1C 7.7 (H) 01/27/2022    LDLCALC 46.2 (L) 08/01/2022    LABMICR 125.0 08/01/2022    CREATRANDUR 81.0 08/01/2022    MICALBCREAT 154.3 (H) 08/01/2022         Orders:  -     Hemoglobin A1C  -     Ambulatory referral/consult to Diabetes Education    2. Hypercalcemia due to a drug  -     Basic Metabolic Panel    3. Anemia, unspecified type  -      CBC Auto Differential    4. Hematuria, unspecified type  -     Urinalysis  -     Urine culture    5. Pain of foot, unspecified laterality  -     Ambulatory referral/consult to Podiatry    6. Hypertension associated with diabetes  Assessment & Plan:  Controlled, continue losartan-HCTZ      7. Hyperlipidemia associated with type 2 diabetes mellitus  Assessment & Plan:  Controlled, continue crestor    Lab Results   Component Value Date    CHOL 114 (L) 08/01/2022    CHOL 158 01/27/2022    LDLCALC 46.2 (L) 08/01/2022    LDLCALC 72.6 01/27/2022    TRIG 79 08/01/2022    HDL 52 08/01/2022    ALT 25 08/02/2022    AST 25 08/02/2022    ALKPHOS 62 08/02/2022           Advised no supplements unless advised by physician to take them. Patient expressed understanding and agreement with plan.

## 2022-08-05 NOTE — ASSESSMENT & PLAN NOTE
Controlled, continue crestor    Lab Results   Component Value Date    CHOL 114 (L) 08/01/2022    CHOL 158 01/27/2022    LDLCALC 46.2 (L) 08/01/2022    LDLCALC 72.6 01/27/2022    TRIG 79 08/01/2022    HDL 52 08/01/2022    ALT 25 08/02/2022    AST 25 08/02/2022    ALKPHOS 62 08/02/2022

## 2022-08-07 LAB
BACTERIA UR CULT: NORMAL
BACTERIA UR CULT: NORMAL

## 2022-08-09 LAB — PTH RELATED PROT SERPL-SCNC: 0.5 PMOL/L

## 2022-08-10 ENCOUNTER — TELEPHONE (OUTPATIENT)
Dept: DIABETES | Facility: CLINIC | Age: 77
End: 2022-08-10
Payer: MEDICARE

## 2022-08-11 ENCOUNTER — TELEPHONE (OUTPATIENT)
Dept: RHEUMATOLOGY | Facility: CLINIC | Age: 77
End: 2022-08-11
Payer: MEDICARE

## 2022-08-11 ENCOUNTER — PATIENT OUTREACH (OUTPATIENT)
Dept: ADMINISTRATIVE | Facility: HOSPITAL | Age: 77
End: 2022-08-11
Payer: MEDICARE

## 2022-08-11 NOTE — TELEPHONE ENCOUNTER
----- Message from Nolvia Nelson sent at 8/11/2022  3:41 PM CDT -----  Contact: Pt @[phone]  [email or web address]     Patient is calling for Medical Advice regarding:    Pt would like to know if provider would like her to start back taking her methotrexate 2.5 MG Tab. She states that she has been off of it for three weeks now and they she was taken off of all vitamins.     Would like response via [employer]:   call back     Comments:   Please call back to advise

## 2022-08-12 DIAGNOSIS — D64.9 ANEMIA, UNSPECIFIED TYPE: ICD-10-CM

## 2022-08-12 DIAGNOSIS — D72.829 LEUKOCYTOSIS, UNSPECIFIED TYPE: Primary | ICD-10-CM

## 2022-08-12 DIAGNOSIS — E83.52 HYPERCALCEMIA: ICD-10-CM

## 2022-08-12 DIAGNOSIS — R79.89 ELEVATED PLATELET COUNT: ICD-10-CM

## 2022-08-12 DIAGNOSIS — E87.6 HYPOKALEMIA: ICD-10-CM

## 2022-08-18 ENCOUNTER — OFFICE VISIT (OUTPATIENT)
Dept: PODIATRY | Facility: CLINIC | Age: 77
End: 2022-08-18
Payer: MEDICARE

## 2022-08-18 VITALS — BODY MASS INDEX: 21.56 KG/M2 | WEIGHT: 121.69 LBS | HEIGHT: 63 IN

## 2022-08-18 DIAGNOSIS — M79.673 PAIN OF FOOT, UNSPECIFIED LATERALITY: ICD-10-CM

## 2022-08-18 DIAGNOSIS — M20.42 HAMMER TOES OF BOTH FEET: ICD-10-CM

## 2022-08-18 DIAGNOSIS — L90.9 PLANTAR FAT PAD ATROPHY: ICD-10-CM

## 2022-08-18 DIAGNOSIS — M20.41 HAMMER TOES OF BOTH FEET: ICD-10-CM

## 2022-08-18 DIAGNOSIS — M77.42 METATARSALGIA, LEFT FOOT: Primary | ICD-10-CM

## 2022-08-18 DIAGNOSIS — M77.41 METATARSALGIA, RIGHT FOOT: ICD-10-CM

## 2022-08-18 PROCEDURE — 1159F MED LIST DOCD IN RCRD: CPT | Mod: CPTII,S$GLB,, | Performed by: PODIATRIST

## 2022-08-18 PROCEDURE — 1125F AMNT PAIN NOTED PAIN PRSNT: CPT | Mod: CPTII,S$GLB,, | Performed by: PODIATRIST

## 2022-08-18 PROCEDURE — 1160F PR REVIEW ALL MEDS BY PRESCRIBER/CLIN PHARMACIST DOCUMENTED: ICD-10-PCS | Mod: CPTII,S$GLB,, | Performed by: PODIATRIST

## 2022-08-18 PROCEDURE — 99999 PR PBB SHADOW E&M-EST. PATIENT-LVL III: CPT | Mod: PBBFAC,,, | Performed by: PODIATRIST

## 2022-08-18 PROCEDURE — 99203 PR OFFICE/OUTPT VISIT, NEW, LEVL III, 30-44 MIN: ICD-10-PCS | Mod: S$GLB,,, | Performed by: PODIATRIST

## 2022-08-18 PROCEDURE — 99203 OFFICE O/P NEW LOW 30 MIN: CPT | Mod: S$GLB,,, | Performed by: PODIATRIST

## 2022-08-18 PROCEDURE — 1101F PR PT FALLS ASSESS DOC 0-1 FALLS W/OUT INJ PAST YR: ICD-10-PCS | Mod: CPTII,S$GLB,, | Performed by: PODIATRIST

## 2022-08-18 PROCEDURE — 1159F PR MEDICATION LIST DOCUMENTED IN MEDICAL RECORD: ICD-10-PCS | Mod: CPTII,S$GLB,, | Performed by: PODIATRIST

## 2022-08-18 PROCEDURE — 3288F FALL RISK ASSESSMENT DOCD: CPT | Mod: CPTII,S$GLB,, | Performed by: PODIATRIST

## 2022-08-18 PROCEDURE — 1125F PR PAIN SEVERITY QUANTIFIED, PAIN PRESENT: ICD-10-PCS | Mod: CPTII,S$GLB,, | Performed by: PODIATRIST

## 2022-08-18 PROCEDURE — 3288F PR FALLS RISK ASSESSMENT DOCUMENTED: ICD-10-PCS | Mod: CPTII,S$GLB,, | Performed by: PODIATRIST

## 2022-08-18 PROCEDURE — 1160F RVW MEDS BY RX/DR IN RCRD: CPT | Mod: CPTII,S$GLB,, | Performed by: PODIATRIST

## 2022-08-18 PROCEDURE — 1101F PT FALLS ASSESS-DOCD LE1/YR: CPT | Mod: CPTII,S$GLB,, | Performed by: PODIATRIST

## 2022-08-18 PROCEDURE — 99999 PR PBB SHADOW E&M-EST. PATIENT-LVL III: ICD-10-PCS | Mod: PBBFAC,,, | Performed by: PODIATRIST

## 2022-08-18 NOTE — PROGRESS NOTES
Subjective:       Patient ID: Indira Walker is a 77 y.o. female.    Chief Complaint: Foot Pain (Bilateral foot pain with and without pressure on it, 6/10 pain at present, diabetic, pcp  last seen last seen 2022)    HPI: Indira Walker presents to the office today, with complaints of pains to the left and right foot. The pains are stated as mild to moderate to the ball(s) of the foot. Patient states limping with gait at times due to the pains. Pains are exacerbated by walking and standing. Sitting and limited WB does alleviate and/or decrease the pains. The patient does have hammer toe contractures. States alternation of shoe gear has not been helpful. Patient's Primary Care Provider is Bertha Kearns MD.     Review of patient's allergies indicates:   Allergen Reactions    Pyrilamine Swelling     (an ingredient in Midol)    Iodine and iodide containing products Hives       Past Medical History:   Diagnosis Date    Arthritis     Diabetes mellitus, type 2     Foot drop, left 2018    GERD (gastroesophageal reflux disease)     Heartburn     Hyperlipidemia     Hypertension     Left sided sciatica     s/p microdiscectomy 3/21/18       Family History   Problem Relation Age of Onset    Diabetes Mother     Diabetes Father        Social History     Socioeconomic History    Marital status:     Number of children: 2   Occupational History    Occupation:       Employer: [employer]   Tobacco Use    Smoking status: Never Smoker    Smokeless tobacco: Never Used   Substance and Sexual Activity    Alcohol use: No    Drug use: No    Sexual activity: Not Currently     Partners: Male       Past Surgical History:   Procedure Laterality Date    BREAST BIOPSY       SECTION      COLONOSCOPY  2008    DR. JANET GARCÍA/MILD DIVERICULOSIS DESCENDING AND SIGMOID. REPEAT 5 YRS    ENDOSCOPIC ULTRASOUND OF UPPER GASTROINTESTINAL TRACT N/A 2022     "Procedure: ULTRASOUND, UPPER GI TRACT, ENDOSCOPIC;  Surgeon: Ange Saldivar MD;  Location: [facility];  Service: Endoscopy;  Laterality: N/A;  Upper and Linear, 22 sharkcore, slides and formalin    HERNIA REPAIR      MICRODISCECTOMY OF SPINE Left 03/21/2018    left L5-S1, Dr Segundo       Review of Systems   Constitutional: Negative for chills, fatigue and fever.   HENT: Negative for hearing loss.    Eyes: Negative for photophobia and visual disturbance.   Respiratory: Negative for cough, chest tightness, shortness of breath and wheezing.    Cardiovascular: Negative for chest pain and palpitations.   Gastrointestinal: Negative for constipation, diarrhea, nausea and vomiting.   Endocrine: Negative for cold intolerance and heat intolerance.   Genitourinary: Negative for flank pain.   Musculoskeletal: Positive for gait problem. Negative for neck pain and neck stiffness.   Neurological: Negative for light-headedness and headaches.   Psychiatric/Behavioral: Negative for sleep disturbance.     Objective:   Ht 5' 3" (1.6 m)   Wt 55.2 kg (121 lb 11.1 oz)   BMI 21.56 kg/m²     Physical Exam    LOWER EXTREMITY PHYSICAL EXAMINATION    DERMATOLOGY: Skin is supple, dry and intact. Callus formation, B/L plantar 1st MTPJ.    VASCULAR: The right dorsalis pedis pulse is 2/4 and the posterior tibial pulse is 2/4. The left dorsalis pedis pulse is 1/4 and the posterior tibial pulse is 1/4. Hair growth is noted on the dorsal foot and digits. Proximal to distal, warm to warm. Capillary refill time is WNL at less than 3s.    ORTHOPEDIC: Manual Muscle Testing is 5/5 in all planes on the B/L foot, without pains, with and without resistance. Gait pattern is antalgic. There is mild to moderate discomfort to palpation at the left plantar 2nd, 3rd and 4th MTPJ. There is mild to moderate discomfort to palpation at the right plantar 2nd, 3rd and 4th MTPJ. Plantar fat pad atrophy with displacement is noted.  Gastrocsoleal " equinus contracture is noted.  Rectus foot type is noted. Negative Lachman's testing is noted.     Assessment:     1. Metatarsalgia, left foot    2. Metatarsalgia, right foot    3. Plantar fat pad atrophy    4. Pain of foot, unspecified laterality    5. Hammer toes of both feet        Plan:     Metatarsalgia, left foot    Metatarsalgia, right foot    Plantar fat pad atrophy    Pain of foot, unspecified laterality  -     Ambulatory referral/consult to Podiatry    Hammer toes of both feet        Thorough discussion is had with the patient today, concerning the diagnosis, its etiology, and the treatment algorithm at present.     Did discuss proper and supportive shoe gear in detail and at length with the patient. These are shoes with firm and robust arch support; medial counter.  Shoes which only bend at the metatarsophalangeal joint and which are rigid in the midfoot and hindfoot. Patient urged to purchase running type or cross training type shoes gear which are designed for pronation control. Patient will purchase OTC metatarsal sleeves for cushioning and off-loading of the B/L MTPJ.    This patient does have hammertoe (digital) contractures. I did advise the patient to ambulate with shoe gear that is high in the tox box to allow for extra room and depth in the sagittal plane, in order to alleviate and lessen the potential for dorsal digital break down at the IPJs. I do also recommended shoe gear that is soft and supple in the foot bed as to lessen the potential for plantar distal digital break down at the contracted digits. If the patient does not feel the aforementioned is necessary, he or she may also purchase OTC padding devices to be worn across the MTPJ, at the distal aspects of the digits, and/or at the dorsal aspects of the IPJs. The patient does acknowledge understanding and is said to be amenable to compliance.           Future Appointments   Date Time Provider Department Center   8/23/2022 10:30 AM  Janee Raphael RD, CDE [facility]   10/13/2022 10:40 AM Nila Vallejo PA-C [facility] [place]   11/9/2022  9:00 AM Bertha Kearns MD ON [facility]   12/21/2022  9:40 AM LABORATORY, [facility]   12/28/2022  7:30 AM Giovanni Cage MD ON [facility]

## 2022-08-19 ENCOUNTER — TELEPHONE (OUTPATIENT)
Dept: RHEUMATOLOGY | Facility: CLINIC | Age: 77
End: 2022-08-19
Payer: MEDICARE

## 2022-08-19 NOTE — TELEPHONE ENCOUNTER
Spoke to patient and informed her that she should still be taking her methotrexate and she confirmed how she takes it

## 2022-08-19 NOTE — TELEPHONE ENCOUNTER
----- Message from Aleksandra Alanis sent at 8/19/2022 11:04 AM CDT -----  Please call pt @ [phone] regarding medication methotrexate, pt need to know if she need to be taking this meds, pt do not remember.

## 2022-08-28 DIAGNOSIS — M05.9 SEROPOSITIVE RHEUMATOID ARTHRITIS: ICD-10-CM

## 2022-08-31 RX ORDER — METHOTREXATE 2.5 MG/1
TABLET ORAL
Qty: 120 TABLET | Refills: 2 | Status: SHIPPED | OUTPATIENT
Start: 2022-08-31 | End: 2023-04-06

## 2022-09-06 ENCOUNTER — LAB VISIT (OUTPATIENT)
Dept: LAB | Facility: HOSPITAL | Age: 77
End: 2022-09-06
Attending: FAMILY MEDICINE
Payer: MEDICARE

## 2022-09-06 DIAGNOSIS — D64.9 ANEMIA, UNSPECIFIED TYPE: ICD-10-CM

## 2022-09-06 DIAGNOSIS — R79.89 ELEVATED PLATELET COUNT: ICD-10-CM

## 2022-09-06 DIAGNOSIS — D72.829 LEUKOCYTOSIS, UNSPECIFIED TYPE: ICD-10-CM

## 2022-09-06 DIAGNOSIS — E83.52 HYPERCALCEMIA: ICD-10-CM

## 2022-09-06 DIAGNOSIS — E87.6 HYPOKALEMIA: ICD-10-CM

## 2022-09-06 LAB
ANION GAP SERPL CALC-SCNC: 8 MMOL/L (ref 8–16)
BASOPHILS # BLD AUTO: 0.03 K/UL (ref 0–0.2)
BASOPHILS NFR BLD: 0.4 % (ref 0–1.9)
BUN SERPL-MCNC: 20 MG/DL (ref 8–23)
CALCIUM SERPL-MCNC: 9.5 MG/DL (ref 8.7–10.5)
CHLORIDE SERPL-SCNC: 99 MMOL/L (ref 95–110)
CO2 SERPL-SCNC: 24 MMOL/L (ref 23–29)
CREAT SERPL-MCNC: 1.2 MG/DL (ref 0.5–1.4)
DIFFERENTIAL METHOD: ABNORMAL
EOSINOPHIL # BLD AUTO: 0.2 K/UL (ref 0–0.5)
EOSINOPHIL NFR BLD: 2.4 % (ref 0–8)
ERYTHROCYTE [DISTWIDTH] IN BLOOD BY AUTOMATED COUNT: 14.9 % (ref 11.5–14.5)
EST. GFR  (NO RACE VARIABLE): 46 ML/MIN/1.73 M^2
GLUCOSE SERPL-MCNC: 169 MG/DL (ref 70–110)
HCT VFR BLD AUTO: 31 % (ref 37–48.5)
HGB BLD-MCNC: 9.9 G/DL (ref 12–16)
IMM GRANULOCYTES # BLD AUTO: 0.01 K/UL (ref 0–0.04)
IMM GRANULOCYTES NFR BLD AUTO: 0.1 % (ref 0–0.5)
LYMPHOCYTES # BLD AUTO: 3.1 K/UL (ref 1–4.8)
LYMPHOCYTES NFR BLD: 43.6 % (ref 18–48)
MCH RBC QN AUTO: 30.3 PG (ref 27–31)
MCHC RBC AUTO-ENTMCNC: 31.9 G/DL (ref 32–36)
MCV RBC AUTO: 95 FL (ref 82–98)
MONOCYTES # BLD AUTO: 0.3 K/UL (ref 0.3–1)
MONOCYTES NFR BLD: 4.7 % (ref 4–15)
NEUTROPHILS # BLD AUTO: 3.5 K/UL (ref 1.8–7.7)
NEUTROPHILS NFR BLD: 48.8 % (ref 38–73)
NRBC BLD-RTO: 0 /100 WBC
PLATELET # BLD AUTO: 406 K/UL (ref 150–450)
PMV BLD AUTO: 10.3 FL (ref 9.2–12.9)
POTASSIUM SERPL-SCNC: 4.2 MMOL/L (ref 3.5–5.1)
RBC # BLD AUTO: 3.27 M/UL (ref 4–5.4)
SODIUM SERPL-SCNC: 131 MMOL/L (ref 136–145)
WBC # BLD AUTO: 7.2 K/UL (ref 3.9–12.7)

## 2022-09-06 PROCEDURE — 36415 COLL VENOUS BLD VENIPUNCTURE: CPT | Performed by: FAMILY MEDICINE

## 2022-09-06 PROCEDURE — 85025 COMPLETE CBC W/AUTO DIFF WBC: CPT | Performed by: FAMILY MEDICINE

## 2022-09-06 PROCEDURE — 80048 BASIC METABOLIC PNL TOTAL CA: CPT | Performed by: FAMILY MEDICINE

## 2022-09-08 DIAGNOSIS — E11.65 UNCONTROLLED TYPE 2 DIABETES MELLITUS WITH HYPERGLYCEMIA: Primary | ICD-10-CM

## 2022-09-08 DIAGNOSIS — E87.1 HYPONATREMIA: Primary | ICD-10-CM

## 2022-09-08 RX ORDER — INSULIN PUMP SYRINGE, 3 ML
EACH MISCELLANEOUS
COMMUNITY
End: 2022-09-08

## 2022-09-08 RX ORDER — LANCETS
EACH MISCELLANEOUS
Qty: 200 EACH | Refills: 3 | Status: SHIPPED | OUTPATIENT
Start: 2022-09-08

## 2022-09-08 RX ORDER — INSULIN PUMP SYRINGE, 3 ML
EACH MISCELLANEOUS
Qty: 1 EACH | Refills: 0 | Status: SHIPPED | OUTPATIENT
Start: 2022-09-08 | End: 2022-09-14 | Stop reason: SDUPTHER

## 2022-09-08 RX ORDER — INSULIN PUMP SYRINGE, 3 ML
EACH MISCELLANEOUS
Status: CANCELLED | OUTPATIENT
Start: 2022-09-08

## 2022-09-08 NOTE — TELEPHONE ENCOUNTER
----- Message from Oma Meredith sent at 9/8/2022 11:21 AM CDT -----  States her meter to test her blood sugar is broken. States she would like to get a new meter. Please call pt [phone]. Thank you

## 2022-09-14 DIAGNOSIS — E11.65 UNCONTROLLED TYPE 2 DIABETES MELLITUS WITH HYPERGLYCEMIA: ICD-10-CM

## 2022-09-15 ENCOUNTER — LAB VISIT (OUTPATIENT)
Dept: LAB | Facility: HOSPITAL | Age: 77
End: 2022-09-15
Attending: FAMILY MEDICINE
Payer: MEDICARE

## 2022-09-15 DIAGNOSIS — E87.1 HYPONATREMIA: ICD-10-CM

## 2022-09-15 LAB
ANION GAP SERPL CALC-SCNC: 7 MMOL/L (ref 8–16)
BUN SERPL-MCNC: 19 MG/DL (ref 8–23)
CALCIUM SERPL-MCNC: 9.3 MG/DL (ref 8.7–10.5)
CHLORIDE SERPL-SCNC: 102 MMOL/L (ref 95–110)
CO2 SERPL-SCNC: 28 MMOL/L (ref 23–29)
CREAT SERPL-MCNC: 1.1 MG/DL (ref 0.5–1.4)
EST. GFR  (NO RACE VARIABLE): 51.1 ML/MIN/1.73 M^2
GLUCOSE SERPL-MCNC: 144 MG/DL (ref 70–110)
POTASSIUM SERPL-SCNC: 3.9 MMOL/L (ref 3.5–5.1)
SODIUM SERPL-SCNC: 137 MMOL/L (ref 136–145)

## 2022-09-15 PROCEDURE — 80048 BASIC METABOLIC PNL TOTAL CA: CPT | Performed by: FAMILY MEDICINE

## 2022-09-15 PROCEDURE — 36415 COLL VENOUS BLD VENIPUNCTURE: CPT | Performed by: FAMILY MEDICINE

## 2022-09-15 RX ORDER — INSULIN PUMP SYRINGE, 3 ML
EACH MISCELLANEOUS
Qty: 1 EACH | Refills: 0 | Status: SHIPPED | OUTPATIENT
Start: 2022-09-15 | End: 2023-11-24

## 2022-09-22 ENCOUNTER — TELEPHONE (OUTPATIENT)
Dept: INTERNAL MEDICINE | Facility: CLINIC | Age: 77
End: 2022-09-22
Payer: MEDICARE

## 2022-09-22 NOTE — TELEPHONE ENCOUNTER
----- Message from Alexandrea Parry sent at 9/22/2022  4:24 PM CDT -----  Contact: Indira  Patient is calling to speak to the nurse regarding medication. Reports having a cold and needing to know what to do. Please give patient a call back at .[phone]

## 2022-09-22 NOTE — TELEPHONE ENCOUNTER
Returned call to pt. Pt wanted to know if it was ok for her to take vitamin C  and coricidin because she feels like she is catching a cold. Advised pt that it was safe for her to take vitamin C to help with her immune system, and coricidin to help with the cold symptoms. Verbalized understanding.

## 2022-09-28 ENCOUNTER — TELEPHONE (OUTPATIENT)
Dept: INTERNAL MEDICINE | Facility: CLINIC | Age: 77
End: 2022-09-28
Payer: MEDICARE

## 2022-09-28 ENCOUNTER — TELEPHONE (OUTPATIENT)
Dept: ADMINISTRATIVE | Facility: HOSPITAL | Age: 77
End: 2022-09-28
Payer: MEDICARE

## 2022-09-28 NOTE — TELEPHONE ENCOUNTER
----- Message from Kim Padilla sent at 9/28/2022 11:46 AM CDT -----  Patient wants to know should she take the 4th booster,Please call back at [phone].Thanks

## 2022-09-28 NOTE — TELEPHONE ENCOUNTER
Pt inquiring about 4th covid booster.  Pts last vaccination 11/2021.  Please advise.  I would be happy to help schedule

## 2022-10-10 ENCOUNTER — TELEPHONE (OUTPATIENT)
Dept: INTERNAL MEDICINE | Facility: CLINIC | Age: 77
End: 2022-10-10
Payer: MEDICARE

## 2022-10-10 NOTE — TELEPHONE ENCOUNTER
----- Message from Cyndie Beverly sent at 10/10/2022 10:24 AM CDT -----  Contact: Indira  Patient is calling to speak with the nurse regarding diabetes supplies. Patient explains prescriptions has . Please give patient  a call back at [phone] as requested.  Thanks  LR

## 2022-10-12 LAB
LEFT EYE DM RETINOPATHY: NEGATIVE
RIGHT EYE DM RETINOPATHY: NEGATIVE

## 2022-10-13 ENCOUNTER — OFFICE VISIT (OUTPATIENT)
Dept: GASTROENTEROLOGY | Facility: CLINIC | Age: 77
End: 2022-10-13
Payer: MEDICARE

## 2022-10-13 VITALS
BODY MASS INDEX: 21.68 KG/M2 | WEIGHT: 122.38 LBS | HEART RATE: 60 BPM | HEIGHT: 63 IN | DIASTOLIC BLOOD PRESSURE: 60 MMHG | SYSTOLIC BLOOD PRESSURE: 130 MMHG | OXYGEN SATURATION: 97 %

## 2022-10-13 DIAGNOSIS — R63.4 WEIGHT LOSS: Primary | ICD-10-CM

## 2022-10-13 DIAGNOSIS — K86.2 PANCREATIC CYST: ICD-10-CM

## 2022-10-13 PROCEDURE — 1159F PR MEDICATION LIST DOCUMENTED IN MEDICAL RECORD: ICD-10-PCS | Mod: CPTII,S$GLB,, | Performed by: PHYSICIAN ASSISTANT

## 2022-10-13 PROCEDURE — 1126F AMNT PAIN NOTED NONE PRSNT: CPT | Mod: CPTII,S$GLB,, | Performed by: PHYSICIAN ASSISTANT

## 2022-10-13 PROCEDURE — 1126F PR PAIN SEVERITY QUANTIFIED, NO PAIN PRESENT: ICD-10-PCS | Mod: CPTII,S$GLB,, | Performed by: PHYSICIAN ASSISTANT

## 2022-10-13 PROCEDURE — 1159F MED LIST DOCD IN RCRD: CPT | Mod: CPTII,S$GLB,, | Performed by: PHYSICIAN ASSISTANT

## 2022-10-13 PROCEDURE — 3075F PR MOST RECENT SYSTOLIC BLOOD PRESS GE 130-139MM HG: ICD-10-PCS | Mod: CPTII,S$GLB,, | Performed by: PHYSICIAN ASSISTANT

## 2022-10-13 PROCEDURE — 99999 PR PBB SHADOW E&M-EST. PATIENT-LVL IV: CPT | Mod: PBBFAC,,, | Performed by: PHYSICIAN ASSISTANT

## 2022-10-13 PROCEDURE — 3078F DIAST BP <80 MM HG: CPT | Mod: CPTII,S$GLB,, | Performed by: PHYSICIAN ASSISTANT

## 2022-10-13 PROCEDURE — 1101F PT FALLS ASSESS-DOCD LE1/YR: CPT | Mod: CPTII,S$GLB,, | Performed by: PHYSICIAN ASSISTANT

## 2022-10-13 PROCEDURE — 3078F PR MOST RECENT DIASTOLIC BLOOD PRESSURE < 80 MM HG: ICD-10-PCS | Mod: CPTII,S$GLB,, | Performed by: PHYSICIAN ASSISTANT

## 2022-10-13 PROCEDURE — 99214 PR OFFICE/OUTPT VISIT, EST, LEVL IV, 30-39 MIN: ICD-10-PCS | Mod: S$GLB,,, | Performed by: PHYSICIAN ASSISTANT

## 2022-10-13 PROCEDURE — 1101F PR PT FALLS ASSESS DOC 0-1 FALLS W/OUT INJ PAST YR: ICD-10-PCS | Mod: CPTII,S$GLB,, | Performed by: PHYSICIAN ASSISTANT

## 2022-10-13 PROCEDURE — 99214 OFFICE O/P EST MOD 30 MIN: CPT | Mod: S$GLB,,, | Performed by: PHYSICIAN ASSISTANT

## 2022-10-13 PROCEDURE — 3288F FALL RISK ASSESSMENT DOCD: CPT | Mod: CPTII,S$GLB,, | Performed by: PHYSICIAN ASSISTANT

## 2022-10-13 PROCEDURE — 99999 PR PBB SHADOW E&M-EST. PATIENT-LVL IV: ICD-10-PCS | Mod: PBBFAC,,, | Performed by: PHYSICIAN ASSISTANT

## 2022-10-13 PROCEDURE — 3075F SYST BP GE 130 - 139MM HG: CPT | Mod: CPTII,S$GLB,, | Performed by: PHYSICIAN ASSISTANT

## 2022-10-13 PROCEDURE — 3288F PR FALLS RISK ASSESSMENT DOCUMENTED: ICD-10-PCS | Mod: CPTII,S$GLB,, | Performed by: PHYSICIAN ASSISTANT

## 2022-10-13 RX ORDER — SODIUM, POTASSIUM,MAG SULFATES 17.5-3.13G
1 SOLUTION, RECONSTITUTED, ORAL ORAL DAILY
Qty: 1 KIT | Refills: 0 | Status: SHIPPED | OUTPATIENT
Start: 2022-10-13 | End: 2022-10-15

## 2022-10-13 RX ORDER — CHLORHEXIDINE GLUCONATE ORAL RINSE 1.2 MG/ML
SOLUTION DENTAL
COMMUNITY
Start: 2022-10-10 | End: 2023-05-10

## 2022-10-13 NOTE — PROGRESS NOTES
Subjective:      Patient ID: Indira Walker is a 77 y.o. female.    Chief Complaint: pancreatic cyst (Follow up)    HPI:  Patient reports to clinic today for follow up of pancreatic cysts. EUS performed 7/2022 with the following findings:               - Esophagogastric landmarks identified.                          - Gastroesophageal flap valve classified as Hill                          Grade IV (no fold, wide open lumen, hiatal hernia                          present).                          - Normal esophagus.                          - Normal stomach.                          - Normal examined duodenum.                          - Three cystic lesions were seen in the pancreatic                          body. Fine needle aspiration for fluid performed.                          - There was no sign of significant pathology in                          the common bile duct.   Per chart review - CEA level of fluid was low.    She also has history of recent weight loss (40lbs) that was mentioned back in May 2022 when she first saw GI. She was not trying to lose weight. She did have some significant life stressors during this time. At that time, her weight was stable. She reports today with similar weight and no additional weight loss. She feels well overall. Denies abdominal pain, changes in bowel habits, decreased appetite. Last colonoscopy in 2008    Review of Systems   Constitutional:  Positive for unexpected weight change (has been stable over the past 6 months). Negative for activity change, appetite change, chills, diaphoresis, fatigue and fever.   HENT:  Negative for trouble swallowing.    Respiratory:  Negative for shortness of breath.    Cardiovascular:  Negative for chest pain.   Gastrointestinal:  Negative for abdominal distention, abdominal pain, anal bleeding, blood in stool, constipation, diarrhea, nausea and vomiting.   Musculoskeletal:  Negative for back pain.   Skin:  Negative for color change  and pallor.   Neurological:  Negative for dizziness and weakness.   Psychiatric/Behavioral:  Negative for dysphoric mood. The patient is not nervous/anxious.      Medical History: Reviewed    Social History: Reviewed    Allergies: Reviewed    Objective:     Physical Exam  Constitutional:       General: She is not in acute distress.     Appearance: Normal appearance. She is not ill-appearing, toxic-appearing or diaphoretic.   HENT:      Head: Normocephalic and atraumatic.   Eyes:      General: No scleral icterus.     Extraocular Movements: Extraocular movements intact.   Cardiovascular:      Rate and Rhythm: Normal rate and regular rhythm.   Pulmonary:      Effort: Pulmonary effort is normal. No respiratory distress.      Breath sounds: Normal breath sounds.   Abdominal:      General: Bowel sounds are normal. There is no distension.      Palpations: Abdomen is soft. There is no mass.      Tenderness: There is no abdominal tenderness. There is no guarding.   Musculoskeletal:         General: Normal range of motion.      Cervical back: Normal range of motion.   Skin:     General: Skin is warm and dry.      Coloration: Skin is not jaundiced or pale.   Neurological:      Mental Status: She is alert and oriented to person, place, and time.       Assessment:     1. Weight loss    2. Pancreatic cyst        Plan:     -Reviewed EUS results with patient. Due to findings (size of cyst and CEA level) will need repeat imaging in 1 year for monitoring.   -Due to previous significant weight loss, would recommend colonoscopy, as her last scope was 2008. Discussed the risks and the benefits of this procedure with the patient. She verbalizes understanding and agrees to move forward with scope.   -Reviewed with Dr. Peña.    Indira was seen today for pancreatic cyst.    Diagnoses and all orders for this visit:    Weight loss  -     Case Request Endoscopy: COLONOSCOPY  -     Ambulatory referral/consult to Endo Procedure ;  Future  -     sodium,potassium,mag sulfates (SUPREP BOWEL PREP KIT) 17.5-3.13-1.6 gram SolR; Take 177 mLs by mouth once daily. for 2 days    Pancreatic cyst      No follow-ups on file.    Thank you for the opportunity to participate in the care of this patient.   Nila Vallejo PA-C.

## 2022-10-17 ENCOUNTER — HOSPITAL ENCOUNTER (OUTPATIENT)
Dept: PREADMISSION TESTING | Facility: HOSPITAL | Age: 77
Discharge: HOME | End: 2022-10-17
Payer: MEDICARE

## 2022-10-17 DIAGNOSIS — R63.4 WEIGHT LOSS: ICD-10-CM

## 2022-10-20 ENCOUNTER — PATIENT OUTREACH (OUTPATIENT)
Dept: ADMINISTRATIVE | Facility: HOSPITAL | Age: 77
End: 2022-10-20
Payer: MEDICARE

## 2022-11-01 ENCOUNTER — TELEPHONE (OUTPATIENT)
Dept: INTERNAL MEDICINE | Facility: CLINIC | Age: 77
End: 2022-11-01
Payer: MEDICARE

## 2022-11-01 NOTE — TELEPHONE ENCOUNTER
Pt would like the nurse to call her back regarding a cold/cough. She would like something called in to the pharmacy or do she need to schedule an appt. The cough have been persistent for the last 3 weeks..       Since cough has gone on for 3 weeks, does pt need an appointment to address?

## 2022-11-01 NOTE — TELEPHONE ENCOUNTER
----- Message from Isabella Goins sent at 11/1/2022  2:04 PM CDT -----  Pt would like the nurse to call her back regarding a cold/cough. She would like something called in to the pharmacy or do she need to schedule an appt. The cough have been persistent for the last 3 weeks.. Please call her back at .[phone]. Thx. EL

## 2022-11-02 ENCOUNTER — OFFICE VISIT (OUTPATIENT)
Dept: INTERNAL MEDICINE | Facility: CLINIC | Age: 77
End: 2022-11-02
Payer: MEDICARE

## 2022-11-02 VITALS
HEART RATE: 76 BPM | WEIGHT: 126.31 LBS | RESPIRATION RATE: 17 BRPM | DIASTOLIC BLOOD PRESSURE: 72 MMHG | SYSTOLIC BLOOD PRESSURE: 136 MMHG | HEIGHT: 65 IN | BODY MASS INDEX: 21.04 KG/M2 | OXYGEN SATURATION: 99 % | TEMPERATURE: 98 F

## 2022-11-02 DIAGNOSIS — J06.9 UPPER RESPIRATORY TRACT INFECTION, UNSPECIFIED TYPE: Primary | ICD-10-CM

## 2022-11-02 DIAGNOSIS — J02.9 PHARYNGITIS, UNSPECIFIED ETIOLOGY: ICD-10-CM

## 2022-11-02 PROCEDURE — 3288F PR FALLS RISK ASSESSMENT DOCUMENTED: ICD-10-PCS | Mod: CPTII,S$GLB,, | Performed by: PHYSICIAN ASSISTANT

## 2022-11-02 PROCEDURE — 1101F PR PT FALLS ASSESS DOC 0-1 FALLS W/OUT INJ PAST YR: ICD-10-PCS | Mod: CPTII,S$GLB,, | Performed by: PHYSICIAN ASSISTANT

## 2022-11-02 PROCEDURE — 99213 OFFICE O/P EST LOW 20 MIN: CPT | Mod: S$GLB,,, | Performed by: PHYSICIAN ASSISTANT

## 2022-11-02 PROCEDURE — 1159F MED LIST DOCD IN RCRD: CPT | Mod: CPTII,S$GLB,, | Performed by: PHYSICIAN ASSISTANT

## 2022-11-02 PROCEDURE — 1160F PR REVIEW ALL MEDS BY PRESCRIBER/CLIN PHARMACIST DOCUMENTED: ICD-10-PCS | Mod: CPTII,S$GLB,, | Performed by: PHYSICIAN ASSISTANT

## 2022-11-02 PROCEDURE — 3075F SYST BP GE 130 - 139MM HG: CPT | Mod: CPTII,S$GLB,, | Performed by: PHYSICIAN ASSISTANT

## 2022-11-02 PROCEDURE — 99213 PR OFFICE/OUTPT VISIT, EST, LEVL III, 20-29 MIN: ICD-10-PCS | Mod: S$GLB,,, | Performed by: PHYSICIAN ASSISTANT

## 2022-11-02 PROCEDURE — 99999 PR PBB SHADOW E&M-EST. PATIENT-LVL V: ICD-10-PCS | Mod: PBBFAC,,, | Performed by: PHYSICIAN ASSISTANT

## 2022-11-02 PROCEDURE — 3075F PR MOST RECENT SYSTOLIC BLOOD PRESS GE 130-139MM HG: ICD-10-PCS | Mod: CPTII,S$GLB,, | Performed by: PHYSICIAN ASSISTANT

## 2022-11-02 PROCEDURE — 3078F DIAST BP <80 MM HG: CPT | Mod: CPTII,S$GLB,, | Performed by: PHYSICIAN ASSISTANT

## 2022-11-02 PROCEDURE — 1160F RVW MEDS BY RX/DR IN RCRD: CPT | Mod: CPTII,S$GLB,, | Performed by: PHYSICIAN ASSISTANT

## 2022-11-02 PROCEDURE — 99999 PR PBB SHADOW E&M-EST. PATIENT-LVL V: CPT | Mod: PBBFAC,,, | Performed by: PHYSICIAN ASSISTANT

## 2022-11-02 PROCEDURE — 1126F PR PAIN SEVERITY QUANTIFIED, NO PAIN PRESENT: ICD-10-PCS | Mod: CPTII,S$GLB,, | Performed by: PHYSICIAN ASSISTANT

## 2022-11-02 PROCEDURE — 1126F AMNT PAIN NOTED NONE PRSNT: CPT | Mod: CPTII,S$GLB,, | Performed by: PHYSICIAN ASSISTANT

## 2022-11-02 PROCEDURE — 3288F FALL RISK ASSESSMENT DOCD: CPT | Mod: CPTII,S$GLB,, | Performed by: PHYSICIAN ASSISTANT

## 2022-11-02 PROCEDURE — 1159F PR MEDICATION LIST DOCUMENTED IN MEDICAL RECORD: ICD-10-PCS | Mod: CPTII,S$GLB,, | Performed by: PHYSICIAN ASSISTANT

## 2022-11-02 PROCEDURE — 1101F PT FALLS ASSESS-DOCD LE1/YR: CPT | Mod: CPTII,S$GLB,, | Performed by: PHYSICIAN ASSISTANT

## 2022-11-02 PROCEDURE — 3078F PR MOST RECENT DIASTOLIC BLOOD PRESSURE < 80 MM HG: ICD-10-PCS | Mod: CPTII,S$GLB,, | Performed by: PHYSICIAN ASSISTANT

## 2022-11-02 RX ORDER — MUPIROCIN 20 MG/G
OINTMENT TOPICAL
COMMUNITY
Start: 2022-10-29 | End: 2023-05-10

## 2022-11-02 RX ORDER — AZELASTINE 1 MG/ML
1 SPRAY, METERED NASAL 2 TIMES DAILY
Qty: 30 ML | Refills: 0 | Status: SHIPPED | OUTPATIENT
Start: 2022-11-02 | End: 2022-11-28

## 2022-11-02 RX ORDER — MELOXICAM 15 MG/1
15 TABLET ORAL DAILY
COMMUNITY
Start: 2022-09-20 | End: 2023-04-05 | Stop reason: ALTCHOICE

## 2022-11-02 RX ORDER — AMOXICILLIN 500 MG/1
500 CAPSULE ORAL EVERY 12 HOURS
Qty: 20 CAPSULE | Refills: 0 | Status: SHIPPED | OUTPATIENT
Start: 2022-11-02 | End: 2022-11-12

## 2022-11-02 NOTE — PATIENT INSTRUCTIONS
Please bring your medication or a written list to your next office visit.    If you check your blood pressure at home, please bring written your blood pressure log and your blood pressure machine to your next office visit.     1. Drink plenty of fluids  2. Get plenty of rest.  3. Take Tylenol as directed on package for pain/fever. Do not take more than package suggests to take.   4. Go to Emergency Department if your symptoms worsen.  5. Follow up with your PCP in 1 week if symptoms persist.

## 2022-11-02 NOTE — TELEPHONE ENCOUNTER
Attempted to call pt to let her know she has been scheduled a same day appt for 1:20 pm.  No answer, LVM to call clinic back.

## 2022-11-02 NOTE — PROGRESS NOTES
Subjective:       Patient ID: Indira Walker is a 77 y.o. female.    Chief Complaint: Cough      Patient Care Team:  Bertha Kearns MD as PCP - General (Family Medicine)  Jhony Garza MD as Consulting Physician (Ophthalmology)  Shruthi Segundo MD as Consulting Physician (Neurosurgery)  Mayur Marie RD as Dietitian (Endocrinology)  Rosey George MD (Gastroenterology)  Bertha Kearns MD as Consulting Physician (Family Medicine)    HPI    Indira Walker is a 77 y.o. female who presents today with complaints of Cough  Symptoms started 3-4 weeks ago. Reports nasal congestion, sore throat and hoarseness. Cough improved with Coricidin HBP. Reports currently treating a sore in her mouth from the dentist. Using multiple OTC medications for sore throat with slight relief. Cough has improved/resolved.     Review of Systems   Constitutional:  Negative for chills, fatigue and fever.   HENT:  Positive for congestion, sore throat and voice change. Negative for ear pain and trouble swallowing.    Respiratory:  Negative for cough, shortness of breath and wheezing.    Gastrointestinal:  Negative for diarrhea, nausea and vomiting.   Musculoskeletal:  Negative for neck pain.   Neurological:  Positive for headaches. Negative for dizziness, facial asymmetry, speech difficulty, weakness and light-headedness.     Objective:      Physical Exam  Vitals and nursing note reviewed.   Constitutional:       General: She is not in acute distress.     Appearance: She is well-developed.   HENT:      Head: Normocephalic and atraumatic.      Right Ear: Tympanic membrane, ear canal and external ear normal.      Left Ear: Tympanic membrane, ear canal and external ear normal.      Nose: Nose normal.      Mouth/Throat:      Lips: Pink.      Mouth: Mucous membranes are moist.      Pharynx: Posterior oropharyngeal erythema present.        Comments: Purulent PND noted to posterior pharynx  Eyes:      General: Lids are  normal. No scleral icterus.     Extraocular Movements: Extraocular movements intact.      Conjunctiva/sclera: Conjunctivae normal.   Cardiovascular:      Rate and Rhythm: Normal rate and regular rhythm.   Pulmonary:      Effort: Pulmonary effort is normal.      Breath sounds: Normal breath sounds. No decreased breath sounds, wheezing, rhonchi or rales.   Neurological:      Mental Status: She is alert.      Cranial Nerves: No cranial nerve deficit.   Psychiatric:         Mood and Affect: Mood and affect normal.       Assessment:       1. Upper respiratory tract infection, unspecified type    2. Pharyngitis, unspecified etiology        Plan:   1. Upper respiratory tract infection, unspecified type  -     azelastine (ASTELIN) 137 mcg (0.1 %) nasal spray; 1 spray (137 mcg total) by Nasal route 2 (two) times daily.  Dispense: 30 mL; Refill: 0    2. Pharyngitis, unspecified etiology  -     amoxicillin (AMOXIL) 500 MG capsule; Take 1 capsule (500 mg total) by mouth every 12 (twelve) hours. for 10 days  Dispense: 20 capsule; Refill: 0    Continue Coricidin HBP.

## 2022-11-08 ENCOUNTER — OFFICE VISIT (OUTPATIENT)
Dept: PODIATRY | Facility: CLINIC | Age: 77
End: 2022-11-08
Payer: MEDICARE

## 2022-11-08 VITALS — WEIGHT: 126.31 LBS | HEIGHT: 65 IN | BODY MASS INDEX: 21.04 KG/M2

## 2022-11-08 DIAGNOSIS — M77.42 METATARSALGIA, LEFT FOOT: Primary | ICD-10-CM

## 2022-11-08 DIAGNOSIS — M24.572 CONTRACTURE, LEFT ANKLE: ICD-10-CM

## 2022-11-08 DIAGNOSIS — B35.1 ONYCHOMYCOSIS: ICD-10-CM

## 2022-11-08 DIAGNOSIS — S91.312A LACERATION OF LEFT FOOT, INITIAL ENCOUNTER: ICD-10-CM

## 2022-11-08 DIAGNOSIS — M21.6X2 PLANTAR FAT PAD ATROPHY OF LEFT FOOT: ICD-10-CM

## 2022-11-08 PROCEDURE — 99499 UNLISTED E&M SERVICE: CPT | Mod: S$GLB,,, | Performed by: PODIATRIST

## 2022-11-08 PROCEDURE — 99214 OFFICE O/P EST MOD 30 MIN: CPT | Mod: S$GLB,,, | Performed by: PODIATRIST

## 2022-11-08 PROCEDURE — 1160F RVW MEDS BY RX/DR IN RCRD: CPT | Mod: CPTII,S$GLB,, | Performed by: PODIATRIST

## 2022-11-08 PROCEDURE — 99499 RISK ADDL DX/OHS AUDIT: ICD-10-PCS | Mod: S$GLB,,, | Performed by: PODIATRIST

## 2022-11-08 PROCEDURE — 1160F PR REVIEW ALL MEDS BY PRESCRIBER/CLIN PHARMACIST DOCUMENTED: ICD-10-PCS | Mod: CPTII,S$GLB,, | Performed by: PODIATRIST

## 2022-11-08 PROCEDURE — 1159F PR MEDICATION LIST DOCUMENTED IN MEDICAL RECORD: ICD-10-PCS | Mod: CPTII,S$GLB,, | Performed by: PODIATRIST

## 2022-11-08 PROCEDURE — 99999 PR PBB SHADOW E&M-EST. PATIENT-LVL III: ICD-10-PCS | Mod: PBBFAC,,, | Performed by: PODIATRIST

## 2022-11-08 PROCEDURE — 99214 PR OFFICE/OUTPT VISIT, EST, LEVL IV, 30-39 MIN: ICD-10-PCS | Mod: S$GLB,,, | Performed by: PODIATRIST

## 2022-11-08 PROCEDURE — 99999 PR PBB SHADOW E&M-EST. PATIENT-LVL III: CPT | Mod: PBBFAC,,, | Performed by: PODIATRIST

## 2022-11-08 PROCEDURE — 1159F MED LIST DOCD IN RCRD: CPT | Mod: CPTII,S$GLB,, | Performed by: PODIATRIST

## 2022-11-08 NOTE — PROGRESS NOTES
Subjective:       Patient ID: Indira Walker is a 77 y.o. female.    Chief Complaint: Foot Problem (10/10 pain to left plantar foot. diabetic PCP: Dr. Kearns last seen 11/02/22 with LEA Canas)    HPI: Indira Walker presents to the office today, with complaints of pains to the left foot. The pains are stated as moderate to severe to the ball(s) of the foot. Patient states recently cutting her calluses on the left foot and cutting herself by accident. States no infection at present. States her DMII is not controlled.     Hemoglobin A1C   Date Value Ref Range Status   08/01/2022 8.4 (H) 4.0 - 5.6 % Final     Comment:     ADA Screening Guidelines:  5.7-6.4%  Consistent with prediabetes  >or=6.5%  Consistent with diabetes    High levels of fetal hemoglobin interfere with the HbA1C  assay. Heterozygous hemoglobin variants (HbS, HgC, etc)do  not significantly interfere with this assay.   However, presence of multiple variants may affect accuracy.     01/27/2022 7.7 (H) 4.0 - 5.6 % Final     Comment:     ADA Screening Guidelines:  5.7-6.4%  Consistent with prediabetes  >or=6.5%  Consistent with diabetes    High levels of fetal hemoglobin interfere with the HbA1C  assay. Heterozygous hemoglobin variants (HbS, HgC, etc)do  not significantly interfere with this assay.   However, presence of multiple variants may affect accuracy.     07/08/2021 8.1 (H) 4.0 - 5.6 % Final     Comment:     ADA Screening Guidelines:  5.7-6.4%  Consistent with prediabetes  >or=6.5%  Consistent with diabetes    High levels of fetal hemoglobin interfere with the HbA1C  assay. Heterozygous hemoglobin variants (HbS, HgC, etc)do  not significantly interfere with this assay.   However, presence of multiple variants may affect accuracy.           Review of patient's allergies indicates:   Allergen Reactions    Pyrilamine Swelling     (an ingredient in Midol)    Iodine and iodide containing products Hives       Past Medical History:    Diagnosis Date    Arthritis     Diabetes mellitus, type 2     Foot drop, left 2018    GERD (gastroesophageal reflux disease)     Heartburn     Hyperlipidemia     Hypertension     Left sided sciatica     s/p microdiscectomy 3/21/18       Family History   Problem Relation Age of Onset    Diabetes Mother     Diabetes Father        Social History     Socioeconomic History    Marital status:     Number of children: 2   Occupational History    Occupation:       Employer: [employer]    Tobacco Use    Smoking status: Never    Smokeless tobacco: Never   Substance and Sexual Activity    Alcohol use: No    Drug use: No    Sexual activity: Not Currently     Partners: Male       Past Surgical History:   Procedure Laterality Date    BREAST BIOPSY       SECTION      COLONOSCOPY  2008    DR. JANET GARCÍA/MILD DIVERICULOSIS DESCENDING AND SIGMOID. REPEAT 5 YRS    ENDOSCOPIC ULTRASOUND OF UPPER GASTROINTESTINAL TRACT N/A 2022    Procedure: ULTRASOUND, UPPER GI TRACT, ENDOSCOPIC;  Surgeon: Ange Saldivar MD;  Location: [facility];  Service: Endoscopy;  Laterality: N/A;  Upper and Linear, 22 sharkcore, slides and formalin    HERNIA REPAIR      MICRODISCECTOMY OF SPINE Left 2018    left L5-S1, Dr Segundo       Review of Systems   Constitutional:  Negative for chills, fatigue and fever.   HENT:  Negative for hearing loss.    Eyes:  Negative for photophobia and visual disturbance.   Respiratory:  Negative for cough, chest tightness, shortness of breath and wheezing.    Cardiovascular:  Negative for chest pain and palpitations.   Gastrointestinal:  Negative for constipation, diarrhea, nausea and vomiting.   Endocrine: Negative for cold intolerance and heat intolerance.   Genitourinary:  Negative for flank pain.   Musculoskeletal:  Positive for gait problem. Negative for neck pain and neck stiffness.   Neurological:  Negative for light-headedness and headaches.  "  Psychiatric/Behavioral:  Negative for sleep disturbance.        Objective:   Ht 5' 5" (1.651 m)   Wt 57.3 kg (126 lb 5.2 oz)   BMI 21.02 kg/m²     Physical Exam    LOWER EXTREMITY PHYSICAL EXAMINATION  DERMATOLOGY: Minor skin laceration, LLE plantar 1st MTPJ. No infection is noted. Mild pains to palpation.     VASCULAR: The right dorsalis pedis pulse is 2/4 and the posterior tibial pulse is 2/4. The left dorsalis pedis pulse is 1/4 and the posterior tibial pulse is 1/4. Hair growth is noted on the dorsal foot and digits. Proximal to distal, warm to warm. Capillary refill time is WNL at less than 3s.    ORTHOPEDIC: Manual Muscle Testing is 5/5 in all planes on the B/L foot, without pains, with and without resistance. Gait pattern is antalgic. There is mild to moderate discomfort to palpation at the left plantar 2nd, 3rd and 4th MTPJ. Plantar fat pad atrophy with displacement is noted.  Gastrocsoleal equinus contracture is noted.  Rectus foot type is noted. Negative Lachman's testing is noted.     Assessment:     1. Metatarsalgia, left foot    2. Plantar fat pad atrophy of left foot    3. Contracture, left ankle    4. Laceration of left foot, initial encounter    5. Onychomycosis        Plan:     Metatarsalgia, left foot    Plantar fat pad atrophy of left foot    Contracture, left ankle    Laceration of left foot, initial encounter    Onychomycosis  -     ketoconazole (NIZORAL) 2 % cream; Apply topically 2 (two) times daily.  Dispense: 30 g; Refill: 2      Thorough discussion is had with the patient today, concerning the diagnosis, its etiology, and the treatment algorithm at present.     Minor laceration w/o infection, partial thickness.    Start topical Abx ointment QD for 5 days.              Future Appointments   Date Time Provider Department Center   12/21/2022  9:40 AM JULIUS VERA [facility]   12/28/2022  7:30 [facility]   5/2/2023  8:40 AM JULIUS VERA" [facility]   5/9/2023  9:20 AM [facility]

## 2022-11-09 ENCOUNTER — OFFICE VISIT (OUTPATIENT)
Dept: INTERNAL MEDICINE | Facility: CLINIC | Age: 77
End: 2022-11-09
Payer: MEDICARE

## 2022-11-09 ENCOUNTER — LAB VISIT (OUTPATIENT)
Dept: LAB | Facility: HOSPITAL | Age: 77
End: 2022-11-09
Attending: FAMILY MEDICINE
Payer: MEDICARE

## 2022-11-09 VITALS
BODY MASS INDEX: 20.25 KG/M2 | DIASTOLIC BLOOD PRESSURE: 60 MMHG | SYSTOLIC BLOOD PRESSURE: 114 MMHG | HEIGHT: 65 IN | WEIGHT: 121.56 LBS | HEART RATE: 90 BPM | RESPIRATION RATE: 18 BRPM | TEMPERATURE: 98 F

## 2022-11-09 DIAGNOSIS — E11.59 HYPERTENSION ASSOCIATED WITH DIABETES: ICD-10-CM

## 2022-11-09 DIAGNOSIS — E78.5 HYPERLIPIDEMIA ASSOCIATED WITH TYPE 2 DIABETES MELLITUS: ICD-10-CM

## 2022-11-09 DIAGNOSIS — E11.69 HYPERLIPIDEMIA ASSOCIATED WITH TYPE 2 DIABETES MELLITUS: ICD-10-CM

## 2022-11-09 DIAGNOSIS — E11.65 UNCONTROLLED TYPE 2 DIABETES MELLITUS WITH HYPERGLYCEMIA: ICD-10-CM

## 2022-11-09 DIAGNOSIS — E11.65 UNCONTROLLED TYPE 2 DIABETES MELLITUS WITH HYPERGLYCEMIA: Primary | ICD-10-CM

## 2022-11-09 DIAGNOSIS — I15.2 HYPERTENSION ASSOCIATED WITH DIABETES: ICD-10-CM

## 2022-11-09 LAB
ESTIMATED AVG GLUCOSE: 180 MG/DL (ref 68–131)
HBA1C MFR BLD: 7.9 % (ref 4–5.6)

## 2022-11-09 PROCEDURE — 3074F SYST BP LT 130 MM HG: CPT | Mod: CPTII,S$GLB,, | Performed by: FAMILY MEDICINE

## 2022-11-09 PROCEDURE — 1159F MED LIST DOCD IN RCRD: CPT | Mod: CPTII,S$GLB,, | Performed by: FAMILY MEDICINE

## 2022-11-09 PROCEDURE — 3078F PR MOST RECENT DIASTOLIC BLOOD PRESSURE < 80 MM HG: ICD-10-PCS | Mod: CPTII,S$GLB,, | Performed by: FAMILY MEDICINE

## 2022-11-09 PROCEDURE — 99213 OFFICE O/P EST LOW 20 MIN: CPT | Mod: S$GLB,,, | Performed by: FAMILY MEDICINE

## 2022-11-09 PROCEDURE — 3078F DIAST BP <80 MM HG: CPT | Mod: CPTII,S$GLB,, | Performed by: FAMILY MEDICINE

## 2022-11-09 PROCEDURE — 3288F FALL RISK ASSESSMENT DOCD: CPT | Mod: CPTII,S$GLB,, | Performed by: FAMILY MEDICINE

## 2022-11-09 PROCEDURE — 3288F PR FALLS RISK ASSESSMENT DOCUMENTED: ICD-10-PCS | Mod: CPTII,S$GLB,, | Performed by: FAMILY MEDICINE

## 2022-11-09 PROCEDURE — 99999 PR PBB SHADOW E&M-EST. PATIENT-LVL V: ICD-10-PCS | Mod: PBBFAC,,, | Performed by: FAMILY MEDICINE

## 2022-11-09 PROCEDURE — 99999 PR PBB SHADOW E&M-EST. PATIENT-LVL V: CPT | Mod: PBBFAC,,, | Performed by: FAMILY MEDICINE

## 2022-11-09 PROCEDURE — 3074F PR MOST RECENT SYSTOLIC BLOOD PRESSURE < 130 MM HG: ICD-10-PCS | Mod: CPTII,S$GLB,, | Performed by: FAMILY MEDICINE

## 2022-11-09 PROCEDURE — 36415 COLL VENOUS BLD VENIPUNCTURE: CPT | Performed by: FAMILY MEDICINE

## 2022-11-09 PROCEDURE — 99213 PR OFFICE/OUTPT VISIT, EST, LEVL III, 20-29 MIN: ICD-10-PCS | Mod: S$GLB,,, | Performed by: FAMILY MEDICINE

## 2022-11-09 PROCEDURE — 1159F PR MEDICATION LIST DOCUMENTED IN MEDICAL RECORD: ICD-10-PCS | Mod: CPTII,S$GLB,, | Performed by: FAMILY MEDICINE

## 2022-11-09 PROCEDURE — 83036 HEMOGLOBIN GLYCOSYLATED A1C: CPT | Performed by: FAMILY MEDICINE

## 2022-11-09 PROCEDURE — 1101F PR PT FALLS ASSESS DOC 0-1 FALLS W/OUT INJ PAST YR: ICD-10-PCS | Mod: CPTII,S$GLB,, | Performed by: FAMILY MEDICINE

## 2022-11-09 PROCEDURE — 1126F AMNT PAIN NOTED NONE PRSNT: CPT | Mod: CPTII,S$GLB,, | Performed by: FAMILY MEDICINE

## 2022-11-09 PROCEDURE — 1101F PT FALLS ASSESS-DOCD LE1/YR: CPT | Mod: CPTII,S$GLB,, | Performed by: FAMILY MEDICINE

## 2022-11-09 PROCEDURE — 1126F PR PAIN SEVERITY QUANTIFIED, NO PAIN PRESENT: ICD-10-PCS | Mod: CPTII,S$GLB,, | Performed by: FAMILY MEDICINE

## 2022-11-09 RX ORDER — ONDANSETRON 4 MG/1
4 TABLET, ORALLY DISINTEGRATING ORAL
COMMUNITY
End: 2023-07-26

## 2022-11-09 RX ORDER — KETOCONAZOLE 20 MG/G
CREAM TOPICAL 2 TIMES DAILY
Qty: 30 G | Refills: 2 | Status: SHIPPED | OUTPATIENT
Start: 2022-11-09 | End: 2023-07-26

## 2022-11-09 NOTE — ADDENDUM NOTE
Addended by: ROMAINE AREVALO on: 11/9/2022 03:25 PM     Modules accepted: Orders, Level of Service

## 2022-11-09 NOTE — PROGRESS NOTES
Subjective:       Patient ID: Indira Walker is a 77 y.o. female.    Chief Complaint: Diabetes (3 mo f/u DM)    Patient presents to clinic today for followup of diabetes. She brings in home BS log showing -152.  Patient reports seeing Podiatry for her foot yesterday and is ambulating with cane due to this issue.  She also reports a fall at home about a week ago after tripping over her granddaughter's toy.  She reports hitting her right knee and states that it is much better.  She denies any other injuries.  She denies additional falls.  Patient also inquires if mupirocin is adequate for wound on her finger that was given by other provider.  Patient reports still having some hoarseness, but is overall feeling much better after treatment for pharyngitis with Hilda. Patient is otherwise without concerns today.    Review of Systems   Constitutional:  Negative for chills, fatigue, fever and unexpected weight change.   Eyes:  Negative for visual disturbance.   Respiratory:  Negative for shortness of breath.    Cardiovascular:  Negative for chest pain.   Musculoskeletal:  Negative for myalgias.   Neurological:  Negative for headaches.       Objective:      Physical Exam  Vitals reviewed.   Constitutional:       General: She is not in acute distress.     Appearance: She is well-developed.   HENT:      Head: Normocephalic and atraumatic.   Eyes:      General: Lids are normal. No scleral icterus.     Extraocular Movements: Extraocular movements intact.      Conjunctiva/sclera: Conjunctivae normal.      Pupils: Pupils are equal, round, and reactive to light.   Pulmonary:      Effort: Pulmonary effort is normal.   Neurological:      Mental Status: She is alert and oriented to person, place, and time.      Cranial Nerves: No cranial nerve deficit.      Gait: Gait normal.   Psychiatric:         Mood and Affect: Mood and affect normal.       Assessment:       1. Uncontrolled type 2 diabetes mellitus with hyperglycemia     2. Hypertension associated with diabetes    3. Hyperlipidemia associated with type 2 diabetes mellitus          Plan:   1. Uncontrolled type 2 diabetes mellitus with hyperglycemia  -     Hemoglobin A1C; Future; Expected date: 11/09/2022  -     Hemoglobin A1C; Future; Expected date: 05/09/2023    2. Hypertension associated with diabetes  -     TSH; Future; Expected date: 05/09/2023  -     CBC Auto Differential; Future; Expected date: 05/09/2023    3. Hyperlipidemia associated with type 2 diabetes mellitus  -     Comprehensive Metabolic Panel; Future; Expected date: 05/09/2023  -     Lipid Panel; Future; Expected date: 05/09/2023    Advised voice rest and plenty of liquids for hoarseness.  Advised if this is worse or persistent to contact clinic for ENT referral.  Advised patient that mupirocin should be adequate to treat the wound on her finger.  Advised if it is worse or not improving to contact clinic for dermatology referral.  Health Maintenance reviewed/updated.  Discussed recommended vaccines.  Advised okay to defer until she completes her current course of antibiotics for recent URI.  Patient expressed understanding and agreement with plan.

## 2022-11-09 NOTE — PATIENT INSTRUCTIONS
"Get your flu vaccine this season, October is the optimal time. Flu vaccines start becoming available in September and we continue vaccinating through the winter months. Flu vaccines are also available at most local pharmacies.     The bivalent covid booster is now available. You can schedule an appointment for the vaccine through [facility] or ask  to assist you with scheduling an appointment for the vaccine.    The bivalent vaccines, known as the "Omicron" vaccines, target both the original strain of COVID-19 as well as the Omicron variant, which is now the predominant strain in the [place].    The Omicron booster is authorized for individuals 12 years and older and is given two months after last dose of primary or booster shot.     Check with your pharmacist regarding shingrix vaccine.     Check with your pharmacist regarding Tdap (tetanus/diphtheria/pertussis) vaccine.     "

## 2022-11-14 DIAGNOSIS — R63.4 WEIGHT LOSS: Primary | ICD-10-CM

## 2022-11-14 DIAGNOSIS — R93.5 ABNORMAL CT OF THE ABDOMEN: ICD-10-CM

## 2022-12-01 ENCOUNTER — OFFICE VISIT (OUTPATIENT)
Dept: PODIATRY | Facility: CLINIC | Age: 77
End: 2022-12-01
Payer: MEDICARE

## 2022-12-01 ENCOUNTER — HOSPITAL ENCOUNTER (OUTPATIENT)
Dept: RADIOLOGY | Facility: HOSPITAL | Age: 77
Discharge: HOME | End: 2022-12-01
Attending: PODIATRIST
Payer: MEDICARE

## 2022-12-01 DIAGNOSIS — Z91.81 HISTORY OF FALL: ICD-10-CM

## 2022-12-01 DIAGNOSIS — E11.65 UNCONTROLLED TYPE 2 DIABETES MELLITUS WITH HYPERGLYCEMIA: ICD-10-CM

## 2022-12-01 DIAGNOSIS — M25.561 ACUTE PAIN OF RIGHT KNEE: ICD-10-CM

## 2022-12-01 DIAGNOSIS — M20.42 HAMMER TOE OF LEFT FOOT: ICD-10-CM

## 2022-12-01 DIAGNOSIS — M62.838 MUSCLE SPASM: ICD-10-CM

## 2022-12-01 DIAGNOSIS — M20.12 HAV (HALLUX ABDUCTO VALGUS), LEFT: ICD-10-CM

## 2022-12-01 DIAGNOSIS — M77.42 METATARSALGIA, LEFT FOOT: Primary | ICD-10-CM

## 2022-12-01 DIAGNOSIS — M79.672 PAIN IN LEFT FOOT: ICD-10-CM

## 2022-12-01 DIAGNOSIS — M24.572 CONTRACTURE, LEFT ANKLE: ICD-10-CM

## 2022-12-01 PROCEDURE — 73560 X-RAY EXAM OF KNEE 1 OR 2: CPT | Mod: 26,RT,, | Performed by: RADIOLOGY

## 2022-12-01 PROCEDURE — 73560 X-RAY EXAM OF KNEE 1 OR 2: CPT | Mod: TC,RT

## 2022-12-01 PROCEDURE — 3288F PR FALLS RISK ASSESSMENT DOCUMENTED: ICD-10-PCS | Mod: CPTII,S$GLB,, | Performed by: PODIATRIST

## 2022-12-01 PROCEDURE — 73562 X-RAY EXAM OF KNEE 3: CPT | Mod: 26,LT,, | Performed by: RADIOLOGY

## 2022-12-01 PROCEDURE — 1159F PR MEDICATION LIST DOCUMENTED IN MEDICAL RECORD: ICD-10-PCS | Mod: CPTII,S$GLB,, | Performed by: PODIATRIST

## 2022-12-01 PROCEDURE — 99214 PR OFFICE/OUTPT VISIT, EST, LEVL IV, 30-39 MIN: ICD-10-PCS | Mod: S$GLB,,, | Performed by: PODIATRIST

## 2022-12-01 PROCEDURE — 99999 PR PBB SHADOW E&M-EST. PATIENT-LVL III: ICD-10-PCS | Mod: PBBFAC,,, | Performed by: PODIATRIST

## 2022-12-01 PROCEDURE — 1159F MED LIST DOCD IN RCRD: CPT | Mod: CPTII,S$GLB,, | Performed by: PODIATRIST

## 2022-12-01 PROCEDURE — 3288F FALL RISK ASSESSMENT DOCD: CPT | Mod: CPTII,S$GLB,, | Performed by: PODIATRIST

## 2022-12-01 PROCEDURE — 73560 XR KNEE ORTHO LEFT: ICD-10-PCS | Mod: 26,RT,, | Performed by: RADIOLOGY

## 2022-12-01 PROCEDURE — 99214 OFFICE O/P EST MOD 30 MIN: CPT | Mod: S$GLB,,, | Performed by: PODIATRIST

## 2022-12-01 PROCEDURE — 99999 PR PBB SHADOW E&M-EST. PATIENT-LVL III: CPT | Mod: PBBFAC,,, | Performed by: PODIATRIST

## 2022-12-01 PROCEDURE — 1125F PR PAIN SEVERITY QUANTIFIED, PAIN PRESENT: ICD-10-PCS | Mod: CPTII,S$GLB,, | Performed by: PODIATRIST

## 2022-12-01 PROCEDURE — 1101F PT FALLS ASSESS-DOCD LE1/YR: CPT | Mod: CPTII,S$GLB,, | Performed by: PODIATRIST

## 2022-12-01 PROCEDURE — 1125F AMNT PAIN NOTED PAIN PRSNT: CPT | Mod: CPTII,S$GLB,, | Performed by: PODIATRIST

## 2022-12-01 PROCEDURE — 1160F RVW MEDS BY RX/DR IN RCRD: CPT | Mod: CPTII,S$GLB,, | Performed by: PODIATRIST

## 2022-12-01 PROCEDURE — 1160F PR REVIEW ALL MEDS BY PRESCRIBER/CLIN PHARMACIST DOCUMENTED: ICD-10-PCS | Mod: CPTII,S$GLB,, | Performed by: PODIATRIST

## 2022-12-01 PROCEDURE — 73562 XR KNEE ORTHO LEFT: ICD-10-PCS | Mod: 26,LT,, | Performed by: RADIOLOGY

## 2022-12-01 PROCEDURE — 1101F PR PT FALLS ASSESS DOC 0-1 FALLS W/OUT INJ PAST YR: ICD-10-PCS | Mod: CPTII,S$GLB,, | Performed by: PODIATRIST

## 2022-12-01 RX ORDER — METHOCARBAMOL 500 MG/1
1000 TABLET, FILM COATED ORAL 3 TIMES DAILY
Qty: 60 TABLET | Refills: 0 | Status: SHIPPED | OUTPATIENT
Start: 2022-12-01 | End: 2022-12-11

## 2022-12-01 RX ORDER — PREDNISONE 10 MG/1
10 TABLET ORAL DAILY
Qty: 5 TABLET | Refills: 0 | Status: SHIPPED | OUTPATIENT
Start: 2022-12-01 | End: 2022-12-06

## 2022-12-01 NOTE — PROGRESS NOTES
Subjective:       Patient ID: Indira Walker is a 77 y.o. female.    Chief Complaint: Foot Pain (C/o left foot pain, 6 pain level, Diabetic, Last PCP visit 11/9/2022)    HPI: Indira Walker presents to the office today, with complaints of pains to the left foot at the plantar 1st MTPJ. The pains are stated as moderate to severe. Also states left knee pains, s/p fall. States left knee swelling.  is present. Is wearing Lidocaine patches on the left knee and foot. States Tylenol for minimal relief.    Hemoglobin A1C   Date Value Ref Range Status   11/09/2022 7.9 (H) 4.0 - 5.6 % Final     Comment:     ADA Screening Guidelines:  5.7-6.4%  Consistent with prediabetes  >or=6.5%  Consistent with diabetes    High levels of fetal hemoglobin interfere with the HbA1C  assay. Heterozygous hemoglobin variants (HbS, HgC, etc)do  not significantly interfere with this assay.   However, presence of multiple variants may affect accuracy.     08/01/2022 8.4 (H) 4.0 - 5.6 % Final     Comment:     ADA Screening Guidelines:  5.7-6.4%  Consistent with prediabetes  >or=6.5%  Consistent with diabetes    High levels of fetal hemoglobin interfere with the HbA1C  assay. Heterozygous hemoglobin variants (HbS, HgC, etc)do  not significantly interfere with this assay.   However, presence of multiple variants may affect accuracy.     01/27/2022 7.7 (H) 4.0 - 5.6 % Final     Comment:     ADA Screening Guidelines:  5.7-6.4%  Consistent with prediabetes  >or=6.5%  Consistent with diabetes    High levels of fetal hemoglobin interfere with the HbA1C  assay. Heterozygous hemoglobin variants (HbS, HgC, etc)do  not significantly interfere with this assay.   However, presence of multiple variants may affect accuracy.           Review of patient's allergies indicates:   Allergen Reactions    Pyrilamine Swelling     (an ingredient in Midol)    Iodine and iodide containing products Hives       Past Medical History:   Diagnosis Date    Arthritis      Diabetes mellitus, type 2     Foot drop, left 2018    GERD (gastroesophageal reflux disease)     Heartburn     Hyperlipidemia     Hypertension     Left sided sciatica     s/p microdiscectomy 3/21/18       Family History   Problem Relation Age of Onset    Diabetes Mother     Diabetes Father        Social History     Socioeconomic History    Marital status:     Number of children: 2   Occupational History    Occupation:       Employer: [employer]   Tobacco Use    Smoking status: Never    Smokeless tobacco: Never   Substance and Sexual Activity    Alcohol use: No    Drug use: No    Sexual activity: Not Currently     Partners: Male       Past Surgical History:   Procedure Laterality Date    BREAST BIOPSY       SECTION      COLONOSCOPY  2008    DR. JANET GARCÍA/MILD DIVERICULOSIS DESCENDING AND SIGMOID. REPEAT 5 YRS    ENDOSCOPIC ULTRASOUND OF UPPER GASTROINTESTINAL TRACT N/A 2022    Procedure: ULTRASOUND, UPPER GI TRACT, ENDOSCOPIC;  Surgeon: Ange Saldivar MD;  Location: [facility];  Service: Endoscopy;  Laterality: N/A;  Upper and Linear, 22 sharkcore, slides and formalin    HERNIA REPAIR      MICRODISCECTOMY OF SPINE Left 2018    left L5-S1, Dr Segundo       Review of Systems   Constitutional:  Negative for chills, fatigue and fever.   HENT:  Negative for hearing loss.    Eyes:  Negative for photophobia and visual disturbance.   Respiratory:  Negative for cough, chest tightness, shortness of breath and wheezing.    Cardiovascular:  Negative for chest pain and palpitations.   Gastrointestinal:  Negative for constipation, diarrhea, nausea and vomiting.   Endocrine: Negative for cold intolerance and heat intolerance.   Genitourinary:  Negative for flank pain.   Musculoskeletal:  Positive for gait problem. Negative for neck pain and neck stiffness.   Neurological:  Negative for light-headedness and headaches.   Psychiatric/Behavioral:  Negative for  sleep disturbance.        Objective:   There were no vitals taken for this visit.    Physical Exam  Musculoskeletal:      Left knee: Swelling and bony tenderness present. Decreased range of motion. Tenderness present over the medial joint line, lateral joint line, MCL and LCL.       LOWER EXTREMITY PHYSICAL EXAMINATION  DERMATOLOGY: Skin is supple, dry and intact. No erythema is noted. No calor is noted.     ORTHOPEDIC: Gait pattern is antalgic. There is moderate to severe discomfort to palpation at the left plantar 1st MTPJ. Plantar fat pad atrophy with displacement is noted.  Gastrocsoleal equinus contracture is noted.  Rectus foot type is noted.    Assessment:     1. Metatarsalgia, left foot    2. Hav (hallux abducto valgus), left    3. Hammer toe of left foot    4. Contracture, left ankle    5. Acute pain of right knee    6. History of fall    7. Uncontrolled type 2 diabetes mellitus with hyperglycemia    8. Pain in left foot    9. Muscle spasm        Plan:     Metatarsalgia, left foot    Hav (hallux abducto valgus), left    Hammer toe of left foot    Contracture, left ankle    Acute pain of right knee  -     Cancel: X-Ray Knee 3 View Left; Future; Expected date: 12/01/2022    History of fall  -     Cancel: X-Ray Knee 3 View Left; Future; Expected date: 12/01/2022    Uncontrolled type 2 diabetes mellitus with hyperglycemia    Pain in left foot  -     predniSONE (DELTASONE) 10 MG tablet; Take 1 tablet (10 mg total) by mouth once daily. for 5 days  Dispense: 5 tablet; Refill: 0    Muscle spasm  -     methocarbamoL (ROBAXIN) 500 MG Tab; Take 2 tablets (1,000 mg total) by mouth 3 (three) times daily. for 10 days  Dispense: 60 tablet; Refill: 0      Thorough discussion is had with the patient today, concerning the diagnosis, its etiology, and the treatment algorithm at present.     Did discuss proper and supportive shoe gear in detail and at length with the patient.  These are shoes with firm and robust arch support;  medial counter.  Shoes which only bend at the metatarsophalangeal joint and which are rigid in the midfoot and hindfoot. Patient urged to purchase running type or cross training type shoes gear which are designed for pronation control.             Future Appointments   Date Time Provider Department Center   12/21/2022  9:40 AM LAB[facility]AT[facility], [facility]   12/28/2022  7:30 AM Giovanni Cage MD [facility]   5/2/2023  8:40 AM LABORATORY, [facility]   5/9/2023  9:20 AM Hilda Hankins PA-C [facility]

## 2022-12-16 ENCOUNTER — TELEPHONE (OUTPATIENT)
Dept: INTERNAL MEDICINE | Facility: CLINIC | Age: 77
End: 2022-12-16
Payer: MEDICARE

## 2022-12-16 DIAGNOSIS — Z01.810 PREOP CARDIOVASCULAR EXAM: Primary | ICD-10-CM

## 2022-12-16 NOTE — TELEPHONE ENCOUNTER
----- Message from Isabella Goins sent at 12/16/2022  1:35 PM CST -----  Pt would like a referral to see a cardiologist because she need clearance for surgery. Call back number is [phone]. x.EL

## 2022-12-21 ENCOUNTER — TELEPHONE (OUTPATIENT)
Dept: INTERNAL MEDICINE | Facility: CLINIC | Age: 77
End: 2022-12-21
Payer: MEDICARE

## 2022-12-21 NOTE — TELEPHONE ENCOUNTER
Called pt to advise she is scheduled virtually angélica/ Hilda at 2 pm but we are unable to do audio visits.  No answer, LVM to call clinic back.

## 2022-12-21 NOTE — TELEPHONE ENCOUNTER
----- Message from Jhoan Gomes sent at 12/21/2022 10:42 AM CST -----  Contact: Indira Jacobson would like a call back at [phone] in regards to getting scheduled for a virtual visit today 12/21/22 at 2pm, patient would like to see if it can be made audio only due to patient having the [facility] but doesn't know how to log in.  Thanks   Am

## 2022-12-22 ENCOUNTER — OFFICE VISIT (OUTPATIENT)
Dept: INTERNAL MEDICINE | Facility: CLINIC | Age: 77
End: 2022-12-22
Payer: MEDICARE

## 2022-12-22 VITALS
DIASTOLIC BLOOD PRESSURE: 72 MMHG | WEIGHT: 117.75 LBS | SYSTOLIC BLOOD PRESSURE: 122 MMHG | TEMPERATURE: 97 F | HEIGHT: 64 IN | BODY MASS INDEX: 20.1 KG/M2 | RESPIRATION RATE: 14 BRPM | HEART RATE: 97 BPM | OXYGEN SATURATION: 98 %

## 2022-12-22 DIAGNOSIS — B96.89 ACUTE BACTERIAL SINUSITIS: ICD-10-CM

## 2022-12-22 DIAGNOSIS — J01.90 ACUTE BACTERIAL SINUSITIS: ICD-10-CM

## 2022-12-22 DIAGNOSIS — K12.0 APHTHOUS ULCER: Primary | ICD-10-CM

## 2022-12-22 PROCEDURE — 3074F SYST BP LT 130 MM HG: CPT | Mod: CPTII,S$GLB,, | Performed by: PHYSICIAN ASSISTANT

## 2022-12-22 PROCEDURE — 1100F PR PT FALLS ASSESS DOC 2+ FALLS/FALL W/INJURY/YR: ICD-10-PCS | Mod: CPTII,S$GLB,, | Performed by: PHYSICIAN ASSISTANT

## 2022-12-22 PROCEDURE — 3078F DIAST BP <80 MM HG: CPT | Mod: CPTII,S$GLB,, | Performed by: PHYSICIAN ASSISTANT

## 2022-12-22 PROCEDURE — 1125F AMNT PAIN NOTED PAIN PRSNT: CPT | Mod: CPTII,S$GLB,, | Performed by: PHYSICIAN ASSISTANT

## 2022-12-22 PROCEDURE — 1100F PTFALLS ASSESS-DOCD GE2>/YR: CPT | Mod: CPTII,S$GLB,, | Performed by: PHYSICIAN ASSISTANT

## 2022-12-22 PROCEDURE — 3288F FALL RISK ASSESSMENT DOCD: CPT | Mod: CPTII,S$GLB,, | Performed by: PHYSICIAN ASSISTANT

## 2022-12-22 PROCEDURE — 99213 PR OFFICE/OUTPT VISIT, EST, LEVL III, 20-29 MIN: ICD-10-PCS | Mod: S$GLB,,, | Performed by: PHYSICIAN ASSISTANT

## 2022-12-22 PROCEDURE — 1159F MED LIST DOCD IN RCRD: CPT | Mod: CPTII,S$GLB,, | Performed by: PHYSICIAN ASSISTANT

## 2022-12-22 PROCEDURE — 3078F PR MOST RECENT DIASTOLIC BLOOD PRESSURE < 80 MM HG: ICD-10-PCS | Mod: CPTII,S$GLB,, | Performed by: PHYSICIAN ASSISTANT

## 2022-12-22 PROCEDURE — 99999 PR PBB SHADOW E&M-EST. PATIENT-LVL V: ICD-10-PCS | Mod: PBBFAC,,, | Performed by: PHYSICIAN ASSISTANT

## 2022-12-22 PROCEDURE — 1159F PR MEDICATION LIST DOCUMENTED IN MEDICAL RECORD: ICD-10-PCS | Mod: CPTII,S$GLB,, | Performed by: PHYSICIAN ASSISTANT

## 2022-12-22 PROCEDURE — 1160F PR REVIEW ALL MEDS BY PRESCRIBER/CLIN PHARMACIST DOCUMENTED: ICD-10-PCS | Mod: CPTII,S$GLB,, | Performed by: PHYSICIAN ASSISTANT

## 2022-12-22 PROCEDURE — 3288F PR FALLS RISK ASSESSMENT DOCUMENTED: ICD-10-PCS | Mod: CPTII,S$GLB,, | Performed by: PHYSICIAN ASSISTANT

## 2022-12-22 PROCEDURE — 99999 PR PBB SHADOW E&M-EST. PATIENT-LVL V: CPT | Mod: PBBFAC,,, | Performed by: PHYSICIAN ASSISTANT

## 2022-12-22 PROCEDURE — 1125F PR PAIN SEVERITY QUANTIFIED, PAIN PRESENT: ICD-10-PCS | Mod: CPTII,S$GLB,, | Performed by: PHYSICIAN ASSISTANT

## 2022-12-22 PROCEDURE — 3074F PR MOST RECENT SYSTOLIC BLOOD PRESSURE < 130 MM HG: ICD-10-PCS | Mod: CPTII,S$GLB,, | Performed by: PHYSICIAN ASSISTANT

## 2022-12-22 PROCEDURE — 99213 OFFICE O/P EST LOW 20 MIN: CPT | Mod: S$GLB,,, | Performed by: PHYSICIAN ASSISTANT

## 2022-12-22 PROCEDURE — 1160F RVW MEDS BY RX/DR IN RCRD: CPT | Mod: CPTII,S$GLB,, | Performed by: PHYSICIAN ASSISTANT

## 2022-12-22 RX ORDER — NYSTATIN 100000 [USP'U]/ML
SUSPENSION ORAL 2 TIMES DAILY
COMMUNITY
Start: 2022-12-15 | End: 2023-04-05 | Stop reason: ALTCHOICE

## 2022-12-22 RX ORDER — AMOXICILLIN 875 MG/1
875 TABLET, FILM COATED ORAL 2 TIMES DAILY
Qty: 10 TABLET | Refills: 0 | Status: SHIPPED | OUTPATIENT
Start: 2022-12-22 | End: 2022-12-27

## 2022-12-23 ENCOUNTER — HOSPITAL ENCOUNTER (OUTPATIENT)
Dept: CARDIOLOGY | Facility: HOSPITAL | Age: 77
Discharge: HOME | End: 2022-12-23
Attending: INTERNAL MEDICINE
Payer: MEDICARE

## 2022-12-23 ENCOUNTER — OFFICE VISIT (OUTPATIENT)
Dept: CARDIOLOGY | Facility: CLINIC | Age: 77
End: 2022-12-23
Payer: MEDICARE

## 2022-12-23 VITALS
HEART RATE: 84 BPM | SYSTOLIC BLOOD PRESSURE: 130 MMHG | BODY MASS INDEX: 20.72 KG/M2 | DIASTOLIC BLOOD PRESSURE: 76 MMHG | OXYGEN SATURATION: 96 % | WEIGHT: 118.81 LBS

## 2022-12-23 DIAGNOSIS — E78.5 HYPERLIPIDEMIA ASSOCIATED WITH TYPE 2 DIABETES MELLITUS: ICD-10-CM

## 2022-12-23 DIAGNOSIS — I15.2 HYPERTENSION ASSOCIATED WITH DIABETES: ICD-10-CM

## 2022-12-23 DIAGNOSIS — E11.65 UNCONTROLLED TYPE 2 DIABETES MELLITUS WITH HYPERGLYCEMIA: ICD-10-CM

## 2022-12-23 DIAGNOSIS — I51.89 DIASTOLIC DYSFUNCTION: ICD-10-CM

## 2022-12-23 DIAGNOSIS — R07.9 CHEST PAIN, UNSPECIFIED TYPE: Primary | ICD-10-CM

## 2022-12-23 DIAGNOSIS — R07.9 CHEST PAIN, UNSPECIFIED TYPE: ICD-10-CM

## 2022-12-23 DIAGNOSIS — R94.31 ABNORMAL ECG: ICD-10-CM

## 2022-12-23 DIAGNOSIS — E11.69 HYPERLIPIDEMIA ASSOCIATED WITH TYPE 2 DIABETES MELLITUS: ICD-10-CM

## 2022-12-23 DIAGNOSIS — E11.59 HYPERTENSION ASSOCIATED WITH DIABETES: ICD-10-CM

## 2022-12-23 DIAGNOSIS — Z01.810 PREOP CARDIOVASCULAR EXAM: Primary | ICD-10-CM

## 2022-12-23 DIAGNOSIS — R07.89 ATYPICAL CHEST PAIN: ICD-10-CM

## 2022-12-23 PROCEDURE — 99205 PR OFFICE/OUTPT VISIT, NEW, LEVL V, 60-74 MIN: ICD-10-PCS | Mod: S$GLB,,, | Performed by: INTERNAL MEDICINE

## 2022-12-23 PROCEDURE — 1160F PR REVIEW ALL MEDS BY PRESCRIBER/CLIN PHARMACIST DOCUMENTED: ICD-10-PCS | Mod: CPTII,S$GLB,, | Performed by: INTERNAL MEDICINE

## 2022-12-23 PROCEDURE — 93010 EKG 12-LEAD: ICD-10-PCS | Mod: ,,, | Performed by: INTERNAL MEDICINE

## 2022-12-23 PROCEDURE — 1159F PR MEDICATION LIST DOCUMENTED IN MEDICAL RECORD: ICD-10-PCS | Mod: CPTII,S$GLB,, | Performed by: INTERNAL MEDICINE

## 2022-12-23 PROCEDURE — 3075F SYST BP GE 130 - 139MM HG: CPT | Mod: CPTII,S$GLB,, | Performed by: INTERNAL MEDICINE

## 2022-12-23 PROCEDURE — 99999 PR PBB SHADOW E&M-EST. PATIENT-LVL III: ICD-10-PCS | Mod: PBBFAC,,, | Performed by: INTERNAL MEDICINE

## 2022-12-23 PROCEDURE — 1160F RVW MEDS BY RX/DR IN RCRD: CPT | Mod: CPTII,S$GLB,, | Performed by: INTERNAL MEDICINE

## 2022-12-23 PROCEDURE — 3078F DIAST BP <80 MM HG: CPT | Mod: CPTII,S$GLB,, | Performed by: INTERNAL MEDICINE

## 2022-12-23 PROCEDURE — 1159F MED LIST DOCD IN RCRD: CPT | Mod: CPTII,S$GLB,, | Performed by: INTERNAL MEDICINE

## 2022-12-23 PROCEDURE — 99205 OFFICE O/P NEW HI 60 MIN: CPT | Mod: S$GLB,,, | Performed by: INTERNAL MEDICINE

## 2022-12-23 PROCEDURE — 93010 ELECTROCARDIOGRAM REPORT: CPT | Mod: ,,, | Performed by: INTERNAL MEDICINE

## 2022-12-23 PROCEDURE — 99999 PR PBB SHADOW E&M-EST. PATIENT-LVL III: CPT | Mod: PBBFAC,,, | Performed by: INTERNAL MEDICINE

## 2022-12-23 PROCEDURE — 3075F PR MOST RECENT SYSTOLIC BLOOD PRESS GE 130-139MM HG: ICD-10-PCS | Mod: CPTII,S$GLB,, | Performed by: INTERNAL MEDICINE

## 2022-12-23 PROCEDURE — 93005 ELECTROCARDIOGRAM TRACING: CPT

## 2022-12-23 PROCEDURE — 3078F PR MOST RECENT DIASTOLIC BLOOD PRESSURE < 80 MM HG: ICD-10-PCS | Mod: CPTII,S$GLB,, | Performed by: INTERNAL MEDICINE

## 2022-12-23 NOTE — PROGRESS NOTES
"Subjective:    Patient ID:  Indira Walker is a 77 y.o. female who presents for evaluation of Pre-op Exam      HPI  pt presents for preop eval.  She has DM, HTN, hyperlipidemia, DD.  Nonsmoker.  She has seen Javier Unger, in past.  Last seen Nov 2019.  He saw her for preop, atypical cp in past.  Stress MPI negative for ischemia, in 2018.  Echo 2018 normal LVEF, DD.  Ecgs in past, NSR, low precordial R waves, last one 8/2/22 similar.  She thinks she needs colonoscopy clearance.  Had pancreatic cyst eval 7/22 without CV complications.  Ecg today 12/23/22 NSR, possible old anteroseptal infarct.  "Periodically" she gets CP, stress per pt.  Sharp, different locations of chest.  Short lasting seconds.  Last episode few weeks ago.  Few times month.  Nonexertional, but is sedentary.  No CHF sxs.  Labs reviewed.  DM HGAIC above goal.  Lipids well controlled on statin tx.  Under stress w loss of both of her 2 children.      Past Medical History:   Diagnosis Date    Arthritis     Diabetes mellitus, type 2     Foot drop, left 01/26/2018    GERD (gastroesophageal reflux disease)     Heartburn     Hyperlipidemia     Hypertension     Left sided sciatica     s/p microdiscectomy 3/21/18       Current Outpatient Medications:     (Magic mouthwash) 1:1:1 diphenhydrAMINE(Benadryl) 12.5mg/5ml liq, aluminum & magnesium hydroxide-simethicone (Maalox), LIDOcaine viscous 2%, Swish and spit 15 mLs every 4 (four) hours as needed (mouth sores). for mouth sores, Disp: 360 mL, Rfl: 0    acetaminophen (TYLENOL EXTRA STRENGTH ORAL), Take by mouth., Disp: , Rfl:     amoxicillin (AMOXIL) 875 MG tablet, Take 1 tablet (875 mg total) by mouth 2 (two) times a day. for 5 days, Disp: 10 tablet, Rfl: 0    aspirin (ECOTRIN) 81 MG EC tablet, Take 1 tablet (81 mg total) by mouth once daily. Every other day, Disp: , Rfl: 0    azelastine (ASTELIN) 137 mcg (0.1 %) nasal spray, SPRAY 1 SPRAY BY NASAL ROUTE 2 TIMES DAILY., Disp: 30 mL, Rfl: 5    blood sugar " diagnostic Strp, To check BG 2 times daily, to use with insurance preferred meter, Disp: 200 each, Rfl: 3    blood-glucose meter kit, To check BG 2 times daily, to use with insurance preferred meter, Disp: 1 each, Rfl: 0    chlorhexidine (PERIDEX) 0.12 % solution, PLEASE SEE ATTACHED FOR DETAILED DIRECTIONS, Disp: , Rfl:     famotidine (PEPCID) 40 MG tablet, Take 1 tablet (40 mg total) by mouth once daily. (Patient not taking: Reported on 12/22/2022), Disp: 90 tablet, Rfl: 3    folic acid (FOLVITE) 1 MG tablet, TAKE 1 TABLET BY MOUTH EVERY DAY, Disp: 90 tablet, Rfl: 3    ketoconazole (NIZORAL) 2 % cream, Apply topically 2 (two) times daily., Disp: 30 g, Rfl: 2    lancets Misc, To check BG 2 times daily, to use with insurance preferred meter, Disp: 200 each, Rfl: 3    loratadine (CLARITIN) 10 mg tablet, Take 10 mg by mouth once daily., Disp: , Rfl:     losartan-hydrochlorothiazide 100-12.5 mg (HYZAAR) 100-12.5 mg Tab, TAKE 1 TABLET BY MOUTH EVERY DAY, Disp: 90 tablet, Rfl: 1    meloxicam (MOBIC) 15 MG tablet, Take 15 mg by mouth once daily., Disp: , Rfl:     metFORMIN (GLUCOPHAGE) 1000 MG tablet, TAKE 1 TABLET BY MOUTH TWICE A DAY WITH MEALS, Disp: 180 tablet, Rfl: 1    methotrexate 2.5 MG Tab, TAKE 10 TABLETS (25 MG TOTAL) BY MOUTH EVERY 7 DAYS., Disp: 120 tablet, Rfl: 2    mupirocin (BACTROBAN) 2 % ointment, Apply topically., Disp: , Rfl:     nystatin (MYCOSTATIN) 100,000 unit/mL suspension, Take by mouth 2 (two) times daily., Disp: , Rfl:     ondansetron (ZOFRAN-ODT) 4 MG TbDL, Take 4 mg by mouth as needed. 1 tablet sublingual PRN nausea, Disp: , Rfl:     rosuvastatin (CRESTOR) 20 MG tablet, TAKE 1 TABLET BY MOUTH EVERY DAY, Disp: 90 tablet, Rfl: 1      Review of Systems   Constitutional: Negative.   HENT: Negative.     Eyes: Negative.    Cardiovascular:  Positive for chest pain.   Respiratory: Negative.     Endocrine: Negative.    Hematologic/Lymphatic: Negative.    Skin: Negative.    Musculoskeletal: Negative.     Gastrointestinal: Negative.    Genitourinary: Negative.    Neurological: Negative.    Psychiatric/Behavioral: Negative.     Allergic/Immunologic: Negative.         /76 (BP Location: Left arm, Patient Position: Sitting, BP Method: Medium (Manual))   Pulse 84   Wt 53.9 kg (118 lb 13.3 oz)   SpO2 96%   BMI 20.72 kg/m²     Wt Readings from Last 3 Encounters:   12/23/22 53.9 kg (118 lb 13.3 oz)   12/22/22 53.4 kg (117 lb 11.6 oz)   11/09/22 55.2 kg (121 lb 9.3 oz)     Temp Readings from Last 3 Encounters:   12/22/22 97.3 °F (36.3 °C) (Temporal)   11/09/22 98.1 °F (36.7 °C) (Temporal)   11/02/22 98.1 °F (36.7 °C) (Skin)     BP Readings from Last 3 Encounters:   12/23/22 130/76   12/22/22 122/72   11/09/22 114/60     Pulse Readings from Last 3 Encounters:   12/23/22 84   12/22/22 97   11/09/22 90       Objective:    Physical Exam  Vitals and nursing note reviewed.   Constitutional:       General: She is not in acute distress.     Appearance: Normal appearance. She is well-developed. She is not ill-appearing or diaphoretic.   HENT:      Head: Normocephalic.   Neck:      Thyroid: No thyromegaly.      Vascular: Normal carotid pulses. No carotid bruit, hepatojugular reflux or JVD.   Cardiovascular:      Rate and Rhythm: Normal rate and regular rhythm.      Chest Wall: PMI is not displaced.      Pulses: Normal pulses.           Radial pulses are 2+ on the right side and 2+ on the left side.      Heart sounds: Normal heart sounds, S1 normal and S2 normal. No murmur heard.    No friction rub. No gallop.   Pulmonary:      Effort: Pulmonary effort is normal.      Breath sounds: Normal breath sounds. No wheezing or rales.   Abdominal:      General: Bowel sounds are normal. There is no abdominal bruit.      Palpations: Abdomen is soft. There is no hepatomegaly, splenomegaly or mass.      Tenderness: There is no abdominal tenderness.   Musculoskeletal:      Cervical back: Neck supple.   Lymphadenopathy:      Cervical: No  cervical adenopathy.   Skin:     General: Skin is warm.   Neurological:      Mental Status: She is alert and oriented to person, place, and time.   Psychiatric:         Behavior: Behavior normal. Behavior is cooperative.         I have reviewed all pertinent labs and cardiac studies.      Chemistry        Component Value Date/Time     09/15/2022 0910    K 3.9 09/15/2022 0910     09/15/2022 0910    CO2 28 09/15/2022 0910    BUN 19 09/15/2022 0910    CREATININE 1.1 09/15/2022 0910     (H) 09/15/2022 0910        Component Value Date/Time    CALCIUM 9.3 09/15/2022 0910    ALKPHOS 62 08/02/2022 1224    AST 25 08/02/2022 1224    ALT 25 08/02/2022 1224    BILITOT 0.6 08/02/2022 1224    ESTGFRAFRICA 56 (A) 07/20/2022 1342    EGFRNONAA 48 (A) 07/20/2022 1342        Lab Results   Component Value Date    WBC 7.20 09/06/2022    HGB 9.9 (L) 09/06/2022    HCT 31.0 (L) 09/06/2022    MCV 95 09/06/2022     09/06/2022       Lab Results   Component Value Date    HGBA1C 7.9 (H) 11/09/2022     Lab Results   Component Value Date    CHOL 114 (L) 08/01/2022    CHOL 158 01/27/2022    CHOL 153 07/08/2021     Lab Results   Component Value Date    HDL 52 08/01/2022    HDL 74 01/27/2022    HDL 76 (H) 07/08/2021     Lab Results   Component Value Date    LDLCALC 46.2 (L) 08/01/2022    LDLCALC 72.6 01/27/2022    LDLCALC 65.8 07/08/2021     Lab Results   Component Value Date    TRIG 79 08/01/2022    TRIG 57 01/27/2022    TRIG 56 07/08/2021     Lab Results   Component Value Date    CHOLHDL 45.6 08/01/2022    CHOLHDL 46.8 01/27/2022    CHOLHDL 49.7 07/08/2021       No results found for this or any previous visit.      Assessment:       1. Preop cardiovascular exam    2. Abnormal ECG    3. Atypical chest pain    4. Hypertension associated with diabetes    5. Diastolic dysfunction    6. Hyperlipidemia associated with type 2 diabetes mellitus    7. Uncontrolled type 2 diabetes mellitus with hyperglycemia         Plan:              Atypical CP.  May be stress related w loss of family members.  At risk for CAD, multiple risk factors.  Abnormal ecg, suggestive of old anteroseptal infarct but her prior ecgs with low precordial R waves.  Stress MPI.  Echocardiogram.  Discussed possible LHC if stress test/echo + for ischemia/CHF.  Risks/benefits of LHC to include PCI discussed.  CURTIS for PAD screening.  DM control advised.  Statin tx.  Cardiac/diabetic diet.    Phone review for clearance for colonoscopy.          I have reviewed all pertinent labs and cardiac studies independently. Plans and recommendations have been formulated under my direct supervision. All questions answered and patient voiced understanding.

## 2022-12-23 NOTE — PROGRESS NOTES
Subjective:       Patient ID: Indira Walker is a 77 y.o. female.    Chief Complaint: Mouth Lesions      Mouth Lesions   Associated symptoms include congestion and mouth sores. Pertinent negatives include no fever, no diarrhea, no nausea, no vomiting, no ear pain and no cough.     Indira Walker is a 77 y.o. female who presents today with complaints of Mouth Lesions  Reports ulcers in mouth for the last few weeks. Dentist treating with Nystatin without relief. She has also been soaking her dentures in Vinegar water.    Also reports 2-3 weeks of nasal congestion, rhinorrhea, and postnasal drainage that is constant and gradually worsening. Denies F/C, sore throat or cough. Tried Coricidin HBP with little relief.    Review of Systems   Constitutional:  Negative for chills, fatigue and fever.   HENT:  Positive for congestion, mouth sores and postnasal drip. Negative for ear pain.    Respiratory:  Negative for cough and shortness of breath.    Cardiovascular:  Negative for chest pain.   Gastrointestinal:  Negative for diarrhea, nausea and vomiting.     Objective:      Physical Exam  Vitals and nursing note reviewed.   Constitutional:       General: She is not in acute distress.     Appearance: She is well-developed.   HENT:      Head: Normocephalic and atraumatic.      Right Ear: Tympanic membrane, ear canal and external ear normal.      Left Ear: Tympanic membrane, ear canal and external ear normal.      Nose: Nose normal.      Mouth/Throat:      Lips: Pink.      Mouth: Mucous membranes are moist.      Pharynx: Posterior oropharyngeal erythema present.        Comments: Purulent PND noted to posterior pharynx  Eyes:      General: Lids are normal. No scleral icterus.     Extraocular Movements: Extraocular movements intact.      Conjunctiva/sclera: Conjunctivae normal.   Cardiovascular:      Rate and Rhythm: Normal rate and regular rhythm.   Pulmonary:      Effort: Pulmonary effort is normal.      Breath sounds:  Normal breath sounds. No decreased breath sounds, wheezing, rhonchi or rales.   Neurological:      Mental Status: She is alert.      Cranial Nerves: No cranial nerve deficit.   Psychiatric:         Mood and Affect: Mood and affect normal.       Assessment:       1. Aphthous ulcer    2. Acute bacterial sinusitis          Plan:   1. Aphthous ulcer  -     (Magic mouthwash) 1:1:1 diphenhydrAMINE(Benadryl) 12.5mg/5ml liq, aluminum & magnesium hydroxide-simethicone (Maalox), LIDOcaine viscous 2%; Swish and spit 15 mLs every 4 (four) hours as needed (mouth sores). for mouth sores  Dispense: 360 mL; Refill: 0    2. Acute bacterial sinusitis  -     amoxicillin (AMOXIL) 875 MG tablet; Take 1 tablet (875 mg total) by mouth 2 (two) times a day. for 5 days  Dispense: 10 tablet; Refill: 0

## 2022-12-28 DIAGNOSIS — J31.0 CHRONIC RHINITIS: Primary | ICD-10-CM

## 2022-12-28 NOTE — TELEPHONE ENCOUNTER
----- Message from Aida Christie sent at 12/28/2022  1:26 PM CST -----  Contact: citlalli Liriano is requesting a refill on her antibiotics amoxicillin. Please call her back at [phone].      [facility]/pharmacy #[id number] [facility] - [place] [place] - [address] AT [facility]  [address] [facility]  [place] [address]  Phone: [phone] Fax: [phone]             Thanks  DD

## 2022-12-29 ENCOUNTER — OFFICE VISIT (OUTPATIENT)
Dept: OTOLARYNGOLOGY | Facility: CLINIC | Age: 77
End: 2022-12-29
Payer: MEDICARE

## 2022-12-29 ENCOUNTER — NURSE TRIAGE (OUTPATIENT)
Dept: ADMINISTRATIVE | Facility: CLINIC | Age: 77
End: 2022-12-29
Payer: MEDICARE

## 2022-12-29 VITALS — BODY MASS INDEX: 19.96 KG/M2 | HEIGHT: 64 IN | WEIGHT: 116.88 LBS | TEMPERATURE: 97 F

## 2022-12-29 DIAGNOSIS — M27.0 TORUS PALATINUS: ICD-10-CM

## 2022-12-29 DIAGNOSIS — K21.9 LARYNGOPHARYNGEAL REFLUX (LPR): ICD-10-CM

## 2022-12-29 DIAGNOSIS — J02.9 SORE THROAT: Primary | ICD-10-CM

## 2022-12-29 DIAGNOSIS — H92.03 OTALGIA, BILATERAL: ICD-10-CM

## 2022-12-29 DIAGNOSIS — K12.0 APHTHOUS ULCER OF MOUTH: ICD-10-CM

## 2022-12-29 PROCEDURE — 1160F RVW MEDS BY RX/DR IN RCRD: CPT | Mod: CPTII,S$GLB,, | Performed by: OTOLARYNGOLOGY

## 2022-12-29 PROCEDURE — 99999 PR PBB SHADOW E&M-EST. PATIENT-LVL IV: ICD-10-PCS | Mod: PBBFAC,,, | Performed by: OTOLARYNGOLOGY

## 2022-12-29 PROCEDURE — 1100F PR PT FALLS ASSESS DOC 2+ FALLS/FALL W/INJURY/YR: ICD-10-PCS | Mod: CPTII,S$GLB,, | Performed by: OTOLARYNGOLOGY

## 2022-12-29 PROCEDURE — 99213 PR OFFICE/OUTPT VISIT, EST, LEVL III, 20-29 MIN: ICD-10-PCS | Mod: 25,S$GLB,, | Performed by: OTOLARYNGOLOGY

## 2022-12-29 PROCEDURE — 31575 PR LARYNGOSCOPY, FLEXIBLE; DIAGNOSTIC: ICD-10-PCS | Mod: S$GLB,,, | Performed by: OTOLARYNGOLOGY

## 2022-12-29 PROCEDURE — 1159F PR MEDICATION LIST DOCUMENTED IN MEDICAL RECORD: ICD-10-PCS | Mod: CPTII,S$GLB,, | Performed by: OTOLARYNGOLOGY

## 2022-12-29 PROCEDURE — 1160F PR REVIEW ALL MEDS BY PRESCRIBER/CLIN PHARMACIST DOCUMENTED: ICD-10-PCS | Mod: CPTII,S$GLB,, | Performed by: OTOLARYNGOLOGY

## 2022-12-29 PROCEDURE — 1159F MED LIST DOCD IN RCRD: CPT | Mod: CPTII,S$GLB,, | Performed by: OTOLARYNGOLOGY

## 2022-12-29 PROCEDURE — 3288F FALL RISK ASSESSMENT DOCD: CPT | Mod: CPTII,S$GLB,, | Performed by: OTOLARYNGOLOGY

## 2022-12-29 PROCEDURE — 99999 PR PBB SHADOW E&M-EST. PATIENT-LVL IV: CPT | Mod: PBBFAC,,, | Performed by: OTOLARYNGOLOGY

## 2022-12-29 PROCEDURE — 31575 DIAGNOSTIC LARYNGOSCOPY: CPT | Mod: S$GLB,,, | Performed by: OTOLARYNGOLOGY

## 2022-12-29 PROCEDURE — 3288F PR FALLS RISK ASSESSMENT DOCUMENTED: ICD-10-PCS | Mod: CPTII,S$GLB,, | Performed by: OTOLARYNGOLOGY

## 2022-12-29 PROCEDURE — 1100F PTFALLS ASSESS-DOCD GE2>/YR: CPT | Mod: CPTII,S$GLB,, | Performed by: OTOLARYNGOLOGY

## 2022-12-29 PROCEDURE — 99213 OFFICE O/P EST LOW 20 MIN: CPT | Mod: 25,S$GLB,, | Performed by: OTOLARYNGOLOGY

## 2022-12-29 RX ORDER — OMEPRAZOLE 20 MG/1
20 CAPSULE, DELAYED RELEASE ORAL DAILY
Qty: 30 CAPSULE | Refills: 1 | Status: SHIPPED | OUTPATIENT
Start: 2022-12-29 | End: 2023-01-20 | Stop reason: ALTCHOICE

## 2022-12-29 RX ORDER — OMEPRAZOLE 20 MG/1
20 CAPSULE, DELAYED RELEASE ORAL DAILY
COMMUNITY
End: 2022-12-29 | Stop reason: SDUPTHER

## 2022-12-29 NOTE — Clinical Note
I recommended trial of Prilosec, leaving it up to you if you want to try something else. Scope exam was not impressive.  Nikky

## 2022-12-29 NOTE — PROGRESS NOTES
SUBJECTIVE:    HPI:Indira is here today for a sore throat that has been going on for the past 6 weeks. She also complains of otalgia, left worse than right, as well. She also complains of ulcers in her mouth and sore gums as well. She has been given magic mouthwash, which helps when she uses it. No aggravating or alleviating factors.         OBJECTIVE:  Physical Exam  HENT:      Right Ear: Tympanic membrane, ear canal and external ear normal. No middle ear effusion.      Left Ear: Tympanic membrane, ear canal and external ear normal.  No middle ear effusion.      Mouth/Throat:      Mouth: Mucous membranes are moist.      Pharynx: Uvula midline.      Comments: Aphthous ulcer inside of lower lip to right of midline, no other obvious ulcers noted.     Palatal torus noted  Neurological:      Mental Status: She is alert.     Non obstructing cerumen was removed from both external auditory canals under the microscope using a curette.     Due to indication in patient's history, presentation or risk factors, a fiberoptic exam was performed.     SEPARATE PROCEDURE NOTE:    ANESTHESIA:  Topical lidocaine with tanna-synephrine    Findings: mild erythema bilateral true vocal folds    PROCEDURE:  After verbal consent was obtained, the flexible scope was passed through the patient's nasal cavity without difficulty.  The nasopharynx (adenoid pad) and eustachian tube orifices were first visualized and were found to be normal, without masses or irregularity.  The posterior pharyngeal wall and base of tongue were then examined and no mass or irregular tissue was seen.  The scope was then advanced to the larynx, and the epiglottis, valleculae, and piriform sinuses were normal, without masses or mucosal irregularity.  The false vocal folds and true vocal folds were then examined and were found to have normal mobility (full abduction and adduction) and no masses or mucosal irregularity was seen.  There was mild erythema of both true  vocal folds.The arytenoids and the interarytenoid area were normal.     ASSESSMENT:    Encounter Diagnoses   Name Primary?    Sore throat Yes    Otalgia, bilateral     Laryngopharyngeal reflux (LPR)     Torus palatinus     Aphthous ulcer of mouth          PLAN:   Reviewed exam findings with patient, reassurance given. Recommended trial of OTC reflux meds to see if that improves her sore throat. I see no role for antibiotics here. For the aphthous ulcers, she could get Rheumatology input on that if they don't resolve. Continue symptomatic control with magic mouthwash. Her ears look normal on exam today. The otalgia could be referred from the LPR or could be related to TMJ dysfunction. Follow up as needed.

## 2022-12-30 ENCOUNTER — TELEPHONE (OUTPATIENT)
Dept: RHEUMATOLOGY | Facility: CLINIC | Age: 77
End: 2022-12-30
Payer: MEDICARE

## 2022-12-30 NOTE — TELEPHONE ENCOUNTER
Appt in AM at 0900 with cardiology. States she is unable to make it and will need to reschedule.   Advised to call appt location in the AM to notify and request new appt date. Pt VU.   Reason for Disposition   Question about upcoming scheduled test, no triage required and triager able to answer question    Protocols used: Information Only Call - No Triage-A-

## 2022-12-30 NOTE — TELEPHONE ENCOUNTER
----- Message from Madelin Villanueva sent at 12/30/2022  1:48 PM CST -----  Contact: Patient  Indira Walker would like a call back at [phone], in regards to her being prescribed Omeprazole. The pharmacy told her that it might conflict with her methotrexate 2.5 Mg, and she would like to  verify if it's ok if she still take the medication.

## 2022-12-30 NOTE — TELEPHONE ENCOUNTER
Informed patient that she can not take Omeprazole and Methotrexate .  Omeprazole will increase the toxicity of the Methotrexate. Patient states that she has mouth ulcers so bad that she is having trouble eating and drinking . She has tried Majic mouth wash and Nystatin with no help

## 2023-01-03 ENCOUNTER — SPECIALTY PHARMACY (OUTPATIENT)
Dept: PHARMACY | Facility: CLINIC | Age: 78
End: 2023-01-03
Payer: MEDICARE

## 2023-01-03 DIAGNOSIS — M05.9 SEROPOSITIVE RHEUMATOID ARTHRITIS: Primary | ICD-10-CM

## 2023-01-03 RX ORDER — ETANERCEPT 50 MG/ML
50 SOLUTION SUBCUTANEOUS WEEKLY
Qty: 4 ML | Refills: 11 | Status: SHIPPED | OUTPATIENT
Start: 2023-01-03 | End: 2023-01-20

## 2023-01-03 NOTE — TELEPHONE ENCOUNTER
Enbrel rx received   -PA is required.  -PA submission pending lab collection and results.     Patrick, this is Rika Nicholas with [facility].  We are working on your prescription that your doctor has sent us. We will be working with your insurance to get this approved for you. We will be calling you along the way with updates on your medication.  If you have any questions, you can reach us at [phone].  Welcome call outcome: Left voicemail.

## 2023-01-03 NOTE — TELEPHONE ENCOUNTER
Giovanni Cage MD      Recommend labs at earliest convenience prior initiating therapy with Enbrel as replacement of methotrexate.     [email or web address]        Patient informed of Dr. Cage recommendations verbalized understanding

## 2023-01-10 ENCOUNTER — TELEPHONE (OUTPATIENT)
Dept: RHEUMATOLOGY | Facility: CLINIC | Age: 78
End: 2023-01-10
Payer: MEDICARE

## 2023-01-10 ENCOUNTER — TELEPHONE (OUTPATIENT)
Dept: INTERNAL MEDICINE | Facility: CLINIC | Age: 78
End: 2023-01-10
Payer: MEDICARE

## 2023-01-10 NOTE — TELEPHONE ENCOUNTER
----- Message from Yaa Chavez sent at 1/10/2023 11:58 AM CST -----  Contact: pt  Pt is calling in regard to her med,methotrexate 2.5 MG Tab and omeprazole (PRILOSEC) 20 MG capsule these are conflicting and would like to speak to the nurse or doctor.  Please call her back at [phone]  jalen/maria fernanda

## 2023-01-10 NOTE — TELEPHONE ENCOUNTER
----- Message from Yaa Chavez sent at 1/10/2023 12:01 PM CST -----  Contact: pt  Pt is calling in regard to her med,methotrexate 2.5 MG Tab and omeprazole (PRILOSEC) 20 MG capsule these are conflicting and would like to speak to the nurse or doctor.  Please call her back at [phone]  jalen/maria fernanda

## 2023-01-10 NOTE — TELEPHONE ENCOUNTER
Spoke with patient . Reminded her that we have talked in December about the Methotrexate and Prilosec. She is to stop Methotrexate. Patient states that she did stop Methotrexate . Her mouth ulcers are gone . Informed her to stay off methotrexate and new medication has been sent to [facility] called Enbrel. They are getting approval from insurance and will call her when approved st schedule a shipping date. Patient verbalized understanding .

## 2023-01-10 NOTE — TELEPHONE ENCOUNTER
Spoke with the patient states Dr. Cage told her to stop taking methotraxate and he replaced with enabrel. Patient asked if she needs to continue taking prilosec and when to stop. Informed to continue taking it and stop when advised to do so. Verbally understood and agreed.

## 2023-01-11 NOTE — TELEPHONE ENCOUNTER
Incoming call from the patient requesting to speak with Rika regarding the labs she needs to have. I informed the patient that she needs to get labs for TB and Hep B. Patient verbalized her understanding.

## 2023-01-11 NOTE — TELEPHONE ENCOUNTER
Outgoing call attempt #2 to encourage patient to have labs collected in order to move forward with PA process.   Patient voiced understanding and plans to get labs collected this week.

## 2023-01-13 ENCOUNTER — TELEPHONE (OUTPATIENT)
Dept: RHEUMATOLOGY | Facility: CLINIC | Age: 78
End: 2023-01-13
Payer: MEDICARE

## 2023-01-13 NOTE — TELEPHONE ENCOUNTER
----- Message from Yogesh Heck sent at 1/13/2023 10:21 AM CST -----  Contact: self  ...Type:  Sooner Apoointment Request    Caller is requesting a sooner appointment.  Caller declined first available appointment listed below.  Caller will not accept being placed on the waitlist and is requesting a message be sent to doctor.  Name of Caller: .Indira Walker  When is the first available appointment?June   Symptoms:  Would the patient rather a call back or a response via [facility]?  Call back   Best Call Back Number: .[phone] (home)   Additional Information: pt has an apt on 01/20 but states that too early in the am for her

## 2023-01-13 NOTE — TELEPHONE ENCOUNTER
Spoke with pt and she is unable to come in for 7 am, would like to do a virtual visit instead if possible. Changed visit over. Pt verbalized understanding

## 2023-01-17 ENCOUNTER — PES CALL (OUTPATIENT)
Dept: ADMINISTRATIVE | Facility: CLINIC | Age: 78
End: 2023-01-17
Payer: MEDICARE

## 2023-01-17 ENCOUNTER — LAB VISIT (OUTPATIENT)
Dept: LAB | Facility: HOSPITAL | Age: 78
End: 2023-01-17
Attending: INTERNAL MEDICINE
Payer: MEDICARE

## 2023-01-17 DIAGNOSIS — M05.9 SEROPOSITIVE RHEUMATOID ARTHRITIS: ICD-10-CM

## 2023-01-17 LAB
ALBUMIN SERPL BCP-MCNC: 3.4 G/DL (ref 3.5–5.2)
ALP SERPL-CCNC: 54 U/L (ref 55–135)
ALT SERPL W/O P-5'-P-CCNC: 10 U/L (ref 10–44)
ANION GAP SERPL CALC-SCNC: 12 MMOL/L (ref 8–16)
AST SERPL-CCNC: 14 U/L (ref 10–40)
BASOPHILS # BLD AUTO: 0.05 K/UL (ref 0–0.2)
BASOPHILS NFR BLD: 0.5 % (ref 0–1.9)
BILIRUB SERPL-MCNC: 0.5 MG/DL (ref 0.1–1)
BUN SERPL-MCNC: 14 MG/DL (ref 8–23)
CALCIUM SERPL-MCNC: 9 MG/DL (ref 8.7–10.5)
CHLORIDE SERPL-SCNC: 104 MMOL/L (ref 95–110)
CO2 SERPL-SCNC: 25 MMOL/L (ref 23–29)
CREAT SERPL-MCNC: 0.9 MG/DL (ref 0.5–1.4)
DIFFERENTIAL METHOD: ABNORMAL
EOSINOPHIL # BLD AUTO: 0.1 K/UL (ref 0–0.5)
EOSINOPHIL NFR BLD: 1.2 % (ref 0–8)
ERYTHROCYTE [DISTWIDTH] IN BLOOD BY AUTOMATED COUNT: 15.7 % (ref 11.5–14.5)
EST. GFR  (NO RACE VARIABLE): >60 ML/MIN/1.73 M^2
GLUCOSE SERPL-MCNC: 129 MG/DL (ref 70–110)
HBV CORE AB SERPL QL IA: NORMAL
HBV SURFACE AG SERPL QL IA: NORMAL
HCT VFR BLD AUTO: 30.1 % (ref 37–48.5)
HGB BLD-MCNC: 9.7 G/DL (ref 12–16)
IMM GRANULOCYTES # BLD AUTO: 0.17 K/UL (ref 0–0.04)
IMM GRANULOCYTES NFR BLD AUTO: 1.6 % (ref 0–0.5)
LYMPHOCYTES # BLD AUTO: 3.5 K/UL (ref 1–4.8)
LYMPHOCYTES NFR BLD: 32.3 % (ref 18–48)
MCH RBC QN AUTO: 32.1 PG (ref 27–31)
MCHC RBC AUTO-ENTMCNC: 32.2 G/DL (ref 32–36)
MCV RBC AUTO: 100 FL (ref 82–98)
MONOCYTES # BLD AUTO: 1.3 K/UL (ref 0.3–1)
MONOCYTES NFR BLD: 11.9 % (ref 4–15)
NEUTROPHILS # BLD AUTO: 5.7 K/UL (ref 1.8–7.7)
NEUTROPHILS NFR BLD: 52.5 % (ref 38–73)
NRBC BLD-RTO: 0 /100 WBC
PLATELET # BLD AUTO: 481 K/UL (ref 150–450)
PMV BLD AUTO: 8.9 FL (ref 9.2–12.9)
POTASSIUM SERPL-SCNC: 3.3 MMOL/L (ref 3.5–5.1)
PROT SERPL-MCNC: 7.2 G/DL (ref 6–8.4)
RBC # BLD AUTO: 3.02 M/UL (ref 4–5.4)
SODIUM SERPL-SCNC: 141 MMOL/L (ref 136–145)
WBC # BLD AUTO: 10.88 K/UL (ref 3.9–12.7)

## 2023-01-17 PROCEDURE — 85025 COMPLETE CBC W/AUTO DIFF WBC: CPT | Performed by: INTERNAL MEDICINE

## 2023-01-17 PROCEDURE — 36415 COLL VENOUS BLD VENIPUNCTURE: CPT | Performed by: INTERNAL MEDICINE

## 2023-01-17 PROCEDURE — 86480 TB TEST CELL IMMUN MEASURE: CPT | Performed by: INTERNAL MEDICINE

## 2023-01-17 PROCEDURE — 80053 COMPREHEN METABOLIC PANEL: CPT | Performed by: INTERNAL MEDICINE

## 2023-01-17 PROCEDURE — 87340 HEPATITIS B SURFACE AG IA: CPT | Performed by: INTERNAL MEDICINE

## 2023-01-17 PROCEDURE — 86704 HEP B CORE ANTIBODY TOTAL: CPT | Performed by: INTERNAL MEDICINE

## 2023-01-18 LAB
GAMMA INTERFERON BACKGROUND BLD IA-ACNC: 0.14 IU/ML
M TB IFN-G CD4+ BCKGRND COR BLD-ACNC: -0.03 IU/ML
MITOGEN IGNF BCKGRD COR BLD-ACNC: 9.86 IU/ML
TB GOLD PLUS: NEGATIVE
TB2 - NIL: -0 IU/ML

## 2023-01-19 ENCOUNTER — TELEPHONE (OUTPATIENT)
Dept: INTERNAL MEDICINE | Facility: CLINIC | Age: 78
End: 2023-01-19
Payer: MEDICARE

## 2023-01-19 NOTE — TELEPHONE ENCOUNTER
"Pt reports that her omeprazole is having a "reaction" with the Methotrexate she is taking.  Pt is requesting alternative medication to address her acid that will not interfere with Methotrexate.  Please advise.  "

## 2023-01-19 NOTE — TELEPHONE ENCOUNTER
Outgoing call to patient to offer financial assistance for Enbrel. Patient stated that she is going to contact her doctor and ask to be put back on methotrexate tablets. She does not want to give herself injections and stated that she cannot afford Enbrel. I explained that if she were approved by the [employer] that she would be able to get the medication at no charge. Patient acknowledged but still wants to contact her doctor because she thought that the MTX was working just fine and does not want to use an injection.

## 2023-01-19 NOTE — TELEPHONE ENCOUNTER
TB and HepB results negative.   PA submitted via [facility]. Key: [id number]      -PA approved from 01/19/2023  to 7/19/2023  Case ID: [id number]      Benefits Investigation   Insurance name: [employer] Part D   Copay: $2252.34  LIS LVL: none    Outgoing call to patient to inform. PA approval scanned into chart.   Forwarding to FA.

## 2023-01-20 ENCOUNTER — TELEPHONE (OUTPATIENT)
Dept: INTERNAL MEDICINE | Facility: CLINIC | Age: 78
End: 2023-01-20
Payer: MEDICARE

## 2023-01-20 ENCOUNTER — OFFICE VISIT (OUTPATIENT)
Dept: RHEUMATOLOGY | Facility: CLINIC | Age: 78
End: 2023-01-20
Payer: MEDICARE

## 2023-01-20 ENCOUNTER — PATIENT MESSAGE (OUTPATIENT)
Dept: RHEUMATOLOGY | Facility: CLINIC | Age: 78
End: 2023-01-20

## 2023-01-20 DIAGNOSIS — M05.9 SEROPOSITIVE RHEUMATOID ARTHRITIS: Primary | ICD-10-CM

## 2023-01-20 DIAGNOSIS — M19.90 INFLAMMATORY ARTHRITIS: ICD-10-CM

## 2023-01-20 DIAGNOSIS — M15.9 PRIMARY OSTEOARTHRITIS INVOLVING MULTIPLE JOINTS: ICD-10-CM

## 2023-01-20 DIAGNOSIS — Z79.899 HIGH RISK MEDICATION USE: ICD-10-CM

## 2023-01-20 DIAGNOSIS — E11.65 UNCONTROLLED TYPE 2 DIABETES MELLITUS WITH HYPERGLYCEMIA: ICD-10-CM

## 2023-01-20 DIAGNOSIS — Z51.81 MEDICATION MONITORING ENCOUNTER: ICD-10-CM

## 2023-01-20 DIAGNOSIS — I50.32 CHRONIC HEART FAILURE WITH PRESERVED EJECTION FRACTION: ICD-10-CM

## 2023-01-20 DIAGNOSIS — K21.9 GASTROESOPHAGEAL REFLUX DISEASE, UNSPECIFIED WHETHER ESOPHAGITIS PRESENT: ICD-10-CM

## 2023-01-20 PROCEDURE — 99214 OFFICE O/P EST MOD 30 MIN: CPT | Mod: 95,,, | Performed by: INTERNAL MEDICINE

## 2023-01-20 PROCEDURE — 99499 RISK ADDL DX/OHS AUDIT: ICD-10-PCS | Mod: 95,,, | Performed by: INTERNAL MEDICINE

## 2023-01-20 PROCEDURE — 99214 PR OFFICE/OUTPT VISIT, EST, LEVL IV, 30-39 MIN: ICD-10-PCS | Mod: 95,,, | Performed by: INTERNAL MEDICINE

## 2023-01-20 PROCEDURE — 99499 UNLISTED E&M SERVICE: CPT | Mod: 95,,, | Performed by: INTERNAL MEDICINE

## 2023-01-20 RX ORDER — FOLIC ACID 1 MG/1
2000 TABLET ORAL DAILY
Qty: 180 TABLET | Refills: 2 | Status: SHIPPED | OUTPATIENT
Start: 2023-01-20 | End: 2023-04-06

## 2023-01-20 NOTE — TELEPHONE ENCOUNTER
Called pt to discuss the following.  No answer, LVM to call clinic back.     It appears all PPIs interact with methotrexate. She has pepcid listed in her chart, which treats acid reflux and should not interact with methotrexate. If she is taking that and it is not effective I recommend she see gastroenterology.    Pt has seen Dr. Jain and LEA Vallejo within the past year in [facility]

## 2023-01-20 NOTE — PROGRESS NOTES
The patient location is: [place]  The chief complaint leading to consultation is: RA    Visit type: audiovisual    Face to Face time with patient: 15  30 minutes of total time spent on the encounter, which includes face to face time and non-face to face time preparing to see the patient (eg, review of tests), Obtaining and/or reviewing separately obtained history, Documenting clinical information in the electronic or other health record, Independently interpreting results (not separately reported) and communicating results to the patient/family/caregiver, or Care coordination (not separately reported).         Each patient to whom he or she provides medical services by telemedicine is:  (1) informed of the relationship between the physician and patient and the respective role of any other health care provider with respect to management of the patient; and (2) notified that he or she may decline to receive medical services by telemedicine and may withdraw from such care at any time.       RHEUMATOLOGY OUTPATIENT CLINIC NOTE    1/20/2023    Attending Rheumatologist: Giovanni Cage  Primary Care Provider/Physician Requesting Consultation: Bertha Kearns MD   Chief Complaint/Reason For Consultation:  No chief complaint on file.      Subjective:     Indira Walker is a 77 y.o. Black or  female with SPRA for f/u visit.    Main concern of recurrent oral ulcerations.  Onset since initiating PPI in combination with MTX.    Addendum 4/6: recurrent oral aphthosis.  Suggest replacing MTX by SSZ.  Labs 2 months after new DMARD, repeat with CRP close to f/u visit    Review of Systems   Constitutional:  Negative for fever.   HENT:          Denies active ulcers at present   Eyes:  Negative for photophobia and pain.   Respiratory:  Negative for shortness of breath.    Gastrointestinal:  Negative for blood in stool, heartburn and melena.   Genitourinary:  Negative for dysuria, hematuria and urgency.    Musculoskeletal:  Negative for joint pain.   Skin:  Negative for rash.   Neurological:  Negative for focal weakness and headaches.     Chronic comorbid conditions affecting medical decision making today:  Past Medical History:   Diagnosis Date    Arthritis     Diabetes mellitus, type 2     Foot drop, left 2018    GERD (gastroesophageal reflux disease)     Heartburn     Hyperlipidemia     Hypertension     Left sided sciatica     s/p microdiscectomy 3/21/18     Past Surgical History:   Procedure Laterality Date    BREAST BIOPSY       SECTION      COLONOSCOPY  2008    DR. JANET GARCÍA/MILD DIVERICULOSIS DESCENDING AND SIGMOID. REPEAT 5 YRS    ENDOSCOPIC ULTRASOUND OF UPPER GASTROINTESTINAL TRACT N/A 2022    Procedure: ULTRASOUND, UPPER GI TRACT, ENDOSCOPIC;  Surgeon: Ange Saldivar MD;  Location: [facility];  Service: Endoscopy;  Laterality: N/A;  Upper and Linear, 22 sharkcore, slides and formalin    HERNIA REPAIR      MICRODISCECTOMY OF SPINE Left 2018    left L5-S1, Dr Segundo     Family History   Problem Relation Age of Onset    Diabetes Mother     Diabetes Father      Social History     Tobacco Use   Smoking Status Never   Smokeless Tobacco Never       Current Outpatient Medications:     (Magic mouthwash) 1:1:1 diphenhydrAMINE(Benadryl) 12.5mg/5ml liq, aluminum & magnesium hydroxide-simethicone (Maalox), LIDOcaine viscous 2%, Swish and spit 15 mLs every 4 (four) hours as needed (mouth sores). for mouth sores, Disp: 360 mL, Rfl: 0    acetaminophen (TYLENOL EXTRA STRENGTH ORAL), Take by mouth., Disp: , Rfl:     aspirin (ECOTRIN) 81 MG EC tablet, Take 1 tablet (81 mg total) by mouth once daily. Every other day, Disp: , Rfl: 0    azelastine (ASTELIN) 137 mcg (0.1 %) nasal spray, SPRAY 1 SPRAY BY NASAL ROUTE 2 TIMES DAILY., Disp: 30 mL, Rfl: 5    blood sugar diagnostic Strp, To check BG 2 times daily, to use with insurance preferred meter, Disp: 200 each, Rfl: 3     blood-glucose meter kit, To check BG 2 times daily, to use with insurance preferred meter, Disp: 1 each, Rfl: 0    chlorhexidine (PERIDEX) 0.12 % solution, PLEASE SEE ATTACHED FOR DETAILED DIRECTIONS, Disp: , Rfl:     etanercept (ENBREL SURECLICK) 50 mg/mL (1 mL), Inject 1 mL (50 mg total) into the skin once a week., Disp: 4 mL, Rfl: 11    famotidine (PEPCID) 40 MG tablet, TAKE 1 TABLET BY MOUTH EVERY DAY, Disp: 90 tablet, Rfl: 3    folic acid (FOLVITE) 1 MG tablet, TAKE 1 TABLET BY MOUTH EVERY DAY, Disp: 90 tablet, Rfl: 3    ketoconazole (NIZORAL) 2 % cream, Apply topically 2 (two) times daily., Disp: 30 g, Rfl: 2    lancets Misc, To check BG 2 times daily, to use with insurance preferred meter, Disp: 200 each, Rfl: 3    loratadine (CLARITIN) 10 mg tablet, Take 10 mg by mouth once daily., Disp: , Rfl:     losartan-hydrochlorothiazide 100-12.5 mg (HYZAAR) 100-12.5 mg Tab, TAKE 1 TABLET BY MOUTH EVERY DAY, Disp: 90 tablet, Rfl: 1    meloxicam (MOBIC) 15 MG tablet, Take 15 mg by mouth once daily., Disp: , Rfl:     metFORMIN (GLUCOPHAGE) 1000 MG tablet, TAKE 1 TABLET BY MOUTH TWICE A DAY WITH MEALS, Disp: 180 tablet, Rfl: 1    methotrexate 2.5 MG Tab, TAKE 10 TABLETS (25 MG TOTAL) BY MOUTH EVERY 7 DAYS., Disp: 120 tablet, Rfl: 2    mupirocin (BACTROBAN) 2 % ointment, Apply topically., Disp: , Rfl:     nystatin (MYCOSTATIN) 100,000 unit/mL suspension, Take by mouth 2 (two) times daily., Disp: , Rfl:     omeprazole (PRILOSEC) 20 MG capsule, Take 1 capsule (20 mg total) by mouth once daily., Disp: 30 capsule, Rfl: 1    ondansetron (ZOFRAN-ODT) 4 MG TbDL, Take 4 mg by mouth as needed. 1 tablet sublingual PRN nausea, Disp: , Rfl:     rosuvastatin (CRESTOR) 20 MG tablet, TAKE 1 TABLET BY MOUTH EVERY DAY, Disp: 90 tablet, Rfl: 1     Objective:     There were no vitals filed for this visit.  Physical Exam   Eyes: Conjunctivae are normal.   Pulmonary/Chest: Effort normal.   Musculoskeletal:         General: No swelling or  tenderness. Normal range of motion.      Comments: Able to make full fist w/o difficulty   Neurological: She displays no weakness.   Skin: No rash noted.     Reviewed available old and all outside pertinent medical records available.    All lab results personally reviewed and interpreted by me.       ASSESSMENT:     Seropositive RA    High risk mediation use    Medication monitoring encounter    Oral aphthosis    DM II    GERD    OA    PLAN:     CDAI: remission / low disease activity.  More frequent painful oral ulcers since MTX PPI combo.  Enbrel prescribed as alternative, unaffordable by patient.  Currently able to make full fist w/o pain.  Labs stable w/o worrisome findings suggestive of MTX toxicity.  Patient request's going back on MTX reporting being happy with outcome from therapy.  Suggest No PPI on day of methotrexate.  Alternatively, may switch to SSZ or Arava.  Patient will inform clinic if would like to try different DMARD.  Resume MTX at previous dose, suggest FA 2mg per day.  Labs in 2 months and prior f/u visit to monitor for toxicity from medication.       Disclaimer: This note is prepared using voice recognition software and as such is likely to have errors and has not been proof read. Please contact me for questions.         Giovanni Cage M.D.

## 2023-01-20 NOTE — TELEPHONE ENCOUNTER
Spoke top pt.  Pt verb understanding of PCP recommendations and will f/u w/ office as to effectiveness of Pepcid

## 2023-01-20 NOTE — TELEPHONE ENCOUNTER
It appears all PPIs interact with methotrexate. She has pepcid listed in her chart, which treats acid reflux and should not interact with methotrexate. If she is taking that and it is not effective I recommend she see gastroenterology.

## 2023-01-20 NOTE — TELEPHONE ENCOUNTER
Enrollment declined due to patient unwilling to start Enbrel.   Thank you for allowing us to participate in this patient's care.

## 2023-01-20 NOTE — TELEPHONE ENCOUNTER
----- Message from Lukas Ledesma sent at 1/20/2023  1:41 PM CST -----  Regarding: Patient Advice  Contact: Indira Tejeda is calling in regards to some medication and has questions about it. Please give her a call at [phone]      Thanks,  AK

## 2023-02-13 ENCOUNTER — TELEPHONE (OUTPATIENT)
Dept: CARDIOLOGY | Facility: CLINIC | Age: 78
End: 2023-02-13
Payer: MEDICARE

## 2023-02-13 NOTE — TELEPHONE ENCOUNTER
Madelin KIM Staff  Caller: Patient (Today,  1:02 PM)  Indira Walker would like a call back at [phone], in regards to a clearance. Pt would like to know what test is needs for the clearance to be completed.     Returned call no answer lvm to return call

## 2023-02-14 ENCOUNTER — TELEPHONE (OUTPATIENT)
Dept: OBSTETRICS AND GYNECOLOGY | Facility: CLINIC | Age: 78
End: 2023-02-14
Payer: MEDICARE

## 2023-02-14 NOTE — TELEPHONE ENCOUNTER
----- Message from Rachana Preciado sent at 2/14/2023  3:12 PM CST -----  Contact: Indira  Type:  Sooner Apoointment Request    Caller is requesting a sooner appointment. Caller will not accept being placed on the waitlist and is requesting a message be sent to doctor.  Name of Caller:Indira  When is the first available appointment?unknown  Symptoms:hysterectomy procedure/fallen uterus  Would the patient rather a call back or a response via [facility]? call  Best Call Back Number:[phone]   Additional Information: Patient reports missing a call and request to discuss scheduling for surgery. Please give patient a call back as soon as possible.   Thank you,  GH

## 2023-03-07 ENCOUNTER — OFFICE VISIT (OUTPATIENT)
Dept: OBSTETRICS AND GYNECOLOGY | Facility: CLINIC | Age: 78
End: 2023-03-07
Payer: MEDICARE

## 2023-03-07 VITALS
SYSTOLIC BLOOD PRESSURE: 150 MMHG | WEIGHT: 130.06 LBS | HEIGHT: 64 IN | DIASTOLIC BLOOD PRESSURE: 70 MMHG | BODY MASS INDEX: 22.2 KG/M2

## 2023-03-07 DIAGNOSIS — N81.4 CYSTOCELE WITH UTERINE PROLAPSE: Primary | ICD-10-CM

## 2023-03-07 PROCEDURE — 1100F PTFALLS ASSESS-DOCD GE2>/YR: CPT | Mod: CPTII,S$GLB,, | Performed by: OBSTETRICS & GYNECOLOGY

## 2023-03-07 PROCEDURE — 3078F PR MOST RECENT DIASTOLIC BLOOD PRESSURE < 80 MM HG: ICD-10-PCS | Mod: CPTII,S$GLB,, | Performed by: OBSTETRICS & GYNECOLOGY

## 2023-03-07 PROCEDURE — 3077F SYST BP >= 140 MM HG: CPT | Mod: CPTII,S$GLB,, | Performed by: OBSTETRICS & GYNECOLOGY

## 2023-03-07 PROCEDURE — 3077F PR MOST RECENT SYSTOLIC BLOOD PRESSURE >= 140 MM HG: ICD-10-PCS | Mod: CPTII,S$GLB,, | Performed by: OBSTETRICS & GYNECOLOGY

## 2023-03-07 PROCEDURE — 1159F MED LIST DOCD IN RCRD: CPT | Mod: CPTII,S$GLB,, | Performed by: OBSTETRICS & GYNECOLOGY

## 2023-03-07 PROCEDURE — 1126F PR PAIN SEVERITY QUANTIFIED, NO PAIN PRESENT: ICD-10-PCS | Mod: CPTII,S$GLB,, | Performed by: OBSTETRICS & GYNECOLOGY

## 2023-03-07 PROCEDURE — 3078F DIAST BP <80 MM HG: CPT | Mod: CPTII,S$GLB,, | Performed by: OBSTETRICS & GYNECOLOGY

## 2023-03-07 PROCEDURE — 99213 OFFICE O/P EST LOW 20 MIN: CPT | Mod: S$GLB,,, | Performed by: OBSTETRICS & GYNECOLOGY

## 2023-03-07 PROCEDURE — 99999 PR PBB SHADOW E&M-EST. PATIENT-LVL IV: ICD-10-PCS | Mod: PBBFAC,,, | Performed by: OBSTETRICS & GYNECOLOGY

## 2023-03-07 PROCEDURE — 3288F PR FALLS RISK ASSESSMENT DOCUMENTED: ICD-10-PCS | Mod: CPTII,S$GLB,, | Performed by: OBSTETRICS & GYNECOLOGY

## 2023-03-07 PROCEDURE — 1159F PR MEDICATION LIST DOCUMENTED IN MEDICAL RECORD: ICD-10-PCS | Mod: CPTII,S$GLB,, | Performed by: OBSTETRICS & GYNECOLOGY

## 2023-03-07 PROCEDURE — 3288F FALL RISK ASSESSMENT DOCD: CPT | Mod: CPTII,S$GLB,, | Performed by: OBSTETRICS & GYNECOLOGY

## 2023-03-07 PROCEDURE — 99999 PR PBB SHADOW E&M-EST. PATIENT-LVL IV: CPT | Mod: PBBFAC,,, | Performed by: OBSTETRICS & GYNECOLOGY

## 2023-03-07 PROCEDURE — 99213 PR OFFICE/OUTPT VISIT, EST, LEVL III, 20-29 MIN: ICD-10-PCS | Mod: S$GLB,,, | Performed by: OBSTETRICS & GYNECOLOGY

## 2023-03-07 PROCEDURE — 1100F PR PT FALLS ASSESS DOC 2+ FALLS/FALL W/INJURY/YR: ICD-10-PCS | Mod: CPTII,S$GLB,, | Performed by: OBSTETRICS & GYNECOLOGY

## 2023-03-07 PROCEDURE — 1126F AMNT PAIN NOTED NONE PRSNT: CPT | Mod: CPTII,S$GLB,, | Performed by: OBSTETRICS & GYNECOLOGY

## 2023-03-07 NOTE — PROGRESS NOTES
"  Subjective:       Patient ID: Indira Walker is a 77 y.o. female.    Chief Complaint:  Vaginal Prolapse and bladder prolapse       History of Present Illness  HPI  Returns for exacerbation of pelvic prolapse diagnosed one year ago   No loss of urine and no defecation issues   Not interested in Pessary     Health Maintenance   Topic Date Due    TETANUS VACCINE  Never done    Hemoglobin A1c  2023    Lipid Panel  2023    Eye Exam  10/12/2023    DEXA Scan  2024    Hepatitis C Screening  Completed     GYN & OB History  No LMP recorded. Patient is postmenopausal.   Date of Last Pap: No result found    OB History    Para Term  AB Living   3 3 3         SAB IAB Ectopic Multiple Live Births                  # Outcome Date GA Lbr Leonid/2nd Weight Sex Delivery Anes PTL Lv   3 Term            2 Term            1 Term                Review of Systems  Review of Systems        Objective:   BP (!) 150/70   Ht 5' 3.5" (1.613 m)   Wt 59 kg (130 lb 1.1 oz)   BMI 22.68 kg/m²    Physical Exam:             Abdominal: Soft. Bowel sounds are normal. She exhibits no distension, no mass and no abdominal incision. There is no abdominal tenderness. There is no guarding. No hernia.     Genitourinary:    Inguinal canal, vagina, uterus and rectum normal.   The external female genitalia was normal.   There is no rash, tenderness or lesion on the right labia. There is no rash, tenderness or lesion on the left labia. Cervix is normal. Right adnexum displays no mass, no tenderness and no fullness. Left adnexum displays no mass, no tenderness and no fullness. There is cystocele (grade 4) in the vagina. No  no vaginal discharge, tenderness, bleeding, rectocele or unspecified prolapse of vaginal walls in the vagina.    No foreign body in the vagina.      No signs of injury in the vagina.   Cervix exhibits no motion tenderness, no discharge and no friability. Uerus contour normal  Uterus is uterine prolapse " (complete). Uterus is not deviated, not enlarged, not tender and not hosting fibroids. Normal urethral meatus.Urethra findings: no tendernessBladder findings: no bladder tenderness                   Assessment:        1. Cystocele with uterine prolapse                Plan:            Indira was seen today for vaginal prolapse and bladder prolapse .    Diagnoses and all orders for this visit:    Cystocele with uterine prolapse  Comments:  Tvh with anterior colporrhaphy and sslf

## 2023-03-08 DIAGNOSIS — Z01.818 PREOP EXAMINATION: ICD-10-CM

## 2023-03-08 DIAGNOSIS — N81.10 FEMALE BLADDER PROLAPSE: ICD-10-CM

## 2023-03-08 DIAGNOSIS — N81.4 CYSTOCELE WITH UTERINE PROLAPSE: Primary | ICD-10-CM

## 2023-03-24 ENCOUNTER — TELEPHONE (OUTPATIENT)
Dept: OBSTETRICS AND GYNECOLOGY | Facility: CLINIC | Age: 78
End: 2023-03-24
Payer: MEDICARE

## 2023-03-24 NOTE — TELEPHONE ENCOUNTER
----- Message from Gissell Tinoco LPN sent at 3/24/2023  2:39 PM CDT -----  Contact: Indira Delgadillo Afternoon,     Please Advise Message below     Tika  ----- Message -----  From: Lady Guevara  Sent: 3/24/2023   1:38 PM CDT  To: Mukesh Cid Staff    Indira needs a call back at [phone], Regards to getting her surgery date reschedule for a sooner date.    Thanks  Td

## 2023-03-24 NOTE — TELEPHONE ENCOUNTER
Spoke with patient, unfortunately nothing earlier available for surgery, currently scheduled for 4/14. Will place patient on waiting list in event there is a cancellation to move surgery up.

## 2023-03-27 ENCOUNTER — TELEPHONE (OUTPATIENT)
Dept: OBSTETRICS AND GYNECOLOGY | Facility: CLINIC | Age: 78
End: 2023-03-27
Payer: MEDICARE

## 2023-03-27 NOTE — TELEPHONE ENCOUNTER
Pt states that she wanted her surgery moved up and I informed her that he probably did not have any sooner openings but I would reach out and ask. Pt also asked about when she needs her labs done for surgery. Please advise.

## 2023-03-29 DIAGNOSIS — Z01.818 PREOP TESTING: Primary | ICD-10-CM

## 2023-03-30 DIAGNOSIS — Z01.818 PREOP EXAMINATION: Primary | ICD-10-CM

## 2023-04-05 ENCOUNTER — OFFICE VISIT (OUTPATIENT)
Dept: OBSTETRICS AND GYNECOLOGY | Facility: CLINIC | Age: 78
End: 2023-04-05
Payer: MEDICARE

## 2023-04-05 ENCOUNTER — OFFICE VISIT (OUTPATIENT)
Dept: INTERNAL MEDICINE | Facility: CLINIC | Age: 78
End: 2023-04-05
Payer: MEDICARE

## 2023-04-05 ENCOUNTER — LAB VISIT (OUTPATIENT)
Dept: LAB | Facility: HOSPITAL | Age: 78
End: 2023-04-05
Attending: ANESTHESIOLOGY
Payer: MEDICARE

## 2023-04-05 VITALS
TEMPERATURE: 98 F | SYSTOLIC BLOOD PRESSURE: 129 MMHG | OXYGEN SATURATION: 94 % | DIASTOLIC BLOOD PRESSURE: 70 MMHG | BODY MASS INDEX: 21.04 KG/M2 | SYSTOLIC BLOOD PRESSURE: 142 MMHG | HEIGHT: 64 IN | DIASTOLIC BLOOD PRESSURE: 81 MMHG | WEIGHT: 123.25 LBS | HEART RATE: 75 BPM

## 2023-04-05 DIAGNOSIS — N81.4 CYSTOCELE WITH UTERINE DESCENSUS: Primary | ICD-10-CM

## 2023-04-05 DIAGNOSIS — D64.9 ANEMIA, UNSPECIFIED TYPE: ICD-10-CM

## 2023-04-05 DIAGNOSIS — E11.65 UNCONTROLLED TYPE 2 DIABETES MELLITUS WITH HYPERGLYCEMIA: ICD-10-CM

## 2023-04-05 DIAGNOSIS — N81.4 CYSTOCELE WITH UTERINE DESCENSUS: ICD-10-CM

## 2023-04-05 DIAGNOSIS — Z01.818 PREOP EXAMINATION: ICD-10-CM

## 2023-04-05 DIAGNOSIS — Z01.818 PREOPERATIVE EXAMINATION: Primary | ICD-10-CM

## 2023-04-05 DIAGNOSIS — K12.0 APHTHOUS ULCER: ICD-10-CM

## 2023-04-05 DIAGNOSIS — Z01.818 PREOP TESTING: ICD-10-CM

## 2023-04-05 PROBLEM — E83.52 HYPERCALCEMIA DUE TO A DRUG: Status: RESOLVED | Noted: 2022-08-02 | Resolved: 2023-04-05

## 2023-04-05 PROBLEM — R07.89 ATYPICAL CHEST PAIN: Status: RESOLVED | Noted: 2022-12-23 | Resolved: 2023-04-05

## 2023-04-05 PROBLEM — Z01.810 PREOP CARDIOVASCULAR EXAM: Status: RESOLVED | Noted: 2022-12-23 | Resolved: 2023-04-05

## 2023-04-05 PROBLEM — Z28.39 IMMUNIZATION DEFICIENCY: Status: RESOLVED | Noted: 2018-12-19 | Resolved: 2023-04-05

## 2023-04-05 PROBLEM — R94.31 ABNORMAL ECG: Status: RESOLVED | Noted: 2022-12-23 | Resolved: 2023-04-05

## 2023-04-05 PROBLEM — R31.9 HEMATURIA: Status: RESOLVED | Noted: 2022-08-05 | Resolved: 2023-04-05

## 2023-04-05 PROBLEM — H61.22 IMPACTED CERUMEN OF LEFT EAR: Status: RESOLVED | Noted: 2021-06-28 | Resolved: 2023-04-05

## 2023-04-05 PROBLEM — Z63.4 GRIEF AT LOSS OF CHILD: Status: RESOLVED | Noted: 2022-01-11 | Resolved: 2023-04-05

## 2023-04-05 PROBLEM — F43.21 GRIEF AT LOSS OF CHILD: Status: RESOLVED | Noted: 2022-01-11 | Resolved: 2023-04-05

## 2023-04-05 PROBLEM — T50.905A HYPERCALCEMIA DUE TO A DRUG: Status: RESOLVED | Noted: 2022-08-02 | Resolved: 2023-04-05

## 2023-04-05 LAB
ANION GAP SERPL CALC-SCNC: 11 MMOL/L (ref 8–16)
BASOPHILS # BLD AUTO: 0.03 K/UL (ref 0–0.2)
BASOPHILS NFR BLD: 0.5 % (ref 0–1.9)
BUN SERPL-MCNC: 16 MG/DL (ref 8–23)
CALCIUM SERPL-MCNC: 9.4 MG/DL (ref 8.7–10.5)
CHLORIDE SERPL-SCNC: 103 MMOL/L (ref 95–110)
CO2 SERPL-SCNC: 25 MMOL/L (ref 23–29)
CREAT SERPL-MCNC: 1.2 MG/DL (ref 0.5–1.4)
DIFFERENTIAL METHOD: ABNORMAL
EOSINOPHIL # BLD AUTO: 0.1 K/UL (ref 0–0.5)
EOSINOPHIL NFR BLD: 1.5 % (ref 0–8)
ERYTHROCYTE [DISTWIDTH] IN BLOOD BY AUTOMATED COUNT: 14.8 % (ref 11.5–14.5)
EST. GFR  (NO RACE VARIABLE): 46 ML/MIN/1.73 M^2
GLUCOSE SERPL-MCNC: 96 MG/DL (ref 70–110)
HCT VFR BLD AUTO: 33.7 % (ref 37–48.5)
HGB BLD-MCNC: 11.1 G/DL (ref 12–16)
IMM GRANULOCYTES # BLD AUTO: 0.02 K/UL (ref 0–0.04)
IMM GRANULOCYTES NFR BLD AUTO: 0.3 % (ref 0–0.5)
LYMPHOCYTES # BLD AUTO: 2.3 K/UL (ref 1–4.8)
LYMPHOCYTES NFR BLD: 38.3 % (ref 18–48)
MCH RBC QN AUTO: 31.7 PG (ref 27–31)
MCHC RBC AUTO-ENTMCNC: 32.9 G/DL (ref 32–36)
MCV RBC AUTO: 96 FL (ref 82–98)
MONOCYTES # BLD AUTO: 0.3 K/UL (ref 0.3–1)
MONOCYTES NFR BLD: 4.9 % (ref 4–15)
NEUTROPHILS # BLD AUTO: 3.3 K/UL (ref 1.8–7.7)
NEUTROPHILS NFR BLD: 54.5 % (ref 38–73)
NRBC BLD-RTO: 0 /100 WBC
PLATELET # BLD AUTO: 361 K/UL (ref 150–450)
PMV BLD AUTO: 9.2 FL (ref 9.2–12.9)
POTASSIUM SERPL-SCNC: 4 MMOL/L (ref 3.5–5.1)
RBC # BLD AUTO: 3.5 M/UL (ref 4–5.4)
SODIUM SERPL-SCNC: 139 MMOL/L (ref 136–145)
WBC # BLD AUTO: 6.08 K/UL (ref 3.9–12.7)

## 2023-04-05 PROCEDURE — 99499 NO LOS: ICD-10-PCS | Mod: S$GLB,,, | Performed by: OBSTETRICS & GYNECOLOGY

## 2023-04-05 PROCEDURE — 99499 UNLISTED E&M SERVICE: CPT | Mod: S$GLB,,, | Performed by: OBSTETRICS & GYNECOLOGY

## 2023-04-05 PROCEDURE — 85025 COMPLETE CBC W/AUTO DIFF WBC: CPT | Performed by: ANESTHESIOLOGY

## 2023-04-05 PROCEDURE — 36415 COLL VENOUS BLD VENIPUNCTURE: CPT | Performed by: ANESTHESIOLOGY

## 2023-04-05 PROCEDURE — 3078F DIAST BP <80 MM HG: CPT | Mod: CPTII,S$GLB,, | Performed by: OBSTETRICS & GYNECOLOGY

## 2023-04-05 PROCEDURE — 3288F FALL RISK ASSESSMENT DOCD: CPT | Mod: CPTII,S$GLB,, | Performed by: OBSTETRICS & GYNECOLOGY

## 2023-04-05 PROCEDURE — 1101F PT FALLS ASSESS-DOCD LE1/YR: CPT | Mod: CPTII,S$GLB,, | Performed by: OBSTETRICS & GYNECOLOGY

## 2023-04-05 PROCEDURE — 1126F AMNT PAIN NOTED NONE PRSNT: CPT | Mod: CPTII,S$GLB,, | Performed by: OBSTETRICS & GYNECOLOGY

## 2023-04-05 PROCEDURE — 1101F PR PT FALLS ASSESS DOC 0-1 FALLS W/OUT INJ PAST YR: ICD-10-PCS | Mod: CPTII,S$GLB,, | Performed by: OBSTETRICS & GYNECOLOGY

## 2023-04-05 PROCEDURE — 3077F PR MOST RECENT SYSTOLIC BLOOD PRESSURE >= 140 MM HG: ICD-10-PCS | Mod: CPTII,S$GLB,, | Performed by: OBSTETRICS & GYNECOLOGY

## 2023-04-05 PROCEDURE — 99999 PR PBB SHADOW E&M-EST. PATIENT-LVL III: ICD-10-PCS | Mod: PBBFAC,,,

## 2023-04-05 PROCEDURE — 1126F PR PAIN SEVERITY QUANTIFIED, NO PAIN PRESENT: ICD-10-PCS | Mod: CPTII,S$GLB,, | Performed by: OBSTETRICS & GYNECOLOGY

## 2023-04-05 PROCEDURE — 3288F PR FALLS RISK ASSESSMENT DOCUMENTED: ICD-10-PCS | Mod: CPTII,S$GLB,, | Performed by: OBSTETRICS & GYNECOLOGY

## 2023-04-05 PROCEDURE — 3077F SYST BP >= 140 MM HG: CPT | Mod: CPTII,S$GLB,, | Performed by: OBSTETRICS & GYNECOLOGY

## 2023-04-05 PROCEDURE — 80048 BASIC METABOLIC PNL TOTAL CA: CPT | Performed by: ANESTHESIOLOGY

## 2023-04-05 PROCEDURE — 99999 PR PBB SHADOW E&M-EST. PATIENT-LVL IV: CPT | Mod: PBBFAC,,, | Performed by: OBSTETRICS & GYNECOLOGY

## 2023-04-05 PROCEDURE — 99999 PR PBB SHADOW E&M-EST. PATIENT-LVL III: CPT | Mod: PBBFAC,,,

## 2023-04-05 PROCEDURE — 3078F PR MOST RECENT DIASTOLIC BLOOD PRESSURE < 80 MM HG: ICD-10-PCS | Mod: CPTII,S$GLB,, | Performed by: OBSTETRICS & GYNECOLOGY

## 2023-04-05 PROCEDURE — 99999 PR PBB SHADOW E&M-EST. PATIENT-LVL IV: ICD-10-PCS | Mod: PBBFAC,,, | Performed by: OBSTETRICS & GYNECOLOGY

## 2023-04-05 RX ORDER — ACETAMINOPHEN 500 MG
1000 TABLET ORAL
Status: DISCONTINUED | OUTPATIENT
Start: 2023-04-05 | End: 2023-05-10

## 2023-04-05 RX ORDER — DEXTROSE MONOHYDRATE AND SODIUM CHLORIDE 5; .9 G/100ML; G/100ML
INJECTION, SOLUTION INTRAVENOUS CONTINUOUS
Status: CANCELLED | OUTPATIENT
Start: 2023-04-05

## 2023-04-05 NOTE — PRE-PROCEDURE INSTRUCTIONS
Pre op instructions reviewed with patient during Clinic Visit with Provider. Patient verbalized understanding.    To confirm, Surgery is scheduled on 4/14. We will call you late afternoon the business day prior to surgery with your arrival time.    *Please report to the [facility] (1st Floor) located off of [facility] (2nd Entrance/Building on the left, in front of the flag pole).  Address: [address] [place] [address] [place]. [id number]      INSTRUCTIONS IMPORTANT!!!  Do Not Eat, Drink, or Smoke after 12 midnight unless instructed otherwise by your Surgeon. OK to brush teeth, no gum, candy or mints!      *Take Only these medications with a small sip of water Morning of Surgery as instructed by Tatiana Banks NP:  Pepcid    Take your last Metformin, prior to surgery, on Wednesday, 4/12/23.    Blood Thinners: Stop taking Aspirin 7 days prior to surgery per Physician Instructions! Call your Surgeon office to inquire about any questions regarding your blood thinner medication.    ____  HOLD all vitamins, herbal supplements, aspirin products & NSAIDS 7 days prior to surgery, as these can thin the blood.  ____  NO Acrylic/fake nails or nail polish worn day of surgery (specifically hand/arm & foot surgeries).  ____  NO powder, lotions, deodorants, oils or cream on body.  ____  Remove all jewelry & piercings prior to surgery.  ____  Remove Dentures, Hearing Aids & Contact Lens prior to surgery.  ____  Bring photo ID and insurance information to hospital (Leave Valuables at Home).  ____  If going home the same day, arrange for a ride home. You will not be able to drive for 24 hrs if Anesthesia was used.   ____  Females (ages 11-60): may need to give a urine sample the morning of surgery; please see Pre op Nurse prior to using the restroom.  ____  Males: Stop ED medications (Viagra, Cialis) 24 hrs prior to surgery.  ____  Wear clean, loose fitting clothing to allow for dressings/ bandages.            Diabetic  Patients: If you take diabetic medication, do NOT take morning of surgery unless instructed by Doctor. Metformin to be stopped 24 hrs prior to surgery time. DO NOT take long-acting insulin the evening before surgery. Blood sugars will be checked in pre-op by Nurse.    Bathing Instructions:    -Shower with anti-bacterial Soap (Hibiclens or Dial) the night before surgery and the morning of.   -Do not use Hibiclens on your face or genitals.   -Apply clean clothes after shower.  -Do not shave your face or body 2 days prior to surgery unless instructed otherwise by your Surgeon.  -Do not shave pubic hair 7 days prior to surgery (gyn pt's).    [facility]/Ride Policy:  Only 2 adults allowed (over the age of 18) to accompany you to the Hospital. You Must have a ride home from a responsible adult that you know and trust. Medical Transport, Uber or Lyft can only be used if patient has a responsible adult to accompany them during ride home.    Discharge Instructions: You will receive Post-op/Discharge instructions by your Discharge Nurse prior to going home. Please call your Surgeon's office with any post-surgery questions/concerns @ [phone].    *If you are running late or have questions the morning of surgery, please call the Surgery Dept @ [phone]  *Insurance/ Financial Questions, please call [phone]    Thank you,  -[facility] Pre Admit Testing Nurse  [phone] or [phone]  M-F 7:30 am-4:00 pm (Closed Major Holidays)

## 2023-04-05 NOTE — H&P
Subjective:      Patient ID: Indira Walker is a 77 y.o. female.    Chief Complaint:  Vaginal prolapse       History of Present Illness  HPI  Having increasing symptoms from pelvic organ relaxation   for surgical resolution    GYN & OB History  No LMP recorded. Patient is postmenopausal.   Date of Last Pap: No result found    OB History    Para Term  AB Living   3 3 3         SAB IAB Ectopic Multiple Live Births                  # Outcome Date GA Lbr Leonid/2nd Weight Sex Delivery Anes PTL Lv   3 Term            2 Term            1 Term              Past Medical History:   Diagnosis Date    Arthritis     Diabetes mellitus, type 2     Foot drop, left 2018    GERD (gastroesophageal reflux disease)     Heartburn     Hyperlipidemia     Hypertension     Left sided sciatica     s/p microdiscectomy 3/21/18     Past Surgical History:   Procedure Laterality Date    BREAST BIOPSY       SECTION      COLONOSCOPY  2008    DR. JANET GARCÍA/MILD DIVERICULOSIS DESCENDING AND SIGMOID. REPEAT 5 YRS    ENDOSCOPIC ULTRASOUND OF UPPER GASTROINTESTINAL TRACT N/A 2022    Procedure: ULTRASOUND, UPPER GI TRACT, ENDOSCOPIC;  Surgeon: Ange Saldivar MD;  Location: [facility];  Service: Endoscopy;  Laterality: N/A;  Upper and Linear, 22 sharkcore, slides and formalin    HERNIA REPAIR      MICRODISCECTOMY OF SPINE Left 2018    left L5-S1, Dr Segundo    SPINE SURGERY       Family History   Problem Relation Age of Onset    Diabetes Mother     Diabetes Father      Social History     Tobacco Use    Smoking status: Never    Smokeless tobacco: Never   Substance Use Topics    Alcohol use: No    Drug use: No     (Not in a hospital admission)    Review of patient's allergies indicates:   Allergen Reactions    Pyrilamine Swelling     (an ingredient in Midol)    Iodine and iodide containing products Hives     Current Outpatient Medications   Medication Sig    (Magic mouthwash) 1:1:1  diphenhydrAMINE(Benadryl) 12.5mg/5ml liq, aluminum & magnesium hydroxide-simethicone (Maalox), LIDOcaine viscous 2% Swish and spit 15 mLs every 4 (four) hours as needed (mouth sores). for mouth sores    acetaminophen (TYLENOL EXTRA STRENGTH ORAL) Take by mouth.    aspirin (ECOTRIN) 81 MG EC tablet Take 1 tablet (81 mg total) by mouth once daily. Every other day    azelastine (ASTELIN) 137 mcg (0.1 %) nasal spray SPRAY 1 SPRAY BY NASAL ROUTE 2 TIMES DAILY.    blood sugar diagnostic Strp To check BG 2 times daily, to use with insurance preferred meter    blood-glucose meter kit To check BG 2 times daily, to use with insurance preferred meter    chlorhexidine (PERIDEX) 0.12 % solution PLEASE SEE ATTACHED FOR DETAILED DIRECTIONS    famotidine (PEPCID) 40 MG tablet TAKE 1 TABLET BY MOUTH EVERY DAY    folic acid (FOLVITE) 1 MG tablet Take 2 tablets (2,000 mcg total) by mouth once daily.    lancets Misc To check BG 2 times daily, to use with insurance preferred meter    loratadine (CLARITIN) 10 mg tablet Take 10 mg by mouth once daily.    losartan-hydrochlorothiazide 100-12.5 mg (HYZAAR) 100-12.5 mg Tab TAKE 1 TABLET BY MOUTH EVERY DAY    metFORMIN (GLUCOPHAGE) 1000 MG tablet TAKE 1 TABLET BY MOUTH TWICE A DAY WITH MEALS    methotrexate 2.5 MG Tab TAKE 10 TABLETS (25 MG TOTAL) BY MOUTH EVERY 7 DAYS.    mupirocin (BACTROBAN) 2 % ointment Apply topically.    ondansetron (ZOFRAN-ODT) 4 MG TbDL Take 4 mg by mouth as needed. 1 tablet sublingual PRN nausea    rosuvastatin (CRESTOR) 20 MG tablet TAKE 1 TABLET BY MOUTH EVERY DAY    ketoconazole (NIZORAL) 2 % cream Apply topically 2 (two) times daily.     No current facility-administered medications for this visit.      Review of Systems  Review of Systems   Constitutional:  Negative for activity change, appetite change, chills, fatigue, fever and unexpected weight change.   HENT:  Negative for mouth sores.    Eyes:  Negative for visual disturbance.   Respiratory:  Negative for  cough and shortness of breath.    Cardiovascular:  Negative for chest pain and palpitations.   Gastrointestinal:  Negative for abdominal pain, bloating, blood in stool, constipation, diarrhea and nausea.   Genitourinary:  Negative for decreased libido, dyspareunia, dysuria, frequency, genital sores, hematuria, pelvic pain, urgency, vaginal discharge, urinary incontinence, postcoital bleeding, postmenopausal bleeding and vaginal odor.   Musculoskeletal:  Negative for back pain, joint swelling and myalgias.   Integumentary:  Negative for rash, breast mass, nipple discharge and breast skin changes.   Neurological:  Negative for syncope, numbness and headaches.   Hematological:  Negative for adenopathy. Does not bruise/bleed easily.   Psychiatric/Behavioral:  Negative for depression and sleep disturbance. The patient is not nervous/anxious.    Breast: Negative for mass, mastodynia, nipple discharge and skin changes       Objective:     Physical Exam:   Constitutional: She is oriented to person, place, and time. Vital signs are normal. She appears well-developed and well-nourished. No distress.    HENT:   Head: Normocephalic and atraumatic.   Right Ear: External ear normal.   Left Ear: External ear normal.   Nose: Nose normal.    Eyes: Pupils are equal, round, and reactive to light. Conjunctivae are normal.    Neck: Trachea normal. No JVD present. No thyroid mass and no thyromegaly present.    Cardiovascular:  Normal rate and regular rhythm.             Pulmonary/Chest: Effort normal and breath sounds normal. No respiratory distress.        Abdominal: Soft. Bowel sounds are normal. She exhibits no distension and no mass. There is no abdominal tenderness. No hernia. Hernia confirmed negative in the ventral area, confirmed negative in the right inguinal area and confirmed negative in the left inguinal area.     Genitourinary:    Vagina, uterus and rectum normal.   Rectum:      No external hemorrhoid or abnormal anal tone.    Labial bartholins normal.There is no rash, tenderness or lesion on the right labia. There is no rash, tenderness or lesion on the left labia. Cervix is normal. Right adnexum displays no mass, no tenderness and no fullness. Left adnexum displays no mass, no tenderness and no fullness. There is cystocele (grade 4) in the vagina. No  no vaginal discharge, tenderness, bleeding, rectocele or unspecified prolapse of vaginal walls in the vagina.    No foreign body in the vagina.   Cervix exhibits no motion tenderness, no discharge and no friability. Uterus is uterine prolapse (complete). Uterus is not deviated, not enlarged and not tender.           Musculoskeletal: Normal range of motion.       Neurological: She is alert and oriented to person, place, and time. She has normal reflexes.    Skin: Skin is warm and dry. She is not diaphoretic.    Psychiatric: She has a normal mood and affect. Her speech is normal and behavior is normal. Thought content normal.       Assessment:     1. Cystocele with uterine descensus               Plan:     Indira was seen today for post-op evaluation.    Diagnoses and all orders for this visit:    Cystocele with uterine descensus  Comments:  TvH with anterior colporrhaphy possible sslf

## 2023-04-05 NOTE — ASSESSMENT & PLAN NOTE
Known risk factors for perioperative complications: None  None    Difficulty with intubation is not anticipated.    Cardiac Risk Estimation: Based on the Revised Cardiac Risk index, patient is a Class 1 risk with a 3.9% risk of a major cardiac event in a low risk procedure.    1.) Preoperative workup as follows: hemoglobin, hematocrit, creatinine, liver function studies.  2.) Change in medication regimen before surgery: discontinue ASA, NSAIDs 7 days prior to surgery, hold Metformin 24 hours prior to surgery.  3.) Prophylaxis for cardiac events with perioperative beta-blockers: not indicated.  4.) Invasive hemodynamic monitoring perioperatively: not indicated.  5.) Deep vein thrombosis prophylaxis postoperatively: intermittent pneumatic compression boots and regimen to be chosen by surgical team.  6.) Surveillance for postoperative MI with ECG immediately postoperatively and on postoperati ve days 1 and 2 AND troponin levels 24 hours postoperatively and on day 4 or hospital discharge (whichever comes first): not indicated.  7.) Current medications which may produce withdrawal symptoms if withheld perioperatively: None  8.) Other measures: None

## 2023-04-05 NOTE — DISCHARGE INSTRUCTIONS
Pre op instructions reviewed with patient during Clinic Visit with Provider. Patient verbalized understanding.    To confirm, Surgery is scheduled on 4/14. We will call you late afternoon the business day prior to surgery with your arrival time.    *Please report to the [facility] (1st Floor) located off of [facility] (2nd Entrance/Building on the left, in front of the flag pole).  Address: [address] [place] [address] [place]. [id number]      INSTRUCTIONS IMPORTANT!!!  Do Not Eat, Drink, or Smoke after 12 midnight unless instructed otherwise by your Surgeon. OK to brush teeth, no gum, candy or mints!      *Take Only these medications with a small sip of water Morning of Surgery as instructed by Tatiana Banks NP:  Pepcid    Take your last Metformin, prior to surgery, on Wednesday, 4/12/23.    Blood Thinners: Stop taking Aspirin 7 days prior to surgery per Physician Instructions! Call your Surgeon office to inquire about any questions regarding your blood thinner medication.    ____  HOLD all vitamins, herbal supplements, aspirin products & NSAIDS 7 days prior to surgery, as these can thin the blood.  ____  NO Acrylic/fake nails or nail polish worn day of surgery (specifically hand/arm & foot surgeries).  ____  NO powder, lotions, deodorants, oils or cream on body.  ____  Remove all jewelry & piercings prior to surgery.  ____  Remove Dentures, Hearing Aids & Contact Lens prior to surgery.  ____  Bring photo ID and insurance information to hospital (Leave Valuables at Home).  ____  If going home the same day, arrange for a ride home. You will not be able to drive for 24 hrs if Anesthesia was used.   ____  Females (ages 11-60): may need to give a urine sample the morning of surgery; please see Pre op Nurse prior to using the restroom.  ____  Males: Stop ED medications (Viagra, Cialis) 24 hrs prior to surgery.  ____  Wear clean, loose fitting clothing to allow for dressings/ bandages.            Diabetic  Patients: If you take diabetic medication, do NOT take morning of surgery unless instructed by Doctor. Metformin to be stopped 24 hrs prior to surgery time. DO NOT take long-acting insulin the evening before surgery. Blood sugars will be checked in pre-op by Nurse.    Bathing Instructions:    -Shower with anti-bacterial Soap (Hibiclens or Dial) the night before surgery and the morning of.   -Do not use Hibiclens on your face or genitals.   -Apply clean clothes after shower.  -Do not shave your face or body 2 days prior to surgery unless instructed otherwise by your Surgeon.  -Do not shave pubic hair 7 days prior to surgery (gyn pt's).    [facility]itor/Ride Policy:  Only 2 adults allowed (over the age of 18) to accompany you to the Hospital. You Must have a ride home from a responsible adult that you know and trust. Medical Transport, Uber or Lyft can only be used if patient has a responsible adult to accompany them during ride home.    Discharge Instructions: You will receive Post-op/Discharge instructions by your Discharge Nurse prior to going home. Please call your Surgeon's office with any post-surgery questions/concerns @ [phone].    *If you are running late or have questions the morning of surgery, please call the Surgery Dept @ [phone]  *Insurance/ Financial Questions, please call [phone]    Thank you,  -[facility] Pre Admit Testing Nurse  [phone] or [phone]  M-F 7:30 am-4:00 pm (Closed Major Holidays)

## 2023-04-05 NOTE — H&P (VIEW-ONLY)
Subjective:      Patient ID: Indira Walker is a 77 y.o. female.    Chief Complaint:  Vaginal prolapse       History of Present Illness  HPI  Having increasing symptoms from pelvic organ relaxation   for surgical resolution    GYN & OB History  No LMP recorded. Patient is postmenopausal.   Date of Last Pap: No result found    OB History    Para Term  AB Living   3 3 3         SAB IAB Ectopic Multiple Live Births                  # Outcome Date GA Lbr Leonid/2nd Weight Sex Delivery Anes PTL Lv   3 Term            2 Term            1 Term              Past Medical History:   Diagnosis Date    Arthritis     Diabetes mellitus, type 2     Foot drop, left 2018    GERD (gastroesophageal reflux disease)     Heartburn     Hyperlipidemia     Hypertension     Left sided sciatica     s/p microdiscectomy 3/21/18     Past Surgical History:   Procedure Laterality Date    BREAST BIOPSY       SECTION      COLONOSCOPY  2008    DR. JANET GARCÍA/MILD DIVERICULOSIS DESCENDING AND SIGMOID. REPEAT 5 YRS    ENDOSCOPIC ULTRASOUND OF UPPER GASTROINTESTINAL TRACT N/A 2022    Procedure: ULTRASOUND, UPPER GI TRACT, ENDOSCOPIC;  Surgeon: Ange Saldivar MD;  Location: [facility];  Service: Endoscopy;  Laterality: N/A;  Upper and Linear, 22 sharkcore, slides and formalin    HERNIA REPAIR      MICRODISCECTOMY OF SPINE Left 2018    left L5-S1, Dr Segundo    SPINE SURGERY       Family History   Problem Relation Age of Onset    Diabetes Mother     Diabetes Father      Social History     Tobacco Use    Smoking status: Never    Smokeless tobacco: Never   Substance Use Topics    Alcohol use: No    Drug use: No     (Not in a hospital admission)    Review of patient's allergies indicates:   Allergen Reactions    Pyrilamine Swelling     (an ingredient in Midol)    Iodine and iodide containing products Hives     Current Outpatient Medications   Medication Sig    (Magic mouthwash) 1:1:1  diphenhydrAMINE(Benadryl) 12.5mg/5ml liq, aluminum & magnesium hydroxide-simethicone (Maalox), LIDOcaine viscous 2% Swish and spit 15 mLs every 4 (four) hours as needed (mouth sores). for mouth sores    acetaminophen (TYLENOL EXTRA STRENGTH ORAL) Take by mouth.    aspirin (ECOTRIN) 81 MG EC tablet Take 1 tablet (81 mg total) by mouth once daily. Every other day    azelastine (ASTELIN) 137 mcg (0.1 %) nasal spray SPRAY 1 SPRAY BY NASAL ROUTE 2 TIMES DAILY.    blood sugar diagnostic Strp To check BG 2 times daily, to use with insurance preferred meter    blood-glucose meter kit To check BG 2 times daily, to use with insurance preferred meter    chlorhexidine (PERIDEX) 0.12 % solution PLEASE SEE ATTACHED FOR DETAILED DIRECTIONS    famotidine (PEPCID) 40 MG tablet TAKE 1 TABLET BY MOUTH EVERY DAY    folic acid (FOLVITE) 1 MG tablet Take 2 tablets (2,000 mcg total) by mouth once daily.    lancets Misc To check BG 2 times daily, to use with insurance preferred meter    loratadine (CLARITIN) 10 mg tablet Take 10 mg by mouth once daily.    losartan-hydrochlorothiazide 100-12.5 mg (HYZAAR) 100-12.5 mg Tab TAKE 1 TABLET BY MOUTH EVERY DAY    metFORMIN (GLUCOPHAGE) 1000 MG tablet TAKE 1 TABLET BY MOUTH TWICE A DAY WITH MEALS    methotrexate 2.5 MG Tab TAKE 10 TABLETS (25 MG TOTAL) BY MOUTH EVERY 7 DAYS.    mupirocin (BACTROBAN) 2 % ointment Apply topically.    ondansetron (ZOFRAN-ODT) 4 MG TbDL Take 4 mg by mouth as needed. 1 tablet sublingual PRN nausea    rosuvastatin (CRESTOR) 20 MG tablet TAKE 1 TABLET BY MOUTH EVERY DAY    ketoconazole (NIZORAL) 2 % cream Apply topically 2 (two) times daily.     No current facility-administered medications for this visit.      Review of Systems  Review of Systems   Constitutional:  Negative for activity change, appetite change, chills, fatigue, fever and unexpected weight change.   HENT:  Negative for mouth sores.    Eyes:  Negative for visual disturbance.   Respiratory:  Negative for  cough and shortness of breath.    Cardiovascular:  Negative for chest pain and palpitations.   Gastrointestinal:  Negative for abdominal pain, bloating, blood in stool, constipation, diarrhea and nausea.   Genitourinary:  Negative for decreased libido, dyspareunia, dysuria, frequency, genital sores, hematuria, pelvic pain, urgency, vaginal discharge, urinary incontinence, postcoital bleeding, postmenopausal bleeding and vaginal odor.   Musculoskeletal:  Negative for back pain, joint swelling and myalgias.   Integumentary:  Negative for rash, breast mass, nipple discharge and breast skin changes.   Neurological:  Negative for syncope, numbness and headaches.   Hematological:  Negative for adenopathy. Does not bruise/bleed easily.   Psychiatric/Behavioral:  Negative for depression and sleep disturbance. The patient is not nervous/anxious.    Breast: Negative for mass, mastodynia, nipple discharge and skin changes       Objective:     Physical Exam:   Constitutional: She is oriented to person, place, and time. Vital signs are normal. She appears well-developed and well-nourished. No distress.    HENT:   Head: Normocephalic and atraumatic.   Right Ear: External ear normal.   Left Ear: External ear normal.   Nose: Nose normal.    Eyes: Pupils are equal, round, and reactive to light. Conjunctivae are normal.    Neck: Trachea normal. No JVD present. No thyroid mass and no thyromegaly present.    Cardiovascular:  Normal rate and regular rhythm.             Pulmonary/Chest: Effort normal and breath sounds normal. No respiratory distress.        Abdominal: Soft. Bowel sounds are normal. She exhibits no distension and no mass. There is no abdominal tenderness. No hernia. Hernia confirmed negative in the ventral area, confirmed negative in the right inguinal area and confirmed negative in the left inguinal area.     Genitourinary:    Vagina, uterus and rectum normal.   Rectum:      No external hemorrhoid or abnormal anal tone.    Labial bartholins normal.There is no rash, tenderness or lesion on the right labia. There is no rash, tenderness or lesion on the left labia. Cervix is normal. Right adnexum displays no mass, no tenderness and no fullness. Left adnexum displays no mass, no tenderness and no fullness. There is cystocele (grade 4) in the vagina. No  no vaginal discharge, tenderness, bleeding, rectocele or unspecified prolapse of vaginal walls in the vagina.    No foreign body in the vagina.   Cervix exhibits no motion tenderness, no discharge and no friability. Uterus is uterine prolapse (complete). Uterus is not deviated, not enlarged and not tender.           Musculoskeletal: Normal range of motion.       Neurological: She is alert and oriented to person, place, and time. She has normal reflexes.    Skin: Skin is warm and dry. She is not diaphoretic.    Psychiatric: She has a normal mood and affect. Her speech is normal and behavior is normal. Thought content normal.       Assessment:     1. Cystocele with uterine descensus               Plan:     Indira was seen today for post-op evaluation.    Diagnoses and all orders for this visit:    Cystocele with uterine descensus  Comments:  TvH with anterior colporrhaphy possible sslf

## 2023-04-05 NOTE — PROGRESS NOTES
Preoperative History and Physical                                                              Hospital Medicine                                                                      Chief Complaint: Preoperative evaluation     History of Present Illness:      Indira Walker is a 77 y.o. female who presents to the office today for a preoperative consultation at the request of ANA MARIA Mayorga MD who plans on performing HYSTERECTOMY,VAGINAL, WITH SACROSPINOUS LIGAMENT FIXATION on .     Functional Status:      The patient is able to climb a flight of stairs. The patient is able to ambulate without difficulty. The patient's functional status is not affected by the surgical problem. The patient's functional status is not affected by shortness of breath, chest pain, dyspnea on exertion and fatigue.    MET score greater than 4    Past Medical History:      Past Medical History:   Diagnosis Date    Arthritis     Diabetes mellitus, type 2     Foot drop, left 2018    GERD (gastroesophageal reflux disease)     Heartburn     Hyperlipidemia     Hypertension     Left sided sciatica     s/p microdiscectomy 3/21/18        Past Surgical History:      Past Surgical History:   Procedure Laterality Date    BREAST BIOPSY       SECTION      COLONOSCOPY  2008    DR. JANET GARCÍA/MILD DIVERICULOSIS DESCENDING AND SIGMOID. REPEAT 5 YRS    ENDOSCOPIC ULTRASOUND OF UPPER GASTROINTESTINAL TRACT N/A 2022    Procedure: ULTRASOUND, UPPER GI TRACT, ENDOSCOPIC;  Surgeon: Ange Saldivar MD;  Location: [facility];  Service: Endoscopy;  Laterality: N/A;  Upper and Linear, 22 sharkcore, slides and formalin    HERNIA REPAIR      MICRODISCECTOMY OF SPINE Left 2018    left L5-S1, Dr Segundo    SPINE SURGERY          Social History:      Social History     Socioeconomic History    Marital status:     Number of children: 2   Occupational History     Occupation:       Employer: [employer]    Tobacco Use    Smoking status: Never    Smokeless tobacco: Never   Substance and Sexual Activity    Alcohol use: No    Drug use: No    Sexual activity: Not Currently     Partners: Male        Family History:      Family History   Problem Relation Age of Onset    Diabetes Mother     Diabetes Father        Allergies:      Review of patient's allergies indicates:   Allergen Reactions    Pyrilamine Swelling     (an ingredient in Midol)    Iodine and iodide containing products Hives       Medications:      Current Outpatient Medications   Medication Sig    acetaminophen (TYLENOL EXTRA STRENGTH ORAL) Take by mouth.    aspirin (ECOTRIN) 81 MG EC tablet Take 1 tablet (81 mg total) by mouth once daily. Every other day    azelastine (ASTELIN) 137 mcg (0.1 %) nasal spray SPRAY 1 SPRAY BY NASAL ROUTE 2 TIMES DAILY.    blood sugar diagnostic Strp To check BG 2 times daily, to use with insurance preferred meter    blood-glucose meter kit To check BG 2 times daily, to use with insurance preferred meter    chlorhexidine (PERIDEX) 0.12 % solution PLEASE SEE ATTACHED FOR DETAILED DIRECTIONS    famotidine (PEPCID) 40 MG tablet TAKE 1 TABLET BY MOUTH EVERY DAY    ketoconazole (NIZORAL) 2 % cream Apply topically 2 (two) times daily.    lancets Misc To check BG 2 times daily, to use with insurance preferred meter    loratadine (CLARITIN) 10 mg tablet Take 10 mg by mouth once daily.    losartan-hydrochlorothiazide 100-12.5 mg (HYZAAR) 100-12.5 mg Tab TAKE 1 TABLET BY MOUTH EVERY DAY    metFORMIN (GLUCOPHAGE) 1000 MG tablet TAKE 1 TABLET BY MOUTH TWICE A DAY WITH MEALS    rosuvastatin (CRESTOR) 20 MG tablet TAKE 1 TABLET BY MOUTH EVERY DAY    (Magic mouthwash) 1:1:1 diphenhydrAMINE(Benadryl) 12.5mg/5ml liq, aluminum & magnesium hydroxide-simethicone (Maalox), LIDOcaine viscous 2% Swish and spit 15 mLs every 4 (four) hours as needed (mouth sores). for mouth sores    mupirocin  (BACTROBAN) 2 % ointment Apply topically.    ondansetron (ZOFRAN-ODT) 4 MG TbDL Take 4 mg by mouth as needed. 1 tablet sublingual PRN nausea    sulfaSALAzine (AZULFIDINE) 500 MG EC tablet Take 1 tablet (500 mg total) by mouth 2 (two) times daily.     Current Facility-Administered Medications   Medication    acetaminophen tablet 1,000 mg       Vitals:      Vitals:    04/05/23 1146   BP: 129/81   Pulse: 75   Temp: 97.7 °F (36.5 °C)       Review of Systems:        Constitutional: Negative for fever, chills, weight loss, malaise/fatigue and diaphoresis.   HENT: Negative for hearing loss, ear pain, nosebleeds, congestion, sore throat, neck pain, tinnitus and ear discharge.    Eyes: Negative for blurred vision, double vision, photophobia, pain, discharge and redness.   Respiratory: Negative for cough, hemoptysis, sputum production, shortness of breath, wheezing and stridor.    Cardiovascular: Negative for chest pain, palpitations, orthopnea, claudication, leg swelling and PND.   Gastrointestinal: Negative for heartburn, nausea, vomiting, abdominal pain, diarrhea, constipation, blood in stool and melena.   Genitourinary: Negative for dysuria, urgency, frequency, hematuria and flank pain.   Musculoskeletal: Negative for myalgias, back pain, joint pain and falls.   Skin: Negative for itching and rash.   Neurological: Negative for dizziness, tingling, tremors, sensory change, speech change, focal weakness, seizures, loss of consciousness, weakness and headaches.   Endo/Heme/Allergies: Negative for environmental allergies and polydipsia. Does not bruise/bleed easily.   Psychiatric/Behavioral: Negative for depression, suicidal ideas, hallucinations, memory loss and substance abuse. The patient is not nervous/anxious and does not have insomnia.    All 14 systems reviewed and negative except as noted above.    Physical Exam:      Constitutional: Appears well-developed, well-nourished and in no acute distress.  Patient is oriented  to person, place, and time.   Head: Normocephalic and atraumatic. Mucous membranes moist.  Neck: Neck supple no mass.   Cardiovascular: Normal rate and regular rhythm.  S1 S2 appreciated by ascultation.  Pulmonary/Chest: Effort normal and clear to auscultation bilaterally. No respiratory distress.   Abdomen: Soft. Non-tender and non-distended. Bowel sounds are normal.   Neurological: Patient is alert and oriented to person, place and time. Moves all extremities.  Skin: Warm and dry. No lesions.  Extremities: No clubbing, cyanosis or edema.    Laboratory data:      Reviewed and noted in plan where applicable. Please see chart for full laboratory data.    @XNDJDLWBT37(cpk,cpkmb,troponini,mb)@ @WLUAGCXCB69(poctglucose)@     Lab Results   Component Value Date    INR 1.0 03/08/2018       Lab Results   Component Value Date    WBC 6.08 04/05/2023    HGB 11.1 (L) 04/05/2023    HCT 33.7 (L) 04/05/2023    MCV 96 04/05/2023     04/05/2023       @ATIYPMWYQ52(GLU,NA,K,Cl,CO2,BUN,Creatinine,Calcium,MG)@    Predictors of intubation difficulty:       Morbid obesity? no   Anatomically abnormal facies? no   Prominent incisors? no   Receding mandible? no   Short, thick neck? no   Neck range of motion: normal   Dentition:  Missing teeth, top and bottom, has 2 partial plates, top and bottom  Based on the Modified Mallampati, patient is a mallampati score: II (hard and soft palate, upper portion of tonsils anduvula visible)    Cardiographics:      ECG: no change since previous ECG dated 8/2/2022  Echocardiogram: not indicated    Imaging:      Chest x-ray: not indicated    Assessment and Plan:      Preoperative examination  Known risk factors for perioperative complications: None  None    Difficulty with intubation is not anticipated.    Cardiac Risk Estimation: Based on the Revised Cardiac Risk index, patient is a Class 1 risk with a 3.9% risk of a major cardiac event in a low risk procedure.    1.) Preoperative workup as follows:  hemoglobin, hematocrit, creatinine, liver function studies.  2.) Change in medication regimen before surgery: discontinue ASA, NSAIDs 7 days prior to surgery, hold Metformin 24 hours prior to surgery.  3.) Prophylaxis for cardiac events with perioperative beta-blockers: not indicated.  4.) Invasive hemodynamic monitoring perioperatively: not indicated.  5.) Deep vein thrombosis prophylaxis postoperatively: intermittent pneumatic compression boots and regimen to be chosen by surgical team.  6.) Surveillance for postoperative MI with ECG immediately postoperatively and on postoperati ve days 1 and 2 AND troponin levels 24 hours postoperatively and on day 4 or hospital discharge (whichever comes first): not indicated.  7.) Current medications which may produce withdrawal symptoms if withheld perioperatively: None  8.) Other measures: None    Cystocele with uterine descensus  Planned for HYSTERECTOMY,VAGINAL, WITH SACROSPINOUS LIGAMENT FIXATION with Dr. Jose Roberto Mayorga on 4/14/23.    --Morning of Procedure: take pepcid  --Hold HYZAAR, resume all medications post-operatively  --Hold ASA, NSAIDs and all vitamins/supplements 7 days prior to procedure    Uncontrolled type 2 diabetes mellitus with hyperglycemia  Chronic, uncontrolled, managed by PCP    --Hold metformin 24H prior to procedure  --Check CBG prior to procedure    Anemia  Chronic, stable    --H/H: 11.1/33.7            Electronically signed by Tatiana Banks NP on 4/9/2023 at 12:06 PM.

## 2023-04-05 NOTE — PROGRESS NOTES
I have explained the risks, benefits , and alternatives of total vaginal hysterectomy with anterior vaginal repair and sacrospinous ligament fixation in detail.  The patient voices understanding and all questions have been answered.  The patient agrees to proceed as planned.  Consent forms for the procedure have been reviewed and signed by the patient.

## 2023-04-06 ENCOUNTER — TELEPHONE (OUTPATIENT)
Dept: RHEUMATOLOGY | Facility: CLINIC | Age: 78
End: 2023-04-06
Payer: MEDICARE

## 2023-04-06 RX ORDER — SULFASALAZINE 500 MG/1
500 TABLET, DELAYED RELEASE ORAL 2 TIMES DAILY
Qty: 60 TABLET | Refills: 3 | Status: SHIPPED | OUTPATIENT
Start: 2023-04-06 | End: 2023-05-06

## 2023-04-06 NOTE — TELEPHONE ENCOUNTER
"Contacted patient to discuss this with her. Patient verbalized understanding. All questions answered.     Jodi Phipps (Allye), [facility]  Rheumatology Department   "

## 2023-04-06 NOTE — TELEPHONE ENCOUNTER
Giovanni Cage MD  You 52 minutes ago (2:10 PM)       Recommend 2mg of folic acid daily until finishing bottle.  Discontinue methotrexate, replace by SSZ: script sent to pharmacy on file.  CBC, CMP 2 months after new medication.  Repeat labs w/ CRP few days close to f/u visit.

## 2023-04-06 NOTE — TELEPHONE ENCOUNTER
----- Message from Eloy Azar sent at 4/6/2023 10:19 AM CDT -----  Contact: [phone]  Patient requesting a call back in regards to mouth ulcers. Please call back at [phone]. Thanks KD

## 2023-04-06 NOTE — TELEPHONE ENCOUNTER
"Dr. Cage, this patient stated that she really needed to speak with you regarding the ulcers that she has in her mouth again.       Advised patient that you did send her the following message back in January.      Please advise.      Returned patients phone call. Patient stated that she saw an Oral Surgeon Dr. Chacon and he said that the ulcers that she has in her mouth are not cancerous but that he thinks they are caused by the MTX that she is taking. She is barely able to eat anything due to the ulcers. She stated that she needed to speak with Dr. Cage ASAP regarding this. Advised patient that Dr. Cage is in the middle of clinic but that I will route him this message. Patient thanked me for the return phone call and verbalized understanding. All questions answered.      Jodi Phipps (Allye), [facility]  Rheumatology Department    "

## 2023-04-09 NOTE — ASSESSMENT & PLAN NOTE
Planned for HYSTERECTOMY,VAGINAL, WITH SACROSPINOUS LIGAMENT FIXATION with Dr. Jose Roberto Mayorga on 4/14/23.    --Morning of Procedure: take pepcid  --Hold HYZAAR, resume all medications post-operatively  --Hold ASA, NSAIDs and all vitamins/supplements 7 days prior to procedure

## 2023-04-09 NOTE — ASSESSMENT & PLAN NOTE
Chronic, uncontrolled, managed by PCP    --Hold metformin 24H prior to procedure  --Check CBG prior to procedure

## 2023-04-13 ENCOUNTER — PES CALL (OUTPATIENT)
Dept: ADMINISTRATIVE | Facility: CLINIC | Age: 78
End: 2023-04-13
Payer: MEDICARE

## 2023-04-13 ENCOUNTER — TELEPHONE (OUTPATIENT)
Dept: PREADMISSION TESTING | Facility: HOSPITAL | Age: 78
End: 2023-04-13
Payer: MEDICARE

## 2023-04-13 NOTE — TELEPHONE ENCOUNTER
Called and spoke with patient about the following:     Please arrive to [facility] ([facility] at 4/14/23 on 0730 for your scheduled procedure.  Address: [address] [place] [address] [place] [address] ([address] on left, 1st Floor Lobby)  >>>NO eating or drinking after midnight unless instructed otherwise by your Surgeon<<<    Thank you

## 2023-04-14 ENCOUNTER — ANESTHESIA (OUTPATIENT)
Dept: SURGERY | Facility: HOSPITAL | Age: 78
End: 2023-04-14
Payer: MEDICARE

## 2023-04-14 ENCOUNTER — HOSPITAL ENCOUNTER (OUTPATIENT)
Facility: HOSPITAL | Age: 78
Discharge: HOME | End: 2023-04-15
Attending: OBSTETRICS & GYNECOLOGY | Admitting: OBSTETRICS & GYNECOLOGY
Payer: MEDICARE

## 2023-04-14 ENCOUNTER — ANESTHESIA EVENT (OUTPATIENT)
Dept: SURGERY | Facility: HOSPITAL | Age: 78
End: 2023-04-14
Payer: MEDICARE

## 2023-04-14 DIAGNOSIS — N81.4 CYSTOCELE WITH UTERINE DESCENSUS: Primary | ICD-10-CM

## 2023-04-14 PROBLEM — Z01.818 PREOPERATIVE EXAMINATION: Status: RESOLVED | Noted: 2023-04-05 | Resolved: 2023-04-14

## 2023-04-14 LAB
POCT GLUCOSE: 157 MG/DL (ref 70–110)
POCT GLUCOSE: 164 MG/DL (ref 70–110)

## 2023-04-14 PROCEDURE — 58260 VAGINAL HYSTERECTOMY: CPT | Mod: ,,, | Performed by: OBSTETRICS & GYNECOLOGY

## 2023-04-14 PROCEDURE — 88305 TISSUE EXAM BY PATHOLOGIST: CPT | Mod: 26,,, | Performed by: PATHOLOGY

## 2023-04-14 PROCEDURE — 63600175 PHARM REV CODE 636 W HCPCS: Performed by: ANESTHESIOLOGY

## 2023-04-14 PROCEDURE — 25000003 PHARM REV CODE 250: Performed by: OBSTETRICS & GYNECOLOGY

## 2023-04-14 PROCEDURE — 58260 PR VAGINAL HYSTERECTOMY,UTERUS 250 GMS/<: ICD-10-PCS | Mod: ,,, | Performed by: OBSTETRICS & GYNECOLOGY

## 2023-04-14 PROCEDURE — 57240 ANTERIOR COLPORRHAPHY: CPT | Mod: 51,,, | Performed by: OBSTETRICS & GYNECOLOGY

## 2023-04-14 PROCEDURE — 63600175 PHARM REV CODE 636 W HCPCS: Performed by: OBSTETRICS & GYNECOLOGY

## 2023-04-14 PROCEDURE — 58260 PR VAGINAL HYSTERECTOMY,UTERUS 250 GMS/<: ICD-10-PCS | Mod: AS,,, | Performed by: PHYSICIAN ASSISTANT

## 2023-04-14 PROCEDURE — 36000708 HC OR TIME LEV III 1ST 15 MIN: Performed by: OBSTETRICS & GYNECOLOGY

## 2023-04-14 PROCEDURE — 57240 ANTERIOR COLPORRHAPHY: CPT | Mod: AS,51,, | Performed by: PHYSICIAN ASSISTANT

## 2023-04-14 PROCEDURE — 57240: ICD-10-PCS | Mod: 51,,, | Performed by: OBSTETRICS & GYNECOLOGY

## 2023-04-14 PROCEDURE — 63600175 PHARM REV CODE 636 W HCPCS: Performed by: FAMILY MEDICINE

## 2023-04-14 PROCEDURE — 88305 TISSUE EXAM BY PATHOLOGIST: ICD-10-PCS | Mod: 26,,, | Performed by: PATHOLOGY

## 2023-04-14 PROCEDURE — 88305 TISSUE EXAM BY PATHOLOGIST: CPT | Performed by: PATHOLOGY

## 2023-04-14 PROCEDURE — 58260 VAGINAL HYSTERECTOMY: CPT | Mod: AS,,, | Performed by: PHYSICIAN ASSISTANT

## 2023-04-14 PROCEDURE — 37000009 HC ANESTHESIA EA ADD 15 MINS: Performed by: OBSTETRICS & GYNECOLOGY

## 2023-04-14 PROCEDURE — 71000015 HC POSTOP RECOV 1ST HR: Performed by: OBSTETRICS & GYNECOLOGY

## 2023-04-14 PROCEDURE — 71000016 HC POSTOP RECOV ADDL HR: Performed by: OBSTETRICS & GYNECOLOGY

## 2023-04-14 PROCEDURE — 57240: ICD-10-PCS | Mod: AS,51,, | Performed by: PHYSICIAN ASSISTANT

## 2023-04-14 PROCEDURE — 36000709 HC OR TIME LEV III EA ADD 15 MIN: Performed by: OBSTETRICS & GYNECOLOGY

## 2023-04-14 PROCEDURE — 25000003 PHARM REV CODE 250: Performed by: ANESTHESIOLOGY

## 2023-04-14 PROCEDURE — 25000003 PHARM REV CODE 250: Performed by: FAMILY MEDICINE

## 2023-04-14 PROCEDURE — 71000039 HC RECOVERY, EACH ADD'L HOUR: Performed by: OBSTETRICS & GYNECOLOGY

## 2023-04-14 PROCEDURE — 71000033 HC RECOVERY, INTIAL HOUR: Performed by: OBSTETRICS & GYNECOLOGY

## 2023-04-14 PROCEDURE — 37000008 HC ANESTHESIA 1ST 15 MINUTES: Performed by: OBSTETRICS & GYNECOLOGY

## 2023-04-14 RX ORDER — PROPOFOL 10 MG/ML
VIAL (ML) INTRAVENOUS
Status: DISCONTINUED | OUTPATIENT
Start: 2023-04-14 | End: 2023-04-14

## 2023-04-14 RX ORDER — OXYCODONE HYDROCHLORIDE 5 MG/1
5 TABLET ORAL
Status: DISCONTINUED | OUTPATIENT
Start: 2023-04-14 | End: 2023-04-14 | Stop reason: HOSPADM

## 2023-04-14 RX ORDER — ONDANSETRON 2 MG/ML
4 INJECTION INTRAMUSCULAR; INTRAVENOUS DAILY PRN
Status: DISCONTINUED | OUTPATIENT
Start: 2023-04-14 | End: 2023-04-14 | Stop reason: HOSPADM

## 2023-04-14 RX ORDER — ALBUTEROL SULFATE 0.83 MG/ML
2.5 SOLUTION RESPIRATORY (INHALATION) EVERY 4 HOURS PRN
Status: DISCONTINUED | OUTPATIENT
Start: 2023-04-14 | End: 2023-04-14 | Stop reason: HOSPADM

## 2023-04-14 RX ORDER — CEFAZOLIN SODIUM 2 G/50ML
2 SOLUTION INTRAVENOUS
Status: DISCONTINUED | OUTPATIENT
Start: 2023-04-14 | End: 2023-04-14 | Stop reason: HOSPADM

## 2023-04-14 RX ORDER — DIPHENHYDRAMINE HYDROCHLORIDE 50 MG/ML
25 INJECTION INTRAMUSCULAR; INTRAVENOUS EVERY 6 HOURS PRN
Status: DISCONTINUED | OUTPATIENT
Start: 2023-04-14 | End: 2023-04-14 | Stop reason: HOSPADM

## 2023-04-14 RX ORDER — DIPHENHYDRAMINE HCL 25 MG
25 CAPSULE ORAL EVERY 4 HOURS PRN
Status: DISCONTINUED | OUTPATIENT
Start: 2023-04-14 | End: 2023-04-15 | Stop reason: HOSPADM

## 2023-04-14 RX ORDER — KETOROLAC TROMETHAMINE 30 MG/ML
15 INJECTION, SOLUTION INTRAMUSCULAR; INTRAVENOUS EVERY 8 HOURS PRN
Status: DISCONTINUED | OUTPATIENT
Start: 2023-04-14 | End: 2023-04-14 | Stop reason: HOSPADM

## 2023-04-14 RX ORDER — DEXTROSE MONOHYDRATE AND SODIUM CHLORIDE 5; .9 G/100ML; G/100ML
INJECTION, SOLUTION INTRAVENOUS CONTINUOUS
Status: DISCONTINUED | OUTPATIENT
Start: 2023-04-14 | End: 2023-04-15

## 2023-04-14 RX ORDER — VASOPRESSIN 20 [USP'U]/ML
INJECTION, SOLUTION INTRAMUSCULAR; SUBCUTANEOUS
Status: DISCONTINUED | OUTPATIENT
Start: 2023-04-14 | End: 2023-04-14 | Stop reason: HOSPADM

## 2023-04-14 RX ORDER — HYDROMORPHONE HYDROCHLORIDE 2 MG/ML
0.2 INJECTION, SOLUTION INTRAMUSCULAR; INTRAVENOUS; SUBCUTANEOUS EVERY 5 MIN PRN
Status: DISCONTINUED | OUTPATIENT
Start: 2023-04-14 | End: 2023-04-14 | Stop reason: HOSPADM

## 2023-04-14 RX ORDER — HYDROMORPHONE HYDROCHLORIDE 2 MG/ML
1 INJECTION, SOLUTION INTRAMUSCULAR; INTRAVENOUS; SUBCUTANEOUS EVERY 4 HOURS PRN
Status: DISCONTINUED | OUTPATIENT
Start: 2023-04-14 | End: 2023-04-15 | Stop reason: HOSPADM

## 2023-04-14 RX ORDER — PROCHLORPERAZINE EDISYLATE 5 MG/ML
5 INJECTION INTRAMUSCULAR; INTRAVENOUS EVERY 30 MIN PRN
Status: DISCONTINUED | OUTPATIENT
Start: 2023-04-14 | End: 2023-04-14 | Stop reason: HOSPADM

## 2023-04-14 RX ORDER — FENTANYL CITRATE 50 UG/ML
INJECTION, SOLUTION INTRAMUSCULAR; INTRAVENOUS
Status: DISCONTINUED | OUTPATIENT
Start: 2023-04-14 | End: 2023-04-14

## 2023-04-14 RX ORDER — HYDROCODONE BITARTRATE AND ACETAMINOPHEN 5; 325 MG/1; MG/1
1 TABLET ORAL EVERY 4 HOURS PRN
Status: DISCONTINUED | OUTPATIENT
Start: 2023-04-14 | End: 2023-04-15 | Stop reason: HOSPADM

## 2023-04-14 RX ORDER — SODIUM CHLORIDE 0.9 % (FLUSH) 0.9 %
3 SYRINGE (ML) INJECTION
Status: DISCONTINUED | OUTPATIENT
Start: 2023-04-14 | End: 2023-04-15

## 2023-04-14 RX ORDER — LIDOCAINE HYDROCHLORIDE 20 MG/ML
INJECTION, SOLUTION EPIDURAL; INFILTRATION; INTRACAUDAL; PERINEURAL
Status: DISCONTINUED | OUTPATIENT
Start: 2023-04-14 | End: 2023-04-14

## 2023-04-14 RX ORDER — MEPERIDINE HYDROCHLORIDE 25 MG/ML
12.5 INJECTION INTRAMUSCULAR; INTRAVENOUS; SUBCUTANEOUS ONCE
Status: DISCONTINUED | OUTPATIENT
Start: 2023-04-14 | End: 2023-04-14 | Stop reason: HOSPADM

## 2023-04-14 RX ORDER — ACETAMINOPHEN 500 MG
1000 TABLET ORAL
Status: COMPLETED | OUTPATIENT
Start: 2023-04-14 | End: 2023-04-14

## 2023-04-14 RX ORDER — IBUPROFEN 800 MG/1
800 TABLET ORAL EVERY 8 HOURS
Status: DISCONTINUED | OUTPATIENT
Start: 2023-04-15 | End: 2023-04-15 | Stop reason: HOSPADM

## 2023-04-14 RX ORDER — KETOROLAC TROMETHAMINE 30 MG/ML
15 INJECTION, SOLUTION INTRAMUSCULAR; INTRAVENOUS EVERY 6 HOURS
Status: DISCONTINUED | OUTPATIENT
Start: 2023-04-14 | End: 2023-04-15 | Stop reason: HOSPADM

## 2023-04-14 RX ORDER — SODIUM CHLORIDE, SODIUM LACTATE, POTASSIUM CHLORIDE, CALCIUM CHLORIDE 600; 310; 30; 20 MG/100ML; MG/100ML; MG/100ML; MG/100ML
INJECTION, SOLUTION INTRAVENOUS CONTINUOUS
Status: DISCONTINUED | OUTPATIENT
Start: 2023-04-14 | End: 2023-04-15 | Stop reason: HOSPADM

## 2023-04-14 RX ORDER — HYDROCODONE BITARTRATE AND ACETAMINOPHEN 5; 325 MG/1; MG/1
1 TABLET ORAL EVERY 6 HOURS PRN
Qty: 15 TABLET | Refills: 0 | Status: SHIPPED | OUTPATIENT
Start: 2023-04-14 | End: 2023-04-21

## 2023-04-14 RX ORDER — ROCURONIUM BROMIDE 10 MG/ML
INJECTION, SOLUTION INTRAVENOUS
Status: DISCONTINUED | OUTPATIENT
Start: 2023-04-14 | End: 2023-04-14

## 2023-04-14 RX ADMIN — HYDROMORPHONE HYDROCHLORIDE 0.2 MG: 2 INJECTION INTRAMUSCULAR; INTRAVENOUS; SUBCUTANEOUS at 12:04

## 2023-04-14 RX ADMIN — CEFAZOLIN SODIUM 2 G: 2 SOLUTION INTRAVENOUS at 10:04

## 2023-04-14 RX ADMIN — FENTANYL CITRATE 50 MCG: 50 INJECTION, SOLUTION INTRAMUSCULAR; INTRAVENOUS at 10:04

## 2023-04-14 RX ADMIN — HYDROCODONE BITARTRATE AND ACETAMINOPHEN 1 TABLET: 5; 325 TABLET ORAL at 10:04

## 2023-04-14 RX ADMIN — FENTANYL CITRATE 50 MCG: 50 INJECTION, SOLUTION INTRAMUSCULAR; INTRAVENOUS at 11:04

## 2023-04-14 RX ADMIN — PROPOFOL 130 MG: 10 INJECTION, EMULSION INTRAVENOUS at 10:04

## 2023-04-14 RX ADMIN — KETOROLAC TROMETHAMINE 15 MG: 30 INJECTION, SOLUTION INTRAMUSCULAR; INTRAVENOUS at 05:04

## 2023-04-14 RX ADMIN — LIDOCAINE HYDROCHLORIDE 50 MG: 20 INJECTION, SOLUTION EPIDURAL; INFILTRATION; INTRACAUDAL; PERINEURAL at 10:04

## 2023-04-14 RX ADMIN — ACETAMINOPHEN 1000 MG: 500 TABLET ORAL at 10:04

## 2023-04-14 RX ADMIN — OXYCODONE HYDROCHLORIDE 5 MG: 5 TABLET ORAL at 02:04

## 2023-04-14 RX ADMIN — SODIUM CHLORIDE, SODIUM LACTATE, POTASSIUM CHLORIDE, AND CALCIUM CHLORIDE: .6; .31; .03; .02 INJECTION, SOLUTION INTRAVENOUS at 10:04

## 2023-04-14 RX ADMIN — HYDROCODONE BITARTRATE AND ACETAMINOPHEN 1 TABLET: 5; 325 TABLET ORAL at 05:04

## 2023-04-14 RX ADMIN — SODIUM CHLORIDE, POTASSIUM CHLORIDE, SODIUM LACTATE AND CALCIUM CHLORIDE: 600; 310; 30; 20 INJECTION, SOLUTION INTRAVENOUS at 05:04

## 2023-04-14 RX ADMIN — ROCURONIUM BROMIDE 50 MG: 10 INJECTION, SOLUTION INTRAVENOUS at 10:04

## 2023-04-14 NOTE — ANESTHESIA PREPROCEDURE EVALUATION
04/14/2023  Indira Walker is a 77 y.o., female.    Patient Active Problem List   Diagnosis    Sciatica without back pain, left    Hypertension associated with diabetes    Uncontrolled type 2 diabetes mellitus without complication, with long-term current use of insulin    Diastolic dysfunction    Chronic heart failure with preserved ejection fraction    Primary osteoarthritis of both hands    Uncontrolled type 2 diabetes mellitus with hyperglycemia    Hyperlipidemia associated with type 2 diabetes mellitus    Osteopenia    Right hip pain    Decreased hearing of both ears    RA (rheumatoid arthritis)    Anemia    Pain of foot    Cystocele with uterine descensus    Preoperative examination     Pre-op Assessment    I have reviewed the Patient Summary Reports.     I have reviewed the Nursing Notes. I have reviewed the NPO Status.   I have reviewed the Medications.     Review of Systems  Anesthesia Hx:  Denies Family Hx of Anesthesia complications.   Denies Personal Hx of Anesthesia complications.   Hematology/Oncology:  Hematology Normal   Oncology Normal     EENT/Dental:EENT/Dental Normal   Cardiovascular:   Exercise tolerance: good Hypertension ECG has been reviewed. Cleared by internist  Climbs stairs without a problem  Saw her cardiologist in December for atypical CP and asymptomatic diastolic HF  Abnormal EKG is unchanged from 2018  2018 NMST and ECHO were normal   Pulmonary:  Pulmonary Normal    Renal/:  Renal/ Normal     Hepatic/GI:   GERD    Musculoskeletal:   Arthritis     OB/GYN/PEDS:  Uterine prolapse  Cystocele   Neurological:  Neurology Normal    Endocrine:   Diabetes    Dermatological:  Skin Normal    Psych:  Psychiatric Normal           Physical Exam  General: Alert and Oriented    Airway:  Mallampati: II   Mouth Opening: Normal  TM Distance: Normal  Tongue: Normal  Neck ROM:  Normal ROM        Anesthesia Plan  Type of Anesthesia, risks & benefits discussed:    Anesthesia Type: Gen ETT  Intra-op Monitoring Plan: Standard ASA Monitors  Induction:  IV  Informed Consent: Informed consent signed with the Patient and all parties understand the risks and agree with anesthesia plan.  All questions answered.   ASA Score: 3  Day of Surgery Review of History & Physical: I have interviewed and examined the patient. I have reviewed the patient's H&P dated:   Anesthesia Plan Notes: Discussed patient and lack of specific cardiac clearance or recent testing with Dr. Mayorga who agrees that it is reasonable to proceed.    Ready For Surgery From Anesthesia Perspective.     .

## 2023-04-14 NOTE — PLAN OF CARE
Pt transferred from PACU. Discussed poc with pt, pt verbalized understanding  Purposeful rounding every 2 hours.  VS  stable. Orders acknowledged.  Chart check complete  Cardiac monitoring in use. NSR Blood glucose monitoring   Fall precautions in place, remains injury free, resting comfortably with family at bedside.  Pain and nausea under control with PRN meds  IVFs; Accurate I&Os  Bed locked at lowest position  Call light within reach  Continue with POC.

## 2023-04-14 NOTE — TRANSFER OF CARE
"Anesthesia Transfer of Care Note    Patient: Indira Walker    Procedure(s) Performed: Procedure(s) (LRB):  COLPORRHAPHY, ANTERIOR (N/A)  HYSTERECTOMY, TOTAL, VAGINAL (N/A)    Patient location: PACU    Anesthesia Type: general    Transport from OR: Transported from OR on room air with adequate spontaneous ventilation    Post pain: adequate analgesia    Post assessment: no apparent anesthetic complications    Post vital signs: stable    Level of consciousness: awake and responds to stimulation    Nausea/Vomiting: no nausea/vomiting    Complications: none    Transfer of care protocol was followed      Last vitals:   Visit Vitals  /89 (BP Location: Right arm, Patient Position: Sitting)   Pulse 75   Temp 36.6 °C (97.9 °F) (Temporal)   Resp 16   Ht 5' 3" (1.6 m)   Wt 56 kg (123 lb 7.3 oz)   SpO2 99%   Breastfeeding No   BMI 21.87 kg/m²     "

## 2023-04-14 NOTE — ANESTHESIA POSTPROCEDURE EVALUATION
Anesthesia Post Evaluation    Patient: Indira Walker    Procedure(s) Performed: Procedure(s) (LRB):  COLPORRHAPHY, ANTERIOR (N/A)  HYSTERECTOMY, TOTAL, VAGINAL (N/A)    Final Anesthesia Type: general      Patient location during evaluation: PACU  Patient participation: Yes- Able to Participate  Level of consciousness: awake and alert  Post-procedure vital signs: reviewed and stable  Pain management: adequate  Airway patency: patent  RHIANNON mitigation strategies: Verification of full reversal of neuromuscular block  PONV status at discharge: No PONV  Anesthetic complications: no      Cardiovascular status: hemodynamically stable  Respiratory status: spontaneous ventilation  Hydration status: euvolemic  Follow-up not needed.          Vitals Value Taken Time   /67 04/14/23 1400   Temp 36.9 °C (98.4 °F) 04/14/23 1155   Pulse 80 04/14/23 1402   Resp 81 04/14/23 1402   SpO2 100 % 04/14/23 1401   Vitals shown include unvalidated device data.      Event Time   Out of Recovery 13:59:41         Pain/Coleman Score: Pain Rating Prior to Med Admin: 7 (4/14/2023 12:30 PM)  Coleman Score: 8 (4/14/2023  2:00 PM)

## 2023-04-14 NOTE — OP NOTE
Critical access hospital - Surgery ([facility])  Surgery Department  Operative Note    SUMMARY     Date of Procedure: 4/14/2023     Procedure: Procedure(s) (LRB):  COLPORRHAPHY, ANTERIOR (N/A)  HYSTERECTOMY, TOTAL, VAGINAL (N/A)     Surgeon(s) and Role:     * ANA MARIA Mayorga MD - Primary    Assisting Surgeon:       Jennifer TATE assistance deemed necessary by MD     Pre-Operative Diagnosis: Cystocele with uterine prolapse [N81.4]  Female bladder prolapse [N81.10]    Post-Operative Diagnosis: Post-Op Diagnosis Codes:     * Cystocele with uterine prolapse [N81.4]     * Female bladder prolapse [N81.10]    Anesthesia: General    Operative Findings (including complications, if any):     Description of Technical Procedures:     FINDINGS:  Small uterus with 6 cm long cervix.      Prolapse complete with grade 2 cystocele after removal of the uterus   Good posterior support   No significant cuff prolapse      TVH:  Vaginal prep done with Betadine solution.  Bladder drained with straight urethral  catheter. Sterile drapes applied with patient in high stirrups.               Weighted speculum placed in the vagina, anterior and posterior lip of the cervix grasped with Leyhee tenaculums .                 Dilute Pitressin solution injected around the cervix  10 cc total.                Incision made 1 to 2 cm above the cervical os, carried around the cervix.                The vaginal mucosa is dissected off of the anterior and posterior surface with sharp and blunt dissection                Posterior cul de sac entered with sharp dissection and the Duckbill speculum is placed into the posterior cul-de-sac.  Entrance into the anterior cul-de-sac accomplished with sharp dissection while retracting the bladder anteriorly with a right angle retractor which is held in place.                Curved Jevon clamps used with clamp cut tie technique to control the blood supply to the uterus and cervix.  Zero Vicryl suture used on all  pedicles.  Angles held and the anterior and posterior margins.                Uterus removed intact.                Alcantara catheter is place and the anterior peritoneal edge is identified and held with Breanna Clamp in the midline.                 The fallopian tubes and ovaries are visualized and  atrophic and small                Zero Vicryl suture used to close the vaginal cuff by placing 0 Vicryl suture at each angle incorporating the anterior peritoneal angle and the held pedicles   Final closure of the cuff is done using the held angle suture, starting at each angle, running lock toward the center including ant and post peritoneum and post cuff. Suture tied in the midline    The anterior vaginal repair is started by grasping the edges of the anterior vaginal cuff on either side of the midline and dissecting the vaginal mucosa off the underlying tissue and incising the mucosa in the midline toward the urethral meatus stopping 1.5 cm below the meatus.  The edges of the vaginal mucosa are held on tension with Traci Clamps along the incision line.  After the mucosa is dissected free, the cystocele is reduced with interrupted 2 zero Vicryl suture   The vaginal mucosa is trimmed and closed in the midline with interrupted 2 zero Vicryl suture to the end of the incision line   .                The posterior vaginal cuff suture is then passed through both sides of the anterior cuff and tied to allow closure in the midline of the cuff                  The vaginal cuff is inspected for bleeding and plain packing is placed  and the patient is awaken and taken to recovery with Alcantara catheter in place                     Significant Surgical Tasks Conducted by the Assistant(s), if Applicable: first assist     Estimated Blood Loss (EBL): 50 mL           Implants: * No implants in log *    Specimens:   Specimen (24h ago, onward)       Start     Ordered    04/14/23 3616  Specimen to Pathology, Surgery Gynecology and Obstetrics   Once        Comments: Pre-op Diagnosis: Cystocele with uterine prolapse [N81.4]Female bladder prolapse [N81.10]Procedure(s):HYSTERECTOMY,VAGINAL, WITH SACROSPINOUS LIGAMENT FIXATIONCOLPORRHAPHY, ANTERIOR Number of specimens: 1Name of specimens: 1. Uterus, cervix--perm     References:    Click here for ordering Quick Tip   Question Answer Comment   Procedure Type: Gynecology and Obstetrics    Specimen Class: Routine/Screening    Which provider would you like to cc? ANA MARIA LOPEZ RICHY    Release to patient Immediate        04/14/23 1140                            Condition: Good    Disposition: PACU - hemodynamically stable.    Attestation: I was present and scrubbed for the entire procedure.

## 2023-04-14 NOTE — H&P
[facility])  Obstetrics & Gynecology  Pre op note     Patient Name: Indira Walker  MRN: [id number]  Admission Date: 4/14/2023  Primary Care Provider: Bertha Kearns MD    Subjective:     Chief Complaint/Reason for Admission: vaginal pressure     History of Present Illness:  Having increasing symptoms from pelvic organ relaxation   for surgical resolution      No new subjective & objective note has been filed under this hospital service since the last note was generated.    Assessment/Plan:     Renal/  Cystocele with uterine descensus  Total vaginal hysterectomy with sacrospinous ligament fixation and anterior colporrhaphy         F Jose Roberto Mayorga MD  Obstetrics & Gynecology  [facility])

## 2023-04-15 VITALS
DIASTOLIC BLOOD PRESSURE: 74 MMHG | TEMPERATURE: 98 F | SYSTOLIC BLOOD PRESSURE: 160 MMHG | BODY MASS INDEX: 23.28 KG/M2 | OXYGEN SATURATION: 96 % | HEART RATE: 61 BPM | WEIGHT: 131.38 LBS | RESPIRATION RATE: 14 BRPM | HEIGHT: 63 IN

## 2023-04-15 LAB
ESTIMATED AVG GLUCOSE: 171 MG/DL (ref 68–131)
HBA1C MFR BLD: 7.6 % (ref 4–5.6)

## 2023-04-15 PROCEDURE — 63600175 PHARM REV CODE 636 W HCPCS: Performed by: OBSTETRICS & GYNECOLOGY

## 2023-04-15 PROCEDURE — 83036 HEMOGLOBIN GLYCOSYLATED A1C: CPT | Performed by: OBSTETRICS & GYNECOLOGY

## 2023-04-15 PROCEDURE — 25000003 PHARM REV CODE 250: Performed by: OBSTETRICS & GYNECOLOGY

## 2023-04-15 PROCEDURE — 94799 UNLISTED PULMONARY SVC/PX: CPT

## 2023-04-15 PROCEDURE — 36415 COLL VENOUS BLD VENIPUNCTURE: CPT | Performed by: OBSTETRICS & GYNECOLOGY

## 2023-04-15 PROCEDURE — 99900035 HC TECH TIME PER 15 MIN (STAT)

## 2023-04-15 RX ORDER — HYDROCHLOROTHIAZIDE 12.5 MG/1
12.5 TABLET ORAL DAILY
Status: DISCONTINUED | OUTPATIENT
Start: 2023-04-15 | End: 2023-04-15 | Stop reason: HOSPADM

## 2023-04-15 RX ORDER — LOSARTAN POTASSIUM 50 MG/1
100 TABLET ORAL DAILY
Status: DISCONTINUED | OUTPATIENT
Start: 2023-04-15 | End: 2023-04-15 | Stop reason: HOSPADM

## 2023-04-15 RX ORDER — GLUCAGON 1 MG
1 KIT INJECTION
Status: DISCONTINUED | OUTPATIENT
Start: 2023-04-15 | End: 2023-04-15 | Stop reason: HOSPADM

## 2023-04-15 RX ORDER — LOSARTAN POTASSIUM AND HYDROCHLOROTHIAZIDE 12.5; 1 MG/1; MG/1
1 TABLET ORAL DAILY
Status: DISCONTINUED | OUTPATIENT
Start: 2023-04-15 | End: 2023-04-15 | Stop reason: CLARIF

## 2023-04-15 RX ORDER — PROCHLORPERAZINE EDISYLATE 5 MG/ML
5 INJECTION INTRAMUSCULAR; INTRAVENOUS EVERY 6 HOURS PRN
Status: DISCONTINUED | OUTPATIENT
Start: 2023-04-15 | End: 2023-04-15 | Stop reason: HOSPADM

## 2023-04-15 RX ORDER — ONDANSETRON 8 MG/1
8 TABLET, ORALLY DISINTEGRATING ORAL EVERY 8 HOURS PRN
Status: DISCONTINUED | OUTPATIENT
Start: 2023-04-15 | End: 2023-04-15 | Stop reason: HOSPADM

## 2023-04-15 RX ORDER — DEXTROSE 40 %
15 GEL (GRAM) ORAL
Status: DISCONTINUED | OUTPATIENT
Start: 2023-04-15 | End: 2023-04-15 | Stop reason: HOSPADM

## 2023-04-15 RX ORDER — DEXTROSE 40 %
30 GEL (GRAM) ORAL
Status: DISCONTINUED | OUTPATIENT
Start: 2023-04-15 | End: 2023-04-15 | Stop reason: HOSPADM

## 2023-04-15 RX ADMIN — KETOROLAC TROMETHAMINE 15 MG: 30 INJECTION, SOLUTION INTRAMUSCULAR; INTRAVENOUS at 05:04

## 2023-04-15 RX ADMIN — LOSARTAN POTASSIUM 100 MG: 50 TABLET, FILM COATED ORAL at 09:04

## 2023-04-15 RX ADMIN — HYDROCHLOROTHIAZIDE 12.5 MG: 12.5 TABLET ORAL at 09:04

## 2023-04-15 NOTE — ASSESSMENT & PLAN NOTE
POD # 1 s/p Total vaginal hysterectomy with sacrospinous ligament fixation and anterior colporrhaphy   Pt is doing well and is ready and stable for discharge to home.  Pt was counseled on post-operative care and warning sign instructions.    
Total vaginal hysterectomy with sacrospinous ligament fixation and anterior colporrhaphy   
147

## 2023-04-15 NOTE — PLAN OF CARE
[facility] - Med Surg  Discharge Final Note    Primary Care Provider: Bertha Kearns MD    Expected Discharge Date: 4/15/2023    Final Discharge Note (most recent)       Final Note - 04/15/23 1124          Final Note    Assessment Type Final Discharge Note     Anticipated Discharge Disposition Home or Self Care        Post-Acute Status    Discharge Delays None known at this time                     Important Message from Medicare             Contact Info       F Jose Roberto Mayorga MD   Specialty: Obstetrics, Obstetrics and Gynecology    [address]  [place] [address]   Phone: [phone]       Next Steps: Follow up in 4 week(s)    Instructions: Post-operative visit          Pt has no discharge needs at the time of chart review.

## 2023-04-15 NOTE — PLAN OF CARE
Discussed poc with pt, pt verbalized understanding.  Purposeful rounding every 2 hours.Vital signs stable upon discharge.  Orders acknowledged  Chart check completepon discharge.  Cardiac monitoring discontinued. IV removed. Blood glucose monitoring complete.  Fall precautions in place, remains injury free. Accurate I&Os.  Bed locked at lowest position. Care plan adequate for discharge. Education resolved.

## 2023-04-15 NOTE — PROGRESS NOTES
[facility] Surg  Obstetrics & Gynecology  Progress Note    Patient Name: Indira Walker  MRN: [id number]  Admission Date: 4/14/2023  Primary Care Provider: Bertha Kearns MD  Principal Problem: Cystocele with uterine descensus    Subjective:     HPI:  Having increasing symptoms from pelvic organ relaxation   for surgical resolution      Interval History:   Pt reports that she is doing well.  Pains are well controlled with medications.  Is tolerating PO diet well without nausea or vomiting.  Has passed flatus but no BM yet.  Is voiding and ambulating without difficulty.  No vaginal bleeding.  Is ready to go home.      Scheduled Meds:   losartan  100 mg Oral Daily    And    hydroCHLOROthiazide  12.5 mg Oral Daily    ketorolac  15 mg Intravenous Q6H    Followed by    ibuprofen  800 mg Oral Q8H     Continuous Infusions:   lactated ringers 125 mL/hr at 04/14/23 1750     PRN Meds:dextrose 10%, dextrose 10%, dextrose, dextrose, diphenhydrAMINE, glucagon (human recombinant), HYDROcodone-acetaminophen, HYDROmorphone, ondansetron, prochlorperazine    Review of patient's allergies indicates:   Allergen Reactions    Pyrilamine Swelling     (an ingredient in Midol)    Iodine and iodide containing products Hives       Objective:     Vital Signs (Most Recent):  Temp: 97.7 °F (36.5 °C) (04/15/23 0731)  Pulse: 61 (04/15/23 0731)  Resp: 14 (04/15/23 0731)  BP: (!) 160/74 (04/15/23 0731)  SpO2: 96 % (04/15/23 0731)   Vital Signs (24h Range):  Temp:  [97.7 °F (36.5 °C)-99.3 °F (37.4 °C)] 97.7 °F (36.5 °C)  Pulse:  [61-88] 61  Resp:  [12-22] 14  SpO2:  [90 %-100 %] 96 %  BP: (118-172)/(59-82) 160/74     Weight: 59.6 kg (131 lb 6.3 oz)  Body mass index is 23.28 kg/m².  No LMP recorded. Patient is postmenopausal.    I&O (Last 24H):    Intake/Output Summary (Last 24 hours) at 4/15/2023 0959  Last data filed at 4/15/2023 0547  Gross per 24 hour   Intake --   Output 375 ml   Net -375 ml         Laboratory:  I have personallly  reviewed all pertinent lab results from the last 24 hours.    Diagnostic Results:  Labs: Reviewed    Physical Exam:   Constitutional: She is oriented to person, place, and time. She appears well-developed and well-nourished. No distress.       Cardiovascular:  Normal rate, regular rhythm and normal heart sounds.             Pulmonary/Chest: Effort normal and breath sounds normal.        Abdominal: Soft. Bowel sounds are normal. She exhibits no distension. There is no abdominal tenderness.             Musculoskeletal: Normal range of motion and moves all extremeties. No tenderness or edema.       Neurological: She is alert and oriented to person, place, and time.    Skin: Skin is warm and dry.    Psychiatric: She has a normal mood and affect. Her behavior is normal. Thought content normal.     Review of Systems      Assessment/Plan:     * Cystocele with uterine descensus  POD # 1 s/p Total vaginal hysterectomy with sacrospinous ligament fixation and anterior colporrhaphy   Pt is doing well and is ready and stable for discharge to home.  Pt was counseled on post-operative care and warning sign instructions.          Schuyler Cage MD  Obstetrics & Gynecology  [facility]

## 2023-04-15 NOTE — DISCHARGE SUMMARY
[facility] Surg  Obstetrics & Gynecology  Discharge Summary    Patient Name: Indira Walker  MRN: [id number]  Admission Date: 4/14/2023  Hospital Length of Stay: 0 days  Discharge Date and Time:  04/15/2023 10:03 AM  Attending Physician: ANA MARIA Mayorga MD   Discharging Provider: Schuyler Cage MD  Primary Care Provider: Bertha Kearns MD    HPI:  Having increasing symptoms from pelvic organ relaxation   for surgical resolution      Hospital Course:  Pt was admitted for scheduled surgery.  Total vaginal hysterectomy with sacrospinous ligament fixation and anterior colporrhaphy  was performed without complications.  Post-operative course was unremarkable and pt recovered well.  Pt was discharged on POD #1 upon meeting all discharge criteria.  Pt was counseled on post-operative care and warning sign instructions prior to her discharge.      Goals of Care Treatment Preferences:  Code Status: Full Code      Procedure(s) (LRB):  COLPORRHAPHY, ANTERIOR (N/A)  HYSTERECTOMY, TOTAL, VAGINAL (N/A)         Significant Diagnostic Studies: Labs: All labs within the past 24 hours have been reviewed      Pending Diagnostic Studies:     Procedure Component Value Units Date/Time    Hemoglobin A1c if not done in past 3 months [[id number]] Collected: 04/15/23 0828    Order Status: Sent Lab Status: In process Updated: 04/15/23 0828    Specimen: Blood     Specimen to Pathology, Surgery Gynecology and Obstetrics [[id number]] Collected: 04/14/23 1201    Order Status: Sent Lab Status: In process Updated: 04/14/23 1257    Specimen: Tissue         Final Active Diagnoses:    Diagnosis Date Noted POA    PRINCIPAL PROBLEM:  Cystocele with uterine descensus [N81.4] 04/05/2023 Yes      Problems Resolved During this Admission:        Discharged Condition: stable    Disposition: Home or Self Care    Follow Up:   Follow-up Information     NATY Mayorga MD Follow up in 4 week(s).    Specialties: Obstetrics, Obstetrics and  Gynecology  Why: Post-operative visit  Contact information:  [id number] [address] [facility]  [place] [address]  [phone]                       Patient Instructions:      Other restrictions (specify):   Order Comments: Pelvic rest x 6 weeks and light activity x 4 weeks     Notify your health care provider if you experience any of the following:  increased confusion or weakness     Notify your health care provider if you experience any of the following:  persistent dizziness, light-headedness, or visual disturbances     Notify your health care provider if you experience any of the following:  worsening rash     Notify your health care provider if you experience any of the following:  severe persistent headache     Notify your health care provider if you experience any of the following:  difficulty breathing or increased cough     Notify your health care provider if you experience any of the following:  redness, tenderness, or signs of infection (pain, swelling, redness, odor or green/yellow discharge around incision site)     Notify your health care provider if you experience any of the following:  severe uncontrolled pain     Notify your health care provider if you experience any of the following:  persistent nausea and vomiting or diarrhea     Notify your health care provider if you experience any of the following:  temperature >100.4     Medications:  Reconciled Home Medications:      Medication List      START taking these medications    HYDROcodone-acetaminophen 5-325 mg per tablet  Commonly known as: NORCO  Take 1 tablet by mouth every 6 (six) hours as needed for Pain.        CONTINUE taking these medications    (MAGIC MOUTHWASH) 1:1:1 BENADRYL 12.5MG/5ML LIQ, ALUMINUM & MAGNESIUM  Swish and spit 15 mLs every 4 (four) hours as needed (mouth sores). for mouth sores     aspirin 81 MG EC tablet  Commonly known as: ECOTRIN  Take 1 tablet (81 mg total) by mouth once daily. Every other day     azelastine 137 mcg (0.1 %)  nasal spray  Commonly known as: ASTELIN  SPRAY 1 SPRAY BY NASAL ROUTE 2 TIMES DAILY.     blood sugar diagnostic Strp  To check BG 2 times daily, to use with insurance preferred meter     blood-glucose meter kit  To check BG 2 times daily, to use with insurance preferred meter     chlorhexidine 0.12 % solution  Commonly known as: PERIDEX  PLEASE SEE ATTACHED FOR DETAILED DIRECTIONS     famotidine 40 MG tablet  Commonly known as: PEPCID  TAKE 1 TABLET BY MOUTH EVERY DAY     ketoconazole 2 % cream  Commonly known as: NIZORAL  Apply topically 2 (two) times daily.     lancets Misc  To check BG 2 times daily, to use with insurance preferred meter     loratadine 10 mg tablet  Commonly known as: CLARITIN  Take 10 mg by mouth once daily.     losartan-hydrochlorothiazide 100-12.5 mg 100-12.5 mg Tab  Commonly known as: HYZAAR  TAKE 1 TABLET BY MOUTH EVERY DAY     metFORMIN 1000 MG tablet  Commonly known as: GLUCOPHAGE  TAKE 1 TABLET BY MOUTH TWICE A DAY WITH MEALS     mupirocin 2 % ointment  Commonly known as: BACTROBAN  Apply topically.     ondansetron 4 MG Tbdl  Commonly known as: ZOFRAN-ODT  Take 4 mg by mouth as needed. 1 tablet sublingual PRN nausea     rosuvastatin 20 MG tablet  Commonly known as: CRESTOR  TAKE 1 TABLET BY MOUTH EVERY DAY     sulfaSALAzine 500 MG EC tablet  Commonly known as: AZULFIDINE  Take 1 tablet (500 mg total) by mouth 2 (two) times daily.     TYLENOL EXTRA STRENGTH ORAL  Take by mouth.            Schuyler Cage MD  Obstetrics & Gynecology  [facility]

## 2023-04-15 NOTE — SUBJECTIVE & OBJECTIVE
Interval History:   Pt reports that she is doing well.  Pains are well controlled with medications.  Is tolerating PO diet well without nausea or vomiting.  Has passed flatus but no BM yet.  Is voiding and ambulating without difficulty.  No vaginal bleeding.  Is ready to go home.      Scheduled Meds:   losartan  100 mg Oral Daily    And    hydroCHLOROthiazide  12.5 mg Oral Daily    ketorolac  15 mg Intravenous Q6H    Followed by    ibuprofen  800 mg Oral Q8H     Continuous Infusions:   lactated ringers 125 mL/hr at 04/14/23 1750     PRN Meds:dextrose 10%, dextrose 10%, dextrose, dextrose, diphenhydrAMINE, glucagon (human recombinant), HYDROcodone-acetaminophen, HYDROmorphone, ondansetron, prochlorperazine    Review of patient's allergies indicates:   Allergen Reactions    Pyrilamine Swelling     (an ingredient in Midol)    Iodine and iodide containing products Hives       Objective:     Vital Signs (Most Recent):  Temp: 97.7 °F (36.5 °C) (04/15/23 0731)  Pulse: 61 (04/15/23 0731)  Resp: 14 (04/15/23 0731)  BP: (!) 160/74 (04/15/23 0731)  SpO2: 96 % (04/15/23 0731)   Vital Signs (24h Range):  Temp:  [97.7 °F (36.5 °C)-99.3 °F (37.4 °C)] 97.7 °F (36.5 °C)  Pulse:  [61-88] 61  Resp:  [12-22] 14  SpO2:  [90 %-100 %] 96 %  BP: (118-172)/(59-82) 160/74     Weight: 59.6 kg (131 lb 6.3 oz)  Body mass index is 23.28 kg/m².  No LMP recorded. Patient is postmenopausal.    I&O (Last 24H):    Intake/Output Summary (Last 24 hours) at 4/15/2023 0916  Last data filed at 4/15/2023 0547  Gross per 24 hour   Intake --   Output 375 ml   Net -375 ml         Laboratory:  I have personallly reviewed all pertinent lab results from the last 24 hours.    Diagnostic Results:  Labs: Reviewed    Physical Exam:   Constitutional: She is oriented to person, place, and time. She appears well-developed and well-nourished. No distress.       Cardiovascular:  Normal rate, regular rhythm and normal heart sounds.             Pulmonary/Chest: Effort  normal and breath sounds normal.        Abdominal: Soft. Bowel sounds are normal. She exhibits no distension. There is no abdominal tenderness.             Musculoskeletal: Normal range of motion and moves all extremeties. No tenderness or edema.       Neurological: She is alert and oriented to person, place, and time.    Skin: Skin is warm and dry.    Psychiatric: She has a normal mood and affect. Her behavior is normal. Thought content normal.     Review of Systems

## 2023-04-15 NOTE — HOSPITAL COURSE
Pt was admitted for scheduled surgery.  Total vaginal hysterectomy with sacrospinous ligament fixation and anterior colporrhaphy  was performed without complications.  Post-operative course was unremarkable and pt recovered well.  Pt was discharged on POD #1 upon meeting all discharge criteria.  Pt was counseled on post-operative care and warning sign instructions prior to her discharge.

## 2023-04-19 LAB
FINAL PATHOLOGIC DIAGNOSIS: NORMAL
GROSS: NORMAL
Lab: NORMAL

## 2023-05-02 ENCOUNTER — LAB VISIT (OUTPATIENT)
Dept: LAB | Facility: HOSPITAL | Age: 78
End: 2023-05-02
Attending: FAMILY MEDICINE
Payer: MEDICARE

## 2023-05-02 DIAGNOSIS — I15.2 HYPERTENSION ASSOCIATED WITH DIABETES: ICD-10-CM

## 2023-05-02 DIAGNOSIS — E11.69 HYPERLIPIDEMIA ASSOCIATED WITH TYPE 2 DIABETES MELLITUS: ICD-10-CM

## 2023-05-02 DIAGNOSIS — E78.5 HYPERLIPIDEMIA ASSOCIATED WITH TYPE 2 DIABETES MELLITUS: ICD-10-CM

## 2023-05-02 DIAGNOSIS — E11.59 HYPERTENSION ASSOCIATED WITH DIABETES: ICD-10-CM

## 2023-05-02 DIAGNOSIS — E11.65 UNCONTROLLED TYPE 2 DIABETES MELLITUS WITH HYPERGLYCEMIA: ICD-10-CM

## 2023-05-02 LAB
ALBUMIN SERPL BCP-MCNC: 3.7 G/DL (ref 3.5–5.2)
ALP SERPL-CCNC: 48 U/L (ref 55–135)
ALT SERPL W/O P-5'-P-CCNC: 8 U/L (ref 10–44)
ANION GAP SERPL CALC-SCNC: 8 MMOL/L (ref 8–16)
AST SERPL-CCNC: 14 U/L (ref 10–40)
BASOPHILS # BLD AUTO: 0.06 K/UL (ref 0–0.2)
BASOPHILS NFR BLD: 0.8 % (ref 0–1.9)
BILIRUB SERPL-MCNC: 0.5 MG/DL (ref 0.1–1)
BUN SERPL-MCNC: 15 MG/DL (ref 8–23)
CALCIUM SERPL-MCNC: 9.3 MG/DL (ref 8.7–10.5)
CHLORIDE SERPL-SCNC: 102 MMOL/L (ref 95–110)
CHOLEST SERPL-MCNC: 161 MG/DL (ref 120–199)
CHOLEST/HDLC SERPL: 2 {RATIO} (ref 2–5)
CO2 SERPL-SCNC: 28 MMOL/L (ref 23–29)
CREAT SERPL-MCNC: 0.8 MG/DL (ref 0.5–1.4)
DIFFERENTIAL METHOD: ABNORMAL
EOSINOPHIL # BLD AUTO: 0.3 K/UL (ref 0–0.5)
EOSINOPHIL NFR BLD: 3.5 % (ref 0–8)
ERYTHROCYTE [DISTWIDTH] IN BLOOD BY AUTOMATED COUNT: 15.2 % (ref 11.5–14.5)
EST. GFR  (NO RACE VARIABLE): >60 ML/MIN/1.73 M^2
ESTIMATED AVG GLUCOSE: 166 MG/DL (ref 68–131)
GLUCOSE SERPL-MCNC: 116 MG/DL (ref 70–110)
HBA1C MFR BLD: 7.4 % (ref 4–5.6)
HCT VFR BLD AUTO: 33.5 % (ref 37–48.5)
HDLC SERPL-MCNC: 82 MG/DL (ref 40–75)
HDLC SERPL: 50.9 % (ref 20–50)
HGB BLD-MCNC: 10.4 G/DL (ref 12–16)
IMM GRANULOCYTES # BLD AUTO: 0.02 K/UL (ref 0–0.04)
IMM GRANULOCYTES NFR BLD AUTO: 0.3 % (ref 0–0.5)
LDLC SERPL CALC-MCNC: 69.2 MG/DL (ref 63–159)
LYMPHOCYTES # BLD AUTO: 2.6 K/UL (ref 1–4.8)
LYMPHOCYTES NFR BLD: 33.9 % (ref 18–48)
MCH RBC QN AUTO: 31.5 PG (ref 27–31)
MCHC RBC AUTO-ENTMCNC: 31 G/DL (ref 32–36)
MCV RBC AUTO: 102 FL (ref 82–98)
MONOCYTES # BLD AUTO: 0.8 K/UL (ref 0.3–1)
MONOCYTES NFR BLD: 10.9 % (ref 4–15)
NEUTROPHILS # BLD AUTO: 3.9 K/UL (ref 1.8–7.7)
NEUTROPHILS NFR BLD: 50.6 % (ref 38–73)
NONHDLC SERPL-MCNC: 79 MG/DL
NRBC BLD-RTO: 0 /100 WBC
PLATELET # BLD AUTO: 353 K/UL (ref 150–450)
PMV BLD AUTO: 10 FL (ref 9.2–12.9)
POTASSIUM SERPL-SCNC: 4.2 MMOL/L (ref 3.5–5.1)
PROT SERPL-MCNC: 7.1 G/DL (ref 6–8.4)
RBC # BLD AUTO: 3.3 M/UL (ref 4–5.4)
SODIUM SERPL-SCNC: 138 MMOL/L (ref 136–145)
TRIGL SERPL-MCNC: 49 MG/DL (ref 30–150)
TSH SERPL DL<=0.005 MIU/L-ACNC: 1.69 UIU/ML (ref 0.4–4)
WBC # BLD AUTO: 7.61 K/UL (ref 3.9–12.7)

## 2023-05-02 PROCEDURE — 84443 ASSAY THYROID STIM HORMONE: CPT | Performed by: FAMILY MEDICINE

## 2023-05-02 PROCEDURE — 85025 COMPLETE CBC W/AUTO DIFF WBC: CPT | Performed by: FAMILY MEDICINE

## 2023-05-02 PROCEDURE — 83036 HEMOGLOBIN GLYCOSYLATED A1C: CPT | Performed by: FAMILY MEDICINE

## 2023-05-02 PROCEDURE — 80053 COMPREHEN METABOLIC PANEL: CPT | Performed by: FAMILY MEDICINE

## 2023-05-02 PROCEDURE — 36415 COLL VENOUS BLD VENIPUNCTURE: CPT | Performed by: FAMILY MEDICINE

## 2023-05-02 PROCEDURE — 80061 LIPID PANEL: CPT | Performed by: FAMILY MEDICINE

## 2023-05-03 ENCOUNTER — TELEPHONE (OUTPATIENT)
Dept: INTERNAL MEDICINE | Facility: CLINIC | Age: 78
End: 2023-05-03
Payer: MEDICARE

## 2023-05-03 NOTE — TELEPHONE ENCOUNTER
----- Message from Miri Brizuela sent at 5/3/2023 10:19 AM CDT -----  Regarding: Patient Returning Call  Contact: Indira  Type:  Patient Returning Call    Who Called: Indira  Who Left Message for Patient:Harsha Grubbs MA  Does the patient know what this is regarding?:  Would the patient rather a call back or a response via [facility]? call  Best Call Back Number: [phone] (home)    Additional Information:  Indira is taking Pepcid due to pain on her left side near her heart. She has medical questions and would like to speak to the nurse today.

## 2023-05-03 NOTE — TELEPHONE ENCOUNTER
Returned patients phone call . Patient stated that the last 2 days she has been having pain around her breast , states she has a bad case of indigestion wants to know can she increase from 20 to 40.      Informed patient that if pain persists she can go to the ED patient verbalized understanding.

## 2023-05-03 NOTE — TELEPHONE ENCOUNTER
----- Message from Avani Sanchez sent at 5/3/2023  9:29 AM CDT -----  Regarding: pt call back  Name of Who is Calling:JINA TAM [[id number]]           What is the request in detail: pt is requesting  a call back            Can the clinic reply by [facility]:           [facility] [phone]

## 2023-05-08 ENCOUNTER — TELEPHONE (OUTPATIENT)
Dept: INTERNAL MEDICINE | Facility: CLINIC | Age: 78
End: 2023-05-08
Payer: MEDICARE

## 2023-05-08 NOTE — TELEPHONE ENCOUNTER
"States she has been sick for the last few days.    she states she couldn't belch  states she has a lot of pain in her back and around her chest.   States she thinks its gas. Went to urgent care and had EKG done was good according to the staff and said nothing was wrong with her heart.   States she has tried everything and nothing has worked     Would like to be prescribed "linzess" or something that might help her.   "

## 2023-05-08 NOTE — TELEPHONE ENCOUNTER
----- Message from Avel Jackson sent at 5/8/2023  8:29 AM CDT -----  Contact: [phone]  Pt would like to consult with a nurse in regards to getting a prescription for linzess. She stated that she has been having chest pain that possible gas.           [id number] - [place] - [address] AT AT [facility]  [address] [facility]  [place] [address]  Phone: [phone] Fax: [phone]        Pt contact number is [phone]. Thanks KB

## 2023-05-09 ENCOUNTER — OFFICE VISIT (OUTPATIENT)
Dept: INTERNAL MEDICINE | Facility: CLINIC | Age: 78
End: 2023-05-09
Payer: MEDICARE

## 2023-05-09 ENCOUNTER — PES CALL (OUTPATIENT)
Dept: ADMINISTRATIVE | Facility: CLINIC | Age: 78
End: 2023-05-09
Payer: MEDICARE

## 2023-05-09 VITALS
DIASTOLIC BLOOD PRESSURE: 60 MMHG | RESPIRATION RATE: 17 BRPM | HEART RATE: 89 BPM | SYSTOLIC BLOOD PRESSURE: 110 MMHG | BODY MASS INDEX: 21.66 KG/M2 | HEIGHT: 63 IN | TEMPERATURE: 98 F | WEIGHT: 122.25 LBS | OXYGEN SATURATION: 96 %

## 2023-05-09 DIAGNOSIS — E11.59 HYPERTENSION ASSOCIATED WITH DIABETES: Primary | ICD-10-CM

## 2023-05-09 DIAGNOSIS — E11.69 HYPERLIPIDEMIA ASSOCIATED WITH TYPE 2 DIABETES MELLITUS: ICD-10-CM

## 2023-05-09 DIAGNOSIS — I15.2 HYPERTENSION ASSOCIATED WITH DIABETES: Primary | ICD-10-CM

## 2023-05-09 DIAGNOSIS — E78.5 HYPERLIPIDEMIA ASSOCIATED WITH TYPE 2 DIABETES MELLITUS: ICD-10-CM

## 2023-05-09 DIAGNOSIS — N18.31 TYPE 2 DIABETES MELLITUS WITH STAGE 3A CHRONIC KIDNEY DISEASE, WITH LONG-TERM CURRENT USE OF INSULIN: ICD-10-CM

## 2023-05-09 DIAGNOSIS — I50.32 CHRONIC HEART FAILURE WITH PRESERVED EJECTION FRACTION: ICD-10-CM

## 2023-05-09 DIAGNOSIS — D64.9 ANEMIA, UNSPECIFIED TYPE: ICD-10-CM

## 2023-05-09 DIAGNOSIS — I70.0 ATHEROSCLEROSIS OF AORTA: ICD-10-CM

## 2023-05-09 DIAGNOSIS — E11.22 TYPE 2 DIABETES MELLITUS WITH STAGE 3A CHRONIC KIDNEY DISEASE, WITH LONG-TERM CURRENT USE OF INSULIN: ICD-10-CM

## 2023-05-09 DIAGNOSIS — Z79.4 TYPE 2 DIABETES MELLITUS WITH STAGE 3A CHRONIC KIDNEY DISEASE, WITH LONG-TERM CURRENT USE OF INSULIN: ICD-10-CM

## 2023-05-09 DIAGNOSIS — M54.6 ACUTE LEFT-SIDED THORACIC BACK PAIN: ICD-10-CM

## 2023-05-09 PROBLEM — M85.80 OSTEOPENIA: Status: RESOLVED | Noted: 2021-03-15 | Resolved: 2023-05-09

## 2023-05-09 PROBLEM — M79.673 PAIN OF FOOT: Status: RESOLVED | Noted: 2022-08-05 | Resolved: 2023-05-09

## 2023-05-09 PROBLEM — M25.551 RIGHT HIP PAIN: Status: RESOLVED | Noted: 2021-06-28 | Resolved: 2023-05-09

## 2023-05-09 PROCEDURE — 3288F PR FALLS RISK ASSESSMENT DOCUMENTED: ICD-10-PCS | Mod: CPTII,S$GLB,, | Performed by: PHYSICIAN ASSISTANT

## 2023-05-09 PROCEDURE — 1159F PR MEDICATION LIST DOCUMENTED IN MEDICAL RECORD: ICD-10-PCS | Mod: CPTII,S$GLB,, | Performed by: PHYSICIAN ASSISTANT

## 2023-05-09 PROCEDURE — 3288F FALL RISK ASSESSMENT DOCD: CPT | Mod: CPTII,S$GLB,, | Performed by: PHYSICIAN ASSISTANT

## 2023-05-09 PROCEDURE — 1101F PR PT FALLS ASSESS DOC 0-1 FALLS W/OUT INJ PAST YR: ICD-10-PCS | Mod: CPTII,S$GLB,, | Performed by: PHYSICIAN ASSISTANT

## 2023-05-09 PROCEDURE — 99999 PR PBB SHADOW E&M-EST. PATIENT-LVL V: ICD-10-PCS | Mod: PBBFAC,,, | Performed by: PHYSICIAN ASSISTANT

## 2023-05-09 PROCEDURE — 3078F PR MOST RECENT DIASTOLIC BLOOD PRESSURE < 80 MM HG: ICD-10-PCS | Mod: CPTII,S$GLB,, | Performed by: PHYSICIAN ASSISTANT

## 2023-05-09 PROCEDURE — 3078F DIAST BP <80 MM HG: CPT | Mod: CPTII,S$GLB,, | Performed by: PHYSICIAN ASSISTANT

## 2023-05-09 PROCEDURE — 1160F PR REVIEW ALL MEDS BY PRESCRIBER/CLIN PHARMACIST DOCUMENTED: ICD-10-PCS | Mod: CPTII,S$GLB,, | Performed by: PHYSICIAN ASSISTANT

## 2023-05-09 PROCEDURE — 3074F PR MOST RECENT SYSTOLIC BLOOD PRESSURE < 130 MM HG: ICD-10-PCS | Mod: CPTII,S$GLB,, | Performed by: PHYSICIAN ASSISTANT

## 2023-05-09 PROCEDURE — 99214 OFFICE O/P EST MOD 30 MIN: CPT | Mod: S$GLB,,, | Performed by: PHYSICIAN ASSISTANT

## 2023-05-09 PROCEDURE — 1159F MED LIST DOCD IN RCRD: CPT | Mod: CPTII,S$GLB,, | Performed by: PHYSICIAN ASSISTANT

## 2023-05-09 PROCEDURE — 99214 PR OFFICE/OUTPT VISIT, EST, LEVL IV, 30-39 MIN: ICD-10-PCS | Mod: S$GLB,,, | Performed by: PHYSICIAN ASSISTANT

## 2023-05-09 PROCEDURE — 1160F RVW MEDS BY RX/DR IN RCRD: CPT | Mod: CPTII,S$GLB,, | Performed by: PHYSICIAN ASSISTANT

## 2023-05-09 PROCEDURE — 1101F PT FALLS ASSESS-DOCD LE1/YR: CPT | Mod: CPTII,S$GLB,, | Performed by: PHYSICIAN ASSISTANT

## 2023-05-09 PROCEDURE — 99999 PR PBB SHADOW E&M-EST. PATIENT-LVL V: CPT | Mod: PBBFAC,,, | Performed by: PHYSICIAN ASSISTANT

## 2023-05-09 PROCEDURE — 1125F AMNT PAIN NOTED PAIN PRSNT: CPT | Mod: CPTII,S$GLB,, | Performed by: PHYSICIAN ASSISTANT

## 2023-05-09 PROCEDURE — 3074F SYST BP LT 130 MM HG: CPT | Mod: CPTII,S$GLB,, | Performed by: PHYSICIAN ASSISTANT

## 2023-05-09 PROCEDURE — 1125F PR PAIN SEVERITY QUANTIFIED, PAIN PRESENT: ICD-10-PCS | Mod: CPTII,S$GLB,, | Performed by: PHYSICIAN ASSISTANT

## 2023-05-09 RX ORDER — FOLIC ACID 1 MG/1
1000 TABLET ORAL
COMMUNITY
Start: 2023-04-27 | End: 2023-12-14

## 2023-05-09 RX ORDER — TIZANIDINE 4 MG/1
4 TABLET ORAL EVERY 8 HOURS PRN
Qty: 20 TABLET | Refills: 0 | Status: SHIPPED | OUTPATIENT
Start: 2023-05-09

## 2023-05-09 RX ORDER — MELOXICAM 7.5 MG/1
7.5 TABLET ORAL DAILY PRN
Qty: 30 TABLET | Refills: 0 | Status: SHIPPED | OUTPATIENT
Start: 2023-05-09 | End: 2023-07-26

## 2023-05-09 RX ORDER — PANTOPRAZOLE SODIUM 40 MG/1
40 TABLET, DELAYED RELEASE ORAL
COMMUNITY
Start: 2023-05-05 | End: 2023-08-29

## 2023-05-09 NOTE — PATIENT INSTRUCTIONS
"Check with your pharmacist regarding Tdap (tetanus/diphtheria/pertussis) vaccine.     Check with your pharmacist regarding shingrix vaccine.     The bivalent covid booster is now available. You can schedule an appointment for the vaccine through [facility] or ask  to assist you with scheduling an appointment for the vaccine.    The bivalent vaccines, known as the "Omicron" vaccines, target both the original strain of COVID-19 as well as the Omicron variant, which is now the predominant strain in the [place].    The Omicron booster is authorized for individuals 12 years and older and is given two months after last dose of primary or booster shot.   "

## 2023-05-09 NOTE — PROGRESS NOTES
Subjective:       Patient ID: Indira Walker is a 77 y.o. female.    Chief Complaint: Annual Exam      Patient presents to clinic today for annual physical exam.      Review of Systems   Constitutional:  Negative for chills, fatigue, fever and unexpected weight change.   HENT:  Negative for congestion, dental problem, ear pain, hearing loss, rhinorrhea and trouble swallowing.    Eyes:  Negative for pain and visual disturbance.   Respiratory:  Negative for cough and shortness of breath.    Cardiovascular:  Negative for chest pain, palpitations and leg swelling.   Gastrointestinal:  Negative for abdominal distention, abdominal pain, blood in stool, constipation, diarrhea, nausea and vomiting.   Genitourinary:  Negative for difficulty urinating and vaginal discharge.   Musculoskeletal:  Positive for arthralgias (4 days ago, left thoracic back that radiates to left lateral chest, seen in urgent care recently with normal EKG and started on Protonix without relief) and myalgias.   Skin:  Negative for rash.   Neurological:  Positive for headaches (4 days, bitemporal, took Tylenol). Negative for dizziness, weakness and numbness.   Hematological:  Negative for adenopathy. Does not bruise/bleed easily.   Psychiatric/Behavioral:  Positive for dysphoric mood (chronic, lost both of her children). Negative for sleep disturbance. The patient is not nervous/anxious.      Objective:      Physical Exam  Vitals and nursing note reviewed.   Constitutional:       General: She is not in acute distress.     Appearance: Normal appearance. She is well-developed.      Comments: Ambulatory without assistive device   HENT:      Head: Normocephalic and atraumatic.      Right Ear: Tympanic membrane, ear canal and external ear normal.      Left Ear: Tympanic membrane, ear canal and external ear normal.      Nose: Nose normal. No mucosal edema or rhinorrhea.      Mouth/Throat:      Lips: Pink.      Mouth: Mucous membranes are moist.       Pharynx: Oropharynx is clear. Uvula midline.   Eyes:      General: Lids are normal. No scleral icterus.     Extraocular Movements: Extraocular movements intact.      Conjunctiva/sclera: Conjunctivae normal.      Pupils: Pupils are equal, round, and reactive to light.   Neck:      Thyroid: No thyromegaly.   Cardiovascular:      Rate and Rhythm: Normal rate and regular rhythm.      Pulses: Normal pulses.   Pulmonary:      Effort: Pulmonary effort is normal.      Breath sounds: Normal breath sounds. No wheezing, rhonchi or rales.   Abdominal:      General: Bowel sounds are normal. There is no distension.      Palpations: Abdomen is soft. There is no mass.      Tenderness: There is no abdominal tenderness.   Musculoskeletal:         General: Normal range of motion.      Cervical back: Normal range of motion and neck supple.        Back:       Right lower leg: No edema.      Left lower leg: No edema.   Lymphadenopathy:      Cervical: No cervical adenopathy.   Skin:     General: Skin is warm and dry.      Findings: No lesion or rash.      Nails: There is no clubbing.   Neurological:      Mental Status: She is alert.      Cranial Nerves: No cranial nerve deficit.      Sensory: No sensory deficit.   Psychiatric:         Mood and Affect: Mood and affect normal.       Assessment:       1. Hypertension associated with diabetes    2. Hyperlipidemia associated with type 2 diabetes mellitus    3. Type 2 diabetes mellitus with stage 3a chronic kidney disease, with long-term current use of insulin    4. Chronic heart failure with preserved ejection fraction    5. Atherosclerosis of aorta    6. Acute left-sided thoracic back pain    7. Anemia, unspecified type        Plan:   1. Hypertension associated with diabetes  Assessment & Plan:  /60, controlled, continue losartan-HCTZ  Lab Results   Component Value Date     05/02/2023    K 4.2 05/02/2023    BUN 15 05/02/2023    CREATININE 0.8 05/02/2023    ESTGFRAFRICA 56 (A)  07/20/2022    EGFRNONAA 48 (A) 07/20/2022    EGFRNORACEVR >60.0 05/02/2023          2. Hyperlipidemia associated with type 2 diabetes mellitus  Assessment & Plan:  Controlled, continue rosuvastatin   Lab Results   Component Value Date    CHOL 161 05/02/2023    LDLCALC 69.2 05/02/2023    TRIG 49 05/02/2023    HDL 82 (H) 05/02/2023    ALT 8 (L) 05/02/2023    AST 14 05/02/2023    ALKPHOS 48 (L) 05/02/2023        Orders:  -     Comprehensive Metabolic Panel; Future; Expected date: 11/09/2023  -     Lipid Panel; Future; Expected date: 11/09/2023    3. Type 2 diabetes mellitus with stage 3a chronic kidney disease, with long-term current use of insulin  Assessment & Plan:  Improving, continue metformin  Lab Results   Component Value Date    HGBA1C 7.4 (H) 05/02/2023    HGBA1C 7.6 (H) 04/15/2023    LDLCALC 69.2 05/02/2023    LABMICR 125.0 08/01/2022    CREATRANDUR 81.0 08/01/2022    MICALBCREAT 154.3 (H) 08/01/2022        Orders:  -     Hemoglobin A1C; Future; Expected date: 11/09/2023  -     Microalbumin/Creatinine Ratio, Urine; Future; Expected date: 11/09/2023    4. Chronic heart failure with preserved ejection fraction  Overview:  Followed by Cardiology, continue current treatment plan       5. Atherosclerosis of aorta  Overview:  XR Sacrum 2/2019, on ASA and statin      6. Acute left-sided thoracic back pain  -     tiZANidine (ZANAFLEX) 4 MG tablet; Take 1 tablet (4 mg total) by mouth every 8 (eight) hours as needed (muscle spasm).  Dispense: 20 tablet; Refill: 0  -     meloxicam (MOBIC) 7.5 MG tablet; Take 1 tablet (7.5 mg total) by mouth daily as needed for Pain.  Dispense: 30 tablet; Refill: 0    7. Anemia, unspecified type  Assessment & Plan:  Stable   Lab Results   Component Value Date    RBC 3.30 (L) 05/02/2023    HGB 10.4 (L) 05/02/2023    HCT 33.5 (L) 05/02/2023     (H) 05/02/2023              Recent labs reviewed with patient.    Discussed shingrix and Tdap vaccines, advised can be obtained at  pharmacy.  Discussed Covid booster, scheduling information given.  6 month f/u with Dr. Kearns scheduled with fasting labs PTA   Health Maintenance reviewed/updated.

## 2023-05-09 NOTE — ASSESSMENT & PLAN NOTE
Controlled, continue rosuvastatin   Lab Results   Component Value Date    CHOL 161 05/02/2023    LDLCALC 69.2 05/02/2023    TRIG 49 05/02/2023    HDL 82 (H) 05/02/2023    ALT 8 (L) 05/02/2023    AST 14 05/02/2023    ALKPHOS 48 (L) 05/02/2023

## 2023-05-09 NOTE — ASSESSMENT & PLAN NOTE
Stable   Lab Results   Component Value Date    RBC 3.30 (L) 05/02/2023    HGB 10.4 (L) 05/02/2023    HCT 33.5 (L) 05/02/2023     (H) 05/02/2023

## 2023-05-09 NOTE — ASSESSMENT & PLAN NOTE
Improving, continue metformin  Lab Results   Component Value Date    HGBA1C 7.4 (H) 05/02/2023    HGBA1C 7.6 (H) 04/15/2023    LDLCALC 69.2 05/02/2023    LABMICR 125.0 08/01/2022    CREATRANDUR 81.0 08/01/2022    MICALBCREAT 154.3 (H) 08/01/2022

## 2023-05-09 NOTE — ASSESSMENT & PLAN NOTE
/60, controlled, continue losartan-HCTZ  Lab Results   Component Value Date     05/02/2023    K 4.2 05/02/2023    BUN 15 05/02/2023    CREATININE 0.8 05/02/2023    ESTGFRAFRICA 56 (A) 07/20/2022    EGFRNONAA 48 (A) 07/20/2022    EGFRNORACEVR >60.0 05/02/2023

## 2023-05-10 ENCOUNTER — OFFICE VISIT (OUTPATIENT)
Dept: HOME HEALTH SERVICES | Facility: CLINIC | Age: 78
End: 2023-05-10
Payer: MEDICARE

## 2023-05-10 VITALS
OXYGEN SATURATION: 98 % | WEIGHT: 122 LBS | TEMPERATURE: 98 F | DIASTOLIC BLOOD PRESSURE: 64 MMHG | HEIGHT: 63 IN | BODY MASS INDEX: 21.62 KG/M2 | HEART RATE: 81 BPM | SYSTOLIC BLOOD PRESSURE: 129 MMHG

## 2023-05-10 DIAGNOSIS — M06.9 RHEUMATOID ARTHRITIS, INVOLVING UNSPECIFIED SITE, UNSPECIFIED WHETHER RHEUMATOID FACTOR PRESENT: ICD-10-CM

## 2023-05-10 DIAGNOSIS — I50.32 CHRONIC HEART FAILURE WITH PRESERVED EJECTION FRACTION: ICD-10-CM

## 2023-05-10 DIAGNOSIS — M19.041 PRIMARY OSTEOARTHRITIS OF BOTH HANDS: ICD-10-CM

## 2023-05-10 DIAGNOSIS — E11.69 HYPERLIPIDEMIA ASSOCIATED WITH TYPE 2 DIABETES MELLITUS: ICD-10-CM

## 2023-05-10 DIAGNOSIS — Z90.710 S/P TAH (TOTAL ABDOMINAL HYSTERECTOMY): ICD-10-CM

## 2023-05-10 DIAGNOSIS — M19.042 PRIMARY OSTEOARTHRITIS OF BOTH HANDS: ICD-10-CM

## 2023-05-10 DIAGNOSIS — D64.9 ANEMIA, UNSPECIFIED TYPE: ICD-10-CM

## 2023-05-10 DIAGNOSIS — F32.A MILD DEPRESSION: ICD-10-CM

## 2023-05-10 DIAGNOSIS — Z00.00 ENCOUNTER FOR PREVENTIVE HEALTH EXAMINATION: Primary | ICD-10-CM

## 2023-05-10 DIAGNOSIS — E11.22 TYPE 2 DIABETES MELLITUS WITH STAGE 3A CHRONIC KIDNEY DISEASE, WITH LONG-TERM CURRENT USE OF INSULIN: ICD-10-CM

## 2023-05-10 DIAGNOSIS — N18.31 TYPE 2 DIABETES MELLITUS WITH STAGE 3A CHRONIC KIDNEY DISEASE, WITH LONG-TERM CURRENT USE OF INSULIN: ICD-10-CM

## 2023-05-10 DIAGNOSIS — M54.32 SCIATICA WITHOUT BACK PAIN, LEFT: ICD-10-CM

## 2023-05-10 DIAGNOSIS — E78.5 HYPERLIPIDEMIA ASSOCIATED WITH TYPE 2 DIABETES MELLITUS: ICD-10-CM

## 2023-05-10 DIAGNOSIS — E11.59 HYPERTENSION ASSOCIATED WITH DIABETES: ICD-10-CM

## 2023-05-10 DIAGNOSIS — H91.93 DECREASED HEARING OF BOTH EARS: ICD-10-CM

## 2023-05-10 DIAGNOSIS — I15.2 HYPERTENSION ASSOCIATED WITH DIABETES: ICD-10-CM

## 2023-05-10 DIAGNOSIS — Z79.4 TYPE 2 DIABETES MELLITUS WITH STAGE 3A CHRONIC KIDNEY DISEASE, WITH LONG-TERM CURRENT USE OF INSULIN: ICD-10-CM

## 2023-05-10 DIAGNOSIS — I51.89 DIASTOLIC DYSFUNCTION: ICD-10-CM

## 2023-05-10 DIAGNOSIS — I70.0 ATHEROSCLEROSIS OF AORTA: ICD-10-CM

## 2023-05-10 PROCEDURE — 3078F PR MOST RECENT DIASTOLIC BLOOD PRESSURE < 80 MM HG: ICD-10-PCS | Mod: CPTII,S$GLB,, | Performed by: NURSE PRACTITIONER

## 2023-05-10 PROCEDURE — G0439 PR MEDICARE ANNUAL WELLNESS SUBSEQUENT VISIT: ICD-10-PCS | Mod: S$GLB,,, | Performed by: NURSE PRACTITIONER

## 2023-05-10 PROCEDURE — 1159F MED LIST DOCD IN RCRD: CPT | Mod: CPTII,S$GLB,, | Performed by: NURSE PRACTITIONER

## 2023-05-10 PROCEDURE — 1160F PR REVIEW ALL MEDS BY PRESCRIBER/CLIN PHARMACIST DOCUMENTED: ICD-10-PCS | Mod: CPTII,S$GLB,, | Performed by: NURSE PRACTITIONER

## 2023-05-10 PROCEDURE — 3074F SYST BP LT 130 MM HG: CPT | Mod: CPTII,S$GLB,, | Performed by: NURSE PRACTITIONER

## 2023-05-10 PROCEDURE — 3074F PR MOST RECENT SYSTOLIC BLOOD PRESSURE < 130 MM HG: ICD-10-PCS | Mod: CPTII,S$GLB,, | Performed by: NURSE PRACTITIONER

## 2023-05-10 PROCEDURE — 1101F PT FALLS ASSESS-DOCD LE1/YR: CPT | Mod: CPTII,S$GLB,, | Performed by: NURSE PRACTITIONER

## 2023-05-10 PROCEDURE — 3288F PR FALLS RISK ASSESSMENT DOCUMENTED: ICD-10-PCS | Mod: CPTII,S$GLB,, | Performed by: NURSE PRACTITIONER

## 2023-05-10 PROCEDURE — 1170F PR FUNCTIONAL STATUS ASSESSED: ICD-10-PCS | Mod: CPTII,S$GLB,, | Performed by: NURSE PRACTITIONER

## 2023-05-10 PROCEDURE — 1160F RVW MEDS BY RX/DR IN RCRD: CPT | Mod: CPTII,S$GLB,, | Performed by: NURSE PRACTITIONER

## 2023-05-10 PROCEDURE — 1125F PR PAIN SEVERITY QUANTIFIED, PAIN PRESENT: ICD-10-PCS | Mod: CPTII,S$GLB,, | Performed by: NURSE PRACTITIONER

## 2023-05-10 PROCEDURE — 1101F PR PT FALLS ASSESS DOC 0-1 FALLS W/OUT INJ PAST YR: ICD-10-PCS | Mod: CPTII,S$GLB,, | Performed by: NURSE PRACTITIONER

## 2023-05-10 PROCEDURE — 1125F AMNT PAIN NOTED PAIN PRSNT: CPT | Mod: CPTII,S$GLB,, | Performed by: NURSE PRACTITIONER

## 2023-05-10 PROCEDURE — 3288F FALL RISK ASSESSMENT DOCD: CPT | Mod: CPTII,S$GLB,, | Performed by: NURSE PRACTITIONER

## 2023-05-10 PROCEDURE — 1170F FXNL STATUS ASSESSED: CPT | Mod: CPTII,S$GLB,, | Performed by: NURSE PRACTITIONER

## 2023-05-10 PROCEDURE — 1159F PR MEDICATION LIST DOCUMENTED IN MEDICAL RECORD: ICD-10-PCS | Mod: CPTII,S$GLB,, | Performed by: NURSE PRACTITIONER

## 2023-05-10 PROCEDURE — 3078F DIAST BP <80 MM HG: CPT | Mod: CPTII,S$GLB,, | Performed by: NURSE PRACTITIONER

## 2023-05-10 PROCEDURE — G0439 PPPS, SUBSEQ VISIT: HCPCS | Mod: S$GLB,,, | Performed by: NURSE PRACTITIONER

## 2023-05-10 NOTE — PATIENT INSTRUCTIONS
Counseling and Referral of Other Preventative  (Italic type indicates deductible and co-insurance are waived)    Patient Name: Indira Walker  Today's Date: 5/10/2023    Health Maintenance       Date Due Completion Date    TETANUS VACCINE Never done ---    Shingles Vaccine (1 of 2) Never done ---    Pneumococcal Vaccines (Age 65+) (2 - PPSV23 if available, else PCV20) 05/17/2019 3/22/2019    COVID-19 Vaccine (4 - Booster for Moderna series) 01/17/2022 11/22/2021    Diabetes Urine Screening 08/01/2023 8/1/2022    Influenza Vaccine (Season Ended) 09/01/2023 2/2/2022    Eye Exam 10/12/2023 10/12/2022    Hemoglobin A1c 11/02/2023 5/2/2023    Lipid Panel 05/02/2024 5/2/2023    DEXA Scan 07/26/2024 7/26/2021        Orders Placed This Encounter   Procedures    Ambulatory referral/consult to Audiology     The following information is provided to all patients.  This information is to help you find resources for any of the problems found today that may be affecting your health:                Living healthy guide: www.[facility].[place].gov      Understanding Diabetes: www.diabetes.org      Eating healthy: www.cdc.gov/healthyweight      CDC home safety checklist: www.cdc.gov/steadi/patient.html      Agency on Aging: www.goea.[place].[facility]      Alcoholics anonymous (AA): www.aa.org      Physical Activity: www.[place].nih.gov/jv1ompq      Tobacco use: www.quit[place].org

## 2023-05-10 NOTE — PROGRESS NOTES
"Indira Walker presented for Medicare AWV today. The following components were reviewed and updated:    Medical history  Family History  Social history  Allergies and Current Medications  Health Risk Assessment  Health Maintenance  Care Team    **See Completed Assessments for Annual Wellness visit with in the encounter summary    The following assessments were completed:  Depression Screening  Cognitive function Screening      Timed Get Up Test  Whisper Test    Vitals:    05/10/23 1108   BP: 129/64   Pulse: 81   Temp: 98.1 °F (36.7 °C)   TempSrc: Temporal   SpO2: 98%   Weight: 55.3 kg (122 lb)   Height: 5' 3" (1.6 m)     Body mass index is 21.61 kg/m².   ]    Physical Exam  Vitals reviewed.   Constitutional:       Appearance: Normal appearance.   HENT:      Head: Normocephalic and atraumatic.      Ears:      Comments: [place] bilaterally. No cerumen impaction to ear canals.     Nose: Nose normal.      Mouth/Throat:      Mouth: Mucous membranes are moist.      Pharynx: Oropharynx is clear.   Cardiovascular:      Rate and Rhythm: Normal rate and regular rhythm.      Pulses: Normal pulses.           Dorsalis pedis pulses are 2+ on the right side and 2+ on the left side.        Posterior tibial pulses are 2+ on the right side and 2+ on the left side.      Heart sounds: Normal heart sounds.   Pulmonary:      Effort: Pulmonary effort is normal.      Breath sounds: Normal breath sounds.   Musculoskeletal:         General: Normal range of motion.      Cervical back: Normal range of motion and neck supple.      Right foot: Normal range of motion. No deformity, bunion, Charcot foot, foot drop or prominent metatarsal heads.      Left foot: Normal range of motion. No deformity, bunion, Charcot foot, foot drop or prominent metatarsal heads.   Feet:      Right foot:      Protective Sensation: 10 sites tested.  10 sites sensed.      Skin integrity: Skin integrity normal. No ulcer, blister, skin breakdown, erythema, warmth, callus, dry " skin or fissure.      Toenail Condition: Right toenails are normal.      Left foot:      Protective Sensation: 10 sites tested.  10 sites sensed.      Skin integrity: Callus (mid ball of the foot) present. No ulcer, blister, skin breakdown, erythema, warmth, dry skin or fissure.      Toenail Condition: Left toenails are normal.   Skin:     General: Skin is warm and dry.   Neurological:      Mental Status: She is alert and oriented to person, place, and time.   Psychiatric:         Mood and Affect: Mood normal.         Behavior: Behavior normal.        Outpatient Medications Marked as Taking for the 5/10/23 encounter (Office Visit) with RYDER Martinez   Medication Sig Dispense Refill    acetaminophen (TYLENOL EXTRA STRENGTH ORAL) Take by mouth.      aspirin (ECOTRIN) 81 MG EC tablet Take 1 tablet (81 mg total) by mouth once daily. Every other day  0    blood sugar diagnostic Strp To check BG 2 times daily, to use with insurance preferred meter 200 each 3    blood-glucose meter kit To check BG 2 times daily, to use with insurance preferred meter 1 each 0    folic acid (FOLVITE) 1 MG tablet Take 1,000 mcg by mouth.      ketoconazole (NIZORAL) 2 % cream Apply topically 2 (two) times daily. 30 g 2    lancets Misc To check BG 2 times daily, to use with insurance preferred meter 200 each 3    loratadine (CLARITIN) 10 mg tablet Take 10 mg by mouth once daily.      losartan-hydrochlorothiazide 100-12.5 mg (HYZAAR) 100-12.5 mg Tab TAKE 1 TABLET BY MOUTH EVERY DAY 90 tablet 1    meloxicam (MOBIC) 7.5 MG tablet Take 1 tablet (7.5 mg total) by mouth daily as needed for Pain. 30 tablet 0    metFORMIN (GLUCOPHAGE) 1000 MG tablet TAKE 1 TABLET BY MOUTH TWICE A DAY WITH MEALS 180 tablet 1    ondansetron (ZOFRAN-ODT) 4 MG TbDL Take 4 mg by mouth as needed. 1 tablet sublingual PRN nausea      pantoprazole (PROTONIX) 40 MG tablet Take 40 mg by mouth.      rosuvastatin (CRESTOR) 20 MG tablet TAKE 1 TABLET BY MOUTH EVERY DAY 90  tablet 1     Current Facility-Administered Medications for the 5/10/23 encounter (Office Visit) with RYDER Martinez   Medication Dose Route Frequency Provider Last Rate Last Admin    [DISCONTINUED] acetaminophen tablet 1,000 mg  1,000 mg Oral On Call Procedure FJennifer Mayorga MD            Diagnoses and health risks identified today and associated recommendations/orders:  1. Encounter for preventive health examination  Medicare awv complete. Health maintenance:  tetanus vaccine, shingles vaccine, pneumococcal vaccine, and covid 19 4th vaccine due-encouraged pt to obtain.     2. Type 2 diabetes mellitus with stage 3a chronic kidney disease, with long-term current use of insulin   Latest Reference Range & Units 01/11/18 08:08 04/17/18 10:31 08/08/18 10:04 12/14/18 09:09 03/19/19 09:28 07/19/19 09:29 10/15/19 09:50 02/18/20 10:13 09/24/20 09:42 12/11/20 09:10 03/08/21 11:28 07/08/21 11:27 01/27/22 11:04 08/01/22 12:12 11/09/22 09:46 04/15/23 08:28 05/02/23 10:56   Hemoglobin A1C External 4.0 - 5.6 % 7.6 (H) 7.8 (H) 7.8 (H) 7.4 (H) 7.1 (H) 7.9 (H) 7.7 (H) 7.4 (H) 9.3 (H) 7.4 (H) 7.7 (H) 8.1 (H) 7.7 (H) 8.4 (H) 7.9 (H) 7.6 (H) 7.4 (H)   Estimated Avg Glucose 68 - 131 mg/dL 171 (H) 177 (H) 177 (H) 166 (H) 157 (H) 180 (H) 174 (H) 166 (H) 220 (H) 166 (H) 174 (H) 186 (H) 174 (H) 194 (H) 180 (H) 171 (H) 166 (H)   (H): Data is abnormally high  Controlled. Continue current management. On metformin. See med list. Patient states she checks her bs every 2-3 days and BS usually ranges 110-130s.  Reviewed diabetic diet, diabetic foot care, preferred BS, and HgbA1C levels. Follow up with your PCP as instructed, podiatry and ophthalmology yearly.      3. Atherosclerosis of aorta  Chronic and stable on statin medication. Continue current. F/u with pcp.      4. Hypertension associated with diabetes  Chronic and stable on BP medication.  Continue current management.  See med list above. Recommend low sodium diet. Follow up  with PCP.       5. Hyperlipidemia associated with type 2 diabetes mellitus  Lab Results   Component Value Date    CHOL 161 05/02/2023    CHOL 114 (L) 08/01/2022    CHOL 158 01/27/2022     Lab Results   Component Value Date    HDL 82 (H) 05/02/2023    HDL 52 08/01/2022    HDL 74 01/27/2022     Lab Results   Component Value Date    LDLCALC 69.2 05/02/2023    LDLCALC 46.2 (L) 08/01/2022    LDLCALC 72.6 01/27/2022     No results found for: DLDL  Lab Results   Component Value Date    TRIG 49 05/02/2023    TRIG 79 08/01/2022    TRIG 57 01/27/2022       f1   Lab Results   Component Value Date    CHOLHDL 50.9 (H) 05/02/2023    CHOLHDL 45.6 08/01/2022    CHOLHDL 46.8 01/27/2022    Chronic and stable on statin medication. Continue current. F/u with pcp.      6. Chronic heart failure with preserved ejection fraction  Chronic and stable. Continue current management. See med list above. Follow up with cardiology.      7. Rheumatoid arthritis, involving unspecified site, unspecified whether rheumatoid factor present  Chronic and stable. Continue current management. See med list above. Follow up with rheumatology.     8. Diastolic dysfunction  Chronic and stable. Continue current management. See med list above. Follow up with cardiology.      9. Anemia, unspecified type  Chronic and stable. Continue current management. See med list above. Follow up with PCP.     10. Sciatica without back pain, left  Chronic and stable. Continue current management. See med list above. Follow up with PCP.     11. Primary osteoarthritis of both hands  Chronic and stable. Continue current management. See med list above. Follow up with PCP.     12. S/P ROSSI (total abdominal hysterectomy)  4/14/23. No acute issues.     13. Decreased hearing of both ears  - Ambulatory referral/consult to Audiology; Future    14. Mild depression   Mild depression with phq-9 score of 6. Pt denies si. Pt declined counseling. F/u with pcp,           Provided Indira with a  5-10 year written screening schedule and personal prevention plan. Recommendations were developed using the USPSTF age appropriate recommendations. Education, counseling, and referrals were provided as needed.  After Visit Summary printed and given to patient which includes a list of additional screenings\tests needed.    Follow up in about 1 year (around 5/10/2024) for annual wellness visit.      Doreen Rose, P    I offered to discuss advanced care planning, including how to pick a person who would make decisions for you if you were unable to make them for yourself, called a health care power of , and what kind of decisions you might make such as use of life sustaining treatments such as ventilators and tube feeding when faced with a life limiting illness recorded on a living will that they will need to know. (How you want to be cared for as you near the end of your natural life)     X Patient is interested in learning more about how to make advanced directives.  I provided them paperwork and offered to discuss this with them.

## 2023-05-10 NOTE — Clinical Note
Medicare awv complete. Health maintenance:  tetanus vaccine, shingles vaccine, pneumococcal vaccine, and covid 19 4th vaccine due-encouraged pt to obtain.   Mild depression with phq-9 score of 6. Pt denies si. Pt declined counseling. F/u with pcp,

## 2023-05-11 ENCOUNTER — PATIENT MESSAGE (OUTPATIENT)
Dept: INTERNAL MEDICINE | Facility: CLINIC | Age: 78
End: 2023-05-11
Payer: MEDICARE

## 2023-05-17 ENCOUNTER — OFFICE VISIT (OUTPATIENT)
Dept: OBSTETRICS AND GYNECOLOGY | Facility: CLINIC | Age: 78
End: 2023-05-17
Payer: MEDICARE

## 2023-05-17 VITALS
BODY MASS INDEX: 21.56 KG/M2 | HEIGHT: 63 IN | SYSTOLIC BLOOD PRESSURE: 130 MMHG | DIASTOLIC BLOOD PRESSURE: 64 MMHG | WEIGHT: 121.69 LBS

## 2023-05-17 DIAGNOSIS — R30.0 DYSURIA: Primary | ICD-10-CM

## 2023-05-17 PROCEDURE — 99024 PR POST-OP FOLLOW-UP VISIT: ICD-10-PCS | Mod: ,,, | Performed by: OBSTETRICS & GYNECOLOGY

## 2023-05-17 PROCEDURE — 3288F PR FALLS RISK ASSESSMENT DOCUMENTED: ICD-10-PCS | Mod: CPTII,,, | Performed by: OBSTETRICS & GYNECOLOGY

## 2023-05-17 PROCEDURE — 1101F PR PT FALLS ASSESS DOC 0-1 FALLS W/OUT INJ PAST YR: ICD-10-PCS | Mod: CPTII,,, | Performed by: OBSTETRICS & GYNECOLOGY

## 2023-05-17 PROCEDURE — 99213 OFFICE O/P EST LOW 20 MIN: CPT | Performed by: OBSTETRICS & GYNECOLOGY

## 2023-05-17 PROCEDURE — 3288F FALL RISK ASSESSMENT DOCD: CPT | Mod: CPTII,,, | Performed by: OBSTETRICS & GYNECOLOGY

## 2023-05-17 PROCEDURE — 99999 PR PBB SHADOW E&M-EST. PATIENT-LVL III: CPT | Mod: PBBFAC,,, | Performed by: OBSTETRICS & GYNECOLOGY

## 2023-05-17 PROCEDURE — 99999 PR PBB SHADOW E&M-EST. PATIENT-LVL III: ICD-10-PCS | Mod: PBBFAC,,, | Performed by: OBSTETRICS & GYNECOLOGY

## 2023-05-17 PROCEDURE — 3078F PR MOST RECENT DIASTOLIC BLOOD PRESSURE < 80 MM HG: ICD-10-PCS | Mod: CPTII,,, | Performed by: OBSTETRICS & GYNECOLOGY

## 2023-05-17 PROCEDURE — 3075F PR MOST RECENT SYSTOLIC BLOOD PRESS GE 130-139MM HG: ICD-10-PCS | Mod: CPTII,,, | Performed by: OBSTETRICS & GYNECOLOGY

## 2023-05-17 PROCEDURE — 3078F DIAST BP <80 MM HG: CPT | Mod: CPTII,,, | Performed by: OBSTETRICS & GYNECOLOGY

## 2023-05-17 PROCEDURE — 1126F PR PAIN SEVERITY QUANTIFIED, NO PAIN PRESENT: ICD-10-PCS | Mod: CPTII,,, | Performed by: OBSTETRICS & GYNECOLOGY

## 2023-05-17 PROCEDURE — 1101F PT FALLS ASSESS-DOCD LE1/YR: CPT | Mod: CPTII,,, | Performed by: OBSTETRICS & GYNECOLOGY

## 2023-05-17 PROCEDURE — 1159F MED LIST DOCD IN RCRD: CPT | Mod: CPTII,,, | Performed by: OBSTETRICS & GYNECOLOGY

## 2023-05-17 PROCEDURE — 99024 POSTOP FOLLOW-UP VISIT: CPT | Mod: ,,, | Performed by: OBSTETRICS & GYNECOLOGY

## 2023-05-17 PROCEDURE — 1126F AMNT PAIN NOTED NONE PRSNT: CPT | Mod: CPTII,,, | Performed by: OBSTETRICS & GYNECOLOGY

## 2023-05-17 PROCEDURE — 1159F PR MEDICATION LIST DOCUMENTED IN MEDICAL RECORD: ICD-10-PCS | Mod: CPTII,,, | Performed by: OBSTETRICS & GYNECOLOGY

## 2023-05-17 PROCEDURE — 3075F SYST BP GE 130 - 139MM HG: CPT | Mod: CPTII,,, | Performed by: OBSTETRICS & GYNECOLOGY

## 2023-05-17 NOTE — PROGRESS NOTES
"  Subjective:       Patient ID: Indira Walker is a 77 y.o. female.    Chief Complaint:  Post-op Evaluation      History of Present Illness  HPI  4 weeks post op total vaginal hysterectomy with cystocele   Having some urinary urge with leakage   No burning or frequency   No vaginal bleeding   No pain     Health Maintenance   Topic Date Due    TETANUS VACCINE  Never done    Eye Exam  10/12/2023    Hemoglobin A1c  2023    Lipid Panel  2024    DEXA Scan  2024    Hepatitis C Screening  Completed     GYN & OB History  No LMP recorded. Patient is postmenopausal.   Date of Last Pap: No result found    OB History    Para Term  AB Living   3 3 3         SAB IAB Ectopic Multiple Live Births                  # Outcome Date GA Lbr Leonid/2nd Weight Sex Delivery Anes PTL Lv   3 Term            2 Term            1 Term                Review of Systems  Review of Systems        Objective:   /64   Ht 5' 3" (1.6 m)   Wt 55.2 kg (121 lb 11.1 oz)   BMI 21.56 kg/m²    Physical Exam     Assessment:        1. Dysuria                Plan:            Indira was seen today for post-op evaluation.    Diagnoses and all orders for this visit:    Dysuria  -     Urinalysis; Future  -     Urine culture; Future    If urinalysis is normal, will give 6 to 8 weeks for urinary urge to resolve     "

## 2023-05-19 DIAGNOSIS — G91.2 (IDIOPATHIC) NORMAL PRESSURE HYDROCEPHALUS: ICD-10-CM

## 2023-05-19 DIAGNOSIS — E78.5 HYPERLIPIDEMIA ASSOCIATED WITH TYPE 2 DIABETES MELLITUS: ICD-10-CM

## 2023-05-19 DIAGNOSIS — E11.69 HYPERLIPIDEMIA ASSOCIATED WITH TYPE 2 DIABETES MELLITUS: ICD-10-CM

## 2023-05-19 RX ORDER — METFORMIN HYDROCHLORIDE 1000 MG/1
TABLET ORAL
Qty: 180 TABLET | Refills: 1 | Status: SHIPPED | OUTPATIENT
Start: 2023-05-19 | End: 2023-10-07

## 2023-05-19 RX ORDER — ROSUVASTATIN CALCIUM 20 MG/1
TABLET, COATED ORAL
Qty: 90 TABLET | Refills: 1 | Status: SHIPPED | OUTPATIENT
Start: 2023-05-19 | End: 2023-11-11

## 2023-05-19 NOTE — TELEPHONE ENCOUNTER
Refill Decision Note   Indira Walker  is requesting a refill authorization.  Brief Assessment and Rationale for Refill:  Approve     Medication Therapy Plan:       Medication Reconciliation Completed: No   Comments:     No Care Gaps recommended.     Note composed:8:03 AM 05/19/2023

## 2023-05-19 NOTE — TELEPHONE ENCOUNTER
No care due was identified.  [facility] Embedded Care Due Messages. Reference number: [id number].   5/19/2023 12:17:41 AM CDT

## 2023-06-02 ENCOUNTER — TELEPHONE (OUTPATIENT)
Dept: INTERNAL MEDICINE | Facility: CLINIC | Age: 78
End: 2023-06-02
Payer: MEDICARE

## 2023-06-02 NOTE — TELEPHONE ENCOUNTER
----- Message from Miri Brizuela sent at 6/2/2023 11:04 AM CDT -----  Regarding: Medical Question  Contact: Indira Jacobson says she does not have any energy and would like to be on B-12.    Indira can be reached at [phone] (eete)      Thanks

## 2023-06-02 NOTE — TELEPHONE ENCOUNTER
----- Message from Gissell Tinoco LPN sent at 6/2/2023  2:15 PM CDT -----  Regarding: FW: Soon Appointment  Contact: Indira  Good  Afternoon,     Message was sent to OB/ GYN     Pt states she was calling in regards to wanted to take B12 tablets. Pt states she needs something for energy and want to take B12 OTC if recommended   ----- Message -----  From: Miri Brizuela  Sent: 6/2/2023  11:04 AM CDT  To: Mukesh Cid Staff  Subject: Soon Appointment                                 Type:  Sooner Appointment Request    Caller is requesting a sooner appointment.  Caller declined first available appointment listed below.  Caller will not accept being placed on the waitlist and is requesting a message be sent to doctor.  Name of Caller: Indira  When is the first available appointment? 06/28/2023  Symptoms: n/a  Would the patient rather a call back or a response via [facility]? call  Best Call Back Number: [phone] (home)    Additional Information:  Indira was seen 05/17/203 and was asked to follow up again in 4 weeks. The next available in Epic was 06/28/2023 but she want to come in sooner.

## 2023-06-14 ENCOUNTER — LAB VISIT (OUTPATIENT)
Dept: LAB | Facility: HOSPITAL | Age: 78
End: 2023-06-14
Attending: INTERNAL MEDICINE
Payer: MEDICARE

## 2023-06-14 DIAGNOSIS — M05.9 SEROPOSITIVE RHEUMATOID ARTHRITIS: ICD-10-CM

## 2023-06-14 LAB
ALBUMIN SERPL BCP-MCNC: 3.6 G/DL (ref 3.5–5.2)
ALP SERPL-CCNC: 54 U/L (ref 55–135)
ALT SERPL W/O P-5'-P-CCNC: 12 U/L (ref 10–44)
ANION GAP SERPL CALC-SCNC: 11 MMOL/L (ref 8–16)
AST SERPL-CCNC: 16 U/L (ref 10–40)
BASOPHILS # BLD AUTO: 0.02 K/UL (ref 0–0.2)
BASOPHILS NFR BLD: 0.4 % (ref 0–1.9)
BILIRUB SERPL-MCNC: 0.5 MG/DL (ref 0.1–1)
BUN SERPL-MCNC: 17 MG/DL (ref 8–23)
CALCIUM SERPL-MCNC: 9.3 MG/DL (ref 8.7–10.5)
CHLORIDE SERPL-SCNC: 101 MMOL/L (ref 95–110)
CO2 SERPL-SCNC: 23 MMOL/L (ref 23–29)
CREAT SERPL-MCNC: 1.2 MG/DL (ref 0.5–1.4)
DIFFERENTIAL METHOD: ABNORMAL
EOSINOPHIL # BLD AUTO: 0.1 K/UL (ref 0–0.5)
EOSINOPHIL NFR BLD: 1.9 % (ref 0–8)
ERYTHROCYTE [DISTWIDTH] IN BLOOD BY AUTOMATED COUNT: 14 % (ref 11.5–14.5)
EST. GFR  (NO RACE VARIABLE): 46 ML/MIN/1.73 M^2
GLUCOSE SERPL-MCNC: 377 MG/DL (ref 70–110)
HCT VFR BLD AUTO: 32.3 % (ref 37–48.5)
HGB BLD-MCNC: 10.6 G/DL (ref 12–16)
IMM GRANULOCYTES # BLD AUTO: 0.02 K/UL (ref 0–0.04)
IMM GRANULOCYTES NFR BLD AUTO: 0.4 % (ref 0–0.5)
LYMPHOCYTES # BLD AUTO: 1.7 K/UL (ref 1–4.8)
LYMPHOCYTES NFR BLD: 31.8 % (ref 18–48)
MCH RBC QN AUTO: 31.4 PG (ref 27–31)
MCHC RBC AUTO-ENTMCNC: 32.8 G/DL (ref 32–36)
MCV RBC AUTO: 96 FL (ref 82–98)
MONOCYTES # BLD AUTO: 0.5 K/UL (ref 0.3–1)
MONOCYTES NFR BLD: 9.3 % (ref 4–15)
NEUTROPHILS # BLD AUTO: 3 K/UL (ref 1.8–7.7)
NEUTROPHILS NFR BLD: 56.2 % (ref 38–73)
NRBC BLD-RTO: 0 /100 WBC
PLATELET # BLD AUTO: 289 K/UL (ref 150–450)
PMV BLD AUTO: 9.5 FL (ref 9.2–12.9)
POTASSIUM SERPL-SCNC: 4.1 MMOL/L (ref 3.5–5.1)
PROT SERPL-MCNC: 7.1 G/DL (ref 6–8.4)
RBC # BLD AUTO: 3.38 M/UL (ref 4–5.4)
SODIUM SERPL-SCNC: 135 MMOL/L (ref 136–145)
WBC # BLD AUTO: 5.29 K/UL (ref 3.9–12.7)

## 2023-06-14 PROCEDURE — 36415 COLL VENOUS BLD VENIPUNCTURE: CPT | Performed by: INTERNAL MEDICINE

## 2023-06-14 PROCEDURE — 80053 COMPREHEN METABOLIC PANEL: CPT | Performed by: INTERNAL MEDICINE

## 2023-06-14 PROCEDURE — 85025 COMPLETE CBC W/AUTO DIFF WBC: CPT | Performed by: INTERNAL MEDICINE

## 2023-06-26 ENCOUNTER — CLINICAL SUPPORT (OUTPATIENT)
Dept: AUDIOLOGY | Facility: CLINIC | Age: 78
End: 2023-06-26
Payer: MEDICARE

## 2023-06-26 DIAGNOSIS — H90.6 HEARING LOSS, MIXED, BILATERAL: ICD-10-CM

## 2023-06-26 PROCEDURE — 92567 TYMPANOMETRY: CPT | Mod: S$GLB,,, | Performed by: AUDIOLOGIST

## 2023-06-26 PROCEDURE — 99999 PR PBB SHADOW E&M-EST. PATIENT-LVL I: ICD-10-PCS | Mod: PBBFAC,,, | Performed by: AUDIOLOGIST

## 2023-06-26 PROCEDURE — 92567 PR TYMPA2METRY: ICD-10-PCS | Mod: S$GLB,,, | Performed by: AUDIOLOGIST

## 2023-06-26 PROCEDURE — 99999 PR PBB SHADOW E&M-EST. PATIENT-LVL I: CPT | Mod: PBBFAC,,, | Performed by: AUDIOLOGIST

## 2023-06-26 PROCEDURE — 92557 COMPREHENSIVE HEARING TEST: CPT | Mod: S$GLB,,, | Performed by: AUDIOLOGIST

## 2023-06-26 PROCEDURE — 92557 PR COMPREHENSIVE HEARING TEST: ICD-10-PCS | Mod: S$GLB,,, | Performed by: AUDIOLOGIST

## 2023-06-26 NOTE — PROGRESS NOTES
Indira Walker was seen 06/26/2023 for an audiological evaluation. Patient complains of gradual bilateral hearing loss, left worse. She is having to ask others to repeat more often recently. She also reports intermittent tinnitus and occasional dizziness.    Results reveal a mild-to-moderate mixed hearing loss 250-8000 Hz for the right ear, and  mild-to-moderate mixed hearing loss 250-8000 Hz for the left ear.   Speech Reception Thresholds were  40 dBHL for the right ear and 40 dBHL for the left ear.   Word recognition scores were excellent for the right ear and excellent for the left ear.  Tympanograms were Type C, abnormal for the right ear and Type C, abnormal for the left ear.    Patient was counseled on the above findings.    Recommendations:  Follow-up with ENT, re:ETD   Repeat per ENT  Consider a hearing aid consultation through [employer].  Patient provided with a copy of audiogram.   Wear hearing protection devices when around loud noise.

## 2023-07-01 DIAGNOSIS — I10 HYPERTENSION, ESSENTIAL: ICD-10-CM

## 2023-07-01 RX ORDER — LOSARTAN POTASSIUM AND HYDROCHLOROTHIAZIDE 12.5; 1 MG/1; MG/1
TABLET ORAL
Qty: 90 TABLET | Refills: 1 | Status: SHIPPED | OUTPATIENT
Start: 2023-07-01 | End: 2023-12-04

## 2023-07-01 NOTE — TELEPHONE ENCOUNTER
No care due was identified.  [facility] Embedded Care Due Messages. Reference number: [id number].   7/01/2023 7:16:15 AM CDT

## 2023-07-01 NOTE — TELEPHONE ENCOUNTER
Refill Decision Note   Indira Walker  is requesting a refill authorization.  Brief Assessment and Rationale for Refill:  Approve     Medication Therapy Plan:         Comments:     Note composed:5:09 PM 07/01/2023

## 2023-07-06 ENCOUNTER — TELEPHONE (OUTPATIENT)
Dept: OBSTETRICS AND GYNECOLOGY | Facility: CLINIC | Age: 78
End: 2023-07-06
Payer: MEDICARE

## 2023-07-06 NOTE — TELEPHONE ENCOUNTER
----- Message from Thiago Fletcher sent at 7/6/2023  1:49 PM CDT -----  Contact: [phone]  Pt is calling regarding 4 weeks post op appt . Please call back at [phone] . Thanksdj

## 2023-07-10 ENCOUNTER — TELEPHONE (OUTPATIENT)
Dept: OBSTETRICS AND GYNECOLOGY | Facility: CLINIC | Age: 78
End: 2023-07-10
Payer: MEDICARE

## 2023-07-10 NOTE — TELEPHONE ENCOUNTER
Pt had post op visit on 5/17/23 and was told she had to wait 4 more weeks before having intercourse and thought she needed to come back for another visit,. I informed pt that she was cleared at her post op and she only needed to come back if she was having any issues. Pt verbalized understanding, no further questions.

## 2023-07-10 NOTE — TELEPHONE ENCOUNTER
----- Message from Cyndie Beverly sent at 7/10/2023 10:50 AM CDT -----  Contact: Indira  Patient is calling to speak with the nurse regarding follow up appointment form procedure on 04/14/2023. Please give a call back at [phone] as requested.  Thanks  LR

## 2023-07-11 ENCOUNTER — OFFICE VISIT (OUTPATIENT)
Dept: OTOLARYNGOLOGY | Facility: CLINIC | Age: 78
End: 2023-07-11
Payer: MEDICARE

## 2023-07-11 VITALS — WEIGHT: 124.13 LBS | BODY MASS INDEX: 21.99 KG/M2 | HEIGHT: 63 IN | TEMPERATURE: 98 F

## 2023-07-11 DIAGNOSIS — H61.23 HEARING LOSS DUE TO CERUMEN IMPACTION, BILATERAL: ICD-10-CM

## 2023-07-11 DIAGNOSIS — H90.6 HEARING LOSS, MIXED, BILATERAL: ICD-10-CM

## 2023-07-11 DIAGNOSIS — H69.93 DYSFUNCTION OF BOTH EUSTACHIAN TUBES: Primary | ICD-10-CM

## 2023-07-11 PROCEDURE — 1159F PR MEDICATION LIST DOCUMENTED IN MEDICAL RECORD: ICD-10-PCS | Mod: CPTII,S$GLB,, | Performed by: OTOLARYNGOLOGY

## 2023-07-11 PROCEDURE — 99214 OFFICE O/P EST MOD 30 MIN: CPT | Mod: 25,S$GLB,, | Performed by: OTOLARYNGOLOGY

## 2023-07-11 PROCEDURE — 3288F PR FALLS RISK ASSESSMENT DOCUMENTED: ICD-10-PCS | Mod: CPTII,S$GLB,, | Performed by: OTOLARYNGOLOGY

## 2023-07-11 PROCEDURE — 1101F PT FALLS ASSESS-DOCD LE1/YR: CPT | Mod: CPTII,S$GLB,, | Performed by: OTOLARYNGOLOGY

## 2023-07-11 PROCEDURE — 69210 PR REMOVAL IMPACTED CERUMEN REQUIRING INSTRUMENTATION, UNILATERAL: ICD-10-PCS | Mod: S$GLB,,, | Performed by: OTOLARYNGOLOGY

## 2023-07-11 PROCEDURE — 1126F PR PAIN SEVERITY QUANTIFIED, NO PAIN PRESENT: ICD-10-PCS | Mod: CPTII,S$GLB,, | Performed by: OTOLARYNGOLOGY

## 2023-07-11 PROCEDURE — 1126F AMNT PAIN NOTED NONE PRSNT: CPT | Mod: CPTII,S$GLB,, | Performed by: OTOLARYNGOLOGY

## 2023-07-11 PROCEDURE — 1101F PR PT FALLS ASSESS DOC 0-1 FALLS W/OUT INJ PAST YR: ICD-10-PCS | Mod: CPTII,S$GLB,, | Performed by: OTOLARYNGOLOGY

## 2023-07-11 PROCEDURE — 3288F FALL RISK ASSESSMENT DOCD: CPT | Mod: CPTII,S$GLB,, | Performed by: OTOLARYNGOLOGY

## 2023-07-11 PROCEDURE — 1159F MED LIST DOCD IN RCRD: CPT | Mod: CPTII,S$GLB,, | Performed by: OTOLARYNGOLOGY

## 2023-07-11 PROCEDURE — 99214 PR OFFICE/OUTPT VISIT, EST, LEVL IV, 30-39 MIN: ICD-10-PCS | Mod: 25,S$GLB,, | Performed by: OTOLARYNGOLOGY

## 2023-07-11 PROCEDURE — 99999 PR PBB SHADOW E&M-EST. PATIENT-LVL III: CPT | Mod: PBBFAC,,, | Performed by: OTOLARYNGOLOGY

## 2023-07-11 PROCEDURE — 69210 REMOVE IMPACTED EAR WAX UNI: CPT | Mod: S$GLB,,, | Performed by: OTOLARYNGOLOGY

## 2023-07-11 PROCEDURE — 99999 PR PBB SHADOW E&M-EST. PATIENT-LVL III: ICD-10-PCS | Mod: PBBFAC,,, | Performed by: OTOLARYNGOLOGY

## 2023-07-11 RX ORDER — FLUTICASONE PROPIONATE 50 MCG
2 SPRAY, SUSPENSION (ML) NASAL DAILY
Qty: 16 G | Refills: 5 | Status: SHIPPED | OUTPATIENT
Start: 2023-07-11 | End: 2024-01-09 | Stop reason: SDUPTHER

## 2023-07-11 NOTE — PROGRESS NOTES
"Referring Provider:    Doreen Rose, [facility]  [address] [place] [address]  Subjective:   Patient: Indira Walker [id number], :1945   Visit date:2023 1:10 PM    Chief Complaint:  Hearing Loss ( states has been going on x7-8 months. )    HPI:    Prior notes reviewed by myself.  Clinical documentation obtained by nursing staff reviewed.     77-year-old female presents for evaluation of hearing loss.  She had a recent audiogram which demonstrated bilateral mixed hearing loss in the audiologist recommended an ENT evaluation.  She feels as if she hears well but her family has noticed increased difficulty with understanding.  She does not have any significant otologic history.      Objective:     Physical Exam:  Vitals:  Temp 97.9 °F (36.6 °C) (Temporal)   Ht 5' 3" (1.6 m)   Wt 56.3 kg (124 lb 1.9 oz)   BMI 21.99 kg/m²   General appearance:  Well developed, well nourished    Ears:  Otoscopy of external auditory canals and tympanic membranes was significant for bilateral cerumen impaction and slight retraction of TM's bilaterally, clinical speech reception thresholds grossly intact, no mass/lesion of auricle.    Nose:  No masses/lesions of external nose, nasal mucosa, septum, and turbinates were within normal limits.    Mouth:  No mass/lesion of lips, teeth, gums, hard/soft palate, tongue, tonsils, or oropharynx.    Neck & Lymphatics:  No cervical lymphadenopathy, no neck mass/crepitus/ asymmetry, trachea is midline, no thyroid enlargement/tenderness/mass.    Procedure Note    CHIEF COMPLAINT:  Cerumen Impaction    Description:  The patient was seated in an exam chair.  An ear speculum was placed in the right EAC and was examined under the microscope.  Suction and/or loop curettes were used to remove a large cerumen impaction.  The tympanic membrane was visualized and was normal in appearance.  The procedure was repeated on the left side in a similar fashion.  The TM was intact and normal " on this side as well.  The patient tolerated the procedure well.      [x]  Data Reviewed:    Lab Results   Component Value Date    WBC 5.29 06/14/2023    HGB 10.6 (L) 06/14/2023    HCT 32.3 (L) 06/14/2023    MCV 96 06/14/2023    EOSINOPHIL 1.9 06/14/2023         [x]  Independent interpretation of test: Mixed HL mild to mod, type C tymps     Clinical Support    6/26/2023  [facility] - Audiology    KACI Stephen  Audiology Hearing loss, mixed, bilateral  Dx Referred by RYDER Martinez  Reason for Visit     Instructions    After Visit Summary (Automatic SnapShot taken 6/27/2023)  Progress Notes  KACI Stephen (Audiologist)   Audiology  Indira Walker was seen 06/26/2023 for an audiological evaluation. Patient complains of gradual bilateral hearing loss, left worse. She is having to ask others to repeat more often recently. She also reports intermittent tinnitus and occasional dizziness.     Results reveal a mild-to-moderate mixed hearing loss 250-8000 Hz for the right ear, and  mild-to-moderate mixed hearing loss 250-8000 Hz for the left ear.   Speech Reception Thresholds were  40 dBHL for the right ear and 40 dBHL for the left ear.   Word recognition scores were excellent for the right ear and excellent for the left ear.  Tympanograms were Type C, abnormal for the right ear and Type C, abnormal for the left ear.     Patient was counseled on the above findings.     Recommendations:  Follow-up with ENT, re:ETD   Repeat per ENT  Consider a hearing aid consultation through [facility].  Patient provided with a copy of audiogram.   Wear hearing protection devices when around loud noise.                     Other Notes  All notes    Addendum Note from RYDER Martinez (Hospice and Palliative Medicine)  Additional Documentation    Encounter Info:  Billing Info,  Detailed Report,  Education,  Care Plan,  History,  Allergies,  Patient-Entered Questionnaires,  Outpatient Care Plans  ...(6 more)      Communications    View Encounter Conversation Summary    Chart Routed to RYDER Martinez  Media  From this encounter  Annotation on 6/26/2023 11:43 AM by KACI Stephen: AUDIOGRAM     Active Diagnosis Review (HCC)    Active Diagnosis Review (HCC)     Not recorded  All Charges for This Encounter    Code Description Service Date Service Provider Modifiers Qty   52810 VT COMPREHENSIVE HEARING TEST 6/26/2023 KACI Stephen S$GLB 1   42323 VT CPHBH2XPNAP 6/26/2023 KACI Stephen S$GLB 1   [id number] VT PBB SHADOW E&M-EST. PATIENT-LVL I 6/26/2023 KACI Stephen PBBFAC 1     Level of Service    BestPractice Advisories    Click to view BestPractice Advisory history     AVS Reports    Date/Time Report Action User   6/27/2023  6:36 PM After Visit Summary Automatically Generated RYDER Martinez   6/26/2023  2:19 PM After Visit Summary Automatically Generated KACI Stephen     Encounter-Level Documents on 06/26/2023:    After Visit Summary - Document on 6/27/2023 6:36 PM by RYDER Martinez: After Visit Summary  After Visit Summary - Document on 6/26/2023 2:19 PM by KACI Stephen: After Visit Summary  Orders Placed    Ambulatory referral/consult to ENT Authorized  Other Orders Performed    Ambulatory referral/consult to Audiology Closed  Medication Changes      None     Medication List     Visit Diagnoses      Hearing loss, mixed, bilateral     Problem List             Assessment & Plan:   Dysfunction of both eustachian tubes  -     fluticasone propionate (FLONASE) 50 mcg/actuation nasal spray; 2 sprays (100 mcg total) by Each Nostril route once daily.  Dispense: 16 g; Refill: 5    Hearing loss, mixed, bilateral  -     Ambulatory referral/consult to ENT      She does have some retraction and evidence of eustachian tube dysfunction with associated conductive hearing loss on her audiogram.  We discussed conservative treatment of this and I also  recommended that she still pursue a hearing aid evaluation.  She can follow up p.r.n.    Conservative Treatment of Eustachian Tube Dysfunction    Your physician has diagnosed you with eustachian tube dysfunction.  There are several conservative medical treatments that have been shown to help improve the function of your eustachian tube.    Topical Decongestant Spray (Afrin):  2 sprays in each nostril twice daily for 3 days.  It is important to stop this medication after 3 days as it can be habit-forming.  Buy the Afrin pump spray mist.  This usually comes in a red and white box over the counter.  Autoinsufflation (popping the ears):  Recommend gentle popping of the ears 3-4 times per day and as needed for symptom relief.  Oral Steroids:  Your physician may or may not recommend a short course of oral steroids.  If so, take as directed.  These help decrease the inflammation in your nasal/sinus cavity as well as around your eustachian tube which may help with your symptoms  Nasal Steroids:  Most commonly fluticasone 2 sprays in each nostril once daily.  This will help decrease the inflammation in your nasal/sinus cavity as well as around your eustachian tube which may help with your symptoms    If no improvement after 2 weeks of conservative treatment, your physician may recommend a myringotomy and or ear tube placement for symptom relief.

## 2023-07-20 ENCOUNTER — OFFICE VISIT (OUTPATIENT)
Dept: PODIATRY | Facility: CLINIC | Age: 78
End: 2023-07-20
Payer: MEDICARE

## 2023-07-20 DIAGNOSIS — Q82.8 POROKERATOSIS: ICD-10-CM

## 2023-07-20 DIAGNOSIS — E11.65 UNCONTROLLED TYPE 2 DIABETES MELLITUS WITH HYPERGLYCEMIA: ICD-10-CM

## 2023-07-20 DIAGNOSIS — M21.6X2 PLANTAR FAT PAD ATROPHY OF LEFT FOOT: Primary | ICD-10-CM

## 2023-07-20 PROCEDURE — 1160F RVW MEDS BY RX/DR IN RCRD: CPT | Mod: CPTII,S$GLB,, | Performed by: PODIATRIST

## 2023-07-20 PROCEDURE — 99999 PR PBB SHADOW E&M-EST. PATIENT-LVL III: CPT | Mod: PBBFAC,,, | Performed by: PODIATRIST

## 2023-07-20 PROCEDURE — 3288F PR FALLS RISK ASSESSMENT DOCUMENTED: ICD-10-PCS | Mod: CPTII,S$GLB,, | Performed by: PODIATRIST

## 2023-07-20 PROCEDURE — 1160F PR REVIEW ALL MEDS BY PRESCRIBER/CLIN PHARMACIST DOCUMENTED: ICD-10-PCS | Mod: CPTII,S$GLB,, | Performed by: PODIATRIST

## 2023-07-20 PROCEDURE — 1101F PR PT FALLS ASSESS DOC 0-1 FALLS W/OUT INJ PAST YR: ICD-10-PCS | Mod: CPTII,S$GLB,, | Performed by: PODIATRIST

## 2023-07-20 PROCEDURE — 99213 OFFICE O/P EST LOW 20 MIN: CPT | Mod: S$GLB,,, | Performed by: PODIATRIST

## 2023-07-20 PROCEDURE — 1101F PT FALLS ASSESS-DOCD LE1/YR: CPT | Mod: CPTII,S$GLB,, | Performed by: PODIATRIST

## 2023-07-20 PROCEDURE — 1159F MED LIST DOCD IN RCRD: CPT | Mod: CPTII,S$GLB,, | Performed by: PODIATRIST

## 2023-07-20 PROCEDURE — 1159F PR MEDICATION LIST DOCUMENTED IN MEDICAL RECORD: ICD-10-PCS | Mod: CPTII,S$GLB,, | Performed by: PODIATRIST

## 2023-07-20 PROCEDURE — 99213 PR OFFICE/OUTPT VISIT, EST, LEVL III, 20-29 MIN: ICD-10-PCS | Mod: S$GLB,,, | Performed by: PODIATRIST

## 2023-07-20 PROCEDURE — 99999 PR PBB SHADOW E&M-EST. PATIENT-LVL III: ICD-10-PCS | Mod: PBBFAC,,, | Performed by: PODIATRIST

## 2023-07-20 PROCEDURE — 3288F FALL RISK ASSESSMENT DOCD: CPT | Mod: CPTII,S$GLB,, | Performed by: PODIATRIST

## 2023-07-20 NOTE — PROGRESS NOTES
Subjective:       Patient ID: Indira Walker is a 77 y.o. female.    Chief Complaint: Foot Pain and Establish Care (Patient in to establish care due to pain under the bottom of her right foot but she wants you to check both due to having a knot under the greater toe on left foot that hurts and she rates the pain as a seven, last seen by her pcp on 5/9/2023)    HPI: Indira Walker presents to the office today, with areas of concern to the plantar aspect of the right foot.  States that it feels as if he/she is walking on rocks.  Denies any puncture wounds or injuries.  States this pain is been ongoing for some time without significant improvement.  No specific memory of walking barefoot or causing injury.  States visualization of new developed lesions to the foot.  Currently utilizing topical hydrating cream and urea cream without significant improvement.  Not currently utilizing a pumice stone.  No signs of acute infection.    Review of patient's allergies indicates:   Allergen Reactions    Pyrilamine Swelling     (an ingredient in Midol)    Iodine and iodide containing products Hives    Iron Other (See Comments)       Past Medical History:   Diagnosis Date    Arthritis     Diabetes mellitus, type 2     Foot drop, left 01/26/2018    GERD (gastroesophageal reflux disease)     Heartburn     Hyperlipidemia     Hypertension     Left sided sciatica     s/p microdiscectomy 3/21/18       Family History   Problem Relation Age of Onset    Diabetes Mother     Diabetes Father        Social History     Socioeconomic History    Marital status:     Number of children: 2   Occupational History    Occupation:       Employer: [employer]   Tobacco Use    Smoking status: Never    Smokeless tobacco: Never   Substance and Sexual Activity    Alcohol use: No    Drug use: No    Sexual activity: Not Currently     Partners: Male     Social Determinants of Health     Financial Resource Strain: Low Risk      Difficulty of Paying Living Expenses: Not hard at all   Food Insecurity: No Food Insecurity    Worried About Running Out of Food in the Last Year: Never true    Ran Out of Food in the Last Year: Never true   Transportation Needs: No Transportation Needs    Lack of Transportation (Medical): No    Lack of Transportation (Non-Medical): No   Physical Activity: Sufficiently Active    Days of Exercise per Week: 6 days    Minutes of Exercise per Session: 60 min   Stress: Stress Concern Present    Feeling of Stress : Very much   Social Connections: Socially Integrated    Frequency of Communication with Friends and Family: More than three times a week    Frequency of Social Gatherings with Friends and Family: Never    Attends [employer] Services: More than 4 times per year    Active Member of Clubs or Organizations: Yes    Attends Club or Organization Meetings: More than 4 times per year    Marital Status:    Housing Stability: Low Risk     Unable to Pay for Housing in the Last Year: No    Number of Places Lived in the Last Year: 1    Unstable Housing in the Last Year: No       Past Surgical History:   Procedure Laterality Date    ANTERIOR VAGINAL REPAIR N/A 2023    Procedure: COLPORRHAPHY, ANTERIOR;  Surgeon: ANA MARIA Mayorga MD;  Location: [facility] OR;  Service: OB/GYN;  Laterality: N/A;    BREAST BIOPSY       SECTION      COLONOSCOPY  2008    DR. JANET GARCÍA/MILD DIVERICULOSIS DESCENDING AND SIGMOID. REPEAT 5 YRS    ENDOSCOPIC ULTRASOUND OF UPPER GASTROINTESTINAL TRACT N/A 2022    Procedure: ULTRASOUND, UPPER GI TRACT, ENDOSCOPIC;  Surgeon: Ange Saldivar MD;  Location: [facility] ENDO;  Service: Endoscopy;  Laterality: N/A;  Upper and Linear, 22 sharkcore, slides and formalin    HERNIA REPAIR      MICRODISCECTOMY OF SPINE Left 2018    left L5-S1, Dr Segundo    SPINE SURGERY      TOTAL VAGINAL HYSTERECTOMY N/A 2023    Procedure: HYSTERECTOMY, TOTAL, VAGINAL;  Surgeon: ANA MARIA  [place] Roberto Mayorga MD;  Location: [facility];  Service: OB/GYN;  Laterality: N/A;       Review of Systems       Objective:   There were no vitals taken for this visit.    X-ray Knee Ortho Left  Narrative: EXAMINATION:  XR KNEE ORTHO LEFT    CLINICAL HISTORY:  Pain in right knee    TECHNIQUE:  AP standing of both knees, Merchant views of both knees as well as a lateral view of the left knee were performed.    COMPARISON:  None    FINDINGS:  No fracture or dislocation.  Prominent soft tissue swelling within the proximal lower leg.    Degenerative changes of the bilateral knees with osteophytes, joint space narrowing, and subchondral sclerosis.  Impression: Prominent soft tissue swelling of the lower leg.    Electronically signed by: Crispin De La Cruz  Date:    12/01/2022  Time:    17:14       Physical Exam   LOWER EXTREMITY PHYSICAL EXAMINATION    Vascular:  The right dorsalis pedis pulse 2/4 and the right posterior tibial pulse 2/4.  The left dorsalis pedis pulse 2/4 and posterior tibial pulse on the left is 2/4.  Capillary refill is intact.  Pedal hair growth intact    Neurological:  Sharp/dull, light touch, proprioception all intact and equal bilaterally.  Vibratory sensation is intact.  Protective sensation intact    Musculoskeletal: Manual Muscle Testing is 5/5 with dorsiflexion, plantar flexion, abduction, and adduction.   There is normal range of motion in the forefoot, hindfoot, and Ankle joint.     Dermatological:  Skin is supple and moist.  Plantar fat pad atrophy is noted.  There is 1 lesion present to the plantar aspect of the foot which have a resemblance to a plantar porokeratosis.  Centralize core is noted.  There is no acute signs of infection.  No signs of underlying ulceration.  There is no absence of skin line.  No obvious capillary hemorrhaging.    Assessment:     1. 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 Porokeratosis    3. Uncontrolled type 2 diabetes mellitus with hyperglycemia        Plan:     Plantar  fat pad atrophy of left foot    Porokeratosis    Uncontrolled type 2 diabetes mellitus with hyperglycemia      We discussed the etiology and pathology associated with acquired plantar porokeratosis.  We discussed the importance of removing the centralize core and alleviating pain discomfort.  We also discussed utilizing a pumice stone at home to reduce callus formation and subsequent recurrence of this area.  Recommend to apply hydrating creams and lotions this area daily to ensure that there is no subsequent buildup.    Porokeratotic skin formation, as outlined within the examination portion of this note, is surgically debrided with sharp #10/#15 blade, to alleviate discomfort with weight bearing and ambulation, and to lessen the possibility of skin complications, e.g., ulceration due to pressure. No ulceration(s) is are noted with/post debridement. The lesion is completed healed and resolved. No evidence of infection.     Foot counseling and education is provided at this visit. Patient is advised to wear socks and shoes at all times.  Do not walk barefoot, or with just socks, even when indoors.  Be sure to check and inspect the inside of the shoe before putting them on her feet.  Protect your feet at all times.  Walking shoes and/or athletic shoes are the best types of shoe gear. Do not wear vinyl or plastic type shoe gear, as they do not stretch and/or breathe.  Protect your feet from hot and/or cold. Elevate the extremities when sitting.  Do not wear excessively tight socks and/or shoe gear. Wiggle your toes for a few minutes throughout the day. Move your ankles up and down, in and out, to help blood flow in your lower extremity.     Future Appointments   Date Time Provider Department Center   8/2/2023  9:30 AM LABORATORYJULIUS [facility] [place]   8/9/2023  8:30 AM [facility]   11/2/2023  7:30 AM SPECIMEN, JULIUS [facility] SPECL[facility] [place]   11/2/2023  8:20 AM JULIUS [facility]     11/9/2023 11:20 AM [facility]

## 2023-07-25 ENCOUNTER — TELEPHONE (OUTPATIENT)
Dept: INTERNAL MEDICINE | Facility: CLINIC | Age: 78
End: 2023-07-25
Payer: MEDICARE

## 2023-07-25 NOTE — TELEPHONE ENCOUNTER
----- Message from Ryan Fletcher sent at 7/25/2023 12:27 PM CDT -----  Contact: Indira Jacobson is needing a call back in regards to having trouble with acid reflux. She would like to be called in to:    [id number] [place] - [address] AT [facility]  [address]  [place] [address]  Phone: [phone] Fax: [phone]    She also stated that she is having a rash on her left leg, and it may be starting to spread. Please give her a call back at [phone]

## 2023-07-26 ENCOUNTER — OFFICE VISIT (OUTPATIENT)
Dept: INTERNAL MEDICINE | Facility: CLINIC | Age: 78
End: 2023-07-26
Payer: MEDICARE

## 2023-07-26 VITALS
HEART RATE: 86 BPM | BODY MASS INDEX: 22.28 KG/M2 | SYSTOLIC BLOOD PRESSURE: 116 MMHG | WEIGHT: 125.75 LBS | OXYGEN SATURATION: 96 % | DIASTOLIC BLOOD PRESSURE: 60 MMHG | TEMPERATURE: 98 F

## 2023-07-26 DIAGNOSIS — B35.4 TINEA CORPORIS: Primary | ICD-10-CM

## 2023-07-26 PROCEDURE — 1159F PR MEDICATION LIST DOCUMENTED IN MEDICAL RECORD: ICD-10-PCS | Mod: CPTII,S$GLB,, | Performed by: PHYSICIAN ASSISTANT

## 2023-07-26 PROCEDURE — 3078F PR MOST RECENT DIASTOLIC BLOOD PRESSURE < 80 MM HG: ICD-10-PCS | Mod: CPTII,S$GLB,, | Performed by: PHYSICIAN ASSISTANT

## 2023-07-26 PROCEDURE — 3288F PR FALLS RISK ASSESSMENT DOCUMENTED: ICD-10-PCS | Mod: CPTII,S$GLB,, | Performed by: PHYSICIAN ASSISTANT

## 2023-07-26 PROCEDURE — 1160F RVW MEDS BY RX/DR IN RCRD: CPT | Mod: CPTII,S$GLB,, | Performed by: PHYSICIAN ASSISTANT

## 2023-07-26 PROCEDURE — 1126F PR PAIN SEVERITY QUANTIFIED, NO PAIN PRESENT: ICD-10-PCS | Mod: CPTII,S$GLB,, | Performed by: PHYSICIAN ASSISTANT

## 2023-07-26 PROCEDURE — 99999 PR PBB SHADOW E&M-EST. PATIENT-LVL V: ICD-10-PCS | Mod: PBBFAC,,, | Performed by: PHYSICIAN ASSISTANT

## 2023-07-26 PROCEDURE — 99999 PR PBB SHADOW E&M-EST. PATIENT-LVL V: CPT | Mod: PBBFAC,,, | Performed by: PHYSICIAN ASSISTANT

## 2023-07-26 PROCEDURE — 3074F SYST BP LT 130 MM HG: CPT | Mod: CPTII,S$GLB,, | Performed by: PHYSICIAN ASSISTANT

## 2023-07-26 PROCEDURE — 1101F PT FALLS ASSESS-DOCD LE1/YR: CPT | Mod: CPTII,S$GLB,, | Performed by: PHYSICIAN ASSISTANT

## 2023-07-26 PROCEDURE — 1159F MED LIST DOCD IN RCRD: CPT | Mod: CPTII,S$GLB,, | Performed by: PHYSICIAN ASSISTANT

## 2023-07-26 PROCEDURE — 1160F PR REVIEW ALL MEDS BY PRESCRIBER/CLIN PHARMACIST DOCUMENTED: ICD-10-PCS | Mod: CPTII,S$GLB,, | Performed by: PHYSICIAN ASSISTANT

## 2023-07-26 PROCEDURE — 3078F DIAST BP <80 MM HG: CPT | Mod: CPTII,S$GLB,, | Performed by: PHYSICIAN ASSISTANT

## 2023-07-26 PROCEDURE — 3288F FALL RISK ASSESSMENT DOCD: CPT | Mod: CPTII,S$GLB,, | Performed by: PHYSICIAN ASSISTANT

## 2023-07-26 PROCEDURE — 1101F PR PT FALLS ASSESS DOC 0-1 FALLS W/OUT INJ PAST YR: ICD-10-PCS | Mod: CPTII,S$GLB,, | Performed by: PHYSICIAN ASSISTANT

## 2023-07-26 PROCEDURE — 1126F AMNT PAIN NOTED NONE PRSNT: CPT | Mod: CPTII,S$GLB,, | Performed by: PHYSICIAN ASSISTANT

## 2023-07-26 PROCEDURE — 3074F PR MOST RECENT SYSTOLIC BLOOD PRESSURE < 130 MM HG: ICD-10-PCS | Mod: CPTII,S$GLB,, | Performed by: PHYSICIAN ASSISTANT

## 2023-07-26 PROCEDURE — 99213 PR OFFICE/OUTPT VISIT, EST, LEVL III, 20-29 MIN: ICD-10-PCS | Mod: S$GLB,,, | Performed by: PHYSICIAN ASSISTANT

## 2023-07-26 PROCEDURE — 99213 OFFICE O/P EST LOW 20 MIN: CPT | Mod: S$GLB,,, | Performed by: PHYSICIAN ASSISTANT

## 2023-07-26 RX ORDER — CHOLECALCIFEROL (VITAMIN D3) 25 MCG
1000 TABLET ORAL DAILY
COMMUNITY
End: 2023-11-09

## 2023-07-26 RX ORDER — ASCORBIC ACID 500 MG
500 TABLET ORAL DAILY
COMMUNITY

## 2023-07-26 RX ORDER — KETOCONAZOLE 20 MG/G
1 CREAM TOPICAL
COMMUNITY
End: 2023-11-17 | Stop reason: SDUPTHER

## 2023-07-26 RX ORDER — CLOTRIMAZOLE AND BETAMETHASONE DIPROPIONATE 10; .64 MG/G; MG/G
CREAM TOPICAL 2 TIMES DAILY
Qty: 45 G | Refills: 2 | Status: SHIPPED | OUTPATIENT
Start: 2023-07-26

## 2023-07-26 RX ORDER — MULTIVITAMIN
1 TABLET ORAL DAILY
COMMUNITY

## 2023-07-26 RX ORDER — BIOTIN 1000 MCG
TABLET,CHEWABLE ORAL
COMMUNITY
End: 2023-11-09

## 2023-07-26 RX ORDER — PANTOPRAZOLE SODIUM 40 MG/1
40 TABLET, DELAYED RELEASE ORAL
Status: CANCELLED | OUTPATIENT
Start: 2023-07-26

## 2023-07-26 RX ORDER — SULFASALAZINE 500 MG/1
500 TABLET, DELAYED RELEASE ORAL 2 TIMES DAILY
COMMUNITY
Start: 2023-07-01 | End: 2023-08-01

## 2023-07-26 NOTE — PROGRESS NOTES
Subjective:       Patient ID: Indira Walker is a 77 y.o. female.    Chief Complaint: Rash (Patient stated that she noticed the rash on her arms and legs about a month ago. She has been treating it with Ketoconazole cream from urgent care but it is not working. )      HPI  76 y/o female presents to clinic with c/o pruritic rash to left lower leg for 1 month. She denies change in soap, lotion or detergent. She tried ketoconazole cream without relief.    Review of Systems   Constitutional:  Negative for chills and fever.   Skin:  Positive for rash.     Objective:      Physical Exam  Vitals and nursing note reviewed.   Constitutional:       General: She is not in acute distress.     Appearance: She is well-developed.   HENT:      Head: Normocephalic and atraumatic.   Eyes:      General: Lids are normal. No scleral icterus.     Extraocular Movements: Extraocular movements intact.      Conjunctiva/sclera: Conjunctivae normal.   Pulmonary:      Effort: Pulmonary effort is normal.   Skin:         Neurological:      Mental Status: She is alert.      Cranial Nerves: No cranial nerve deficit.   Psychiatric:         Mood and Affect: Mood and affect normal.       Assessment:       1. Tinea corporis        Plan:   1. Tinea corporis  -     clotrimazole-betamethasone 1-0.05% (LOTRISONE) cream; Apply topically 2 (two) times daily.  Dispense: 45 g; Refill: 2

## 2023-08-01 DIAGNOSIS — M05.9 RHEUMATOID ARTHRITIS WITH RHEUMATOID FACTOR, UNSPECIFIED: ICD-10-CM

## 2023-08-01 RX ORDER — SULFASALAZINE 500 MG/1
500 TABLET, DELAYED RELEASE ORAL 2 TIMES DAILY
Qty: 60 TABLET | Refills: 3 | Status: SHIPPED | OUTPATIENT
Start: 2023-08-01 | End: 2023-11-22

## 2023-08-02 ENCOUNTER — LAB VISIT (OUTPATIENT)
Dept: LAB | Facility: HOSPITAL | Age: 78
End: 2023-08-02
Payer: MEDICARE

## 2023-08-02 DIAGNOSIS — M05.9 SEROPOSITIVE RHEUMATOID ARTHRITIS: ICD-10-CM

## 2023-08-02 LAB
ALBUMIN SERPL BCP-MCNC: 3.4 G/DL (ref 3.5–5.2)
ALP SERPL-CCNC: 45 U/L (ref 55–135)
ALT SERPL W/O P-5'-P-CCNC: 8 U/L (ref 10–44)
ANION GAP SERPL CALC-SCNC: 9 MMOL/L (ref 8–16)
AST SERPL-CCNC: 14 U/L (ref 10–40)
BASOPHILS # BLD AUTO: 0.03 K/UL (ref 0–0.2)
BASOPHILS NFR BLD: 0.5 % (ref 0–1.9)
BILIRUB SERPL-MCNC: 0.4 MG/DL (ref 0.1–1)
BUN SERPL-MCNC: 14 MG/DL (ref 8–23)
CALCIUM SERPL-MCNC: 9 MG/DL (ref 8.7–10.5)
CHLORIDE SERPL-SCNC: 103 MMOL/L (ref 95–110)
CO2 SERPL-SCNC: 27 MMOL/L (ref 23–29)
CREAT SERPL-MCNC: 0.9 MG/DL (ref 0.5–1.4)
DIFFERENTIAL METHOD: ABNORMAL
EOSINOPHIL # BLD AUTO: 0.1 K/UL (ref 0–0.5)
EOSINOPHIL NFR BLD: 2.2 % (ref 0–8)
ERYTHROCYTE [DISTWIDTH] IN BLOOD BY AUTOMATED COUNT: 13.3 % (ref 11.5–14.5)
EST. GFR  (NO RACE VARIABLE): >60 ML/MIN/1.73 M^2
GLUCOSE SERPL-MCNC: 152 MG/DL (ref 70–110)
HCT VFR BLD AUTO: 34.1 % (ref 37–48.5)
HGB BLD-MCNC: 11 G/DL (ref 12–16)
IMM GRANULOCYTES # BLD AUTO: 0.01 K/UL (ref 0–0.04)
IMM GRANULOCYTES NFR BLD AUTO: 0.2 % (ref 0–0.5)
LYMPHOCYTES # BLD AUTO: 2.2 K/UL (ref 1–4.8)
LYMPHOCYTES NFR BLD: 35.2 % (ref 18–48)
MCH RBC QN AUTO: 30.3 PG (ref 27–31)
MCHC RBC AUTO-ENTMCNC: 32.3 G/DL (ref 32–36)
MCV RBC AUTO: 94 FL (ref 82–98)
MONOCYTES # BLD AUTO: 0.8 K/UL (ref 0.3–1)
MONOCYTES NFR BLD: 12.1 % (ref 4–15)
NEUTROPHILS # BLD AUTO: 3.2 K/UL (ref 1.8–7.7)
NEUTROPHILS NFR BLD: 49.8 % (ref 38–73)
NRBC BLD-RTO: 0 /100 WBC
PLATELET # BLD AUTO: 291 K/UL (ref 150–450)
PMV BLD AUTO: 9.3 FL (ref 9.2–12.9)
POTASSIUM SERPL-SCNC: 3.9 MMOL/L (ref 3.5–5.1)
PROT SERPL-MCNC: 6.8 G/DL (ref 6–8.4)
RBC # BLD AUTO: 3.63 M/UL (ref 4–5.4)
SODIUM SERPL-SCNC: 139 MMOL/L (ref 136–145)
WBC # BLD AUTO: 6.34 K/UL (ref 3.9–12.7)

## 2023-08-02 PROCEDURE — 85025 COMPLETE CBC W/AUTO DIFF WBC: CPT | Performed by: INTERNAL MEDICINE

## 2023-08-02 PROCEDURE — 36415 COLL VENOUS BLD VENIPUNCTURE: CPT | Performed by: INTERNAL MEDICINE

## 2023-08-02 PROCEDURE — 80053 COMPREHEN METABOLIC PANEL: CPT | Performed by: INTERNAL MEDICINE

## 2023-08-09 ENCOUNTER — OFFICE VISIT (OUTPATIENT)
Dept: RHEUMATOLOGY | Facility: CLINIC | Age: 78
End: 2023-08-09
Payer: MEDICARE

## 2023-08-09 VITALS
SYSTOLIC BLOOD PRESSURE: 139 MMHG | HEIGHT: 63 IN | WEIGHT: 125.88 LBS | HEART RATE: 67 BPM | BODY MASS INDEX: 22.3 KG/M2 | DIASTOLIC BLOOD PRESSURE: 77 MMHG

## 2023-08-09 DIAGNOSIS — E78.5 HYPERLIPIDEMIA ASSOCIATED WITH TYPE 2 DIABETES MELLITUS: ICD-10-CM

## 2023-08-09 DIAGNOSIS — I50.32 CHRONIC HEART FAILURE WITH PRESERVED EJECTION FRACTION: ICD-10-CM

## 2023-08-09 DIAGNOSIS — M67.911 DISORDER OF RIGHT ROTATOR CUFF: ICD-10-CM

## 2023-08-09 DIAGNOSIS — Z79.899 IMMUNOSUPPRESSION DUE TO DRUG THERAPY: ICD-10-CM

## 2023-08-09 DIAGNOSIS — H91.93 DECREASED HEARING OF BOTH EARS: ICD-10-CM

## 2023-08-09 DIAGNOSIS — Z79.4 TYPE 2 DIABETES MELLITUS WITH STAGE 3A CHRONIC KIDNEY DISEASE, WITH LONG-TERM CURRENT USE OF INSULIN: ICD-10-CM

## 2023-08-09 DIAGNOSIS — E11.69 HYPERLIPIDEMIA ASSOCIATED WITH TYPE 2 DIABETES MELLITUS: ICD-10-CM

## 2023-08-09 DIAGNOSIS — Z51.81 MEDICATION MONITORING ENCOUNTER: ICD-10-CM

## 2023-08-09 DIAGNOSIS — M05.9 SEROPOSITIVE RHEUMATOID ARTHRITIS: Primary | ICD-10-CM

## 2023-08-09 DIAGNOSIS — N18.31 TYPE 2 DIABETES MELLITUS WITH STAGE 3A CHRONIC KIDNEY DISEASE, WITH LONG-TERM CURRENT USE OF INSULIN: ICD-10-CM

## 2023-08-09 DIAGNOSIS — D84.821 IMMUNOSUPPRESSION DUE TO DRUG THERAPY: ICD-10-CM

## 2023-08-09 DIAGNOSIS — E11.22 TYPE 2 DIABETES MELLITUS WITH STAGE 3A CHRONIC KIDNEY DISEASE, WITH LONG-TERM CURRENT USE OF INSULIN: ICD-10-CM

## 2023-08-09 PROCEDURE — 1159F MED LIST DOCD IN RCRD: CPT | Mod: CPTII,S$GLB,, | Performed by: INTERNAL MEDICINE

## 2023-08-09 PROCEDURE — 99499 UNLISTED E&M SERVICE: CPT | Mod: S$GLB,,, | Performed by: INTERNAL MEDICINE

## 2023-08-09 PROCEDURE — 3078F DIAST BP <80 MM HG: CPT | Mod: CPTII,S$GLB,, | Performed by: INTERNAL MEDICINE

## 2023-08-09 PROCEDURE — 3075F SYST BP GE 130 - 139MM HG: CPT | Mod: CPTII,S$GLB,, | Performed by: INTERNAL MEDICINE

## 2023-08-09 PROCEDURE — 3078F PR MOST RECENT DIASTOLIC BLOOD PRESSURE < 80 MM HG: ICD-10-PCS | Mod: CPTII,S$GLB,, | Performed by: INTERNAL MEDICINE

## 2023-08-09 PROCEDURE — 99214 PR OFFICE/OUTPT VISIT, EST, LEVL IV, 30-39 MIN: ICD-10-PCS | Mod: S$GLB,,, | Performed by: INTERNAL MEDICINE

## 2023-08-09 PROCEDURE — 3288F PR FALLS RISK ASSESSMENT DOCUMENTED: ICD-10-PCS | Mod: CPTII,S$GLB,, | Performed by: INTERNAL MEDICINE

## 2023-08-09 PROCEDURE — 99999 PR PBB SHADOW E&M-EST. PATIENT-LVL IV: CPT | Mod: PBBFAC,,, | Performed by: INTERNAL MEDICINE

## 2023-08-09 PROCEDURE — 1126F AMNT PAIN NOTED NONE PRSNT: CPT | Mod: CPTII,S$GLB,, | Performed by: INTERNAL MEDICINE

## 2023-08-09 PROCEDURE — 1126F PR PAIN SEVERITY QUANTIFIED, NO PAIN PRESENT: ICD-10-PCS | Mod: CPTII,S$GLB,, | Performed by: INTERNAL MEDICINE

## 2023-08-09 PROCEDURE — 3075F PR MOST RECENT SYSTOLIC BLOOD PRESS GE 130-139MM HG: ICD-10-PCS | Mod: CPTII,S$GLB,, | Performed by: INTERNAL MEDICINE

## 2023-08-09 PROCEDURE — 99999 PR PBB SHADOW E&M-EST. PATIENT-LVL IV: ICD-10-PCS | Mod: PBBFAC,,, | Performed by: INTERNAL MEDICINE

## 2023-08-09 PROCEDURE — 1101F PR PT FALLS ASSESS DOC 0-1 FALLS W/OUT INJ PAST YR: ICD-10-PCS | Mod: CPTII,S$GLB,, | Performed by: INTERNAL MEDICINE

## 2023-08-09 PROCEDURE — 99214 OFFICE O/P EST MOD 30 MIN: CPT | Mod: S$GLB,,, | Performed by: INTERNAL MEDICINE

## 2023-08-09 PROCEDURE — 1101F PT FALLS ASSESS-DOCD LE1/YR: CPT | Mod: CPTII,S$GLB,, | Performed by: INTERNAL MEDICINE

## 2023-08-09 PROCEDURE — 3288F FALL RISK ASSESSMENT DOCD: CPT | Mod: CPTII,S$GLB,, | Performed by: INTERNAL MEDICINE

## 2023-08-09 PROCEDURE — 1159F PR MEDICATION LIST DOCUMENTED IN MEDICAL RECORD: ICD-10-PCS | Mod: CPTII,S$GLB,, | Performed by: INTERNAL MEDICINE

## 2023-08-09 NOTE — PROGRESS NOTES
RHEUMATOLOGY OUTPATIENT CLINIC NOTE    2023    Attending Rheumatologist: Giovanni Cage  Primary Care Provider/Physician Requesting Consultation: Bertha Kearns MD   Chief Complaint/Reason For Consultation:  Rheumatoid Arthritis and Osteoarthritis      Subjective:     Indira Walker is a 78 y.o. Black or  female with SPRA for f/u    No acute complaints.  Patient unable to recall DMARDs by name, unsure if taking SSZ prescribed last visit to replace MTX (recurrent ulcers oral mucosa).    Review of Systems   Constitutional:  Negative for fever.   Respiratory:  Negative for cough and shortness of breath.    Gastrointestinal:  Negative for blood in stool.   Genitourinary:  Negative for hematuria.   Musculoskeletal:  Negative for joint pain.   Skin:  Negative for rash.   Neurological:  Negative for focal weakness and weakness.       Chronic comorbid conditions affecting medical decision making today:  Past Medical History:   Diagnosis Date    Arthritis     Diabetes mellitus, type 2     Foot drop, left 2018    GERD (gastroesophageal reflux disease)     Heartburn     Hyperlipidemia     Hypertension     Left sided sciatica     s/p microdiscectomy 3/21/18     Past Surgical History:   Procedure Laterality Date    ANTERIOR VAGINAL REPAIR N/A 2023    Procedure: COLPORRHAPHY, ANTERIOR;  Surgeon: ANA MARIA Mayorga MD;  Location: [facility] OR;  Service: OB/GYN;  Laterality: N/A;    BREAST BIOPSY       SECTION      COLONOSCOPY  2008    DR. JANET GARCÍA/MILD DIVERICULOSIS DESCENDING AND SIGMOID. REPEAT 5 YRS    ENDOSCOPIC ULTRASOUND OF UPPER GASTROINTESTINAL TRACT N/A 2022    Procedure: ULTRASOUND, UPPER GI TRACT, ENDOSCOPIC;  Surgeon: Ange Saldivar MD;  Location: [facility] ENDO;  Service: Endoscopy;  Laterality: N/A;  Upper and Linear, 22 sharkcore, slides and formalin    HERNIA REPAIR      MICRODISCECTOMY OF SPINE Left 2018    left L5-S1, Dr Segundo     SPINE SURGERY      TOTAL VAGINAL HYSTERECTOMY N/A 4/14/2023    Procedure: HYSTERECTOMY, TOTAL, VAGINAL;  Surgeon: ANA MARIA Mayorga MD;  Location: [facility];  Service: OB/GYN;  Laterality: N/A;     Family History   Problem Relation Age of Onset    Diabetes Mother     Diabetes Father      Social History     Tobacco Use   Smoking Status Never   Smokeless Tobacco Never       Current Outpatient Medications:     (Magic mouthwash) 1:1:1 diphenhydrAMINE(Benadryl) 12.5mg/5ml liq, aluminum & magnesium hydroxide-simethicone (Maalox), LIDOcaine viscous 2%, Swish and spit 15 mLs every 4 (four) hours as needed (mouth sores). for mouth sores, Disp: 360 mL, Rfl: 0    acetaminophen (TYLENOL EXTRA STRENGTH ORAL), Take by mouth., Disp: , Rfl:     ascorbic acid, vitamin C, (VITAMIN C) 500 MG tablet, Take 500 mg by mouth once daily., Disp: , Rfl:     aspirin (ECOTRIN) 81 MG EC tablet, Take 1 tablet (81 mg total) by mouth once daily. Every other day, Disp: , Rfl: 0    azelastine (ASTELIN) 137 mcg (0.1 %) nasal spray, SPRAY 1 SPRAY BY NASAL ROUTE 2 TIMES DAILY., Disp: 30 mL, Rfl: 5    biotin 1,000 mcg Chew, Take by mouth., Disp: , Rfl:     blood sugar diagnostic Strp, To check BG 2 times daily, to use with insurance preferred meter, Disp: 200 each, Rfl: 3    blood-glucose meter kit, To check BG 2 times daily, to use with insurance preferred meter, Disp: 1 each, Rfl: 0    clotrimazole-betamethasone 1-0.05% (LOTRISONE) cream, Apply topically 2 (two) times daily., Disp: 45 g, Rfl: 2    famotidine (PEPCID) 40 MG tablet, TAKE 1 TABLET BY MOUTH EVERY DAY, Disp: 90 tablet, Rfl: 3    fluticasone propionate (FLONASE) 50 mcg/actuation nasal spray, 2 sprays (100 mcg total) by Each Nostril route once daily., Disp: 16 g, Rfl: 5    folic acid (FOLVITE) 1 MG tablet, Take 1,000 mcg by mouth., Disp: , Rfl:     ketoconazole (NIZORAL) 2 % cream, 1 application ., Disp: , Rfl:     lancets Misc, To check BG 2 times daily, to use with insurance preferred  meter, Disp: 200 each, Rfl: 3    loratadine (CLARITIN) 10 mg tablet, Take 10 mg by mouth once daily., Disp: , Rfl:     losartan-hydrochlorothiazide 100-12.5 mg (HYZAAR) 100-12.5 mg Tab, TAKE 1 TABLET BY MOUTH EVERY DAY, Disp: 90 tablet, Rfl: 1    metFORMIN (GLUCOPHAGE) 1000 MG tablet, TAKE 1 TABLET BY MOUTH TWICE A DAY WITH MEALS, Disp: 180 tablet, Rfl: 1    multivitamin (THERAGRAN) per tablet, Take 1 tablet by mouth once daily., Disp: , Rfl:     pantoprazole (PROTONIX) 40 MG tablet, Take 40 mg by mouth., Disp: , Rfl:     rosuvastatin (CRESTOR) 20 MG tablet, TAKE 1 TABLET BY MOUTH EVERY DAY, Disp: 90 tablet, Rfl: 1    sulfaSALAzine (AZULFIDINE) 500 MG EC tablet, TAKE 1 TABLET BY MOUTH TWICE A DAY, Disp: 60 tablet, Rfl: 3    tiZANidine (ZANAFLEX) 4 MG tablet, Take 1 tablet (4 mg total) by mouth every 8 (eight) hours as needed (muscle spasm)., Disp: 20 tablet, Rfl: 0    vitamin D (VITAMIN D3) 1000 units Tab, Take 1,000 Units by mouth once daily., Disp: , Rfl:      Objective:     Vitals:    08/09/23 0825   BP: 139/77   Pulse: 67     Physical Exam   Eyes: Conjunctivae are normal.   Pulmonary/Chest: Effort normal. No respiratory distress.   Musculoskeletal:         General: Swelling and deformity present. No tenderness.   Neurological: She displays no weakness.   Skin: No rash noted.       Reviewed available old and all outside pertinent medical records available.    All lab results personally reviewed and interpreted by me.       ASSESSMENT      Encounter Diagnoses   Name Primary?    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 Recurrent mucositis w/ MTX, replaced by SSZ (4/2023-), however patient unable to recall medications w/ certainty, showing her personal list of active medications including MTX.  Denies squeeze tenderness on exam.  No rashes w/ inflammatory appearance or weakness on exam. Labs w/o concern of toxicity related to DMARD use.  No marginal erosive changes reported on XR on file.  Plan to c/w SSZ 500mg BID unchanged.  Asked patient to bring medications to encounter next visit.  Labs close to f/u visit to monitor for toxicity related to DMARD use.     Giovanni Cage M.D.

## 2023-08-24 ENCOUNTER — TELEPHONE (OUTPATIENT)
Dept: INTERNAL MEDICINE | Facility: CLINIC | Age: 78
End: 2023-08-24
Payer: MEDICARE

## 2023-08-24 NOTE — TELEPHONE ENCOUNTER
If she is eating plenty of vegetables, fruits and whole grains a multivitamin supplement should not be necessary.

## 2023-08-24 NOTE — TELEPHONE ENCOUNTER
Pt states she was taken off all of her vitamins but she would like to know if she can take a woman's multivitamin. Please advise.

## 2023-08-24 NOTE — TELEPHONE ENCOUNTER
Pt states Dr. Kearns advised to d/c them, said she overdosed on vitamins and was in the hospital. She said the ones she really needs to take is biotin and a multivitamin. She was also taking vit D3, vit C, carrots, beats, etc. Please advise.

## 2023-08-24 NOTE — TELEPHONE ENCOUNTER
----- Message from Kaylynn Mandujano sent at 8/24/2023  1:42 PM CDT -----  Contact: Indira Jacobson is calling to speak to the nurse regarding her vitamins she stated she was taken off all of them and ants to get back on another one. Please give her a call at  [phone]    Thanks  LJ

## 2023-08-29 ENCOUNTER — LAB VISIT (OUTPATIENT)
Dept: LAB | Facility: HOSPITAL | Age: 78
End: 2023-08-29
Attending: PHYSICIAN ASSISTANT
Payer: MEDICARE

## 2023-08-29 ENCOUNTER — OFFICE VISIT (OUTPATIENT)
Dept: INTERNAL MEDICINE | Facility: CLINIC | Age: 78
End: 2023-08-29
Payer: MEDICARE

## 2023-08-29 VITALS
DIASTOLIC BLOOD PRESSURE: 66 MMHG | OXYGEN SATURATION: 96 % | WEIGHT: 126.63 LBS | SYSTOLIC BLOOD PRESSURE: 132 MMHG | TEMPERATURE: 97 F | HEART RATE: 105 BPM | BODY MASS INDEX: 22.44 KG/M2

## 2023-08-29 DIAGNOSIS — R53.83 FATIGUE, UNSPECIFIED TYPE: ICD-10-CM

## 2023-08-29 DIAGNOSIS — K21.9 GASTROESOPHAGEAL REFLUX DISEASE, UNSPECIFIED WHETHER ESOPHAGITIS PRESENT: Primary | ICD-10-CM

## 2023-08-29 DIAGNOSIS — F33.9 DEPRESSION, RECURRENT: ICD-10-CM

## 2023-08-29 PROBLEM — F32.A MILD DEPRESSION: Status: RESOLVED | Noted: 2023-05-10 | Resolved: 2023-08-29

## 2023-08-29 LAB
BACTERIA #/AREA URNS HPF: NORMAL /HPF
BILIRUB UR QL STRIP: NEGATIVE
CLARITY UR: CLEAR
COLOR UR: YELLOW
GLUCOSE UR QL STRIP: ABNORMAL
HGB UR QL STRIP: NEGATIVE
KETONES UR QL STRIP: NEGATIVE
LEUKOCYTE ESTERASE UR QL STRIP: NEGATIVE
MICROSCOPIC COMMENT: NORMAL
NITRITE UR QL STRIP: NEGATIVE
PH UR STRIP: 7 [PH] (ref 5–8)
PROT UR QL STRIP: ABNORMAL
SP GR UR STRIP: 1.02 (ref 1–1.03)
URN SPEC COLLECT METH UR: ABNORMAL
UROBILINOGEN UR STRIP-ACNC: NEGATIVE EU/DL
YEAST URNS QL MICRO: NORMAL

## 2023-08-29 PROCEDURE — 81000 URINALYSIS NONAUTO W/SCOPE: CPT | Performed by: PHYSICIAN ASSISTANT

## 2023-08-29 PROCEDURE — 3078F PR MOST RECENT DIASTOLIC BLOOD PRESSURE < 80 MM HG: ICD-10-PCS | Mod: CPTII,S$GLB,, | Performed by: PHYSICIAN ASSISTANT

## 2023-08-29 PROCEDURE — 3288F PR FALLS RISK ASSESSMENT DOCUMENTED: ICD-10-PCS | Mod: CPTII,S$GLB,, | Performed by: PHYSICIAN ASSISTANT

## 2023-08-29 PROCEDURE — 99214 OFFICE O/P EST MOD 30 MIN: CPT | Mod: S$GLB,,, | Performed by: PHYSICIAN ASSISTANT

## 2023-08-29 PROCEDURE — 1125F PR PAIN SEVERITY QUANTIFIED, PAIN PRESENT: ICD-10-PCS | Mod: CPTII,S$GLB,, | Performed by: PHYSICIAN ASSISTANT

## 2023-08-29 PROCEDURE — 1101F PR PT FALLS ASSESS DOC 0-1 FALLS W/OUT INJ PAST YR: ICD-10-PCS | Mod: CPTII,S$GLB,, | Performed by: PHYSICIAN ASSISTANT

## 2023-08-29 PROCEDURE — 99214 PR OFFICE/OUTPT VISIT, EST, LEVL IV, 30-39 MIN: ICD-10-PCS | Mod: S$GLB,,, | Performed by: PHYSICIAN ASSISTANT

## 2023-08-29 PROCEDURE — 3078F DIAST BP <80 MM HG: CPT | Mod: CPTII,S$GLB,, | Performed by: PHYSICIAN ASSISTANT

## 2023-08-29 PROCEDURE — 99999 PR PBB SHADOW E&M-EST. PATIENT-LVL V: CPT | Mod: PBBFAC,,, | Performed by: PHYSICIAN ASSISTANT

## 2023-08-29 PROCEDURE — 99999 PR PBB SHADOW E&M-EST. PATIENT-LVL V: ICD-10-PCS | Mod: PBBFAC,,, | Performed by: PHYSICIAN ASSISTANT

## 2023-08-29 PROCEDURE — 3288F FALL RISK ASSESSMENT DOCD: CPT | Mod: CPTII,S$GLB,, | Performed by: PHYSICIAN ASSISTANT

## 2023-08-29 PROCEDURE — 1159F MED LIST DOCD IN RCRD: CPT | Mod: CPTII,S$GLB,, | Performed by: PHYSICIAN ASSISTANT

## 2023-08-29 PROCEDURE — 1101F PT FALLS ASSESS-DOCD LE1/YR: CPT | Mod: CPTII,S$GLB,, | Performed by: PHYSICIAN ASSISTANT

## 2023-08-29 PROCEDURE — 1125F AMNT PAIN NOTED PAIN PRSNT: CPT | Mod: CPTII,S$GLB,, | Performed by: PHYSICIAN ASSISTANT

## 2023-08-29 PROCEDURE — 1159F PR MEDICATION LIST DOCUMENTED IN MEDICAL RECORD: ICD-10-PCS | Mod: CPTII,S$GLB,, | Performed by: PHYSICIAN ASSISTANT

## 2023-08-29 PROCEDURE — 3075F SYST BP GE 130 - 139MM HG: CPT | Mod: CPTII,S$GLB,, | Performed by: PHYSICIAN ASSISTANT

## 2023-08-29 PROCEDURE — 3075F PR MOST RECENT SYSTOLIC BLOOD PRESS GE 130-139MM HG: ICD-10-PCS | Mod: CPTII,S$GLB,, | Performed by: PHYSICIAN ASSISTANT

## 2023-08-29 RX ORDER — PANTOPRAZOLE SODIUM 40 MG/1
40 TABLET, DELAYED RELEASE ORAL DAILY
Qty: 90 TABLET | Refills: 3 | Status: SHIPPED | OUTPATIENT
Start: 2023-08-29 | End: 2024-08-28

## 2023-08-29 NOTE — PROGRESS NOTES
"Subjective:       Patient ID: Indira Walker is a 78 y.o. female.    Chief Complaint: Fatigue (Patient stated that she was told to stop taking all the vitamins she was on and she said that she has been feeling extremely weak since then. )      Fatigue  Associated symptoms include fatigue. Pertinent negatives include no chest pain or fever.     78-year-old female with hypertension, hyperlipidemia, diabetes, heart failure, rheumatoid arthritis, anemia and GERD presents with chronic fatigue.  She is requesting to have all my vitamin levels checked".  She was hospitalized last year for hypercalcemia due to multiple vitamins she was taking over-the-counter that had calcium in each 1.  Since that time she reports she has stopped all of her vitamins and she experiences fatigue and low energy.  She is usually very active.  She is also requesting refill of her Protonix.    Review of Systems   Constitutional:  Positive for fatigue. Negative for fever.   Respiratory:  Negative for shortness of breath.    Cardiovascular:  Negative for chest pain.       Objective:      Physical Exam  Vitals and nursing note reviewed.   Constitutional:       General: She is not in acute distress.     Appearance: She is well-developed.   HENT:      Head: Normocephalic and atraumatic.   Eyes:      General: Lids are normal. No scleral icterus.     Extraocular Movements: Extraocular movements intact.      Conjunctiva/sclera: Conjunctivae normal.   Pulmonary:      Effort: Pulmonary effort is normal.   Neurological:      Mental Status: She is alert.      Cranial Nerves: No cranial nerve deficit.   Psychiatric:         Mood and Affect: Mood and affect normal.         Assessment:       1. Gastroesophageal reflux disease, unspecified whether esophagitis present    2. Fatigue, unspecified type    3. Depression, recurrent        Plan:   1. Gastroesophageal reflux disease, unspecified whether esophagitis present  Assessment & Plan:  Continue " Protonix    Orders:  -     pantoprazole (PROTONIX) 40 MG tablet; Take 1 tablet (40 mg total) by mouth once daily.  Dispense: 90 tablet; Refill: 3    2. Fatigue, unspecified type  -     Urinalysis, Reflex to Urine Culture Urine, Clean Catch; Future; Expected date: 08/29/2023    3. Depression, recurrent  Overview:  Patient lost both of her children, declines therapy at this time        Instructed patient to take a multivitamin, vitamin D3 supplement and biotin only.  Do not take any additional calcium unless otherwise instructed by her primary care team.  No additional vitamin-C.  I did advise her that she may take vitamin B12 in the morning if her multivitamin does not have B12 in it but if it has B12 to not taken additional B12 supplement.  No labs will be ordered at this time.

## 2023-08-29 NOTE — PATIENT INSTRUCTIONS
"Tips to Control Acid Reflux  To control acid reflux, youll need to make some basic diet and lifestyle changes. The simple steps outlined below may be all youll need to relieve discomfort.  Watch What You Eat    Avoid fatty foods and spicy foods.  Eat fewer acidic foods, such as citrus and tomato-based foods. These can increase symptoms.  Limit drinking alcohol, caffeine, and fizzy beverages. All increase acid reflux.  Try limiting chocolate, peppermint, and spearmint. These can worsen acid reflux in some people.  Watch When You Eat  Avoid lying down for 3 hours after eating.  Do not snack before going to bed.  Raise Your Head    Raising your head and upper body by 4" to 6" helps limit reflux when youre lying down. Put blocks under the head of the bed frame to raise it.  Other Changes   Lose weight, if you need to.  Dont work out near bedtime.  Avoid tight-fitting clothes.  Limit aspirin and ibuprofen.  Stop smoking.    © 6319-6900 [facility], [address] [place] [address], [place] [address]. All rights reserved. This information is not intended as a substitute for professional medical care. Always follow your healthcare professional's instructions.   "
Pt underwent an emergent  section.  Please refer to delivery summary and operative report for details.    Pt did well postpartum.  She was voiding, ambulating and tolerating regular diet.  Her pain is well controlled and she remained afebrile.  Pt was discharged in stable condition and will follow up in office for her postpartum visit.

## 2023-08-30 DIAGNOSIS — R73.09 OTHER ABNORMAL GLUCOSE: ICD-10-CM

## 2023-08-30 DIAGNOSIS — R81 GLUCOSURIA: Primary | ICD-10-CM

## 2023-09-01 ENCOUNTER — LAB VISIT (OUTPATIENT)
Dept: LAB | Facility: HOSPITAL | Age: 78
End: 2023-09-01
Attending: PHYSICIAN ASSISTANT
Payer: MEDICARE

## 2023-09-01 DIAGNOSIS — R73.09 OTHER ABNORMAL GLUCOSE: ICD-10-CM

## 2023-09-01 DIAGNOSIS — R81 GLUCOSURIA: ICD-10-CM

## 2023-09-01 PROCEDURE — 80048 BASIC METABOLIC PNL TOTAL CA: CPT | Performed by: PHYSICIAN ASSISTANT

## 2023-09-01 PROCEDURE — 83036 HEMOGLOBIN GLYCOSYLATED A1C: CPT | Performed by: PHYSICIAN ASSISTANT

## 2023-09-01 PROCEDURE — 36415 COLL VENOUS BLD VENIPUNCTURE: CPT | Performed by: PHYSICIAN ASSISTANT

## 2023-09-02 LAB
ANION GAP SERPL CALC-SCNC: 10 MMOL/L (ref 8–16)
BUN SERPL-MCNC: 9 MG/DL (ref 8–23)
CALCIUM SERPL-MCNC: 9.6 MG/DL (ref 8.7–10.5)
CHLORIDE SERPL-SCNC: 102 MMOL/L (ref 95–110)
CO2 SERPL-SCNC: 25 MMOL/L (ref 23–29)
CREAT SERPL-MCNC: 1 MG/DL (ref 0.5–1.4)
EST. GFR  (NO RACE VARIABLE): 57 ML/MIN/1.73 M^2
ESTIMATED AVG GLUCOSE: 209 MG/DL (ref 68–131)
GLUCOSE SERPL-MCNC: 315 MG/DL (ref 70–110)
HBA1C MFR BLD: 8.9 % (ref 4–5.6)
POTASSIUM SERPL-SCNC: 3.6 MMOL/L (ref 3.5–5.1)
SODIUM SERPL-SCNC: 137 MMOL/L (ref 136–145)

## 2023-09-05 DIAGNOSIS — N18.31 TYPE 2 DIABETES MELLITUS WITH STAGE 3A CHRONIC KIDNEY DISEASE, WITHOUT LONG-TERM CURRENT USE OF INSULIN: Primary | ICD-10-CM

## 2023-09-05 DIAGNOSIS — E11.22 TYPE 2 DIABETES MELLITUS WITH STAGE 3A CHRONIC KIDNEY DISEASE, WITHOUT LONG-TERM CURRENT USE OF INSULIN: Primary | ICD-10-CM

## 2023-09-05 RX ORDER — GLIPIZIDE 5 MG/1
5 TABLET, FILM COATED, EXTENDED RELEASE ORAL
Qty: 90 TABLET | Refills: 3 | Status: SHIPPED | OUTPATIENT
Start: 2023-09-05 | End: 2024-09-04

## 2023-09-25 DIAGNOSIS — E11.65 UNCONTROLLED TYPE 2 DIABETES MELLITUS WITH HYPERGLYCEMIA: ICD-10-CM

## 2023-09-25 RX ORDER — CALCIUM CITRATE/VITAMIN D3 200MG-6.25
TABLET ORAL
Qty: 200 STRIP | Refills: 3 | Status: SHIPPED | OUTPATIENT
Start: 2023-09-25 | End: 2023-10-06 | Stop reason: SDUPTHER

## 2023-09-25 NOTE — TELEPHONE ENCOUNTER
No care due was identified.  [facility] Embedded Care Due Messages. Reference number: [id number].   9/25/2023 9:34:45 AM CDT

## 2023-09-26 NOTE — TELEPHONE ENCOUNTER
Refill Decision Note   Indira Walker  is requesting a refill authorization.  Brief Assessment and Rationale for Refill:  Approve     Medication Therapy Plan:         Comments:     Note composed:9:15 PM 09/25/2023

## 2023-09-27 ENCOUNTER — TELEPHONE (OUTPATIENT)
Dept: INTERNAL MEDICINE | Facility: CLINIC | Age: 78
End: 2023-09-27
Payer: MEDICARE

## 2023-09-27 NOTE — TELEPHONE ENCOUNTER
----- Message from Terri Sanchez sent at 9/27/2023  1:13 PM CDT -----  Contact: Indira  Patient is calling to speak with the nurse in regards to refill. [facility] on [address] stating they still have not received approval for the TRUE METRIX GLUCOSE TEST STRIP Strp. Please check status and give patient a callback at [phone].

## 2023-09-27 NOTE — TELEPHONE ENCOUNTER
Spoke with pt and notified her MNF sent to insurance to approve test strips, she stated she will call the pharmacy in a couple days to check the status.

## 2023-10-06 ENCOUNTER — OFFICE VISIT (OUTPATIENT)
Dept: INTERNAL MEDICINE | Facility: CLINIC | Age: 78
End: 2023-10-06
Payer: MEDICARE

## 2023-10-06 VITALS
TEMPERATURE: 98 F | SYSTOLIC BLOOD PRESSURE: 116 MMHG | BODY MASS INDEX: 22.51 KG/M2 | WEIGHT: 127.13 LBS | OXYGEN SATURATION: 95 % | DIASTOLIC BLOOD PRESSURE: 62 MMHG | HEART RATE: 95 BPM

## 2023-10-06 DIAGNOSIS — Z23 NEED FOR INFLUENZA VACCINATION: ICD-10-CM

## 2023-10-06 DIAGNOSIS — L25.9 CONTACT DERMATITIS, UNSPECIFIED CONTACT DERMATITIS TYPE, UNSPECIFIED TRIGGER: Primary | ICD-10-CM

## 2023-10-06 DIAGNOSIS — N39.41 URGE INCONTINENCE: ICD-10-CM

## 2023-10-06 DIAGNOSIS — E11.65 UNCONTROLLED TYPE 2 DIABETES MELLITUS WITH HYPERGLYCEMIA: ICD-10-CM

## 2023-10-06 PROCEDURE — 99214 PR OFFICE/OUTPT VISIT, EST, LEVL IV, 30-39 MIN: ICD-10-PCS | Mod: S$GLB,,, | Performed by: PHYSICIAN ASSISTANT

## 2023-10-06 PROCEDURE — 3078F DIAST BP <80 MM HG: CPT | Mod: CPTII,S$GLB,, | Performed by: PHYSICIAN ASSISTANT

## 2023-10-06 PROCEDURE — 1126F AMNT PAIN NOTED NONE PRSNT: CPT | Mod: CPTII,S$GLB,, | Performed by: PHYSICIAN ASSISTANT

## 2023-10-06 PROCEDURE — 90694 FLU VACCINE - QUADRIVALENT - ADJUVANTED: ICD-10-PCS | Mod: S$GLB,,, | Performed by: PHYSICIAN ASSISTANT

## 2023-10-06 PROCEDURE — 1101F PT FALLS ASSESS-DOCD LE1/YR: CPT | Mod: CPTII,S$GLB,, | Performed by: PHYSICIAN ASSISTANT

## 2023-10-06 PROCEDURE — G0008 FLU VACCINE - QUADRIVALENT - ADJUVANTED: ICD-10-PCS | Mod: S$GLB,,, | Performed by: PHYSICIAN ASSISTANT

## 2023-10-06 PROCEDURE — 1159F PR MEDICATION LIST DOCUMENTED IN MEDICAL RECORD: ICD-10-PCS | Mod: CPTII,S$GLB,, | Performed by: PHYSICIAN ASSISTANT

## 2023-10-06 PROCEDURE — 3288F FALL RISK ASSESSMENT DOCD: CPT | Mod: CPTII,S$GLB,, | Performed by: PHYSICIAN ASSISTANT

## 2023-10-06 PROCEDURE — 3074F SYST BP LT 130 MM HG: CPT | Mod: CPTII,S$GLB,, | Performed by: PHYSICIAN ASSISTANT

## 2023-10-06 PROCEDURE — 99999 PR PBB SHADOW E&M-EST. PATIENT-LVL V: CPT | Mod: PBBFAC,,, | Performed by: PHYSICIAN ASSISTANT

## 2023-10-06 PROCEDURE — 99999 PR PBB SHADOW E&M-EST. PATIENT-LVL V: ICD-10-PCS | Mod: PBBFAC,,, | Performed by: PHYSICIAN ASSISTANT

## 2023-10-06 PROCEDURE — 1126F PR PAIN SEVERITY QUANTIFIED, NO PAIN PRESENT: ICD-10-PCS | Mod: CPTII,S$GLB,, | Performed by: PHYSICIAN ASSISTANT

## 2023-10-06 PROCEDURE — 1101F PR PT FALLS ASSESS DOC 0-1 FALLS W/OUT INJ PAST YR: ICD-10-PCS | Mod: CPTII,S$GLB,, | Performed by: PHYSICIAN ASSISTANT

## 2023-10-06 PROCEDURE — G0008 ADMIN INFLUENZA VIRUS VAC: HCPCS | Mod: S$GLB,,, | Performed by: PHYSICIAN ASSISTANT

## 2023-10-06 PROCEDURE — 1160F PR REVIEW ALL MEDS BY PRESCRIBER/CLIN PHARMACIST DOCUMENTED: ICD-10-PCS | Mod: CPTII,S$GLB,, | Performed by: PHYSICIAN ASSISTANT

## 2023-10-06 PROCEDURE — 1159F MED LIST DOCD IN RCRD: CPT | Mod: CPTII,S$GLB,, | Performed by: PHYSICIAN ASSISTANT

## 2023-10-06 PROCEDURE — 99214 OFFICE O/P EST MOD 30 MIN: CPT | Mod: S$GLB,,, | Performed by: PHYSICIAN ASSISTANT

## 2023-10-06 PROCEDURE — 90694 VACC AIIV4 NO PRSRV 0.5ML IM: CPT | Mod: S$GLB,,, | Performed by: PHYSICIAN ASSISTANT

## 2023-10-06 PROCEDURE — 3078F PR MOST RECENT DIASTOLIC BLOOD PRESSURE < 80 MM HG: ICD-10-PCS | Mod: CPTII,S$GLB,, | Performed by: PHYSICIAN ASSISTANT

## 2023-10-06 PROCEDURE — 1160F RVW MEDS BY RX/DR IN RCRD: CPT | Mod: CPTII,S$GLB,, | Performed by: PHYSICIAN ASSISTANT

## 2023-10-06 PROCEDURE — 3074F PR MOST RECENT SYSTOLIC BLOOD PRESSURE < 130 MM HG: ICD-10-PCS | Mod: CPTII,S$GLB,, | Performed by: PHYSICIAN ASSISTANT

## 2023-10-06 PROCEDURE — 3288F PR FALLS RISK ASSESSMENT DOCUMENTED: ICD-10-PCS | Mod: CPTII,S$GLB,, | Performed by: PHYSICIAN ASSISTANT

## 2023-10-06 RX ORDER — CLOTRIMAZOLE AND BETAMETHASONE DIPROPIONATE 10; .64 MG/G; MG/G
CREAM TOPICAL 2 TIMES DAILY
Qty: 45 G | Refills: 2 | Status: CANCELLED | OUTPATIENT
Start: 2023-10-06

## 2023-10-06 NOTE — PROGRESS NOTES
Subjective:       Patient ID: Indira Walker is a 78 y.o. female.    Chief Complaint: Rash (Patient stated that she has a rash under both armpits. She reports itching and burning. She has not change deodorants but says that it happens when she uses too much. She has been using John anti-itch cream and said that it helps a little bit but not much. )      HPI  78-year-old female presents with complaints of pruritic and burning rash to bilateral axillary regions after using excessive deodorant.  She tried Benadryl anti-itch cream without relief.    She is requesting specialist for her urge incontinence.  She had history of prolapsed uterus and bladder but reported to have had surgery for this.  She does report urinary frequency over the last couple of weeks which has slightly improved with adding on glipizide to her diabetic medication regimen.  I suspect she is had frequency due to hypoglycemia.  She reports having to wear panty liners/depends because she is unable to make it to the bathroom in time before she urinated on herself.  She denies any burning or lower abdominal pain.  No blood in her urine.    Review of Systems   Constitutional:  Negative for chills and fever.   Gastrointestinal:  Negative for abdominal pain.   Genitourinary:  Positive for frequency and urgency. Negative for dysuria and hematuria.   Skin:  Positive for rash.       Objective:      Physical Exam  Vitals and nursing note reviewed.   Constitutional:       General: She is not in acute distress.     Appearance: She is well-developed.   HENT:      Head: Normocephalic and atraumatic.   Eyes:      General: Lids are normal. No scleral icterus.     Extraocular Movements: Extraocular movements intact.      Conjunctiva/sclera: Conjunctivae normal.   Pulmonary:      Effort: Pulmonary effort is normal.   Skin:         Neurological:      Mental Status: She is alert.      Cranial Nerves: No cranial nerve deficit.   Psychiatric:         Mood and  Affect: Mood and affect normal.         Assessment:       1. Contact dermatitis, unspecified contact dermatitis type, unspecified trigger    2. Uncontrolled type 2 diabetes mellitus with hyperglycemia    3. Urge incontinence    4. Need for influenza vaccination        Plan:   1. Contact dermatitis, unspecified contact dermatitis type, unspecified trigger    2. Uncontrolled type 2 diabetes mellitus with hyperglycemia  -     blood sugar diagnostic (TRUE METRIX GLUCOSE TEST STRIP) Strp; Use 1 strip per day to check blood glucose  Dispense: 100 strip; Refill: 3    3. Urge incontinence  -     Ambulatory referral/consult to Urology; Future; Expected date: 10/13/2023    4. Need for influenza vaccination    Other orders  -     Influenza - Quadrivalent (Adjuvanted)

## 2023-10-06 NOTE — PATIENT INSTRUCTIONS
Check with your pharmacist regarding shingrix vaccine.     Check with your pharmacist regarding Tdap (tetanus/diphtheria/pertussis) vaccine.

## 2023-10-07 DIAGNOSIS — G91.2 (IDIOPATHIC) NORMAL PRESSURE HYDROCEPHALUS: ICD-10-CM

## 2023-10-07 DIAGNOSIS — E11.65 UNCONTROLLED TYPE 2 DIABETES MELLITUS WITH HYPERGLYCEMIA: ICD-10-CM

## 2023-10-07 DIAGNOSIS — M05.9 SEROPOSITIVE RHEUMATOID ARTHRITIS: ICD-10-CM

## 2023-10-07 RX ORDER — METFORMIN HYDROCHLORIDE 1000 MG/1
TABLET ORAL
Qty: 180 TABLET | Refills: 0 | Status: SHIPPED | OUTPATIENT
Start: 2023-10-07 | End: 2024-04-01

## 2023-10-07 NOTE — TELEPHONE ENCOUNTER
No care due was identified.  [facility] Embedded Care Due Messages. Reference number: [id number].   10/07/2023 11:46:47 AM CDT

## 2023-10-08 NOTE — TELEPHONE ENCOUNTER
Refill Decision Note   Indira Walker  is requesting a refill authorization.  Brief Assessment and Rationale for Refill:  Approve     Medication Therapy Plan:         Alert overridden per protocol: Yes   Comments:     Note composed:11:22 PM 10/07/2023

## 2023-10-09 RX ORDER — CALCIUM CITRATE/VITAMIN D3 200MG-6.25
TABLET ORAL
Qty: 100 STRIP | Refills: 3 | OUTPATIENT
Start: 2023-10-09

## 2023-10-09 RX ORDER — METHOTREXATE 2.5 MG/1
TABLET ORAL
Qty: 120 TABLET | Refills: 2 | OUTPATIENT
Start: 2023-10-09

## 2023-10-26 ENCOUNTER — OFFICE VISIT (OUTPATIENT)
Dept: UROLOGY | Facility: CLINIC | Age: 78
End: 2023-10-26
Payer: MEDICARE

## 2023-10-26 VITALS
HEART RATE: 80 BPM | WEIGHT: 127 LBS | SYSTOLIC BLOOD PRESSURE: 116 MMHG | BODY MASS INDEX: 22.49 KG/M2 | DIASTOLIC BLOOD PRESSURE: 65 MMHG

## 2023-10-26 DIAGNOSIS — N39.46 MIXED INCONTINENCE: ICD-10-CM

## 2023-10-26 DIAGNOSIS — N32.81 OAB (OVERACTIVE BLADDER): Primary | ICD-10-CM

## 2023-10-26 DIAGNOSIS — N39.41 URGE INCONTINENCE: ICD-10-CM

## 2023-10-26 PROCEDURE — 3074F SYST BP LT 130 MM HG: CPT | Mod: CPTII,S$GLB,, | Performed by: NURSE PRACTITIONER

## 2023-10-26 PROCEDURE — 1159F MED LIST DOCD IN RCRD: CPT | Mod: CPTII,S$GLB,, | Performed by: NURSE PRACTITIONER

## 2023-10-26 PROCEDURE — 3078F PR MOST RECENT DIASTOLIC BLOOD PRESSURE < 80 MM HG: ICD-10-PCS | Mod: CPTII,S$GLB,, | Performed by: NURSE PRACTITIONER

## 2023-10-26 PROCEDURE — 1101F PT FALLS ASSESS-DOCD LE1/YR: CPT | Mod: CPTII,S$GLB,, | Performed by: NURSE PRACTITIONER

## 2023-10-26 PROCEDURE — 99204 OFFICE O/P NEW MOD 45 MIN: CPT | Mod: S$GLB,,, | Performed by: NURSE PRACTITIONER

## 2023-10-26 PROCEDURE — 3074F PR MOST RECENT SYSTOLIC BLOOD PRESSURE < 130 MM HG: ICD-10-PCS | Mod: CPTII,S$GLB,, | Performed by: NURSE PRACTITIONER

## 2023-10-26 PROCEDURE — 99999 PR PBB SHADOW E&M-EST. PATIENT-LVL IV: CPT | Mod: PBBFAC,,, | Performed by: NURSE PRACTITIONER

## 2023-10-26 PROCEDURE — 3288F FALL RISK ASSESSMENT DOCD: CPT | Mod: CPTII,S$GLB,, | Performed by: NURSE PRACTITIONER

## 2023-10-26 PROCEDURE — 99999 PR PBB SHADOW E&M-EST. PATIENT-LVL IV: ICD-10-PCS | Mod: PBBFAC,,, | Performed by: NURSE PRACTITIONER

## 2023-10-26 PROCEDURE — 99204 PR OFFICE/OUTPT VISIT, NEW, LEVL IV, 45-59 MIN: ICD-10-PCS | Mod: S$GLB,,, | Performed by: NURSE PRACTITIONER

## 2023-10-26 PROCEDURE — 3288F PR FALLS RISK ASSESSMENT DOCUMENTED: ICD-10-PCS | Mod: CPTII,S$GLB,, | Performed by: NURSE PRACTITIONER

## 2023-10-26 PROCEDURE — 1101F PR PT FALLS ASSESS DOC 0-1 FALLS W/OUT INJ PAST YR: ICD-10-PCS | Mod: CPTII,S$GLB,, | Performed by: NURSE PRACTITIONER

## 2023-10-26 PROCEDURE — 3078F DIAST BP <80 MM HG: CPT | Mod: CPTII,S$GLB,, | Performed by: NURSE PRACTITIONER

## 2023-10-26 PROCEDURE — 1159F PR MEDICATION LIST DOCUMENTED IN MEDICAL RECORD: ICD-10-PCS | Mod: CPTII,S$GLB,, | Performed by: NURSE PRACTITIONER

## 2023-10-26 NOTE — PROGRESS NOTES
Chief Complaint:   Overactive bladder  Mixed incontinence    HPI:   Patient is a 78-year-old female that is presenting with mixed incontinence, urge greater than stress in overactive bladder.  Patient states that she is status post recent hysterectomy and urinary incontinence has increased.  States that she drinks very little water throughout the day, several caffeinated cold drinks.  Denies pelvic or flank pain.  No gross hematuria.  Urine in clinic is negative and PVR is 14 mL.  No overactive bladder medication.    Allergies:  Pyrilamine, Iodine and iodide containing products, and Iron    Medications:  has a current medication list which includes the following prescription(s): diphenhydramine hcl, acetaminophen, ascorbic acid (vitamin c), aspirin, azelastine, biotin, blood sugar diagnostic, blood-glucose meter, clotrimazole-betamethasone 1-0.05%, famotidine, fluticasone propionate, folic acid, glipizide, ketoconazole, lancets, loratadine, losartan-hydrochlorothiazide 100-12.5 mg, metformin, multivitamin, pantoprazole, rosuvastatin, sulfasalazine, tizanidine, and vitamin d.    Review of Systems:  General: No fever, chills, fatigability, or weight loss.  Skin: No rashes, itching, or changes in color or texture of skin.  Chest: Denies JACKSON, cyanosis, wheezing, cough, and sputum production.  Abdomen: Appetite fine. No weight loss. Denies diarrhea, abdominal pain, hematemesis, or blood in stool.  Musculoskeletal: No joint stiffness or swelling. Denies back pain.  : As above.  All other review of systems negative.    PMH:   has a past medical history of Arthritis, Diabetes mellitus, type 2, Foot drop, left (2018), GERD (gastroesophageal reflux disease), Heartburn, Hyperlipidemia, Hypertension, and Left sided sciatica.    PSH:   has a past surgical history that includes  section; Hernia repair; Colonoscopy (2008); Breast biopsy; Microdiscectomy of spine (Left, 2018); Endoscopic ultrasound of  upper gastrointestinal tract (N/A, 07/11/2022); Spine surgery; Anterior vaginal repair (N/A, 4/14/2023); and Total vaginal hysterectomy (N/A, 4/14/2023).    FamHx: family history includes Diabetes in her father and mother.    SocHx:  reports that she has never smoked. She has never used smokeless tobacco. She reports that she does not drink alcohol and does not use drugs.      Physical Exam:  Vitals:    10/26/23 1307   BP: 116/65   Pulse: 80     General: A&Ox3, no apparent distress, no deformities  Neck: No masses, normal thyroid  Lungs: normal inspiration, no use of accessory muscles  Heart: normal pulse, no arrhythmias  Abdomen: Soft, NT, ND, no masses, no hernias, no hepatosplenomegaly  Lymphatic: Neck and groin nodes negative    Labs/Studies:   See HPI    Impression/Plan:   Overactive bladder with mixed incontinence, urge greater than stress  Patient was educated on behavior modifications needed to decrease overactive bladder symptoms.  Patient states that she does not want to consider PT pelvic floor training or medication, at this time, wants to implement behavior modifications.  Will return to clinic in 3 months for re-evaluation.

## 2023-11-02 ENCOUNTER — LAB VISIT (OUTPATIENT)
Dept: LAB | Facility: HOSPITAL | Age: 78
End: 2023-11-02
Attending: PHYSICIAN ASSISTANT
Payer: MEDICARE

## 2023-11-02 DIAGNOSIS — E78.5 HYPERLIPIDEMIA ASSOCIATED WITH TYPE 2 DIABETES MELLITUS: ICD-10-CM

## 2023-11-02 DIAGNOSIS — Z79.4 TYPE 2 DIABETES MELLITUS WITH STAGE 3A CHRONIC KIDNEY DISEASE, WITH LONG-TERM CURRENT USE OF INSULIN: ICD-10-CM

## 2023-11-02 DIAGNOSIS — N18.31 TYPE 2 DIABETES MELLITUS WITH STAGE 3A CHRONIC KIDNEY DISEASE, WITH LONG-TERM CURRENT USE OF INSULIN: ICD-10-CM

## 2023-11-02 DIAGNOSIS — E11.22 TYPE 2 DIABETES MELLITUS WITH STAGE 3A CHRONIC KIDNEY DISEASE, WITH LONG-TERM CURRENT USE OF INSULIN: ICD-10-CM

## 2023-11-02 DIAGNOSIS — E11.69 HYPERLIPIDEMIA ASSOCIATED WITH TYPE 2 DIABETES MELLITUS: ICD-10-CM

## 2023-11-02 LAB
ALBUMIN SERPL BCP-MCNC: 3.6 G/DL (ref 3.5–5.2)
ALP SERPL-CCNC: 46 U/L (ref 55–135)
ALT SERPL W/O P-5'-P-CCNC: 11 U/L (ref 10–44)
ANION GAP SERPL CALC-SCNC: 12 MMOL/L (ref 8–16)
AST SERPL-CCNC: 18 U/L (ref 10–40)
BILIRUB SERPL-MCNC: 0.5 MG/DL (ref 0.1–1)
BUN SERPL-MCNC: 21 MG/DL (ref 8–23)
CALCIUM SERPL-MCNC: 9.4 MG/DL (ref 8.7–10.5)
CHLORIDE SERPL-SCNC: 100 MMOL/L (ref 95–110)
CHOLEST SERPL-MCNC: 220 MG/DL (ref 120–199)
CHOLEST/HDLC SERPL: 2.5 {RATIO} (ref 2–5)
CO2 SERPL-SCNC: 25 MMOL/L (ref 23–29)
CREAT SERPL-MCNC: 1 MG/DL (ref 0.5–1.4)
EST. GFR  (NO RACE VARIABLE): 57 ML/MIN/1.73 M^2
ESTIMATED AVG GLUCOSE: 160 MG/DL (ref 68–131)
GLUCOSE SERPL-MCNC: 95 MG/DL (ref 70–110)
HBA1C MFR BLD: 7.2 % (ref 4–5.6)
HDLC SERPL-MCNC: 89 MG/DL (ref 40–75)
HDLC SERPL: 40.5 % (ref 20–50)
LDLC SERPL CALC-MCNC: 121.2 MG/DL (ref 63–159)
NONHDLC SERPL-MCNC: 131 MG/DL
POTASSIUM SERPL-SCNC: 3.6 MMOL/L (ref 3.5–5.1)
PROT SERPL-MCNC: 7.1 G/DL (ref 6–8.4)
SODIUM SERPL-SCNC: 137 MMOL/L (ref 136–145)
TRIGL SERPL-MCNC: 49 MG/DL (ref 30–150)

## 2023-11-02 PROCEDURE — 80053 COMPREHEN METABOLIC PANEL: CPT | Performed by: PHYSICIAN ASSISTANT

## 2023-11-02 PROCEDURE — 83036 HEMOGLOBIN GLYCOSYLATED A1C: CPT | Performed by: PHYSICIAN ASSISTANT

## 2023-11-02 PROCEDURE — 36415 COLL VENOUS BLD VENIPUNCTURE: CPT | Performed by: PHYSICIAN ASSISTANT

## 2023-11-02 PROCEDURE — 80061 LIPID PANEL: CPT | Performed by: PHYSICIAN ASSISTANT

## 2023-11-09 ENCOUNTER — OFFICE VISIT (OUTPATIENT)
Dept: INTERNAL MEDICINE | Facility: CLINIC | Age: 78
End: 2023-11-09
Payer: MEDICARE

## 2023-11-09 VITALS
RESPIRATION RATE: 18 BRPM | HEIGHT: 63 IN | SYSTOLIC BLOOD PRESSURE: 120 MMHG | OXYGEN SATURATION: 96 % | WEIGHT: 127.19 LBS | BODY MASS INDEX: 22.54 KG/M2 | DIASTOLIC BLOOD PRESSURE: 68 MMHG | HEART RATE: 75 BPM | TEMPERATURE: 98 F

## 2023-11-09 DIAGNOSIS — D64.9 ANEMIA, UNSPECIFIED TYPE: ICD-10-CM

## 2023-11-09 DIAGNOSIS — E11.69 HYPERLIPIDEMIA ASSOCIATED WITH TYPE 2 DIABETES MELLITUS: ICD-10-CM

## 2023-11-09 DIAGNOSIS — Z23 NEED FOR PNEUMOCOCCAL VACCINATION: ICD-10-CM

## 2023-11-09 DIAGNOSIS — E11.22 TYPE 2 DIABETES MELLITUS WITH STAGE 3A CHRONIC KIDNEY DISEASE, WITHOUT LONG-TERM CURRENT USE OF INSULIN: Primary | ICD-10-CM

## 2023-11-09 DIAGNOSIS — I15.2 HYPERTENSION ASSOCIATED WITH DIABETES: ICD-10-CM

## 2023-11-09 DIAGNOSIS — E11.59 HYPERTENSION ASSOCIATED WITH DIABETES: ICD-10-CM

## 2023-11-09 DIAGNOSIS — E78.5 HYPERLIPIDEMIA ASSOCIATED WITH TYPE 2 DIABETES MELLITUS: ICD-10-CM

## 2023-11-09 DIAGNOSIS — N18.31 TYPE 2 DIABETES MELLITUS WITH STAGE 3A CHRONIC KIDNEY DISEASE, WITHOUT LONG-TERM CURRENT USE OF INSULIN: Primary | ICD-10-CM

## 2023-11-09 PROCEDURE — 90677 PCV20 VACCINE IM: CPT | Mod: S$GLB,,, | Performed by: FAMILY MEDICINE

## 2023-11-09 PROCEDURE — 3078F PR MOST RECENT DIASTOLIC BLOOD PRESSURE < 80 MM HG: ICD-10-PCS | Mod: CPTII,S$GLB,, | Performed by: FAMILY MEDICINE

## 2023-11-09 PROCEDURE — 1126F AMNT PAIN NOTED NONE PRSNT: CPT | Mod: CPTII,S$GLB,, | Performed by: FAMILY MEDICINE

## 2023-11-09 PROCEDURE — G0009 PNEUMOCOCCAL CONJUGATE VACCINE 20-VALENT: ICD-10-PCS | Mod: S$GLB,,, | Performed by: FAMILY MEDICINE

## 2023-11-09 PROCEDURE — 1159F MED LIST DOCD IN RCRD: CPT | Mod: CPTII,S$GLB,, | Performed by: FAMILY MEDICINE

## 2023-11-09 PROCEDURE — 99214 PR OFFICE/OUTPT VISIT, EST, LEVL IV, 30-39 MIN: ICD-10-PCS | Mod: S$GLB,,, | Performed by: FAMILY MEDICINE

## 2023-11-09 PROCEDURE — 3074F PR MOST RECENT SYSTOLIC BLOOD PRESSURE < 130 MM HG: ICD-10-PCS | Mod: CPTII,S$GLB,, | Performed by: FAMILY MEDICINE

## 2023-11-09 PROCEDURE — 99999 PR PBB SHADOW E&M-EST. PATIENT-LVL V: ICD-10-PCS | Mod: PBBFAC,,, | Performed by: FAMILY MEDICINE

## 2023-11-09 PROCEDURE — 1159F PR MEDICATION LIST DOCUMENTED IN MEDICAL RECORD: ICD-10-PCS | Mod: CPTII,S$GLB,, | Performed by: FAMILY MEDICINE

## 2023-11-09 PROCEDURE — 3078F DIAST BP <80 MM HG: CPT | Mod: CPTII,S$GLB,, | Performed by: FAMILY MEDICINE

## 2023-11-09 PROCEDURE — 1126F PR PAIN SEVERITY QUANTIFIED, NO PAIN PRESENT: ICD-10-PCS | Mod: CPTII,S$GLB,, | Performed by: FAMILY MEDICINE

## 2023-11-09 PROCEDURE — 1101F PT FALLS ASSESS-DOCD LE1/YR: CPT | Mod: CPTII,S$GLB,, | Performed by: FAMILY MEDICINE

## 2023-11-09 PROCEDURE — 3074F SYST BP LT 130 MM HG: CPT | Mod: CPTII,S$GLB,, | Performed by: FAMILY MEDICINE

## 2023-11-09 PROCEDURE — 3288F FALL RISK ASSESSMENT DOCD: CPT | Mod: CPTII,S$GLB,, | Performed by: FAMILY MEDICINE

## 2023-11-09 PROCEDURE — 99999 PR PBB SHADOW E&M-EST. PATIENT-LVL V: CPT | Mod: PBBFAC,,, | Performed by: FAMILY MEDICINE

## 2023-11-09 PROCEDURE — 3288F PR FALLS RISK ASSESSMENT DOCUMENTED: ICD-10-PCS | Mod: CPTII,S$GLB,, | Performed by: FAMILY MEDICINE

## 2023-11-09 PROCEDURE — 90677 PNEUMOCOCCAL CONJUGATE VACCINE 20-VALENT: ICD-10-PCS | Mod: S$GLB,,, | Performed by: FAMILY MEDICINE

## 2023-11-09 PROCEDURE — G0009 ADMIN PNEUMOCOCCAL VACCINE: HCPCS | Mod: S$GLB,,, | Performed by: FAMILY MEDICINE

## 2023-11-09 PROCEDURE — 99214 OFFICE O/P EST MOD 30 MIN: CPT | Mod: S$GLB,,, | Performed by: FAMILY MEDICINE

## 2023-11-09 PROCEDURE — 1101F PR PT FALLS ASSESS DOC 0-1 FALLS W/OUT INJ PAST YR: ICD-10-PCS | Mod: CPTII,S$GLB,, | Performed by: FAMILY MEDICINE

## 2023-11-09 RX ORDER — LANOLIN ALCOHOL/MO/W.PET/CERES
100 CREAM (GRAM) TOPICAL DAILY
COMMUNITY
End: 2023-11-09

## 2023-11-09 NOTE — ASSESSMENT & PLAN NOTE
Above goal and increased from previous, advised to continue crestor and look at her diet    Lab Results   Component Value Date    CHOL 220 (H) 11/02/2023    LDLCALC 121.2 11/02/2023    TRIG 49 11/02/2023    HDL 89 (H) 11/02/2023    ALT 11 11/02/2023    AST 18 11/02/2023    ALKPHOS 46 (L) 11/02/2023

## 2023-11-09 NOTE — ASSESSMENT & PLAN NOTE
Improving, reasonable control for age, continue current medications    Lab Results   Component Value Date    HGBA1C 7.2 (H) 11/02/2023    HGBA1C 8.9 (H) 09/01/2023    LDLCALC 121.2 11/02/2023    LABMICR 26.0 11/02/2023    CREATRANDUR 100.0 11/02/2023    MICALBCREAT 26.0 11/02/2023        Digestive

## 2023-11-09 NOTE — PATIENT INSTRUCTIONS
You are eligible for a covid booster, covid vaccines are available at most pharmacies.     Check with your pharmacist regarding Tdap (tetanus/diphtheria/pertussis) vaccine.     Check with your pharmacist regarding shingrix vaccine.     Check with your pharmacist regarding RSV vaccine.

## 2023-11-09 NOTE — PROGRESS NOTES
Subjective:       Patient ID: Indira Walker is a 78 y.o. female.    Chief Complaint: Follow-up    Patient presents to clinic today for followup of chronic conditions.    Review of Systems   Constitutional:  Negative for chills, fatigue, fever and unexpected weight change.   Eyes:  Negative for visual disturbance.   Respiratory:  Negative for shortness of breath.    Cardiovascular:  Negative for chest pain.   Musculoskeletal:  Negative for myalgias.   Neurological:  Negative for headaches.       Objective:      Physical Exam  Vitals reviewed.   Constitutional:       General: She is not in acute distress.     Appearance: She is well-developed.   HENT:      Head: Normocephalic and atraumatic.   Eyes:      General: Lids are normal. No scleral icterus.     Extraocular Movements: Extraocular movements intact.      Conjunctiva/sclera: Conjunctivae normal.      Pupils: Pupils are equal, round, and reactive to light.   Pulmonary:      Effort: Pulmonary effort is normal.   Neurological:      Mental Status: She is alert and oriented to person, place, and time.      Cranial Nerves: No cranial nerve deficit.      Gait: Gait normal.   Psychiatric:         Mood and Affect: Mood and affect normal.         Assessment:       1. Type 2 diabetes mellitus with stage 3a chronic kidney disease, without long-term current use of insulin    2. Hypertension associated with diabetes    3. Hyperlipidemia associated with type 2 diabetes mellitus    4. Anemia, unspecified type    5. Need for pneumococcal vaccination        Plan:   1. Type 2 diabetes mellitus with stage 3a chronic kidney disease, without long-term current use of insulin  Assessment & Plan:  Improving, reasonable control for age, continue current medications    Lab Results   Component Value Date    HGBA1C 7.2 (H) 11/02/2023    HGBA1C 8.9 (H) 09/01/2023    LDLCALC 121.2 11/02/2023    LABMICR 26.0 11/02/2023    CREATRANDUR 100.0 11/02/2023    MICALBCREAT 26.0 11/02/2023          Orders:  -     Hemoglobin A1C; Future; Expected date: 11/09/2023    2. Hypertension associated with diabetes  Assessment & Plan:  Controlled, continue losartan-HCTZ    Orders:  -     TSH; Future; Expected date: 05/09/2024  -     CBC Auto Differential; Future; Expected date: 05/09/2024    3. Hyperlipidemia associated with type 2 diabetes mellitus  Assessment & Plan:  Above goal and increased from previous, advised to continue crestor and look at her diet    Lab Results   Component Value Date    CHOL 220 (H) 11/02/2023    LDLCALC 121.2 11/02/2023    TRIG 49 11/02/2023    HDL 89 (H) 11/02/2023    ALT 11 11/02/2023    AST 18 11/02/2023    ALKPHOS 46 (L) 11/02/2023         Orders:  -     Comprehensive Metabolic Panel; Future; Expected date: 05/09/2024  -     Lipid Panel; Future; Expected date: 05/09/2024    4. Anemia, unspecified type  Assessment & Plan:  Offered B12 level, declines due to coverage    Orders:  -     Vitamin B12; Future; Expected date: 05/09/2024    5. Need for pneumococcal vaccination  -     Pneumococcal Conjugate Vaccine (20 Valent) (IM)(Preferred)      Health Maintenance reviewed/updated.  ABBY for eye exam.

## 2023-11-11 DIAGNOSIS — E11.69 HYPERLIPIDEMIA ASSOCIATED WITH TYPE 2 DIABETES MELLITUS: ICD-10-CM

## 2023-11-11 DIAGNOSIS — E78.5 HYPERLIPIDEMIA ASSOCIATED WITH TYPE 2 DIABETES MELLITUS: ICD-10-CM

## 2023-11-11 RX ORDER — ROSUVASTATIN CALCIUM 20 MG/1
TABLET, COATED ORAL
Qty: 90 TABLET | Refills: 3 | Status: SHIPPED | OUTPATIENT
Start: 2023-11-11

## 2023-11-11 NOTE — TELEPHONE ENCOUNTER
Refill Decision Note   Indira Walker  is requesting a refill authorization.  Brief Assessment and Rationale for Refill:  Approve     Medication Therapy Plan:       Medication Reconciliation Completed: No   Comments:     No Care Gaps recommended.     Note composed:11:30 AM 11/11/2023

## 2023-11-11 NOTE — TELEPHONE ENCOUNTER
No care due was identified.  [facility] Embedded Care Due Messages. Reference number: [id number].   11/11/2023 7:04:31 AM CST

## 2023-11-13 ENCOUNTER — TELEPHONE (OUTPATIENT)
Dept: INTERNAL MEDICINE | Facility: CLINIC | Age: 78
End: 2023-11-13
Payer: MEDICARE

## 2023-11-13 NOTE — TELEPHONE ENCOUNTER
----- Message from Jhoan Gomes sent at 11/13/2023  9:55 AM CST -----  Contact: Indira  Indira would like a call back at [phone] in regards to needing to see about her getting the new glucose meter and testing strips. Patient pharmacy states that the last prescription for glucose meter that was sent was not covered by patient's insurance. Patient was told to tell the office to call pharmacy to get names of alternate devices that can be covered by patient's insurance.  [id number] [place] [address] - [address] AT AT [facility]  [address]  [place] [address]  Phone: [phone] Fax: [phone]    Thanks   Am

## 2023-11-14 ENCOUNTER — TELEPHONE (OUTPATIENT)
Dept: INTERNAL MEDICINE | Facility: CLINIC | Age: 78
End: 2023-11-14
Payer: MEDICARE

## 2023-11-14 DIAGNOSIS — E11.65 UNCONTROLLED TYPE 2 DIABETES MELLITUS WITH HYPERGLYCEMIA: Primary | ICD-10-CM

## 2023-11-14 RX ORDER — INSULIN PUMP SYRINGE, 3 ML
EACH MISCELLANEOUS
Qty: 1 EACH | Refills: 1 | OUTPATIENT
Start: 2023-11-14 | End: 2023-11-24

## 2023-11-14 NOTE — TELEPHONE ENCOUNTER
Pt notified to contact her insurance company to find out the name of the meter that her insurance will pay for so it can be ordered for her

## 2023-11-14 NOTE — TELEPHONE ENCOUNTER
----- Message from Neelima Washington sent at 11/14/2023  9:21 AM CST -----  Contact: Vipul  .Type:  Patient Returning Call    Who Called: Vipul   Who Left Message for Patient: Danni  Does the patient know what this is regarding?: Meter refill   Would the patient rather a call back or a response via [facility]?  Call   Best Call Back Number: .[phone]   Additional Information:  Pt needs staff to call pharmacy about meter alternative insurance will cover.

## 2023-11-14 NOTE — TELEPHONE ENCOUNTER
No care due was identified.  [facility] Embedded Care Due Messages. Reference number: [id number].   11/14/2023 12:06:24 PM CST

## 2023-11-14 NOTE — TELEPHONE ENCOUNTER
----- Message from Zeny Preston sent at 11/14/2023 11:08 AM CST -----  Contact: Indira Liriano is calling in regards to knowing the machine name is One touch and would like for it be called in to [facility] pharmacy. Please call back at  [phone]                    [id number] - [place] - [address] AT AT [facility]  [address] [place] [address]  Phone: [phone] Fax: [phone]  Hours: Not open 24 hours                Thanks  KT

## 2023-11-15 ENCOUNTER — TELEPHONE (OUTPATIENT)
Dept: INTERNAL MEDICINE | Facility: CLINIC | Age: 78
End: 2023-11-15
Payer: MEDICARE

## 2023-11-15 NOTE — TELEPHONE ENCOUNTER
----- Message from Miri Brizuela sent at 11/15/2023 11:50 AM CST -----  Regarding: Medical Advice  Contact: Cee  .Type:  Needs Medical Advice    Who Called: Indira   Symptoms (please be specific):    How long has patient had these symptoms:      Pharmacy name and phone #:    [id number] [place] - [address] AT AT [facility]  [address] [facility]  [place] [address]  Phone: [phone] Fax: [phone]     Would the patient rather a call back or a response via [facility]? call  Best Call Back Number: [phone] (home)    Additional Information:  Indira says the meter that her insurance will pay for is One Touch

## 2023-11-17 DIAGNOSIS — B35.1 TINEA UNGUIUM: ICD-10-CM

## 2023-11-17 DIAGNOSIS — M05.9 RHEUMATOID ARTHRITIS WITH RHEUMATOID FACTOR, UNSPECIFIED: ICD-10-CM

## 2023-11-17 RX ORDER — KETOCONAZOLE 20 MG/G
1 CREAM TOPICAL 2 TIMES DAILY
Qty: 30 G | Refills: 2 | Status: SHIPPED | OUTPATIENT
Start: 2023-11-17

## 2023-11-21 DIAGNOSIS — E11.65 UNCONTROLLED TYPE 2 DIABETES MELLITUS WITH HYPERGLYCEMIA: ICD-10-CM

## 2023-11-21 RX ORDER — INSULIN PUMP SYRINGE, 3 ML
EACH MISCELLANEOUS
Qty: 1 EACH | Refills: 0 | OUTPATIENT
Start: 2023-11-21 | End: 2024-11-20

## 2023-11-21 NOTE — TELEPHONE ENCOUNTER
No care due was identified.  [facility] Embedded Care Due Messages. Reference number: [id number].   11/21/2023 11:06:54 AM CST

## 2023-11-21 NOTE — TELEPHONE ENCOUNTER
LOV:11/9/2023    Script for meter did not go through it shows print. I will correct and pend the order.

## 2023-11-21 NOTE — TELEPHONE ENCOUNTER
Refill Routing Note   Medication(s) are not appropriate for processing by [facility] for the following reason(s):        New or recently adjusted medication    [facility] action(s):  Defer             Appointments  past 12m or future 3m with PCP    Date Provider   Last Visit   11/9/2023 Bertha Kearns MD   Next Visit   Visit date not found Bertha Kearns MD   ED visits in past 90 days: 0        Note composed:11:16 AM 11/21/2023

## 2023-11-21 NOTE — TELEPHONE ENCOUNTER
----- Message from Zeny Preston sent at 11/21/2023 11:00 AM CST -----  Contact: Indira  Pt is calling in regards to getting prescription for one touch. Pt stated calling three times and haven't heard back. Please call back at [phone]                    [id number] [place] - [address] AT AT [facility]  [address] [place] [address]  Phone: [phone] Fax: [phone]  Hours: Not open 24 hours              Thanks  KT

## 2023-11-22 RX ORDER — SULFASALAZINE 500 MG/1
500 TABLET, DELAYED RELEASE ORAL 2 TIMES DAILY
Qty: 60 TABLET | Refills: 3 | Status: SHIPPED | OUTPATIENT
Start: 2023-11-22 | End: 2023-12-14 | Stop reason: SDUPTHER

## 2023-11-24 ENCOUNTER — TELEPHONE (OUTPATIENT)
Dept: INTERNAL MEDICINE | Facility: CLINIC | Age: 78
End: 2023-11-24
Payer: MEDICARE

## 2023-11-24 DIAGNOSIS — E11.22 TYPE 2 DIABETES MELLITUS WITH STAGE 3A CHRONIC KIDNEY DISEASE, WITHOUT LONG-TERM CURRENT USE OF INSULIN: Primary | ICD-10-CM

## 2023-11-24 DIAGNOSIS — N18.31 TYPE 2 DIABETES MELLITUS WITH STAGE 3A CHRONIC KIDNEY DISEASE, WITHOUT LONG-TERM CURRENT USE OF INSULIN: Primary | ICD-10-CM

## 2023-11-24 RX ORDER — INSULIN PUMP SYRINGE, 3 ML
EACH MISCELLANEOUS
Qty: 1 EACH | Refills: 1 | OUTPATIENT
Start: 2023-11-24 | End: 2023-11-29 | Stop reason: SDUPTHER

## 2023-11-24 NOTE — TELEPHONE ENCOUNTER
Patient stated that she is needing One Touch machine and strips [facility] [address] [facility] [address]. She said that is the only one her insurance will cover now.

## 2023-11-24 NOTE — TELEPHONE ENCOUNTER
----- Message from Miri Brizuela sent at 11/24/2023 10:05 AM CST -----  Regarding: RX Refill  Contact: Indira  Type:  RX Refill Request  Who Called: Indira  Refill or New Rx: New   RX Name and Strength: One Touch   How is the patient currently taking it? (ex. 1XDay):  Is this a 30 day or 90 day RX:    Preferred Pharmacy with phone number:   [facility] #[id number] -    AT [facility]  [address] [facility] [address]  Phone: [phone] Fax: [phone]         Local or Mail Order: local  Ordering Provider: [phone] (home)    Would the patient rather a call back or a response via [facility]? call  Best Call Back Number: [phone] (home)    Additional Information: Indira's insurance will cover the One Touch machine/strips

## 2023-11-28 DIAGNOSIS — B35.4 TINEA CORPORIS: ICD-10-CM

## 2023-11-29 DIAGNOSIS — E11.22 TYPE 2 DIABETES MELLITUS WITH STAGE 3A CHRONIC KIDNEY DISEASE, WITHOUT LONG-TERM CURRENT USE OF INSULIN: ICD-10-CM

## 2023-11-29 DIAGNOSIS — N18.31 TYPE 2 DIABETES MELLITUS WITH STAGE 3A CHRONIC KIDNEY DISEASE, WITHOUT LONG-TERM CURRENT USE OF INSULIN: ICD-10-CM

## 2023-11-29 RX ORDER — CLOTRIMAZOLE AND BETAMETHASONE DIPROPIONATE 10; .64 MG/G; MG/G
CREAM TOPICAL 2 TIMES DAILY
Qty: 45 G | Refills: 2 | OUTPATIENT
Start: 2023-11-29

## 2023-11-29 NOTE — TELEPHONE ENCOUNTER
No care due was identified.  [facility] Embedded Care Due Messages. Reference number: [id number].   11/29/2023 10:55:22 AM CST

## 2023-11-29 NOTE — TELEPHONE ENCOUNTER
----- Message from Lula Henning sent at 11/29/2023  8:06 AM CST -----  Contact: Pt  Pt is calling rg following up on having her one touch diabetic machine called in to her pharm and can be reached at  [phone]      [id number] [place] - [address] [place] [address] AT [facility]  [address] [place] [address]  Phone: [phone] Fax: [phone]

## 2023-11-29 NOTE — TELEPHONE ENCOUNTER
----- Message from Lula Henning sent at 11/29/2023  8:06 AM CST -----  Contact: Pt  Pt is calling rg following up on having her one touch diabetic machine called in to her pharm and can be reached at  [phone]      [id number] - [place] - [address] [place] AT AT [facility]  [address] [place] [address]  Phone: [phone] Fax: [phone]

## 2023-11-29 NOTE — TELEPHONE ENCOUNTER
Refill Routing Note   Medication(s) are not appropriate for processing by [facility] for the following reason(s):        Outside of protocol    [facility] action(s):  Route               Appointments  past 12m or future 3m with PCP    Date Provider   Last Visit   11/9/2023 Bertha Kearns MD   Next Visit   Visit date not found Bertha Kearns MD   ED visits in past 90 days: 0        Note composed:9:40 AM 11/29/2023

## 2023-12-04 DIAGNOSIS — I10 HYPERTENSION, ESSENTIAL: ICD-10-CM

## 2023-12-04 DIAGNOSIS — E11.22 TYPE 2 DIABETES MELLITUS WITH STAGE 3A CHRONIC KIDNEY DISEASE, WITHOUT LONG-TERM CURRENT USE OF INSULIN: ICD-10-CM

## 2023-12-04 DIAGNOSIS — N18.31 TYPE 2 DIABETES MELLITUS WITH STAGE 3A CHRONIC KIDNEY DISEASE, WITHOUT LONG-TERM CURRENT USE OF INSULIN: ICD-10-CM

## 2023-12-04 RX ORDER — INSULIN PUMP SYRINGE, 3 ML
EACH MISCELLANEOUS
Qty: 1 EACH | Refills: 1 | Status: SHIPPED | OUTPATIENT
Start: 2023-12-04 | End: 2023-12-04 | Stop reason: SDUPTHER

## 2023-12-04 RX ORDER — LOSARTAN POTASSIUM AND HYDROCHLOROTHIAZIDE 12.5; 1 MG/1; MG/1
TABLET ORAL
Qty: 90 TABLET | Refills: 3 | Status: SHIPPED | OUTPATIENT
Start: 2023-12-04

## 2023-12-04 RX ORDER — INSULIN PUMP SYRINGE, 3 ML
EACH MISCELLANEOUS
Qty: 1 EACH | Refills: 1 | Status: SHIPPED | OUTPATIENT
Start: 2023-12-04 | End: 2024-11-28

## 2023-12-04 NOTE — TELEPHONE ENCOUNTER
----- Message from Ebony Montero sent at 12/4/2023 11:58 AM CST -----  Contact: Indira  Patient is requesting a call back concerning meter, reports needing One touch meter called In, reports the one touch meter is the only meter insurance will cover. Please call in to pharmacy and call patient back at [phone]             .  [facility]pharmacy #[id number] - [place] - [address] AT AT [facility]  [address]  [facility] [address]  Phone: [phone] Fax: [phone]

## 2023-12-04 NOTE — TELEPHONE ENCOUNTER
----- Message from Alexandrea Parry sent at 2023  1:55 PM CST -----  Contact: Indira  .Patient is calling to speak with the nurse regarding disability. Reports needing handicap paper due to the patient current one is  . Please give patient a call back at .[phone]

## 2023-12-04 NOTE — TELEPHONE ENCOUNTER
Refill Decision Note   Indira Walker  is requesting a refill authorization.  Brief Assessment and Rationale for Refill:  Approve     Medication Therapy Plan:         Comments:     Note composed:5:00 PM 12/04/2023

## 2023-12-14 ENCOUNTER — OFFICE VISIT (OUTPATIENT)
Dept: RHEUMATOLOGY | Facility: CLINIC | Age: 78
End: 2023-12-14
Payer: MEDICARE

## 2023-12-14 VITALS
BODY MASS INDEX: 23.83 KG/M2 | DIASTOLIC BLOOD PRESSURE: 75 MMHG | SYSTOLIC BLOOD PRESSURE: 132 MMHG | HEIGHT: 63 IN | HEART RATE: 74 BPM | WEIGHT: 134.5 LBS

## 2023-12-14 DIAGNOSIS — M15.9 PRIMARY OSTEOARTHRITIS INVOLVING MULTIPLE JOINTS: ICD-10-CM

## 2023-12-14 DIAGNOSIS — Z79.899 IMMUNOSUPPRESSION DUE TO DRUG THERAPY: ICD-10-CM

## 2023-12-14 DIAGNOSIS — I50.32 CHRONIC HEART FAILURE WITH PRESERVED EJECTION FRACTION: ICD-10-CM

## 2023-12-14 DIAGNOSIS — E78.5 HYPERLIPIDEMIA ASSOCIATED WITH TYPE 2 DIABETES MELLITUS: ICD-10-CM

## 2023-12-14 DIAGNOSIS — M05.9 SEROPOSITIVE RHEUMATOID ARTHRITIS: Primary | ICD-10-CM

## 2023-12-14 DIAGNOSIS — E11.69 HYPERLIPIDEMIA ASSOCIATED WITH TYPE 2 DIABETES MELLITUS: ICD-10-CM

## 2023-12-14 DIAGNOSIS — Z51.81 MEDICATION MONITORING ENCOUNTER: ICD-10-CM

## 2023-12-14 DIAGNOSIS — D84.821 IMMUNOSUPPRESSION DUE TO DRUG THERAPY: ICD-10-CM

## 2023-12-14 PROCEDURE — 3075F SYST BP GE 130 - 139MM HG: CPT | Mod: CPTII,S$GLB,, | Performed by: INTERNAL MEDICINE

## 2023-12-14 PROCEDURE — 99999 PR PBB SHADOW E&M-EST. PATIENT-LVL IV: CPT | Mod: PBBFAC,,, | Performed by: INTERNAL MEDICINE

## 2023-12-14 PROCEDURE — 1159F MED LIST DOCD IN RCRD: CPT | Mod: CPTII,S$GLB,, | Performed by: INTERNAL MEDICINE

## 2023-12-14 PROCEDURE — 1125F PR PAIN SEVERITY QUANTIFIED, PAIN PRESENT: ICD-10-PCS | Mod: CPTII,S$GLB,, | Performed by: INTERNAL MEDICINE

## 2023-12-14 PROCEDURE — 1125F AMNT PAIN NOTED PAIN PRSNT: CPT | Mod: CPTII,S$GLB,, | Performed by: INTERNAL MEDICINE

## 2023-12-14 PROCEDURE — 1101F PR PT FALLS ASSESS DOC 0-1 FALLS W/OUT INJ PAST YR: ICD-10-PCS | Mod: CPTII,S$GLB,, | Performed by: INTERNAL MEDICINE

## 2023-12-14 PROCEDURE — 3075F PR MOST RECENT SYSTOLIC BLOOD PRESS GE 130-139MM HG: ICD-10-PCS | Mod: CPTII,S$GLB,, | Performed by: INTERNAL MEDICINE

## 2023-12-14 PROCEDURE — 99214 OFFICE O/P EST MOD 30 MIN: CPT | Mod: S$GLB,,, | Performed by: INTERNAL MEDICINE

## 2023-12-14 PROCEDURE — 3078F PR MOST RECENT DIASTOLIC BLOOD PRESSURE < 80 MM HG: ICD-10-PCS | Mod: CPTII,S$GLB,, | Performed by: INTERNAL MEDICINE

## 2023-12-14 PROCEDURE — 99214 PR OFFICE/OUTPT VISIT, EST, LEVL IV, 30-39 MIN: ICD-10-PCS | Mod: S$GLB,,, | Performed by: INTERNAL MEDICINE

## 2023-12-14 PROCEDURE — 99999 PR PBB SHADOW E&M-EST. PATIENT-LVL IV: ICD-10-PCS | Mod: PBBFAC,,, | Performed by: INTERNAL MEDICINE

## 2023-12-14 PROCEDURE — 1101F PT FALLS ASSESS-DOCD LE1/YR: CPT | Mod: CPTII,S$GLB,, | Performed by: INTERNAL MEDICINE

## 2023-12-14 PROCEDURE — 3078F DIAST BP <80 MM HG: CPT | Mod: CPTII,S$GLB,, | Performed by: INTERNAL MEDICINE

## 2023-12-14 PROCEDURE — 3288F PR FALLS RISK ASSESSMENT DOCUMENTED: ICD-10-PCS | Mod: CPTII,S$GLB,, | Performed by: INTERNAL MEDICINE

## 2023-12-14 PROCEDURE — 3288F FALL RISK ASSESSMENT DOCD: CPT | Mod: CPTII,S$GLB,, | Performed by: INTERNAL MEDICINE

## 2023-12-14 PROCEDURE — 1159F PR MEDICATION LIST DOCUMENTED IN MEDICAL RECORD: ICD-10-PCS | Mod: CPTII,S$GLB,, | Performed by: INTERNAL MEDICINE

## 2023-12-14 RX ORDER — SULFASALAZINE 500 MG/1
500 TABLET, DELAYED RELEASE ORAL 2 TIMES DAILY
Qty: 180 TABLET | Refills: 1 | Status: SHIPPED | OUTPATIENT
Start: 2023-12-14 | End: 2024-06-11

## 2023-12-14 NOTE — PROGRESS NOTES
RHEUMATOLOGY OUTPATIENT CLINIC NOTE    2023    Attending Rheumatologist: Giovanni Cage  Primary Care Provider/Physician Requesting Consultation: Bertha Kearns MD   Chief Complaint/Reason For Consultation:  Rheumatoid Arthritis, Osteoarthritis, and inflammatory arthritis      Subjective:     Indira Walker is a 78 y.o. Black or  female with SPRA    No acute complaints.  Denies RA flares since last visit.  Good response to SSZ 500mg BID.    Review of Systems   Constitutional:  Negative for fever.   Eyes:  Negative for photophobia and pain.   Respiratory:  Negative for cough.    Cardiovascular:  Negative for chest pain.   Gastrointestinal:  Negative for blood in stool and melena.   Genitourinary:  Negative for dysuria.   Musculoskeletal:  Negative for joint pain.   Skin:  Negative for rash.   Neurological:  Negative for focal weakness and weakness.       Chronic comorbid conditions affecting medical decision making today:  Past Medical History:   Diagnosis Date    Arthritis     Diabetes mellitus, type 2     Foot drop, left 2018    GERD (gastroesophageal reflux disease)     Heartburn     Hyperlipidemia     Hypertension     Left sided sciatica     s/p microdiscectomy 3/21/18     Past Surgical History:   Procedure Laterality Date    ANTERIOR VAGINAL REPAIR N/A 2023    Procedure: COLPORRHAPHY, ANTERIOR;  Surgeon: ANA MARIA Mayorga MD;  Location: [facility] OR;  Service: OB/GYN;  Laterality: N/A;    BREAST BIOPSY       SECTION      COLONOSCOPY  2008    DR. JANET GARCÍA/MILD DIVERICULOSIS DESCENDING AND SIGMOID. REPEAT 5 YRS    ENDOSCOPIC ULTRASOUND OF UPPER GASTROINTESTINAL TRACT N/A 2022    Procedure: ULTRASOUND, UPPER GI TRACT, ENDOSCOPIC;  Surgeon: Ange Saldivar MD;  Location: [facility] ENDO;  Service: Endoscopy;  Laterality: N/A;  Upper and Linear, 22 sharkcore, slides and formalin    HERNIA REPAIR      MICRODISCECTOMY OF SPINE Left 2018    left  L5-S1, Dr Segundo    SPINE SURGERY      TOTAL VAGINAL HYSTERECTOMY N/A 4/14/2023    Procedure: HYSTERECTOMY, TOTAL, VAGINAL;  Surgeon: ANA MARIA Mayorga MD;  Location: [facility];  Service: OB/GYN;  Laterality: N/A;     Family History   Problem Relation Age of Onset    Diabetes Mother     Diabetes Father      Social History     Tobacco Use   Smoking Status Never   Smokeless Tobacco Never       Current Outpatient Medications:     acetaminophen (TYLENOL EXTRA STRENGTH ORAL), Take by mouth., Disp: , Rfl:     ascorbic acid, vitamin C, (VITAMIN C) 500 MG tablet, Take 500 mg by mouth once daily., Disp: , Rfl:     aspirin (ECOTRIN) 81 MG EC tablet, Take 1 tablet (81 mg total) by mouth once daily. Every other day, Disp: , Rfl: 0    azelastine (ASTELIN) 137 mcg (0.1 %) nasal spray, SPRAY 1 SPRAY BY NASAL ROUTE 2 TIMES DAILY., Disp: 30 mL, Rfl: 5    blood sugar diagnostic Strp, 1 each by Misc.(Non-Drug; Combo Route) route 2 (two) times a day., Disp: 200 each, Rfl: 3    blood-glucose meter kit, Use as instructed to check BG twice daily, Disp: 1 each, Rfl: 1    clotrimazole-betamethasone 1-0.05% (LOTRISONE) cream, Apply topically 2 (two) times daily., Disp: 45 g, Rfl: 2    famotidine (PEPCID) 40 MG tablet, TAKE 1 TABLET BY MOUTH EVERY DAY, Disp: 90 tablet, Rfl: 3    fluticasone propionate (FLONASE) 50 mcg/actuation nasal spray, 2 sprays (100 mcg total) by Each Nostril route once daily., Disp: 16 g, Rfl: 5    folic acid (FOLVITE) 1 MG tablet, Take 1,000 mcg by mouth., Disp: , Rfl:     glipiZIDE 5 MG TR24, Take 1 tablet (5 mg total) by mouth daily with breakfast., Disp: 90 tablet, Rfl: 3    ketoconazole (NIZORAL) 2 % cream, APPLY TOPICALLY TWICE A DAY, Disp: 30 g, Rfl: 2    lancets Misc, To check BG 2 times daily, to use with insurance preferred meter, Disp: 200 each, Rfl: 3    loratadine (CLARITIN) 10 mg tablet, Take 10 mg by mouth once daily., Disp: , Rfl:     losartan-hydrochlorothiazide 100-12.5 mg (HYZAAR) 100-12.5 mg  Tab, TAKE 1 TABLET BY MOUTH EVERY DAY, Disp: 90 tablet, Rfl: 3    metFORMIN (GLUCOPHAGE) 1000 MG tablet, TAKE 1 TABLET BY MOUTH TWICE A DAY WITH MEALS, Disp: 180 tablet, Rfl: 0    multivitamin (THERAGRAN) per tablet, Take 1 tablet by mouth once daily., Disp: , Rfl:     pantoprazole (PROTONIX) 40 MG tablet, Take 1 tablet (40 mg total) by mouth once daily., Disp: 90 tablet, Rfl: 3    rosuvastatin (CRESTOR) 20 MG tablet, TAKE 1 TABLET BY MOUTH EVERY DAY, Disp: 90 tablet, Rfl: 3    sulfaSALAzine (AZULFIDINE) 500 MG EC tablet, TAKE 1 TABLET BY MOUTH TWICE A DAY, Disp: 60 tablet, Rfl: 3    tiZANidine (ZANAFLEX) 4 MG tablet, Take 1 tablet (4 mg total) by mouth every 8 (eight) hours as needed (muscle spasm)., Disp: 20 tablet, Rfl: 0    (Magic mouthwash) 1:1:1 diphenhydrAMINE(Benadryl) 12.5mg/5ml liq, aluminum & magnesium hydroxide-simethicone (Maalox), LIDOcaine viscous 2%, Swish and spit 15 mLs every 4 (four) hours as needed (mouth sores). for mouth sores (Patient not taking: Reported on 12/14/2023), Disp: 360 mL, Rfl: 0     Objective:     Vitals:    12/14/23 1028   BP: 132/75   Pulse: 74     Physical Exam   Eyes: Conjunctivae are normal.   Pulmonary/Chest: Effort normal. No respiratory distress.   Musculoskeletal:         General: Deformity present. No swelling or tenderness. Normal range of motion.   Neurological: She displays no weakness.   Skin: No rash noted.       Reviewed available old and all outside pertinent medical records available.    All lab results personally reviewed and interpreted by me.       ASSESSMENT      Encounter Diagnoses   Name Primary?    Seropositive rheumatoid arthritis Yes    Immunosuppression due to drug therapy     Medication monitoring encounter     Chronic heart failure with preserved ejection fraction     Hyperlipidemia associated with type 2 diabetes mellitus     Primary osteoarthritis involving multiple joints       PLAN     CDAI; low disease activity.  Great response to SSZ, currently  500mg BID.  Denies RA flares or regular PDN use.  No squeeze tenderness or weakness on exam.  No concerning cytopenias.  Liver and kidney function stable.  No marginal erosive changes reported on past XR.  Plan to c/w SSZ unchanged.  Repeat labs and XR close to f/u.      Giovanni Cage M.D.

## 2023-12-18 DIAGNOSIS — R21 RASH: Primary | ICD-10-CM

## 2023-12-18 DIAGNOSIS — B35.4 TINEA CORPORIS: ICD-10-CM

## 2023-12-18 RX ORDER — CLOTRIMAZOLE AND BETAMETHASONE DIPROPIONATE 10; .64 MG/G; MG/G
CREAM TOPICAL 2 TIMES DAILY
Qty: 45 G | Refills: 2 | Status: CANCELLED | OUTPATIENT
Start: 2023-12-18

## 2023-12-18 NOTE — TELEPHONE ENCOUNTER
Pt calling to request the clotrimazole-betamethasone 1-0.05% (LOTRISONE) cream again   Pt states she is itching and need the cream called in. ALso states she is going out of town. She can't go to another doctor at this time.

## 2023-12-18 NOTE — TELEPHONE ENCOUNTER
I need more details. 4 months ago she received Rx for 1 tube with 2 refills. I'm concerned she is overusing steroid cream which could cause her more problems.

## 2023-12-18 NOTE — TELEPHONE ENCOUNTER
----- Message from Yary Jovani Fletcher sent at 12/18/2023  9:49 AM CST -----  Contact: Indira  Pt is calling in regards to her itching all over and needing an refill on clotrimazole-betamethasone 1-0.05% (LOTRISONE) cream.pt stated that she want be able to get an appt with a derma this week because she is leaving to got out of town. Please call back at .[phone]              .  [facility]/pharmacy #[id number] [facility] - [place] - [address] AT AT [facility]  [address]  [place] [address]  Phone: [phone] Fax: [phone]          Thanks  GIL

## 2023-12-27 LAB
LEFT EYE DM RETINOPATHY: NEGATIVE
RIGHT EYE DM RETINOPATHY: NEGATIVE

## 2024-01-09 ENCOUNTER — OFFICE VISIT (OUTPATIENT)
Dept: INTERNAL MEDICINE | Facility: CLINIC | Age: 79
End: 2024-01-09
Payer: MEDICARE

## 2024-01-09 VITALS
HEART RATE: 74 BPM | WEIGHT: 133.25 LBS | TEMPERATURE: 98 F | OXYGEN SATURATION: 99 % | SYSTOLIC BLOOD PRESSURE: 110 MMHG | BODY MASS INDEX: 23.61 KG/M2 | DIASTOLIC BLOOD PRESSURE: 76 MMHG

## 2024-01-09 DIAGNOSIS — R05.1 ACUTE COUGH: ICD-10-CM

## 2024-01-09 DIAGNOSIS — J06.9 UPPER RESPIRATORY TRACT INFECTION, UNSPECIFIED TYPE: Primary | ICD-10-CM

## 2024-01-09 PROCEDURE — 99999 PR PBB SHADOW E&M-EST. PATIENT-LVL V: CPT | Mod: PBBFAC,,, | Performed by: PHYSICIAN ASSISTANT

## 2024-01-09 PROCEDURE — 99213 OFFICE O/P EST LOW 20 MIN: CPT | Mod: S$GLB,,, | Performed by: PHYSICIAN ASSISTANT

## 2024-01-09 RX ORDER — AZELASTINE 1 MG/ML
1 SPRAY, METERED NASAL 2 TIMES DAILY
Qty: 30 ML | Refills: 5 | Status: SHIPPED | OUTPATIENT
Start: 2024-01-09

## 2024-01-09 RX ORDER — ALBUTEROL SULFATE 90 UG/1
2 AEROSOL, METERED RESPIRATORY (INHALATION) EVERY 4 HOURS PRN
Qty: 18 G | Refills: 0 | Status: SHIPPED | OUTPATIENT
Start: 2024-01-09 | End: 2024-01-31

## 2024-01-09 RX ORDER — FLUTICASONE PROPIONATE 50 MCG
2 SPRAY, SUSPENSION (ML) NASAL DAILY
Qty: 16 G | Refills: 5 | Status: SHIPPED | OUTPATIENT
Start: 2024-01-09

## 2024-01-09 NOTE — PROGRESS NOTES
Subjective:       Patient ID: Indira Walker is a 78 y.o. female.    Chief Complaint: Cough (Patient stated that she has been coughing for 3 or 4 days and has been taking Coricidin HBP cold & flu. )      Cough  This is a new problem. The current episode started in the past 7 days. The problem has been unchanged. The problem occurs hourly. The cough is Non-productive. Associated symptoms include nasal congestion, postnasal drip and wheezing. Pertinent negatives include no chest pain, chills, ear pain, fever, sore throat or shortness of breath. Nothing aggravates the symptoms. She has tried OTC cough suppressant for the symptoms. The treatment provided no relief.       Review of Systems   Constitutional:  Negative for chills, fatigue and fever.   HENT:  Positive for congestion and postnasal drip. Negative for ear pain and sore throat.    Respiratory:  Positive for cough and wheezing. Negative for chest tightness and shortness of breath.    Cardiovascular:  Negative for chest pain.       Objective:      Physical Exam  Vitals and nursing note reviewed.   Constitutional:       General: She is not in acute distress.     Appearance: She is well-developed.   HENT:      Head: Normocephalic and atraumatic.   Eyes:      General: Lids are normal. No scleral icterus.     Extraocular Movements: Extraocular movements intact.      Conjunctiva/sclera: Conjunctivae normal.   Cardiovascular:      Rate and Rhythm: Normal rate and regular rhythm.   Pulmonary:      Effort: Pulmonary effort is normal.      Breath sounds: Wheezing present. No decreased breath sounds, rhonchi or rales.   Neurological:      Mental Status: She is alert.      Cranial Nerves: No cranial nerve deficit.   Psychiatric:         Mood and Affect: Mood and affect normal.         Assessment:       1. Upper respiratory tract infection, unspecified type    2. Acute cough        Plan:   1. Upper respiratory tract infection, unspecified type  -     albuterol (VENTOLIN  HFA) 90 mcg/actuation inhaler; Inhale 2 puffs into the lungs every 4 (four) hours as needed for Wheezing or Shortness of Breath.  Dispense: 18 g; Refill: 0  -     azelastine (ASTELIN) 137 mcg (0.1 %) nasal spray; 1 spray (137 mcg total) by Nasal route 2 (two) times daily.  Dispense: 30 mL; Refill: 5  -     fluticasone propionate (FLONASE) 50 mcg/actuation nasal spray; 2 sprays (100 mcg total) by Each Nostril route once daily.  Dispense: 16 g; Refill: 5    2. Acute cough      Continue Claritin  Encouraged to do an at home Covid test since we are out of tests in clinic, she agreed and would let us know since she may qualify for treatment if she is positive

## 2024-01-10 ENCOUNTER — TELEPHONE (OUTPATIENT)
Dept: INTERNAL MEDICINE | Facility: CLINIC | Age: 79
End: 2024-01-10
Payer: MEDICARE

## 2024-01-10 NOTE — TELEPHONE ENCOUNTER
----- Message from Karlene Osullivan sent at 1/10/2024  8:45 AM CST -----  Contact: Indira Liriano requests a call back to discuss the medication that was prescribed 1/9. Please call her back at [phone].    Thanks  TS

## 2024-01-10 NOTE — TELEPHONE ENCOUNTER
I refilled both of her nasal sprays to restart for her nasal congestion and her albuterol inhaler for her cough. I did not recommend any oral medications at this time.

## 2024-01-10 NOTE — TELEPHONE ENCOUNTER
----- Message from Kajal Tinoco sent at 1/10/2024 10:23 AM CST -----  Who Called: Pt    What is the request in detail: Requesting call back to discuss recent missed call about meds.  Please advise    Can the clinic reply by [facility]? No    Best Call Back Number: [phone]      Additional Information:

## 2024-01-10 NOTE — TELEPHONE ENCOUNTER
Pt states you didn't give her any oral meds to take   She wants to know did you want her to continue to take the coricidin   States she can't remember what you said

## 2024-01-10 NOTE — TELEPHONE ENCOUNTER
I spoke to Ms. Jacobson and relayed Mrs. Stevens's message. Ms. Jacobson stated that she cannot just believe that the nasal sprays are going to help the mucus coming out of her nose. I explained that the nasal sprays will help with nasal congestion. She states she didn't understand why she couldn't take the pills she was on and I explained again that Mrs. Stevens did not recommend any oral medication at this time. She verbalized understanding.

## 2024-01-10 NOTE — TELEPHONE ENCOUNTER
Returned pts call to inquire what s/s she is experiencing to best advise her and get scheduled for visit.  No answer LVM to call clinic back.    bed rails

## 2024-01-30 ENCOUNTER — OFFICE VISIT (OUTPATIENT)
Dept: UROLOGY | Facility: CLINIC | Age: 79
End: 2024-01-30
Payer: MEDICARE

## 2024-01-30 DIAGNOSIS — N32.81 OAB (OVERACTIVE BLADDER): Primary | ICD-10-CM

## 2024-01-30 LAB
BILIRUB UR QL STRIP: NEGATIVE
GLUCOSE UR QL STRIP: POSITIVE
KETONES UR QL STRIP: NEGATIVE
LEUKOCYTE ESTERASE UR QL STRIP: NEGATIVE
PH, POC UA: 5.5
POC BLOOD, URINE: NEGATIVE
POC NITRATES, URINE: NEGATIVE
POC RESIDUAL URINE VOLUME: 2 ML (ref 0–100)
PROT UR QL STRIP: NEGATIVE
SP GR UR STRIP: 1.03 (ref 1–1.03)
UROBILINOGEN UR STRIP-ACNC: 0.2 (ref 0.1–1.1)

## 2024-01-30 PROCEDURE — 99214 OFFICE O/P EST MOD 30 MIN: CPT | Mod: S$GLB,,, | Performed by: NURSE PRACTITIONER

## 2024-01-30 PROCEDURE — 51798 US URINE CAPACITY MEASURE: CPT | Mod: S$GLB,,, | Performed by: NURSE PRACTITIONER

## 2024-01-30 PROCEDURE — 81003 URINALYSIS AUTO W/O SCOPE: CPT | Mod: QW,S$GLB,, | Performed by: NURSE PRACTITIONER

## 2024-01-30 PROCEDURE — 99999 PR PBB SHADOW E&M-EST. PATIENT-LVL III: CPT | Mod: PBBFAC,,, | Performed by: NURSE PRACTITIONER

## 2024-01-30 NOTE — PROGRESS NOTES
Chief Complaint:   OAB  Mixed incontinence     HPI:   Patient 78-year-old female that is presenting as a follow-up to overactive bladder.  Patient states that she has implemented behavior modifications, discussed at our initial visit.  Patient states that overactive bladder symptoms have greatly improved with behavior modifications.  Urine in clinic is negative and PVR is 2 mL.  10/26/2023  Patient is a 78-year-old female that is presenting with mixed incontinence, urge greater than stress in overactive bladder. Patient states that she is status post recent hysterectomy and urinary incontinence has increased.  States that she drinks very little water throughout the day, several caffeinated cold drinks.  Denies pelvic or flank pain.  No gross hematuria.  Urine in clinic is negative and PVR is 14 mL.  No overactive bladder medication.       Allergies:  Pyrilamine, Iodine and iodide containing products, and Iron    Medications:  has a current medication list which includes the following prescription(s): diphenhydramine hcl, acetaminophen, acetaminophen/chlorpheniramine, albuterol, ascorbic acid (vitamin c), aspirin, azelastine, blood sugar diagnostic, blood-glucose meter, clotrimazole-betamethasone 1-0.05%, famotidine, fluticasone propionate, glipizide, ketoconazole, lancets, loratadine, losartan-hydrochlorothiazide 100-12.5 mg, metformin, multivitamin, pantoprazole, rosuvastatin, sulfasalazine, and tizanidine.    Review of Systems:  General: No fever, chills, fatigability, or weight loss.  Skin: No rashes, itching, or changes in color or texture of skin.  Chest: Denies JACKSON, cyanosis, wheezing, cough, and sputum production.  Abdomen: Appetite fine. No weight loss. Denies diarrhea, abdominal pain, hematemesis, or blood in stool.  Musculoskeletal: No joint stiffness or swelling. Denies back pain.  : As above.  All other review of systems negative.    PMH:   has a past medical history of Arthritis, Diabetes mellitus,  type 2, Foot drop, left (2018), GERD (gastroesophageal reflux disease), Heartburn, Hyperlipidemia, Hypertension, and Left sided sciatica.    PSH:   has a past surgical history that includes  section; Hernia repair; Colonoscopy (2008); Breast biopsy; Microdiscectomy of spine (Left, 2018); Endoscopic ultrasound of upper gastrointestinal tract (N/A, 2022); Spine surgery; Anterior vaginal repair (N/A, 2023); and Total vaginal hysterectomy (N/A, 2023).    FamHx: family history includes Diabetes in her father and mother.    SocHx:  reports that she has never smoked. She has never used smokeless tobacco. She reports that she does not drink alcohol and does not use drugs.      Physical Exam:  General: A&Ox3, no apparent distress, no deformities  Neck: No masses, normal thyroid  Lungs: normal inspiration, no use of accessory muscles  Heart: normal pulse, no arrhythmias  Abdomen: Soft, NT, ND, no masses, no hernias, no hepatosplenomegaly  Lymphatic: Neck and groin nodes negative  Skin: The skin is warm and dry. No jaundice.    Labs/Studies:   See HPI  Impression/Plan:   Overactive bladder  Patient states that overactive bladder symptoms have greatly improved, with behavior modifications alone.  Patient to follow-up with our clinic p.r.n..

## 2024-01-31 DIAGNOSIS — J06.9 UPPER RESPIRATORY TRACT INFECTION, UNSPECIFIED TYPE: ICD-10-CM

## 2024-01-31 RX ORDER — ALBUTEROL SULFATE 90 UG/1
AEROSOL, METERED RESPIRATORY (INHALATION)
Qty: 18 G | Refills: 2 | Status: SHIPPED | OUTPATIENT
Start: 2024-01-31

## 2024-01-31 NOTE — TELEPHONE ENCOUNTER
Refill Routing Note   Medication(s) are not appropriate for processing by [facility] for the following reason(s):        Non-participating provider    [facility] action(s):  Route               Appointments  past 12m or future 3m with PCP    Date Provider   Last Visit   1/9/2024 Hilda Hankins PA-C   Next Visit   5/9/2024 Hilda Hankins PA-C   ED visits in past 90 days: 0        Note composed:8:17 AM 01/31/2024

## 2024-02-08 DIAGNOSIS — K21.9 GASTROESOPHAGEAL REFLUX DISEASE: ICD-10-CM

## 2024-02-08 DIAGNOSIS — M05.9 SEROPOSITIVE RHEUMATOID ARTHRITIS: ICD-10-CM

## 2024-02-08 DIAGNOSIS — M19.90 INFLAMMATORY ARTHRITIS: ICD-10-CM

## 2024-02-08 RX ORDER — FAMOTIDINE 40 MG/1
TABLET, FILM COATED ORAL
Qty: 90 TABLET | Refills: 3 | Status: SHIPPED | OUTPATIENT
Start: 2024-02-08

## 2024-02-13 RX ORDER — FOLIC ACID 1 MG/1
TABLET ORAL
Qty: 90 TABLET | Refills: 3 | Status: SHIPPED | OUTPATIENT
Start: 2024-02-13

## 2024-03-22 NOTE — TELEPHONE ENCOUNTER
They cannot sell fewer than one box of the Tresiba or any of the pens. Maybe she has a family member/friend who can keep the extras for her?   Abdomen soft, non-tender, no guarding no rebound

## 2024-03-31 DIAGNOSIS — G91.2 (IDIOPATHIC) NORMAL PRESSURE HYDROCEPHALUS: ICD-10-CM

## 2024-03-31 NOTE — TELEPHONE ENCOUNTER
Care Due:                  Date            Visit Type   Department     Provider  --------------------------------------------------------------------------------                                EP -                              PRIMARY      [facility] INTERNAL  Last Visit: 11-      CARE ([facility])   MEDICINE       Bertha Kearns  Next Visit: None Scheduled  None         None Found                                                            Last  Test          Frequency    Reason                     Performed    Due Date  --------------------------------------------------------------------------------    HBA1C.......  6 months...  metFORMIN................  11- 04-    [facility] Embedded Care Due Messages. Reference number: [id number].   3/31/2024 7:05:09 AM CDT

## 2024-04-01 RX ORDER — METFORMIN HYDROCHLORIDE 1000 MG/1
1000 TABLET ORAL 2 TIMES DAILY WITH MEALS
Qty: 180 TABLET | Refills: 0 | Status: SHIPPED | OUTPATIENT
Start: 2024-04-01 | End: 2024-05-14

## 2024-04-01 NOTE — TELEPHONE ENCOUNTER
Refill Decision Note   Indira Walker  is requesting a refill authorization.  Brief Assessment and Rationale for Refill:  Approve     Medication Therapy Plan:  FLOS      Alert overridden per protocol: Yes   Comments:     Note composed:2:36 PM 04/01/2024

## 2024-04-17 ENCOUNTER — TELEPHONE (OUTPATIENT)
Dept: INTERNAL MEDICINE | Facility: CLINIC | Age: 79
End: 2024-04-17
Payer: MEDICARE

## 2024-04-17 NOTE — TELEPHONE ENCOUNTER
Spoke with pt, offered appt but pt stated she will wait until may 7th for her appt with LEA Canas   Cosentyx Pregnancy And Lactation Text: This medication is Pregnancy Category B and is considered safe during pregnancy. It is unknown if this medication is excreted in breast milk.

## 2024-04-17 NOTE — TELEPHONE ENCOUNTER
----- Message from Jhoan Gomes sent at 4/17/2024  9:13 AM CDT -----  Contact: Indira Jacobson would like a call back at [phone] in regards to having issues with indigestion and acid reflux. Patient states that the current medication she is taking isn't working so she would like to know what to do.  Thanks   Am

## 2024-05-06 ENCOUNTER — LAB VISIT (OUTPATIENT)
Dept: LAB | Facility: HOSPITAL | Age: 79
End: 2024-05-06
Attending: FAMILY MEDICINE
Payer: MEDICARE

## 2024-05-06 DIAGNOSIS — E11.59 HYPERTENSION ASSOCIATED WITH DIABETES: ICD-10-CM

## 2024-05-06 DIAGNOSIS — E11.22 TYPE 2 DIABETES MELLITUS WITH STAGE 3A CHRONIC KIDNEY DISEASE, WITHOUT LONG-TERM CURRENT USE OF INSULIN: ICD-10-CM

## 2024-05-06 DIAGNOSIS — I15.2 HYPERTENSION ASSOCIATED WITH DIABETES: ICD-10-CM

## 2024-05-06 DIAGNOSIS — E78.5 HYPERLIPIDEMIA ASSOCIATED WITH TYPE 2 DIABETES MELLITUS: ICD-10-CM

## 2024-05-06 DIAGNOSIS — E11.69 HYPERLIPIDEMIA ASSOCIATED WITH TYPE 2 DIABETES MELLITUS: ICD-10-CM

## 2024-05-06 DIAGNOSIS — N18.31 TYPE 2 DIABETES MELLITUS WITH STAGE 3A CHRONIC KIDNEY DISEASE, WITHOUT LONG-TERM CURRENT USE OF INSULIN: ICD-10-CM

## 2024-05-06 LAB
ALBUMIN SERPL BCP-MCNC: 3.4 G/DL (ref 3.5–5.2)
ALP SERPL-CCNC: 57 U/L (ref 55–135)
ALT SERPL W/O P-5'-P-CCNC: 11 U/L (ref 10–44)
ANION GAP SERPL CALC-SCNC: 10 MMOL/L (ref 8–16)
AST SERPL-CCNC: 15 U/L (ref 10–40)
BASOPHILS # BLD AUTO: 0.05 K/UL (ref 0–0.2)
BASOPHILS NFR BLD: 0.5 % (ref 0–1.9)
BILIRUB SERPL-MCNC: 0.5 MG/DL (ref 0.1–1)
BUN SERPL-MCNC: 14 MG/DL (ref 8–23)
CALCIUM SERPL-MCNC: 9.6 MG/DL (ref 8.7–10.5)
CHLORIDE SERPL-SCNC: 101 MMOL/L (ref 95–110)
CHOLEST SERPL-MCNC: 182 MG/DL (ref 120–199)
CHOLEST/HDLC SERPL: 2 {RATIO} (ref 2–5)
CO2 SERPL-SCNC: 27 MMOL/L (ref 23–29)
CREAT SERPL-MCNC: 1.1 MG/DL (ref 0.5–1.4)
DIFFERENTIAL METHOD BLD: ABNORMAL
EOSINOPHIL # BLD AUTO: 0.1 K/UL (ref 0–0.5)
EOSINOPHIL NFR BLD: 1 % (ref 0–8)
ERYTHROCYTE [DISTWIDTH] IN BLOOD BY AUTOMATED COUNT: 14.6 % (ref 11.5–14.5)
EST. GFR  (NO RACE VARIABLE): 50.8 ML/MIN/1.73 M^2
ESTIMATED AVG GLUCOSE: 169 MG/DL (ref 68–131)
GLUCOSE SERPL-MCNC: 103 MG/DL (ref 70–110)
HBA1C MFR BLD: 7.5 % (ref 4–5.6)
HCT VFR BLD AUTO: 35.6 % (ref 37–48.5)
HDLC SERPL-MCNC: 93 MG/DL (ref 40–75)
HDLC SERPL: 51.1 % (ref 20–50)
HGB BLD-MCNC: 11.1 G/DL (ref 12–16)
IMM GRANULOCYTES # BLD AUTO: 0.03 K/UL (ref 0–0.04)
IMM GRANULOCYTES NFR BLD AUTO: 0.3 % (ref 0–0.5)
LDLC SERPL CALC-MCNC: 77.4 MG/DL (ref 63–159)
LYMPHOCYTES # BLD AUTO: 2.3 K/UL (ref 1–4.8)
LYMPHOCYTES NFR BLD: 23.4 % (ref 18–48)
MCH RBC QN AUTO: 29.7 PG (ref 27–31)
MCHC RBC AUTO-ENTMCNC: 31.2 G/DL (ref 32–36)
MCV RBC AUTO: 95 FL (ref 82–98)
MONOCYTES # BLD AUTO: 1.1 K/UL (ref 0.3–1)
MONOCYTES NFR BLD: 11.4 % (ref 4–15)
NEUTROPHILS # BLD AUTO: 6.3 K/UL (ref 1.8–7.7)
NEUTROPHILS NFR BLD: 63.4 % (ref 38–73)
NONHDLC SERPL-MCNC: 89 MG/DL
NRBC BLD-RTO: 0 /100 WBC
PLATELET # BLD AUTO: 376 K/UL (ref 150–450)
PMV BLD AUTO: 10.4 FL (ref 9.2–12.9)
POTASSIUM SERPL-SCNC: 4.1 MMOL/L (ref 3.5–5.1)
PROT SERPL-MCNC: 7.5 G/DL (ref 6–8.4)
RBC # BLD AUTO: 3.74 M/UL (ref 4–5.4)
SODIUM SERPL-SCNC: 138 MMOL/L (ref 136–145)
TRIGL SERPL-MCNC: 58 MG/DL (ref 30–150)
TSH SERPL DL<=0.005 MIU/L-ACNC: 1.73 UIU/ML (ref 0.4–4)
WBC # BLD AUTO: 9.86 K/UL (ref 3.9–12.7)

## 2024-05-06 PROCEDURE — 83036 HEMOGLOBIN GLYCOSYLATED A1C: CPT | Performed by: FAMILY MEDICINE

## 2024-05-06 PROCEDURE — 36415 COLL VENOUS BLD VENIPUNCTURE: CPT | Performed by: FAMILY MEDICINE

## 2024-05-06 PROCEDURE — 80061 LIPID PANEL: CPT | Performed by: FAMILY MEDICINE

## 2024-05-06 PROCEDURE — 84443 ASSAY THYROID STIM HORMONE: CPT | Performed by: FAMILY MEDICINE

## 2024-05-06 PROCEDURE — 85025 COMPLETE CBC W/AUTO DIFF WBC: CPT | Performed by: FAMILY MEDICINE

## 2024-05-06 PROCEDURE — 80053 COMPREHEN METABOLIC PANEL: CPT | Performed by: FAMILY MEDICINE

## 2024-05-07 ENCOUNTER — OFFICE VISIT (OUTPATIENT)
Dept: INTERNAL MEDICINE | Facility: CLINIC | Age: 79
End: 2024-05-07
Payer: MEDICARE

## 2024-05-07 VITALS
DIASTOLIC BLOOD PRESSURE: 64 MMHG | BODY MASS INDEX: 22.9 KG/M2 | OXYGEN SATURATION: 97 % | SYSTOLIC BLOOD PRESSURE: 120 MMHG | WEIGHT: 129.31 LBS | HEART RATE: 86 BPM

## 2024-05-07 DIAGNOSIS — E78.5 HYPERLIPIDEMIA ASSOCIATED WITH TYPE 2 DIABETES MELLITUS: ICD-10-CM

## 2024-05-07 DIAGNOSIS — I70.0 ATHEROSCLEROSIS OF AORTA: ICD-10-CM

## 2024-05-07 DIAGNOSIS — F33.9 DEPRESSION, RECURRENT: Primary | ICD-10-CM

## 2024-05-07 DIAGNOSIS — M06.9 RHEUMATOID ARTHRITIS, INVOLVING UNSPECIFIED SITE, UNSPECIFIED WHETHER RHEUMATOID FACTOR PRESENT: ICD-10-CM

## 2024-05-07 DIAGNOSIS — E11.22 TYPE 2 DIABETES MELLITUS WITH STAGE 3A CHRONIC KIDNEY DISEASE, WITHOUT LONG-TERM CURRENT USE OF INSULIN: ICD-10-CM

## 2024-05-07 DIAGNOSIS — H61.23 BILATERAL IMPACTED CERUMEN: ICD-10-CM

## 2024-05-07 DIAGNOSIS — D64.9 ANEMIA, UNSPECIFIED TYPE: ICD-10-CM

## 2024-05-07 DIAGNOSIS — I50.32 CHRONIC HEART FAILURE WITH PRESERVED EJECTION FRACTION: ICD-10-CM

## 2024-05-07 DIAGNOSIS — N18.31 CHRONIC KIDNEY DISEASE, STAGE 3A: ICD-10-CM

## 2024-05-07 DIAGNOSIS — N18.31 TYPE 2 DIABETES MELLITUS WITH STAGE 3A CHRONIC KIDNEY DISEASE, WITHOUT LONG-TERM CURRENT USE OF INSULIN: ICD-10-CM

## 2024-05-07 DIAGNOSIS — I51.89 DIASTOLIC DYSFUNCTION: ICD-10-CM

## 2024-05-07 DIAGNOSIS — Z79.899 IMMUNOSUPPRESSION DUE TO DRUG THERAPY: ICD-10-CM

## 2024-05-07 DIAGNOSIS — D84.821 IMMUNOSUPPRESSION DUE TO DRUG THERAPY: ICD-10-CM

## 2024-05-07 DIAGNOSIS — E11.69 HYPERLIPIDEMIA ASSOCIATED WITH TYPE 2 DIABETES MELLITUS: ICD-10-CM

## 2024-05-07 DIAGNOSIS — I15.2 HYPERTENSION ASSOCIATED WITH DIABETES: ICD-10-CM

## 2024-05-07 DIAGNOSIS — Z78.0 POSTMENOPAUSAL: ICD-10-CM

## 2024-05-07 DIAGNOSIS — E11.59 HYPERTENSION ASSOCIATED WITH DIABETES: ICD-10-CM

## 2024-05-07 DIAGNOSIS — K21.9 GASTROESOPHAGEAL REFLUX DISEASE, UNSPECIFIED WHETHER ESOPHAGITIS PRESENT: ICD-10-CM

## 2024-05-07 PROCEDURE — 99999 PR PBB SHADOW E&M-EST. PATIENT-LVL V: CPT | Mod: PBBFAC,,, | Performed by: PHYSICIAN ASSISTANT

## 2024-05-07 PROCEDURE — 1125F AMNT PAIN NOTED PAIN PRSNT: CPT | Mod: CPTII,S$GLB,, | Performed by: PHYSICIAN ASSISTANT

## 2024-05-07 PROCEDURE — 1159F MED LIST DOCD IN RCRD: CPT | Mod: CPTII,S$GLB,, | Performed by: PHYSICIAN ASSISTANT

## 2024-05-07 PROCEDURE — 1160F RVW MEDS BY RX/DR IN RCRD: CPT | Mod: CPTII,S$GLB,, | Performed by: PHYSICIAN ASSISTANT

## 2024-05-07 PROCEDURE — 3078F DIAST BP <80 MM HG: CPT | Mod: CPTII,S$GLB,, | Performed by: PHYSICIAN ASSISTANT

## 2024-05-07 PROCEDURE — 3074F SYST BP LT 130 MM HG: CPT | Mod: CPTII,S$GLB,, | Performed by: PHYSICIAN ASSISTANT

## 2024-05-07 PROCEDURE — 99214 OFFICE O/P EST MOD 30 MIN: CPT | Mod: S$GLB,,, | Performed by: PHYSICIAN ASSISTANT

## 2024-05-07 NOTE — ASSESSMENT & PLAN NOTE
Controlled, continue rosuvastatin  Lab Results   Component Value Date    CHOL 182 05/06/2024    LDLCALC 77.4 05/06/2024    TRIG 58 05/06/2024    HDL 93 (H) 05/06/2024    ALT 11 05/06/2024    AST 15 05/06/2024    ALKPHOS 57 05/06/2024

## 2024-05-07 NOTE — ASSESSMENT & PLAN NOTE
/64, controlled, continue losartan-HCTZ  Lab Results   Component Value Date     05/06/2024    K 4.1 05/06/2024    BUN 14 05/06/2024    CREATININE 1.1 05/06/2024    EGFRNORACEVR 50.8 (A) 05/06/2024

## 2024-05-07 NOTE — PATIENT INSTRUCTIONS
Check with your pharmacist regarding shingrix vaccine.     Check with your pharmacist regarding Tdap (tetanus/diphtheria/pertussis) vaccine.     Please bring your medication or a written list to your next office visit.    If you check your blood pressure at home, please bring written your blood pressure log and your blood pressure machine to your next office visit.

## 2024-05-07 NOTE — ASSESSMENT & PLAN NOTE
Stable  Lab Results   Component Value Date    RBC 3.74 (L) 05/06/2024    HGB 11.1 (L) 05/06/2024    HCT 35.6 (L) 05/06/2024    MCV 95 05/06/2024

## 2024-05-07 NOTE — ASSESSMENT & PLAN NOTE
Stable, no NSAIDs  Lab Results   Component Value Date    BUN 14 05/06/2024    CREATININE 1.1 05/06/2024    EGFRNORACEVR 50.8 (A) 05/06/2024

## 2024-05-07 NOTE — PROGRESS NOTES
Subjective:       Patient ID: Indira Walker is a 78 y.o. female.    Chief Complaint: Annual Exam      Patient presents to clinic today for annual physical exam.        Review of Systems   Constitutional:  Negative for chills, fatigue, fever and unexpected weight change.   HENT:  Positive for dental problem (chronic) and hearing loss (chronic). Negative for congestion, ear pain, rhinorrhea and trouble swallowing.    Eyes:  Negative for pain and visual disturbance.   Respiratory:  Negative for cough and shortness of breath.    Cardiovascular:  Negative for chest pain, palpitations and leg swelling.   Gastrointestinal:  Positive for abdominal pain (chronic), nausea (chronic) and vomiting (chronic). Negative for abdominal distention, blood in stool, constipation and diarrhea.   Genitourinary:  Negative for difficulty urinating and vaginal discharge.   Musculoskeletal:  Positive for arthralgias (chronic). Negative for myalgias.   Skin:  Negative for rash.   Neurological:  Negative for dizziness, weakness, numbness and headaches.   Hematological:  Negative for adenopathy. Does not bruise/bleed easily.   Psychiatric/Behavioral:  Negative for dysphoric mood and sleep disturbance. The patient is not nervous/anxious.        Objective:      Physical Exam  Vitals and nursing note reviewed.   Constitutional:       General: She is not in acute distress.     Appearance: Normal appearance. She is well-developed.      Comments: Ambulatory without assistive device    HENT:      Head: Normocephalic and atraumatic.      Right Ear: There is impacted cerumen.      Left Ear: There is impacted cerumen.      Nose: Nose normal. No mucosal edema or rhinorrhea.      Mouth/Throat:      Lips: Pink.      Mouth: Mucous membranes are moist.      Pharynx: Oropharynx is clear. Uvula midline.   Eyes:      General: Lids are normal. No scleral icterus.     Extraocular Movements: Extraocular movements intact.      Conjunctiva/sclera: Conjunctivae  normal.      Pupils: Pupils are equal, round, and reactive to light.   Neck:      Thyroid: No thyromegaly.   Cardiovascular:      Rate and Rhythm: Normal rate and regular rhythm.      Pulses: Normal pulses.   Pulmonary:      Effort: Pulmonary effort is normal.      Breath sounds: Normal breath sounds. No wheezing, rhonchi or rales.   Musculoskeletal:         General: Normal range of motion.      Cervical back: Normal range of motion and neck supple.      Right lower leg: No edema.      Left lower leg: No edema.   Lymphadenopathy:      Cervical: No cervical adenopathy.   Skin:     General: Skin is warm and dry.      Findings: No lesion or rash.      Nails: There is no clubbing.   Neurological:      Mental Status: She is alert.      Cranial Nerves: No cranial nerve deficit.      Sensory: No sensory deficit.   Psychiatric:         Mood and Affect: Mood and affect normal.         Assessment:       1. Depression, recurrent    2. Chronic heart failure with preserved ejection fraction    3. Hyperlipidemia associated with type 2 diabetes mellitus    4. Hypertension associated with diabetes    5. Diastolic dysfunction    6. Rheumatoid arthritis, involving unspecified site, unspecified whether rheumatoid factor present    7. Type 2 diabetes mellitus with stage 3a chronic kidney disease, without long-term current use of insulin    8. Chronic kidney disease, stage 3a    9. Atherosclerosis of aorta    10. Immunosuppression due to drug therapy    11. Anemia, unspecified type    12. Gastroesophageal reflux disease, unspecified whether esophagitis present    13. Postmenopausal    14. Bilateral impacted cerumen        Plan:   1. Depression, recurrent  Overview:  Patient lost both of her children, declines therapy at this time      2. Chronic heart failure with preserved ejection fraction  Overview:  Followed by Cardiology, continue current treatment plan       3. Hyperlipidemia associated with type 2 diabetes mellitus  Assessment &  Plan:  Controlled, continue rosuvastatin  Lab Results   Component Value Date    CHOL 182 05/06/2024    LDLCALC 77.4 05/06/2024    TRIG 58 05/06/2024    HDL 93 (H) 05/06/2024    ALT 11 05/06/2024    AST 15 05/06/2024    ALKPHOS 57 05/06/2024        Orders:  -     Comprehensive Metabolic Panel; Future; Expected date: 11/07/2024  -     Lipid Panel; Future; Expected date: 11/07/2024    4. Hypertension associated with diabetes  Assessment & Plan:  /64, controlled, continue losartan-HCTZ  Lab Results   Component Value Date     05/06/2024    K 4.1 05/06/2024    BUN 14 05/06/2024    CREATININE 1.1 05/06/2024    EGFRNORACEVR 50.8 (A) 05/06/2024          5. Diastolic dysfunction  Overview:  Followed by Cardiology, continue current treatment plan       6. Rheumatoid arthritis, involving unspecified site, unspecified whether rheumatoid factor present  Overview:  Followed by Rheumatology, continue current treatment plan       7. Type 2 diabetes mellitus with stage 3a chronic kidney disease, without long-term current use of insulin  Assessment & Plan:  Controlled, continue metformin, glipizide  Lab Results   Component Value Date    HGBA1C 7.5 (H) 05/06/2024    HGBA1C 7.2 (H) 11/02/2023    LDLCALC 77.4 05/06/2024    LABMICR 26.0 11/02/2023    CREATRANDUR 100.0 11/02/2023    MICALBCREAT 26.0 11/02/2023        Orders:  -     Hemoglobin A1C; Future; Expected date: 11/07/2024  -     Microalbumin/Creatinine Ratio, Urine; Future; Expected date: 11/07/2024    8. Chronic kidney disease, stage 3a  Assessment & Plan:  Stable, no NSAIDs  Lab Results   Component Value Date    BUN 14 05/06/2024    CREATININE 1.1 05/06/2024    EGFRNORACEVR 50.8 (A) 05/06/2024          9. Atherosclerosis of aorta  Overview:  XR Sacrum 2/2019, on ASA and statin      10. Immunosuppression due to drug therapy  Overview:  Followed by Rheumatology, continue current treatment plan       11. Anemia, unspecified type  Assessment & Plan:  Stable  Lab Results    Component Value Date    RBC 3.74 (L) 05/06/2024    HGB 11.1 (L) 05/06/2024    HCT 35.6 (L) 05/06/2024    MCV 95 05/06/2024          12. Gastroesophageal reflux disease, unspecified whether esophagitis present  Overview:  Continue pepcid and protonix, worsening, refer to GI    Orders:  -     Ambulatory referral/consult to Gastroenterology; Future; Expected date: 05/14/2024    13. Postmenopausal  -     DXA Bone Density Axial Skeleton 1 or more sites; Future; Expected date: 07/26/2024    14. Bilateral impacted cerumen  -     Ambulatory referral/consult to ENT; Future; Expected date: 05/14/2024        Recent labs reviewed with patient.     ABBY signed for last eye exam  Discussed shingrix and Tdap vaccines, advised can be obtained at pharmacy.   DEXA scheduled in July  6 month f/u with Dr. Kearns scheduled with fasting labs PTA   Health Maintenance reviewed/updated.      Visit today included increased complexity associated with the care of the episodic problem GERD, which was addressed while instituting co-management of the longitudinal care of the patient due to the serious and/or complex managed problem(s) .    I have evaluated and discussed management associated with medical care services that serve as the continuing focal point for all needed health care services and/or with medical care services that are part of ongoing care related to my patient's single, serious condition or a complex condition(s).    I am providing ongoing care and I am the primary care provider for this patient, and they are being managed, monitored, and/or observed for their chronic conditions over time.     I have addressed their ongoing health maintenance requirements and needs for all health care services and reviewed co-management plans provided by specialty providers when available.    Health Maintenance Due   Topic Date Due    TETANUS VACCINE  Never done    Shingles Vaccine (1 of 2) Never done    RSV Vaccine (Age 60+ and Pregnant  patients) (1 - 1-dose 60+ series) Never done    COVID-19 Vaccine (4 - 2023-24 season) 09/01/2023    Eye Exam  10/12/2023    DEXA Scan  07/26/2024

## 2024-05-07 NOTE — ASSESSMENT & PLAN NOTE
Controlled, continue metformin, glipizide  Lab Results   Component Value Date    HGBA1C 7.5 (H) 05/06/2024    HGBA1C 7.2 (H) 11/02/2023    LDLCALC 77.4 05/06/2024    LABMICR 26.0 11/02/2023    CREATRANDUR 100.0 11/02/2023    MICALBCREAT 26.0 11/02/2023

## 2024-05-13 DIAGNOSIS — G91.2 (IDIOPATHIC) NORMAL PRESSURE HYDROCEPHALUS: ICD-10-CM

## 2024-05-13 NOTE — TELEPHONE ENCOUNTER
No care due was identified.  [facility] Embedded Care Due Messages. Reference number: [id number].   5/13/2024 4:37:31 PM CDT

## 2024-05-14 RX ORDER — METFORMIN HYDROCHLORIDE 1000 MG/1
1000 TABLET ORAL 2 TIMES DAILY WITH MEALS
Qty: 180 TABLET | Refills: 0 | Status: SHIPPED | OUTPATIENT
Start: 2024-05-14

## 2024-05-14 NOTE — TELEPHONE ENCOUNTER
Refill Decision Note   Indira Walker  is requesting a refill authorization.  Brief Assessment and Rationale for Refill:  Approve     Medication Therapy Plan:        Pharmacist review requested: Yes   Comments:     Note composed:9:35 AM 05/14/2024

## 2024-05-14 NOTE — TELEPHONE ENCOUNTER
Refill Routing Note   Medication(s) are not appropriate for processing by [facility] for the following reason(s):     DDI not previously overridden by current provider--after initial override, the Refill Center will be able to continue overrides      Drug-disease interaction: metFORMIN and Type 2 diabetes mellitus with stage 3a chronic kidney disease, without long-term current use of insulin     [facility] action(s):  Defer      Medication Therapy Plan: DDI not previously overridden by provider    Pharmacist review requested: Yes     Appointments  past 12m or future 3m with PCP    Date Provider   Last Visit   11/9/2023 Bertha Kearns MD   Next Visit   Visit date not found Bertha Kearns MD   ED visits in past 90 days: 0        Note composed:3:34 AM 05/14/2024

## 2024-05-17 ENCOUNTER — PATIENT OUTREACH (OUTPATIENT)
Dept: ADMINISTRATIVE | Facility: HOSPITAL | Age: 79
End: 2024-05-17
Payer: MEDICARE

## 2024-05-30 ENCOUNTER — TELEPHONE (OUTPATIENT)
Dept: INTERNAL MEDICINE | Facility: CLINIC | Age: 79
End: 2024-05-30
Payer: MEDICARE

## 2024-05-30 NOTE — TELEPHONE ENCOUNTER
----- Message from Alexandrea Parry sent at 5/30/2024  9:33 AM CDT -----  Contact: Indira  .Patient is calling to speak with the nurse regarding concerns . Reports having vertigo and needing something to get fid of it   . Please give patient a call back at   [phone].

## 2024-05-30 NOTE — TELEPHONE ENCOUNTER
Patient asking vertigo medicine. Informed she needs to come see her provider for further eval prior to have the medication or proceed to any urgent care. Patient refused, states she will see if it will get better if not she will give us a call back. Verbally understood.

## 2024-06-03 ENCOUNTER — OFFICE VISIT (OUTPATIENT)
Dept: OTOLARYNGOLOGY | Facility: CLINIC | Age: 79
End: 2024-06-03
Payer: MEDICARE

## 2024-06-03 VITALS — BODY MASS INDEX: 22.93 KG/M2 | HEIGHT: 63 IN | WEIGHT: 129.44 LBS

## 2024-06-03 DIAGNOSIS — H61.23 BILATERAL IMPACTED CERUMEN: ICD-10-CM

## 2024-06-03 PROCEDURE — 99499 UNLISTED E&M SERVICE: CPT | Mod: S$GLB,,, | Performed by: PHYSICIAN ASSISTANT

## 2024-06-03 PROCEDURE — 69210 REMOVE IMPACTED EAR WAX UNI: CPT | Mod: S$GLB,,, | Performed by: PHYSICIAN ASSISTANT

## 2024-06-03 PROCEDURE — 99999 PR PBB SHADOW E&M-EST. PATIENT-LVL IV: CPT | Mod: PBBFAC,,, | Performed by: PHYSICIAN ASSISTANT

## 2024-06-03 NOTE — PROGRESS NOTES
"Subjective:   Patient ID: Indira Walker is a 78 y.o. female.    Chief Complaint: Cerumen Impaction (Pt has possible wax in both ears )    Patient is here to see me today for evaluation of a possible wax impaction in bilateral ears.  She has complaints of hearing loss in the affected ears, but denies pain or drainage.  This has been an issue in the past.  The patient has not been using any sort of ear drop to soften the wax.       Review of patient's allergies indicates:   Allergen Reactions    Pyrilamine Swelling     (an ingredient in Midol)    Iodine and iodide containing products Hives    Iron Other (See Comments)           Review of Systems   Constitutional:  Negative for chills, fatigue, fever and unexpected weight change.   HENT:  Positive for hearing loss. Negative for congestion, dental problem, ear discharge, ear pain, facial swelling, nosebleeds, postnasal drip, rhinorrhea, sinus pressure, sneezing, sore throat, tinnitus, trouble swallowing and voice change.    Eyes:  Negative for redness, itching and visual disturbance.   Respiratory:  Negative for cough, choking, shortness of breath and wheezing.    Cardiovascular:  Negative for chest pain and palpitations.   Gastrointestinal:  Negative for abdominal pain.        No reflux.   Musculoskeletal:  Negative for gait problem.   Skin:  Negative for rash.   Neurological:  Negative for dizziness, light-headedness and headaches.         Objective:   Ht 5' 3" (1.6 m)   Wt 58.7 kg (129 lb 6.6 oz)   BMI 22.92 kg/m²     Physical Exam  Constitutional:       General: She is not in acute distress.     Appearance: She is well-developed.   HENT:      Head: Normocephalic and atraumatic.      Right Ear: Tympanic membrane, ear canal and external ear normal. There is impacted cerumen.      Left Ear: Tympanic membrane, ear canal and external ear normal. There is impacted cerumen.      Nose: Nose normal. No nasal deformity, septal deviation, mucosal edema or rhinorrhea.     "  Right Sinus: No maxillary sinus tenderness or frontal sinus tenderness.      Left Sinus: No maxillary sinus tenderness or frontal sinus tenderness.      Mouth/Throat:      Mouth: Mucous membranes are not pale and not dry.      Dentition: No dental caries.      Pharynx: Uvula midline. No oropharyngeal exudate or posterior oropharyngeal erythema.   Eyes:      General: Lids are normal. No scleral icterus.     Extraocular Movements:      Right eye: Normal extraocular motion and no nystagmus.      Left eye: Normal extraocular motion and no nystagmus.      Conjunctiva/sclera: Conjunctivae normal.      Right eye: Right conjunctiva is not injected. No chemosis.     Left eye: Left conjunctiva is not injected. No chemosis.     Pupils: Pupils are equal, round, and reactive to light.   Neck:      Thyroid: No thyroid mass or thyromegaly.      Trachea: Trachea and phonation normal. No tracheal tenderness or tracheal deviation.   Pulmonary:      Effort: Pulmonary effort is normal. No respiratory distress.      Breath sounds: No stridor.   Abdominal:      General: There is no distension.   Lymphadenopathy:      Head:      Right side of head: No submental, submandibular, preauricular, posterior auricular or occipital adenopathy.      Left side of head: No submental, submandibular, preauricular, posterior auricular or occipital adenopathy.      Cervical: No cervical adenopathy.   Skin:     General: Skin is warm and dry.      Findings: No erythema or rash.   Neurological:      Mental Status: She is alert and oriented to person, place, and time.      Cranial Nerves: No cranial nerve deficit.   Psychiatric:         Behavior: Behavior normal.          Procedure Note    CHIEF COMPLAINT:  Cerumen Impaction    Description:  The patient was seated in an exam chair.  An ear speculum was placed in the right EAC and was examined under the microscope.  Suction and/or loop curettes were used to remove a large cerumen impaction.  The tympanic  membrane was visualized and was normal in appearance.  The procedure was repeated on the left side in a similar fashion.  The TM was intact and normal on this side as well.  The patient tolerated the procedure well.     Assessment:     1. Bilateral impacted cerumen        Plan:     Bilateral impacted cerumen  -     Ambulatory referral/consult to ENT       Cerumen impaction:  Removed today without difficulty.  I would recommend the use of a wax softening drop, either over the counter Debrox or mineral oil, on a weekly basis.  I also instructed the patient to avoid Qtips.

## 2024-06-19 ENCOUNTER — HOSPITAL ENCOUNTER (OUTPATIENT)
Dept: RADIOLOGY | Facility: HOSPITAL | Age: 79
Discharge: HOME | End: 2024-06-19
Attending: INTERNAL MEDICINE
Payer: MEDICARE

## 2024-06-19 DIAGNOSIS — M05.9 SEROPOSITIVE RHEUMATOID ARTHRITIS: ICD-10-CM

## 2024-06-19 PROCEDURE — 73130 X-RAY EXAM OF HAND: CPT | Mod: 26,50,, | Performed by: RADIOLOGY

## 2024-06-19 PROCEDURE — 73130 X-RAY EXAM OF HAND: CPT | Mod: TC,50

## 2024-06-26 ENCOUNTER — OFFICE VISIT (OUTPATIENT)
Dept: RHEUMATOLOGY | Facility: CLINIC | Age: 79
End: 2024-06-26
Payer: MEDICARE

## 2024-06-26 VITALS
HEART RATE: 63 BPM | DIASTOLIC BLOOD PRESSURE: 67 MMHG | WEIGHT: 130.81 LBS | HEIGHT: 63 IN | SYSTOLIC BLOOD PRESSURE: 113 MMHG | BODY MASS INDEX: 23.18 KG/M2

## 2024-06-26 DIAGNOSIS — M05.9 SEROPOSITIVE RHEUMATOID ARTHRITIS: Primary | ICD-10-CM

## 2024-06-26 DIAGNOSIS — E78.5 HYPERLIPIDEMIA ASSOCIATED WITH TYPE 2 DIABETES MELLITUS: ICD-10-CM

## 2024-06-26 DIAGNOSIS — D84.821 IMMUNOSUPPRESSION DUE TO DRUG THERAPY: ICD-10-CM

## 2024-06-26 DIAGNOSIS — N18.31 CHRONIC KIDNEY DISEASE, STAGE 3A: ICD-10-CM

## 2024-06-26 DIAGNOSIS — Z79.899 IMMUNOSUPPRESSION DUE TO DRUG THERAPY: ICD-10-CM

## 2024-06-26 DIAGNOSIS — Z51.81 MEDICATION MONITORING ENCOUNTER: ICD-10-CM

## 2024-06-26 DIAGNOSIS — E11.69 HYPERLIPIDEMIA ASSOCIATED WITH TYPE 2 DIABETES MELLITUS: ICD-10-CM

## 2024-06-26 PROBLEM — M06.9 RA (RHEUMATOID ARTHRITIS): Status: RESOLVED | Noted: 2022-01-11 | Resolved: 2024-06-26

## 2024-06-26 PROCEDURE — 1100F PTFALLS ASSESS-DOCD GE2>/YR: CPT | Mod: CPTII,S$GLB,, | Performed by: INTERNAL MEDICINE

## 2024-06-26 PROCEDURE — 99214 OFFICE O/P EST MOD 30 MIN: CPT | Mod: S$GLB,,, | Performed by: INTERNAL MEDICINE

## 2024-06-26 PROCEDURE — 3288F FALL RISK ASSESSMENT DOCD: CPT | Mod: CPTII,S$GLB,, | Performed by: INTERNAL MEDICINE

## 2024-06-26 PROCEDURE — 99999 PR PBB SHADOW E&M-EST. PATIENT-LVL IV: CPT | Mod: PBBFAC,,, | Performed by: INTERNAL MEDICINE

## 2024-06-26 PROCEDURE — 1159F MED LIST DOCD IN RCRD: CPT | Mod: CPTII,S$GLB,, | Performed by: INTERNAL MEDICINE

## 2024-06-26 PROCEDURE — 1125F AMNT PAIN NOTED PAIN PRSNT: CPT | Mod: CPTII,S$GLB,, | Performed by: INTERNAL MEDICINE

## 2024-06-26 PROCEDURE — 3078F DIAST BP <80 MM HG: CPT | Mod: CPTII,S$GLB,, | Performed by: INTERNAL MEDICINE

## 2024-06-26 PROCEDURE — 3074F SYST BP LT 130 MM HG: CPT | Mod: CPTII,S$GLB,, | Performed by: INTERNAL MEDICINE

## 2024-06-26 NOTE — PROGRESS NOTES
RHEUMATOLOGY OUTPATIENT CLINIC NOTE    2024    Attending Rheumatologist: Giovanni Cage  Primary Care Provider/Physician Requesting Consultation: Bertha Kearns MD   Chief Complaint/Reason For Consultation:  Rheumatoid Arthritis, Osteoarthritis, and INFLAMMATORY ARTHRITIS      Subjective:     Indira Walker is a 78 y.o. Black or  female with SPRA    No acute complaitns.  Adherence w/ SSZ, currently 500mg BID.    Review of Systems   Constitutional:  Negative for fever.   Eyes:  Negative for photophobia and pain.   Respiratory:  Negative for cough and shortness of breath.    Cardiovascular:  Negative for chest pain.   Gastrointestinal:  Negative for blood in stool and melena.   Genitourinary:  Negative for dysuria, hematuria and urgency.   Musculoskeletal:  Negative for joint pain.   Skin:  Negative for rash.   Neurological:  Negative for focal weakness and weakness.       Chronic comorbid conditions affecting medical decision making today:  Past Medical History:   Diagnosis Date    Arthritis     Diabetes mellitus, type 2     Foot drop, left 2018    GERD (gastroesophageal reflux disease)     Heartburn     Hyperlipidemia     Hypertension     Left sided sciatica     s/p microdiscectomy 3/21/18     Past Surgical History:   Procedure Laterality Date    ANTERIOR VAGINAL REPAIR N/A 2023    Procedure: COLPORRHAPHY, ANTERIOR;  Surgeon: ANA MARIA Mayorga MD;  Location: [facility] OR;  Service: OB/GYN;  Laterality: N/A;    BREAST BIOPSY       SECTION      COLONOSCOPY  2008    DR. JANET GARCÍA/MILD DIVERICULOSIS DESCENDING AND SIGMOID. REPEAT 5 YRS    ENDOSCOPIC ULTRASOUND OF UPPER GASTROINTESTINAL TRACT N/A 2022    Procedure: ULTRASOUND, UPPER GI TRACT, ENDOSCOPIC;  Surgeon: Ange Saldivar MD;  Location: [facility] ENDO;  Service: Endoscopy;  Laterality: N/A;  Upper and Linear, 22 sharkcore, slides and formalin    HERNIA REPAIR      MICRODISCECTOMY OF SPINE Left  03/21/2018    left L5-S1, Dr Segundo    SPINE SURGERY      TOTAL VAGINAL HYSTERECTOMY N/A 4/14/2023    Procedure: HYSTERECTOMY, TOTAL, VAGINAL;  Surgeon: ANA MARIA Mayorga MD;  [facility];  Service: OB/GYN;  Laterality: N/A;     Family History   Problem Relation Name Age of Onset    Diabetes Mother      Diabetes Father       Social History     Tobacco Use   Smoking Status Never   Smokeless Tobacco Never       Current Outpatient Medications:     blood sugar diagnostic Strp, 1 each by Misc.(Non-Drug; Combo Route) route 2 (two) times a day., Disp: 200 each, Rfl: 3    blood-glucose meter kit, Use as instructed to check BG twice daily, Disp: 1 each, Rfl: 1    lancets Misc, To check BG 2 times daily, to use with insurance preferred meter, Disp: 200 each, Rfl: 3    acetaminophen (TYLENOL EXTRA STRENGTH ORAL), Take by mouth., Disp: , Rfl:     albuterol (PROVENTIL/VENTOLIN HFA) 90 mcg/actuation inhaler, INHALE 2 PUFFS INTO THE LUNGS EVERY 4 HOURS AS NEEDED FOR WHEEZING OR SHORTNESS OF BREATH., Disp: 18 g, Rfl: 2    ascorbic acid, vitamin C, (VITAMIN C) 500 MG tablet, Take 500 mg by mouth once daily., Disp: , Rfl:     aspirin (ECOTRIN) 81 MG EC tablet, Take 1 tablet (81 mg total) by mouth once daily. Every other day, Disp: , Rfl: 0    azelastine (ASTELIN) 137 mcg (0.1 %) nasal spray, 1 spray (137 mcg total) by Nasal route 2 (two) times daily., Disp: 30 mL, Rfl: 5    clotrimazole-betamethasone 1-0.05% (LOTRISONE) cream, Apply topically 2 (two) times daily., Disp: 45 g, Rfl: 2    famotidine (PEPCID) 40 MG tablet, TAKE 1 TABLET BY MOUTH EVERY DAY, Disp: 90 tablet, Rfl: 3    fluticasone propionate (FLONASE) 50 mcg/actuation nasal spray, 2 sprays (100 mcg total) by Each Nostril route once daily., Disp: 16 g, Rfl: 5    folic acid (FOLVITE) 1 MG tablet, TAKE 1 TABLET BY MOUTH EVERY DAY, Disp: 90 tablet, Rfl: 3    glipiZIDE 5 MG TR24, Take 1 tablet (5 mg total) by mouth daily with breakfast., Disp: 90 tablet, Rfl: 3     ketoconazole (NIZORAL) 2 % cream, APPLY TOPICALLY TWICE A DAY, Disp: 30 g, Rfl: 2    loratadine (CLARITIN) 10 mg tablet, Take 10 mg by mouth once daily., Disp: , Rfl:     losartan-hydrochlorothiazide 100-12.5 mg (HYZAAR) 100-12.5 mg Tab, TAKE 1 TABLET BY MOUTH EVERY DAY, Disp: 90 tablet, Rfl: 3    metFORMIN (GLUCOPHAGE) 1000 MG tablet, TAKE 1 TABLET BY MOUTH TWICE A DAY WITH FOOD, Disp: 180 tablet, Rfl: 0    multivitamin (THERAGRAN) per tablet, Take 1 tablet by mouth once daily., Disp: , Rfl:     pantoprazole (PROTONIX) 40 MG tablet, Take 1 tablet (40 mg total) by mouth once daily., Disp: 90 tablet, Rfl: 3    rosuvastatin (CRESTOR) 20 MG tablet, TAKE 1 TABLET BY MOUTH EVERY DAY, Disp: 90 tablet, Rfl: 3    tiZANidine (ZANAFLEX) 4 MG tablet, Take 1 tablet (4 mg total) by mouth every 8 (eight) hours as needed (muscle spasm)., Disp: 20 tablet, Rfl: 0     Objective:     Vitals:    06/26/24 1224   BP: 113/67   Pulse: 63     Physical Exam   Eyes: Conjunctivae are normal.   Pulmonary/Chest: Effort normal. No respiratory distress.   Musculoskeletal:         General: Deformity present. No swelling or tenderness. Normal range of motion.   Neurological: She displays no weakness.       Reviewed available old and all outside pertinent medical records available.    All lab results personally reviewed and interpreted by me.       ASSESSMENT / PLAN     1. Seropositive rheumatoid arthritis  CDAI; remission.  Great response to SSZ BID.  Trial of only 500mg per day until next visit.  Sedimentation rate    Rheumatoid Factor    Cyclic Citrullinated Peptide Antibody, IgG      2. Hyperlipidemia associated with type 2 diabetes mellitus  Caution advised with systemic steroid use.      3. Chronic kidney disease, stage 3a  Renally dose medications.      4. Immunosuppression due to drug therapy  Hold sulfasalazine in event of active infections.   CBC Auto Differential      5. Medication monitoring encounter  Comprehensive Metabolic Panel                 Giovanni Cage M.D.

## 2024-07-31 ENCOUNTER — OFFICE VISIT (OUTPATIENT)
Dept: INTERNAL MEDICINE | Facility: CLINIC | Age: 79
End: 2024-07-31
Payer: MEDICARE

## 2024-07-31 ENCOUNTER — TELEPHONE (OUTPATIENT)
Dept: PREADMISSION TESTING | Facility: HOSPITAL | Age: 79
End: 2024-07-31
Payer: MEDICARE

## 2024-07-31 ENCOUNTER — LAB VISIT (OUTPATIENT)
Dept: LAB | Facility: HOSPITAL | Age: 79
End: 2024-07-31
Attending: FAMILY MEDICINE
Payer: MEDICARE

## 2024-07-31 ENCOUNTER — E-CONSULT (OUTPATIENT)
Dept: GASTROENTEROLOGY | Facility: CLINIC | Age: 79
End: 2024-07-31
Payer: MEDICARE

## 2024-07-31 ENCOUNTER — PATIENT MESSAGE (OUTPATIENT)
Dept: OTOLARYNGOLOGY | Facility: CLINIC | Age: 79
End: 2024-07-31
Payer: MEDICARE

## 2024-07-31 VITALS
SYSTOLIC BLOOD PRESSURE: 102 MMHG | HEART RATE: 92 BPM | BODY MASS INDEX: 22.4 KG/M2 | DIASTOLIC BLOOD PRESSURE: 62 MMHG | OXYGEN SATURATION: 97 % | WEIGHT: 126.44 LBS | HEIGHT: 63 IN

## 2024-07-31 DIAGNOSIS — K86.2 PANCREATIC CYST: ICD-10-CM

## 2024-07-31 DIAGNOSIS — R63.4 WEIGHT LOSS: ICD-10-CM

## 2024-07-31 DIAGNOSIS — K44.9 HIATAL HERNIA: ICD-10-CM

## 2024-07-31 DIAGNOSIS — K21.9 GASTROESOPHAGEAL REFLUX DISEASE WITHOUT ESOPHAGITIS: Primary | ICD-10-CM

## 2024-07-31 DIAGNOSIS — K21.9 GASTROESOPHAGEAL REFLUX DISEASE, UNSPECIFIED WHETHER ESOPHAGITIS PRESENT: Primary | ICD-10-CM

## 2024-07-31 DIAGNOSIS — R42 VERTIGO: ICD-10-CM

## 2024-07-31 DIAGNOSIS — K21.9 GASTROESOPHAGEAL REFLUX DISEASE, UNSPECIFIED WHETHER ESOPHAGITIS PRESENT: ICD-10-CM

## 2024-07-31 LAB
BASOPHILS # BLD AUTO: 0.04 K/UL (ref 0–0.2)
BASOPHILS NFR BLD: 0.6 % (ref 0–1.9)
DIFFERENTIAL METHOD BLD: ABNORMAL
EOSINOPHIL # BLD AUTO: 0.1 K/UL (ref 0–0.5)
EOSINOPHIL NFR BLD: 1.1 % (ref 0–8)
ERYTHROCYTE [DISTWIDTH] IN BLOOD BY AUTOMATED COUNT: 14.4 % (ref 11.5–14.5)
HCT VFR BLD AUTO: 35.4 % (ref 37–48.5)
HGB BLD-MCNC: 10.8 G/DL (ref 12–16)
IMM GRANULOCYTES # BLD AUTO: 0.02 K/UL (ref 0–0.04)
IMM GRANULOCYTES NFR BLD AUTO: 0.3 % (ref 0–0.5)
LYMPHOCYTES # BLD AUTO: 2.2 K/UL (ref 1–4.8)
LYMPHOCYTES NFR BLD: 30.3 % (ref 18–48)
MCH RBC QN AUTO: 29.3 PG (ref 27–31)
MCHC RBC AUTO-ENTMCNC: 30.5 G/DL (ref 32–36)
MCV RBC AUTO: 96 FL (ref 82–98)
MONOCYTES # BLD AUTO: 0.7 K/UL (ref 0.3–1)
MONOCYTES NFR BLD: 10 % (ref 4–15)
NEUTROPHILS # BLD AUTO: 4.1 K/UL (ref 1.8–7.7)
NEUTROPHILS NFR BLD: 57.7 % (ref 38–73)
NRBC BLD-RTO: 0 /100 WBC
PLATELET # BLD AUTO: 326 K/UL (ref 150–450)
PMV BLD AUTO: 10.6 FL (ref 9.2–12.9)
RBC # BLD AUTO: 3.68 M/UL (ref 4–5.4)
WBC # BLD AUTO: 7.13 K/UL (ref 3.9–12.7)

## 2024-07-31 PROCEDURE — 1160F RVW MEDS BY RX/DR IN RCRD: CPT | Mod: CPTII,S$GLB,, | Performed by: FAMILY MEDICINE

## 2024-07-31 PROCEDURE — 85025 COMPLETE CBC W/AUTO DIFF WBC: CPT | Performed by: FAMILY MEDICINE

## 2024-07-31 PROCEDURE — G2211 COMPLEX E/M VISIT ADD ON: HCPCS | Mod: S$GLB,,, | Performed by: FAMILY MEDICINE

## 2024-07-31 PROCEDURE — 1125F AMNT PAIN NOTED PAIN PRSNT: CPT | Mod: CPTII,S$GLB,, | Performed by: FAMILY MEDICINE

## 2024-07-31 PROCEDURE — 3074F SYST BP LT 130 MM HG: CPT | Mod: CPTII,S$GLB,, | Performed by: FAMILY MEDICINE

## 2024-07-31 PROCEDURE — 86677 HELICOBACTER PYLORI ANTIBODY: CPT | Performed by: FAMILY MEDICINE

## 2024-07-31 PROCEDURE — 99214 OFFICE O/P EST MOD 30 MIN: CPT | Mod: S$GLB,,, | Performed by: FAMILY MEDICINE

## 2024-07-31 PROCEDURE — 1159F MED LIST DOCD IN RCRD: CPT | Mod: CPTII,S$GLB,, | Performed by: FAMILY MEDICINE

## 2024-07-31 PROCEDURE — 1101F PT FALLS ASSESS-DOCD LE1/YR: CPT | Mod: CPTII,S$GLB,, | Performed by: FAMILY MEDICINE

## 2024-07-31 PROCEDURE — 99999 PR PBB SHADOW E&M-EST. PATIENT-LVL V: CPT | Mod: PBBFAC,,, | Performed by: FAMILY MEDICINE

## 2024-07-31 PROCEDURE — 99999 PR PBB SHADOW E&M-EST. PATIENT-LVL I: CPT | Mod: PBBFAC,,, | Performed by: INTERNAL MEDICINE

## 2024-07-31 PROCEDURE — 3078F DIAST BP <80 MM HG: CPT | Mod: CPTII,S$GLB,, | Performed by: FAMILY MEDICINE

## 2024-07-31 PROCEDURE — 36415 COLL VENOUS BLD VENIPUNCTURE: CPT | Performed by: FAMILY MEDICINE

## 2024-07-31 PROCEDURE — 3288F FALL RISK ASSESSMENT DOCD: CPT | Mod: CPTII,S$GLB,, | Performed by: FAMILY MEDICINE

## 2024-07-31 NOTE — PROGRESS NOTES
Subjective:       Patient ID: Indira Walker is a 78 y.o. female.    Chief Complaint: Gastroesophageal Reflux    History of Present Illness    Patient presents today for dizziness and acid reflux symptoms. She reports experiencing vertigo-like symptoms for 3-4 days, including room spinning sensation and difficulty walking straight. She has been using a walker for support over the last 2-3 days. She has a history of similar episodes. Dizziness is exacerbated by rolling over in bed, sitting up or turning quickly when symptoms are already present. She denies feeling like she will pass out, racing heart rate, chest pain, or shortness of breath. She reports severe acid reflux symptoms persisting despite current medications (pantoprazole, famotidine, and Gas X at night). She has had vomiting for the past 4-5 days, which she associates with acid reflux rather than vertigo. She expresses concern about her abdomen, reporting tenderness in the abdominal area upon palpation. She denies fever. Her last gastroenterologist visit was a few years ago. She has a history of earwax buildup and has previously visited an ENT specialist. She is scheduled for cataract surgery within the next three weeks. Patient is otherwise without concerns today.      Review of Systems   Constitutional:  Negative for chills, fatigue, fever and unexpected weight change.   Eyes:  Negative for visual disturbance.   Respiratory:  Negative for shortness of breath.    Cardiovascular:  Negative for chest pain.   Gastrointestinal:  Positive for abdominal pain, nausea and vomiting. Negative for constipation and diarrhea.   Musculoskeletal:  Negative for myalgias.   Neurological:  Positive for dizziness. Negative for headaches.         Objective:      Physical Exam  Vitals reviewed.   Constitutional:       General: She is not in acute distress.     Appearance: She is well-developed.   HENT:      Head: Normocephalic and atraumatic.   Eyes:      General: Lids  are normal. No scleral icterus.     Extraocular Movements: Extraocular movements intact.      Conjunctiva/sclera: Conjunctivae normal.      Pupils: Pupils are equal, round, and reactive to light.   Cardiovascular:      Rate and Rhythm: Normal rate and regular rhythm.      Heart sounds: No murmur heard.     No friction rub. No gallop.   Pulmonary:      Effort: Pulmonary effort is normal.      Breath sounds: Normal breath sounds. No decreased breath sounds, wheezing, rhonchi or rales.   Abdominal:      General: Bowel sounds are normal. There is no distension.      Palpations: Abdomen is soft. There is no mass.      Tenderness: There is no abdominal tenderness.   Neurological:      Mental Status: She is alert and oriented to person, place, and time.      Cranial Nerves: No cranial nerve deficit.      Gait: Gait normal.   Psychiatric:         Mood and Affect: Mood and affect normal.         Assessment:       1. Gastroesophageal reflux disease, unspecified whether esophagitis present    2. Vertigo        Plan:   1. Gastroesophageal reflux disease, unspecified whether esophagitis present  Overview:  Continue pepcid and protonix, worsening, refer to GI    Orders:  -     H. PYLORI ANTIBODY, IGG; Future; Expected date: 07/31/2024  -     CBC Auto Differential; Future; Expected date: 07/31/2024  -     E-Consult to Gastroenterology    2. Vertigo  -     Ambulatory referral/consult to ENT; Future; Expected date: 08/07/2024     Assessed vertigo symptoms and acid reflux complaints   Considered potential causes for vertigo, including middle ear issues   Evaluated severity and duration of acid reflux symptoms despite current medication regimen   Will investigate H. pylori as a possible cause of GI symptoms   ENT referral to further evaluate vertigo symptoms   Initiated e-consult with gastroenterology for guidance on additional workup for persistent acid reflux   Instructed patient to go to the emergency room for severe or persistent  dizziness or vomiting.    Patient expressed understanding and agreement with plan.    Visit today included increased complexity associated with the care of the episodic problem acid reflux, which was addressed while instituting co-management of the longitudinal care of the patient due to the serious and/or complex managed problem(s) .    I have evaluated and discussed management associated with medical care services that serve as the continuing focal point for all needed health care services and/or with medical care services that are part of ongoing care related to my patient's single, serious condition or a complex condition(s).    I am providing ongoing care and I am the primary care provider for this patient, and they are being managed, monitored, and/or observed for their chronic conditions over time.     I have addressed their ongoing health maintenance requirements and needs for all health care services and reviewed co-management plans provided by specialty providers when available.    Health Maintenance Due   Topic Date Due    TETANUS VACCINE  Never done    Shingles Vaccine (1 of 2) Never done    RSV Vaccine (Age 60+ and Pregnant patients) (1 - 1-dose 60+ series) Never done    COVID-19 Vaccine (4 - 2023-24 season) 09/01/2023    DEXA Scan  07/26/2024     Follow up if symptoms worsen or fail to improve, for keep routine follow up, labs today.    This note was generated with the assistance of ambient listening technology. Verbal consent was obtained by the patient and accompanying visitor(s) for the recording of patient appointment to facilitate this note. I attest to having reviewed and edited the generated note for accuracy, though some syntax or spelling errors may persist. Please contact the author of this note for any clarification.

## 2024-07-31 NOTE — CONSULTS
[facility]  Response for E-Consult     Patient Name: Indira Walker  MRN: [id number]  Primary Care Provider: Bertha Kearns MD   Requesting Provider: Bertha Kearns MD  E-Consult to Gastroenterology  Consult performed by: Keith Pak MD  Consult ordered by: Bertha Kearns MD  Reason for consult: GERD, Pancreas cyst, Hiatal hernia, weight loss  Assessment/Recommendations: Gastroesophageal reflux disease without esophagitis  (primary encounter diagnosis)  Weight loss  Pancreatic cyst  Hiatal hernia          Recommendation:   Gastroesophageal reflux disease without esophagitis  -     Ambulatory referral/consult to Endo Procedure ; Future; Expected date: 08/01/2024    Weight loss  -     Ambulatory referral/consult to Endo Procedure ; Future; Expected date: 08/01/2024    Pancreatic cyst  -     MRI Abdomen W WO Contrast; Future; Expected date: 07/31/2024    Hiatal hernia  -     Ambulatory referral/consult to Endo Procedure ; Future; Expected date: 08/01/2024          Contingency that warrants a repeat eConsult or referral: refer to clinic.     Total time of Consultation: 15 minute    I did not speak to the requesting provider verbally about this.     *This eConsult is based on the clinical data available to me and is furnished without benefit of a physical examination. The eConsult will need to be interpreted in light of any clinical issues or changes in patient status not available to me at the time of filing this eConsults. Significant changes in patient condition or level of acuity should result in immediate formal consultation and reevaluation. Please alert me if you have further questions.    Thank you for this eConsult referral.     Keith Pak MD  [facility]

## 2024-08-01 ENCOUNTER — HOSPITAL ENCOUNTER (OUTPATIENT)
Dept: PREADMISSION TESTING | Facility: HOSPITAL | Age: 79
Discharge: HOME | End: 2024-08-01
Attending: INTERNAL MEDICINE
Payer: MEDICARE

## 2024-08-01 DIAGNOSIS — R63.4 WEIGHT LOSS: ICD-10-CM

## 2024-08-01 DIAGNOSIS — K21.9 GASTROESOPHAGEAL REFLUX DISEASE WITHOUT ESOPHAGITIS: ICD-10-CM

## 2024-08-01 DIAGNOSIS — K44.9 HIATAL HERNIA: ICD-10-CM

## 2024-08-01 LAB — H PYLORI IGG SERPL QL IA: NEGATIVE

## 2024-08-02 ENCOUNTER — PATIENT MESSAGE (OUTPATIENT)
Dept: GASTROENTEROLOGY | Facility: CLINIC | Age: 79
End: 2024-08-02
Payer: MEDICARE

## 2024-08-02 ENCOUNTER — PATIENT MESSAGE (OUTPATIENT)
Dept: INTERNAL MEDICINE | Facility: CLINIC | Age: 79
End: 2024-08-02
Payer: MEDICARE

## 2024-08-02 NOTE — PROGRESS NOTES
The patient has had an E-Consult completed. Please refer to that note as needed. Please communicate the information below to the patient. Based on the recommendations of the specialist, I recommend that the patient do the following:    Dr. Pak, Gastroenterology, has completed e-consult and placed orders for additional evaluation of patient's condition. It appears GI nurse has attempted to contact her to assist with scheduling. Please advise her to contact them back to arrange studies that he ordered.

## 2024-08-05 ENCOUNTER — TELEPHONE (OUTPATIENT)
Dept: INTERNAL MEDICINE | Facility: CLINIC | Age: 79
End: 2024-08-05
Payer: MEDICARE

## 2024-08-06 ENCOUNTER — TELEPHONE (OUTPATIENT)
Dept: PREADMISSION TESTING | Facility: HOSPITAL | Age: 79
End: 2024-08-06
Payer: MEDICARE

## 2024-08-06 ENCOUNTER — HOSPITAL ENCOUNTER (OUTPATIENT)
Dept: PREADMISSION TESTING | Facility: HOSPITAL | Age: 79
Discharge: HOME | End: 2024-08-06
Attending: PHYSICIAN ASSISTANT
Payer: MEDICARE

## 2024-08-07 ENCOUNTER — TELEPHONE (OUTPATIENT)
Dept: INTERNAL MEDICINE | Facility: CLINIC | Age: 79
End: 2024-08-07
Payer: MEDICARE

## 2024-08-07 NOTE — TELEPHONE ENCOUNTER
----- Message from Myriam Wood sent at 8/7/2024  8:38 AM CDT -----  Type:  Patient Returning Call    Who Called:pt  Who Left Message for Patient: Danni  Does the patient know what this is regarding?:schedule a MRI   Would the patient rather a call back or a response via [facility]? Call back   Best Call Back Number:[phone]  Additional Information:

## 2024-08-08 ENCOUNTER — HOSPITAL ENCOUNTER (OUTPATIENT)
Dept: PREADMISSION TESTING | Facility: HOSPITAL | Age: 79
Discharge: HOME | End: 2024-08-08
Attending: FAMILY MEDICINE
Payer: MEDICARE

## 2024-08-08 DIAGNOSIS — K21.9 GASTROESOPHAGEAL REFLUX DISEASE, UNSPECIFIED WHETHER ESOPHAGITIS PRESENT: Primary | ICD-10-CM

## 2024-08-10 DIAGNOSIS — M05.9 SEROPOSITIVE RHEUMATOID ARTHRITIS: ICD-10-CM

## 2024-08-12 DIAGNOSIS — E11.22 TYPE 2 DIABETES MELLITUS WITH STAGE 3A CHRONIC KIDNEY DISEASE, WITHOUT LONG-TERM CURRENT USE OF INSULIN: ICD-10-CM

## 2024-08-12 DIAGNOSIS — N18.31 TYPE 2 DIABETES MELLITUS WITH STAGE 3A CHRONIC KIDNEY DISEASE, WITHOUT LONG-TERM CURRENT USE OF INSULIN: ICD-10-CM

## 2024-08-12 DIAGNOSIS — G91.2 (IDIOPATHIC) NORMAL PRESSURE HYDROCEPHALUS: ICD-10-CM

## 2024-08-12 NOTE — TELEPHONE ENCOUNTER
Care Due:                  Date            Visit Type   Department     Provider  --------------------------------------------------------------------------------                                EP -                              PRIMARY      [facility] INTERNAL  Last Visit: 07-      CARE ([facility])   KODY Kearns                              EP -                              PRIMARY      [facility] INTERNAL  Next Visit: 11-      CARE ([facility])   KODY Kearns                                                            Last  Test          Frequency    Reason                     Performed    Due Date  --------------------------------------------------------------------------------    HBA1C.......  6 months...  metFORMIN................  05- 11-    [facility] Embedded Care Due Messages. Reference number: [id number].   8/12/2024 1:59:25 PM CDT

## 2024-08-13 RX ORDER — METFORMIN HYDROCHLORIDE 1000 MG/1
1000 TABLET ORAL 2 TIMES DAILY WITH MEALS
Qty: 180 TABLET | Refills: 1 | Status: SHIPPED | OUTPATIENT
Start: 2024-08-13

## 2024-08-13 RX ORDER — GLIPIZIDE 5 MG/1
5 TABLET, FILM COATED, EXTENDED RELEASE ORAL
Qty: 90 TABLET | Refills: 3 | Status: SHIPPED | OUTPATIENT
Start: 2024-08-13

## 2024-08-13 RX ORDER — SULFASALAZINE 500 MG/1
500 TABLET, DELAYED RELEASE ORAL 2 TIMES DAILY
Qty: 180 TABLET | Refills: 1 | Status: SHIPPED | OUTPATIENT
Start: 2024-08-13

## 2024-08-13 NOTE — TELEPHONE ENCOUNTER
Refill Routing Note   Medication(s) are not appropriate for processing by [facility] for the following reason(s):        Required labs abnormal    [facility] action(s):  Defer        Medication Therapy Plan: FLOS      Appointments  past 12m or future 3m with PCP    Date Provider   Last Visit   7/31/2024 Bertha Kearns MD   Next Visit   11/13/2024 Bertha Kearns MD   ED visits in past 90 days: 0        Note composed:4:05 AM 08/13/2024

## 2024-08-20 ENCOUNTER — TELEPHONE (OUTPATIENT)
Dept: INTERNAL MEDICINE | Facility: CLINIC | Age: 79
End: 2024-08-20
Payer: MEDICARE

## 2024-08-20 NOTE — TELEPHONE ENCOUNTER
E-consult to GI states she needs in clinic appt with them  Dr. Kearns stopped this medication back in July. Refill is not appropriate at this time. Can we check on GI appt?

## 2024-08-20 NOTE — TELEPHONE ENCOUNTER
----- Message from Cesar Grimaldo MA sent at 8/20/2024  2:29 PM CDT -----  Contact: [phone]    ----- Message -----  From: Thiago Fletcher  Sent: 8/20/2024   2:24 PM CDT  To: Niles Davila Staff; Cr Stevens Staff    Type:  RX Refill Request    Who Called: Indira   Refill or New Rx:refill   RX Name and Strength:famotidine 40mg   How is the patient currently taking it? (ex. 1XDay):  Is this a 30 day or 90 day RX:  Preferred Pharmacy with phone number:  [facility]/pharmacy #[id number] - [place] - [address] [facility] AT [facility]  [address] [facility]  [place] [address]  Phone: [phone] Fax: [phone]  Local or Mail Order:local   Ordering Provider:Hilda Hankins   Would the patient rather a call back or a response via [facility]? Call back   Best Call Back Number:[phone]  Additional Information:

## 2024-08-21 ENCOUNTER — TELEPHONE (OUTPATIENT)
Dept: PREADMISSION TESTING | Facility: HOSPITAL | Age: 79
End: 2024-08-21
Payer: MEDICARE

## 2024-08-21 DIAGNOSIS — K86.2 PANCREATIC CYST: Primary | ICD-10-CM

## 2024-08-21 NOTE — TELEPHONE ENCOUNTER
----- Message from Loren Wyatt LPN sent at 8/21/2024  9:44 AM CDT -----  Regarding: FW: Procedure Cancellation  Contact: Indira    ----- Message -----  From: Miri Brizuela  Sent: 8/21/2024   8:10 AM CDT  To: Federica Cleveland Staff  Subject: Procedure Cancellation                           .Type:  Needs Medical Advice    Who Called: Indira   Symptoms (please be specific):    How long has patient had these symptoms:    Pharmacy name and phone #:    Would the patient rather a call back or a response via [facility]? call  Best Call Back Number:  [phone] (home)    Additional Information:  Indira has been ill for 3 days and need to cancel her procedure on 08/22/2024 please.

## 2024-08-21 NOTE — TELEPHONE ENCOUNTER
Spoke to pt and she canceled procedure for 8/22 due to illness and will call back to reschedule, call back number given.

## 2024-08-22 NOTE — TELEPHONE ENCOUNTER
LVM for patient to call us back. She needs to schedule an appointment with GI. A referral has already been placed.

## 2024-08-26 ENCOUNTER — CLINICAL SUPPORT (OUTPATIENT)
Dept: AUDIOLOGY | Facility: CLINIC | Age: 79
End: 2024-08-26
Payer: MEDICARE

## 2024-08-26 ENCOUNTER — OFFICE VISIT (OUTPATIENT)
Dept: OTOLARYNGOLOGY | Facility: CLINIC | Age: 79
End: 2024-08-26
Payer: MEDICARE

## 2024-08-26 ENCOUNTER — TELEPHONE (OUTPATIENT)
Dept: RHEUMATOLOGY | Facility: CLINIC | Age: 79
End: 2024-08-26
Payer: MEDICARE

## 2024-08-26 ENCOUNTER — LAB VISIT (OUTPATIENT)
Dept: LAB | Facility: HOSPITAL | Age: 79
End: 2024-08-26
Attending: RADIOLOGY
Payer: MEDICARE

## 2024-08-26 VITALS — BODY MASS INDEX: 21.56 KG/M2 | WEIGHT: 126.31 LBS | HEIGHT: 64 IN

## 2024-08-26 DIAGNOSIS — R42 DIZZINESS: ICD-10-CM

## 2024-08-26 DIAGNOSIS — K86.2 PANCREATIC CYST: ICD-10-CM

## 2024-08-26 DIAGNOSIS — H90.3 SENSORINEURAL HEARING LOSS, BILATERAL: Primary | ICD-10-CM

## 2024-08-26 DIAGNOSIS — R42 VERTIGO: ICD-10-CM

## 2024-08-26 DIAGNOSIS — H90.3 SENSORINEURAL HEARING LOSS (SNHL), BILATERAL: Primary | ICD-10-CM

## 2024-08-26 LAB
CREAT SERPL-MCNC: 1 MG/DL (ref 0.5–1.4)
EST. GFR  (NO RACE VARIABLE): 57 ML/MIN/1.73 M^2

## 2024-08-26 PROCEDURE — 36415 COLL VENOUS BLD VENIPUNCTURE: CPT | Performed by: RADIOLOGY

## 2024-08-26 PROCEDURE — 99999 PR PBB SHADOW E&M-EST. PATIENT-LVL III: CPT | Mod: PBBFAC,,, | Performed by: STUDENT IN AN ORGANIZED HEALTH CARE EDUCATION/TRAINING PROGRAM

## 2024-08-26 PROCEDURE — 3288F FALL RISK ASSESSMENT DOCD: CPT | Mod: CPTII,S$GLB,, | Performed by: STUDENT IN AN ORGANIZED HEALTH CARE EDUCATION/TRAINING PROGRAM

## 2024-08-26 PROCEDURE — 92567 TYMPANOMETRY: CPT | Mod: S$GLB,,, | Performed by: AUDIOLOGIST-HEARING AID FITTER

## 2024-08-26 PROCEDURE — 99999 PR PBB SHADOW E&M-EST. PATIENT-LVL I: CPT | Mod: PBBFAC,,, | Performed by: AUDIOLOGIST-HEARING AID FITTER

## 2024-08-26 PROCEDURE — 1126F AMNT PAIN NOTED NONE PRSNT: CPT | Mod: CPTII,S$GLB,, | Performed by: STUDENT IN AN ORGANIZED HEALTH CARE EDUCATION/TRAINING PROGRAM

## 2024-08-26 PROCEDURE — 1101F PT FALLS ASSESS-DOCD LE1/YR: CPT | Mod: CPTII,S$GLB,, | Performed by: STUDENT IN AN ORGANIZED HEALTH CARE EDUCATION/TRAINING PROGRAM

## 2024-08-26 PROCEDURE — 1159F MED LIST DOCD IN RCRD: CPT | Mod: CPTII,S$GLB,, | Performed by: STUDENT IN AN ORGANIZED HEALTH CARE EDUCATION/TRAINING PROGRAM

## 2024-08-26 PROCEDURE — 92557 COMPREHENSIVE HEARING TEST: CPT | Mod: S$GLB,,, | Performed by: AUDIOLOGIST-HEARING AID FITTER

## 2024-08-26 PROCEDURE — 99213 OFFICE O/P EST LOW 20 MIN: CPT | Mod: S$GLB,,, | Performed by: STUDENT IN AN ORGANIZED HEALTH CARE EDUCATION/TRAINING PROGRAM

## 2024-08-26 PROCEDURE — 82565 ASSAY OF CREATININE: CPT | Performed by: RADIOLOGY

## 2024-08-26 NOTE — TELEPHONE ENCOUNTER
----- Message from Myriam Wood sent at 8/26/2024  3:42 PM CDT -----  Type:  RX Refill Request    Who Called:  pt   Refill or New Rx: refill  RX Name and Strength:sulfaSALAzine (AZULFIDINE) 500 MG EC tablet  How is the patient currently taking it? (ex. 1XDay):: TAKE 1 TABLET BY MOUTH TWICE A DAY - Oral  Is this a 30 day or 90 day RX:180  Preferred Pharmacy with phone number:[facility]/pharmacy #[id number] - [place]  [address] AT [facility]   Phone: [phone]  Fax: [phone]  Would the patient rather a call back or a response via [facility]? Call back   Best Call Back Number: [phone]  Additional Information:

## 2024-08-26 NOTE — PROGRESS NOTES
Referring provider: Dr. Som Jacobson Geraldine Walker was seen 08/26/2024 for an audiological evaluation.  Patient complains of dizziness. She describes sensation of unsteadiness some mornings when getting out of bed. Duration for about 15-20 minutes. No room spinning. This has been recurring for the past six months. She reports gradual decline in hearing. She has known bilateral sensorineural hearing loss with her last audiogram 06/26/2023. No tinnitus.     Audiogram:  Results reveal a mild-to-severe sensorineural hearing loss 250-8000 Hz for the right ear, and a mild-to-severe sensorineural hearing loss 250-8000 Hz for the left ear.   Speech Reception Thresholds were 45 dBHL for the right ear and 45 dBHL for the left ear.   Word recognition scores were fair for the right ear and good for the left ear.   Tympanograms were Type A for the right ear and Type A for the left ear.    Stable SNHL AU.    Uneeda-Hallpike: Negative AU.    Patient was counseled on the above findings.    Recommendations:  ENT  Hearing aids, binaural. Patient is provided copy of her audiograms and may check her [employer] plan.

## 2024-08-26 NOTE — PROGRESS NOTES
Chief complaint:   Chief Complaint   Patient presents with    Dizziness     Pt is coming in for dizziness.         Referring Provider:  Bertha Kearns Md  [address] [place] [address] [place] [id number]    History of Present Illness:     Ms. Walker is a 79 y.o. female presenting for evaluation of dizziness.     Onset: 6 months or so  Frequency of episodes: daily  Quality: unsteadiness  when getting out of bed, wobbly, lightheadedness  Using a walker at times   Denies room spinning or positional changes   Duration of individual episodes:minutes  Prior Medications: nothing    Prior history of similar events: No    Associated signs and symptoms:    [x] Hearing loss - progressive, bilateral  [] Ear fullness  [] Tinnitus  [] Headache  [] Light sensitivity  [] Sound sensitivity  [] Nausea   [] Vomiting  [] Dizziness with valsalva or weather change  [] Autophony    The patient denies significant hearing loss risk factors, ototoxic or vestibulotoxic medication exposure, chronic vestibular suppressant use, head/ facial/ marguerite trauma, and otologic surgery.          History        Past Medical History:   Past Medical History:   Diagnosis Date    Arthritis     Diabetes mellitus, type 2     Foot drop, left 2018    GERD (gastroesophageal reflux disease)     Heartburn     Hyperlipidemia     Hypertension     Left sided sciatica     s/p microdiscectomy 3/21/18    .          Past Surgical History:  Past Surgical History:   Procedure Laterality Date    ANTERIOR VAGINAL REPAIR N/A 2023    Procedure: COLPORRHAPHY, ANTERIOR;  Surgeon: ANA MARIA Mayorga MD;  Location: [facility];  Service: OB/GYN;  Laterality: N/A;    BREAST BIOPSY       SECTION      COLONOSCOPY  2008    DR. JANET GARCÍA/MILD DIVERICULOSIS DESCENDING AND SIGMOID. REPEAT 5 YRS    ENDOSCOPIC ULTRASOUND OF UPPER GASTROINTESTINAL TRACT N/A 2022    Procedure: ULTRASOUND, UPPER GI TRACT, ENDOSCOPIC;  Surgeon: Ange Saldivar MD;   Location: [facility] ENDO;  Service: Endoscopy;  Laterality: N/A;  Upper and Linear, 22 sharkcore, slides and formalin    HERNIA REPAIR      MICRODISCECTOMY OF SPINE Left 03/21/2018    left L5-S1, Dr Segundo    SPINE SURGERY      TOTAL VAGINAL HYSTERECTOMY N/A 4/14/2023    Procedure: HYSTERECTOMY, TOTAL, VAGINAL;  Surgeon: ANA MARIA Mayorga MD;  Location: [facility] OR;  Service: OB/GYN;  Laterality: N/A;   .         Medications: Medication list was reviewed. She  has a current medication list which includes the following prescription(s): acetaminophen, albuterol, ascorbic acid (vitamin c), aspirin, azelastine, blood sugar diagnostic, blood-glucose meter, clotrimazole-betamethasone 1-0.05%, fluticasone propionate, folic acid, glipizide, ketoconazole, lancets, loratadine, losartan-hydrochlorothiazide 100-12.5 mg, metformin, multivitamin, rosuvastatin, and sulfasalazine.         Allergies:   Review of patient's allergies indicates:   Allergen Reactions    Pyrilamine Swelling     (an ingredient in Midol)    Iodine and iodide containing products Hives    Iron Other (See Comments)            Family history: family history includes Diabetes in her father and mother.         Social History          Alcohol use:  reports no history of alcohol use.            Tobacco:  reports that she has never smoked. She has never used smokeless tobacco.         Please see the patient's intake form for full details of past medical history, past surgical history, family history, social history and review of systems. ?This information was reviewed by me and noted.      Physical Examination     There were no vitals filed for this visit.     General: Well developed, well nourished, well hydrated. Verbal with a strong voice and not dysphonic.     Head/Face: Normocephalic, atraumatic. No scars or lesions. Facial musculature equal.     Eyes: No scleral icterus or conjunctival hemorrhage. EOMI. PERRLA.     Ears:     Right ear: No gross deformity. EAC  is clear of debris and erythema. The TM is intact with a pneumatized middle ear. No signs of retraction, fluid or infection.      Left ear: No gross deformity. EAC is clear of debris and erythema. The TM is intact with a pneumatized middle ear. No signs of retraction, fluid or infection.     Neurologic: Moving all extremities without gross abnormality.CN II-XII grossly intact. House-Brackmann 1/6.     Motor strength:  Strength 5/5 throughout.    Sensation:  Sensory function to light touch and proprioception intact throughout.    Gait:  No ataxia and can tandem gait 6 steps.        Data review    Audiogram       Stable SNHL AU.  Mild sloping to moderately severe Sensorineural Hearing Loss  Word rec 76% AD, 80% AS       [place]ont-Hallpike: Negative AU.     Patient was counseled on the above findings.         Assessment     1. Sensorineural hearing loss (SNHL), bilateral    2. Dizziness          Plan:      Dizziness is an extremely complex problem that may arise from many sources.  I requires the coordination between the visual system, the vestibular system as well as the proprioreceptive system.  Additionally, balance is compromised in the setting of musculoskeletal, cerebral, cardiac, and numerous physiologic disorders.  The complex interplay between these systems may also lead to dizziness if there is dysynchrony between the bilateral vestibular symptoms.    Central vestibular symptoms can generally be distinguished from peripheral vestibulopathies by the presence of other non vestibular neurologic symptoms (focal weakness, headache), light headedness (rather than true vertigo), near syncope, weak limbs, panic, fuzziness/cloudiness in mentation, and clumsiness.  Peripheral vestibulopathies are generally, at some point during their course, characterized by a true vertiginous sensation of movement, difficulty with sudden head movements, nausea, difficulty with sudden head movement, and possibly oscicllopsia.    . Based on  the history, physical exam and imaging studies there is little evidence of a vestibular dysfunction. I suspect her dizziness is multifactorial - general deconditioning, orthostasis.  I recommend hydration, rising slowly from bed, use of walker as needed, and PT.     -HEARING LOSS-  I spent a considerable amount of time educating the patient on hearing and hearing loss.  We discussed the basic characteristics of conductive hearing loss versus sensorineural hearing loss and the significant differences in treatment options between the two categories.      We discussed that in cases of conductive hearing loss, this suggests a mechanical disorder that sometimes can be improved with medications &/or surgery.  It may occur secondary to external ear pathology (atresia, otitis externa, etc), tympanic membrane disorders (large perforations, immobility due to scarring or eustachian tube dysfunction), and middle ear disorders (effusions, ossicular disorders).    Sensorineural hearing loss is the expected hearing loss pattern with aging, but some disorders such as Meniere's may accelerate this process.  Additionally, amplification with hearing aids is generally the best option for hearing rehabilitation, except where the hearing loss is profound.  We discussed that this generally does not represent a dangerous condition, but in cases where there is a large discrepancy between the two ears in terms of nerve function, more investigation is often necessary due to the possiblity of vestibular schwannomas or meningiomas at the cerebellopontine angle.  The definitive test for this is an MRI with gadolinium.  However, these masses are usually very slow growing (1-2mm/year), so patients may elect to repeat an audiogram in about 6 months or obtain an ABR so long as they understand that this may result in a delay in diagnosis (although very unlikely that this would have a significant clinical impact on their outcome due to the slow  growing nature of these masses) with the understanding that if ABR is abnormal or asymmetry increases, an MRI would then be required.    Indira  presents with what appears to be sensorineural hearing loss.  Based on this, my recommendation is hearing aids when motivated.          Ciro Gallo MD  [facility]   [facility] - [address]  [place] [address]  P: [phone]  F: [phone]

## 2024-08-27 ENCOUNTER — HOSPITAL ENCOUNTER (OUTPATIENT)
Dept: RADIOLOGY | Facility: HOSPITAL | Age: 79
Discharge: HOME | End: 2024-08-27
Attending: INTERNAL MEDICINE
Payer: MEDICARE

## 2024-08-27 DIAGNOSIS — K86.2 PANCREATIC CYST: ICD-10-CM

## 2024-08-27 DIAGNOSIS — K86.2 PANCREATIC CYST: Primary | ICD-10-CM

## 2024-08-27 PROCEDURE — 74183 MRI ABD W/O CNTR FLWD CNTR: CPT | Mod: TC

## 2024-08-27 PROCEDURE — 74183 MRI ABD W/O CNTR FLWD CNTR: CPT | Mod: 26,,, | Performed by: RADIOLOGY

## 2024-08-27 PROCEDURE — A9585 GADOBUTROL INJECTION: HCPCS | Performed by: INTERNAL MEDICINE

## 2024-08-27 PROCEDURE — 25500020 PHARM REV CODE 255: Performed by: INTERNAL MEDICINE

## 2024-08-27 RX ORDER — GADOBUTROL 604.72 MG/ML
10 INJECTION INTRAVENOUS
Status: COMPLETED | OUTPATIENT
Start: 2024-08-27 | End: 2024-08-27

## 2024-08-27 RX ADMIN — GADOBUTROL 5 ML: 604.72 INJECTION INTRAVENOUS at 11:08

## 2024-08-27 NOTE — PROGRESS NOTES
Jennifer Indira Chandler [place],    My name is Keith Pak and I am one of the GI doctors working together with Dr. Kearns.The recent MRI confirmed that there were multiple small cyst of the pancreas. As you may recall, an Endoscopic ultrasound in 2022 revealed three cysts lesions were seen in the pancreatic body. Fine needle aspiration for fluid performed revealed no significant concerns. Also there was no sign of significant pathology in the common bile duct.     The nature of the cysts are most likely benign in nature but I do recommend repeating an MRI in 12-18 months.     Thanks for trusting us with your healthcare needs.    Sincerely,     Keith Pak

## 2024-08-29 ENCOUNTER — TELEPHONE (OUTPATIENT)
Dept: RHEUMATOLOGY | Facility: CLINIC | Age: 79
End: 2024-08-29
Payer: MEDICARE

## 2024-08-29 ENCOUNTER — TELEPHONE (OUTPATIENT)
Dept: OBSTETRICS AND GYNECOLOGY | Facility: CLINIC | Age: 79
End: 2024-08-29
Payer: MEDICARE

## 2024-08-29 NOTE — TELEPHONE ENCOUNTER
Patient was calling to find out what medications she is supposed to be taking. I told her the medications that Dr. Cage has her on she stated that he took her off of one and that she isn't sure if she has been taking the other medications. I sent a message to Dr. Cage for her and also notified her that he is out of office this week and will not return until 09/03/2024.    -SL

## 2024-08-29 NOTE — TELEPHONE ENCOUNTER
----- Message from Myriam Wood sent at 8/29/2024  1:28 PM CDT -----  Type:  Patient Returning Call    Who Called: pt   Who Left Message for Patient:Cesar Grimaldo MA   Does the patient know what this is regarding?:medications   Would the patient rather a call back or a response via [facility]?  Call back   Best Call Back Number: [phone]  Additional Information:

## 2024-08-29 NOTE — TELEPHONE ENCOUNTER
----- Message from Madelin Villanueva sent at 8/29/2024 11:59 AM CDT -----  Contact: patient  Indira Chandler Denise would like a call back at  [phone], in regards to verifying which medications  has prescribed for her.

## 2024-08-30 NOTE — TELEPHONE ENCOUNTER
Ms. Walker changed her mind and said that the protonix is what she has been taking. She needs a refill on it. I got her scheduled with GI and placed her on the wait list as well.

## 2024-09-06 ENCOUNTER — TELEPHONE (OUTPATIENT)
Dept: RHEUMATOLOGY | Facility: CLINIC | Age: 79
End: 2024-09-06
Payer: MEDICARE

## 2024-09-06 DIAGNOSIS — M19.90 INFLAMMATORY ARTHRITIS: ICD-10-CM

## 2024-09-06 DIAGNOSIS — M05.9 SEROPOSITIVE RHEUMATOID ARTHRITIS: ICD-10-CM

## 2024-09-06 RX ORDER — FOLIC ACID 1 MG/1
1000 TABLET ORAL DAILY
Qty: 90 TABLET | Refills: 1 | Status: SHIPPED | OUTPATIENT
Start: 2024-09-06

## 2024-09-06 NOTE — TELEPHONE ENCOUNTER
Giovanni Cage MD     Folic acid prescribed to reduce side effects to methotrexate, no need if not taking.    Source of joint swelling unlikely related to folic acid intake, but patient may resume folic acid supplementaion if prefers.  Refills provided.    Please inform clinic if recurrent hand pain and swelling:  suggest then to increase  sulfasalazine to 2 tablets every 12 hours.          Left message for patient to call .

## 2024-09-06 NOTE — TELEPHONE ENCOUNTER
----- Message from Miri Brizuela sent at 9/6/2024  2:48 PM CDT -----  Regarding: Medical Advice  Contact: Indira  .Type:  Needs Medical Advice    Who Called: Indira  Symptoms (please be specific):    How long has patient had these symptoms:    Pharmacy name and phone #:    Would the patient rather a call back or a response via [facility]?  call  Best Call Back Number:  [phone] (home)    Additional Information:  Indira is requesting a callback from the nurse today in regard to her fingers are swelling and she want to discuss why he took her off one folic acid medication.

## 2024-09-20 ENCOUNTER — PATIENT MESSAGE (OUTPATIENT)
Dept: GASTROENTEROLOGY | Facility: CLINIC | Age: 79
End: 2024-09-20

## 2024-09-20 ENCOUNTER — OFFICE VISIT (OUTPATIENT)
Dept: GASTROENTEROLOGY | Facility: CLINIC | Age: 79
End: 2024-09-20
Payer: MEDICARE

## 2024-09-20 VITALS
OXYGEN SATURATION: 92 % | SYSTOLIC BLOOD PRESSURE: 118 MMHG | BODY MASS INDEX: 22.4 KG/M2 | HEIGHT: 64 IN | DIASTOLIC BLOOD PRESSURE: 58 MMHG | WEIGHT: 131.19 LBS | HEART RATE: 91 BPM

## 2024-09-20 DIAGNOSIS — K86.2 PANCREATIC CYST: ICD-10-CM

## 2024-09-20 DIAGNOSIS — K44.9 HIATAL HERNIA: ICD-10-CM

## 2024-09-20 DIAGNOSIS — K21.9 GASTROESOPHAGEAL REFLUX DISEASE WITHOUT ESOPHAGITIS: Primary | ICD-10-CM

## 2024-09-20 PROCEDURE — 99999 PR PBB SHADOW E&M-EST. PATIENT-LVL IV: CPT | Mod: PBBFAC,,, | Performed by: INTERNAL MEDICINE

## 2024-09-20 RX ORDER — SIMETHICONE 125 MG
125 TABLET,CHEWABLE ORAL EVERY 6 HOURS PRN
COMMUNITY

## 2024-09-20 NOTE — PROGRESS NOTES
Subjective:      Indira Walker is a 79 y.o. female who presents for evaluation and management of dyspepsia, GERD symptoms of prolonged duration, and nausea. Symptoms have been present for approximately 9 months.  Symptoms include abdominal bloating, belching and eructation, heartburn, nausea, and nocturnal burning. The patient denies choking on food, difficulty swallowing, heartburn, hematemesis, melena, odynophagia, regurgitation of undigested food, unexpected weight loss, and wheezing. Symptoms appear to be worsened by caffeine, carbonated beverages, fatty foods, large meals, lying down after eating, and tomato sauce. Risk factors present for GERD include previous diagnosis of hiatal hernia. Risk factors absent for GERD are tobacco abuse and alcohol use.  Treatments tried so far include proton pump inhibitor. Results of treatment: considerable improvement in symptom severity. Currently, the symptoms are mild and occur approximately several times per week.    Patient also has a history of pancreatic cysts that are stable and recent MRI confirmed this.     Past Medical History:   Diagnosis Date    Arthritis     Diabetes mellitus, type 2     Foot drop, left 2018    GERD (gastroesophageal reflux disease)     Heartburn     Hyperlipidemia     Hypertension     Left sided sciatica     s/p microdiscectomy 3/21/18    Pancreatic cyst      Past Surgical History:   Procedure Laterality Date    ANTERIOR VAGINAL REPAIR N/A 2023    Procedure: COLPORRHAPHY, ANTERIOR;  Surgeon: ANA MARIA Mayorga MD;  Location: [facility] OR;  Service: OB/GYN;  Laterality: N/A;    BREAST BIOPSY       SECTION      COLONOSCOPY  2008    DR. JANET GARCÍA/MILD DIVERICULOSIS DESCENDING AND SIGMOID. REPEAT 5 YRS    ENDOSCOPIC ULTRASOUND OF UPPER GASTROINTESTINAL TRACT N/A 2022    Procedure: ULTRASOUND, UPPER GI TRACT, ENDOSCOPIC;  Surgeon: Ange Saldivar MD;  Location: [facility] ENDO;  Service: Endoscopy;  Laterality:  N/A;  Upper and Linear, 22 sharkcore, slides and formalin    HERNIA REPAIR      MICRODISCECTOMY OF SPINE Left 03/21/2018    left L5-S1, Dr Segundo    SPINE SURGERY      TOTAL VAGINAL HYSTERECTOMY N/A 04/14/2023    Procedure: HYSTERECTOMY, TOTAL, VAGINAL;  Surgeon: ANA MARIA Mayorga MD;  Location: [facility];  Service: OB/GYN;  Laterality: N/A;    UPPER GASTROINTESTINAL ENDOSCOPY       Current Outpatient Medications on File Prior to Visit   Medication Sig Dispense Refill    acetaminophen (TYLENOL EXTRA STRENGTH ORAL) Take by mouth.      albuterol (PROVENTIL/VENTOLIN HFA) 90 mcg/actuation inhaler INHALE 2 PUFFS INTO THE LUNGS EVERY 4 HOURS AS NEEDED FOR WHEEZING OR SHORTNESS OF BREATH. 18 g 2    aspirin (ECOTRIN) 81 MG EC tablet Take 1 tablet (81 mg total) by mouth once daily. Every other day  0    azelastine (ASTELIN) 137 mcg (0.1 %) nasal spray 1 spray (137 mcg total) by Nasal route 2 (two) times daily. 30 mL 5    folic acid (FOLVITE) 1 MG tablet Take 1 tablet (1,000 mcg total) by mouth once daily. 90 tablet 1    glipiZIDE 5 MG TR24 TAKE 1 TABLET BY MOUTH EVERY DAY WITH BREAKFAST 90 tablet 3    losartan-hydrochlorothiazide 100-12.5 mg (HYZAAR) 100-12.5 mg Tab TAKE 1 TABLET BY MOUTH EVERY DAY 90 tablet 3    metFORMIN (GLUCOPHAGE) 1000 MG tablet TAKE 1 TABLET BY MOUTH TWICE A DAY WITH FOOD 180 tablet 1    multivitamin (THERAGRAN) per tablet Take 1 tablet by mouth once daily.      pantoprazole (PROTONIX) 40 MG tablet Take 1 tablet (40 mg total) by mouth once daily. 90 tablet 3    rosuvastatin (CRESTOR) 20 MG tablet TAKE 1 TABLET BY MOUTH EVERY DAY 90 tablet 3    simethicone (MYLICON) 125 MG chewable tablet Take 125 mg by mouth every 6 (six) hours as needed for Flatulence.      sulfaSALAzine (AZULFIDINE) 500 MG EC tablet TAKE 1 TABLET BY MOUTH TWICE A  tablet 1    ascorbic acid, vitamin C, (VITAMIN C) 500 MG tablet Take 500 mg by mouth once daily. (Patient not taking: Reported on 9/20/2024)      blood sugar  "diagnostic Strp 1 each by Misc.(Non-Drug; Combo Route) route 2 (two) times a day. 200 each 3    blood-glucose meter kit Use as instructed to check BG twice daily 1 each 1    clotrimazole-betamethasone 1-0.05% (LOTRISONE) cream Apply topically 2 (two) times daily. (Patient not taking: Reported on 9/20/2024) 45 g 2    fluticasone propionate (FLONASE) 50 mcg/actuation nasal spray 2 sprays (100 mcg total) by Each Nostril route once daily. (Patient not taking: Reported on 9/20/2024) 16 g 5    ketoconazole (NIZORAL) 2 % cream APPLY TOPICALLY TWICE A DAY (Patient not taking: Reported on 9/20/2024) 30 g 2    lancets [facility] To check BG 2 times daily, to use with insurance preferred meter 200 each 3    loratadine (CLARITIN) 10 mg tablet Take 10 mg by mouth once daily. (Patient not taking: Reported on 9/20/2024)       No current facility-administered medications on file prior to visit.     Review of patient's allergies indicates:   Allergen Reactions    Pyrilamine Swelling     (an ingredient in Midol)    Iodine and iodide containing products Hives    Iron Other (See Comments)          Objective:      BP (!) 118/58 (BP Location: Right arm, Patient Position: Sitting, BP Method: Medium (Manual))   Pulse 91   Ht 5' 3.5" (1.613 m)   Wt 59.5 kg (131 lb 2.8 oz)   SpO2 (!) 92%   BMI 22.87 kg/m²   Physical Exam  Constitutional:       Appearance: She is well-developed.   HENT:      Head: Normocephalic and atraumatic.   Eyes:      Pupils: Pupils are equal, round, and reactive to light.   Neck:      Thyroid: No thyromegaly.   Cardiovascular:      Rate and Rhythm: Normal rate and regular rhythm.      Heart sounds: Normal heart sounds. No murmur heard.  Pulmonary:      Effort: Pulmonary effort is normal. No respiratory distress.      Breath sounds: Normal breath sounds. No wheezing or rales.   Abdominal:      General: Bowel sounds are normal. There is no distension.      Palpations: Abdomen is soft. There is no mass.      Tenderness: " There is no abdominal tenderness.   Musculoskeletal:         General: No tenderness. Normal range of motion.      Cervical back: Normal range of motion and neck supple.   Lymphadenopathy:      Cervical: No cervical adenopathy.   Skin:     General: Skin is warm and dry.      Findings: No erythema.   Neurological:      Mental Status: She is alert and oriented to person, place, and time.      Cranial Nerves: No cranial nerve deficit.      Deep Tendon Reflexes: Reflexes are normal and symmetric.   Psychiatric:         Behavior: Behavior normal.         Laboratory  Lab Results   Component Value Date    WBC 7.13 07/31/2024    RBC 3.68 (L) 07/31/2024    HGB 10.8 (L) 07/31/2024       BMP  Lab Results   Component Value Date     06/19/2024    K 4.0 06/19/2024     06/19/2024    CO2 25 06/19/2024    BUN 17 06/19/2024    CREATININE 1.0 08/26/2024    CALCIUM 9.2 06/19/2024    ANIONGAP 10 06/19/2024    EGFRNORACEVR 57 (A) 08/26/2024     Lab Results   Component Value Date    ALT 14 06/19/2024    AST 18 06/19/2024    ALKPHOS 43 (L) 06/19/2024    BILITOT 0.5 06/19/2024       Assessment:     1. Gastroesophageal reflux disease without esophagitis    2. Hiatal hernia    3. Pancreatic cyst         Plan:      Gastroesophageal reflux disease without esophagitis  -     Ambulatory referral/consult to Endo Procedure ; Future; Expected date: 09/21/2024    Hiatal hernia    Pancreatic cyst    Repeat MRI in 1 year.   The risks and benefits of my recommendations, as well as other treatment options were discussed with the patient today. Questions were answered.  Follow up: as needed.    Risks, benefits and alternative options were discussed with the patient. Risks including but not limited to infection, bleeding, heart or respiratory problems and perforation that may require surgery were all explained to the patient. The patient had a chance for questions if there were doubts or concerns about the test. The referring provider  will be notified that our consultation is complete and available through the patient's records.    Thanks for letting us participate in the care of this nice patient,      Keith Pak

## 2024-09-20 NOTE — PATIENT INSTRUCTIONS
09/20/2024    Dear Indira Walker,    We are writing to express our heartfelt gratitude for choosing us as your healthcare provider and entrusting us with your well-being. Your health and satisfaction are our top priorities, and we are truly honored to have the opportunity to serve you.    At [facility], we strive to provide the best possible care and support for our patients. My assessment and recommendations are as follows:    Gastroesophageal reflux disease without esophagitis  -     Ambulatory referral/consult to Endo Procedure ; Future; Expected date: 09/21/2024    Hiatal hernia    Pancreatic cyst    Repeat MRI in 1 year.     We genuinely value your feedback and believe that it plays a crucial role in our pursuit of excellence. We kindly request you to take a few minutes of your time to share your experience with us by posting a Google review. Your honest thoughts and opinions can make a significant difference for others seeking healthcare services and will help us better understand how we can further enhance our patient care.    Your kind words and positive reviews will not only motivate our dedicated team but will also inspire confidence in potential patients who are looking for exceptional healthcare services.    Once again, we extend our sincere appreciation for giving us the opportunity to serve you. If there is anything else we can do to improve your experience or assist you further, please do not hesitate to reach out to us.    Thank you for being part of our [facility] family, and we look forward to continuing to provide you with the best care possible.    Thanks for trusting us with your healthcare needs and using [facility]. If you want to ask us a question, you can do so by replying to this message or by calling [phone].    Sincerely,    Keith Pak M.D.    PS: At [facility] we offer virtual visits. Please use your [facility] to schedule a virtual visit.     To rate  "your experience with Dr. Pak please click on the link below:    [email or web address]    To post a review, simply follow these quick steps:  1. Visit [email or web address] or search for my name on Google.  2. Click on the "Write a review" button on the left-hand side of the page.  3. Rate your experience by choosing the number of stars that reflect your satisfaction.  4. Share your thoughts and insights about your visit - we'd love to hear your feedback!    "

## 2024-09-23 ENCOUNTER — HOSPITAL ENCOUNTER (OUTPATIENT)
Dept: PREADMISSION TESTING | Facility: HOSPITAL | Age: 79
Discharge: HOME | End: 2024-09-23
Attending: INTERNAL MEDICINE
Payer: MEDICARE

## 2024-09-23 DIAGNOSIS — K21.9 GASTROESOPHAGEAL REFLUX DISEASE WITHOUT ESOPHAGITIS: ICD-10-CM

## 2024-10-21 NOTE — TELEPHONE ENCOUNTER
Labor Progress Note - Lamar Moser 21 y.o. female MRN: [id number]    Unit/Bed#: -01 Encounter: [id number]       Contraction Frequency (minutes): 2-4 (pt on her side difficulty picking up cx)  Contraction Intensity: Mild/Moderate  Uterine Activity Characteristics: Regular  Cervical Dilation: 6        Cervical Effacement: 80  Fetal Station: -1  Baseline Rate (FHR): 110 bpm  Fetal Heart Rate (FHT): 115 BPM  FHR Category: I             Vital Signs:   Vitals:    10/21/24 1208   BP: 102/60   Pulse: 70   Resp:    Temp:    SpO2: 100%     Notes/comments:   Pt resting comfortably. FHR Category I. [place] with contractions q3min. SVE as above. AROM performed for clear fluid. Will recheck in 2 hours or sooner if clinically indicated. Anticipate . Attending physician Dr. Mora aware.     Baljinder Giang MD 10/21/2024 12:18 PM       No care due was identified.  Health [facility] Embedded Care Due Messages. Reference number: [id number].   12/04/2023 11:47:01 AM CST

## 2024-11-01 ENCOUNTER — TELEPHONE (OUTPATIENT)
Dept: PREADMISSION TESTING | Facility: HOSPITAL | Age: 79
End: 2024-11-01
Payer: MEDICARE

## 2024-11-08 ENCOUNTER — LAB VISIT (OUTPATIENT)
Dept: LAB | Facility: HOSPITAL | Age: 79
End: 2024-11-08
Attending: FAMILY MEDICINE
Payer: MEDICARE

## 2024-11-08 DIAGNOSIS — Z79.899 IMMUNOSUPPRESSION DUE TO DRUG THERAPY: ICD-10-CM

## 2024-11-08 DIAGNOSIS — Z51.81 MEDICATION MONITORING ENCOUNTER: ICD-10-CM

## 2024-11-08 DIAGNOSIS — D84.821 IMMUNOSUPPRESSION DUE TO DRUG THERAPY: ICD-10-CM

## 2024-11-08 DIAGNOSIS — M05.9 SEROPOSITIVE RHEUMATOID ARTHRITIS: ICD-10-CM

## 2024-11-08 DIAGNOSIS — E78.5 HYPERLIPIDEMIA ASSOCIATED WITH TYPE 2 DIABETES MELLITUS: ICD-10-CM

## 2024-11-08 DIAGNOSIS — N18.31 TYPE 2 DIABETES MELLITUS WITH STAGE 3A CHRONIC KIDNEY DISEASE, WITHOUT LONG-TERM CURRENT USE OF INSULIN: ICD-10-CM

## 2024-11-08 DIAGNOSIS — E11.69 HYPERLIPIDEMIA ASSOCIATED WITH TYPE 2 DIABETES MELLITUS: ICD-10-CM

## 2024-11-08 DIAGNOSIS — E11.22 TYPE 2 DIABETES MELLITUS WITH STAGE 3A CHRONIC KIDNEY DISEASE, WITHOUT LONG-TERM CURRENT USE OF INSULIN: ICD-10-CM

## 2024-11-08 LAB
ALBUMIN SERPL BCP-MCNC: 3.7 G/DL (ref 3.5–5.2)
ALBUMIN/CREAT UR: 38.7 UG/MG (ref 0–30)
ALP SERPL-CCNC: 46 U/L (ref 40–150)
ALT SERPL W/O P-5'-P-CCNC: 13 U/L (ref 10–44)
ANION GAP SERPL CALC-SCNC: 11 MMOL/L (ref 8–16)
AST SERPL-CCNC: 19 U/L (ref 10–40)
BASOPHILS # BLD AUTO: 0.04 K/UL (ref 0–0.2)
BASOPHILS NFR BLD: 0.6 % (ref 0–1.9)
BILIRUB SERPL-MCNC: 0.5 MG/DL (ref 0.1–1)
BUN SERPL-MCNC: 12 MG/DL (ref 8–23)
CALCIUM SERPL-MCNC: 9.2 MG/DL (ref 8.7–10.5)
CHLORIDE SERPL-SCNC: 103 MMOL/L (ref 95–110)
CHOLEST SERPL-MCNC: 184 MG/DL (ref 120–199)
CHOLEST/HDLC SERPL: 2.3 {RATIO} (ref 2–5)
CO2 SERPL-SCNC: 24 MMOL/L (ref 23–29)
CREAT SERPL-MCNC: 0.9 MG/DL (ref 0.5–1.4)
CREAT UR-MCNC: 106 MG/DL (ref 15–325)
DIFFERENTIAL METHOD BLD: ABNORMAL
EOSINOPHIL # BLD AUTO: 0.1 K/UL (ref 0–0.5)
EOSINOPHIL NFR BLD: 1.6 % (ref 0–8)
ERYTHROCYTE [DISTWIDTH] IN BLOOD BY AUTOMATED COUNT: 14.2 % (ref 11.5–14.5)
ERYTHROCYTE [SEDIMENTATION RATE] IN BLOOD BY WESTERGREN METHOD: 13 MM/HR (ref 0–20)
EST. GFR  (NO RACE VARIABLE): >60 ML/MIN/1.73 M^2
ESTIMATED AVG GLUCOSE: 163 MG/DL (ref 68–131)
GLUCOSE SERPL-MCNC: 91 MG/DL (ref 70–110)
HBA1C MFR BLD: 7.3 % (ref 4–5.6)
HCT VFR BLD AUTO: 33.2 % (ref 37–48.5)
HDLC SERPL-MCNC: 81 MG/DL (ref 40–75)
HDLC SERPL: 44 % (ref 20–50)
HGB BLD-MCNC: 10.8 G/DL (ref 12–16)
IMM GRANULOCYTES # BLD AUTO: 0.1 K/UL (ref 0–0.04)
IMM GRANULOCYTES NFR BLD AUTO: 1.6 % (ref 0–0.5)
LDLC SERPL CALC-MCNC: 89.6 MG/DL (ref 63–159)
LYMPHOCYTES # BLD AUTO: 2.2 K/UL (ref 1–4.8)
LYMPHOCYTES NFR BLD: 35.5 % (ref 18–48)
MCH RBC QN AUTO: 29.3 PG (ref 27–31)
MCHC RBC AUTO-ENTMCNC: 32.5 G/DL (ref 32–36)
MCV RBC AUTO: 90 FL (ref 82–98)
MICROALBUMIN UR DL<=1MG/L-MCNC: 41 UG/ML
MONOCYTES # BLD AUTO: 0.7 K/UL (ref 0.3–1)
MONOCYTES NFR BLD: 11.8 % (ref 4–15)
NEUTROPHILS # BLD AUTO: 3.1 K/UL (ref 1.8–7.7)
NEUTROPHILS NFR BLD: 48.9 % (ref 38–73)
NONHDLC SERPL-MCNC: 103 MG/DL
NRBC BLD-RTO: 0 /100 WBC
PLATELET # BLD AUTO: 341 K/UL (ref 150–450)
PMV BLD AUTO: 9.4 FL (ref 9.2–12.9)
POTASSIUM SERPL-SCNC: 3.9 MMOL/L (ref 3.5–5.1)
PROT SERPL-MCNC: 7.2 G/DL (ref 6–8.4)
RBC # BLD AUTO: 3.68 M/UL (ref 4–5.4)
SODIUM SERPL-SCNC: 138 MMOL/L (ref 136–145)
TRIGL SERPL-MCNC: 67 MG/DL (ref 30–150)
WBC # BLD AUTO: 6.25 K/UL (ref 3.9–12.7)

## 2024-11-08 PROCEDURE — 80061 LIPID PANEL: CPT | Performed by: PHYSICIAN ASSISTANT

## 2024-11-08 PROCEDURE — 85025 COMPLETE CBC W/AUTO DIFF WBC: CPT | Performed by: INTERNAL MEDICINE

## 2024-11-08 PROCEDURE — 80053 COMPREHEN METABOLIC PANEL: CPT | Performed by: INTERNAL MEDICINE

## 2024-11-08 PROCEDURE — 83036 HEMOGLOBIN GLYCOSYLATED A1C: CPT | Performed by: PHYSICIAN ASSISTANT

## 2024-11-08 PROCEDURE — 85651 RBC SED RATE NONAUTOMATED: CPT | Performed by: INTERNAL MEDICINE

## 2024-11-08 PROCEDURE — 36415 COLL VENOUS BLD VENIPUNCTURE: CPT | Performed by: PHYSICIAN ASSISTANT

## 2024-11-08 PROCEDURE — 82570 ASSAY OF URINE CREATININE: CPT | Performed by: PHYSICIAN ASSISTANT

## 2024-11-13 ENCOUNTER — OFFICE VISIT (OUTPATIENT)
Dept: INTERNAL MEDICINE | Facility: CLINIC | Age: 79
End: 2024-11-13
Payer: MEDICARE

## 2024-11-13 VITALS
SYSTOLIC BLOOD PRESSURE: 110 MMHG | HEIGHT: 63 IN | BODY MASS INDEX: 21.78 KG/M2 | DIASTOLIC BLOOD PRESSURE: 68 MMHG | WEIGHT: 122.94 LBS | HEART RATE: 75 BPM

## 2024-11-13 DIAGNOSIS — E78.5 HYPERLIPIDEMIA ASSOCIATED WITH TYPE 2 DIABETES MELLITUS: ICD-10-CM

## 2024-11-13 DIAGNOSIS — E11.59 HYPERTENSION ASSOCIATED WITH DIABETES: ICD-10-CM

## 2024-11-13 DIAGNOSIS — K21.9 GASTROESOPHAGEAL REFLUX DISEASE, UNSPECIFIED WHETHER ESOPHAGITIS PRESENT: ICD-10-CM

## 2024-11-13 DIAGNOSIS — Z23 NEED FOR VACCINATION: ICD-10-CM

## 2024-11-13 DIAGNOSIS — E11.22 TYPE 2 DIABETES MELLITUS WITH STAGE 3A CHRONIC KIDNEY DISEASE, WITHOUT LONG-TERM CURRENT USE OF INSULIN: Primary | ICD-10-CM

## 2024-11-13 DIAGNOSIS — I15.2 HYPERTENSION ASSOCIATED WITH DIABETES: ICD-10-CM

## 2024-11-13 DIAGNOSIS — E11.69 HYPERLIPIDEMIA ASSOCIATED WITH TYPE 2 DIABETES MELLITUS: ICD-10-CM

## 2024-11-13 DIAGNOSIS — D64.9 ANEMIA, UNSPECIFIED TYPE: ICD-10-CM

## 2024-11-13 DIAGNOSIS — M54.32 SCIATICA WITHOUT BACK PAIN, LEFT: ICD-10-CM

## 2024-11-13 DIAGNOSIS — N18.31 TYPE 2 DIABETES MELLITUS WITH STAGE 3A CHRONIC KIDNEY DISEASE, WITHOUT LONG-TERM CURRENT USE OF INSULIN: Primary | ICD-10-CM

## 2024-11-13 PROCEDURE — 99999 PR PBB SHADOW E&M-EST. PATIENT-LVL IV: CPT | Mod: PBBFAC,,, | Performed by: FAMILY MEDICINE

## 2024-11-13 PROCEDURE — 1126F AMNT PAIN NOTED NONE PRSNT: CPT | Mod: CPTII,S$GLB,, | Performed by: FAMILY MEDICINE

## 2024-11-13 PROCEDURE — 99214 OFFICE O/P EST MOD 30 MIN: CPT | Mod: 25,S$GLB,, | Performed by: FAMILY MEDICINE

## 2024-11-13 PROCEDURE — 3078F DIAST BP <80 MM HG: CPT | Mod: CPTII,S$GLB,, | Performed by: FAMILY MEDICINE

## 2024-11-13 PROCEDURE — 3074F SYST BP LT 130 MM HG: CPT | Mod: CPTII,S$GLB,, | Performed by: FAMILY MEDICINE

## 2024-11-13 PROCEDURE — 1101F PT FALLS ASSESS-DOCD LE1/YR: CPT | Mod: CPTII,S$GLB,, | Performed by: FAMILY MEDICINE

## 2024-11-13 PROCEDURE — 3288F FALL RISK ASSESSMENT DOCD: CPT | Mod: CPTII,S$GLB,, | Performed by: FAMILY MEDICINE

## 2024-11-13 PROCEDURE — G0008 ADMIN INFLUENZA VIRUS VAC: HCPCS | Mod: S$GLB,,, | Performed by: FAMILY MEDICINE

## 2024-11-13 PROCEDURE — 1159F MED LIST DOCD IN RCRD: CPT | Mod: CPTII,S$GLB,, | Performed by: FAMILY MEDICINE

## 2024-11-13 PROCEDURE — 90653 IIV ADJUVANT VACCINE IM: CPT | Mod: S$GLB,,, | Performed by: FAMILY MEDICINE

## 2024-11-13 RX ORDER — ROSUVASTATIN CALCIUM 20 MG/1
TABLET, COATED ORAL
Qty: 90 TABLET | Refills: 3 | Status: SHIPPED | OUTPATIENT
Start: 2024-11-13

## 2024-11-13 NOTE — ASSESSMENT & PLAN NOTE
Controlled, continue losartan-hydrochlorothiazide     BP Readings from Last 1 Encounters:   11/13/24 110/68

## 2024-11-13 NOTE — PROGRESS NOTES
Subjective:       Patient ID: Indira Walker is a 79 y.o. female.    Chief Complaint: Follow-up (6 month f/u)    History of Present Illness    Patient presents to clinic today for followup of chronic conditions.  - Patient reports ongoing acid reflux problems with an upcoming upper endoscopy on the 19th to evaluate her abdomen and esophagus, ordered by Dr. Pak, her gastroenterologist whom she saw in September. She had severe acid reflux symptoms the night before the current visit, describing it as highly uncomfortable.  - Patient also complains of sciatic nerve pain, particularly in her left leg. At night, the left leg pain is severe enough to necessitate standing up to alleviate the discomfort.  - Patient sees Dr. Cage, her rheumatologist treating her for rheumatoid arthritis, with an upcoming appointment in about 2 weeks.      Review of Systems   Constitutional:  Negative for chills, fatigue, fever and unexpected weight change.   Eyes:  Negative for visual disturbance.   Respiratory:  Negative for shortness of breath.    Cardiovascular:  Negative for chest pain.   Musculoskeletal:  Positive for back pain and myalgias.   Neurological:  Negative for headaches.         Objective:      Physical Exam  Vitals reviewed.   Constitutional:       General: She is not in acute distress.     Appearance: She is well-developed.   HENT:      Head: Normocephalic and atraumatic.   Eyes:      General: Lids are normal. No scleral icterus.     Extraocular Movements: Extraocular movements intact.      Conjunctiva/sclera: Conjunctivae normal.      Pupils: Pupils are equal, round, and reactive to light.   Pulmonary:      Effort: Pulmonary effort is normal.   Neurological:      Mental Status: She is alert and oriented to person, place, and time.      Cranial Nerves: No cranial nerve deficit.      Gait: Gait normal.   Psychiatric:         Mood and Affect: Mood and affect normal.         Assessment:       1. Type 2 diabetes mellitus  with stage 3a chronic kidney disease, without long-term current use of insulin    2. Hypertension associated with diabetes    3. Hyperlipidemia associated with type 2 diabetes mellitus    4. Anemia, unspecified type    5. Gastroesophageal reflux disease, unspecified whether esophagitis present    6. Sciatica without back pain, left    7. Need for vaccination        Plan:   1. Type 2 diabetes mellitus with stage 3a chronic kidney disease, without long-term current use of insulin  Assessment & Plan:  Reasonable for age, continue glipizide and metformin    Lab Results   Component Value Date    HGBA1C 7.3 (H) 11/08/2024    HGBA1C 7.5 (H) 05/06/2024    LDLCALC 89.6 11/08/2024    LABMICR 41.0 11/08/2024    CREATRANDUR 106.0 11/08/2024    MICALBCREAT 38.7 (H) 11/08/2024           2. Hypertension associated with diabetes  Assessment & Plan:  Controlled, continue losartan-hydrochlorothiazide     BP Readings from Last 1 Encounters:   11/13/24 110/68           3. Hyperlipidemia associated with type 2 diabetes mellitus  Assessment & Plan:  Controlled, continue crestor    Lab Results   Component Value Date    CHOL 184 11/08/2024    LDLCALC 89.6 11/08/2024    TRIG 67 11/08/2024    HDL 81 (H) 11/08/2024    ALT 13 11/08/2024    AST 19 11/08/2024    ALKPHOS 46 11/08/2024           4. Anemia, unspecified type  Assessment & Plan:  Stable    Lab Results   Component Value Date    WBC 6.25 11/08/2024    HGB 10.8 (L) 11/08/2024    HCT 33.2 (L) 11/08/2024    MCV 90 11/08/2024     11/08/2024             5. Gastroesophageal reflux disease, unspecified whether esophagitis present  Overview:  Followed by Gastroenterology, continue current treatment plan       6. Sciatica without back pain, left  Assessment & Plan:  Advised to discuss with Rheumatology; if they are unable to address we can refer to pain clinic      7. Need for vaccination  -     Influenza - Trivalent (Adjuvanted)    Patient expressed understanding and agreement with  plan.    Visit today included increased complexity associated with the care of the episodic problem diabetes, which was addressed while instituting co-management of the longitudinal care of the patient due to the serious and/or complex managed problem(s) .    I have evaluated and discussed management associated with medical care services that serve as the continuing focal point for all needed health care services and/or with medical care services that are part of ongoing care related to my patient's single, serious condition or a complex condition(s).    I am providing ongoing care and I am the primary care provider for this patient, and they are being managed, monitored, and/or observed for their chronic conditions over time.     I have addressed their ongoing health maintenance requirements and needs for all health care services and reviewed co-management plans provided by specialty providers when available.    Health Maintenance Due   Topic Date Due    TETANUS VACCINE  Never done    Shingles Vaccine (1 of 2) Never done    RSV Vaccine (Age 60+ and Pregnant patients) (1 - 1-dose 75+ series) Never done    COVID-19 Vaccine (4 - 2024-25 season) 09/01/2024    Eye Exam  12/27/2024     Health Maintenance reviewed/updated.    Follow up in about 6 months (around 5/13/2025), or if symptoms worsen or fail to improve, for annual with [facility] labs PTA.    This note was generated with the assistance of ambient listening technology. I attest to having reviewed and edited the generated note for accuracy, though some syntax or spelling errors may persist. Please contact the author of this note for any clarification.

## 2024-11-13 NOTE — PATIENT INSTRUCTIONS
You are eligible for a covid booster which are available at most pharmacies.     Check with your pharmacist regarding RSV vaccine.      Check with your pharmacist regarding Tdap (tetanus/diphtheria/pertussis) vaccine.     Check with your pharmacist regarding shingrix vaccine.

## 2024-11-13 NOTE — ASSESSMENT & PLAN NOTE
Stable    Lab Results   Component Value Date    WBC 6.25 11/08/2024    HGB 10.8 (L) 11/08/2024    HCT 33.2 (L) 11/08/2024    MCV 90 11/08/2024     11/08/2024

## 2024-11-13 NOTE — ASSESSMENT & PLAN NOTE
Reasonable for age, continue glipizide and metformin    Lab Results   Component Value Date    HGBA1C 7.3 (H) 11/08/2024    HGBA1C 7.5 (H) 05/06/2024    LDLCALC 89.6 11/08/2024    LABMICR 41.0 11/08/2024    CREATRANDUR 106.0 11/08/2024    MICALBCREAT 38.7 (H) 11/08/2024

## 2024-11-13 NOTE — TELEPHONE ENCOUNTER
No care due was identified.  Health [facility] Embedded Care Due Messages. Reference number: [id number].   11/13/2024 10:07:39 AM CST

## 2024-11-13 NOTE — ASSESSMENT & PLAN NOTE
Controlled, continue crestor    Lab Results   Component Value Date    CHOL 184 11/08/2024    LDLCALC 89.6 11/08/2024    TRIG 67 11/08/2024    HDL 81 (H) 11/08/2024    ALT 13 11/08/2024    AST 19 11/08/2024    ALKPHOS 46 11/08/2024

## 2024-11-18 DIAGNOSIS — I10 HYPERTENSION, ESSENTIAL: ICD-10-CM

## 2024-11-18 RX ORDER — LOSARTAN POTASSIUM AND HYDROCHLOROTHIAZIDE 12.5; 1 MG/1; MG/1
TABLET ORAL
Qty: 90 TABLET | Refills: 3 | Status: SHIPPED | OUTPATIENT
Start: 2024-11-18

## 2024-11-18 NOTE — TELEPHONE ENCOUNTER
No care due was identified.  Health [facility] Embedded Care Due Messages. Reference number: [id number].   11/18/2024 12:57:10 PM CST

## 2024-11-19 ENCOUNTER — HOSPITAL ENCOUNTER (OUTPATIENT)
Facility: HOSPITAL | Age: 79
Discharge: HOME | End: 2024-11-19
Attending: INTERNAL MEDICINE | Admitting: INTERNAL MEDICINE
Payer: MEDICARE

## 2024-11-19 ENCOUNTER — ANESTHESIA EVENT (OUTPATIENT)
Dept: ENDOSCOPY | Facility: HOSPITAL | Age: 79
End: 2024-11-19
Payer: MEDICARE

## 2024-11-19 ENCOUNTER — ANESTHESIA (OUTPATIENT)
Dept: ENDOSCOPY | Facility: HOSPITAL | Age: 79
End: 2024-11-19
Payer: MEDICARE

## 2024-11-19 DIAGNOSIS — K21.9 GASTROESOPHAGEAL REFLUX DISEASE: ICD-10-CM

## 2024-11-19 LAB
GLUCOSE SERPL-MCNC: 122 MG/DL (ref 70–110)
POCT GLUCOSE: 122 MG/DL (ref 70–110)

## 2024-11-19 PROCEDURE — 63600175 PHARM REV CODE 636 W HCPCS: Performed by: NURSE ANESTHETIST, CERTIFIED REGISTERED

## 2024-11-19 PROCEDURE — 88305 TISSUE EXAM BY PATHOLOGIST: CPT | Performed by: PATHOLOGY

## 2024-11-19 PROCEDURE — 43235 EGD DIAGNOSTIC BRUSH WASH: CPT | Mod: ,,, | Performed by: INTERNAL MEDICINE

## 2024-11-19 PROCEDURE — 82962 GLUCOSE BLOOD TEST: CPT | Performed by: INTERNAL MEDICINE

## 2024-11-19 PROCEDURE — 43235 EGD DIAGNOSTIC BRUSH WASH: CPT | Performed by: INTERNAL MEDICINE

## 2024-11-19 PROCEDURE — 88305 TISSUE EXAM BY PATHOLOGIST: CPT | Mod: 26,,, | Performed by: PATHOLOGY

## 2024-11-19 PROCEDURE — 27201012 HC FORCEPS, HOT/COLD, DISP: Performed by: INTERNAL MEDICINE

## 2024-11-19 PROCEDURE — 37000009 HC ANESTHESIA EA ADD 15 MINS: Performed by: INTERNAL MEDICINE

## 2024-11-19 PROCEDURE — 37000008 HC ANESTHESIA 1ST 15 MINUTES: Performed by: INTERNAL MEDICINE

## 2024-11-19 RX ORDER — LIDOCAINE HYDROCHLORIDE 10 MG/ML
INJECTION, SOLUTION EPIDURAL; INFILTRATION; INTRACAUDAL; PERINEURAL
Status: DISCONTINUED | OUTPATIENT
Start: 2024-11-19 | End: 2024-11-19

## 2024-11-19 RX ORDER — PROPOFOL 10 MG/ML
VIAL (ML) INTRAVENOUS
Status: DISCONTINUED | OUTPATIENT
Start: 2024-11-19 | End: 2024-11-19

## 2024-11-19 RX ADMIN — PROPOFOL 100 MG: 10 INJECTION, EMULSION INTRAVENOUS at 12:11

## 2024-11-19 RX ADMIN — LIDOCAINE HYDROCHLORIDE 50 MG: 10 SOLUTION INTRAVENOUS at 12:11

## 2024-11-19 RX ADMIN — PROPOFOL 50 MG: 10 INJECTION, EMULSION INTRAVENOUS at 12:11

## 2024-11-19 NOTE — H&P
PRE PROCEDURE H&P    Patient Name: Indira Walker  MRN: [id number]  : 1945  Date of Procedure:  2024  Referring Physician: Valentín Duran PA-C  Primary Physician: Bertha Kearns MD  Procedure Physician: Megha Stallworth MD       Planned Procedure: EGD  Diagnosis:   Reflux    Chief Complaint: Same as above    HPI: Patient is an 79 y.o. female is here for the above. Seen by Dr. Pak in 2024 for persistent and symptomatic reflux despite PPI use. Also using Pepto bismol and other OTC remedies with no relief. No previous EGD. No dysphagia. No weight loss. No family at bedside.     Anticoagulation: ASA    Past Medical History:   Past Medical History:   Diagnosis Date    Arthritis     Diabetes mellitus, type 2     Foot drop, left 2018    GERD (gastroesophageal reflux disease)     Heartburn     Hyperlipidemia     Hypertension     Left sided sciatica     s/p microdiscectomy 3/21/18    Pancreatic cyst         Past Surgical History:  Past Surgical History:   Procedure Laterality Date    ANTERIOR VAGINAL REPAIR N/A 2023    Procedure: COLPORRHAPHY, ANTERIOR;  Surgeon: ANA MARIA Mayorga MD;  Location: [facility];  Service: OB/GYN;  Laterality: N/A;    BREAST BIOPSY       SECTION      COLONOSCOPY  2008    DR. JANET GARCÍA/MILD DIVERICULOSIS DESCENDING AND SIGMOID. REPEAT 5 YRS    ENDOSCOPIC ULTRASOUND OF UPPER GASTROINTESTINAL TRACT N/A 2022    Procedure: ULTRASOUND, UPPER GI TRACT, ENDOSCOPIC;  Surgeon: Ange Saldivar MD;  Location: [facility];  Service: Endoscopy;  Laterality: N/A;  Upper and Linear, 22 sharkcore, slides and formalin    HERNIA REPAIR      MICRODISCECTOMY OF SPINE Left 2018    left L5-S1, Dr Segundo    SPINE SURGERY      TOTAL VAGINAL HYSTERECTOMY N/A 2023    Procedure: HYSTERECTOMY, TOTAL, VAGINAL;  Surgeon: ANA MARIA Mayorga MD;  Location: [facility] OR;  Service: OB/GYN;  Laterality: N/A;    UPPER GASTROINTESTINAL ENDOSCOPY           Home Medications:  Prior to Admission medications    Medication Sig Start Date End Date Taking? Authorizing Provider   acetaminophen (TYLENOL EXTRA STRENGTH ORAL) Take by mouth.   Yes Provider, Historical   aspirin (ECOTRIN) 81 MG EC tablet Take 1 tablet (81 mg total) by mouth once daily. Every other day 3/21/18  Yes Suzi Hall PA-C   blood sugar diagnostic Strp 1 each by Misc.(Non-Drug; Combo Route) route 2 (two) times a day. 12/4/23  Yes Hilda Hankins PA-C   blood-glucose meter kit Use as instructed to check BG twice daily 12/4/23 11/28/24 Yes Hilda Hankins PA-C   clotrimazole-betamethasone 1-0.05% (LOTRISONE) cream Apply topically 2 (two) times daily. 7/26/23  Yes Hilda Hankins PA-C   folic acid (FOLVITE) 1 MG tablet Take 1 tablet (1,000 mcg total) by mouth once daily. 9/6/24  Yes Giovanni Cage MD   glipiZIDE 5 MG TR24 TAKE 1 TABLET BY MOUTH EVERY DAY WITH BREAKFAST 8/13/24  Yes Hilda Hankins PA-C   ketoconazole (NIZORAL) 2 % cream APPLY TOPICALLY TWICE A DAY 11/17/23  Yes Paxton Johnson DPSHERRELL   lancets [facility] [place] BG 2 times daily, to use with insurance preferred meter 9/8/22  Yes Bertha Kearns MD   loratadine (CLARITIN) 10 mg tablet Take 10 mg by mouth once daily.   Yes Provider, Historical   losartan-hydrochlorothiazide 100-12.5 mg (HYZAAR) 100-12.5 mg Tab TAKE 1 TABLET BY MOUTH EVERY DAY 11/18/24  Yes Bertha Kearns MD   metFORMIN (GLUCOPHAGE) 1000 MG tablet TAKE 1 TABLET BY MOUTH TWICE A DAY WITH FOOD 8/13/24  Yes Bertha Kearns MD   multivitamin (THERAGRAN) per tablet Take 1 tablet by mouth once daily.   Yes Provider, Historical   pantoprazole (PROTONIX) 40 MG tablet Take 1 tablet (40 mg total) by mouth once daily. 8/30/24 8/30/25 Yes Hilda Hankins PA-C   rosuvastatin (CRESTOR) 20 MG tablet TAKE 1 TABLET BY MOUTH EVERY DAY 11/13/24  Yes Bertha Kearns MD   simethicone (MYLICON) 125 MG chewable tablet Take 125 mg by mouth every 6  (six) hours as needed for Flatulence.   Yes Provider, Historical   sulfaSALAzine (AZULFIDINE) 500 MG EC tablet TAKE 1 TABLET BY MOUTH TWICE A DAY 8/13/24  Yes Giovanni Cage MD   albuterol (PROVENTIL/VENTOLIN HFA) 90 mcg/actuation inhaler INHALE 2 PUFFS INTO THE LUNGS EVERY 4 HOURS AS NEEDED FOR WHEEZING OR SHORTNESS OF BREATH. 1/31/24   Hilda Hankins PA-C   ascorbic acid, vitamin C, (VITAMIN C) 500 MG tablet Take 500 mg by mouth once daily.  Patient not taking: Reported on 10/10/2024    Provider, Historical   azelastine (ASTELIN) 137 mcg (0.1 %) nasal spray 1 spray (137 mcg total) by Nasal route 2 (two) times daily. 1/9/24   Hilda Hankins PA-C   fluticasone propionate (FLONASE) 50 mcg/actuation nasal spray 2 sprays (100 mcg total) by Each Nostril route once daily. 1/9/24   Hilda Hankins PA-C        Allergies:  Review of patient's allergies indicates:   Allergen Reactions    Pyrilamine Swelling     (an ingredient in Midol)    Iodine and iodide containing products Hives    Iron Other (See Comments)        Social History:   Social History     Socioeconomic History    Marital status:     Number of children: 2   Occupational History    Occupation:       Employer: [employer]    Tobacco Use    Smoking status: Never    Smokeless tobacco: Never   Substance and Sexual Activity    Alcohol use: No    Drug use: No    Sexual activity: Not Currently     Partners: Male     Social Drivers of Health     Financial Resource Strain: Low Risk  (5/10/2023)    Overall Financial Resource Strain (CARDIA)     Difficulty of Paying Living Expenses: Not hard at all   Food Insecurity: No Food Insecurity (5/10/2023)    Hunger Vital Sign     Worried About Running Out of Food in the Last Year: Never true     Ran Out of Food in the Last Year: Never true   Transportation Needs: No Transportation Needs (5/10/2023)    PRAPARE - Transportation     Lack of Transportation (Medical): No     Lack of  "Transportation (Non-Medical): No   Physical Activity: Sufficiently Active (5/10/2023)    Exercise Vital Sign     Days of Exercise per Week: 6 days     Minutes of Exercise per Session: 60 min   Stress: Stress Concern Present (5/10/2023)    [employer] - Occupational Stress Questionnaire     Feeling of Stress : Very much   Housing Stability: Low Risk  (5/10/2023)    Housing Stability Vital Sign     Unable to Pay for Housing in the Last Year: No     Number of Places Lived in the Last Year: 1     Unstable Housing in the Last Year: No       Family History:  Family History   Problem Relation Name Age of Onset    Diabetes Mother      Diabetes Father         ROS: No acute cardiac events, no acute respiratory complaints.     Physical Exam (all patients):    BP (!) 182/83 (BP Location: Left arm, Patient Position: Lying)   Pulse 70   Temp 98.1 °F (36.7 °C) (Temporal)   Resp 16   Ht 5' 3" (1.6 m)   Wt 55.3 kg (122 lb)   SpO2 100%   Breastfeeding No   BMI 21.61 kg/m²   Lungs: Clear to auscultation bilaterally, respirations unlabored  Heart: Regular rate and rhythm, S1 and S2 normal, no obvious murmurs  Abdomen:         Soft, non-tender, bowel sounds normal, no masses, no organomegaly    Lab Results   Component Value Date    WBC 6.25 11/08/2024    MCV 90 11/08/2024    RDW 14.2 11/08/2024     11/08/2024    INR 1.0 03/08/2018    GLU 91 11/08/2024    HGBA1C 7.3 (H) 11/08/2024    BUN 12 11/08/2024     11/08/2024    K 3.9 11/08/2024     11/08/2024        SEDATION PLAN: per anesthesia      History reviewed, vital signs satisfactory, cardiopulmonary status satisfactory, sedation options, risks and plans have been discussed with the patient  All their questions were answered and the patient agrees to the sedation procedures as planned and the patient is deemed an appropriate candidate for the sedation as planned.    The risks, benefits and alternatives of the procedure were " discussed with the patient in detail. This discussion was had in the presence of  endoscopy staff. The risks include, risks of adverse reaction to sedation requiring the use of reversal agents, bleeding requiring blood transfusion, perforation requiring surgical intervention and technical failure. Other risks include aspiration leading to respiratory distress and respiratory failure resulting in endotracheal intubation and mechanical ventilation including death. If anesthesia is being utilized for this procedure, it is up to the anesthesiologist to determine airway safety including elective endotracheal intubation. Questions were answered, they agree to proceed. There was no language barriers.      Procedure explained to patient, informed consent obtained and placed in chart.    Megha Stallworth  11/19/2024  12:09 PM

## 2024-11-19 NOTE — TELEPHONE ENCOUNTER
Refill Decision Note   Indira Walker  is requesting a refill authorization.  Brief Assessment and Rationale for Refill:  Approve     Medication Therapy Plan:       Medication Reconciliation Completed: No   Comments:     No Care Gaps recommended.     Note composed:8:19 PM 11/18/2024

## 2024-11-19 NOTE — ANESTHESIA POSTPROCEDURE EVALUATION
Anesthesia Post Evaluation    Patient: Indira Walker    Procedure(s) Performed: Procedure(s) (LRB):  EGD (ESOPHAGOGASTRODUODENOSCOPY) (N/A)    Final Anesthesia Type: MAC      Patient location during evaluation: GI PACU  Patient participation: Yes- Able to Participate  Level of consciousness: awake and alert  Post-procedure vital signs: reviewed and stable  Pain management: adequate  Airway patency: patent    PONV status at discharge: No PONV  Anesthetic complications: no      Cardiovascular status: blood pressure returned to baseline  Respiratory status: unassisted and spontaneous ventilation  Hydration status: euvolemic  Follow-up not needed.              Vitals Value Taken Time   /68 11/19/24 1240   Temp 36.4 °C (97.6 °F) 11/19/24 1229   Pulse 68 11/19/24 1240   Resp 16 11/19/24 1240   SpO2 99 % 11/19/24 1240         Event Time   Out of Recovery 12:47:46         Pain/Coleman Score: Coleman Score: 10 (11/19/2024 12:47 PM)

## 2024-11-19 NOTE — PROVATION PATIENT INSTRUCTIONS
Discharge Summary/Instructions after an Endoscopic Procedure  Patient Name: Indira Walker  Patient MRN: [id number]  Patient YOB: 1945 Tuesday, November 19, 2024 Megha Stallworth MD  Dear patient,  As a result of recent federal legislation (The Federal Cures Act), you may   receive lab or pathology results from your procedure in your [facility]   account before your physician is able to contact you. Your physician or   their representative will relay the results to you with their   recommendations at their soonest availability.  Thank you,  RESTRICTIONS:  During your procedure today, you received medications for sedation.  These   medications may affect your judgment, balance and coordination.  Therefore,   for 24 hours, you have the following restrictions:   - DO NOT drive a car, operate machinery, make legal/financial decisions,   sign important papers or drink alcohol.    ACTIVITY:  Today: no heavy lifting, straining or running due to procedural   sedation/anesthesia.  The following day: return to full activity including work.  DIET:  Eat and drink normally unless instructed otherwise.     TREATMENT FOR COMMON SIDE EFFECTS:  - Mild abdominal pain, nausea, belching, bloating or excessive gas:  rest,   eat lightly and use a heating pad.  - Sore Throat: treat with throat lozenges and/or gargle with warm salt   water.  - Because air was used during the procedure, expelling large amounts of air   from your rectum or belching is normal.  - If a bowel prep was taken, you may not have a bowel movement for 1-3 days.    This is normal.  SYMPTOMS TO WATCH FOR AND REPORT TO YOUR PHYSICIAN:  1. Abdominal pain or bloating, other than gas cramps.  2. Chest pain.  3. Back pain.  4. Signs of infection such as: chills or fever occurring within 24 hours   after the procedure.  5. Rectal bleeding, which would show as bright red, maroon, or black stools.   (A tablespoon of blood from the rectum is not serious, especially if    hemorrhoids are present.)  6. Vomiting.  7. Weakness or dizziness.  GO DIRECTLY TO THE NEAREST EMERGENCY ROOM IF YOU HAVE ANY OF THE FOLLOWING:      Difficulty breathing              Chills and/or fever over 101 F   Persistent vomiting and/or vomiting blood   Severe abdominal pain   Severe chest pain   Black, tarry stools   Bleeding- more than one tablespoon   Any other symptom or condition that you feel may need urgent attention  Your doctor recommends these additional instructions:  If any biopsies were taken, your doctors clinic will contact you in 1 to 2   weeks with any results.  - Discharge patient to home (with escort).   - Resume previous diet.   - Continue present medications.   - Continue Pantoprazole at current dose.  - Await pathology results.   - Follow an antireflux regimen.   - Return to GI clinic with Dr. Pak of Gastroenterology.  For questions, problems or results please call your physician Megha Stallworth MD at Work:  [phone]  If you have any questions about the above instructions, call the GI   department at [phone] or call the endoscopy unit at [phone]   from 7am until 3 pm.  [facility] [place], EMERGENCY ROOM PHONE NUMBER:   [phone]  IF A COMPLICATION OR EMERGENCY SITUATION ARISES AND YOU ARE UNABLE TO REACH   YOUR PHYSICIAN - GO DIRECTLY TO THE EMERGENCY ROOM.  I have read or have had read to me these discharge instructions for my   procedure and have received a written copy.  I understand these   instructions and will follow-up with my physician if I have any questions.     __________________________________       _____________________________________  Nurse Signature                                          Patient/Designated   Responsible Party Signature  MD Megha Klein MD  11/19/2024 12:20:55 PM  This report has been verified and signed electronically.  Dear patient,  As a result of recent federal legislation (The Federal  Cures Act), you may   receive lab or pathology results from your procedure in your [facility]   account before your physician is able to contact you. Your physician or   their representative will relay the results to you with their   recommendations at their soonest availability.  Thank you,  PROVATION

## 2024-11-19 NOTE — TRANSFER OF CARE
"Anesthesia Transfer of Care Note    Patient: Indira Walker    Procedure(s) Performed: Procedure(s) (LRB):  EGD (ESOPHAGOGASTRODUODENOSCOPY) (N/A)    Patient location: GI    Anesthesia Type: MAC    Transport from OR: Transported from OR on room air with adequate spontaneous ventilation    Post pain: adequate analgesia    Post assessment: no apparent anesthetic complications    Post vital signs: stable    Level of consciousness: sedated    Nausea/Vomiting: no nausea/vomiting    Complications: none    Transfer of care protocol was followed      Last vitals: Visit Vitals  /67 (BP Location: Left arm, Patient Position: Lying)   Pulse 67   Temp 36.6 °C (97.9 °F) (Temporal)   Resp 19   Ht 5' 3" (1.6 m)   Wt 55.3 kg (122 lb)   SpO2 99%   Breastfeeding No   BMI 21.61 kg/m²     "

## 2024-11-19 NOTE — ANESTHESIA PREPROCEDURE EVALUATION
11/19/2024  Indira Walker is a 79 y.o., female.      Pre-op Assessment    I have reviewed the Patient Summary Reports.    I have reviewed the NPO Status.   I have reviewed the Medications.     Review of Systems  Anesthesia Hx:  No problems with previous Anesthesia                Cardiovascular:     Hypertension, well controlled           hyperlipidemia   ECG has been reviewed.                            Hepatic/GI:     GERD, well controlled                Musculoskeletal:  Arthritis               Endocrine:  Diabetes, well controlled, type 2           Psych:    depression              Physical Exam  General: Well nourished    Airway:  Mallampati: II   Mouth Opening: Normal  TM Distance: Normal  Tongue: Normal  Neck ROM: Normal ROM    Dental:  Intact    Chest/Lungs:  Normal Respiratory Rate    Heart:  Rate: Normal  Rhythm: Regular Rhythm    Anesthesia Plan  Type of Anesthesia, risks & benefits discussed:    Anesthesia Type: MAC  Intra-op Monitoring Plan: Standard ASA Monitors  Post Op Pain Control Plan: multimodal analgesia  Induction:  IV  Informed Consent: Informed consent signed with the Patient and all parties understand the risks and agree with anesthesia plan.  All questions answered.   ASA Score: 2  Day of Surgery Review of History & Physical: H&P Update referred to the surgeon/provider.    Ready For Surgery From Anesthesia Perspective.   .

## 2024-11-20 VITALS
TEMPERATURE: 98 F | WEIGHT: 122 LBS | RESPIRATION RATE: 16 BRPM | OXYGEN SATURATION: 99 % | HEART RATE: 68 BPM | DIASTOLIC BLOOD PRESSURE: 68 MMHG | SYSTOLIC BLOOD PRESSURE: 118 MMHG | HEIGHT: 63 IN | BODY MASS INDEX: 21.62 KG/M2

## 2024-11-22 LAB
FINAL PATHOLOGIC DIAGNOSIS: NORMAL
GROSS: NORMAL
Lab: NORMAL

## 2024-11-25 ENCOUNTER — LAB VISIT (OUTPATIENT)
Dept: LAB | Facility: HOSPITAL | Age: 79
End: 2024-11-25
Attending: INTERNAL MEDICINE
Payer: MEDICARE

## 2024-11-25 DIAGNOSIS — M05.9 SEROPOSITIVE RHEUMATOID ARTHRITIS: ICD-10-CM

## 2024-11-25 DIAGNOSIS — D84.821 IMMUNOSUPPRESSION DUE TO DRUG THERAPY: ICD-10-CM

## 2024-11-25 DIAGNOSIS — Z51.81 MEDICATION MONITORING ENCOUNTER: ICD-10-CM

## 2024-11-25 DIAGNOSIS — Z79.899 IMMUNOSUPPRESSION DUE TO DRUG THERAPY: ICD-10-CM

## 2024-11-25 LAB
ALBUMIN SERPL BCP-MCNC: 3.5 G/DL (ref 3.5–5.2)
ALP SERPL-CCNC: 47 U/L (ref 40–150)
ALT SERPL W/O P-5'-P-CCNC: 13 U/L (ref 10–44)
ANION GAP SERPL CALC-SCNC: 8 MMOL/L (ref 8–16)
AST SERPL-CCNC: 19 U/L (ref 10–40)
BASOPHILS # BLD AUTO: 0.04 K/UL (ref 0–0.2)
BASOPHILS NFR BLD: 0.6 % (ref 0–1.9)
BILIRUB SERPL-MCNC: 0.4 MG/DL (ref 0.1–1)
BUN SERPL-MCNC: 15 MG/DL (ref 8–23)
CALCIUM SERPL-MCNC: 8.8 MG/DL (ref 8.7–10.5)
CCP AB SER IA-ACNC: 326.6 U/ML
CHLORIDE SERPL-SCNC: 102 MMOL/L (ref 95–110)
CO2 SERPL-SCNC: 26 MMOL/L (ref 23–29)
CREAT SERPL-MCNC: 1.1 MG/DL (ref 0.5–1.4)
DIFFERENTIAL METHOD BLD: ABNORMAL
EOSINOPHIL # BLD AUTO: 0.1 K/UL (ref 0–0.5)
EOSINOPHIL NFR BLD: 2.1 % (ref 0–8)
ERYTHROCYTE [DISTWIDTH] IN BLOOD BY AUTOMATED COUNT: 14.4 % (ref 11.5–14.5)
ERYTHROCYTE [SEDIMENTATION RATE] IN BLOOD BY WESTERGREN METHOD: 10 MM/HR (ref 0–20)
EST. GFR  (NO RACE VARIABLE): 50 ML/MIN/1.73 M^2
GLUCOSE SERPL-MCNC: 246 MG/DL (ref 70–110)
HCT VFR BLD AUTO: 31.4 % (ref 37–48.5)
HGB BLD-MCNC: 10.1 G/DL (ref 12–16)
IMM GRANULOCYTES # BLD AUTO: 0.01 K/UL (ref 0–0.04)
IMM GRANULOCYTES NFR BLD AUTO: 0.2 % (ref 0–0.5)
LYMPHOCYTES # BLD AUTO: 1.9 K/UL (ref 1–4.8)
LYMPHOCYTES NFR BLD: 29.8 % (ref 18–48)
MCH RBC QN AUTO: 29.1 PG (ref 27–31)
MCHC RBC AUTO-ENTMCNC: 32.2 G/DL (ref 32–36)
MCV RBC AUTO: 91 FL (ref 82–98)
MONOCYTES # BLD AUTO: 0.8 K/UL (ref 0.3–1)
MONOCYTES NFR BLD: 11.9 % (ref 4–15)
NEUTROPHILS # BLD AUTO: 3.5 K/UL (ref 1.8–7.7)
NEUTROPHILS NFR BLD: 55.4 % (ref 38–73)
NRBC BLD-RTO: 0 /100 WBC
PLATELET # BLD AUTO: 326 K/UL (ref 150–450)
PMV BLD AUTO: 9.7 FL (ref 9.2–12.9)
POTASSIUM SERPL-SCNC: 3.9 MMOL/L (ref 3.5–5.1)
PROT SERPL-MCNC: 6.9 G/DL (ref 6–8.4)
RBC # BLD AUTO: 3.47 M/UL (ref 4–5.4)
RHEUMATOID FACT SERPL-ACNC: 39 IU/ML (ref 0–15)
SODIUM SERPL-SCNC: 136 MMOL/L (ref 136–145)
WBC # BLD AUTO: 6.28 K/UL (ref 3.9–12.7)

## 2024-11-25 PROCEDURE — 85025 COMPLETE CBC W/AUTO DIFF WBC: CPT | Performed by: INTERNAL MEDICINE

## 2024-11-25 PROCEDURE — 86200 CCP ANTIBODY: CPT | Performed by: INTERNAL MEDICINE

## 2024-11-25 PROCEDURE — 80053 COMPREHEN METABOLIC PANEL: CPT | Performed by: INTERNAL MEDICINE

## 2024-11-25 PROCEDURE — 86431 RHEUMATOID FACTOR QUANT: CPT | Performed by: INTERNAL MEDICINE

## 2024-11-25 PROCEDURE — 36415 COLL VENOUS BLD VENIPUNCTURE: CPT | Performed by: INTERNAL MEDICINE

## 2024-11-25 PROCEDURE — 85651 RBC SED RATE NONAUTOMATED: CPT | Performed by: INTERNAL MEDICINE

## 2024-11-25 NOTE — PROGRESS NOTES
The biopsies of your stomach show no signs of infection but shows a chemical type injury. This is most consistent with medications causing your symptoms. Often medications we take can lead to GI distress. Also, a hiatal hernia was found during your endoscopy. Practicing good anti-reflux measures will help minimize your reflux events. Please continue to take your acid blocker Pantoprazole as prescribed and follow up with Dr. Pak or Ms. Valentín Duran PA-C in the GI clinic.     Thank you for allowing me to be part of your care.    Sincerely,    Megha Stallworth MD

## 2024-11-26 ENCOUNTER — PATIENT MESSAGE (OUTPATIENT)
Dept: GASTROENTEROLOGY | Facility: CLINIC | Age: 79
End: 2024-11-26
Payer: MEDICARE

## 2024-12-04 ENCOUNTER — TELEPHONE (OUTPATIENT)
Dept: RHEUMATOLOGY | Facility: CLINIC | Age: 79
End: 2024-12-04
Payer: MEDICARE

## 2024-12-04 ENCOUNTER — TELEPHONE (OUTPATIENT)
Dept: INTERNAL MEDICINE | Facility: CLINIC | Age: 79
End: 2024-12-04
Payer: MEDICARE

## 2024-12-04 ENCOUNTER — OFFICE VISIT (OUTPATIENT)
Dept: RHEUMATOLOGY | Facility: CLINIC | Age: 79
End: 2024-12-04
Payer: MEDICARE

## 2024-12-04 DIAGNOSIS — N18.31 TYPE 2 DIABETES MELLITUS WITH STAGE 3A CHRONIC KIDNEY DISEASE, WITHOUT LONG-TERM CURRENT USE OF INSULIN: ICD-10-CM

## 2024-12-04 DIAGNOSIS — M05.9 SEROPOSITIVE RHEUMATOID ARTHRITIS: Primary | ICD-10-CM

## 2024-12-04 DIAGNOSIS — D84.821 IMMUNOSUPPRESSION DUE TO DRUG THERAPY: ICD-10-CM

## 2024-12-04 DIAGNOSIS — Z51.81 MEDICATION MONITORING ENCOUNTER: ICD-10-CM

## 2024-12-04 DIAGNOSIS — Z79.899 IMMUNOSUPPRESSION DUE TO DRUG THERAPY: ICD-10-CM

## 2024-12-04 DIAGNOSIS — E11.22 TYPE 2 DIABETES MELLITUS WITH STAGE 3A CHRONIC KIDNEY DISEASE, WITHOUT LONG-TERM CURRENT USE OF INSULIN: ICD-10-CM

## 2024-12-04 DIAGNOSIS — J40 BRONCHITIS: ICD-10-CM

## 2024-12-04 RX ORDER — SULFASALAZINE 500 MG/1
500 TABLET, DELAYED RELEASE ORAL DAILY
Qty: 90 TABLET | Refills: 1 | Status: SHIPPED | OUTPATIENT
Start: 2024-12-04 | End: 2025-06-02

## 2024-12-04 NOTE — PROGRESS NOTES
The patient location is: [place]  The chief complaint leading to consultation is: SPRA    Visit type: audiovisual    Face to Face time with patient: 30  30 minutes of total time spent on the encounter, which includes face to face time and non-face to face time preparing to see the patient (eg, review of tests), Obtaining and/or reviewing separately obtained history, Documenting clinical information in the electronic or other health record, Independently interpreting results (not separately reported) and communicating results to the patient/family/caregiver, or Care coordination (not separately reported).         Each patient to whom he or she provides medical services by telemedicine is:  (1) informed of the relationship between the physician and patient and the respective role of any other health care provider with respect to management of the patient; and (2) notified that he or she may decline to receive medical services by telemedicine and may withdraw from such care at any time.    Notes:      RHEUMATOLOGY OUTPATIENT CLINIC NOTE    12/4/2024    Attending Rheumatologist: Giovanni Cage  Primary Care Provider/Physician Requesting Consultation: Bertha Kearns MD   Chief Complaint/Reason For Consultation:  No chief complaint on file.      Subjective:     Indira Walker is a 79 y.o. Black or  female with SPRA    Adherence w/ SSZ, currently 500mg BID.  Denies active joint pain, or RA flares since last visit.  Active bronchitis sx, developed overnight.  Denies association w/ fever or hemoptysis.    Review of Systems   Constitutional:  Positive for malaise/fatigue. Negative for fever.   Eyes:  Negative for photophobia and pain.   Respiratory:  Positive for cough and sputum production. Negative for hemoptysis and shortness of breath ([place]).    Cardiovascular:  Negative for chest pain.   Gastrointestinal:  Negative for blood in stool and melena.   Genitourinary:  Negative for hematuria.    Musculoskeletal:  Negative for joint pain.   Neurological:  Negative for focal weakness and headaches.       Chronic comorbid conditions affecting medical decision making today:  Past Medical History:   Diagnosis Date    Arthritis     Diabetes mellitus, type 2     Foot drop, left 2018    GERD (gastroesophageal reflux disease)     Heartburn     Hyperlipidemia     Hypertension     Left sided sciatica     s/p microdiscectomy 3/21/18    Pancreatic cyst      Past Surgical History:   Procedure Laterality Date    ANTERIOR VAGINAL REPAIR N/A 2023    Procedure: COLPORRHAPHY, ANTERIOR;  Surgeon: ANA MARIA Mayorga MD;  Location: [facility] OR;  Service: OB/GYN;  Laterality: N/A;    BREAST BIOPSY       SECTION      COLONOSCOPY  2008    DR. JANET GARCÍA/MILD DIVERICULOSIS DESCENDING AND SIGMOID. REPEAT 5 YRS    ENDOSCOPIC ULTRASOUND OF UPPER GASTROINTESTINAL TRACT N/A 2022    Procedure: ULTRASOUND, UPPER GI TRACT, ENDOSCOPIC;  Surgeon: Ange Saldivar MD;  Location: [facility];  Service: Endoscopy;  Laterality: N/A;  Upper and Linear, 22 sharkcore, slides and formalin    ESOPHAGOGASTRODUODENOSCOPY N/A 2024    Procedure: EGD (ESOPHAGOGASTRODUODENOSCOPY);  Surgeon: Megha Stallworth MD;  Location: [facility];  Service: Endoscopy;  Laterality: N/A;    HERNIA REPAIR      MICRODISCECTOMY OF SPINE Left 2018    left L5-S1, Dr Segundo    SPINE SURGERY      TOTAL VAGINAL HYSTERECTOMY N/A 2023    Procedure: HYSTERECTOMY, TOTAL, VAGINAL;  Surgeon: ANA MARIA Mayorga MD;  Location: [facility] OR;  Service: OB/GYN;  Laterality: N/A;    UPPER GASTROINTESTINAL ENDOSCOPY       Family History   Problem Relation Name Age of Onset    Diabetes Mother      Diabetes Father       Social History     Tobacco Use   Smoking Status Never   Smokeless Tobacco Never       Current Outpatient Medications:     acetaminophen (TYLENOL EXTRA STRENGTH ORAL), Take by mouth., Disp: , Rfl:     albuterol  (PROVENTIL/VENTOLIN HFA) 90 mcg/actuation inhaler, INHALE 2 PUFFS INTO THE LUNGS EVERY 4 HOURS AS NEEDED FOR WHEEZING OR SHORTNESS OF BREATH., Disp: 18 g, Rfl: 2    aspirin (ECOTRIN) 81 MG EC tablet, Take 1 tablet (81 mg total) by mouth once daily. Every other day, Disp: , Rfl: 0    azelastine (ASTELIN) 137 mcg (0.1 %) nasal spray, 1 spray (137 mcg total) by Nasal route 2 (two) times daily., Disp: 30 mL, Rfl: 5    blood sugar diagnostic Strp, 1 each by Misc.(Non-Drug; Combo Route) route 2 (two) times a day., Disp: 200 each, Rfl: 3    blood-glucose meter kit, Use as instructed to check BG twice daily, Disp: 1 each, Rfl: 1    clotrimazole-betamethasone 1-0.05% (LOTRISONE) cream, Apply topically 2 (two) times daily., Disp: 45 g, Rfl: 2    fluticasone propionate (FLONASE) 50 mcg/actuation nasal spray, 2 sprays (100 mcg total) by Each Nostril route once daily., Disp: 16 g, Rfl: 5    folic acid (FOLVITE) 1 MG tablet, Take 1 tablet (1,000 mcg total) by mouth once daily., Disp: 90 tablet, Rfl: 1    glipiZIDE 5 MG TR24, TAKE 1 TABLET BY MOUTH EVERY DAY WITH BREAKFAST, Disp: 90 tablet, Rfl: 3    ketoconazole (NIZORAL) 2 % cream, APPLY TOPICALLY TWICE A DAY, Disp: 30 g, Rfl: 2    lancets Misc, To check BG 2 times daily, to use with insurance preferred meter, Disp: 200 each, Rfl: 3    loratadine (CLARITIN) 10 mg tablet, Take 10 mg by mouth once daily., Disp: , Rfl:     losartan-hydrochlorothiazide 100-12.5 mg (HYZAAR) 100-12.5 mg Tab, TAKE 1 TABLET BY MOUTH EVERY DAY, Disp: 90 tablet, Rfl: 3    metFORMIN (GLUCOPHAGE) 1000 MG tablet, TAKE 1 TABLET BY MOUTH TWICE A DAY WITH FOOD, Disp: 180 tablet, Rfl: 1    multivitamin (THERAGRAN) per tablet, Take 1 tablet by mouth once daily., Disp: , Rfl:     pantoprazole (PROTONIX) 40 MG tablet, Take 1 tablet (40 mg total) by mouth once daily., Disp: 90 tablet, Rfl: 3    rosuvastatin (CRESTOR) 20 MG tablet, TAKE 1 TABLET BY MOUTH EVERY DAY, Disp: 90 tablet, Rfl: 3    simethicone (MYLICON) 125  MG chewable tablet, Take 125 mg by mouth every 6 (six) hours as needed for Flatulence., Disp: , Rfl:     sulfaSALAzine (AZULFIDINE) 500 MG EC tablet, TAKE 1 TABLET BY MOUTH TWICE A DAY, Disp: 180 tablet, Rfl: 1     Objective:     There were no vitals filed for this visit.  Physical Exam   Eyes: Conjunctivae are normal. Right eye exhibits no discharge.   Pulmonary/Chest: Effort normal. No respiratory distress.   Musculoskeletal:         General: No swelling or tenderness.       Reviewed available old and all outside pertinent medical records available.    All lab results personally reviewed and interpreted by me.       ASSESSMENT / PLAN     1. Seropositive rheumatoid arthritis  CDAI: low disease activity.  APR WNR.  Decreased RA titers.  No radiographic progression reported on XR.  Decrease SSZ to 500mg per day.  Sedimentation rate      2. Immunosuppression due to drug therapy  Hold immunosuppression in event of active infections or need for surgery.  CBC Auto Differential      3. Medication monitoring encounter  Comprehensive Metabolic Panel      4. Type 2 diabetes mellitus with stage 3a chronic kidney disease, without long-term current use of insulin        5. Bronchitis                Visit today included increased complexity associated with the care of the episodic problem Seropositive rheumatoid arthritis addressed and managing the longitudinal care of the patient due to the serious and/or complex managed problem(s) immunosuppression due to drug therapy , medication monitoring encounter.    Giovanni Cage M.D.

## 2024-12-04 NOTE — TELEPHONE ENCOUNTER
----- Message from Thiago sent at 12/4/2024  2:04 PM CST -----  Contact: self [phone]  Type:  Needs Medical Advice    Who Called: Indira  Symptoms (please be specific): bronchitis , cough   How long has patient had these symptoms:  1 day   Pharmacy name and phone #:    [id number] [place] - [address] [facility] AT AT [facility]  [address] [facility]  [place] [address]  Phone: [phone] Fax: [phone]  Would the patient rather a call back or a response via [facility]? Call back   Best Call Back Number: [phone]  Additional Information:

## 2024-12-04 NOTE — TELEPHONE ENCOUNTER
----- Message from Med Assistant Mcdermott sent at 12/4/2024  8:27 AM CST -----  Regarding: Appointment  Contact: Indira  Please wants appointment changed to virtual visit please.      Baldemar  ----- Message -----  From: Benji Meyer  Sent: 12/4/2024   8:00 AM CST  To: Niles Davila Staff        Who Called:  Indira     Would the patient rather a call back or a response via [facility]? Call back   Best Call Back Number:  .  [phone]    Additional Information:  PT is calling in regard to the appt scheduled this morning for 10am and would like to get a call back to discuss changing the appt to virtual instead of in person due to she is not feeling well and pt would also like to discuss the recent lab results     Thanks

## 2025-02-27 ENCOUNTER — TELEPHONE (OUTPATIENT)
Dept: RHEUMATOLOGY | Facility: CLINIC | Age: 80
End: 2025-02-27
Payer: MEDICARE

## 2025-02-27 NOTE — TELEPHONE ENCOUNTER
Pt stated that since she stopped medication that for 3 days now her hand to her elbow has been tingling and feeling like it is falling asleep. She is wondering is this due to her stop the medication or not.    Message sent to provider

## 2025-02-27 NOTE — TELEPHONE ENCOUNTER
----- Message from Alexandrea sent at 2/27/2025  3:15 PM CST -----  Contact: Indira  .Patient is calling to speak with the nurse regarding questions and concerns . Reports having some questions about the pts treatment  . Please give patient a call back at [phone]

## 2025-02-28 DIAGNOSIS — M05.9 SEROPOSITIVE RHEUMATOID ARTHRITIS: Primary | ICD-10-CM

## 2025-02-28 RX ORDER — PREDNISONE 20 MG/1
20 TABLET ORAL DAILY
Qty: 7 TABLET | Refills: 0 | Status: SHIPPED | OUTPATIENT
Start: 2025-02-28 | End: 2025-03-07

## 2025-02-28 NOTE — TELEPHONE ENCOUNTER
Giovanni Cage MD to Niles Davila Staff (Selected Message)        2/28/25  3:46 PM  Calling short course of prednisone.  Increase sulfasalazine back to BID.  If no improvement recommend evaluation by orthopedics.      Patient infomed of above .

## 2025-03-11 NOTE — TELEPHONE ENCOUNTER
----- Message from Mumart sent at 3/11/2025  2:37 PM CDT -----  Contact: self  ..Type:  Needs Medical AdviceWho Called: Adrian Chandler BowieSymptoms (please be specific): tingling in hands and arm  How long has patient had these symptoms:   Pharmacy name and phone #:  [id number] [place], [place] - [address] [facility] AT AT [facility] [facility] [address]Dfusb: [phone] Fax: [phone] the patient rather a call back or a response via [facility]? Call back Best Call Back Number: .[phone] Information: pt is asking for an return call in reference to the 7 day supply of medication prescribed states since she has no medicine tingling has returned.

## 2025-03-19 ENCOUNTER — TELEPHONE (OUTPATIENT)
Dept: RHEUMATOLOGY | Facility: CLINIC | Age: 80
End: 2025-03-19
Payer: MEDICARE

## 2025-03-19 DIAGNOSIS — M05.9 SEROPOSITIVE RHEUMATOID ARTHRITIS: ICD-10-CM

## 2025-03-19 RX ORDER — SULFASALAZINE 500 MG/1
500 TABLET, DELAYED RELEASE ORAL 2 TIMES DAILY
Qty: 60 TABLET | Refills: 3 | Status: SHIPPED | OUTPATIENT
Start: 2025-03-19 | End: 2025-04-18

## 2025-03-19 NOTE — TELEPHONE ENCOUNTER
----- Message from Giovanni Cage MD sent at 3/19/2025  9:47 AM CDT -----  Regarding: RE: Please advise  Contact: self  Recommend follow up with orthopedics for symtpoms of carpal tunnel syndrome.At high risk of prednisone related complications if remains on it long term, not recommended.Suggest increasing sulfasalazine dose to 500mg BID.  If no response by 12w, needs evaluation in clinic to consider biologic therapy.  ----- Message -----  From: Cesar Grimaldo MA  Sent: 3/11/2025   2:49 PM CDT  To: Giovanni Cage MD  Subject: Please advise                                    Patient stated that the short dose of prednisone you sent was work and was wondering why was only a 7 day supply sent because since she has stopped the medication she has been having tingling in her hands and arms. Patient stated that she could see a neurologist until September.  ----- Message -----  From: Yogesh Heck  Sent: 3/11/2025   2:40 PM CDT  To: Niles Davila Staff    ..Type:  Needs Medical AdviceWho Called: .Indira Chandler BowieSymptoms (please be specific): tingling in hands and arm  How long has patient had these symptoms:   Pharmacy name and phone #:  [id number] [place] - [address] [facility] AT AT [facility] [facility] [address]Jldru: [phone] Fax: [phone] the patient rather a call back or a response via [facility]? Call back Best Call Back Number: .[phone] Information: pt is asking for an return call in reference to the 7 day supply of medication prescribed states since she has no medicine tingling has returned.

## 2025-03-19 NOTE — TELEPHONE ENCOUNTER
Notified patient of doctor recommendations also forwarded message to provider in regards to her concerns and that she stated she understood and will take everything into consideration

## 2025-03-19 NOTE — TELEPHONE ENCOUNTER
----- Message from Giovanni Cage MD sent at 3/19/2025  9:47 AM CDT -----  Regarding: RE: Please advise  Contact: self  Recommend follow up with orthopedics for symtpoms of carpal tunnel syndrome.At high risk of prednisone related complications if remains on it long term, not recommended.Suggest increasing sulfasalazine dose to 500mg BID.  If no response by 12w, needs evaluation in clinic to consider biologic therapy.  ----- Message -----  From: Cesar Grimaldo MA  Sent: 3/11/2025   2:49 PM CDT  To: Giovanni Cage MD  Subject: Please advise                                    Patient stated that the short dose of prednisone you sent was work and was wondering why was only a 7 day supply sent because since she has stopped the medication she has been having tingling in her hands and arms. Patient stated that she could see a neurologist until September.  ----- Message -----  From: Yogesh Heck  Sent: 3/11/2025   2:40 PM CDT  To: [facility]iles Davila Staff    ..Type:  Needs Medical AdviceWho Called: .Indira Chandler BowieSymptoms (please be specific): tingling in hands and arm  How long has patient had these symptoms:   Pharmacy name and phone #:  [id number] [place] - [address] [facility] AT AT [facility] [facility] [address]Pwlyl: [phone] Fax: [phone] the patient rather a call back or a response via [facility]? Call back Best Call Back Number: .[phone] Information: pt is asking for an return call in reference to the 7 day supply of medication prescribed states since she has no medicine tingling has returned.

## 2025-03-24 DIAGNOSIS — Z00.00 ENCOUNTER FOR MEDICARE ANNUAL WELLNESS EXAM: ICD-10-CM

## 2025-03-25 DIAGNOSIS — M15.0 PRIMARY OSTEOARTHRITIS INVOLVING MULTIPLE JOINTS: ICD-10-CM

## 2025-03-25 DIAGNOSIS — G56.00 CARPAL TUNNEL SYNDROME, UNSPECIFIED LATERALITY: Primary | ICD-10-CM

## 2025-03-28 DIAGNOSIS — N18.31 TYPE 2 DIABETES MELLITUS WITH STAGE 3A CHRONIC KIDNEY DISEASE, WITHOUT LONG-TERM CURRENT USE OF INSULIN: ICD-10-CM

## 2025-03-28 DIAGNOSIS — E11.22 TYPE 2 DIABETES MELLITUS WITH STAGE 3A CHRONIC KIDNEY DISEASE, WITHOUT LONG-TERM CURRENT USE OF INSULIN: ICD-10-CM

## 2025-03-28 RX ORDER — GLIPIZIDE 5 MG/1
5 TABLET, FILM COATED, EXTENDED RELEASE ORAL
Qty: 90 TABLET | Refills: 0 | Status: SHIPPED | OUTPATIENT
Start: 2025-03-28

## 2025-03-28 NOTE — TELEPHONE ENCOUNTER
Requested Prescriptions     Pending Prescriptions Disp Refills    glipiZIDE 5 MG TR24 [Pharmacy Med Name: GLIPIZIDE ER 5 MG TABLET] 90 tablet 3     Sig: TAKE 1 TABLET BY MOUTH EVERY DAY WITH BREAKFAST     NONA 11/13/2024  Som WHARTON 08/13/2024   Detail Level: Detailed Depth Of Biopsy: dermis Was A Bandage Applied: Yes Size Of Lesion In Cm: 0 Biopsy Type: H and E Biopsy Method: Dermablade Anesthesia Type: 1% lidocaine with epinephrine Anesthesia Volume In Cc (Will Not Render If 0): 0.5 Hemostasis: Aluminum Chloride and Electrocautery Wound Care: Petrolatum Dressing: bandage Destruction After The Procedure: No Type Of Destruction Used: Curettage Curettage Text: The wound bed was treated with curettage after the biopsy was performed. Cryotherapy Text: The wound bed was treated with cryotherapy after the biopsy was performed. Electrodesiccation Text: The wound bed was treated with electrodesiccation after the biopsy was performed. Electrodesiccation And Curettage Text: The wound bed was treated with electrodesiccation and curettage after the biopsy was performed. Silver Nitrate Text: The wound bed was treated with silver nitrate after the biopsy was performed. Lab: 6 Lab Facility: 3 Consent: Written consent was obtained and risks were reviewed including but not limited to scarring, infection, bleeding, scabbing, incomplete removal, nerve damage and allergy to anesthesia. Post-Care Instructions: I reviewed with the patient in detail post-care instructions. Patient is to keep the biopsy site dry overnight, and then apply bacitracin twice daily until healed. Patient may apply hydrogen peroxide soaks to remove any crusting. Notification Instructions: Patient will be notified of biopsy results. However, patient instructed to call the office if not contacted within 2 weeks. Billing Type: Third-Party Bill Information: Selecting Yes will display possible errors in your note based on the variables you have selected. This validation is only offered as a suggestion for you. PLEASE NOTE THAT THE VALIDATION TEXT WILL BE REMOVED WHEN YOU FINALIZE YOUR NOTE. IF YOU WANT TO FAX A PRELIMINARY NOTE YOU WILL NEED TO TOGGLE THIS TO 'NO' IF YOU DO NOT WANT IT IN YOUR FAXED NOTE.

## 2025-03-31 ENCOUNTER — OFFICE VISIT (OUTPATIENT)
Dept: ORTHOPEDICS | Facility: CLINIC | Age: 80
End: 2025-03-31
Payer: MEDICARE

## 2025-03-31 VITALS — HEIGHT: 63 IN | BODY MASS INDEX: 21.61 KG/M2 | WEIGHT: 121.94 LBS

## 2025-03-31 DIAGNOSIS — G56.00 CARPAL TUNNEL SYNDROME, UNSPECIFIED LATERALITY: ICD-10-CM

## 2025-03-31 DIAGNOSIS — M15.0 PRIMARY OSTEOARTHRITIS INVOLVING MULTIPLE JOINTS: ICD-10-CM

## 2025-03-31 DIAGNOSIS — R20.2 NUMBNESS AND TINGLING: Primary | ICD-10-CM

## 2025-03-31 DIAGNOSIS — R20.0 NUMBNESS AND TINGLING: Primary | ICD-10-CM

## 2025-03-31 PROCEDURE — 3288F FALL RISK ASSESSMENT DOCD: CPT | Mod: CPTII,S$GLB,, | Performed by: ORTHOPAEDIC SURGERY

## 2025-03-31 PROCEDURE — 1159F MED LIST DOCD IN RCRD: CPT | Mod: CPTII,S$GLB,, | Performed by: ORTHOPAEDIC SURGERY

## 2025-03-31 PROCEDURE — 1101F PT FALLS ASSESS-DOCD LE1/YR: CPT | Mod: CPTII,S$GLB,, | Performed by: ORTHOPAEDIC SURGERY

## 2025-03-31 PROCEDURE — 99203 OFFICE O/P NEW LOW 30 MIN: CPT | Mod: S$GLB,,, | Performed by: ORTHOPAEDIC SURGERY

## 2025-03-31 PROCEDURE — 1125F AMNT PAIN NOTED PAIN PRSNT: CPT | Mod: CPTII,S$GLB,, | Performed by: ORTHOPAEDIC SURGERY

## 2025-03-31 PROCEDURE — 99999 PR PBB SHADOW E&M-EST. PATIENT-LVL IV: CPT | Mod: PBBFAC,,, | Performed by: ORTHOPAEDIC SURGERY

## 2025-03-31 PROCEDURE — 1160F RVW MEDS BY RX/DR IN RCRD: CPT | Mod: CPTII,S$GLB,, | Performed by: ORTHOPAEDIC SURGERY

## 2025-03-31 NOTE — PROGRESS NOTES
Subjective:     Patient ID: Indira Walker is a 79 y.o. female.    Chief Complaint: Pain and Numbness of the Left Hand      HPI:  The patient is an 79-year-old female with numbness of all fingers of the left hand.  She has no neck pain.  She has had no nerve conduction studies.  She has not tried splinting.    Past Medical History:   Diagnosis Date    Arthritis     Diabetes mellitus, type 2     Foot drop, left 2018    GERD (gastroesophageal reflux disease)     Heartburn     Hyperlipidemia     Hypertension     Left sided sciatica     s/p microdiscectomy 3/21/18    Pancreatic cyst      Past Surgical History:   Procedure Laterality Date    ANTERIOR VAGINAL REPAIR N/A 2023    Procedure: COLPORRHAPHY, ANTERIOR;  Surgeon: ANA MARIA Mayorga MD;  Location: [facility] OR;  Service: OB/GYN;  Laterality: N/A;    BREAST BIOPSY       SECTION      COLONOSCOPY  2008    DR. JANET GARCÍA/MILD DIVERICULOSIS DESCENDING AND SIGMOID. REPEAT 5 YRS    ENDOSCOPIC ULTRASOUND OF UPPER GASTROINTESTINAL TRACT N/A 2022    Procedure: ULTRASOUND, UPPER GI TRACT, ENDOSCOPIC;  Surgeon: Ange Saldivar MD;  Location: [facility] END[facility];  Service: Endoscopy;  Laterality: N/A;  Upper and Linear, 22 sharkcore, slides and formalin    ESOPHAGOGASTRODUODENOSCOPY N/A 2024    Procedure: EGD (ESOPHAGOGASTRODUODENOSCOPY);  Surgeon: Megha Stallworth MD;  Location: [facility] END[facility];  Service: Endoscopy;  Laterality: N/A;    HERNIA REPAIR      MICRODISCECTOMY OF SPINE Left 2018    left L5-S1, Dr Segundo    SPINE SURGERY      TOTAL VAGINAL HYSTERECTOMY N/A 2023    Procedure: HYSTERECTOMY, TOTAL, VAGINAL;  Surgeon: ANA MARIA Mayorga MD;  Location: [facility] OR;  Service: OB/GYN;  Laterality: N/A;    UPPER GASTROINTESTINAL ENDOSCOPY       Family History   Problem Relation Name Age of Onset    Diabetes Mother      Diabetes Father       Social History[1]  Medication List with Changes/Refills   Current Medications     ACETAMINOPHEN (TYLENOL EXTRA STRENGTH ORAL)    Take by mouth.    ALBUTEROL (PROVENTIL/VENTOLIN HFA) 90 MCG/ACTUATION INHALER    INHALE 2 PUFFS INTO THE LUNGS EVERY 4 HOURS AS NEEDED FOR WHEEZING OR SHORTNESS OF BREATH.    ASPIRIN (ECOTRIN) 81 MG EC TABLET    Take 1 tablet (81 mg total) by mouth once daily. Every other day    AZELASTINE (ASTELIN) 137 MCG (0.1 %) NASAL SPRAY    1 spray (137 mcg total) by Nasal route 2 (two) times daily.    BLOOD SUGAR DIAGNOSTIC STRP    1 each by Misc.(Non-Drug; Combo Route) route 2 (two) times a day.    BLOOD-GLUCOSE METER KIT    Use as instructed to check BG twice daily    CLOTRIMAZOLE-BETAMETHASONE 1-0.05% (LOTRISONE) CREAM    Apply topically 2 (two) times daily.    FLUTICASONE PROPIONATE (FLONASE) 50 MCG/ACTUATION NASAL SPRAY    2 sprays (100 mcg total) by Each Nostril route once daily.    FOLIC ACID (FOLVITE) 1 MG TABLET    Take 1 tablet (1,000 mcg total) by mouth once daily.    GLIPIZIDE 5 MG TR24    TAKE 1 TABLET BY MOUTH EVERY DAY WITH BREAKFAST    KETOCONAZOLE (NIZORAL) 2 % CREAM    APPLY TOPICALLY TWICE A DAY    LANCETS MISC    To check BG 2 times daily, to use with insurance preferred meter    LORATADINE (CLARITIN) 10 MG TABLET    Take 10 mg by mouth once daily.    LOSARTAN-HYDROCHLOROTHIAZIDE 100-12.5 MG (HYZAAR) 100-12.5 MG TAB    TAKE 1 TABLET BY MOUTH EVERY DAY    METFORMIN (GLUCOPHAGE) 1000 MG TABLET    TAKE 1 TABLET BY MOUTH TWICE A DAY WITH FOOD    MULTIVITAMIN (THERAGRAN) PER TABLET    Take 1 tablet by mouth once daily.    PANTOPRAZOLE (PROTONIX) 40 MG TABLET    Take 1 tablet (40 mg total) by mouth once daily.    ROSUVASTATIN (CRESTOR) 20 MG TABLET    TAKE 1 TABLET BY MOUTH EVERY DAY    SIMETHICONE (MYLICON) 125 MG CHEWABLE TABLET    Take 125 mg by mouth every 6 (six) hours as needed for Flatulence.    SULFASALAZINE (AZULFIDINE ENTABS) 500 MG EC TABLET    Take 1 tablet (500 mg total) by mouth 2 (two) times daily. Hold in event of active infections.     Review of  patient's allergies indicates:   Allergen Reactions    Pyrilamine Swelling     (an ingredient in Midol)    Iodine and iodide containing products Hives    Iron Other (See Comments)     Review of Systems   Constitutional: Negative for malaise/fatigue.   HENT:  Positive for hearing loss.    Eyes:  Negative for double vision and visual disturbance.   Cardiovascular:  Negative for chest pain.   Respiratory:  Negative for shortness of breath.    Endocrine: Negative for cold intolerance.   Hematologic/Lymphatic: Does not bruise/bleed easily.   Skin:  Negative for poor wound healing and suspicious lesions.   Musculoskeletal:  Positive for arthritis, back pain, joint pain and joint swelling. Negative for gout.   Gastrointestinal:  Positive for heartburn. Negative for nausea and vomiting.   Genitourinary:  Negative for dysuria.   Neurological:  Positive for numbness, paresthesias and sensory change.   Psychiatric/Behavioral:  Positive for depression. Negative for memory loss and substance abuse. The patient is not nervous/anxious.    Allergic/Immunologic: Negative for persistent infections.       Objective:   Body mass index is 21.6 kg/m².  There were no vitals filed for this visit.              radiographs were not obtained today  Assessment:     Left carpal tunnel syndrome     Plan:     The patient is sent for nerve conduction studies for further documentation and will return with that study                Disclaimer: This note was prepared using a voice recognition system and is likely to have sound alike errors within the text.          [1]   Social History  Socioeconomic History    Marital status:     Number of children: 2   Occupational History    Occupation:       Employer: "[employer]    Tobacco Use    Smoking status: Never    Smokeless tobacco: Never   Substance and Sexual Activity    Alcohol use: No    Drug use: No    Sexual activity: Not Currently     Partners: Male     Social Drivers of  Health     Financial Resource Strain: Low Risk  (5/10/2023)    Overall Financial Resource Strain (CARDIA)     Difficulty of Paying Living Expenses: Not hard at all   Food Insecurity: No Food Insecurity (5/10/2023)    Hunger Vital Sign     Worried About Running Out of Food in the Last Year: Never true     Ran Out of Food in the Last Year: Never true   Transportation Needs: No Transportation Needs (5/10/2023)    PRAPARE - Transportation     Lack of Transportation (Medical): No     Lack of Transportation (Non-Medical): No   Physical Activity: Sufficiently Active (5/10/2023)    Exercise Vital Sign     Days of Exercise per Week: 6 days     Minutes of Exercise per Session: 60 min   Stress: Stress Concern Present (5/10/2023)    [employer] - Occupational Stress Questionnaire     Feeling of Stress : Very much   Housing Stability: Low Risk  (5/10/2023)    Housing Stability Vital Sign     Unable to Pay for Housing in the Last Year: No     Number of Places Lived in the Last Year: 1     Unstable Housing in the Last Year: No

## 2025-04-09 ENCOUNTER — OFFICE VISIT (OUTPATIENT)
Dept: PHYSICAL MEDICINE AND REHAB | Facility: CLINIC | Age: 80
End: 2025-04-09
Payer: MEDICARE

## 2025-04-09 VITALS — WEIGHT: 121.94 LBS | RESPIRATION RATE: 13 BRPM | BODY MASS INDEX: 21.61 KG/M2 | HEIGHT: 63 IN

## 2025-04-09 DIAGNOSIS — G56.03 BILATERAL CARPAL TUNNEL SYNDROME: ICD-10-CM

## 2025-04-09 PROCEDURE — 99999 PR PBB SHADOW E&M-EST. PATIENT-LVL III: CPT | Mod: PBBFAC,,, | Performed by: PHYSICAL MEDICINE & REHABILITATION

## 2025-04-09 PROCEDURE — 99499 UNLISTED E&M SERVICE: CPT | Mod: S$GLB,,, | Performed by: PHYSICAL MEDICINE & REHABILITATION

## 2025-04-09 PROCEDURE — 95911 NRV CNDJ TEST 9-10 STUDIES: CPT | Mod: S$GLB,,, | Performed by: PHYSICAL MEDICINE & REHABILITATION

## 2025-04-09 NOTE — PROGRESS NOTES
[facility]  Department of Physiatry-EMG  [facility]   [address] [place] [address]       Full Name: Indira Walker YOB: 1945  Patient ID: [id number]      Visit Date: 4/9/2025 13:30  Age: 79 Years  Examining Physician:   Referring Physician:   Conclusion: upper extr  Chief Complaint   Patient presents with    Hand Pain       HPI: This is a 79 y.o.  female being seen in clinic today for evaluation of chronic hand achy pain due to arthritis and numbness/tingling in her fingers radiating up her arm.  Bracing helps somewhat. She denies weakness.     History obtained from patient    Past family, medical, social, and surgical history reviewed in chart    Review of Systems:     General- denies lethargy, weight change, fever, chills  Head/neck- denies swallowing difficulties  ENT- + hearing changes  Cardiovascular-denies chest pain  Pulmonary- denies shortness of breath  GI- denies constipation or bowel incontinence  - denies bladder incontinence  Skin- denies wounds or rashes  Musculoskeletal- denies weakness, + pain  Neurologic- + numbness and tingling  Psychiatric- +depression, denies anxiety  Lymphatic-denies swelling  Endocrine- denies hypoglycemic symptoms/+DM history  All other pertinent systems negative     Physical Examination:  General: Well developed, well nourished female, NAD  HEENT:NCAT EOMI bilaterally   Pulmonary:Normal respirations    Spinal Examination: CERVICAL  Active ROM is within normal limits.  Inspection: No deformity of spinal alignment.    Musculoskeletal Tests:  Phalen: neg  Elbow compression (ulnar): neg  Tinels at wrist: neg    Bilateral Upper and Lower Extremities:  Pulses are 2+ at radial bilaterally.  Shoulder/Elbow/Wrist/Hand ROM wnl  Hip/Knee/Ankle ROM   Bilateral Extremities show normal capillary refill.  No signs of cyanosis, rubor, edema, skin changes, or dysvascular changes of appendages.  Nails  appear intact.    Neurological Exam:  Cranial Nerves:  II-XII grossly intact    Manual Muscle Testing: (Motor 5=normal)  5/5 strength bilateral upper extremities    No focal atrophy is noted of either upper extremity.    Bilateral Reflexes:  Manzano's response is absent bilaterally.    Sensation: tested to light touch  - intact in arms     Gait: Narrow base and good arm swing.      Entire procedure explained to patient prior to proceeding.  Verbal consent obtained        Sensory NCS      Nerve / Sites Rec. Site Onset Lat Peak Lat NP Amp PP Amp Segments Distance Velocity     ms ms µV µV  mm m/s   L Median - Digit II (Antidromic)      Wrist Dig II 3.35 4.29 28.5 22.7 Wrist - Dig  42   R Median - Digit II (Antidromic)      Wrist Dig II 3.81 4.71 12.4 20.1 Wrist - Dig  37   L Ulnar - Digit V (Antidromic)      Wrist Dig V 3.21 3.92 10.8 16.7 Wrist - Dig V 140 44   R Ulnar - Digit V (Antidromic)      Wrist Dig V 2.92 3.88 26.3 21.9 Wrist - Dig V 140 48   L Radial - Anatomical snuff box (Forearm)      Forearm Wrist 1.77 2.54 12.2 15.7 Forearm - Wrist 100 56   R Radial - Anatomical snuff box (Forearm)      Forearm Wrist 2.06 2.77 17.9 18.2 Forearm - Wrist 100 48                   Motor NCS      Nerve / Sites Muscle Latency Amplitude Amp % Duration Segments Distance Lat Diff Velocity     ms mV % ms  mm ms m/s   L Median - APB      Wrist APB 3.56 7.1 100 7.75 Wrist - APB 80        Elbow APB 7.85 6.9 97.4 8.12 Elbow - Wrist 230 4.29 54   R Median - APB      Wrist APB 3.94 11.2 100 6.75 Wrist - APB 80        Elbow APB 8.25 10.5 94 7.23 Elbow - Wrist 220 4.31 51   L Ulnar - ADM      Wrist ADM 3.04 7.4 100 9.17 Wrist - ADM 80        B.Elbow ADM 6.69 7.8 106 8.90 B.Elbow - Wrist 210 3.65 58      A.Elbow ADM 8.31 6.7 91.4 9.56 A.Elbow - B.Elbow 100 1.63 62   R Ulnar - ADM      Wrist ADM 3.23 8.4 100 7.58 Wrist - ADM 80        B.Elbow ADM 7.23 8.1 96.9 7.44 B.Elbow - Wrist 240 4.00 60      A.Elbow ADM 8.71 8.1 97.4 7.94  A.Elbow - B.Elbow 90 1.48 61                 INTERPRETATION  -Bilateral median motor nerve conduction study showed normal latency, amplitude, and conduction velocity  -Bilateral median sensory nerve conduction study showed prolonged peak latency and normal amplitude  -Bilateral ulnar motor nerve conduction study showed normal latency, amplitude, and conduction velocity  -Bilateral ulnar sensory nerve conduction study showed normal peak latency and amplitude  -Bilateral radial sensory nerve conduction study showed normal peak latency and amplitude      IMPRESSION  ABNORMAL Study  2. There is electrodiagnostic evidence of a mild demyelinating median neuropathy (Carpal tunnel syndrome) across bilateral wrists     PLAN  Discussed in detail for greater than 30 minutes about diagnosis and treatment plan    1. Follow up with referring provider: Dr. Enrique Palacios*  2. Handouts on CTS provided  3. This study is good for one year. If symptoms worsen or do not improve, please re-consult.    Crystal Muniz M.D.  Physical Medicine and Rehab

## 2025-04-11 ENCOUNTER — OFFICE VISIT (OUTPATIENT)
Dept: ORTHOPEDICS | Facility: CLINIC | Age: 80
End: 2025-04-11
Payer: MEDICARE

## 2025-04-11 VITALS — WEIGHT: 121.94 LBS | HEIGHT: 63 IN | BODY MASS INDEX: 21.61 KG/M2

## 2025-04-11 DIAGNOSIS — G56.03 BILATERAL CARPAL TUNNEL SYNDROME: Primary | ICD-10-CM

## 2025-04-11 PROCEDURE — 99999 PR PBB SHADOW E&M-EST. PATIENT-LVL III: CPT | Mod: PBBFAC,,, | Performed by: ORTHOPAEDIC SURGERY

## 2025-04-11 RX ORDER — TRIAMCINOLONE ACETONIDE 40 MG/ML
40 INJECTION, SUSPENSION INTRA-ARTICULAR; INTRAMUSCULAR
Status: SHIPPED | OUTPATIENT
Start: 2025-04-11

## 2025-04-11 RX ADMIN — TRIAMCINOLONE ACETONIDE 40 MG: 40 INJECTION, SUSPENSION INTRA-ARTICULAR; INTRAMUSCULAR at 10:04

## 2025-04-11 NOTE — PROGRESS NOTES
Subjective:     Patient ID: Indira Walker is a 79 y.o. female.    Chief Complaint: No chief complaint on file.      HPI:  The patient is an 79-year-old female with bilateral carpal tunnel syndrome documented by nerve conduction studies.  She wishes to try injection left wrist.  She has tried splinting without improvement    Past Medical History:   Diagnosis Date    Arthritis     Diabetes mellitus, type 2     Foot drop, left 2018    GERD (gastroesophageal reflux disease)     Heartburn     Hyperlipidemia     Hypertension     Left sided sciatica     s/p microdiscectomy 3/21/18    Pancreatic cyst      Past Surgical History:   Procedure Laterality Date    ANTERIOR VAGINAL REPAIR N/A 2023    Procedure: COLPORRHAPHY, ANTERIOR;  Surgeon: ANA MARIA Mayorga MD;  Location: [facility] OR;  Service: OB/GYN;  Laterality: N/A;    BREAST BIOPSY       SECTION      COLONOSCOPY  2008    DR. JANET GARCÍA/MILD DIVERICULOSIS DESCENDING AND SIGMOID. REPEAT 5 YRS    ENDOSCOPIC ULTRASOUND OF UPPER GASTROINTESTINAL TRACT N/A 2022    Procedure: ULTRASOUND, UPPER GI TRACT, ENDOSCOPIC;  Surgeon: Ange Saldivar MD;  Location: [facility] ENDO;  Service: Endoscopy;  Laterality: N/A;  Upper and Linear, 22 sharkcore, slides and formalin    ESOPHAGOGASTRODUODENOSCOPY N/A 2024    Procedure: EGD (ESOPHAGOGASTRODUODENOSCOPY);  Surgeon: Megha Stallworth MD;  Location: [facility] ENDO;  Service: Endoscopy;  Laterality: N/A;    HERNIA REPAIR      MICRODISCECTOMY OF SPINE Left 2018    left L5-S1, Dr Segundo    SPINE SURGERY      TOTAL VAGINAL HYSTERECTOMY N/A 2023    Procedure: HYSTERECTOMY, TOTAL, VAGINAL;  Surgeon: ANA MARIA Mayorga MD;  Location: [facility] OR;  Service: OB/GYN;  Laterality: N/A;    UPPER GASTROINTESTINAL ENDOSCOPY       Family History   Problem Relation Name Age of Onset    Diabetes Mother      Diabetes Father       Social History[1]  Medication List with Changes/Refills   Current  Medications    ACETAMINOPHEN (TYLENOL EXTRA STRENGTH ORAL)    Take by mouth.    ALBUTEROL (PROVENTIL/VENTOLIN HFA) 90 MCG/ACTUATION INHALER    INHALE 2 PUFFS INTO THE LUNGS EVERY 4 HOURS AS NEEDED FOR WHEEZING OR SHORTNESS OF BREATH.    ASPIRIN (ECOTRIN) 81 MG EC TABLET    Take 1 tablet (81 mg total) by mouth once daily. Every other day    AZELASTINE (ASTELIN) 137 MCG (0.1 %) NASAL SPRAY    1 spray (137 mcg total) by Nasal route 2 (two) times daily.    BLOOD SUGAR DIAGNOSTIC STRP    1 each by Misc.(Non-Drug; Combo Route) route 2 (two) times a day.    BLOOD-GLUCOSE METER KIT    Use as instructed to check BG twice daily    CLOTRIMAZOLE-BETAMETHASONE 1-0.05% (LOTRISONE) CREAM    Apply topically 2 (two) times daily.    FLUTICASONE PROPIONATE (FLONASE) 50 MCG/ACTUATION NASAL SPRAY    2 sprays (100 mcg total) by Each Nostril route once daily.    FOLIC ACID (FOLVITE) 1 MG TABLET    Take 1 tablet (1,000 mcg total) by mouth once daily.    GLIPIZIDE 5 MG TR24    TAKE 1 TABLET BY MOUTH EVERY DAY WITH BREAKFAST    KETOCONAZOLE (NIZORAL) 2 % CREAM    APPLY TOPICALLY TWICE A DAY    LANCETS MISC    To check BG 2 times daily, to use with insurance preferred meter    LORATADINE (CLARITIN) 10 MG TABLET    Take 10 mg by mouth once daily.    LOSARTAN-HYDROCHLOROTHIAZIDE 100-12.5 MG (HYZAAR) 100-12.5 MG TAB    TAKE 1 TABLET BY MOUTH EVERY DAY    METFORMIN (GLUCOPHAGE) 1000 MG TABLET    TAKE 1 TABLET BY MOUTH TWICE A DAY WITH FOOD    MULTIVITAMIN (THERAGRAN) PER TABLET    Take 1 tablet by mouth once daily.    PANTOPRAZOLE (PROTONIX) 40 MG TABLET    Take 1 tablet (40 mg total) by mouth once daily.    ROSUVASTATIN (CRESTOR) 20 MG TABLET    TAKE 1 TABLET BY MOUTH EVERY DAY    SIMETHICONE (MYLICON) 125 MG CHEWABLE TABLET    Take 125 mg by mouth every 6 (six) hours as needed for Flatulence.    SULFASALAZINE (AZULFIDINE ENTABS) 500 MG EC TABLET    Take 1 tablet (500 mg total) by mouth 2 (two) times daily. Hold in event of active infections.      Review of patient's allergies indicates:   Allergen Reactions    Pyrilamine Swelling     (an ingredient in Midol)    Iodine and iodide containing products Hives    Iron Other (See Comments)     Review of Systems   Constitutional: Negative for malaise/fatigue.   HENT:  Positive for hearing loss.    Eyes:  Negative for double vision and visual disturbance.   Cardiovascular:  Negative for chest pain.   Respiratory:  Negative for shortness of breath.    Endocrine: Negative for cold intolerance.   Hematologic/Lymphatic: Does not bruise/bleed easily.   Skin:  Negative for poor wound healing and suspicious lesions.   Musculoskeletal:  Positive for arthritis, back pain, joint pain and joint swelling. Negative for gout.   Gastrointestinal:  Positive for heartburn. Negative for nausea and vomiting.   Genitourinary:  Negative for dysuria.   Neurological:  Negative for numbness, paresthesias and sensory change.   Psychiatric/Behavioral:  Negative for depression, memory loss and substance abuse. The patient is not nervous/anxious.    Allergic/Immunologic: Negative for persistent infections.       Objective:   There is no height or weight on file to calculate BMI.  There were no vitals filed for this visit.             General    Constitutional: She is oriented to person, place, and time. She appears well-developed and well-nourished. No distress.   HENT:   Head: Normocephalic.   Eyes: EOM are normal.   Pulmonary/Chest: Effort normal.   Neurological: She is oriented to person, place, and time.   Psychiatric: She has a normal mood and affect.             Right Hand/Wrist Exam     Inspection   Scars: Wrist - absent Hand -  absent  Effusion: Wrist - absent Hand -  absent    Pain   Wrist - The patient exhibits pain of the flexor/pronator group.    Tests   Phalens sign: positive  Tinel's sign (median nerve): positive  Carpal Tunnel Compression Test: positive    Atrophy   Thenar:  negative  Intrinsic:  negative    Other      Neuorologic Exam    Median Distribution: abnormal  Ulnar Distribution: normal  Radial Distribution: normal    Comments:  The patient has a positive Tinel and positive Phalen sign.  There is no thenar atrophy noted.      Left Hand/Wrist Exam     Inspection   Scars: Wrist - absent Hand -  absent  Effusion: Wrist - absent Hand -  absent    Pain   Wrist - The patient exhibits pain of the flexor/pronator group.    Tests   Phalens sign: positive  Tinel's sign (median nerve): positive  Carpal Tunnel Compression Test: positive    Atrophy  Thenar:  Negative  Intrinsic: negative    Other     Sensory Exam  Median Distribution: abnormal  Ulnar Distribution: normal  Radial Distribution: normal    Comments:  The patient has a positive Tinel and positive Phalen sign.  There is no thenar atrophy noted.          Vascular Exam       Capillary Refill  Right Hand: normal capillary refill  Left Hand: normal capillary refill       radiographs were not obtained today  Assessment:     Encounter Diagnosis   Name Primary?    Bilateral carpal tunnel syndrome Yes        Plan:       The patient was injected left carpal tunnel with 1 cc Kenalog and 1 cc 2% plain lidocaine under sterile technique.  She will wait at least 3 months between injections.              Disclaimer: This note was prepared using a voice recognition system and is likely to have sound alike errors within the text.          [1]   Social History  Socioeconomic History    Marital status:     Number of children: 2   Occupational History    Occupation:       Employer: [employer]   Tobacco Use    Smoking status: Never    Smokeless tobacco: Never   Substance and Sexual Activity    Alcohol use: No    Drug use: No    Sexual activity: Not Currently     Partners: Male     Social Drivers of Health     Financial Resource Strain: Low Risk  (5/10/2023)    Overall Financial Resource Strain (CARDIA)     Difficulty of Paying Living Expenses: Not hard at all    Food Insecurity: No Food Insecurity (5/10/2023)    Hunger Vital Sign     Worried About Running Out of Food in the Last Year: Never true     Ran Out of Food in the Last Year: Never true   Transportation Needs: No Transportation Needs (5/10/2023)    PRAPARE - Transportation     Lack of Transportation (Medical): No     Lack of Transportation (Non-Medical): No   Physical Activity: Sufficiently Active (5/10/2023)    Exercise Vital Sign     Days of Exercise per Week: 6 days     Minutes of Exercise per Session: 60 min   Stress: Stress Concern Present (5/10/2023)    [employer] - Occupational Stress Questionnaire     Feeling of Stress : Very much   Housing Stability: Low Risk  (5/10/2023)    Housing Stability Vital Sign     Unable to Pay for Housing in the Last Year: No     Number of Places Lived in the Last Year: 1     Unstable Housing in the Last Year: No

## 2025-04-11 NOTE — PROCEDURES
Carpal Tunnel: L carpal tunnel    Date/Time: 4/11/2025 10:30 AM    Performed by: Enrique Stratton MD  Authorized by: Enrique Stratton MD    Consent Done?:  Yes (Verbal)  Indications:  Pain  Timeout: prior to procedure the correct patient, procedure, and site was verified    Prep: patient was prepped and draped in usual sterile fashion      Local anesthesia used?: Yes    Local anesthetic:  Lidocaine 2% without epinephrine  Anesthetic total (ml):  1    Location:  Wrist  Site:  L carpal tunnel  Ultrasonic Guidance for Needle Placement?: No    Needle size:  25 G  Approach:  Volar  Medications:  40 mg triamcinolone acetonide 40 mg/mL

## 2025-04-24 DIAGNOSIS — G91.2 (IDIOPATHIC) NORMAL PRESSURE HYDROCEPHALUS: ICD-10-CM

## 2025-04-24 RX ORDER — METFORMIN HYDROCHLORIDE 1000 MG/1
1000 TABLET ORAL 2 TIMES DAILY WITH MEALS
Qty: 180 TABLET | Refills: 0 | Status: SHIPPED | OUTPATIENT
Start: 2025-04-24

## 2025-04-24 NOTE — TELEPHONE ENCOUNTER
Care Due:                  Date            Visit Type   Department     Provider  --------------------------------------------------------------------------------                                EP -                              PRIMARY      [facility] INTERNAL  Last Visit: 11-      CARE ([facility])   MEDICINE       Bertha Kearns  Next Visit: None Scheduled  None         None Found                                                            Last  Test          Frequency    Reason                     Performed    Due Date  --------------------------------------------------------------------------------    HBA1C.......  6 months...  glipiZIDE, metFORMIN.....  11- 05-    [facility] Embedded Care Due Messages. Reference number: [id number].   4/24/2025 11:16:44 AM CDT

## 2025-04-24 NOTE — TELEPHONE ENCOUNTER
----- Message from Britni Coronel sent at 4/24/2025  4:48 PM CDT -----  Contact: Indira  .Type: Patient  Requesting Call BackWho Called: Saadia is this regarding?: Refill requestWould the patient rather a call back or a response via [employer]?  Call [facility] Call Back Number: [phone] Information:  Patient is requesting a call in regard to getting an update on her refill request for Metformin. Please Call Back

## 2025-04-24 NOTE — TELEPHONE ENCOUNTER
----- Message from Anaya sent at 4/24/2025  9:14 AM CDT -----  Contact: self  .Type:  RX Refill RequestWho Called: .Indira Cano or New Rx:refillRX Name and Strength:metFORMIN (GLUCOPHAGE) 1000 MG tabletHow is the patient currently taking it? (ex. 1XDay):Is this a 30 day or 90 day RX:Preferred Pharmacy with phone number: .[facility]/pharmacy #[id number] - [place] - [address] [place] [address] [address] [facility] [address]: [phone] Fax: [phone] or Mail Order:localOrdering Provider:Renataould the patient rather a call back or a response via [facility]? [facility] Back Number:.[phone] Information: Pt states she is needing a refill on her medication metFORMIN (GLUCOPHAGE) 1000 MG tablet. States pharmacy has reached out. Wants a call back when done.

## 2025-05-05 ENCOUNTER — TELEPHONE (OUTPATIENT)
Dept: INTERNAL MEDICINE | Facility: CLINIC | Age: 80
End: 2025-05-05
Payer: MEDICARE

## 2025-05-05 NOTE — TELEPHONE ENCOUNTER
----- Message from Affine sent at 5/5/2025  8:08 AM CDT -----  Contact: self  Type:  Sooner Apoointment RequestCaller is requesting a sooner appointment.  Caller declined first available appointment listed below.  Caller will not accept being placed on the waitlist and is requesting a message be sent to doctor.Name of Caller:Indira Barber is the first available appointment?Symptoms:Enhance Wellness Visit Would the patient rather a call back or a response via [facility]? Call [facility] Call Back Number:[phone] Information: the patient was calling to schedule her enhanced wellness.

## 2025-05-08 ENCOUNTER — OFFICE VISIT (OUTPATIENT)
Dept: INTERNAL MEDICINE | Facility: CLINIC | Age: 80
End: 2025-05-08
Payer: MEDICARE

## 2025-05-08 VITALS
BODY MASS INDEX: 23.9 KG/M2 | HEIGHT: 62 IN | OXYGEN SATURATION: 96 % | WEIGHT: 129.88 LBS | HEART RATE: 87 BPM | SYSTOLIC BLOOD PRESSURE: 130 MMHG | DIASTOLIC BLOOD PRESSURE: 64 MMHG

## 2025-05-08 DIAGNOSIS — E11.22 TYPE 2 DIABETES MELLITUS WITH STAGE 3A CHRONIC KIDNEY DISEASE, WITHOUT LONG-TERM CURRENT USE OF INSULIN: ICD-10-CM

## 2025-05-08 DIAGNOSIS — I70.0 ATHEROSCLEROSIS OF AORTA: ICD-10-CM

## 2025-05-08 DIAGNOSIS — N18.31 TYPE 2 DIABETES MELLITUS WITH STAGE 3A CHRONIC KIDNEY DISEASE, WITHOUT LONG-TERM CURRENT USE OF INSULIN: ICD-10-CM

## 2025-05-08 DIAGNOSIS — E78.5 HYPERLIPIDEMIA ASSOCIATED WITH TYPE 2 DIABETES MELLITUS: ICD-10-CM

## 2025-05-08 DIAGNOSIS — I50.32 CHRONIC HEART FAILURE WITH PRESERVED EJECTION FRACTION: ICD-10-CM

## 2025-05-08 DIAGNOSIS — K21.9 GASTROESOPHAGEAL REFLUX DISEASE, UNSPECIFIED WHETHER ESOPHAGITIS PRESENT: ICD-10-CM

## 2025-05-08 DIAGNOSIS — Z79.899 IMMUNOSUPPRESSION DUE TO DRUG THERAPY: ICD-10-CM

## 2025-05-08 DIAGNOSIS — M05.9 SEROPOSITIVE RHEUMATOID ARTHRITIS: ICD-10-CM

## 2025-05-08 DIAGNOSIS — D84.821 IMMUNOSUPPRESSION DUE TO DRUG THERAPY: ICD-10-CM

## 2025-05-08 DIAGNOSIS — D64.9 ANEMIA, UNSPECIFIED TYPE: ICD-10-CM

## 2025-05-08 DIAGNOSIS — E11.59 HYPERTENSION ASSOCIATED WITH DIABETES: ICD-10-CM

## 2025-05-08 DIAGNOSIS — F33.9 DEPRESSION, RECURRENT: ICD-10-CM

## 2025-05-08 DIAGNOSIS — Z00.00 ENCOUNTER FOR MEDICARE ANNUAL WELLNESS EXAM: Primary | ICD-10-CM

## 2025-05-08 DIAGNOSIS — E11.69 HYPERLIPIDEMIA ASSOCIATED WITH TYPE 2 DIABETES MELLITUS: ICD-10-CM

## 2025-05-08 DIAGNOSIS — I15.2 HYPERTENSION ASSOCIATED WITH DIABETES: ICD-10-CM

## 2025-05-08 PROCEDURE — 99999 PR PBB SHADOW E&M-EST. PATIENT-LVL V: CPT | Mod: PBBFAC,,, | Performed by: NURSE PRACTITIONER

## 2025-05-08 RX ORDER — SULFASALAZINE 500 MG/1
500 TABLET ORAL 2 TIMES DAILY
COMMUNITY

## 2025-05-08 NOTE — PROGRESS NOTES
"  Indira Walker presented for a  Medicare AWV and comprehensive Health Risk Assessment today. The following components were reviewed and updated:    Medical history  Family History  Social history  Allergies and Current Medications  Health Risk Assessment  Health Maintenance  Care Team         ** See Completed Assessments for Annual Wellness Visit within the encounter summary.**         The following assessments were completed:  Living Situation  CAGE  Depression Screening  Timed Get Up and Go  Whisper Test-na  Cognitive Function Screening  Nutrition Screening  ADL Screening  PAQ Screening      Opioid documentation:      Patient does not have a current opioid prescription.        Vitals:    05/08/25 1352   BP: 130/64   Pulse: 87   SpO2: 96%   Weight: 58.9 kg (129 lb 13.6 oz)   Height: 5' 2" (1.575 m)     Body mass index is 23.75 kg/m².  Physical Exam  Vitals and nursing note reviewed.   Constitutional:       Appearance: She is well-developed.   HENT:      Head: Normocephalic.   Cardiovascular:      Rate and Rhythm: Normal rate and regular rhythm.      Heart sounds: Normal heart sounds.   Pulmonary:      Effort: Pulmonary effort is normal. No respiratory distress.      Breath sounds: Normal breath sounds.   Abdominal:      Palpations: Abdomen is soft. There is no mass.      Tenderness: There is no abdominal tenderness.   Musculoskeletal:         General: Normal range of motion.   Skin:     General: Skin is warm and dry.   Neurological:      Mental Status: She is alert and oriented to person, place, and time.      Motor: No abnormal muscle tone.   Psychiatric:         Speech: Speech normal.         Behavior: Behavior normal.               Diagnoses and health risks identified today and associated recommendations/orders:    1. Encounter for Medicare annual wellness exam  - Referral to Enhanced Annual Wellness Visit (eAWV) W+1  She will discuss vaccines with rheumatology  Declines cardiology fu apt  Reports cardiac " conditions are stable.    Encouraged healthy diet and exercise as tolerated   Has urine leakage ever interrupted your daily activites or sleep? No  Do you think you could use some help to better manage urine leakage?No   Abnormal cognitive function screening.  Advised to follow up with PCP for further evaluation and recommendations. Patient expressed understanding.    Signed miguel for eye exam    2. Seropositive rheumatoid arthritis  Sulfasulazine  Continue current treatment plan as previously prescribed with your  rheumatologist.     3. Immunosuppression due to drug therapy  Sulfasulazine  Continue current treatment plan as previously prescribed with your  rheumatologist.     4. Depression, recurrent  PHQ 2-0  Stable and controlled. Continue current treatment plan as previously prescribed with your PCP.      5. Chronic heart failure with preserved ejection fraction  Echo 2/18  Continue current treatment plan as previously prescribed with your  cardiologist.     6. Atherosclerosis of aorta  Xray 2/19  Continue current treatment plan as previously prescribed with your  cardiologist.     7. Hyperlipidemia associated with type 2 diabetes mellitus  A1c 7.3  Lipid-stable  Continue current treatment plan as previously prescribed with your  pcp     8. Type 2 diabetes mellitus with stage 3a chronic kidney disease, without long-term current use of insulin  Stable. Continue current treatment plan as previously prescribed with your  pcp     9. Hypertension associated with diabetes  Stable. Continue current treatment plan as previously prescribed with your  pcp     10. Anemia, unspecified type  Stable. Continue current treatment plan as previously prescribed with your  pcp     11. Gastroesophageal reflux disease, unspecified whether esophagitis present  Protonix  Continue current treatment plan as previously prescribed with your  pcp       Provided Indira with a 5-10 year written screening schedule and personal prevention plan.  Recommendations were developed using the USPSTF age appropriate recommendations. Education, counseling, and referrals were provided as needed. After Visit Summary printed and given to patient which includes a list of additional screenings\tests needed.    Follow up in about 1 year (around 5/8/2026) for awv.    Magali Hutton, NP  I offered to discuss advanced care planning, including how to pick a person who would make decisions for you if you were unable to make them for yourself, called a health care power of , and what kind of decisions you might make such as use of life sustaining treatments such as ventilators and tube feeding when faced with a life limiting illness recorded on a living will that they will need to know. (How you want to be cared for as you near the end of your natural life)     X Patient is interested in learning more about how to make advanced directives.  I provided them paperwork and offered to discuss this with them.

## 2025-05-08 NOTE — PATIENT INSTRUCTIONS
Counseling and Referral of Other Preventative  (Italic type indicates deductible and co-insurance are waived)    Patient Name: Indira Walker  Today's Date: 5/8/2025    Health Maintenance       Date Due Completion Date    TETANUS VACCINE Never done ---    Shingles Vaccine (1 of 2) Never done ---    RSV Vaccine (Age 60+ and Pregnant patients) (1 - 1-dose 75+ series) Never done ---    COVID-19 Vaccine (4 - 2024-25 season) 09/01/2024 11/22/2021    Diabetic Eye Exam 12/27/2024 12/27/2023    Hemoglobin A1c 05/08/2025 11/8/2024    Diabetes Urine Screening 11/08/2025 11/8/2024    Lipid Panel 11/08/2025 11/8/2024    DEXA Scan 08/06/2027 8/6/2024        No orders of the defined types were placed in this encounter.    The following information is provided to all patients.  This information is to help you find resources for any of the problems found today that may be affecting your health:                  Living healthy guide: www.[facility].[place].gov      Understanding Diabetes: www.diabetes.org      Eating healthy: www.cdc.gov/healthyweight      CDC home safety checklist: www.cdc.gov/steadi/patient.html      Agency on Aging: www.goea.[place].gov      Alcoholics anonymous (AA): www.aa.org      Physical Activity: www.[place].nih.gov/rl4vdko      Tobacco use: www.quitwithusla.org

## 2025-05-13 ENCOUNTER — PATIENT OUTREACH (OUTPATIENT)
Dept: ADMINISTRATIVE | Facility: HOSPITAL | Age: 80
End: 2025-05-13
Payer: MEDICARE

## 2025-05-13 DIAGNOSIS — N18.31 TYPE 2 DIABETES MELLITUS WITH STAGE 3A CHRONIC KIDNEY DISEASE, WITHOUT LONG-TERM CURRENT USE OF INSULIN: Primary | ICD-10-CM

## 2025-05-13 DIAGNOSIS — E11.22 TYPE 2 DIABETES MELLITUS WITH STAGE 3A CHRONIC KIDNEY DISEASE, WITHOUT LONG-TERM CURRENT USE OF INSULIN: Primary | ICD-10-CM

## 2025-05-13 NOTE — PROGRESS NOTES
Medicare KED REPORT: A1c, microalbumin, lipid panel Ordered and linked to upcoming lab appointment.

## 2025-05-23 ENCOUNTER — PATIENT OUTREACH (OUTPATIENT)
Dept: ADMINISTRATIVE | Facility: HOSPITAL | Age: 80
End: 2025-05-23
Payer: MEDICARE

## 2025-05-23 NOTE — LETTER
AUTHORIZATION FOR RELEASE OF   CONFIDENTIAL INFORMATION        We are seeing Indira Walker, date of birth 1945, in the clinic at [facility] INTERNAL MEDICINE. Bertha Kearns MD is the patient's PCP. Indira Walker has an outstanding lab/procedure at the time we reviewed her chart. In order to help keep her health information updated, she has authorized us to request the following medical record(s):                                         (  x)  EYE EXAM               Please fax records to [facility], [place], Kristin W., MD,  at [phone] or email to [email or web address].            Patient Name: Indira Walker  : 1945  Patient Phone #: [phone]

## 2025-05-23 NOTE — PROGRESS NOTES
Received signed ABBY via fax in box.   ABBY e faxed for eye exam report. Signed Authorization attached.  Reminder set

## 2025-06-09 DIAGNOSIS — M05.9 SEROPOSITIVE RHEUMATOID ARTHRITIS: ICD-10-CM

## 2025-06-09 DIAGNOSIS — E11.69 HYPERLIPIDEMIA ASSOCIATED WITH TYPE 2 DIABETES MELLITUS: ICD-10-CM

## 2025-06-09 DIAGNOSIS — M19.90 INFLAMMATORY ARTHRITIS: ICD-10-CM

## 2025-06-09 DIAGNOSIS — E78.5 HYPERLIPIDEMIA ASSOCIATED WITH TYPE 2 DIABETES MELLITUS: ICD-10-CM

## 2025-06-09 RX ORDER — ROSUVASTATIN CALCIUM 20 MG/1
20 TABLET, COATED ORAL
Qty: 90 TABLET | Refills: 1 | Status: SHIPPED | OUTPATIENT
Start: 2025-06-09

## 2025-06-09 NOTE — TELEPHONE ENCOUNTER
Copied from CRM #[id number]. Topic: Medications - Medication Question  >> Jun 9, 2025 12:18 PM Seema wrote:  .Type: Patient Call Back        Who called:   Patient      What is the request in detail:  called in with medication concerns. Please call back     Can the clinic reply by AYAHNER?           Would the patient rather a call back or a response via [facility]?        Best call back number:   [phone]

## 2025-06-09 NOTE — TELEPHONE ENCOUNTER
Refill Decision Note   Indira Walker  is requesting a refill authorization.  Brief Assessment and Rationale for Refill:  Approve     Medication Therapy Plan:        Comments:     Note composed:12:36 PM 06/09/2025

## 2025-06-09 NOTE — TELEPHONE ENCOUNTER
No care due was identified.  Health [facility] Embedded Care Due Messages. Reference number: [id number].   6/09/2025 12:10:51 PM CDT

## 2025-06-09 NOTE — TELEPHONE ENCOUNTER
----- Message from [employer] sent at 6/9/2025  1:39 PM CDT -----  Type : Patient CallWho Called : Patient Does the patient know what this is regarding?: Patient is returning a call from Ann Marie Christie, Reached out via teams, no reply.Would the patient rather a call back or a response via [facility]? [facility] Number: [phone] Information:

## 2025-06-10 RX ORDER — FOLIC ACID 1 MG/1
1000 TABLET ORAL DAILY
Qty: 90 TABLET | Refills: 1 | Status: SHIPPED | OUTPATIENT
Start: 2025-06-10

## 2025-06-10 RX ORDER — SULFASALAZINE 500 MG/1
TABLET, DELAYED RELEASE ORAL
Qty: 90 TABLET | Refills: 1 | Status: SHIPPED | OUTPATIENT
Start: 2025-06-10 | End: 2025-06-12 | Stop reason: SDUPTHER

## 2025-06-11 ENCOUNTER — TELEPHONE (OUTPATIENT)
Dept: RHEUMATOLOGY | Facility: CLINIC | Age: 80
End: 2025-06-11
Payer: MEDICARE

## 2025-06-11 DIAGNOSIS — G91.2 (IDIOPATHIC) NORMAL PRESSURE HYDROCEPHALUS: ICD-10-CM

## 2025-06-11 DIAGNOSIS — M05.9 SEROPOSITIVE RHEUMATOID ARTHRITIS: Primary | ICD-10-CM

## 2025-06-11 RX ORDER — METFORMIN HYDROCHLORIDE 1000 MG/1
1000 TABLET ORAL 2 TIMES DAILY WITH MEALS
Qty: 180 TABLET | Refills: 1 | Status: SHIPPED | OUTPATIENT
Start: 2025-06-11

## 2025-06-11 NOTE — TELEPHONE ENCOUNTER
Copied from CRM #[id number]. Topic: Medications - Medication Question  >> Jun 11, 2025  4:33 PM Britni Coronel wrote:  .Type: Patient  Requesting Call Back      Who Called: Indira    What is this regarding?: Directions for Medication    Would the patient rather a call back or a response via [facility]?  Call Back    Best Call Back Number: [phone]    Additional Information:  Patient unable to get prescription due to the directions of taking 1 Daily. Patient says she takes 2 Daily and the pharmacy needs clarification    Patient states that she takes Sulfasalazine 500 mg . 2 tablets daily .  Please resend to [facility] . She is out of medication

## 2025-06-11 NOTE — TELEPHONE ENCOUNTER
No care due was identified.  Health [facility] Embedded Care Due Messages. Reference number: [id number].   6/11/2025 2:52:07 PM CDT

## 2025-06-11 NOTE — TELEPHONE ENCOUNTER
Refill Routing Note   Medication(s) are not appropriate for processing by [facility] for the following reason(s):        Required labs outdated    [facility] action(s):  Defer             Appointments  past 12m or future 3m with PCP    Date Provider   Last Visit   11/13/2024 Bertha Kearns MD   Next Visit   Visit date not found Bertha Kearns MD   ED visits in past 90 days: 0        Note composed:3:08 PM 06/11/2025

## 2025-06-12 RX ORDER — SULFASALAZINE 500 MG/1
500 TABLET, DELAYED RELEASE ORAL 2 TIMES DAILY
Qty: 90 TABLET | Refills: 1 | Status: SHIPPED | OUTPATIENT
Start: 2025-06-12

## 2025-06-23 ENCOUNTER — TELEPHONE (OUTPATIENT)
Dept: INTERNAL MEDICINE | Facility: CLINIC | Age: 80
End: 2025-06-23
Payer: MEDICARE

## 2025-06-23 ENCOUNTER — HOSPITAL ENCOUNTER (OUTPATIENT)
Dept: RADIOLOGY | Facility: HOSPITAL | Age: 80
Discharge: HOME | End: 2025-06-23
Payer: MEDICARE

## 2025-06-23 ENCOUNTER — OFFICE VISIT (OUTPATIENT)
Dept: INTERNAL MEDICINE | Facility: CLINIC | Age: 80
End: 2025-06-23
Payer: MEDICARE

## 2025-06-23 VITALS
SYSTOLIC BLOOD PRESSURE: 122 MMHG | HEIGHT: 62 IN | DIASTOLIC BLOOD PRESSURE: 54 MMHG | WEIGHT: 126.56 LBS | BODY MASS INDEX: 23.29 KG/M2 | OXYGEN SATURATION: 98 % | TEMPERATURE: 98 F | HEART RATE: 74 BPM

## 2025-06-23 DIAGNOSIS — M25.512 LEFT SHOULDER PAIN, UNSPECIFIED CHRONICITY: ICD-10-CM

## 2025-06-23 PROCEDURE — 73020 X-RAY EXAM OF SHOULDER: CPT | Mod: TC,LT

## 2025-06-23 PROCEDURE — 1160F RVW MEDS BY RX/DR IN RCRD: CPT | Mod: CPTII,S$GLB,,

## 2025-06-23 PROCEDURE — 1159F MED LIST DOCD IN RCRD: CPT | Mod: CPTII,S$GLB,,

## 2025-06-23 PROCEDURE — 73020 X-RAY EXAM OF SHOULDER: CPT | Mod: 26,LT,, | Performed by: RADIOLOGY

## 2025-06-23 PROCEDURE — 1125F AMNT PAIN NOTED PAIN PRSNT: CPT | Mod: CPTII,S$GLB,,

## 2025-06-23 PROCEDURE — 3078F DIAST BP <80 MM HG: CPT | Mod: CPTII,S$GLB,,

## 2025-06-23 PROCEDURE — 99999 PR PBB SHADOW E&M-EST. PATIENT-LVL V: CPT | Mod: PBBFAC,,,

## 2025-06-23 PROCEDURE — 3288F FALL RISK ASSESSMENT DOCD: CPT | Mod: CPTII,S$GLB,,

## 2025-06-23 PROCEDURE — 2023F DILAT RTA XM W/O RTNOPTHY: CPT | Mod: CPTII,S$GLB,,

## 2025-06-23 PROCEDURE — 1101F PT FALLS ASSESS-DOCD LE1/YR: CPT | Mod: CPTII,S$GLB,,

## 2025-06-23 PROCEDURE — 3074F SYST BP LT 130 MM HG: CPT | Mod: CPTII,S$GLB,,

## 2025-06-23 PROCEDURE — 99214 OFFICE O/P EST MOD 30 MIN: CPT | Mod: S$GLB,,,

## 2025-06-23 RX ORDER — DICLOFENAC SODIUM 10 MG/G
2 GEL TOPICAL 3 TIMES DAILY
Qty: 1 EACH | Refills: 0 | Status: SHIPPED | OUTPATIENT
Start: 2025-06-23

## 2025-06-23 NOTE — PROGRESS NOTES
Indira Walker  06/23/2025  [id number]    Bertha Kearns MD  Patient Care Team:  Bertha Kearns MD as PCP - General (Family Medicine)  Jhony Garza MD as Consulting Physician (Ophthalmology)  Mayur Marie RD, [facility] as Dietitian (Endocrinology)  Rosey George MD (Gastroenterology)  Bertha Kearns MD as Consulting Physician (Family Medicine)  Mohan Garza PA as Consulting Physician (Ophthalmology)  Giovanni Cage MD as Consulting Physician (Rheumatology)  Enrique Startton MD as Consulting Physician (Hand Surgery)          Visit Type:an urgent visit for a new problem    Chief Complaint:  Chief Complaint   Patient presents with    Arm Pain       History of Present Illness:    History of Present Illness    CHIEF COMPLAINT:  Patient presents today for right arm pain.    LEFT UPPER EXTREMITY PAIN:  She reports left arm pain for 3 months, which began after assisting her  with mobility for about a year. The pain is described as pulling in nature with significant limitation in range of motion, particularly with overhead movements. She experiences tingling sensation extending from fingers up to shoulder, possibly related to carpal tunnel syndrome, though she is uncertain about the relationship between shoulder pain and tingling symptoms.Patient reports she is suppose to have surgery on her hand She has taken Tylenol twice for pain management with minimal relief. She is seeking diagnostic imaging to evaluate the underlying cause.      ROS:  General: -fever, -chills, -fatigue, -weight gain, -weight loss  Eyes: -vision changes, -redness, -discharge  ENT: -ear pain, -nasal congestion, -sore throat  Cardiovascular: -chest pain, -palpitations, -lower extremity edema  Respiratory: -cough, -shortness of breath  Gastrointestinal: -abdominal pain, -nausea, -vomiting, -diarrhea, -constipation, -blood in stool  Genitourinary: -dysuria, -hematuria, -frequency  Musculoskeletal:  -joint pain, -muscle pain, +pain with movement, +limited movement  Skin: -rash, -lesion  Neurological: -headache, -dizziness, -numbness, +tingling  Psychiatric: -anxiety, -depression, -sleep difficulty            The following were reviewed at this visit: active problem list, medication list, allergies, family history, social history, and health maintenance.  History:  Past Medical History:   Diagnosis Date    Arthritis     Diabetes mellitus, type 2     Foot drop, left 2018    GERD (gastroesophageal reflux disease)     Heartburn     Hyperlipidemia     Hypertension     Left sided sciatica     s/p microdiscectomy 3/21/18    Pancreatic cyst     Unspecified cataract      Past Surgical History:   Procedure Laterality Date    ANTERIOR VAGINAL REPAIR N/A 2023    Procedure: COLPORRHAPHY, ANTERIOR;  Surgeon: ANA MARIA Mayorga MD;  Location: [facility] OR;  Service: OB/GYN;  Laterality: N/A;    BREAST BIOPSY       SECTION      COLONOSCOPY  2008    DR. JANET GARCÍA/MILD DIVERICULOSIS DESCENDING AND SIGMOID. REPEAT 5 YRS    ENDOSCOPIC ULTRASOUND OF UPPER GASTROINTESTINAL TRACT N/A 2022    Procedure: ULTRASOUND, UPPER GI TRACT, ENDOSCOPIC;  Surgeon: Ange Saldivar MD;  Location: [facility] ENDO;  Service: Endoscopy;  Laterality: N/A;  Upper and Linear, 22 sharkcore, slides and formalin    ESOPHAGOGASTRODUODENOSCOPY N/A 2024    Procedure: EGD (ESOPHAGOGASTRODUODENOSCOPY);  Surgeon: Megha Stallworth MD;  Location: [facility] ENDO;  Service: Endoscopy;  Laterality: N/A;    HERNIA REPAIR      MICRODISCECTOMY OF SPINE Left 2018    left L5-S1, Dr Segundo    SPINE SURGERY      TOTAL VAGINAL HYSTERECTOMY N/A 2023    Procedure: HYSTERECTOMY, TOTAL, VAGINAL;  Surgeon: ANA MARIA Mayorga MD;  Location: [facility] OR;  Service: OB/GYN;  Laterality: N/A;    UPPER GASTROINTESTINAL ENDOSCOPY       Problem List[1]    Medications:  Medications Ordered Prior to Encounter[2]    Medications have been  reviewed and reconciled with patient at this visit.    Exam:  Wt Readings from Last 3 Encounters:   06/23/25 57.4 kg (126 lb 8.7 oz)   05/08/25 58.9 kg (129 lb 13.6 oz)   04/11/25 55.3 kg (121 lb 14.6 oz)     Temp Readings from Last 3 Encounters:   06/23/25 97.9 °F (36.6 °C) (Tympanic)   11/19/24 97.6 °F (36.4 °C) (Temporal)   01/09/24 97.8 °F (36.6 °C) (Tympanic)     BP Readings from Last 3 Encounters:   06/23/25 (!) 122/54   05/08/25 130/64   11/19/24 118/68     Pulse Readings from Last 3 Encounters:   06/23/25 74   05/08/25 87   11/19/24 68     Body mass index is 23.15 kg/m².      Physical Exam  Cardiovascular:      Rate and Rhythm: Normal rate and regular rhythm.      Pulses: Normal pulses.      Heart sounds: Normal heart sounds.   Musculoskeletal:      Left shoulder: Tenderness present. No swelling, deformity or effusion. Normal strength.        Arms:       Cervical back: Normal range of motion.   Neurological:      General: No focal deficit present.      Mental Status: She is oriented to person, place, and time. Mental status is at baseline.   Psychiatric:         Mood and Affect: Mood normal.         Behavior: Behavior normal.         Thought Content: Thought content normal.         Judgment: Judgment normal.                Laboratory Reviewed ({Yes)    Lab Results   Component Value Date    WBC 6.28 11/25/2024    HGB 10.1 (L) 11/25/2024    HCT 31.4 (L) 11/25/2024     11/25/2024    CHOL 184 11/08/2024    TRIG 67 11/08/2024    HDL 81 (H) 11/08/2024    ALT 13 11/25/2024    AST 19 11/25/2024     11/25/2024    K 3.9 11/25/2024     11/25/2024    CREATININE 1.1 11/25/2024    BUN 15 11/25/2024    CO2 26 11/25/2024    TSH 1.725 05/06/2024    INR 1.0 03/08/2018    GLUF 98 01/21/2019    HGBA1C 7.3 (H) 11/08/2024   Indira was seen today for arm pain.    Diagnoses and all orders for this visit:    Left shoulder pain, unspecified chronicity  -     X-Ray Shoulder Left 1 View; Future  -     diclofenac  sodium (VOLTAREN) 1 % Gel; Apply 2 g topically 3 (three) times daily.  -     Cancel: X-Ray Shoulder 2 or More Views Left; Future      Assessment & Plan    IMPRESSION:  - Assessed arm pain and limited range of motion.    RIGHT ARM PAIN:  - Patient reports arm pain for about 3 months, worsened by lifting arm up too high.  - Evaluated range of motion, noting pain with arm elevation and extension.  - Ordered XR Right Arm to evaluate underlying cause.  - Prescribed Diclofenac gel to be applied to affected area 3 times daily as needed.  - Advised to continue Tylenol as needed for pain.    PARESTHESIA (TINGLING SENSATION):  - Patient reports tingling sensation from fingers to shoulder and is wearing a brace for support.  - Patient does not believe the shoulder pain and tingling are related.    FOLLOW-UP:  - Will call the patient with XR results either this afternoon or the following morning.         Care Plan/Goals: Reviewed     Follow up: No follow-ups on file.    After visit summary was printed and given to patient upon discharge today.  Patient goals and care plan are included in After Visit Summary.      This note was generated with the assistance of ambient listening technology. Verbal consent was obtained by the patient and accompanying visitor(s) for the recording of patient appointment to facilitate this note. I attest to having reviewed and edited the generated note for accuracy, though some syntax or spelling errors may persist. Please contact the author of this note for any clarification.            [1]   Patient Active Problem List  Diagnosis    Sciatica without back pain, left    Hypertension associated with diabetes    Type 2 diabetes mellitus with stage 3a chronic kidney disease, without long-term current use of insulin    Diastolic dysfunction    Chronic heart failure with preserved ejection fraction    Primary osteoarthritis of both hands    Hyperlipidemia associated with type 2 diabetes mellitus     Decreased hearing of both ears    Anemia    Cystocele with uterine descensus    Atherosclerosis of aorta    S/P ROSSI (total abdominal hysterectomy)    Depression, recurrent    Gastroesophageal reflux disease    Immunosuppression due to drug therapy   [2]   Current Outpatient Medications on File Prior to Visit   Medication Sig Dispense Refill    acetaminophen (TYLENOL EXTRA STRENGTH ORAL) Take by mouth.      albuterol (PROVENTIL/VENTOLIN HFA) 90 mcg/actuation inhaler INHALE 2 PUFFS INTO THE LUNGS EVERY 4 HOURS AS NEEDED FOR WHEEZING OR SHORTNESS OF BREATH. 18 g 2    aspirin (ECOTRIN) 81 MG EC tablet Take 1 tablet (81 mg total) by mouth once daily. Every other day  0    blood sugar diagnostic Strp 1 each by Misc.(Non-Drug; Combo Route) route 2 (two) times a day. 200 each 3    folic acid (FOLVITE) 1 MG tablet Take 1 tablet (1,000 mcg total) by mouth once daily. 90 tablet 1    glipiZIDE 5 MG TR24 TAKE 1 TABLET BY MOUTH EVERY DAY WITH BREAKFAST 90 tablet 0    lancets Misc To check BG 2 times daily, to use with insurance preferred meter 200 each 3    losartan-hydrochlorothiazide 100-12.5 mg (HYZAAR) 100-12.5 mg Tab TAKE 1 TABLET BY MOUTH EVERY DAY 90 tablet 3    metFORMIN (GLUCOPHAGE) 1000 MG tablet TAKE 1 TABLET BY MOUTH TWICE A DAY WITH MEALS 180 tablet 1    multivitamin (THERAGRAN) per tablet Take 1 tablet by mouth once daily. Takes a gummy      pantoprazole (PROTONIX) 40 MG tablet Take 1 tablet (40 mg total) by mouth once daily. 90 tablet 3    rosuvastatin (CRESTOR) 20 MG tablet TAKE 1 TABLET BY MOUTH EVERY DAY 90 tablet 1    simethicone (MYLICON) 125 MG chewable tablet Take 125 mg by mouth every 6 (six) hours as needed for Flatulence.      sulfaSALAzine (AZULFIDINE ENTABS) 500 MG EC tablet Take 1 tablet (500 mg total) by mouth 2 (two) times daily. 90 tablet 1    blood-glucose meter kit Use as instructed to check BG twice daily 1 each 1     Current Facility-Administered Medications on File Prior to Visit   Medication  Dose Route Frequency Provider Last Rate Last Admin    triamcinolone acetonide injection 40 mg  40 mg Intra-articular  Enrique Stratton MD   40 mg at 04/11/25 [id number]

## 2025-06-23 NOTE — TELEPHONE ENCOUNTER
Copied from CRM #[id number]. Topic: General Inquiry - Patient Advice  >> Jun 23, 2025 10:37 AM Ashwini wrote:  Type:  Patient Requesting a call back     Who Called:Indira   What is the call back request regarding?:would like to speak to MD or nurse about her going to the hospital  Would the patient rather a call back or a response via [facility]?call  Best Call Back Number:[phone]     Additional Information:    Pt notified Appt made

## 2025-06-24 ENCOUNTER — RESULTS FOLLOW-UP (OUTPATIENT)
Dept: INTERNAL MEDICINE | Facility: CLINIC | Age: 80
End: 2025-06-24

## 2025-06-24 ENCOUNTER — TELEPHONE (OUTPATIENT)
Dept: INTERNAL MEDICINE | Facility: CLINIC | Age: 80
End: 2025-06-24
Payer: MEDICARE

## 2025-06-24 NOTE — TELEPHONE ENCOUNTER
Called with lab results.     Copied from CRM #[id number]. Topic: General Inquiry - Patient Advice  >> Jun 24, 2025 11:52 AM Rosalba wrote:  Patient is calling to get results from xray please callback to assist [phone]

## 2025-07-02 ENCOUNTER — OFFICE VISIT (OUTPATIENT)
Dept: RHEUMATOLOGY | Facility: CLINIC | Age: 80
End: 2025-07-02
Payer: MEDICARE

## 2025-07-02 ENCOUNTER — HOSPITAL ENCOUNTER (OUTPATIENT)
Dept: RADIOLOGY | Facility: HOSPITAL | Age: 80
Discharge: HOME | End: 2025-07-02
Attending: INTERNAL MEDICINE
Payer: MEDICARE

## 2025-07-02 VITALS
WEIGHT: 128.31 LBS | BODY MASS INDEX: 23.61 KG/M2 | HEART RATE: 87 BPM | SYSTOLIC BLOOD PRESSURE: 105 MMHG | HEIGHT: 62 IN | DIASTOLIC BLOOD PRESSURE: 58 MMHG

## 2025-07-02 DIAGNOSIS — Z71.89 COUNSELING ON HEALTH PROMOTION AND DISEASE PREVENTION: ICD-10-CM

## 2025-07-02 DIAGNOSIS — R52 BREAKTHROUGH PAIN: ICD-10-CM

## 2025-07-02 DIAGNOSIS — R20.2 PARESTHESIAS: ICD-10-CM

## 2025-07-02 DIAGNOSIS — Z51.81 MEDICATION MONITORING ENCOUNTER: ICD-10-CM

## 2025-07-02 DIAGNOSIS — E11.22 TYPE 2 DIABETES MELLITUS WITH STAGE 3A CHRONIC KIDNEY DISEASE, WITHOUT LONG-TERM CURRENT USE OF INSULIN: ICD-10-CM

## 2025-07-02 DIAGNOSIS — M05.9 SEROPOSITIVE RHEUMATOID ARTHRITIS: Primary | ICD-10-CM

## 2025-07-02 DIAGNOSIS — Z79.899 IMMUNOSUPPRESSION DUE TO DRUG THERAPY: ICD-10-CM

## 2025-07-02 DIAGNOSIS — D84.821 IMMUNOSUPPRESSION DUE TO DRUG THERAPY: ICD-10-CM

## 2025-07-02 DIAGNOSIS — N18.31 TYPE 2 DIABETES MELLITUS WITH STAGE 3A CHRONIC KIDNEY DISEASE, WITHOUT LONG-TERM CURRENT USE OF INSULIN: ICD-10-CM

## 2025-07-02 PROCEDURE — 99999 PR PBB SHADOW E&M-EST. PATIENT-LVL IV: CPT | Mod: PBBFAC,,, | Performed by: INTERNAL MEDICINE

## 2025-07-02 PROCEDURE — 72050 X-RAY EXAM NECK SPINE 4/5VWS: CPT | Mod: 26,,, | Performed by: RADIOLOGY

## 2025-07-02 PROCEDURE — 72050 X-RAY EXAM NECK SPINE 4/5VWS: CPT | Mod: TC

## 2025-07-02 NOTE — PROGRESS NOTES
RHEUMATOLOGY OUTPATIENT CLINIC NOTE    2025    Attending Rheumatologist: Giovanni Cage  Primary Care Provider/Physician Requesting Consultation: Bertha Kearns MD   Chief Complaint/Reason For Consultation:  Rheumatoid Arthritis      Subjective:     Indira Walker is a 79 y.o. Black or  female with SPRA    SSZ prescribed at 500mg BID.  Palindromic arthralgias w/ some inflammatory features.  Not currently taking prednisone.      Review of Systems   Constitutional:  Negative for fever.   Eyes:  Negative for photophobia and pain.   Respiratory:  Negative for shortness of breath.    Cardiovascular:  Negative for chest pain.   Gastrointestinal:  Negative for melena.   Genitourinary:  Negative for dysuria.   Musculoskeletal:  Positive for joint pain and neck pain.   Skin:  Negative for rash.   Neurological:  Negative for focal weakness.       Chronic comorbid conditions affecting medical decision making today:  Past Medical History:   Diagnosis Date    Arthritis     Diabetes mellitus, type 2     Foot drop, left 2018    GERD (gastroesophageal reflux disease)     Heartburn     Hyperlipidemia     Hypertension     Left sided sciatica     s/p microdiscectomy 3/21/18    Pancreatic cyst     Unspecified cataract      Past Surgical History:   Procedure Laterality Date    ANTERIOR VAGINAL REPAIR N/A 2023    Procedure: COLPORRHAPHY, ANTERIOR;  Surgeon: ANA MARIA Mayorga MD;  Location: [facility] OR;  Service: OB/GYN;  Laterality: N/A;    BREAST BIOPSY       SECTION      COLONOSCOPY  2008    DR. JANET GARCÍA/MILD DIVERICULOSIS DESCENDING AND SIGMOID. REPEAT 5 YRS    ENDOSCOPIC ULTRASOUND OF UPPER GASTROINTESTINAL TRACT N/A 2022    Procedure: ULTRASOUND, UPPER GI TRACT, ENDOSCOPIC;  Surgeon: Ange Saldivar MD;  Location: [facility] ENDO;  Service: Endoscopy;  Laterality: N/A;  Upper and Linear, 22 sharkcore, slides and formalin    ESOPHAGOGASTRODUODENOSCOPY N/A 2024     Procedure: EGD (ESOPHAGOGASTRODUODENOSCOPY);  Surgeon: Megha Stallworth MD;  Location: [facility] ENDO;  Service: Endoscopy;  Laterality: N/A;    HERNIA REPAIR      MICRODISCECTOMY OF SPINE Left 03/21/2018    left L5-S1, Dr Segundo    SPINE SURGERY      TOTAL VAGINAL HYSTERECTOMY N/A 04/14/2023    Procedure: HYSTERECTOMY, TOTAL, VAGINAL;  Surgeon: ANA MARIA Mayorga MD;  Location: [facility] OR;  Service: OB/GYN;  Laterality: N/A;    UPPER GASTROINTESTINAL ENDOSCOPY       Family History   Problem Relation Name Age of Onset    Diabetes Mother      Diabetes Father       Tobacco Use History[1]  Current Medications[2]     Objective:     Vitals:    07/02/25 1005   BP: (!) 105/58   Pulse: 87     Physical Exam   Eyes: Conjunctivae are normal.   Pulmonary/Chest: Effort normal. No respiratory distress.   Musculoskeletal:         General: Swelling and deformity present. No tenderness.   Skin: No rash noted.       Reviewed available old and all outside pertinent medical records available.    All lab results personally reviewed and interpreted by me.       ASSESSMENT / PLAN     1. Seropositive rheumatoid arthritis  CDAI; high disease activity.  Escalate by adding TNFi SC q7d, monitor response after 12w.  C/ current dose of SSZ 500mg BID.  C-Reactive Protein      2. Medication monitoring encounter  Comprehensive Metabolic Panel      3. Immunosuppression due to drug therapy  Hold immunosuppression in event of active infections or need for surgery.  CBC Auto Differential    Hepatitis B Surface Antigen    Hepatitis B Core Antibody, Total    Quantiferon Gold TB      4. Breakthrough pain  Will call in low dose prednisone PRN      5. Type 2 diabetes mellitus with stage 3a chronic kidney disease, without long-term current use of insulin  Caution advised w/ systemic steroid use.  Renally dose medications.      6. Paresthesias  X-Ray Cervical Spine AP Lat with Flex Ex      7. Counseling on health promotion and disease prevention  Limit  NSAID use to <14d per month.  Avoid nephrotoxics.              Visit today included increased complexity associated with the care of the episodic problem Seropositive rheumatoid arthritis addressed and managing the longitudinal care of the patient due to the serious and/or complex managed problem(s) immunosuppression due to drug therapy.    Giovanni Cage M.D.           [1]   Social History  Tobacco Use   Smoking Status Never   Smokeless Tobacco Never   [2]   Current Outpatient Medications:     acetaminophen (TYLENOL EXTRA STRENGTH ORAL), Take by mouth., Disp: , Rfl:     albuterol (PROVENTIL/VENTOLIN HFA) 90 mcg/actuation inhaler, INHALE 2 PUFFS INTO THE LUNGS EVERY 4 HOURS AS NEEDED FOR WHEEZING OR SHORTNESS OF BREATH., Disp: 18 g, Rfl: 2    aspirin (ECOTRIN) 81 MG EC tablet, Take 1 tablet (81 mg total) by mouth once daily. Every other day, Disp: , Rfl: 0    blood sugar diagnostic Strp, 1 each by Misc.(Non-Drug; Combo Route) route 2 (two) times a day., Disp: 200 each, Rfl: 3    diclofenac sodium (VOLTAREN) 1 % Gel, Apply 2 g topically 3 (three) times daily., Disp: 1 each, Rfl: 0    folic acid (FOLVITE) 1 MG tablet, Take 1 tablet (1,000 mcg total) by mouth once daily., Disp: 90 tablet, Rfl: 1    glipiZIDE 5 MG TR24, TAKE 1 TABLET BY MOUTH EVERY DAY WITH BREAKFAST, Disp: 90 tablet, Rfl: 0    lancets Misc, To check BG 2 times daily, to use with insurance preferred meter, Disp: 200 each, Rfl: 3    losartan-hydrochlorothiazide 100-12.5 mg (HYZAAR) 100-12.5 mg Tab, TAKE 1 TABLET BY MOUTH EVERY DAY, Disp: 90 tablet, Rfl: 3    metFORMIN (GLUCOPHAGE) 1000 MG tablet, TAKE 1 TABLET BY MOUTH TWICE A DAY WITH MEALS, Disp: 180 tablet, Rfl: 1    multivitamin (THERAGRAN) per tablet, Take 1 tablet by mouth once daily. Takes a gummy, Disp: , Rfl:     pantoprazole (PROTONIX) 40 MG tablet, Take 1 tablet (40 mg total) by mouth once daily., Disp: 90 tablet, Rfl: 3    rosuvastatin (CRESTOR) 20 MG tablet, TAKE 1 TABLET BY MOUTH EVERY DAY,  Disp: 90 tablet, Rfl: 1    simethicone (MYLICON) 125 MG chewable tablet, Take 125 mg by mouth every 6 (six) hours as needed for Flatulence., Disp: , Rfl:     sulfaSALAzine (AZULFIDINE ENTABS) 500 MG EC tablet, Take 1 tablet (500 mg total) by mouth 2 (two) times daily., Disp: 90 tablet, Rfl: 1    blood-glucose meter kit, Use as instructed to check BG twice daily, Disp: 1 each, Rfl: 1  No current facility-administered medications for this visit.    Facility-Administered Medications Ordered in Other Visits:     triamcinolone acetonide injection 40 mg, 40 mg, Intra-articular, , Enrique Stratton MD, 40 mg at 04/11/25 1032

## 2025-07-03 ENCOUNTER — RESULTS FOLLOW-UP (OUTPATIENT)
Dept: RHEUMATOLOGY | Facility: CLINIC | Age: 80
End: 2025-07-03

## 2025-07-05 ENCOUNTER — HOSPITAL ENCOUNTER (EMERGENCY)
Facility: HOSPITAL | Age: 80
Discharge: HOME | End: 2025-07-05
Attending: EMERGENCY MEDICINE
Payer: MEDICARE

## 2025-07-05 VITALS
RESPIRATION RATE: 18 BRPM | SYSTOLIC BLOOD PRESSURE: 122 MMHG | DIASTOLIC BLOOD PRESSURE: 60 MMHG | WEIGHT: 127 LBS | HEART RATE: 69 BPM | HEIGHT: 62 IN | TEMPERATURE: 98 F | OXYGEN SATURATION: 100 % | BODY MASS INDEX: 23.37 KG/M2

## 2025-07-05 DIAGNOSIS — Z77.098 CHEMICAL EXPOSURE OF EYE: ICD-10-CM

## 2025-07-05 DIAGNOSIS — S05.02XA ABRASION OF LEFT CORNEA, INITIAL ENCOUNTER: ICD-10-CM

## 2025-07-05 DIAGNOSIS — H57.12 ACUTE LEFT EYE PAIN: Primary | ICD-10-CM

## 2025-07-05 PROCEDURE — 99283 EMERGENCY DEPT VISIT LOW MDM: CPT

## 2025-07-05 PROCEDURE — 25000003 PHARM REV CODE 250: Performed by: EMERGENCY MEDICINE

## 2025-07-05 RX ORDER — TETRACAINE HYDROCHLORIDE 5 MG/ML
2 SOLUTION OPHTHALMIC
Status: COMPLETED | OUTPATIENT
Start: 2025-07-05 | End: 2025-07-05

## 2025-07-05 RX ORDER — TOBRAMYCIN 3 MG/ML
1 SOLUTION/ DROPS OPHTHALMIC
Qty: 5 ML | Refills: 0 | Status: SHIPPED | OUTPATIENT
Start: 2025-07-05 | End: 2025-07-08

## 2025-07-05 RX ADMIN — TETRACAINE HYDROCHLORIDE 2 DROP: 5 SOLUTION OPHTHALMIC at 01:07

## 2025-07-05 RX ADMIN — FLUORESCEIN SODIUM 1 EACH: 1 STRIP OPHTHALMIC at 01:07

## 2025-07-05 NOTE — ED PROVIDER NOTES
SCRIBE #1 NOTE: I, Gucci Muller, am scribing for, and in the presence of, Leo Auguste Jr., MD. I have scribed the entire note.       History     Chief Complaint   Patient presents with    Eye Pain     Pt states she sprayed insect killer in her left eye on accident. Pt states she had eye surgery 3 weeks ago on her left eye. Pt states there is some slight burning even after trying to rinse out her eyes.      Review of patient's allergies indicates:   Allergen Reactions    Pyrilamine Swelling     (an ingredient in Midol)    Iodine and iodide containing products Hives    Iron Other (See Comments)         History of Present Illness     HPI    7/5/2025, 1:17 PM  History obtained from the patient and medical records      History of Present Illness: Indira Walker is a 79 y.o. female patient with a PMHx of arthritis, T2DM, GERD, HLD, HTN, and anemia who presents to the Emergency Department for evaluation of L eye pain which began yesterday. Pt reports she accidentally sprayed bug spray into her L eye yesterday and states it still burns today. Pt reports she washed her eye out with water after she sprayed bug spray into it.  Symptoms are constant and moderate in severity. No mitigating or exacerbating factors reported. Pt reports she had cataract surgery on her L eye 3 weeks ago by Dr. Ramsay at [facility]. Prior Tx includes rinsing her L eye out with water.  No further complaints or concerns at this time.       Arrival mode: Personal Transportation    PCP: Bertha Kearns MD        Past Medical History:  Past Medical History:   Diagnosis Date    Arthritis     Diabetes mellitus, type 2     Foot drop, left 01/26/2018    GERD (gastroesophageal reflux disease)     Heartburn     Hyperlipidemia     Hypertension     Left sided sciatica     s/p microdiscectomy 3/21/18    Pancreatic cyst     Unspecified cataract        Past Surgical History:  Past Surgical History:   Procedure Laterality Date    ANTERIOR  VAGINAL REPAIR N/A 2023    Procedure: COLPORRHAPHY, ANTERIOR;  Surgeon: ANA MARIA Mayorga MD;  Location: [facility] OR;  Service: OB/GYN;  Laterality: N/A;    BREAST BIOPSY       SECTION      COLONOSCOPY  2008    DR. JANET GARCÍA/MILD DIVERICULOSIS DESCENDING AND SIGMOID. REPEAT 5 YRS    ENDOSCOPIC ULTRASOUND OF UPPER GASTROINTESTINAL TRACT N/A 2022    Procedure: ULTRASOUND, UPPER GI TRACT, ENDOSCOPIC;  Surgeon: Ange Saldivar MD;  Location: [facility] ENDO;  Service: Endoscopy;  Laterality: N/A;  Upper and Linear, 22 sharkcore, slides and formalin    ESOPHAGOGASTRODUODENOSCOPY N/A 2024    Procedure: EGD (ESOPHAGOGASTRODUODENOSCOPY);  Surgeon: Megha Stallworth MD;  Location: [facility] ENDO;  Service: Endoscopy;  Laterality: N/A;    HERNIA REPAIR      MICRODISCECTOMY OF SPINE Left 2018    left L5-S1, Dr Segundo    SPINE SURGERY      TOTAL VAGINAL HYSTERECTOMY N/A 2023    Procedure: HYSTERECTOMY, TOTAL, VAGINAL;  Surgeon: ANA MARIA Mayorga MD;  Location: [facility] OR;  Service: OB/GYN;  Laterality: N/A;    UPPER GASTROINTESTINAL ENDOSCOPY           Family History:  Family History   Problem Relation Name Age of Onset    Diabetes Mother      Diabetes Father         Social History:  Social History     Tobacco Use    Smoking status: Never    Smokeless tobacco: Never   Substance and Sexual Activity    Alcohol use: No    Drug use: No    Sexual activity: Not Currently     Partners: Male        Review of Systems     Review of Systems   Constitutional:  Negative for fever.   HENT:  Negative for sore throat.    Eyes:  Positive for pain (L eye).   Respiratory:  Negative for shortness of breath.    Cardiovascular:  Negative for chest pain.   Gastrointestinal:  Negative for nausea.   Genitourinary:  Negative for dysuria.   Musculoskeletal:  Negative for back pain.   Skin:  Negative for rash.   Neurological:  Negative for weakness.   Hematological:  Does not bruise/bleed easily.   All  "other systems reviewed and are negative.       Physical Exam     Initial Vitals [07/05/25 1306]   BP Pulse Resp Temp SpO2   (!) 116/56 85 16 98.4 °F (36.9 °C) 99 %      MAP       --          Physical Exam  Nursing Notes and Vital Signs Reviewed.  Constitutional: Patient is in no apparent distress. Well-developed and well-nourished.  Head: Atraumatic. Normocephalic.  Eyes: PERRL. EOM intact. Conjunctivae are not pale. No scleral icterus. Irritation to L eye after tetracaine and fluorescein. Mild uptake over cornea consistent with rubbing. No vision changes. Mild erythema of conjunctiva of L eye.   ENT: Mucous membranes are moist. Oropharynx is clear and symmetric.    Neck: Supple. Full ROM. No lymphadenopathy.  Cardiovascular: Regular rate. Regular rhythm. No murmurs, rubs, or gallops. Distal pulses are 2+ and symmetric.  Pulmonary/Chest: No respiratory distress. Clear to auscultation bilaterally. No wheezing or rales.  Abdominal: Soft and non-distended. There is no tenderness.  No rebound, guarding, or rigidity. Good bowel sounds.  Genitourinary: No CVA tenderness.  Musculoskeletal: Moves all extremities. No obvious deformities. No edema. No calf tenderness.  Skin: Warm and dry.  Neurological:  Alert, awake, and appropriate.  Normal speech.  No acute focal neurological deficits are appreciated.  Psychiatric: Normal affect. Good eye contact. Appropriate in content.     ED Course   Procedures  ED Vital Signs:  Vitals:    07/05/25 1306   BP: (!) 116/56   Pulse: 85   Resp: 16   Temp: 98.4 °F (36.9 °C)   TempSrc: Oral   SpO2: 99%   Weight: 57.6 kg (127 lb)   Height: 5' 2" (1.575 m)       Abnormal Lab Results:  Labs Reviewed - No data to display     All Lab Results:  None.    Imaging Results:  Imaging Results    None               The Emergency Provider reviewed the vital signs and test results, which are outlined above.     ED Discussion     1:41 PM: Reassessed pt at this time. Discussed with patient and/or " family/caretaker all pertinent ED information and results. Discussed pt dx and plan of tx. Gave the patient all f/u and return to the ED instructions. All questions and concerns were addressed at this time. Patient and/or family/caretaker expresses understanding of information and instructions, and is comfortable with plan to discharge. Pt is stable for discharge.     I discussed with patient and/or family/caretaker that evaluation in the ED does not suggest any emergent or life threatening medical conditions requiring immediate intervention beyond what was provided in the ED, and I believe patient is safe for discharge. Regardless, an unremarkable evaluation in the ED does not preclude the development or presence of a serious or life threatening condition. As such, I instructed that the patient is to return immediately for any worsening or change in current symptoms.         Medical Decision Making  Risk  Prescription drug management.                ED Medication(s):  Medications   TETRAcaine HCl (PF) 0.5 % Drop 2 drop (2 drops Both Eyes Given 7/5/25 1330)   fluorescein ophthalmic strip 1 each (1 each Both Eyes Given 7/5/25 1330)       New Prescriptions    TOBRAMYCIN SULFATE 0.3% (TOBREX) 0.3 % OPHTHALMIC SOLUTION    Place 1 drop into the left eye every 4 (four) hours while awake. for 3 days        Follow-up Information       Center[facility]. Call in 2 days.    Contact information:  [address]  [place] [id number] [phone]                                 Scribe Attestation:   Scribe #1: I performed the above scribed service and the documentation accurately describes the services I performed. I attest to the accuracy of the note.     Attending:   Physician Attestation Statement for Scribe #1: I, Leo Auguste Jr., MD, personally performed the services described in this documentation, as scribed by Gucci Muller, in my presence, and it is both accurate and complete.           Clinical  Impression       ICD-10-CM ICD-9-CM   1. Acute left eye pain  H57.12 379.91   2. Abrasion of left cornea, initial encounter  S05.02XA 918.1   3. Chemical exposure of eye  Z77.098 V87.2       Disposition:   Disposition: Discharged  Condition: Stable        Leo Auguste Jr., MD  07/06/25 0702

## 2025-07-05 NOTE — DISCHARGE INSTRUCTIONS
Use tobramycin eye drops to left eye as directed to treat corneal abrasion irritation post bug spray exposure     Your left eye was irrigated here after local anesthstic     Otc tylenol as directed for left eye pain   Call and make appt to see Dr Garza your eye specialist this week for recheck of your left eye given recent catarct surgery to left eye

## 2025-07-07 ENCOUNTER — PATIENT OUTREACH (OUTPATIENT)
Facility: OTHER | Age: 80
End: 2025-07-07
Payer: MEDICARE

## 2025-07-07 ENCOUNTER — TELEPHONE (OUTPATIENT)
Dept: RHEUMATOLOGY | Facility: CLINIC | Age: 80
End: 2025-07-07
Payer: MEDICARE

## 2025-07-07 NOTE — TELEPHONE ENCOUNTER
Copied from CRM #[id number]. Topic: General Inquiry - Test Results  >> Jul 7, 2025  1:41 PM Avel wrote:  Patient is calling for LAB results. Please call patient at [phone] . Thanks KB

## 2025-07-25 ENCOUNTER — TELEPHONE (OUTPATIENT)
Dept: RHEUMATOLOGY | Facility: CLINIC | Age: 80
End: 2025-07-25
Payer: MEDICARE

## 2025-07-25 NOTE — TELEPHONE ENCOUNTER
Copied from CRM #[id number]. Topic: General Inquiry - Test Results  >> Jul 25, 2025  8:51 AM Ashwini wrote:  Type:  Patient Requesting a call back     Who Called:Indira   What is the call back request regarding?:would like to go over results   Would the patient rather a call back or a response via [facility]?call  Best Call Back Number:[phone]    Additional Information:

## 2025-07-28 ENCOUNTER — TELEPHONE (OUTPATIENT)
Dept: RHEUMATOLOGY | Facility: CLINIC | Age: 80
End: 2025-07-28
Payer: MEDICARE

## 2025-07-28 NOTE — TELEPHONE ENCOUNTER
Copied from CRM #[id number]. Topic: General Inquiry - Test Results  >> Jul 28, 2025  9:02 AM Thiago wrote:  Type:  Test Results    Who Called: Indira  Name of Test (Lab/Mammo/Etc): lab/ xray  Date of Test: 7/2  Ordering Provider: Dr Cage   Where the test was performed:   Would the patient rather a call back or a response via [facility]? Call back   Best Call Back Number: [phone]  Additional Information:  pt states no one has returned her call with results yet

## 2025-07-29 ENCOUNTER — TELEPHONE (OUTPATIENT)
Dept: RHEUMATOLOGY | Facility: CLINIC | Age: 80
End: 2025-07-29
Payer: MEDICARE

## 2025-07-29 NOTE — TELEPHONE ENCOUNTER
Pt voiced she was unaware of a new medication being prescribed for her (Enbrel) and has been calling for lab results. Encounter opened to track followup.

## 2025-07-29 NOTE — TELEPHONE ENCOUNTER
Copied from CRM #[id number]. Topic: General Inquiry - Return Call  >> Jul 29, 2025  1:23 PM Summer wrote:  Type:  Patient Returning Call    Who Called: Indira   Who Left Message for Patient: Kristen Ham MA  Does the patient know what this is regarding?: Test results   Would the patient rather a call back or a response via [facility]? callback  Best Call Back Number: [phone]  Additional Information: Missed call

## 2025-07-30 ENCOUNTER — TELEPHONE (OUTPATIENT)
Dept: DERMATOLOGY | Facility: CLINIC | Age: 80
End: 2025-07-30
Payer: MEDICARE

## 2025-07-30 ENCOUNTER — TELEPHONE (OUTPATIENT)
Dept: RHEUMATOLOGY | Facility: CLINIC | Age: 80
End: 2025-07-30
Payer: MEDICARE

## 2025-07-30 DIAGNOSIS — K21.9 GASTROESOPHAGEAL REFLUX DISEASE, UNSPECIFIED WHETHER ESOPHAGITIS PRESENT: ICD-10-CM

## 2025-07-30 NOTE — TELEPHONE ENCOUNTER
Second attempt at reaching out to patient. Call going to voicemail left message for patient to return call

## 2025-07-30 NOTE — TELEPHONE ENCOUNTER
Left a message for patient to return call in regards to her wanting information on her labs and xray results

## 2025-07-30 NOTE — TELEPHONE ENCOUNTER
Copied from CRM #[id number]. Topic: Appointments - Appointment Scheduling  >> Jul 30, 2025  9:03 AM Lala wrote:  Type:  Patient Needing To Schedule   Who Called:Indira   Patient Name: Indira   What is the appointment for?: results of test   Preferred Location and Doctor: Dr Giovanni Ronquillo   Would the patient rather a call back or a response via [facility]?  Call back   Best Call Back Number: [phone]    Additional Information: the patient stated that she has been calling for two weeks    Thanks,  ADDIS

## 2025-07-31 ENCOUNTER — TELEPHONE (OUTPATIENT)
Dept: RHEUMATOLOGY | Facility: CLINIC | Age: 80
End: 2025-07-31
Payer: MEDICARE

## 2025-07-31 RX ORDER — PANTOPRAZOLE SODIUM 40 MG/1
40 TABLET, DELAYED RELEASE ORAL
Qty: 90 TABLET | Refills: 0 | Status: SHIPPED | OUTPATIENT
Start: 2025-07-31

## 2025-07-31 NOTE — TELEPHONE ENCOUNTER
Requested Prescriptions     Pending Prescriptions Disp Refills    pantoprazole (PROTONIX) 40 MG tablet [Pharmacy Med Name: PANTOPRAZOLE SOD DR 40 MG TAB] 90 tablet 1     Sig: TAKE 1 TABLET BY MOUTH EVERY DAY     LV 11/13/2024 KM  NV none scheduled.   LF 08/30/2024 LT

## 2025-07-31 NOTE — TELEPHONE ENCOUNTER
Copied from CRM #[id number]. Topic: General Inquiry - Return Call  >> Jul 31, 2025 10:55 AM Summer wrote:  Type:  Patient Returning Call    Who Called: Indira   Who Left Message for Patient: Ann Marie Christie MA  Does the patient know what this is regarding?: yes   Would the patient rather a call back or a response via [facility]? Callback   Best Call Back Number:[phone]  Additional Information: Pt is missing a call back

## 2025-07-31 NOTE — TELEPHONE ENCOUNTER
Returned patients phone call but it keeps on going to  every time we try to return the patient's phone call. Not sure what is happening. Need to go over lab results as she is requesting

## 2025-08-01 ENCOUNTER — TELEPHONE (OUTPATIENT)
Dept: RHEUMATOLOGY | Facility: CLINIC | Age: 80
End: 2025-08-01
Payer: MEDICARE

## 2025-08-01 NOTE — TELEPHONE ENCOUNTER
Copied from CRM #[id number]. Topic: General Inquiry - Return Call  >> Aug 1, 2025  2:08 PM Araceli wrote:  .Type:  Patient Returning Call    Who Called:Indira Walker  Who Left Message for Patient:Ann Marie Christie MA  Does the patient know what this is regarding?:Patient returning a missed call in regards to test results  Would the patient rather a call back or a response via [facility]? Call back  Best Call Back Number:[phone] (M)  Additional Information:

## 2025-08-01 NOTE — TELEPHONE ENCOUNTER
LVM for pt to return call to go over test results. Phone keeps going to  everytime staff tries to call patient back     _Ann Marie

## 2025-08-04 ENCOUNTER — TELEPHONE (OUTPATIENT)
Dept: RHEUMATOLOGY | Facility: CLINIC | Age: 80
End: 2025-08-04
Payer: MEDICARE

## 2025-08-04 NOTE — TELEPHONE ENCOUNTER
"Informed patient per Dr. Cage,     "  No evidence of rheumatoid involvement of cervical spine.  Wear and tear artrhitis present. "      Per your note PDN was supposed to be sent to pharmacy. Can you please send to [facility] on [address]. Med not on MAR to route   "

## 2025-08-04 NOTE — TELEPHONE ENCOUNTER
Copied from CRM #[id number]. Topic: Escalation - Escalation to Leader  >> Aug 4, 2025  7:12 AM Seema wrote:  .Type: Patient Call Back        Who called:  patient     What is the request in detail:    Called in concerning getting a call back from dr burger. Patient states she has been calling for a couple of weeks now and still hasn't heard anything . Patient states that the office is saying the contact number is going straight to  . Patient states that she read the results on [facility] but doesn't understand them and would like to speak with someone . Patient was advised that someone mentioned something about getting injections and patient would like to speak with someone first   Can the clinic reply by [facility]?           Would the patient rather a call back or a response via [facility]?   call     Best call back number:  [phone]

## 2025-08-04 NOTE — TELEPHONE ENCOUNTER
Copied from CRM #[id number]. Topic: General Inquiry - Patient Advice  >> Aug 4, 2025  7:16 AM Seema wrote:  .Type: Patient Call Back        Who called:   patient     What is the request in detail:    Called in concerning rheum results . Patient states she having a hard time getting in touch with the provider and wanted to speak with pcp . Patient also made an appt to go over test results from dr burger   Can the clinic reply by [facility]?           Would the patient rather a call back or a response via [facility]?   call     Best call back number:  [phone]

## 2025-08-06 ENCOUNTER — PATIENT MESSAGE (OUTPATIENT)
Dept: ADMINISTRATIVE | Facility: HOSPITAL | Age: 80
End: 2025-08-06
Payer: MEDICARE

## 2025-08-12 ENCOUNTER — PATIENT MESSAGE (OUTPATIENT)
Dept: RHEUMATOLOGY | Facility: CLINIC | Age: 80
End: 2025-08-12
Payer: MEDICARE

## 2025-08-26 ENCOUNTER — TELEPHONE (OUTPATIENT)
Dept: RHEUMATOLOGY | Facility: CLINIC | Age: 80
End: 2025-08-26
Payer: MEDICARE

## (undated) DEVICE — PACK DRAPE PERI/GYN TIBURON

## (undated) DEVICE — MANIFOLD 4 PORT

## (undated) DEVICE — SUT VICRYL 2-0 27 CT-1

## (undated) DEVICE — JELLY SURGILUBE LUBE PKT 3GM

## (undated) DEVICE — SYR 10CC LUER LOCK

## (undated) DEVICE — GLOVE SURGICAL LATEX SZ 7

## (undated) DEVICE — PACK BASIC SETUP SC BR

## (undated) DEVICE — GOWN POLY REINF X-LONG XL

## (undated) DEVICE — SPONGE LAP 18X18 PREWASHED

## (undated) DEVICE — SUT VICRYL PLUS 0 CT1 18IN

## (undated) DEVICE — TRAY SKIN SCRUB WET PREMIUM

## (undated) DEVICE — GLOVE SURG BIOGEL LATEX SZ 7.5

## (undated) DEVICE — TRAY CATH FOL SIL URIMTR 16FR

## (undated) DEVICE — CONTAINER SPECIMEN OR STER 4OZ

## (undated) DEVICE — SUT VICRYL 3-0 27 CT-1

## (undated) DEVICE — COVER LIGHT HANDLE 80/CA

## (undated) DEVICE — TUBING MEDI-VAC 20FT .25IN

## (undated) DEVICE — CATH URETHRAL 16FR RED

## (undated) DEVICE — TOWEL OR DISP STRL BLUE 4/PK

## (undated) DEVICE — SUT VICRYL 2 0 CT 2

## (undated) DEVICE — SOL NS 1000CC

## (undated) DEVICE — SUT VICRYL 2-0 CT-2 VCP269H

## (undated) DEVICE — NDL SPINAL SPINOCAN 22GX3.5

## (undated) DEVICE — DRAPE UINDERBUT GRAD PCH

## (undated) DEVICE — SUT 0 54IN COATED VICRYL U

## (undated) DEVICE — Device

## (undated) DEVICE — SUT VICRYL 3-0 27 SH

## (undated) DEVICE — SPONGE GAUZE 16PLY 4X4

## (undated) DEVICE — SUT 3/0 36IN CHROMIC GUT C

## (undated) DEVICE — GLOVE BIOGEL PI MICRO SZ 6.5

## (undated) DEVICE — SYR B-D DISP CONTROL 10CC100/C